# Patient Record
Sex: MALE | Race: WHITE | NOT HISPANIC OR LATINO | Employment: OTHER | URBAN - METROPOLITAN AREA
[De-identification: names, ages, dates, MRNs, and addresses within clinical notes are randomized per-mention and may not be internally consistent; named-entity substitution may affect disease eponyms.]

---

## 2022-05-19 ENCOUNTER — TRANSCRIBE ORDERS (OUTPATIENT)
Dept: ADMINISTRATIVE | Facility: HOSPITAL | Age: 67
End: 2022-05-19

## 2022-05-19 DIAGNOSIS — M43.06 PARS DEFECT OF LUMBAR SPINE: Primary | ICD-10-CM

## 2022-05-26 ENCOUNTER — HOSPITAL ENCOUNTER (OUTPATIENT)
Dept: MRI IMAGING | Facility: HOSPITAL | Age: 67
Discharge: HOME | End: 2022-05-26
Admitting: NURSE PRACTITIONER

## 2022-05-26 DIAGNOSIS — M43.06 PARS DEFECT OF LUMBAR SPINE: ICD-10-CM

## 2022-05-26 PROCEDURE — 72148 MRI LUMBAR SPINE W/O DYE: CPT

## 2022-05-31 ENCOUNTER — HOSPITAL ENCOUNTER (INPATIENT)
Facility: HOSPITAL | Age: 67
LOS: 12 days | Discharge: NURSING, REHAB OR LONG-TERM CARE | End: 2022-06-17
Attending: EMERGENCY MEDICINE

## 2022-05-31 ENCOUNTER — APPOINTMENT (OUTPATIENT)
Dept: CT IMAGING | Facility: HOSPITAL | Age: 67
End: 2022-05-31

## 2022-05-31 ENCOUNTER — APPOINTMENT (OUTPATIENT)
Dept: GENERAL RADIOLOGY | Facility: HOSPITAL | Age: 67
End: 2022-05-31

## 2022-05-31 DIAGNOSIS — R55 SYNCOPE, UNSPECIFIED SYNCOPE TYPE: Primary | ICD-10-CM

## 2022-05-31 DIAGNOSIS — Z95.1 S/P CABG (CORONARY ARTERY BYPASS GRAFT): ICD-10-CM

## 2022-05-31 DIAGNOSIS — I95.1 ORTHOSTATIC HYPOTENSION: ICD-10-CM

## 2022-05-31 DIAGNOSIS — N28.9 ACUTE RENAL INSUFFICIENCY: ICD-10-CM

## 2022-05-31 DIAGNOSIS — J96.01 ACUTE RESPIRATORY FAILURE WITH HYPOXEMIA: ICD-10-CM

## 2022-05-31 DIAGNOSIS — N17.9 AKI (ACUTE KIDNEY INJURY): ICD-10-CM

## 2022-05-31 DIAGNOSIS — R94.39 ABNORMAL NUCLEAR STRESS TEST: ICD-10-CM

## 2022-05-31 DIAGNOSIS — J44.1 COPD EXACERBATION: ICD-10-CM

## 2022-05-31 DIAGNOSIS — R77.8 ELEVATED TROPONIN: ICD-10-CM

## 2022-05-31 DIAGNOSIS — F10.920 ALCOHOLIC INTOXICATION WITHOUT COMPLICATION: ICD-10-CM

## 2022-05-31 DIAGNOSIS — I25.10 CORONARY ARTERY DISEASE INVOLVING NATIVE CORONARY ARTERY OF NATIVE HEART, UNSPECIFIED WHETHER ANGINA PRESENT: ICD-10-CM

## 2022-05-31 LAB
ALBUMIN SERPL-MCNC: 3.6 G/DL (ref 3.5–5.2)
ALBUMIN/GLOB SERPL: 1.4 G/DL
ALP SERPL-CCNC: 54 U/L (ref 39–117)
ALT SERPL W P-5'-P-CCNC: 30 U/L (ref 1–41)
ANION GAP SERPL CALCULATED.3IONS-SCNC: 15 MMOL/L (ref 5–15)
AST SERPL-CCNC: 30 U/L (ref 1–40)
BASOPHILS # BLD AUTO: 0 10*3/MM3 (ref 0–0.2)
BASOPHILS NFR BLD AUTO: 0.5 % (ref 0–1.5)
BILIRUB SERPL-MCNC: 0.4 MG/DL (ref 0–1.2)
BUN SERPL-MCNC: 15 MG/DL (ref 8–23)
BUN/CREAT SERPL: 7.9 (ref 7–25)
CALCIUM SPEC-SCNC: 8.8 MG/DL (ref 8.6–10.5)
CHLORIDE SERPL-SCNC: 97 MMOL/L (ref 98–107)
CO2 SERPL-SCNC: 22 MMOL/L (ref 22–29)
CREAT SERPL-MCNC: 1.9 MG/DL (ref 0.76–1.27)
DEPRECATED RDW RBC AUTO: 48.1 FL (ref 37–54)
EGFRCR SERPLBLD CKD-EPI 2021: 38.2 ML/MIN/1.73
EOSINOPHIL # BLD AUTO: 0 10*3/MM3 (ref 0–0.4)
EOSINOPHIL NFR BLD AUTO: 0.7 % (ref 0.3–6.2)
ERYTHROCYTE [DISTWIDTH] IN BLOOD BY AUTOMATED COUNT: 13.9 % (ref 12.3–15.4)
ETHANOL UR QL: 0.2 %
GLOBULIN UR ELPH-MCNC: 2.5 GM/DL
GLUCOSE SERPL-MCNC: 111 MG/DL (ref 65–99)
HCT VFR BLD AUTO: 36.7 % (ref 37.5–51)
HGB BLD-MCNC: 12.1 G/DL (ref 13–17.7)
LYMPHOCYTES # BLD AUTO: 1.9 10*3/MM3 (ref 0.7–3.1)
LYMPHOCYTES NFR BLD AUTO: 31.5 % (ref 19.6–45.3)
MCH RBC QN AUTO: 32.9 PG (ref 26.6–33)
MCHC RBC AUTO-ENTMCNC: 33 G/DL (ref 31.5–35.7)
MCV RBC AUTO: 99.7 FL (ref 79–97)
MONOCYTES # BLD AUTO: 0.7 10*3/MM3 (ref 0.1–0.9)
MONOCYTES NFR BLD AUTO: 12 % (ref 5–12)
NEUTROPHILS NFR BLD AUTO: 3.3 10*3/MM3 (ref 1.7–7)
NEUTROPHILS NFR BLD AUTO: 55.3 % (ref 42.7–76)
NRBC BLD AUTO-RTO: 0.1 /100 WBC (ref 0–0.2)
PLATELET # BLD AUTO: 170 10*3/MM3 (ref 140–450)
PMV BLD AUTO: 8 FL (ref 6–12)
POTASSIUM SERPL-SCNC: 3.7 MMOL/L (ref 3.5–5.2)
PROT SERPL-MCNC: 6.1 G/DL (ref 6–8.5)
RBC # BLD AUTO: 3.68 10*6/MM3 (ref 4.14–5.8)
SARS-COV-2 RNA PNL SPEC NAA+PROBE: NOT DETECTED
SODIUM SERPL-SCNC: 134 MMOL/L (ref 136–145)
TROPONIN T SERPL-MCNC: 0.33 NG/ML (ref 0–0.03)
TROPONIN T SERPL-MCNC: 0.41 NG/ML (ref 0–0.03)
WBC NRBC COR # BLD: 6 10*3/MM3 (ref 3.4–10.8)

## 2022-05-31 PROCEDURE — 94640 AIRWAY INHALATION TREATMENT: CPT

## 2022-05-31 PROCEDURE — 82077 ASSAY SPEC XCP UR&BREATH IA: CPT | Performed by: PHYSICIAN ASSISTANT

## 2022-05-31 PROCEDURE — 84145 PROCALCITONIN (PCT): CPT | Performed by: PHYSICIAN ASSISTANT

## 2022-05-31 PROCEDURE — 84484 ASSAY OF TROPONIN QUANT: CPT | Performed by: PHYSICIAN ASSISTANT

## 2022-05-31 PROCEDURE — 70450 CT HEAD/BRAIN W/O DYE: CPT

## 2022-05-31 PROCEDURE — G0378 HOSPITAL OBSERVATION PER HR: HCPCS

## 2022-05-31 PROCEDURE — 80053 COMPREHEN METABOLIC PANEL: CPT | Performed by: PHYSICIAN ASSISTANT

## 2022-05-31 PROCEDURE — 85025 COMPLETE CBC W/AUTO DIFF WBC: CPT | Performed by: PHYSICIAN ASSISTANT

## 2022-05-31 PROCEDURE — 93005 ELECTROCARDIOGRAM TRACING: CPT | Performed by: EMERGENCY MEDICINE

## 2022-05-31 PROCEDURE — 94799 UNLISTED PULMONARY SVC/PX: CPT

## 2022-05-31 PROCEDURE — 87635 SARS-COV-2 COVID-19 AMP PRB: CPT | Performed by: PHYSICIAN ASSISTANT

## 2022-05-31 PROCEDURE — 99285 EMERGENCY DEPT VISIT HI MDM: CPT

## 2022-05-31 PROCEDURE — 93005 ELECTROCARDIOGRAM TRACING: CPT

## 2022-05-31 PROCEDURE — 71045 X-RAY EXAM CHEST 1 VIEW: CPT

## 2022-05-31 PROCEDURE — 86140 C-REACTIVE PROTEIN: CPT | Performed by: PHYSICIAN ASSISTANT

## 2022-05-31 PROCEDURE — 25010000002 THIAMINE PER 100 MG: Performed by: PHYSICIAN ASSISTANT

## 2022-05-31 RX ORDER — ALBUTEROL SULFATE 90 UG/1
6 AEROSOL, METERED RESPIRATORY (INHALATION) ONCE
Status: COMPLETED | OUTPATIENT
Start: 2022-05-31 | End: 2022-05-31

## 2022-05-31 RX ORDER — ASPIRIN 81 MG/1
324 TABLET, CHEWABLE ORAL ONCE
Status: COMPLETED | OUTPATIENT
Start: 2022-05-31 | End: 2022-06-01

## 2022-05-31 RX ORDER — HEPARIN SODIUM 10000 [USP'U]/100ML
9.62 INJECTION, SOLUTION INTRAVENOUS
Status: DISCONTINUED | OUTPATIENT
Start: 2022-05-31 | End: 2022-05-31

## 2022-05-31 RX ORDER — SODIUM CHLORIDE 0.9 % (FLUSH) 0.9 %
10 SYRINGE (ML) INJECTION AS NEEDED
Status: DISCONTINUED | OUTPATIENT
Start: 2022-05-31 | End: 2022-06-10

## 2022-05-31 RX ORDER — SODIUM CHLORIDE 9 MG/ML
75 INJECTION, SOLUTION INTRAVENOUS CONTINUOUS
Status: DISCONTINUED | OUTPATIENT
Start: 2022-05-31 | End: 2022-06-03

## 2022-05-31 RX ORDER — ENOXAPARIN SODIUM 100 MG/ML
1 INJECTION SUBCUTANEOUS ONCE
Status: COMPLETED | OUTPATIENT
Start: 2022-06-01 | End: 2022-06-01

## 2022-05-31 RX ORDER — METHYLPREDNISOLONE SODIUM SUCCINATE 125 MG/2ML
125 INJECTION, POWDER, LYOPHILIZED, FOR SOLUTION INTRAMUSCULAR; INTRAVENOUS ONCE
Status: COMPLETED | OUTPATIENT
Start: 2022-05-31 | End: 2022-06-01

## 2022-05-31 RX ADMIN — ALBUTEROL SULFATE 6 PUFF: 108 INHALANT RESPIRATORY (INHALATION) at 21:44

## 2022-05-31 RX ADMIN — FOLIC ACID 1000 ML/HR: 5 INJECTION, SOLUTION INTRAMUSCULAR; INTRAVENOUS; SUBCUTANEOUS at 21:49

## 2022-05-31 RX ADMIN — SODIUM CHLORIDE 1000 ML: 9 INJECTION, SOLUTION INTRAVENOUS at 21:22

## 2022-06-01 ENCOUNTER — APPOINTMENT (OUTPATIENT)
Dept: NUCLEAR MEDICINE | Facility: HOSPITAL | Age: 67
End: 2022-06-01

## 2022-06-01 ENCOUNTER — APPOINTMENT (OUTPATIENT)
Dept: CARDIOLOGY | Facility: HOSPITAL | Age: 67
End: 2022-06-01

## 2022-06-01 ENCOUNTER — APPOINTMENT (OUTPATIENT)
Dept: CT IMAGING | Facility: HOSPITAL | Age: 67
End: 2022-06-01

## 2022-06-01 PROBLEM — J44.1 COPD EXACERBATION: Status: ACTIVE | Noted: 2022-06-01

## 2022-06-01 PROBLEM — E66.9 OBESITY (BMI 30-39.9): Status: ACTIVE | Noted: 2022-06-01

## 2022-06-01 LAB
AMPHET+METHAMPHET UR QL: NEGATIVE
ANION GAP SERPL CALCULATED.3IONS-SCNC: 13 MMOL/L (ref 5–15)
BARBITURATES UR QL SCN: NEGATIVE
BASOPHILS # BLD AUTO: 0 10*3/MM3 (ref 0–0.2)
BASOPHILS NFR BLD AUTO: 0.1 % (ref 0–1.5)
BENZODIAZ UR QL SCN: NEGATIVE
BH CV ECHO MEAS - ACS: 2.25 CM
BH CV ECHO MEAS - AO MAX PG: 7.8 MMHG
BH CV ECHO MEAS - AO MEAN PG: 3.9 MMHG
BH CV ECHO MEAS - AO ROOT DIAM: 3.8 CM
BH CV ECHO MEAS - AO V2 MAX: 139.8 CM/SEC
BH CV ECHO MEAS - AO V2 VTI: 30.6 CM
BH CV ECHO MEAS - AVA(I,D): 2.7 CM2
BH CV ECHO MEAS - EDV(CUBED): 240.2 ML
BH CV ECHO MEAS - EDV(MOD-SP4): 164.8 ML
BH CV ECHO MEAS - EF(MOD-SP4): 67 %
BH CV ECHO MEAS - ESV(CUBED): 89 ML
BH CV ECHO MEAS - ESV(MOD-SP4): 54.3 ML
BH CV ECHO MEAS - FS: 28.2 %
BH CV ECHO MEAS - IVS/LVPW: 1.1 CM
BH CV ECHO MEAS - IVSD: 1.07 CM
BH CV ECHO MEAS - LA DIMENSION: 4.1 CM
BH CV ECHO MEAS - LV MASS(C)D: 268.7 GRAMS
BH CV ECHO MEAS - LV MAX PG: 4.2 MMHG
BH CV ECHO MEAS - LV MEAN PG: 2.38 MMHG
BH CV ECHO MEAS - LV V1 MAX: 102.7 CM/SEC
BH CV ECHO MEAS - LV V1 VTI: 22.8 CM
BH CV ECHO MEAS - LVIDD: 6.2 CM
BH CV ECHO MEAS - LVIDS: 4.5 CM
BH CV ECHO MEAS - LVOT AREA: 3.7 CM2
BH CV ECHO MEAS - LVOT DIAM: 2.17 CM
BH CV ECHO MEAS - LVPWD: 0.97 CM
BH CV ECHO MEAS - MR MAX PG: 131.4 MMHG
BH CV ECHO MEAS - MR MAX VEL: 573.1 CM/SEC
BH CV ECHO MEAS - MV A MAX VEL: 113.5 CM/SEC
BH CV ECHO MEAS - MV DEC SLOPE: 334.6 CM/SEC2
BH CV ECHO MEAS - MV DEC TIME: 0.27 MSEC
BH CV ECHO MEAS - MV E MAX VEL: 45 CM/SEC
BH CV ECHO MEAS - MV E/A: 0.4
BH CV ECHO MEAS - MV MAX PG: 7.4 MMHG
BH CV ECHO MEAS - MV MEAN PG: 3.5 MMHG
BH CV ECHO MEAS - MV V2 VTI: 32.3 CM
BH CV ECHO MEAS - MVA(VTI): 2.6 CM2
BH CV ECHO MEAS - PA ACC TIME: 0.07 SEC
BH CV ECHO MEAS - PA PR(ACCEL): 47.9 MMHG
BH CV ECHO MEAS - PA V2 MAX: 104.2 CM/SEC
BH CV ECHO MEAS - PULM A REVS DUR: 0.08 SEC
BH CV ECHO MEAS - PULM A REVS VEL: 32.2 CM/SEC
BH CV ECHO MEAS - PULM DIAS VEL: 46.2 CM/SEC
BH CV ECHO MEAS - PULM S/D: 1.4
BH CV ECHO MEAS - PULM SYS VEL: 64.6 CM/SEC
BH CV ECHO MEAS - RAP SYSTOLE: 15 MMHG
BH CV ECHO MEAS - RV MAX PG: 3.9 MMHG
BH CV ECHO MEAS - RV V1 MAX: 98.6 CM/SEC
BH CV ECHO MEAS - RV V1 VTI: 20.1 CM
BH CV ECHO MEAS - RVDD: 3.2 CM
BH CV ECHO MEAS - RVSP: 48.3 MMHG
BH CV ECHO MEAS - SV(LVOT): 84.1 ML
BH CV ECHO MEAS - SV(MOD-SP4): 110.5 ML
BH CV ECHO MEAS - TR MAX PG: 33.3 MMHG
BH CV ECHO MEAS - TR MAX VEL: 288.4 CM/SEC
BH CV REST NUCLEAR ISOTOPE DOSE: 7.9 MCI
BH CV STRESS BP STAGE 1: NORMAL
BH CV STRESS BP STAGE 2: NORMAL
BH CV STRESS COMMENTS STAGE 1: NORMAL
BH CV STRESS COMMENTS STAGE 2: NORMAL
BH CV STRESS DOSE REGADENOSON STAGE 1: 0.4
BH CV STRESS DURATION MIN STAGE 1: 0
BH CV STRESS DURATION MIN STAGE 2: 4
BH CV STRESS DURATION SEC STAGE 1: 10
BH CV STRESS DURATION SEC STAGE 2: 0
BH CV STRESS HR STAGE 1: 73
BH CV STRESS HR STAGE 2: 98
BH CV STRESS NUCLEAR ISOTOPE DOSE: 21.9 MCI
BH CV STRESS PROTOCOL 1: NORMAL
BH CV STRESS RECOVERY BP: NORMAL MMHG
BH CV STRESS RECOVERY HR: 84 BPM
BH CV STRESS STAGE 1: 1
BH CV STRESS STAGE 2: 2
BH CV XLRA MEAS LEFT DIST CCA EDV: 19.1 CM/SEC
BH CV XLRA MEAS LEFT DIST CCA PSV: 121 CM/SEC
BH CV XLRA MEAS LEFT DIST ICA EDV: -15.2 CM/SEC
BH CV XLRA MEAS LEFT DIST ICA PSV: -43.2 CM/SEC
BH CV XLRA MEAS LEFT ICA/CCA RATIO: -0.61
BH CV XLRA MEAS LEFT PROX CCA EDV: 19.9 CM/SEC
BH CV XLRA MEAS LEFT PROX CCA PSV: 137 CM/SEC
BH CV XLRA MEAS LEFT PROX ECA PSV: -116 CM/SEC
BH CV XLRA MEAS LEFT PROX ICA EDV: -22.4 CM/SEC
BH CV XLRA MEAS LEFT PROX ICA PSV: -83.2 CM/SEC
BH CV XLRA MEAS LEFT PROX SCLA PSV: 257 CM/SEC
BH CV XLRA MEAS LEFT VERTEBRAL A EDV: -19.9 CM/SEC
BH CV XLRA MEAS LEFT VERTEBRAL A PSV: -96.3 CM/SEC
BH CV XLRA MEAS RIGHT DIST CCA EDV: 14.3 CM/SEC
BH CV XLRA MEAS RIGHT DIST CCA PSV: 81.4 CM/SEC
BH CV XLRA MEAS RIGHT DIST ICA EDV: -16.8 CM/SEC
BH CV XLRA MEAS RIGHT DIST ICA PSV: -53.3 CM/SEC
BH CV XLRA MEAS RIGHT ICA/CCA RATIO: -0.52
BH CV XLRA MEAS RIGHT PROX CCA EDV: 15.5 CM/SEC
BH CV XLRA MEAS RIGHT PROX CCA PSV: 107 CM/SEC
BH CV XLRA MEAS RIGHT PROX ECA PSV: -133 CM/SEC
BH CV XLRA MEAS RIGHT PROX ICA EDV: -9 CM/SEC
BH CV XLRA MEAS RIGHT PROX ICA PSV: -55.7 CM/SEC
BH CV XLRA MEAS RIGHT PROX SCLA PSV: 207 CM/SEC
BH CV XLRA MEAS RIGHT VERTEBRAL A EDV: 11.6 CM/SEC
BH CV XLRA MEAS RIGHT VERTEBRAL A PSV: 32.9 CM/SEC
BILIRUB UR QL STRIP: NEGATIVE
BUN SERPL-MCNC: 14 MG/DL (ref 8–23)
BUN/CREAT SERPL: 12 (ref 7–25)
CALCIUM SPEC-SCNC: 8.3 MG/DL (ref 8.6–10.5)
CANNABINOIDS SERPL QL: NEGATIVE
CHLORIDE SERPL-SCNC: 103 MMOL/L (ref 98–107)
CLARITY UR: CLEAR
CO2 SERPL-SCNC: 20 MMOL/L (ref 22–29)
COCAINE UR QL: NEGATIVE
COLOR UR: YELLOW
CREAT SERPL-MCNC: 1.17 MG/DL (ref 0.76–1.27)
CRP SERPL-MCNC: 1.61 MG/DL (ref 0–0.5)
D DIMER PPP FEU-MCNC: 0.7 MG/L (FEU) (ref 0–0.59)
DEPRECATED RDW RBC AUTO: 46.8 FL (ref 37–54)
EGFRCR SERPLBLD CKD-EPI 2021: 68.3 ML/MIN/1.73
EOSINOPHIL # BLD AUTO: 0 10*3/MM3 (ref 0–0.4)
EOSINOPHIL NFR BLD AUTO: 0.1 % (ref 0.3–6.2)
ERYTHROCYTE [DISTWIDTH] IN BLOOD BY AUTOMATED COUNT: 13.4 % (ref 12.3–15.4)
GLUCOSE SERPL-MCNC: 159 MG/DL (ref 65–99)
GLUCOSE UR STRIP-MCNC: NEGATIVE MG/DL
HCT VFR BLD AUTO: 40.8 % (ref 37.5–51)
HGB BLD-MCNC: 13.4 G/DL (ref 13–17.7)
HGB UR QL STRIP.AUTO: NEGATIVE
KETONES UR QL STRIP: ABNORMAL
LEUKOCYTE ESTERASE UR QL STRIP.AUTO: NEGATIVE
LV EF NUC BP: 45 %
LYMPHOCYTES # BLD AUTO: 0.6 10*3/MM3 (ref 0.7–3.1)
LYMPHOCYTES NFR BLD AUTO: 11.4 % (ref 19.6–45.3)
MAXIMAL PREDICTED HEART RATE: 153 BPM
MCH RBC QN AUTO: 32.4 PG (ref 26.6–33)
MCHC RBC AUTO-ENTMCNC: 32.8 G/DL (ref 31.5–35.7)
MCV RBC AUTO: 99 FL (ref 79–97)
METHADONE UR QL SCN: NEGATIVE
MONOCYTES # BLD AUTO: 0.1 10*3/MM3 (ref 0.1–0.9)
MONOCYTES NFR BLD AUTO: 2 % (ref 5–12)
NEUTROPHILS NFR BLD AUTO: 4.7 10*3/MM3 (ref 1.7–7)
NEUTROPHILS NFR BLD AUTO: 86.4 % (ref 42.7–76)
NITRITE UR QL STRIP: NEGATIVE
NRBC BLD AUTO-RTO: 0.1 /100 WBC (ref 0–0.2)
OPIATES UR QL: NEGATIVE
OXYCODONE UR QL SCN: NEGATIVE
PERCENT MAX PREDICTED HR: 64.05 %
PH UR STRIP.AUTO: <=5 [PH] (ref 5–8)
PLATELET # BLD AUTO: 166 10*3/MM3 (ref 140–450)
PMV BLD AUTO: 7.9 FL (ref 6–12)
POTASSIUM SERPL-SCNC: 4.8 MMOL/L (ref 3.5–5.2)
PROCALCITONIN SERPL-MCNC: 0.12 NG/ML (ref 0–0.25)
PROT UR QL STRIP: NEGATIVE
RBC # BLD AUTO: 4.12 10*6/MM3 (ref 4.14–5.8)
SODIUM SERPL-SCNC: 136 MMOL/L (ref 136–145)
SP GR UR STRIP: 1.01 (ref 1–1.03)
STRESS BASELINE BP: NORMAL MMHG
STRESS BASELINE HR: 73 BPM
STRESS PERCENT HR: 75 %
STRESS POST PEAK BP: NORMAL MMHG
STRESS POST PEAK HR: 98 BPM
STRESS TARGET HR: 130 BPM
TROPONIN T SERPL-MCNC: 0.12 NG/ML (ref 0–0.03)
UROBILINOGEN UR QL STRIP: ABNORMAL
WBC NRBC COR # BLD: 5.5 10*3/MM3 (ref 3.4–10.8)

## 2022-06-01 PROCEDURE — 80307 DRUG TEST PRSMV CHEM ANLYZR: CPT | Performed by: PHYSICIAN ASSISTANT

## 2022-06-01 PROCEDURE — 94799 UNLISTED PULMONARY SVC/PX: CPT

## 2022-06-01 PROCEDURE — 71275 CT ANGIOGRAPHY CHEST: CPT

## 2022-06-01 PROCEDURE — 78452 HT MUSCLE IMAGE SPECT MULT: CPT

## 2022-06-01 PROCEDURE — 25010000002 METHYLPREDNISOLONE PER 40 MG: Performed by: PHYSICIAN ASSISTANT

## 2022-06-01 PROCEDURE — 25010000002 ENOXAPARIN PER 10 MG: Performed by: PHYSICIAN ASSISTANT

## 2022-06-01 PROCEDURE — G0378 HOSPITAL OBSERVATION PER HR: HCPCS

## 2022-06-01 PROCEDURE — 94664 DEMO&/EVAL PT USE INHALER: CPT

## 2022-06-01 PROCEDURE — A9502 TC99M TETROFOSMIN: HCPCS | Performed by: EMERGENCY MEDICINE

## 2022-06-01 PROCEDURE — 80048 BASIC METABOLIC PNL TOTAL CA: CPT | Performed by: PHYSICIAN ASSISTANT

## 2022-06-01 PROCEDURE — 99204 OFFICE O/P NEW MOD 45 MIN: CPT | Performed by: INTERNAL MEDICINE

## 2022-06-01 PROCEDURE — 93018 CV STRESS TEST I&R ONLY: CPT | Performed by: INTERNAL MEDICINE

## 2022-06-01 PROCEDURE — 93005 ELECTROCARDIOGRAM TRACING: CPT | Performed by: INTERNAL MEDICINE

## 2022-06-01 PROCEDURE — 78452 HT MUSCLE IMAGE SPECT MULT: CPT | Performed by: INTERNAL MEDICINE

## 2022-06-01 PROCEDURE — 0 IOPAMIDOL PER 1 ML: Performed by: EMERGENCY MEDICINE

## 2022-06-01 PROCEDURE — 84484 ASSAY OF TROPONIN QUANT: CPT | Performed by: INTERNAL MEDICINE

## 2022-06-01 PROCEDURE — 25010000002 METHYLPREDNISOLONE PER 125 MG: Performed by: PHYSICIAN ASSISTANT

## 2022-06-01 PROCEDURE — 93306 TTE W/DOPPLER COMPLETE: CPT

## 2022-06-01 PROCEDURE — 93880 EXTRACRANIAL BILAT STUDY: CPT

## 2022-06-01 PROCEDURE — 25010000002 REGADENOSON 0.4 MG/5ML SOLUTION: Performed by: EMERGENCY MEDICINE

## 2022-06-01 PROCEDURE — 93306 TTE W/DOPPLER COMPLETE: CPT | Performed by: INTERNAL MEDICINE

## 2022-06-01 PROCEDURE — 85025 COMPLETE CBC W/AUTO DIFF WBC: CPT | Performed by: PHYSICIAN ASSISTANT

## 2022-06-01 PROCEDURE — 93017 CV STRESS TEST TRACING ONLY: CPT

## 2022-06-01 PROCEDURE — 81003 URINALYSIS AUTO W/O SCOPE: CPT | Performed by: PHYSICIAN ASSISTANT

## 2022-06-01 PROCEDURE — 25010000002 SULFUR HEXAFLUORIDE MICROSPH 60.7-25 MG RECONSTITUTED SUSPENSION: Performed by: EMERGENCY MEDICINE

## 2022-06-01 PROCEDURE — 85379 FIBRIN DEGRADATION QUANT: CPT | Performed by: PHYSICIAN ASSISTANT

## 2022-06-01 PROCEDURE — 94640 AIRWAY INHALATION TREATMENT: CPT

## 2022-06-01 PROCEDURE — 0 TECHNETIUM TETROFOSMIN KIT: Performed by: EMERGENCY MEDICINE

## 2022-06-01 RX ORDER — IPRATROPIUM BROMIDE AND ALBUTEROL SULFATE 2.5; .5 MG/3ML; MG/3ML
3 SOLUTION RESPIRATORY (INHALATION)
Status: DISCONTINUED | OUTPATIENT
Start: 2022-06-01 | End: 2022-06-07

## 2022-06-01 RX ORDER — GABAPENTIN 600 MG/1
600 TABLET ORAL 2 TIMES DAILY
COMMUNITY
End: 2022-06-17 | Stop reason: HOSPADM

## 2022-06-01 RX ORDER — AMLODIPINE BESYLATE 5 MG/1
5 TABLET ORAL DAILY
Status: DISCONTINUED | OUTPATIENT
Start: 2022-06-01 | End: 2022-06-10

## 2022-06-01 RX ORDER — LORAZEPAM 1 MG/1
1 TABLET ORAL
Status: ACTIVE | OUTPATIENT
Start: 2022-06-01 | End: 2022-06-08

## 2022-06-01 RX ORDER — PREDNISONE 20 MG/1
40 TABLET ORAL
Qty: 10 TABLET | Refills: 0 | Status: SHIPPED | OUTPATIENT
Start: 2022-06-02 | End: 2022-06-07

## 2022-06-01 RX ORDER — ACETAMINOPHEN 650 MG/1
650 SUPPOSITORY RECTAL EVERY 4 HOURS PRN
Status: DISCONTINUED | OUTPATIENT
Start: 2022-06-01 | End: 2022-06-10

## 2022-06-01 RX ORDER — BUDESONIDE AND FORMOTEROL FUMARATE DIHYDRATE 80; 4.5 UG/1; UG/1
2 AEROSOL RESPIRATORY (INHALATION)
Status: DISCONTINUED | OUTPATIENT
Start: 2022-06-01 | End: 2022-06-02

## 2022-06-01 RX ORDER — ALUMINA, MAGNESIA, AND SIMETHICONE 2400; 2400; 240 MG/30ML; MG/30ML; MG/30ML
15 SUSPENSION ORAL EVERY 6 HOURS PRN
Status: DISCONTINUED | OUTPATIENT
Start: 2022-06-01 | End: 2022-06-10

## 2022-06-01 RX ORDER — ACETAMINOPHEN 325 MG/1
650 TABLET ORAL EVERY 4 HOURS PRN
Status: DISCONTINUED | OUTPATIENT
Start: 2022-06-01 | End: 2022-06-10

## 2022-06-01 RX ORDER — GABAPENTIN 600 MG/1
600 TABLET ORAL 2 TIMES DAILY
Status: DISCONTINUED | OUTPATIENT
Start: 2022-06-01 | End: 2022-06-10

## 2022-06-01 RX ORDER — IPRATROPIUM BROMIDE AND ALBUTEROL SULFATE 2.5; .5 MG/3ML; MG/3ML
3 SOLUTION RESPIRATORY (INHALATION)
Status: DISCONTINUED | OUTPATIENT
Start: 2022-06-01 | End: 2022-06-10

## 2022-06-01 RX ORDER — POTASSIUM CHLORIDE 1.5 G/1.77G
40 POWDER, FOR SOLUTION ORAL AS NEEDED
Status: DISCONTINUED | OUTPATIENT
Start: 2022-06-01 | End: 2022-06-10

## 2022-06-01 RX ORDER — GUAIFENESIN 600 MG/1
1200 TABLET, EXTENDED RELEASE ORAL EVERY 12 HOURS
Status: DISCONTINUED | OUTPATIENT
Start: 2022-06-01 | End: 2022-06-13

## 2022-06-01 RX ORDER — AMLODIPINE BESYLATE 5 MG/1
5 TABLET ORAL DAILY
COMMUNITY
End: 2022-06-17 | Stop reason: HOSPADM

## 2022-06-01 RX ORDER — ONDANSETRON 4 MG/1
4 TABLET, FILM COATED ORAL EVERY 6 HOURS PRN
Status: DISCONTINUED | OUTPATIENT
Start: 2022-06-01 | End: 2022-06-10

## 2022-06-01 RX ORDER — PREDNISONE 20 MG/1
40 TABLET ORAL
Status: DISCONTINUED | OUTPATIENT
Start: 2022-06-02 | End: 2022-06-03

## 2022-06-01 RX ORDER — LISINOPRIL 20 MG/1
40 TABLET ORAL DAILY
Status: DISCONTINUED | OUTPATIENT
Start: 2022-06-01 | End: 2022-06-03

## 2022-06-01 RX ORDER — LORAZEPAM 0.5 MG/1
0.5 TABLET ORAL
Status: ACTIVE | OUTPATIENT
Start: 2022-06-01 | End: 2022-06-08

## 2022-06-01 RX ORDER — BENZONATATE 100 MG/1
200 CAPSULE ORAL 3 TIMES DAILY PRN
Status: DISCONTINUED | OUTPATIENT
Start: 2022-06-01 | End: 2022-06-10

## 2022-06-01 RX ORDER — TAMSULOSIN HYDROCHLORIDE 0.4 MG/1
1 CAPSULE ORAL DAILY
Status: ON HOLD | COMMUNITY
End: 2022-06-17 | Stop reason: SDUPTHER

## 2022-06-01 RX ORDER — LISINOPRIL 40 MG/1
40 TABLET ORAL DAILY
COMMUNITY
End: 2022-06-17 | Stop reason: HOSPADM

## 2022-06-01 RX ORDER — METHYLPREDNISOLONE SODIUM SUCCINATE 40 MG/ML
40 INJECTION, POWDER, LYOPHILIZED, FOR SOLUTION INTRAMUSCULAR; INTRAVENOUS EVERY 8 HOURS
Status: COMPLETED | OUTPATIENT
Start: 2022-06-01 | End: 2022-06-01

## 2022-06-01 RX ORDER — ACETAMINOPHEN 160 MG/5ML
650 SOLUTION ORAL EVERY 4 HOURS PRN
Status: DISCONTINUED | OUTPATIENT
Start: 2022-06-01 | End: 2022-06-10

## 2022-06-01 RX ORDER — LORAZEPAM 0.5 MG/1
0.5 TABLET ORAL
Status: DISPENSED | OUTPATIENT
Start: 2022-06-01 | End: 2022-06-08

## 2022-06-01 RX ORDER — POTASSIUM CHLORIDE 7.45 MG/ML
10 INJECTION INTRAVENOUS
Status: DISCONTINUED | OUTPATIENT
Start: 2022-06-01 | End: 2022-06-10

## 2022-06-01 RX ORDER — ATORVASTATIN CALCIUM 10 MG/1
10 TABLET, FILM COATED ORAL DAILY
Status: DISCONTINUED | OUTPATIENT
Start: 2022-06-01 | End: 2022-06-03

## 2022-06-01 RX ORDER — CHOLECALCIFEROL (VITAMIN D3) 125 MCG
5 CAPSULE ORAL NIGHTLY PRN
Status: DISCONTINUED | OUTPATIENT
Start: 2022-06-01 | End: 2022-06-10

## 2022-06-01 RX ORDER — MAGNESIUM SULFATE HEPTAHYDRATE 40 MG/ML
2 INJECTION, SOLUTION INTRAVENOUS AS NEEDED
Status: DISCONTINUED | OUTPATIENT
Start: 2022-06-01 | End: 2022-06-10

## 2022-06-01 RX ORDER — SODIUM CHLORIDE 0.9 % (FLUSH) 0.9 %
10 SYRINGE (ML) INJECTION AS NEEDED
Status: DISCONTINUED | OUTPATIENT
Start: 2022-06-01 | End: 2022-06-10

## 2022-06-01 RX ORDER — ONDANSETRON 2 MG/ML
4 INJECTION INTRAMUSCULAR; INTRAVENOUS EVERY 6 HOURS PRN
Status: DISCONTINUED | OUTPATIENT
Start: 2022-06-01 | End: 2022-06-10

## 2022-06-01 RX ORDER — POTASSIUM CHLORIDE 20 MEQ/1
40 TABLET, EXTENDED RELEASE ORAL AS NEEDED
Status: DISCONTINUED | OUTPATIENT
Start: 2022-06-01 | End: 2022-06-10

## 2022-06-01 RX ORDER — NITROGLYCERIN 0.4 MG/1
0.4 TABLET SUBLINGUAL
Status: DISCONTINUED | OUTPATIENT
Start: 2022-06-01 | End: 2022-06-10

## 2022-06-01 RX ORDER — ENOXAPARIN SODIUM 150 MG/ML
1 INJECTION SUBCUTANEOUS EVERY 12 HOURS SCHEDULED
Status: DISCONTINUED | OUTPATIENT
Start: 2022-06-01 | End: 2022-06-05

## 2022-06-01 RX ORDER — SODIUM CHLORIDE 0.9 % (FLUSH) 0.9 %
10 SYRINGE (ML) INJECTION EVERY 12 HOURS SCHEDULED
Status: DISCONTINUED | OUTPATIENT
Start: 2022-06-01 | End: 2022-06-10

## 2022-06-01 RX ORDER — PRAVASTATIN SODIUM 20 MG
20 TABLET ORAL DAILY
Status: ON HOLD | COMMUNITY
End: 2022-06-17 | Stop reason: SDUPTHER

## 2022-06-01 RX ORDER — MAGNESIUM SULFATE HEPTAHYDRATE 40 MG/ML
4 INJECTION, SOLUTION INTRAVENOUS AS NEEDED
Status: DISCONTINUED | OUTPATIENT
Start: 2022-06-01 | End: 2022-06-10

## 2022-06-01 RX ADMIN — IPRATROPIUM BROMIDE AND ALBUTEROL SULFATE 3 ML: .5; 3 SOLUTION RESPIRATORY (INHALATION) at 00:38

## 2022-06-01 RX ADMIN — Medication 600 MG: at 21:19

## 2022-06-01 RX ADMIN — ENOXAPARIN SODIUM 110 MG: 150 INJECTION SUBCUTANEOUS at 21:19

## 2022-06-01 RX ADMIN — IPRATROPIUM BROMIDE AND ALBUTEROL SULFATE 3 ML: .5; 3 SOLUTION RESPIRATORY (INHALATION) at 22:40

## 2022-06-01 RX ADMIN — SODIUM CHLORIDE 75 ML/HR: 9 INJECTION, SOLUTION INTRAVENOUS at 14:20

## 2022-06-01 RX ADMIN — ENOXAPARIN SODIUM 100 MG: 100 INJECTION SUBCUTANEOUS at 00:44

## 2022-06-01 RX ADMIN — Medication 10 ML: at 00:45

## 2022-06-01 RX ADMIN — Medication 10 ML: at 11:49

## 2022-06-01 RX ADMIN — ENOXAPARIN SODIUM 110 MG: 150 INJECTION SUBCUTANEOUS at 11:48

## 2022-06-01 RX ADMIN — METHYLPREDNISOLONE SODIUM SUCCINATE 40 MG: 40 INJECTION, POWDER, FOR SOLUTION INTRAMUSCULAR; INTRAVENOUS at 11:48

## 2022-06-01 RX ADMIN — IPRATROPIUM BROMIDE AND ALBUTEROL SULFATE 3 ML: .5; 3 SOLUTION RESPIRATORY (INHALATION) at 19:41

## 2022-06-01 RX ADMIN — IPRATROPIUM BROMIDE AND ALBUTEROL SULFATE 3 ML: .5; 3 SOLUTION RESPIRATORY (INHALATION) at 10:40

## 2022-06-01 RX ADMIN — TETROFOSMIN 1 DOSE: 1.38 INJECTION, POWDER, LYOPHILIZED, FOR SOLUTION INTRAVENOUS at 12:13

## 2022-06-01 RX ADMIN — AMLODIPINE BESYLATE 5 MG: 5 TABLET ORAL at 14:21

## 2022-06-01 RX ADMIN — IOPAMIDOL 100 ML: 755 INJECTION, SOLUTION INTRAVENOUS at 12:43

## 2022-06-01 RX ADMIN — ASPIRIN 324 MG: 81 TABLET, CHEWABLE ORAL at 00:44

## 2022-06-01 RX ADMIN — IPRATROPIUM BROMIDE AND ALBUTEROL SULFATE 3 ML: .5; 3 SOLUTION RESPIRATORY (INHALATION) at 14:58

## 2022-06-01 RX ADMIN — GUAIFENESIN 1200 MG: 600 TABLET, EXTENDED RELEASE ORAL at 23:53

## 2022-06-01 RX ADMIN — GUAIFENESIN 1200 MG: 600 TABLET, EXTENDED RELEASE ORAL at 00:45

## 2022-06-01 RX ADMIN — METHYLPREDNISOLONE SODIUM SUCCINATE 40 MG: 40 INJECTION, POWDER, FOR SOLUTION INTRAMUSCULAR; INTRAVENOUS at 23:53

## 2022-06-01 RX ADMIN — REGADENOSON 0.4 MG: 0.08 INJECTION, SOLUTION INTRAVENOUS at 13:11

## 2022-06-01 RX ADMIN — ATORVASTATIN CALCIUM 10 MG: 10 TABLET, FILM COATED ORAL at 14:20

## 2022-06-01 RX ADMIN — METHYLPREDNISOLONE SODIUM SUCCINATE 40 MG: 40 INJECTION, POWDER, FOR SOLUTION INTRAMUSCULAR; INTRAVENOUS at 18:16

## 2022-06-01 RX ADMIN — GABAPENTIN 600 MG: 600 TABLET, FILM COATED ORAL at 21:19

## 2022-06-01 RX ADMIN — LISINOPRIL 40 MG: 20 TABLET ORAL at 14:20

## 2022-06-01 RX ADMIN — SODIUM CHLORIDE 75 ML/HR: 9 INJECTION, SOLUTION INTRAVENOUS at 00:44

## 2022-06-01 RX ADMIN — GABAPENTIN 600 MG: 600 TABLET, FILM COATED ORAL at 15:52

## 2022-06-01 RX ADMIN — SULFUR HEXAFLUORIDE 2 ML: KIT at 08:46

## 2022-06-01 RX ADMIN — Medication 10 ML: at 21:19

## 2022-06-01 RX ADMIN — METHYLPREDNISOLONE SODIUM SUCCINATE 125 MG: 125 INJECTION, POWDER, FOR SOLUTION INTRAMUSCULAR; INTRAVENOUS at 00:44

## 2022-06-01 RX ADMIN — GUAIFENESIN 1200 MG: 600 TABLET, EXTENDED RELEASE ORAL at 11:49

## 2022-06-01 RX ADMIN — BUDESONIDE AND FORMOTEROL FUMARATE DIHYDRATE 2 PUFF: 80; 4.5 AEROSOL RESPIRATORY (INHALATION) at 22:40

## 2022-06-01 RX ADMIN — TETROFOSMIN 1 DOSE: 1.38 INJECTION, POWDER, LYOPHILIZED, FOR SOLUTION INTRAVENOUS at 13:11

## 2022-06-01 NOTE — PLAN OF CARE
Goal Outcome Evaluation:  Plan of Care Reviewed With: patient        Progress: no change  Outcome Evaluation: Patient had abnormal myoview. Cardiac cath tomorrow. Will monitor.

## 2022-06-01 NOTE — ED PROVIDER NOTES
"Subjective     Patient is a 67-year-old male comes in complaining of syncope since earlier today.  EMS reports that patient joann had witnessed patient passed out earlier today just prior to arrival.  EMS states that when they arrived patient had a subsequent syncopal episode where patient had lost consciousness for about 30 seconds.  EMS also reports that patient had been mildly hypoxic in the upper 80s.  EMS reports that patient has been drinking daily.  I asked patient how many beers he has had today and yesterday patient states that he drinks as much as he wants to drink \"I do not know 5-6 beers\".  Patient denies any vomiting, diarrhea, abdominal pain, chest pain, shortness of breath, urinary symptoms or swelling her legs bilaterally.  Patient denies any cough.          Review of Systems   Constitutional: Negative for chills, fatigue and fever.   HENT: Negative for congestion, sore throat, tinnitus and trouble swallowing.    Eyes: Negative for photophobia, discharge and visual disturbance.   Respiratory: Negative for cough, shortness of breath and wheezing.    Cardiovascular: Negative for chest pain and leg swelling.   Gastrointestinal: Negative for abdominal pain, diarrhea, nausea and vomiting.   Genitourinary: Negative for dysuria, flank pain and urgency.   Musculoskeletal: Negative for arthralgias and myalgias.   Skin: Negative for rash.   Neurological: Positive for syncope. Negative for dizziness, light-headedness and headaches.   Psychiatric/Behavioral: Negative for confusion.       History reviewed. No pertinent past medical history.    No Known Allergies    History reviewed. No pertinent surgical history.    History reviewed. No pertinent family history.    Social History     Socioeconomic History   • Marital status:            Objective   Physical Exam  Vitals and nursing note reviewed.   Constitutional:       General: He is not in acute distress.     Appearance: He is well-developed. He is not " "diaphoretic.   HENT:      Head: Normocephalic and atraumatic.      Right Ear: External ear normal.      Left Ear: External ear normal.      Nose: Nose normal.      Mouth/Throat:      Pharynx: No oropharyngeal exudate.   Eyes:      Extraocular Movements: Extraocular movements intact.      Conjunctiva/sclera: Conjunctivae normal.      Pupils: Pupils are equal, round, and reactive to light.   Cardiovascular:      Rate and Rhythm: Normal rate and regular rhythm.      Pulses: Normal pulses.      Heart sounds: Normal heart sounds.      Comments: S1, S2 audible.  Pulmonary:      Effort: Pulmonary effort is normal. No respiratory distress.      Breath sounds: Normal breath sounds. No wheezing.      Comments: On room air.  Abdominal:      General: Bowel sounds are normal. There is no distension.      Palpations: Abdomen is soft.      Tenderness: There is no abdominal tenderness. There is no guarding or rebound.   Musculoskeletal:         General: No tenderness or deformity. Normal range of motion.      Cervical back: Normal range of motion.      Right lower leg: No edema.      Left lower leg: No edema.   Skin:     General: Skin is warm.      Capillary Refill: Capillary refill takes less than 2 seconds.      Findings: No erythema or rash.   Neurological:      Mental Status: He is alert and oriented to person, place, and time.      Cranial Nerves: No cranial nerve deficit.   Psychiatric:         Mood and Affect: Mood normal.         Behavior: Behavior normal.         Procedures           ED Course  ED Course as of 06/01/22 0022   Tue May 31, 2022   2115 Patient's orthostatic vitals show home blood pressure lying down of 81/53 and heart rate of 69, blood pressure sitting of 83/55 and heart rate of 72, standing blood pressure of 68/45 and heart rate of 77 and reports dizziness upon standing. [RL]      ED Course User Index  [RL] Brian Brock PA      BP 95/52   Pulse 72   Temp 98.1 °F (36.7 °C)   Resp 18   Ht 177.8 cm (70\")   " Wt 104 kg (230 lb)   SpO2 94%   BMI 33.00 kg/m²   Labs Reviewed   COMPREHENSIVE METABOLIC PANEL - Abnormal; Notable for the following components:       Result Value    Glucose 111 (*)     Creatinine 1.90 (*)     Sodium 134 (*)     Chloride 97 (*)     eGFR 38.2 (*)     All other components within normal limits    Narrative:     GFR Normal >60  Chronic Kidney Disease <60  Kidney Failure <15     TROPONIN (IN-HOUSE) - Abnormal; Notable for the following components:    Troponin T 0.415 (*)     All other components within normal limits    Narrative:     Troponin T Reference Range:  <= 0.03 ng/mL-   Negative for AMI  >0.03 ng/mL-     Abnormal for myocardial necrosis.  Clinicians would have to utilize clinical acumen, EKG, Troponin and serial changes to determine if it is an Acute Myocardial Infarction or myocardial injury due to an underlying chronic condition.       Results may be falsely decreased if patient taking Biotin.     TROPONIN (IN-HOUSE) - Abnormal; Notable for the following components:    Troponin T 0.326 (*)     All other components within normal limits    Narrative:     Troponin T Reference Range:  <= 0.03 ng/mL-   Negative for AMI  >0.03 ng/mL-     Abnormal for myocardial necrosis.  Clinicians would have to utilize clinical acumen, EKG, Troponin and serial changes to determine if it is an Acute Myocardial Infarction or myocardial injury due to an underlying chronic condition.       Results may be falsely decreased if patient taking Biotin.     CBC WITH AUTO DIFFERENTIAL - Abnormal; Notable for the following components:    RBC 3.68 (*)     Hemoglobin 12.1 (*)     Hematocrit 36.7 (*)     MCV 99.7 (*)     All other components within normal limits   COVID-19,CEPHEID/ROMAIN,COR/STACEY/PAD/JOVON IN-HOUSE,NP SWAB IN TRANSPORT MEDIA 3-4 HR TAT, RT-PCR - Normal    Narrative:     Fact sheet for providers: https://www.fda.gov/media/722099/download     Fact sheet for patients: https://www.fda.gov/media/937752/download  Fact  sheet for providers: https://www.fda.gov/media/191719/download    Fact sheet for patients: https://www.fda.gov/media/511470/download    Test performed by PCR.   ETHANOL    Narrative:     Plasma Ethanol Clinical Symptoms:    ETOH (%)               Clinical Symptom  .01-.05              No apparent influence  .03-.12              Euphoria, Diminished judgment and attention   .09-.25              Impaired comprehension, Muscle incoordination  .18-.30              Confusion, Staggered gait, Slurred speech  .25-.40              Markedly decreased response to stimuli, unable to stand or                        walk, vomitting, sleep or stupor  .35-.50              Comatose, Anesthesia, Subnormal body temperature       URINE DRUG SCREEN   URINALYSIS W/ CULTURE IF INDICATED   C-REACTIVE PROTEIN   PROCALCITONIN   D-DIMER, QUANTITATIVE   CBC AND DIFFERENTIAL    Narrative:     The following orders were created for panel order CBC & Differential.  Procedure                               Abnormality         Status                     ---------                               -----------         ------                     CBC Auto Differential[[id number]]        Abnormal            Final result                 Please view results for these tests on the individual orders.     CT Head Without Contrast    Result Date: 5/31/2022  No acute intracranial abnormality.  Electronically Signed By-Johann Dodge MD On:5/31/2022 10:53 PM This report was finalized on [id number] by  Johann Dodge MD.    XR Chest 1 View    Result Date: 5/31/2022  No acute cardiopulmonary abnormality.  Electronically Signed By-Johann Dodge MD On:5/31/2022 9:29 PM This report was finalized on [id number] by  Johann Dodge MD.                                               [facility]       Chart review: No known allergies  EKG: EKG reviewed myself interpreted by Dr. Madrid, shows sinus rhythm 72 bpm, no ST elevation apparent.    Imaging:    CT Head Without Contrast  "  Final Result   No acute intracranial abnormality.       Electronically Signed By-Johann Dodge MD On:5/31/2022 10:53 PM   This report was finalized on [id number] by  Johann Dodge MD.      XR Chest 1 View   Final Result   No acute cardiopulmonary abnormality.       Electronically Signed By-Johann Dodge MD On:5/31/2022 9:29 PM   This report was finalized on [id number] by  Johann Dodge MD.          Labs: Serum creatinine elevated at 1.9 with GFR of 38.2.  CBC largely unremarkable for acute findings.  Ethanol level elevated 0.203.  UA pending upon admission as well as UDS.  COVID-19 swab negative.  Initial troponin elevated 0.4, repeat elevated 0.326.    Vitals:  BP 95/52   Pulse 72   Temp 98.1 °F (36.7 °C)   Resp 18   Ht 177.8 cm (70\")   Wt 104 kg (230 lb)   SpO2 94%   BMI 33.00 kg/m²     Medications given:    Medications   sodium chloride 0.9 % flush 10 mL (has no administration in time range)   methylPREDNISolone sodium succinate (SOLU-Medrol) injection 125 mg (has no administration in time range)   sodium chloride 0.9 % infusion (has no administration in time range)   aspirin chewable tablet 324 mg (has no administration in time range)   Enoxaparin Sodium (LOVENOX) syringe 100 mg (has no administration in time range)   LORazepam (ATIVAN) tablet 0.5 mg (has no administration in time range)     Or   LORazepam (ATIVAN) tablet 0.5 mg (has no administration in time range)     Or   LORazepam (ATIVAN) tablet 1 mg (has no administration in time range)     Or   LORazepam (ATIVAN) tablet 0.5 mg (has no administration in time range)     Or   LORazepam (ATIVAN) tablet 0.5 mg (has no administration in time range)     Or   LORazepam (ATIVAN) tablet 0.5 mg (has no administration in time range)   ipratropium-albuterol (DUO-NEB) nebulizer solution 3 mL (has no administration in time range)   ipratropium-albuterol (DUO-NEB) nebulizer solution 3 mL (has no administration in time range)   methylPREDNISolone " sodium succinate (SOLU-Medrol) injection 40 mg (has no administration in time range)   benzonatate (TESSALON) capsule 200 mg (has no administration in time range)   guaiFENesin (MUCINEX) 12 hr tablet 1,200 mg (has no administration in time range)   sodium chloride 0.9 % bolus 1,000 mL (0 mL Intravenous Stopped 5/31/22 2235)   albuterol sulfate HFA (PROVENTIL HFA;VENTOLIN HFA;PROAIR HFA) inhaler 6 puff (6 puffs Inhalation Given 5/31/22 2144)   thiamine (B-1) 100 mg, folic acid 1 mg in sodium chloride 0.9 % 1,000 mL infusion (1,000 mL/hr Intravenous New Bag 5/31/22 2149)       Procedures:    MDM: Patient is a 67-year-old male comes in complaining of syncopal episodes, x2 and patient has a history of alcoholism.  Patient states he does drink 5 or 6 beers today.  Serum creatinine elevated at 1.9 with GFR of 38.2.  CBC largely unremarkable for acute findings.  Ethanol level elevated 0.203.  UA pending upon admission as well as UDS.  COVID-19 swab negative.  CT head unremarkable for acute findings.  Initial troponin elevated 0.4, repeat elevated 0.326.  Patient had orthostatic hypotension and was hypotensive upon arrival.  Patient was given 1 L normal saline bolus as well as 1 L banana bag.  Patient was also wheezy on exam and was given albuterol as well as Solu-Medrol for apparent COPD exacerbation.  Patient states he is not on supplemental oxygen at home.  Patient currently requiring 3.5 L nasal cannula as patient was hypoxic in the upper and mid 80s on room air.  Patient was covered with therapeutic dose Lovenox for patient's elevated troponin as well as hypoxia.  Patient will need further cardiac consultation and work-up.  Patient be placed in ED observation unit for further work-up and treatment.  I discussed case with attending provider recommended ED observation unit as well.  Results and plan discussed with patient and is agreeable with plan.      Syncope  -Possibly from dehydration from alcohol  intoxication  -Cardiology consultation  -Check serial troponin, echo, continuous cardiac monitoring    Elevated troponin   -Troponin elevated at 0.4 and repeat 0.3  -Patient given therapeutic dose Lovenox here in the ED x1    Acute respiratory failure with hypoxia  -Patient usually on no supplemental oxygen at home currently requiring 3.5 L nasal cannula    COPD exacerbation  -Patient given Solu-Medrol in ED as well as albuterol  -Continue Solu-Medrol  -Check Pro-Yfn and CRP    Alcohol intoxication with history of daily alcohol use   -EtOH of 0.203 in ED  -CIWA protocol ordered  -Banana bag given in ED    Final diagnoses:   Syncope, unspecified syncope type   Alcoholic intoxication without complication (HCC)   Acute respiratory failure with hypoxemia (HCC)   Orthostatic hypotension   Elevated troponin   FELIBERTO (acute kidney injury) (HCC)   COPD exacerbation (HCC)       ED Disposition  ED Disposition     ED Disposition   Decision to Admit    Condition   --    Comment   --             No follow-up provider specified.       Medication List      No changes were made to your prescriptions during this visit.          Brian Brock PA  06/01/22 0022       Brian Brock PA  06/01/22 0022

## 2022-06-01 NOTE — H&P
"[facility] H&P    Patient Name: Chi Quinteros Sr.  : 1955  MRN: [id number]  Primary Care Physician: Deysi Mason APRN  Date of admission: 2022     Patient Care Team:  Deysi Mason APRN as PCP - General (Nurse Practitioner)          Subjective   History Present Illness     Chief Complaint:   Chief Complaint   Patient presents with   • Syncope         Obtained from ED provider HPI on 2022:  Patient is a 67-year-old male comes in complaining of syncope since earlier today.  EMS reports that patient joann had witnessed patient passed out earlier today just prior to arrival.  EMS states that when they arrived patient had a subsequent syncopal episode where patient had lost consciousness for about 30 seconds.  EMS also reports that patient had been mildly hypoxic in the upper 80s.  EMS reports that patient has been drinking daily.  I asked patient how many beers he has had today and yesterday patient states that he drinks as much as he wants to drink \"I do not know 5-6 beers\".  Patient denies any vomiting, diarrhea, abdominal pain, chest pain, shortness of breath, urinary symptoms or swelling her legs bilaterally.  Patient denies any cough.    2022: Patient confirms the HPI noted above including a syncopal episode the day before while patient was cleaning fish following a recent extended fishing trip.  He notes that he did drink a large amount of coffee, tea as well as whiskey with some beers prior to his syncopal episode.  He noted no preceding symptoms or prodrome.  No pain, nausea, vomiting, dyspnea, peripheral edema or cardiovascular history is reported.  Patient reports that he stopped smoking completely 1 year ago.  He confirms compliance all of his outpatient medical therapies including Trelegy inhaler      Review of Systems   Constitutional: Negative.   HENT: Negative.    Eyes: Negative.    Cardiovascular: Positive for syncope. Negative for chest pain, dyspnea on " exertion, irregular heartbeat, leg swelling, near-syncope and palpitations.   Respiratory: Negative.    Skin: Negative.    Musculoskeletal: Negative.    Gastrointestinal: Negative.    Genitourinary: Negative.    Psychiatric/Behavioral: Negative.            Personal History     Past Medical History:   Past Medical History:   Diagnosis Date   • Back pain    • Emphysema lung (HCC)    • Hypertension        Surgical History:    History reviewed. No pertinent surgical history.        Family History: family history is not on file. Otherwise pertinent FHx was reviewed and unremarkable.     Social History:  reports that he quit smoking about a year ago. He has never used smokeless tobacco. He reports current alcohol use. He reports that he does not use drugs.      Medications:  Prior to Admission medications    Medication Sig Start Date End Date Taking? Authorizing Provider   amLODIPine (NORVASC) 5 MG tablet Take 5 mg by mouth Daily.   Yes Ponce Tyson MD   Fluticasone-Umeclidin-Vilant (Trelegy Ellipta) 100-62.5-25 MCG/INH inhaler Inhale 1 puff Daily.   Yes Ponce Tyson MD   gabapentin (NEURONTIN) 600 MG tablet Take 600 mg by mouth 2 (Two) Times a Day.   Yes Ponce Tyson MD   ipratropium-albuterol (COMBIVENT RESPIMAT)  MCG/ACT inhaler Inhale 1 puff 4 (Four) Times a Day As Needed for Wheezing.   Yes Ponce Tyson MD   lisinopril (PRINIVIL,ZESTRIL) 40 MG tablet Take 40 mg by mouth Daily.   Yes Ponce Tyson MD   pravastatin (PRAVACHOL) 20 MG tablet Take 20 mg by mouth Daily.   Yes Ponce Tyson MD   tamsulosin (FLOMAX) 0.4 MG capsule 24 hr capsule Take 1 capsule by mouth Daily.   Yes Ponce Tyson MD       Allergies:  No Known Allergies    Objective   Objective     Vital Signs  Temp:  [98.1 °F (36.7 °C)-98.3 °F (36.8 °C)] 98.2 °F (36.8 °C)  Heart Rate:  [67-84] 71  Resp:  [16-24] 18  BP: ()/(45-79) 147/79  SpO2:  [87 %-99 %] 97 %  on  Flow (L/min):  [4-6]  4;   Device (Oxygen Therapy): nasal cannula  Body mass index is 34.58 kg/m².    Physical Exam  Vitals reviewed.   Constitutional:       General: He is not in acute distress.     Appearance: Normal appearance. He is obese. He is not ill-appearing, toxic-appearing or diaphoretic.   HENT:      Head: Normocephalic and atraumatic.      Right Ear: External ear normal.      Left Ear: External ear normal.      Nose: Nose normal.      Mouth/Throat:      Mouth: Mucous membranes are moist.   Eyes:      Extraocular Movements: Extraocular movements intact.   Cardiovascular:      Rate and Rhythm: Normal rate and regular rhythm.      Pulses: Normal pulses.      Heart sounds: Normal heart sounds.   Pulmonary:      Effort: Pulmonary effort is normal. No respiratory distress.      Breath sounds: Normal breath sounds. No wheezing.   Abdominal:      General: Bowel sounds are normal. There is no distension.      Palpations: Abdomen is soft.      Tenderness: There is no abdominal tenderness.   Musculoskeletal:         General: Normal range of motion.      Cervical back: Normal range of motion.   Skin:     General: Skin is warm and dry.      Capillary Refill: Capillary refill takes less than 2 seconds.   Neurological:      General: No focal deficit present.      Mental Status: He is alert and oriented to person, place, and time.   Psychiatric:         Mood and Affect: Mood normal.         Behavior: Behavior normal.         Thought Content: Thought content normal.         Judgment: Judgment normal.           Results Review:  I have personally reviewed most recent cardiac tracings, lab results and radiology images and interpretations and agree with findings, most notably: Troponin, CMP, CBC, D-dimer, procalcitonin, CRP, UA, urine tox, COVID-19 screen, CT of head, carotid duplex ultrasound, chest x-ray and EKG.    Results from last 7 days   Lab Units 06/01/22  1024   WBC 10*3/mm3 5.50   HEMOGLOBIN g/dL 13.4   HEMATOCRIT % 40.8   PLATELETS  10*3/mm3 166     Results from last 7 days   Lab Units 06/01/22  1024 05/31/22 2227 05/31/22 2053   SODIUM mmol/L 136  --  134*   POTASSIUM mmol/L 4.8  --  3.7   CHLORIDE mmol/L 103  --  97*   CO2 mmol/L 20.0*  --  22.0   BUN mg/dL 14  --  15   CREATININE mg/dL 1.17  --  1.90*   GLUCOSE mg/dL 159*  --  111*   CALCIUM mg/dL 8.3*  --  8.8   ALT (SGPT) U/L  --   --  30   AST (SGOT) U/L  --   --  30   TROPONIN T ng/mL 0.116* 0.326* 0.415*   PROCALCITONIN ng/mL  --  0.12  --      Estimated Creatinine Clearance: 75.7 mL/min (by C-G formula based on SCr of 1.17 mg/dL).  Brief Urine Lab Results  (Last result in the past 365 days)      Color   Clarity   Blood   Leuk Est   Nitrite   Protein   CREAT   Urine HCG        06/01/22 0029 Yellow   Clear   Negative   Negative   Negative   Negative                 Microbiology Results (last 10 days)     Procedure Component Value - Date/Time    COVID-19,CEPHEID/ROMAIN,COR/STACEY/PAD/JOVON IN-HOUSE(OR EMERGENT/ADD-ON),NP SWAB IN TRANSPORT MEDIA 3-4 HR TAT, RT-PCR - Swab, Nasopharynx [[id number]]  (Normal) Collected: 05/31/22 2124    Lab Status: Final result Specimen: Swab from Nasopharynx Updated: 05/31/22 2147     COVID19 Not Detected    Narrative:      Fact sheet for providers: https://www.fda.gov/media/543247/download     Fact sheet for patients: https://www.fda.gov/media/562519/download  Fact sheet for providers: https://www.fda.gov/media/616705/download    Fact sheet for patients: https://www.fda.gov/media/395464/download    Test performed by PCR.          ECG/EMG Results (most recent)     Procedure Component Value Units Date/Time    Adult Transthoracic Echo Complete With Contrast if Necessary Per Protocol (With Agitated Saline) [[id number]] Resulted: 06/01/22 0850     Updated: 06/01/22 0851     Target HR (85%) 130 bpm      Max. Pred. HR (100%) 153 bpm      ACS 2.25 cm      Ao root diam 3.8 cm      Ao pk amanda 139.8 cm/sec      Ao V2 VTI 30.6 cm      GERMANIA(I,D) 2.7 cm2      EDV(cubed) 240.2 ml       EDV(MOD-sp4) 164.8 ml      EF(MOD-sp4) 67.0 %      ESV(cubed) 89.0 ml      ESV(MOD-sp4) 54.3 ml      IVS/LVPW 1.10 cm      LV mass(C)d 268.7 grams      LV V1 max PG 4.2 mmHg      LV V1 mean PG 2.38 mmHg      LV V1 max 102.7 cm/sec      LVPWd 0.97 cm      MR max .4 mmHg      MV dec slope 334.6 cm/sec2      MV dec time 0.27 msec      MV V2 VTI 32.3 cm      MVA(VTI) 2.6 cm2      PA acc time 0.07 sec      PA pr(Accel) 47.9 mmHg      PA V2 max 104.2 cm/sec      Pulm A Revs Sylvester 32.2 cm/sec      RAP systole 15.0 mmHg      RV V1 max PG 3.9 mmHg      RV V1 max 98.6 cm/sec      RV V1 VTI 20.1 cm      RVIDd 3.2 cm      RVSP(TR) 48.3 mmHg      SV(LVOT) 84.1 ml      SV(MOD-sp4) 110.5 ml      TR max PG 33.3 mmHg      Ao max PG 7.8 mmHg      Ao mean PG 3.9 mmHg      FS 28.2 %      IVSd 1.07 cm      LA dimension (2D)  4.1 cm      LV V1 VTI 22.8 cm      LVIDd 6.2 cm      LVIDs 4.5 cm      LVOT area 3.7 cm2      LVOT diam 2.17 cm      MV E/A 0.40     MV max PG 7.4 mmHg      MV mean PG 3.5 mmHg      Pulm S/D 1.40     MR max sylvester 573.1 cm/sec      MV A max sylvester 113.5 cm/sec      MV E max sylvester 45.0 cm/sec      Pulm A Revs Dur 0.08 sec      Pulm Álvarez Sylvester 46.2 cm/sec      Pulm Sys Sylvester 64.6 cm/sec      TR max sylvester 288.4 cm/sec     ECG 12 Lead [[id number]] Collected: 05/31/22 2038     Updated: 06/01/22 1044     QT Interval 419 ms     Narrative:      HEART RATE= 72  bpm  RR Interval= 832  ms  CO Interval= 162  ms  P Horizontal Axis= 35  deg  P Front Axis= 16  deg  QRSD Interval= 102  ms  QT Interval= 419  ms  QRS Axis= -6  deg  T Wave Axis= -14  deg  - ABNORMAL ECG -  Sinus rhythm  Inferior infarct, old  Electronically Signed By:   Date and Time of Study: 2022-05-31 20:38:25          Results for orders placed during the hospital encounter of 05/31/22    Bilateral Carotid Duplex    Interpretation Summary  · Proximal right internal carotid artery mild stenosis.  · Proximal left internal carotid artery mild stenosis.          CT Head  Without Contrast    Result Date: 5/31/2022  No acute intracranial abnormality.  Electronically Signed By-Johann Dodge MD On:5/31/2022 10:53 PM This report was finalized on [id number] by  Johann Dodge MD.    MRI Lumbar Spine Without Contrast    Result Date: 5/26/2022  1.Multilevel degenerative changes as noted. 2.Minimal anterolisthesis of L5 on S1. There are suggested pars defects.  Electronically Signed By-Zenon James MD On:5/26/2022 9:11 AM This report was finalized on [id number] by  Zenon James MD.    XR Chest 1 View    Result Date: 5/31/2022  No acute cardiopulmonary abnormality.  Electronically Signed By-Johann Dodge MD On:5/31/2022 9:29 PM This report was finalized on [id number] by  Johann Dodge MD.        Estimated Creatinine Clearance: 75.7 mL/min (by C-G formula based on SCr of 1.17 mg/dL).    Assessment & Plan   Assessment/Plan       Active Hospital Problems    Diagnosis  POA   • COPD exacerbation (HCC) [J44.1]  Yes   • Obesity (BMI 30-39.9) [E66.9]  Yes   • Syncope [R55]  Yes      Resolved Hospital Problems   No resolved problems to display.     Syncope with an elevated troponin  -Serial troponins: 0.415, 0.326, 0.116  -D-dimer: 0.70  -UA shows trace ketones but is otherwise unremarkable  -Chest x-ray shows no acute cardiopulmonary abnormality  -CT of head showed no acute intracranial abnormality  -Bilateral carotid duplex shows mild stenosis in the proximal right and left internal carotid arteries  -EKG shows sinus rhythm at 72 without obvious acute ST changes or ectopy and a QTC of 459 ms  -Patient given treatment dose Lovenox in the ED, continue for now pending further evaluation for pulmonary embolism  -Cardiology consulted who recommended echocardiogram and stress testing  -CT PE protocol showed no pulmonary embolism but with a 4.9 cm descending aortic aneurysm along with bibasilar atelectasis and fatty infiltration of liver    AECOPD  -Oxygen saturation initially 87% on 4 L in  the ED  -Patient given 125 mg Solu-Medrol in the ED with 40 mg every 8 hours ordered subsequently  -DuoNeb, Mucinex ordered in ED  -Incentive spirometry  -Continue O2 and pulse oximetry  -IV Solu-Medrol discontinued on 6/1/2022 with plans to initiate p.o. prednisone in a.m.    Acute on chronic kidney injury  -Initial creatinine: 1.90 with a BUN of 15 and a BUN/creatinine ratio of 7.9, creatinine improved to 1.17 following fluid administration  -Monitor while admitted    Hypertension  -Patient initially hypotensive with blood pressures in the 80s systolic, has improved to 147/79  -Resume lisinopril with close monitoring while admitted    Hyperlipidemia  -Statin    BPH  -Flomax    Alcohol abuse  -Patient reports he currently drinks multiple beers along with whiskey daily  -CIWA protocol ordered in ED    Obesity (34.58)  -Encouraged on lifestyle modifications            VTE Prophylaxis -   Mechanical Order History:      Ordered        06/01/22 0033  Place Sequential Compression Device  Once            06/01/22 0033  Maintain Sequential Compression Device  Continuous                    Pharmalogical Order History:      Ordered     Dose Route Frequency Stop    06/01/22 1102  Enoxaparin Sodium (LOVENOX) syringe 110 mg         1 mg/kg SC Every 12 Hours Scheduled --    06/01/22 1058  Pharmacy to Dose enoxaparin (LOVENOX)         -- XX Continuous PRN --    05/31/22 2358  Enoxaparin Sodium (LOVENOX) syringe 100 mg         1 mg/kg SC Once 06/01/22 0044    05/31/22 2300  heparin 44676 units/250 mL (100 units/mL) in 0.45 % NaCl infusion  10 mL/hr,   Status:  Discontinued         9.62 Units/kg/hr IV Titrated 05/31/22 2355    05/31/22 2300  heparin bolus from bag 2,500 Units  Status:  Discontinued         2,500 Units IV Every 6 Hours PRN 05/31/22 2355    05/31/22 2300  heparin bolus from bag 5,000 Units  Status:  Discontinued         5,000 Units IV Every 6 Hours PRN 05/31/22 2355                CODE STATUS:    Code Status and  Medical Interventions:   Ordered at: 05/31/22 2353     Code Status (Patient has no pulse and is not breathing):    CPR (Attempt to Resuscitate)     Medical Interventions (Patient has pulse or is breathing):    Full Support       This patient has been examined wearing personal protective equipment.     I discussed the patient's findings and my recommendations with patient and nursing staff.      Signature:Electronically signed by Baldomero Hunter PA-C, 06/01/22, 5:24 PM EDT.

## 2022-06-01 NOTE — CONSULTS
Referring Provider: Hospitalist  Reason for Consultation: Syncope    Patient Care Team:  Deysi Mason APRN as PCP - General (Nurse Practitioner)    Chief complaint syncope    Subjective .     History of present illness:  Chi Quinteros Sr. is a 67 y.o. male with history of hypertension hyperlipidemia presented to hospital after syncopal episode.  Patient was at home when he had this episode and was witnessed by his family member.  Patient states that he did not have any chest pain but has shortness of breath with exertion radiograms any PND orthopnea.  No palpitation but had dizziness.  No swelling of the feet.  When patient presented to the ER he had a EKG which showed sinus rhythm and he had a stress Myoview which is abnormal and hence a cardiology consult was called.  Review of Systems   Constitutional: Negative for fever and malaise/fatigue.   HENT: Negative for ear pain and nosebleeds.    Eyes: Negative for blurred vision and double vision.   Cardiovascular: Negative for chest pain, dyspnea on exertion and palpitations.   Respiratory: Positive for shortness of breath. Negative for cough.    Skin: Negative for rash.   Musculoskeletal: Negative for joint pain.   Gastrointestinal: Negative for abdominal pain, nausea and vomiting.   Neurological: Negative for focal weakness and headaches.   Psychiatric/Behavioral: Negative for depression. The patient is not nervous/anxious.    All other systems reviewed and are negative.      History  Past Medical History:   Diagnosis Date   • Back pain    • Emphysema lung (HCC)    • Hypertension        History reviewed. No pertinent surgical history.    History reviewed. No pertinent family history.    Social History     Tobacco Use   • Smoking status: Former Smoker     Quit date: 2021     Years since quittin.0   • Smokeless tobacco: Never Used   Vaping Use   • Vaping Use: Never used   Substance Use Topics   • Alcohol use: Yes     Comment: 3-4 beers daily   • Drug  use: Never        Medications Prior to Admission   Medication Sig Dispense Refill Last Dose   • amLODIPine (NORVASC) 5 MG tablet Take 5 mg by mouth Daily.   5/31/2022 at Unknown time   • Fluticasone-Umeclidin-Vilant (Trelegy Ellipta) 100-62.5-25 MCG/INH inhaler Inhale 1 puff Daily.   5/31/2022 at Unknown time   • gabapentin (NEURONTIN) 600 MG tablet Take 600 mg by mouth 2 (Two) Times a Day.   5/31/2022 at Unknown time   • ipratropium-albuterol (COMBIVENT RESPIMAT)  MCG/ACT inhaler Inhale 1 puff 4 (Four) Times a Day As Needed for Wheezing.   Past Month at Unknown time   • lisinopril (PRINIVIL,ZESTRIL) 40 MG tablet Take 40 mg by mouth Daily.   5/31/2022 at Unknown time   • pravastatin (PRAVACHOL) 20 MG tablet Take 20 mg by mouth Daily.   5/31/2022 at Unknown time   • tamsulosin (FLOMAX) 0.4 MG capsule 24 hr capsule Take 1 capsule by mouth Daily.   5/31/2022 at Unknown time         Patient has no known allergies.    Scheduled Meds:amLODIPine, 5 mg, Oral, Daily  atorvastatin, 10 mg, Oral, Daily  budesonide-formoterol, 2 puff, Inhalation, BID - RT   And  ipratropium, 0.5 mg, Nebulization, Q6H - RT  enoxaparin, 1 mg/kg, Subcutaneous, Q12H  gabapentin, 600 mg, Oral, BID  guaiFENesin, 1,200 mg, Oral, Q12H  ipratropium-albuterol, 3 mL, Nebulization, Q4H - RT  lisinopril, 40 mg, Oral, Daily  methylPREDNISolone sodium succinate, 40 mg, Intravenous, Q8H  [START ON 6/2/2022] predniSONE, 40 mg, Oral, Daily With Breakfast  sodium chloride, 10 mL, Intravenous, Q12H      Continuous Infusions:Pharmacy to Dose enoxaparin (LOVENOX),   Pharmacy to dose Trelegy,   sodium chloride, 75 mL/hr, Last Rate: 75 mL/hr (06/01/22 1420)      PRN Meds:.•  acetaminophen **OR** acetaminophen **OR** acetaminophen  •  aluminum-magnesium hydroxide-simethicone  •  benzonatate  •  ipratropium-albuterol  •  LORazepam **OR** LORazepam **OR** LORazepam **OR** LORazepam **OR** LORazepam **OR** LORazepam  •  magnesium sulfate **OR** magnesium sulfate  "**OR** magnesium sulfate  •  melatonin  •  nitroglycerin  •  ondansetron **OR** ondansetron  •  Pharmacy to Dose enoxaparin (LOVENOX)  •  Pharmacy to dose Trelegy  •  potassium chloride **OR** potassium chloride **OR** potassium chloride  •  sodium chloride  •  sodium chloride    Objective     VITAL SIGNS  Vitals:    06/01/22 1043 06/01/22 1427 06/01/22 1458 06/01/22 1503   BP:  158/57     BP Location:  Left arm     Patient Position:  Lying     Pulse: 71 76 76 73   Resp: 18 18 18 18   Temp:  98.3 °F (36.8 °C)     TempSrc:  Oral     SpO2: 97% 97% 95% 99%   Weight:       Height:           Flowsheet Rows    Flowsheet Row First Filed Value   Admission Height 177.8 cm (70\") Documented at 05/31/2022 2052   Admission Weight 104 kg (230 lb) Documented at 05/31/2022 2033           TELEMETRY: Sinus rhythm    Physical Exam:  Constitutional:       Appearance: Well-developed.   Eyes:      General: No scleral icterus.     Conjunctiva/sclera: Conjunctivae normal.      Pupils: Pupils are equal, round, and reactive to light.   HENT:      Head: Normocephalic and atraumatic.   Neck:      Vascular: No carotid bruit or JVD.   Pulmonary:      Effort: Pulmonary effort is normal.      Breath sounds: Normal breath sounds. No wheezing. No rales.   Cardiovascular:      Normal rate. Regular rhythm.   Pulses:     Intact distal pulses.   Abdominal:      General: Bowel sounds are normal.      Palpations: Abdomen is soft.   Musculoskeletal: Normal range of motion.      Cervical back: Normal range of motion and neck supple. Skin:     General: Skin is warm and dry.      Findings: No rash.   Neurological:      Mental Status: Alert.      Comments: No focal deficits          Results Review:   I reviewed the patient's new clinical results.  Lab Results (last 24 hours)     Procedure Component Value Units Date/Time    Troponin [[id number]]  (Abnormal) Collected: 06/01/22 1024    Specimen: Blood from Arm, Left Updated: 06/01/22 1110     Troponin T 0.116 " ng/mL     Narrative:      Troponin T Reference Range:  <= 0.03 ng/mL-   Negative for AMI  >0.03 ng/mL-     Abnormal for myocardial necrosis.  Clinicians would have to utilize clinical acumen, EKG, Troponin and serial changes to determine if it is an Acute Myocardial Infarction or myocardial injury due to an underlying chronic condition.       Results may be falsely decreased if patient taking Biotin.      Basic Metabolic Panel [[id number]]  (Abnormal) Collected: 06/01/22 1024    Specimen: Blood from Arm, Left Updated: 06/01/22 1108     Glucose 159 mg/dL      BUN 14 mg/dL      Creatinine 1.17 mg/dL      Sodium 136 mmol/L      Potassium 4.8 mmol/L      Chloride 103 mmol/L      CO2 20.0 mmol/L      Calcium 8.3 mg/dL      BUN/Creatinine Ratio 12.0     Anion Gap 13.0 mmol/L      eGFR 68.3 mL/min/1.73      Comment: National Kidney Foundation and American Society of Nephrology (ASN) Task Force recommended calculation based on the Chronic Kidney Disease Epidemiology Collaboration (CKD-EPI) equation refit without adjustment for race.       Narrative:      GFR Normal >60  Chronic Kidney Disease <60  Kidney Failure <15      D-dimer, Quantitative [[id number]]  (Abnormal) Collected: 06/01/22 1024    Specimen: Blood from Arm, Left Updated: 06/01/22 1050     D-Dimer, Quantitative 0.70 mg/L (FEU)     Narrative:      Reference Range  --------------------------------------------------------------------     < 0.50   Negative Predictive Value  0.50-0.59   Indeterminate    >= 0.60   Probable VTE             A very low percentage of patients with DVT may yield D-Dimer results   below the cut-off of 0.50 mg/L FEU.  This is known to be more   prevalent in patients with distal DVT.             Results of this test should always be interpreted in conjunction with   the patient's medical history, clinical presentation and other   findings.  Clinical diagnosis should not be based on the result of   [place] D-Dimer alone.    CBC Auto  Differential [[id number]]  (Abnormal) Collected: 06/01/22 1024    Specimen: Blood from Arm, Left Updated: 06/01/22 1038     WBC 5.50 10*3/mm3      RBC 4.12 10*6/mm3      Hemoglobin 13.4 g/dL      Hematocrit 40.8 %      MCV 99.0 fL      MCH 32.4 pg      MCHC 32.8 g/dL      RDW 13.4 %      RDW-SD 46.8 fl      MPV 7.9 fL      Platelets 166 10*3/mm3      Neutrophil % 86.4 %      Lymphocyte % 11.4 %      Monocyte % 2.0 %      Eosinophil % 0.1 %      Basophil % 0.1 %      Neutrophils, Absolute 4.70 10*3/mm3      Lymphocytes, Absolute 0.60 10*3/mm3      Monocytes, Absolute 0.10 10*3/mm3      Eosinophils, Absolute 0.00 10*3/mm3      Basophils, Absolute 0.00 10*3/mm3      nRBC 0.1 /100 WBC     Urine Drug Screen - Urine, Clean Catch [[id number]]  (Normal) Collected: 06/01/22 0029    Specimen: Urine, Clean Catch Updated: 06/01/22 0123     Amphet/Methamphet, Screen Negative     Barbiturates Screen, Urine Negative     Benzodiazepine Screen, Urine Negative     Cocaine Screen, Urine Negative     Opiate Screen Negative     THC, Screen, Urine Negative     Methadone Screen, Urine Negative     Oxycodone Screen, Urine Negative    Narrative:      Negative Thresholds Per Drugs Screened:    Amphetamines                 500 ng/ml  Barbiturates                 200 ng/ml  Benzodiazepines              100 ng/ml  Cocaine                      300 ng/ml  Methadone                    300 ng/ml  Opiates                      300 ng/ml  Oxycodone                    100 ng/ml  THC                           50 ng/ml    The Normal Value for all drugs tested is negative. This report includes final unconfirmed screening results to be used for medical treatment purposes only. Unconfirmed results must not be used for non-medical purposes such as employment or legal testing. Clinical consideration should be applied to any drug of abuse test, particularly when unconfirmed results are used.          All urine drugs of abuse requests without chain of custody are  "for medical screening purposes only.  False positives are possible.      Urinalysis With Culture If Indicated - Urine, Clean Catch [[id number]]  (Abnormal) Collected: 06/01/22 0029    Specimen: Urine, Clean Catch Updated: 06/01/22 0058     Color, UA Yellow     Appearance, UA Clear     pH, UA <=5.0     Specific Gravity, UA 1.012     Glucose, UA Negative     Ketones, UA Trace     Bilirubin, UA Negative     Blood, UA Negative     Protein, UA Negative     Leuk Esterase, UA Negative     Nitrite, UA Negative     Urobilinogen, UA 0.2 E.U./dL    Narrative:      In absence of clinical symptoms, the presence of pyuria, bacteria, and/or nitrites on the urinalysis result does not correlate with infection.  Urine microscopic not indicated.    Procalcitonin [[id number]]  (Normal) Collected: 05/31/22 2227    Specimen: Blood Updated: 06/01/22 0037     Procalcitonin 0.12 ng/mL     Narrative:      As a Marker for Sepsis (Non-Neonates):    1. <0.5 ng/mL represents a low risk of severe sepsis and/or septic shock.  2. >2 ng/mL represents a high risk of severe sepsis and/or septic shock.    As a Marker for Lower Respiratory Tract Infections that require antibiotic therapy:    PCT on Admission    Antibiotic Therapy       6-12 Hrs later    >0.5                Strongly Recommended  >0.25 - <0.5        Recommended   0.1 - 0.25          Discouraged              Remeasure/reassess PCT  <0.1                Strongly Discouraged     Remeasure/reassess PCT    As 28 day mortality risk marker: \"Change in Procalcitonin Result\" (>80% or <=80%) if Day 0 (or Day 1) and Day 4 values are available. Refer to http://www.[facility]-pct-calculator.com    Change in PCT <=80%  A decrease of PCT levels below or equal to 80% defines a positive change in PCT test result representing a higher risk for 28-day all-cause mortality of patients diagnosed with severe sepsis for septic shock.    Change in PCT >80%  A decrease of PCT levels of more than 80% defines a negative " change in PCT result representing a lower risk for 28-day all-cause mortality of patients diagnosed with severe sepsis or septic shock.       C-reactive Protein [[id number]]  (Abnormal) Collected: 05/31/22 2227    Specimen: Blood Updated: 06/01/22 0032     C-Reactive Protein 1.61 mg/dL     Troponin [[id number]]  (Abnormal) Collected: 05/31/22 2227    Specimen: Blood Updated: 05/31/22 2258     Troponin T 0.326 ng/mL     Narrative:      Troponin T Reference Range:  <= 0.03 ng/mL-   Negative for AMI  >0.03 ng/mL-     Abnormal for myocardial necrosis.  Clinicians would have to utilize clinical acumen, EKG, Troponin and serial changes to determine if it is an Acute Myocardial Infarction or myocardial injury due to an underlying chronic condition.       Results may be falsely decreased if patient taking Biotin.      COVID-19,CEPHEID/ROMAIN,COR/STACEY/PAD/JOVON IN-HOUSE(OR EMERGENT/ADD-ON),NP SWAB IN TRANSPORT MEDIA 3-4 HR TAT, RT-PCR - Swab, Nasopharynx [[id number]]  (Normal) Collected: 05/31/22 2124    Specimen: Swab from Nasopharynx Updated: 05/31/22 2147     COVID19 Not Detected    Narrative:      Fact sheet for providers: https://www.fda.gov/media/471432/download     Fact sheet for patients: https://www.fda.gov/media/595455/download  Fact sheet for providers: https://www.fda.gov/media/084002/download    Fact sheet for patients: https://www.fda.gov/media/097236/download    Test performed by PCR.    Troponin [[id number]]  (Abnormal) Collected: 05/31/22 2053    Specimen: Blood Updated: 05/31/22 2128     Troponin T 0.415 ng/mL     Narrative:      Troponin T Reference Range:  <= 0.03 ng/mL-   Negative for AMI  >0.03 ng/mL-     Abnormal for myocardial necrosis.  Clinicians would have to utilize clinical acumen, EKG, Troponin and serial changes to determine if it is an Acute Myocardial Infarction or myocardial injury due to an underlying chronic condition.       Results may be falsely decreased if patient taking Biotin.       Comprehensive Metabolic Panel [[id number]]  (Abnormal) Collected: 05/31/22 2053    Specimen: Blood Updated: 05/31/22 2125     Glucose 111 mg/dL      BUN 15 mg/dL      Creatinine 1.90 mg/dL      Sodium 134 mmol/L      Potassium 3.7 mmol/L      Comment: Slight hemolysis detected by analyzer. Results may be affected.        Chloride 97 mmol/L      CO2 22.0 mmol/L      Calcium 8.8 mg/dL      Total Protein 6.1 g/dL      Albumin 3.60 g/dL      ALT (SGPT) 30 U/L      AST (SGOT) 30 U/L      Alkaline Phosphatase 54 U/L      Total Bilirubin 0.4 mg/dL      Globulin 2.5 gm/dL      A/G Ratio 1.4 g/dL      BUN/Creatinine Ratio 7.9     Anion Gap 15.0 mmol/L      eGFR 38.2 mL/min/1.73      Comment: National Kidney Foundation and American Society of Nephrology (ASN) Task Force recommended calculation based on the Chronic Kidney Disease Epidemiology Collaboration (CKD-EPI) equation refit without adjustment for race.       Narrative:      GFR Normal >60  Chronic Kidney Disease <60  Kidney Failure <15      Ethanol [[id number]] Collected: 05/31/22 2053    Specimen: Blood Updated: 05/31/22 2125     Ethanol % 0.203 %     Narrative:      Plasma Ethanol Clinical Symptoms:    ETOH (%)               Clinical Symptom  .01-.05              No apparent influence  .03-.12              Euphoria, Diminished judgment and attention   .09-.25              Impaired comprehension, Muscle incoordination  .18-.30              Confusion, Staggered gait, Slurred speech  .25-.40              Markedly decreased response to stimuli, unable to stand or                        walk, vomitting, sleep or stupor  .35-.50              Comatose, Anesthesia, Subnormal body temperature        CBC & Differential [[id number]]  (Abnormal) Collected: 05/31/22 2053    Specimen: Blood Updated: 05/31/22 2105    Narrative:      The following orders were created for panel order CBC & Differential.  Procedure                               Abnormality         Status                      ---------                               -----------         ------                     CBC Auto Differential[[id number]]        Abnormal            Final result                 Please view results for these tests on the individual orders.    CBC Auto Differential [[id number]]  (Abnormal) Collected: 05/31/22 2053    Specimen: Blood Updated: 05/31/22 2105     WBC 6.00 10*3/mm3      RBC 3.68 10*6/mm3      Hemoglobin 12.1 g/dL      Hematocrit 36.7 %      MCV 99.7 fL      MCH 32.9 pg      MCHC 33.0 g/dL      RDW 13.9 %      RDW-SD 48.1 fl      MPV 8.0 fL      Platelets 170 10*3/mm3      Neutrophil % 55.3 %      Lymphocyte % 31.5 %      Monocyte % 12.0 %      Eosinophil % 0.7 %      Basophil % 0.5 %      Neutrophils, Absolute 3.30 10*3/mm3      Lymphocytes, Absolute 1.90 10*3/mm3      Monocytes, Absolute 0.70 10*3/mm3      Eosinophils, Absolute 0.00 10*3/mm3      Basophils, Absolute 0.00 10*3/mm3      nRBC 0.1 /100 WBC           Imaging Results (Last 24 Hours)     Procedure Component Value Units Date/Time    CT Angiogram Chest Pulmonary Embolism [[id number]] Collected: 06/01/22 1246     Updated: 06/01/22 1253    Narrative:         DATE OF EXAM:  6/1/2022 12:42 PM     PROCEDURE:  CT ANGIOGRAM CHEST PULMONARY EMBOLISM-     INDICATIONS:   Pulmonary embolism (PE) suspected, positive D-dimer; R55-Syncope and  collapse; F10.920-Alcohol use, unspecified with intoxication,  uncomplicated; J96.01-Acute respiratory failure with hypoxia;  I95.1-Orthostatic hypotension; R77.8-Other specified abnormalities of  plasma proteins; N17.9-Acute kidney failure, unspecified; J44.1-Chronic  obstructive pulmonary disease with (acute) exacerbation     COMPARISON:   No Comparisons Available     TECHNIQUE:  Routine transaxial slices were obtained through chest after  administration of intravenous Isovue 370. Reconstructed coronal and  sagittal images were also obtained. In addition, a 3 D volume rendered  image was obtained after post processing.   Automated exposure control  and iterative reconstruction methods were used.      FINDINGS:  There is excellent pulmonary arterial opacification and there are no  filling defects to suggest pulmonary embolic disease. There is diffuse  calcific atherosclerotic disease of the thoracic aorta. There is  moderate coronary artery calcific atherosclerosis. There is aneurysmal  dilatation of the ascending aorta up to 4.9 cm. There is elevation of  the right hemidiaphragm. There is bilateral basilar atelectasis. There  is diffuse emphysema. There are no suspicious pulmonary nodules. There  are no pathologically enlarged lymph nodes. There are no consolidations  to suggest pneumonia. There is fatty infiltration of the liver.       Impression:      1.A descending aortic aneurysm up to 4.9 cm.  2.No pulmonary embolic disease.  3.Fatty liver.  4.Bilateral basilar atelectasis.     Electronically Signed By-Manoj Hernandez MD On:6/1/2022 12:51 PM  This report was finalized on [id number] by  Manoj Hernandez MD.    CT Head Without Contrast [[id number]] Collected: 05/31/22 2249     Updated: 05/31/22 2255    Narrative:      Examination: CT HEAD WO CONTRAST-     Date of Exam: 5/31/2022 10:44 PM     Indication: Mental status change, unknown cause.     Comparison: None available.     Technique: Axial noncontrast CT imaging of the head was performed.  Automated exposure control and iterative reconstruction methods were  used.     Findings:  Superficial soft tissues appear within normal limits. The calvarium is  intact.  Paranasal sinuses and mastoid air cells appear well aerated.   Orbits are unremarkable.  There is no acute intracranial hemorrhage.  No  mass effect or midline shift.  No abnormal extra-axial collections.   Gray-white differentiation is within normal limits.  There are no focal  hypoattenuating lesions.  Ventricular size and configuration is normal  for age.          Impression:      No acute intracranial abnormality.      Electronically Signed By-Johann Dodge MD On:5/31/2022 10:53 PM  This report was finalized on [id number] by  Johann Dodge MD.    XR Chest 1 View [[id number]] Collected: 05/31/22 2128     Updated: 05/31/22 2131    Narrative:      Examination: XR CHEST 1 VW-     Date of Exam: 5/31/2022 9:03 PM     Indication: Chest pain protocol.     Comparison: None available.     Technique: Single radiographic view of the chest was obtained.     Findings:  There is no pneumothorax, pleural effusion or focal airspace  consolidation. Cardiomediastinal silhouette is unremarkable. Pulmonary  vasculature appears within normal limits. Regional bones appear grossly  intact.        Impression:      No acute cardiopulmonary abnormality.     Electronically Signed By-Johann Dodge MD On:5/31/2022 9:29 PM  This report was finalized on [id number] by  Johann Dodge MD.          EKG      I personally viewed and interpreted the patient's EKG/Telemetry data:    ECHOCARDIOGRAM:      STRESS MYOVIEW:    CARDIAC CATHETERIZATION:    OTHER:         Assessment & Plan     Active Problems:    Syncope    COPD exacerbation (HCC)    Obesity (BMI 30-39.9)  Hypertension  Hyperlipidemia  Mild renal insufficiency  Elevated troponin    Patient presented after syncopal episode and had mild elevated troponin but had renal insufficiency also  Patient thereafter had a stress Myoview which is abnormal  Patient has been given hydration his renal function is much better now  Patient also had elevated D-dimer and hence a CT scan which showed no pulmonary embolism but had descending aortic aneurysm up to 4.9 cm  Patient will have a cardia catheterization performed to rule out severe coronary disease  Discussed with patient and family about procedure risks and benefits  Patient will have a nephrologist consultation also.    I discussed the patients findings and my recommendations with patient and nurse    Frank Giraldo MD  06/01/22  17:55 EDT

## 2022-06-01 NOTE — CASE MANAGEMENT/SOCIAL WORK
Discharge Planning Assessment   [facility]     Patient Name: Chi Quinteros Sr.  MRN: [id number]  Today's Date: 6/1/2022    Admit Date: 5/31/2022     Discharge Needs Assessment     Row Name 06/01/22 1255       Living Environment    People in Home alone    Current Living Arrangements home    Primary Care Provided by self    Provides Primary Care For no one    Family Caregiver if Needed none    Quality of Family Relationships unable to assess    Able to Return to Prior Arrangements yes       Resource/Environmental Concerns    Resource/Environmental Concerns none    Transportation Concerns none       Transition Planning    Patient/Family Anticipates Transition to home    Patient/Family Anticipated Services at Transition none    Transportation Anticipated family or friend will provide       Discharge Needs Assessment    Readmission Within the Last 30 Days no previous admission in last 30 days    Equipment Currently Used at Home none    Concerns to be Addressed denies needs/concerns at this time;substance/tobacco abuse/use    Anticipated Changes Related to Illness none    Equipment Needed After Discharge none               Discharge Plan     Row Name 06/01/22 1257       Plan    Plan D/C Plan: Anticipate routine home.    Patient/Family in Agreement with Plan yes    Plan Comments Spoke with patient at bedside.  Patient lives at home alone (wife passed away recently).  Patient IADL.  Patient is a daily drinker.  ETOH 0.203 in ER.  See MSW note.  Denies need for DME/PT/HH.  Friend will provide ride on discharge.  No issues affording medications.  Pharmacy and PMD verified.  Anticipate routine home pending clinical course.  Barriers to D/C: Cardiology consulted.  Troponin elevated.  Myoview today.               Demographic Summary     Row Name 06/01/22 1254       General Information    Admission Type observation    Arrived From emergency department    Referral Source admission list    Preferred Language English                Functional Status     Row Name 06/01/22 1255       Functional Status    Usual Activity Tolerance good    Current Activity Tolerance good       Functional Status, IADL    Medications independent    Meal Preparation independent    Housekeeping independent    Laundry independent    Shopping independent       Mental Status    General Appearance WDL WDL       Mental Status Summary    Recent Changes in Mental Status/Cognitive Functioning no changes       Employment/    Employment Status retired            Met with patient in room wearing PPE: mask.      Maintained distance greater than six feet and spent less than 15 minutes in the room.    Toma Hayward RN     Office Phone [phone]  Office Cell [phone]

## 2022-06-01 NOTE — NURSING NOTE
Called [facility] pharmacy to verify patient's home medications. The pharmacist stated they do not have him on file for filling any medications there.

## 2022-06-02 PROBLEM — F10.20 ALCOHOL DEPENDENCE: Status: ACTIVE | Noted: 2022-06-02

## 2022-06-02 PROBLEM — N28.9 ACUTE RENAL INSUFFICIENCY: Status: ACTIVE | Noted: 2022-06-02

## 2022-06-02 PROBLEM — I10 ESSENTIAL HYPERTENSION: Status: ACTIVE | Noted: 2022-06-02

## 2022-06-02 PROBLEM — J43.8 OTHER EMPHYSEMA: Status: ACTIVE | Noted: 2022-06-02

## 2022-06-02 PROBLEM — R55 SYNCOPE: Status: RESOLVED | Noted: 2022-05-31 | Resolved: 2022-06-02

## 2022-06-02 PROBLEM — R94.39 ABNORMAL NUCLEAR STRESS TEST: Status: ACTIVE | Noted: 2022-05-31

## 2022-06-02 PROBLEM — I25.10 CORONARY ARTERY DISEASE: Status: ACTIVE | Noted: 2022-06-02

## 2022-06-02 LAB
ALBUMIN SERPL-MCNC: 3.5 G/DL (ref 3.5–5.2)
ALBUMIN/GLOB SERPL: 1.2 G/DL
ALP SERPL-CCNC: 55 U/L (ref 39–117)
ALT SERPL W P-5'-P-CCNC: 26 U/L (ref 1–41)
ANION GAP SERPL CALCULATED.3IONS-SCNC: 12 MMOL/L (ref 5–15)
AST SERPL-CCNC: 22 U/L (ref 1–40)
BILIRUB SERPL-MCNC: 0.2 MG/DL (ref 0–1.2)
BUN SERPL-MCNC: 13 MG/DL (ref 8–23)
BUN/CREAT SERPL: 15.1 (ref 7–25)
CALCIUM SPEC-SCNC: 8.3 MG/DL (ref 8.6–10.5)
CHLORIDE SERPL-SCNC: 103 MMOL/L (ref 98–107)
CO2 SERPL-SCNC: 23 MMOL/L (ref 22–29)
CREAT SERPL-MCNC: 0.86 MG/DL (ref 0.76–1.27)
DEPRECATED RDW RBC AUTO: 48.6 FL (ref 37–54)
EGFRCR SERPLBLD CKD-EPI 2021: 94.9 ML/MIN/1.73
ERYTHROCYTE [DISTWIDTH] IN BLOOD BY AUTOMATED COUNT: 13.9 % (ref 12.3–15.4)
GLOBULIN UR ELPH-MCNC: 3 GM/DL
GLUCOSE SERPL-MCNC: 159 MG/DL (ref 65–99)
HCT VFR BLD AUTO: 39.3 % (ref 37.5–51)
HGB BLD-MCNC: 12.9 G/DL (ref 13–17.7)
INR PPP: 1 (ref 0.93–1.1)
MAGNESIUM SERPL-MCNC: 2.1 MG/DL (ref 1.6–2.4)
MCH RBC QN AUTO: 32.5 PG (ref 26.6–33)
MCHC RBC AUTO-ENTMCNC: 32.8 G/DL (ref 31.5–35.7)
MCV RBC AUTO: 99 FL (ref 79–97)
PLATELET # BLD AUTO: 161 10*3/MM3 (ref 140–450)
PMV BLD AUTO: 8 FL (ref 6–12)
POTASSIUM SERPL-SCNC: 4.5 MMOL/L (ref 3.5–5.2)
PROT SERPL-MCNC: 6.5 G/DL (ref 6–8.5)
PROTHROMBIN TIME: 10.3 SECONDS (ref 9.6–11.7)
RBC # BLD AUTO: 3.97 10*6/MM3 (ref 4.14–5.8)
SODIUM SERPL-SCNC: 138 MMOL/L (ref 136–145)
WBC NRBC COR # BLD: 6.8 10*3/MM3 (ref 3.4–10.8)

## 2022-06-02 PROCEDURE — 85027 COMPLETE CBC AUTOMATED: CPT | Performed by: EMERGENCY MEDICINE

## 2022-06-02 PROCEDURE — 94799 UNLISTED PULMONARY SVC/PX: CPT

## 2022-06-02 PROCEDURE — 0 IOPAMIDOL PER 1 ML: Performed by: INTERNAL MEDICINE

## 2022-06-02 PROCEDURE — 99152 MOD SED SAME PHYS/QHP 5/>YRS: CPT | Performed by: INTERNAL MEDICINE

## 2022-06-02 PROCEDURE — C1769 GUIDE WIRE: HCPCS | Performed by: INTERNAL MEDICINE

## 2022-06-02 PROCEDURE — 99214 OFFICE O/P EST MOD 30 MIN: CPT | Performed by: INTERNAL MEDICINE

## 2022-06-02 PROCEDURE — 93458 L HRT ARTERY/VENTRICLE ANGIO: CPT | Performed by: INTERNAL MEDICINE

## 2022-06-02 PROCEDURE — G0378 HOSPITAL OBSERVATION PER HR: HCPCS

## 2022-06-02 PROCEDURE — 83735 ASSAY OF MAGNESIUM: CPT | Performed by: EMERGENCY MEDICINE

## 2022-06-02 PROCEDURE — 25010000002 MIDAZOLAM PER 1 MG: Performed by: INTERNAL MEDICINE

## 2022-06-02 PROCEDURE — 85610 PROTHROMBIN TIME: CPT | Performed by: EMERGENCY MEDICINE

## 2022-06-02 PROCEDURE — B2151ZZ FLUOROSCOPY OF LEFT HEART USING LOW OSMOLAR CONTRAST: ICD-10-PCS | Performed by: INTERNAL MEDICINE

## 2022-06-02 PROCEDURE — 80053 COMPREHEN METABOLIC PANEL: CPT | Performed by: EMERGENCY MEDICINE

## 2022-06-02 PROCEDURE — 4A023N7 MEASUREMENT OF CARDIAC SAMPLING AND PRESSURE, LEFT HEART, PERCUTANEOUS APPROACH: ICD-10-PCS | Performed by: INTERNAL MEDICINE

## 2022-06-02 PROCEDURE — 25010000002 ENOXAPARIN PER 10 MG: Performed by: INTERNAL MEDICINE

## 2022-06-02 PROCEDURE — B2111ZZ FLUOROSCOPY OF MULTIPLE CORONARY ARTERIES USING LOW OSMOLAR CONTRAST: ICD-10-PCS | Performed by: INTERNAL MEDICINE

## 2022-06-02 PROCEDURE — 25010000002 FENTANYL CITRATE (PF) 100 MCG/2ML SOLUTION: Performed by: INTERNAL MEDICINE

## 2022-06-02 PROCEDURE — 99231 SBSQ HOSP IP/OBS SF/LOW 25: CPT | Performed by: NURSE PRACTITIONER

## 2022-06-02 PROCEDURE — 63710000001 PREDNISONE PER 1 MG: Performed by: PHYSICIAN ASSISTANT

## 2022-06-02 PROCEDURE — 94664 DEMO&/EVAL PT USE INHALER: CPT

## 2022-06-02 PROCEDURE — C1894 INTRO/SHEATH, NON-LASER: HCPCS | Performed by: INTERNAL MEDICINE

## 2022-06-02 PROCEDURE — C1760 CLOSURE DEV, VASC: HCPCS | Performed by: INTERNAL MEDICINE

## 2022-06-02 RX ORDER — LORAZEPAM 2 MG/ML
1 INJECTION INTRAMUSCULAR
Status: ACTIVE | OUTPATIENT
Start: 2022-06-02 | End: 2022-06-09

## 2022-06-02 RX ORDER — FENTANYL CITRATE 50 UG/ML
INJECTION, SOLUTION INTRAMUSCULAR; INTRAVENOUS AS NEEDED
Status: DISCONTINUED | OUTPATIENT
Start: 2022-06-02 | End: 2022-06-02 | Stop reason: HOSPADM

## 2022-06-02 RX ORDER — LORAZEPAM 0.5 MG/1
0.5 TABLET ORAL
Status: ACTIVE | OUTPATIENT
Start: 2022-06-02 | End: 2022-06-09

## 2022-06-02 RX ORDER — ACETAMINOPHEN 325 MG/1
650 TABLET ORAL EVERY 4 HOURS PRN
Status: DISCONTINUED | OUTPATIENT
Start: 2022-06-02 | End: 2022-06-02 | Stop reason: SDUPTHER

## 2022-06-02 RX ORDER — MIDAZOLAM HYDROCHLORIDE 1 MG/ML
INJECTION INTRAMUSCULAR; INTRAVENOUS AS NEEDED
Status: DISCONTINUED | OUTPATIENT
Start: 2022-06-02 | End: 2022-06-02 | Stop reason: HOSPADM

## 2022-06-02 RX ORDER — SODIUM CHLORIDE 9 MG/ML
75 INJECTION, SOLUTION INTRAVENOUS CONTINUOUS
Status: DISCONTINUED | OUTPATIENT
Start: 2022-06-02 | End: 2022-06-03

## 2022-06-02 RX ORDER — LORAZEPAM 1 MG/1
1 TABLET ORAL
Status: ACTIVE | OUTPATIENT
Start: 2022-06-02 | End: 2022-06-09

## 2022-06-02 RX ORDER — LORAZEPAM 2 MG/ML
0.5 INJECTION INTRAMUSCULAR
Status: DISPENSED | OUTPATIENT
Start: 2022-06-02 | End: 2022-06-09

## 2022-06-02 RX ORDER — SODIUM CHLORIDE 9 MG/ML
250 INJECTION, SOLUTION INTRAVENOUS ONCE AS NEEDED
Status: DISCONTINUED | OUTPATIENT
Start: 2022-06-02 | End: 2022-06-10

## 2022-06-02 RX ORDER — LORAZEPAM 2 MG/ML
1 INJECTION INTRAMUSCULAR
Status: DISPENSED | OUTPATIENT
Start: 2022-06-02 | End: 2022-06-09

## 2022-06-02 RX ORDER — LIDOCAINE HYDROCHLORIDE 20 MG/ML
INJECTION, SOLUTION INFILTRATION; PERINEURAL AS NEEDED
Status: DISCONTINUED | OUTPATIENT
Start: 2022-06-02 | End: 2022-06-02 | Stop reason: HOSPADM

## 2022-06-02 RX ORDER — TAMSULOSIN HYDROCHLORIDE 0.4 MG/1
0.4 CAPSULE ORAL DAILY
Status: DISCONTINUED | OUTPATIENT
Start: 2022-06-03 | End: 2022-06-10

## 2022-06-02 RX ORDER — HYDRALAZINE HYDROCHLORIDE 20 MG/ML
10 INJECTION INTRAMUSCULAR; INTRAVENOUS EVERY 6 HOURS PRN
Status: DISCONTINUED | OUTPATIENT
Start: 2022-06-02 | End: 2022-06-10

## 2022-06-02 RX ORDER — BUDESONIDE AND FORMOTEROL FUMARATE DIHYDRATE 80; 4.5 UG/1; UG/1
2 AEROSOL RESPIRATORY (INHALATION)
Status: DISCONTINUED | OUTPATIENT
Start: 2022-06-02 | End: 2022-06-05

## 2022-06-02 RX ADMIN — SODIUM CHLORIDE 75 ML/HR: 9 INJECTION, SOLUTION INTRAVENOUS at 12:31

## 2022-06-02 RX ADMIN — Medication 600 MG: at 22:17

## 2022-06-02 RX ADMIN — ENOXAPARIN SODIUM 110 MG: 150 INJECTION SUBCUTANEOUS at 22:17

## 2022-06-02 RX ADMIN — IPRATROPIUM BROMIDE AND ALBUTEROL SULFATE 3 ML: .5; 3 SOLUTION RESPIRATORY (INHALATION) at 23:18

## 2022-06-02 RX ADMIN — IPRATROPIUM BROMIDE AND ALBUTEROL SULFATE 3 ML: .5; 3 SOLUTION RESPIRATORY (INHALATION) at 12:16

## 2022-06-02 RX ADMIN — PREDNISONE 40 MG: 20 TABLET ORAL at 08:42

## 2022-06-02 RX ADMIN — Medication 10 ML: at 22:20

## 2022-06-02 RX ADMIN — AMLODIPINE BESYLATE 5 MG: 5 TABLET ORAL at 08:44

## 2022-06-02 RX ADMIN — Medication 600 MG: at 12:29

## 2022-06-02 RX ADMIN — ATORVASTATIN CALCIUM 10 MG: 10 TABLET, FILM COATED ORAL at 08:42

## 2022-06-02 RX ADMIN — BUDESONIDE AND FORMOTEROL FUMARATE DIHYDRATE 2 PUFF: 80; 4.5 AEROSOL RESPIRATORY (INHALATION) at 23:19

## 2022-06-02 RX ADMIN — Medication 5 MG: at 23:54

## 2022-06-02 RX ADMIN — GUAIFENESIN 1200 MG: 600 TABLET, EXTENDED RELEASE ORAL at 23:54

## 2022-06-02 RX ADMIN — GABAPENTIN 600 MG: 600 TABLET, FILM COATED ORAL at 22:17

## 2022-06-02 RX ADMIN — Medication 600 MG: at 04:42

## 2022-06-02 RX ADMIN — BUDESONIDE AND FORMOTEROL FUMARATE DIHYDRATE 2 PUFF: 80; 4.5 AEROSOL RESPIRATORY (INHALATION) at 06:45

## 2022-06-02 RX ADMIN — GABAPENTIN 600 MG: 600 TABLET, FILM COATED ORAL at 08:43

## 2022-06-02 RX ADMIN — SODIUM CHLORIDE 75 ML/HR: 9 INJECTION, SOLUTION INTRAVENOUS at 02:28

## 2022-06-02 RX ADMIN — IPRATROPIUM BROMIDE AND ALBUTEROL SULFATE 3 ML: .5; 3 SOLUTION RESPIRATORY (INHALATION) at 16:26

## 2022-06-02 RX ADMIN — GUAIFENESIN 1200 MG: 600 TABLET, EXTENDED RELEASE ORAL at 12:29

## 2022-06-02 RX ADMIN — IPRATROPIUM BROMIDE AND ALBUTEROL SULFATE 3 ML: .5; 3 SOLUTION RESPIRATORY (INHALATION) at 06:45

## 2022-06-02 RX ADMIN — LISINOPRIL 40 MG: 20 TABLET ORAL at 08:43

## 2022-06-02 NOTE — PROGRESS NOTES
[facility] Medicine Services Daily Progress Note    Patient Name: Chi Quinteros Sr.  : 1955  MRN: [id number]  Primary Care Physician:  Deysi Mason APRN  Date of admission: 2022      Subjective      Chief Complaint:He reports he is hungrey       Patient Reports Denies chest pain or dizziness, reports he is hungry.      Review of Systems   Constitutional: Negative.   HENT:        [place] right ear   Eyes: Negative.    Cardiovascular: Negative.    Respiratory: Negative.    Endocrine: Negative.    Hematologic/Lymphatic: Negative.    Skin: Negative.    Musculoskeletal: Positive for back pain.   Gastrointestinal: Negative.    Genitourinary: Negative.    Neurological: Negative.    Psychiatric/Behavioral: Negative.    Allergic/Immunologic: Negative.          Objective      Vitals:   Temp:  [97.7 °F (36.5 °C)-98.2 °F (36.8 °C)] 98.2 °F (36.8 °C)  Heart Rate:  [56-94] 77  Resp:  [16-22] 16  BP: (135-177)/() 164/91  Flow (L/min):  [1-2] 2    Physical Exam  Vitals reviewed.   Constitutional:       Appearance: Normal appearance. He is obese.   HENT:      Head: Normocephalic and atraumatic.      Right Ear: External ear normal.      Left Ear: External ear normal.      Ears:      Comments: [place] right ear     Nose: Nose normal.      Mouth/Throat:      Mouth: Mucous membranes are moist.   Eyes:      Extraocular Movements: Extraocular movements intact.   Cardiovascular:      Rate and Rhythm: Normal rate and regular rhythm.      Pulses: Normal pulses.      Heart sounds: Normal heart sounds.   Pulmonary:      Effort: Pulmonary effort is normal.      Breath sounds: Normal breath sounds.   Abdominal:      Palpations: Abdomen is soft.   Genitourinary:     Comments: deferred  Musculoskeletal:         General: Normal range of motion.      Cervical back: Normal range of motion and neck supple.   Skin:     General: Skin is warm and dry.   Neurological:      General: No focal deficit present.       "Mental Status: He is alert and oriented to person, place, and time.   Psychiatric:         Mood and Affect: Mood normal.         Behavior: Behavior normal.         Thought Content: Thought content normal.         Judgment: Judgment normal.            Result Review    Result Review:  I have personally reviewed the results from the time of this admission to 6/2/2022 20:49 EDT and agree with these findings:  [x]  Laboratory  [x]  Microbiology  [x]  Radiology  [x]  EKG/Telemetry   [x]  Cardiology/Vascular   []  Pathology  [x]  Old records  []  Other:  Most notable findings include: EKG sinus rhythm old infarct, initial troponin on admission 0.415 then 0.326 and 0.116.  Initial D-dimer 0.70 CT scan per radiology 5/30/1931:    \"IMPRESSION:  1.A descending aortic aneurysm up to 4.9 cm.  2.No pulmonary embolic disease.  3.Fatty liver.  4.Bilateral basilar atelectasis.  \"    Initial creatinine on admission 1.9 with a GFR of 38.2 now improved to creatinine 0.86 with a EGFR of 94.9,    Cath lab reports dated 6/222:    \"Left Main %:       0  Proximal LAD %: 70%  Mid/Distal LAD %:  0.  Diagonal branch has an ostial 70%  LCX %: Subtotal occlusion with collaterals from the LAD  [place]:    RCA %: 100% with collaterals from the left to the right  [place] %:    SVG(s) %:   \"      \"Impression and Recommendation: Severe three-vessel coronary disease  Mild LV dysfunction  We will review films with surgeons for possible coronary bypass\"        Wounds (last 24 hours)     LDA Wound     Row Name 06/02/22 0715             Wound 06/01/22 0020 Right posterior elbow Skin Tear    Wound - Properties Group Placement Date: 06/01/22  - Placement Time: 0020  -AH Present on Hospital Admission: Y  -AH Side: Right  -AH Orientation: posterior  -AH Location: elbow  -AH Primary Wound Type: Skin tear  -AH Additional Comments: mepilex placed  -      Dressing Appearance dry;intact  -TT      Retired Wound - Properties Group Placement Date: 06/01/22  - " Placement Time: 0020 -AH Present on Hospital Admission: Y  -AH Side: Right  -AH Orientation: posterior  -AH Location: elbow  -AH Primary Wound Type: Skin tear  -AH Additional Comments: mepilex placed  -AH      Retired Wound - Properties Group Date first assessed: 06/01/22  - Time first assessed: 0020 -AH Present on Hospital Admission: Y  -AH Side: Right  -AH Location: elbow  -AH Primary Wound Type: Skin tear  -AH Additional Comments: mepilex placed  -AH            User Key  (r) = Recorded By, (t) = Taken By, (c) = Cosigned By    Initials Name Provider Type    Shivani Ventura RN Registered Nurse    Deysi Leger RN Registered Nurse                  Assessment & Plan      Brief Patient Summary:  Chi Quinteros Sr. is a 67 y.o. male who initially presented to the emergency department on 5/31/2022 with complaints of syncope and loss of consciousness.  He was noted to be hypoxic in the 80s by EMS and reported at that time that he drinks about 5-6 beers daily.  His troponins were elevated at 0.415 then 0.326 and then 0.116.  He underwent a stress test which showed severe ischemia to lateral inferior wall and subsequently underwent a cardiac catheterization today 6/2/2022 which showed severe three-vessel coronary artery disease mild left ventricular dysfunction.  He will be admitted for cardiothoracic surgery evaluation for coronary artery bypass grafting.  Initially upon admission he had a creatinine of 1.9 and a GFR of 38.2 and was seen by nephrology for clearance for the cardiac catheterization.  Creatinine has now improved to 0.86 with a GFR of 94.9.  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  CIWA precautions have been put in place, and he will be further evaluated by cardiac surgery.    [MAR Hold] Acetylcysteine, 600 mg, Oral, Q8H  amLODIPine, 5 mg, Oral, Daily  [MAR Hold]  atorvastatin, 10 mg, Oral, Daily  [MAR Hold] budesonide-formoterol, 2 puff, Inhalation, BID - RT  [MAR Hold] enoxaparin, 1 mg/kg, Subcutaneous, Q12H  gabapentin, 600 mg, Oral, BID  [MAR Hold] guaiFENesin, 1,200 mg, Oral, Q12H  [MAR Hold] ipratropium-albuterol, 3 mL, Nebulization, Q4H - RT  lisinopril, 40 mg, Oral, Daily  [MAR Hold] predniSONE, 40 mg, Oral, Daily With Breakfast  [MAR Hold] sodium chloride, 10 mL, Intravenous, Q12H       Pharmacy to Dose enoxaparin (LOVENOX),   Pharmacy to dose Trelegy,   sodium chloride, 75 mL/hr, Last Rate: 75 mL/hr (06/02/22 1231)  sodium chloride, 75 mL/hr         Active Hospital Problems:  Active Hospital Problems    Diagnosis    • Coronary artery disease    • Acute renal insufficiency    • Alcohol dependence (HCC)    • Essential hypertension    • Other emphysema (HCC)    • COPD exacerbation (HCC)    • Obesity (BMI 30-39.9)    • Abnormal nuclear stress test      Added automatically from request for surgery [id number]       Plan:     Coronary artery disease, severe three-vessel per cath report, cardiovascular surgery to be consulted for CABG, cardiology following continuous cardiac monitoring    Acute renal insufficiency creatinine 1.9 at admission now normalized creatinine 0.86, was seen by nephrology and cleared for cardiac catheterization continue to trend renal function avoid nephrotoxic meds    Alcohol dependence, CIWA precautions in place  consulted    Essential hypertension, MAR on hold per cardiology, added 10 mg IV hydralazine every 6 hours as needed systolic blood pressure greater than 160, monitor BP, resume home meds when appropriate if not changed by cardiology    Emphysema, no home oxygen now stable on 2 L via nasal cannula post catheterization, restarted home Trelegy pharmacy to dose     Obesity BMI 34 lifestyle management education     BPh, resume home Flomax in a.m.      DVT prophylaxis:  Medical and mechanical DVT prophylaxis orders are  present.    CODE STATUS:    Code Status (Patient has no pulse and is not breathing): CPR (Attempt to Resuscitate)  Medical Interventions (Patient has pulse or is breathing): Full Support      Disposition:  I expect patient to be discharged pending cardiovascular surgery consult and outcome of evaluation     This patient has been examined wearing appropriate Personal Protective Equipment  06/02/22      Electronically signed by ERICA Suarez, 06/02/22, 20:49 EDT.  [facility] Hospitalist Team

## 2022-06-02 NOTE — PLAN OF CARE
Goal Outcome Evaluation:  Plan of Care Reviewed With: patient           Outcome Evaluation: Patient awaiting heart cath this evening, pt educated again on NPO status, no complaints at this time, will continue to monitor

## 2022-06-02 NOTE — PROGRESS NOTES
"[facility] Observation Unit H&P    Patient Name: Chi Quinteros Sr.  : 1955  MRN: [id number]  Primary Care Physician: Deysi Mason APRN  Date of admission: 2022     Patient Care Team:  Deysi Mason APRN as PCP - General (Nurse Practitioner)  Eliseo Casarez MD as Consulting Physician (Nephrology)          Subjective   History Present Illness     Chief Complaint:   Chief Complaint   Patient presents with   • Syncope       Obtained from ED provider HPI on 2022:  Patient is a 67-year-old male comes in complaining of syncope since earlier today.  EMS reports that patient monicaon had witnessed patient passed out earlier today just prior to arrival.  EMS states that when they arrived patient had a subsequent syncopal episode where patient had lost consciousness for about 30 seconds.  EMS also reports that patient had been mildly hypoxic in the upper 80s.  EMS reports that patient has been drinking daily.  I asked patient how many beers he has had today and yesterday patient states that he drinks as much as he wants to drink \"I do not know 5-6 beers\".  Patient denies any vomiting, diarrhea, abdominal pain, chest pain, shortness of breath, urinary symptoms or swelling her legs bilaterally.  Patient denies any cough.     2022: Patient confirms the HPI noted above including a syncopal episode the day before while patient was cleaning fish following a recent extended fishing trip.  He notes that he did drink a large amount of coffee, tea as well as whiskey with some beers prior to his syncopal episode.  He noted no preceding symptoms or prodrome.  No pain, nausea, vomiting, dyspnea, peripheral edema or cardiovascular history is reported.  Patient reports that he stopped smoking completely 1 year ago.  He confirms compliance all of his outpatient medical therapies including Trelegy inhaler     2022: Patient reports he did generally well throughout the night without recurrence of any syncope/near " syncope.  He denies any dyspnea, chest pain or palpitations.  He is aware of the plan for catheterization today and anxious and hopeful for discharge if possible following      ROS   REVIEW OF SYSTEMS:    Constitutional: Negative.   HENT: Negative.    Eyes: Negative.    Cardiovascular: Positive for syncope. Negative for chest pain, dyspnea on exertion, irregular heartbeat, leg swelling, near-syncope and palpitations.   Respiratory: Negative.    Skin: Negative.    Musculoskeletal: Negative.    Gastrointestinal: Negative.    Genitourinary: Negative.    Psychiatric/Behavioral: Negative.         Personal History     Past Medical History:   Past Medical History:   Diagnosis Date   • Back pain    • Emphysema lung (HCC)    • Hypertension        Surgical History:    History reviewed. No pertinent surgical history.        Family History: family history is not on file. Otherwise pertinent FHx was reviewed and unremarkable.     Social History:  reports that he quit smoking about a year ago. He has never used smokeless tobacco. He reports current alcohol use. He reports that he does not use drugs.      Medications:  Prior to Admission medications    Medication Sig Start Date End Date Taking? Authorizing Provider   amLODIPine (NORVASC) 5 MG tablet Take 5 mg by mouth Daily.   Yes ProviderPonce MD   Fluticasone-Umeclidin-Vilant (Trelegy Ellipta) 100-62.5-25 MCG/INH inhaler Inhale 1 puff Daily.   Yes ProviderPonce MD   gabapentin (NEURONTIN) 600 MG tablet Take 600 mg by mouth 2 (Two) Times a Day.   Yes ProviderPonce MD   ipratropium-albuterol (COMBIVENT RESPIMAT)  MCG/ACT inhaler Inhale 1 puff 4 (Four) Times a Day As Needed for Wheezing.   Yes Ponce Tyson MD   lisinopril (PRINIVIL,ZESTRIL) 40 MG tablet Take 40 mg by mouth Daily.   Yes ProviderPonce MD   pravastatin (PRAVACHOL) 20 MG tablet Take 20 mg by mouth Daily.   Yes Ponce Tyson MD   predniSONE (DELTASONE) 20 MG tablet  Take 2 tablets by mouth Daily With Breakfast for 5 doses. 6/2/22 6/7/22 Yes Baldomero Hunter PA-C   tamsulosin (FLOMAX) 0.4 MG capsule 24 hr capsule Take 1 capsule by mouth Daily.   Yes Provider, MD Ponce       Allergies:  No Known Allergies    Objective   Objective     Vital Signs  Temp:  [97.7 °F (36.5 °C)-98.3 °F (36.8 °C)] 98.2 °F (36.8 °C)  Heart Rate:  [56-94] 87  Resp:  [18-24] 18  BP: (135-158)/(65-83) 156/83  SpO2:  [92 %-99 %] 97 %  on  Flow (L/min):  [1-2] 2;   Device (Oxygen Therapy): nasal cannula  Body mass index is 34.58 kg/m².    Physical Exam  Vitals reviewed.   Constitutional:       General: He is not in acute distress.     Appearance: Normal appearance. He is obese. He is not ill-appearing, toxic-appearing or diaphoretic.   HENT:      Head: Normocephalic and atraumatic.      Right Ear: External ear normal.      Left Ear: External ear normal.      Nose: Nose normal.      Mouth/Throat:      Mouth: Mucous membranes are moist.   Eyes:      Extraocular Movements: Extraocular movements intact.   Cardiovascular:      Rate and Rhythm: Normal rate and regular rhythm.      Pulses: Normal pulses.      Heart sounds: Normal heart sounds.   Pulmonary:      Effort: Pulmonary effort is normal. No respiratory distress.      Breath sounds: Normal breath sounds. No wheezing.   Abdominal:      General: Bowel sounds are normal. There is no distension.      Palpations: Abdomen is soft.      Tenderness: There is no abdominal tenderness.   Musculoskeletal:         General: Normal range of motion.      Cervical back: Normal range of motion.   Skin:     General: Skin is warm and dry.      Capillary Refill: Capillary refill takes less than 2 seconds.   Neurological:      General: No focal deficit present.      Mental Status: He is alert and oriented to person, place, and time.   Psychiatric:         Mood and Affect: Mood normal.         Behavior: Behavior normal.         Thought Content: Thought content normal.          Judgment: Judgment normal.     Results Review:  I have personally reviewed most recent cardiac tracings, lab results and radiology images and interpretations and agree with findings, most notably: Troponin, CBC, CMP, chest x-ray and EKG.    Results from last 7 days   Lab Units 06/02/22  0500   WBC 10*3/mm3 6.80   HEMOGLOBIN g/dL 12.9*   HEMATOCRIT % 39.3   PLATELETS 10*3/mm3 161   INR  1.00     Results from last 7 days   Lab Units 06/02/22  0500 06/01/22  1024 05/31/22  2227 05/31/22  2053   SODIUM mmol/L 138 136  --  134*   POTASSIUM mmol/L 4.5 4.8  --  3.7   CHLORIDE mmol/L 103 103  --  97*   CO2 mmol/L 23.0 20.0*  --  22.0   BUN mg/dL 13 14  --  15   CREATININE mg/dL 0.86 1.17  --  1.90*   GLUCOSE mg/dL 159* 159*  --  111*   CALCIUM mg/dL 8.3* 8.3*  --  8.8   ALT (SGPT) U/L 26  --   --  30   AST (SGOT) U/L 22  --   --  30   TROPONIN T ng/mL  --  0.116* 0.326* 0.415*   PROCALCITONIN ng/mL  --   --  0.12  --      Estimated Creatinine Clearance: 103 mL/min (by C-G formula based on SCr of 0.86 mg/dL).  Brief Urine Lab Results  (Last result in the past 365 days)      Color   Clarity   Blood   Leuk Est   Nitrite   Protein   CREAT   Urine HCG        06/01/22 0029 Yellow   Clear   Negative   Negative   Negative   Negative                 Microbiology Results (last 10 days)     Procedure Component Value - Date/Time    COVID-19,CEPHEID/ROMAIN,COR/STACEY/PAD/JOVON IN-HOUSE(OR EMERGENT/ADD-ON),NP SWAB IN TRANSPORT MEDIA 3-4 HR TAT, RT-PCR - Swab, Nasopharynx [[id number]]  (Normal) Collected: 05/31/22 2124    Lab Status: Final result Specimen: Swab from Nasopharynx Updated: 05/31/22 2147     COVID19 Not Detected    Narrative:      Fact sheet for providers: https://www.fda.gov/media/722423/download     Fact sheet for patients: https://www.fda.gov/media/356641/download  Fact sheet for providers: https://www.fda.gov/media/336753/download    Fact sheet for patients: https://www.fda.gov/media/455214/download    Test performed by  PCR.          ECG/EMG Results (most recent)     Procedure Component Value Units Date/Time    ECG 12 Lead [[id number]] Collected: 05/31/22 2038     Updated: 06/01/22 1044     QT Interval 419 ms     Narrative:      HEART RATE= 72  bpm  RR Interval= 832  ms  KS Interval= 162  ms  P Horizontal Axis= 35  deg  P Front Axis= 16  deg  QRSD Interval= 102  ms  QT Interval= 419  ms  QRS Axis= -6  deg  T Wave Axis= -14  deg  - ABNORMAL ECG -  Sinus rhythm  Inferior infarct, old  Electronically Signed By:   Date and Time of Study: 2022-05-31 20:38:25    Adult Transthoracic Echo Complete With Contrast if Necessary Per Protocol (With Agitated Saline) [[id number]] Resulted: 06/01/22 2101     Updated: 06/01/22 2102     Target HR (85%) 130 bpm      Max. Pred. HR (100%) 153 bpm      ACS 2.25 cm      Ao root diam 3.8 cm      Ao pk sylvester 139.8 cm/sec      Ao V2 VTI 30.6 cm      GER[place](I,D) 2.7 cm2      EDV(cubed) 240.2 ml      EDV(MOD-sp4) 164.8 ml      EF(MOD-sp4) 67.0 %      ESV(cubed) 89.0 ml      ESV(MOD-sp4) 54.3 ml      IVS/LVPW 1.10 cm      LV mass(C)d 268.7 grams      LV V1 max PG 4.2 mmHg      LV V1 mean PG 2.38 mmHg      LV V1 max 102.7 cm/sec      LVPWd 0.97 cm      MR max .4 mmHg      MV dec slope 334.6 cm/sec2      MV dec time 0.27 msec      MV V2 VTI 32.3 cm      MVA(VTI) 2.6 cm2      PA acc time 0.07 sec      PA pr(Accel) 47.9 mmHg      PA V2 max 104.2 cm/sec      Pulm A Revs Sylvester 32.2 cm/sec      RAP systole 15.0 mmHg      RV V1 max PG 3.9 mmHg      RV V1 max 98.6 cm/sec      RV V1 VTI 20.1 cm      RVIDd 3.2 cm      RVSP(TR) 48.3 mmHg      SV(LVOT) 84.1 ml      SV(MOD-sp4) 110.5 ml      TR max PG 33.3 mmHg      Ao max PG 7.8 mmHg      Ao mean PG 3.9 mmHg      FS 28.2 %      IVSd 1.07 cm      LA dimension (2D)  4.1 cm      LV V1 VTI 22.8 cm      LVIDd 6.2 cm      LVIDs 4.5 cm      LVOT area 3.7 cm2      LVOT diam 2.17 cm      MV E/A 0.40     MV max PG 7.4 mmHg      MV mean PG 3.5 mmHg      Pulm S/D 1.40     MR max sylvester 573.1  cm/sec      MV A max sylvester 113.5 cm/sec      MV E max sylvester 45.0 cm/sec      Pulm A Revs Dur 0.08 sec      Pulm Álvarez Sylvester 46.2 cm/sec      Pulm Sys Sylvester 64.6 cm/sec      TR max sylvester 288.4 cm/sec     Narrative:      · Left ventricular ejection fraction appears to be 56 - 60%.  · The right ventricular cavity is mildly dilated.  · Mild dilation of the aortic root is present.  · No pericardial effusion noted             Results for orders placed during the hospital encounter of 05/31/22    Bilateral Carotid Duplex    Interpretation Summary  · Proximal right internal carotid artery mild stenosis.  · Proximal left internal carotid artery mild stenosis.      Results for orders placed during the hospital encounter of 05/31/22    Adult Transthoracic Echo Complete With Contrast if Necessary Per Protocol (With Agitated Saline)    Interpretation Summary  · Left ventricular ejection fraction appears to be 56 - 60%.  · The right ventricular cavity is mildly dilated.  · Mild dilation of the aortic root is present.  · No pericardial effusion noted      CT Head Without Contrast    Result Date: 5/31/2022  No acute intracranial abnormality.  Electronically Signed By-Johann Dodge MD On:5/31/2022 10:53 PM This report was finalized on [id number] by  Johann Dodge MD.    MRI Lumbar Spine Without Contrast    Result Date: 5/26/2022  1.Multilevel degenerative changes as noted. 2.Minimal anterolisthesis of L5 on S1. There are suggested pars defects.  Electronically Signed By-Zenon James MD On:5/26/2022 9:11 AM This report was finalized on [id number] by  Zenon James MD.    XR Chest 1 View    Result Date: 5/31/2022  No acute cardiopulmonary abnormality.  Electronically Signed By-Johann Dodge MD On:5/31/2022 9:29 PM This report was finalized on [id number] by  Johann Dodge MD.    CT Angiogram Chest Pulmonary Embolism    Result Date: 6/1/2022  1.A descending aortic aneurysm up to 4.9 cm. 2.No pulmonary embolic disease. 3.Fatty liver.  4.Bilateral basilar atelectasis.  Electronically Signed By-Manoj Hernandez MD On:6/1/2022 12:51 PM This report was finalized on [id number] by  Manoj Hernandez MD.        Estimated Creatinine Clearance: 103 mL/min (by C-G formula based on SCr of 0.86 mg/dL).    Assessment & Plan   Assessment/Plan       Active Hospital Problems    Diagnosis  POA   • COPD exacerbation (HCC) [J44.1]  Yes   • Obesity (BMI 30-39.9) [E66.9]  Yes   • Syncope [R55]  Yes   • Abnormal nuclear stress test [R94.39]  Unknown      Resolved Hospital Problems   No resolved problems to display.     Syncope with an elevated troponin  -Serial troponins: 0.415, 0.326, 0.116  -D-dimer: 0.70  -UA shows trace ketones but is otherwise unremarkable  -Chest x-ray shows no acute cardiopulmonary abnormality  -CT of head showed no acute intracranial abnormality  -Bilateral carotid duplex shows mild stenosis in the proximal right and left internal carotid arteries  -EKG shows sinus rhythm at 72 without obvious acute ST changes or ectopy and a QTC of 459 ms  -Patient given treatment dose Lovenox in the ED, continue for now pending further evaluation for pulmonary embolism  -Cardiology consulted who recommended echocardiogram and stress testing  -Stress test showed mildly reduced EF of 45% with large sized severe area of ischemia located in the inferior and lateral wall consistent with a high risk study  -Echocardiogram showed an EF between 56 and 60% with mildly dilated right ventricular cavity and mild dilation of the aortic root with no pericardial effusion  -Cardiac catheterization performed on 6/2/2022  -CT PE protocol showed no pulmonary embolism but with a 4.9 cm descending aortic aneurysm along with bibasilar atelectasis and fatty infiltration of liver  -Hospitalist consulted for further management     AECOPD  -Oxygen saturation initially 87% on 4 L in the ED  -Patient given 125 mg Solu-Medrol in the ED with 40 mg every 8 hours ordered subsequently  -DuoNeb,  Mucinex ordered in ED  -Incentive spirometry  -Continue O2 and pulse oximetry  -IV Solu-Medrol discontinued on 6/1/2022 with plans to initiate p.o. prednisone in a.m.     Acute on chronic kidney injury  -Initial creatinine: 1.90 with a BUN of 15 and a BUN/creatinine ratio of 7.9, creatinine improved to 1.17 following fluid administration  -Monitor while admitted              -Creatinine improved to 0.86 on the morning following admission     Hypertension  -Patient initially hypotensive with blood pressures in the 80s systolic, has improved to 149/73  -Resume lisinopril with close monitoring while admitted     Hyperlipidemia  -Statin     BPH  -Flomax     Alcohol abuse  -Patient reports he currently drinks multiple beers along with whiskey daily  -CIWA protocol ordered in ED     Obesity (34.58)  -Encouraged on lifestyle modifications            VTE Prophylaxis -   Mechanical Order History:      Ordered        06/01/22 0033  Place Sequential Compression Device  Once            06/01/22 0033  Maintain Sequential Compression Device  Continuous                    Pharmalogical Order History:      Ordered     Dose Route Frequency Stop    06/01/22 1102  Enoxaparin Sodium (LOVENOX) syringe 110 mg         1 mg/kg SC Every 12 Hours Scheduled --    06/01/22 1058  Pharmacy to Dose enoxaparin (LOVENOX)         -- XX Continuous PRN --    05/31/22 2358  Enoxaparin Sodium (LOVENOX) syringe 100 mg         1 mg/kg SC Once 06/01/22 0044    05/31/22 2300  heparin 47940 units/250 mL (100 units/mL) in 0.45 % NaCl infusion  10 mL/hr,   Status:  Discontinued         9.62 Units/kg/hr IV Titrated 05/31/22 2355    05/31/22 2300  heparin bolus from bag 2,500 Units  Status:  Discontinued         2,500 Units IV Every 6 Hours PRN 05/31/22 2355    05/31/22 2300  heparin bolus from bag 5,000 Units  Status:  Discontinued         5,000 Units IV Every 6 Hours PRN 05/31/22 2355                CODE STATUS:    Code Status and Medical Interventions:    Ordered at: 05/31/22 2353     Code Status (Patient has no pulse and is not breathing):    CPR (Attempt to Resuscitate)     Medical Interventions (Patient has pulse or is breathing):    Full Support       This patient has been examined wearing personal protective equipment.     I discussed the patient's findings and my recommendations with patient and nursing staff.      Signature:Electronically signed by Baldomero Hunter PA-C, 06/02/22, 7:06 PM EDT.

## 2022-06-02 NOTE — PLAN OF CARE
Goal Outcome Evaluation:  Plan of Care Reviewed With: patient         Planned heart cath in am. No distress.  Ivf continue  Outcome Evaluation: planned heart caththis m.no c/o chest pain or shortness of breath. resting well. ivf cntinue

## 2022-06-02 NOTE — CASE MANAGEMENT/SOCIAL WORK
Continued Stay Note  DEBORAH Gifford     Patient Name: Chi Quinteros Sr.  MRN: [id number]  Today's Date: 6/2/2022    Admit Date: 5/31/2022     Discharge Plan     Row Name 06/02/22 3220       Plan    Plan D/C Plan: Anticipate routine home.    Plan Comments Barriers to D/C: heart cath today              Phone communication or documentation only - no physical contact with patient or family.    Toma Hayward RN      Office Phone [phone]  Office Cell [phone]

## 2022-06-02 NOTE — CONSULTS
NEPHROLOGY CONSULTATION-----[facility]    [facility]  [phone]  Eliseo Casarez MD    Patient Care Team:  Deysi Mason APRN as PCP - General (Nurse Practitioner)  Eliseo Casarez MD as Consulting Physician (Nephrology)    CC/REASON FOR CONSULTATION: Elevated creatinine    PHYSICIAN REQUESTING CONSULTATION: Dr. Madrid    History of Present Illness    67 male with past medical history of hypertension presented to the ER after syncopal episode.  His creatinine is 1.9 admission is improved to 0.8 today.  His blood pressure in the low 60s.  UA negative for blood and protein.  Denies any dysuria gross hematuria.  No vomiting or diarrhea.  He does drink alcohol daily about 5-6 beers per day or more.  No chest pain or shortness of breath.  He has been seen by cardiologist.  And is needing a heart cath.  He was on amlodipine, lisinopril and Flomax prior to admission.    Review of Systems   As noted above otherwise 10 systems reviewed and were negative.    Past Medical History:   Diagnosis Date   • Back pain    • Emphysema lung (HCC)    • Hypertension        History reviewed. No pertinent surgical history.    History reviewed. No pertinent family history.    Social History     Tobacco Use   • Smoking status: Former Smoker     Quit date: 2021     Years since quittin.0   • Smokeless tobacco: Never Used   Vaping Use   • Vaping Use: Never used   Substance Use Topics   • Alcohol use: Yes     Comment: 3-4 beers daily   • Drug use: Never       Home Meds:   Medications Prior to Admission   Medication Sig Dispense Refill Last Dose   • amLODIPine (NORVASC) 5 MG tablet Take 5 mg by mouth Daily.   2022 at Unknown time   • Fluticasone-Umeclidin-Vilant (Trelegy Ellipta) 100-62.5-25 MCG/INH inhaler Inhale 1 puff Daily.   2022 at Unknown time   • gabapentin (NEURONTIN) 600 MG tablet Take 600 mg by mouth 2 (Two) Times a Day.   2022 at Unknown time   •  ipratropium-albuterol (COMBIVENT RESPIMAT)  MCG/ACT inhaler Inhale 1 puff 4 (Four) Times a Day As Needed for Wheezing.   Past Month at Unknown time   • lisinopril (PRINIVIL,ZESTRIL) 40 MG tablet Take 40 mg by mouth Daily.   5/31/2022 at Unknown time   • pravastatin (PRAVACHOL) 20 MG tablet Take 20 mg by mouth Daily.   5/31/2022 at Unknown time   • tamsulosin (FLOMAX) 0.4 MG capsule 24 hr capsule Take 1 capsule by mouth Daily.   5/31/2022 at Unknown time       Scheduled Meds:  Acetylcysteine, 600 mg, Oral, Q8H  amLODIPine, 5 mg, Oral, Daily  atorvastatin, 10 mg, Oral, Daily  budesonide-formoterol, 2 puff, Inhalation, BID - RT  enoxaparin, 1 mg/kg, Subcutaneous, Q12H  gabapentin, 600 mg, Oral, BID  guaiFENesin, 1,200 mg, Oral, Q12H  ipratropium-albuterol, 3 mL, Nebulization, Q4H - RT  lisinopril, 40 mg, Oral, Daily  predniSONE, 40 mg, Oral, Daily With Breakfast  sodium chloride, 10 mL, Intravenous, Q12H        Continuous Infusions:  Pharmacy to Dose enoxaparin (LOVENOX),   Pharmacy to dose Trelegy,   sodium chloride, 75 mL/hr, Last Rate: 75 mL/hr (06/02/22 0521)        PRN Meds:  •  acetaminophen **OR** acetaminophen **OR** acetaminophen  •  aluminum-magnesium hydroxide-simethicone  •  benzonatate  •  ipratropium-albuterol  •  LORazepam **OR** LORazepam **OR** LORazepam **OR** LORazepam **OR** LORazepam **OR** LORazepam  •  magnesium sulfate **OR** magnesium sulfate **OR** magnesium sulfate  •  melatonin  •  nitroglycerin  •  ondansetron **OR** ondansetron  •  Pharmacy to Dose enoxaparin (LOVENOX)  •  Pharmacy to dose Trelegy  •  potassium chloride **OR** potassium chloride **OR** potassium chloride  •  sodium chloride  •  sodium chloride    Allergies:  Patient has no known allergies.    OBJECTIVE    Vital Signs  Temp:  [97.7 °F (36.5 °C)-98.3 °F (36.8 °C)] 97.8 °F (36.6 °C)  Heart Rate:  [56-94] 90  Resp:  [18-24] 18  BP: (135-158)/(57-73) 149/73    I/O this shift:  In: 240 [P.O.:240]  Out: 200 [Urine:200]  I/O  last 3 completed shifts:  In: 240 [P.O.:240]  Out: 2140 [Urine:2140]    Physical Exam:  General Appearance: alert, appears stated age and cooperative  Head: normocephalic, without obvious abnormality and atraumatic  Eyes: conjunctivae and sclerae normal and no icterus  Neck: supple and no JVD  Lungs: clear to auscultation and respirations regular  Heart: regular rhythm & normal rate and normal S1, S2  Chest Wall: no abnormalities observed  Abdomen: normal bowel sounds and soft non-tender  Extremities: moves extremities well, no edema, no cyanosis  Skin: no bleeding, bruising or rash  Neurologic: Alert, and oriented. No focal deficits    Results Review:    I reviewed the patient's new clinical results.    WBC WBC   Date Value Ref Range Status   06/02/2022 6.80 3.40 - 10.80 10*3/mm3 Final   06/01/2022 5.50 3.40 - 10.80 10*3/mm3 Final   05/31/2022 6.00 3.40 - 10.80 10*3/mm3 Final      HGB Hemoglobin   Date Value Ref Range Status   06/02/2022 12.9 (L) 13.0 - 17.7 g/dL Final   06/01/2022 13.4 13.0 - 17.7 g/dL Final   05/31/2022 12.1 (L) 13.0 - 17.7 g/dL Final      HCT Hematocrit   Date Value Ref Range Status   06/02/2022 39.3 37.5 - 51.0 % Final   06/01/2022 40.8 37.5 - 51.0 % Final   05/31/2022 36.7 (L) 37.5 - 51.0 % Final      Platlets No results found for: LABPLAT   MCV MCV   Date Value Ref Range Status   06/02/2022 99.0 (H) 79.0 - 97.0 fL Final   06/01/2022 99.0 (H) 79.0 - 97.0 fL Final   05/31/2022 99.7 (H) 79.0 - 97.0 fL Final          Sodium Sodium   Date Value Ref Range Status   06/02/2022 138 136 - 145 mmol/L Final   06/01/2022 136 136 - 145 mmol/L Final   05/31/2022 134 (L) 136 - 145 mmol/L Final      Potassium Potassium   Date Value Ref Range Status   06/02/2022 4.5 3.5 - 5.2 mmol/L Final   06/01/2022 4.8 3.5 - 5.2 mmol/L Final   05/31/2022 3.7 3.5 - 5.2 mmol/L Final     Comment:     Slight hemolysis detected by analyzer. Results may be affected.      Chloride Chloride   Date Value Ref Range Status    06/02/2022 103 98 - 107 mmol/L Final   06/01/2022 103 98 - 107 mmol/L Final   05/31/2022 97 (L) 98 - 107 mmol/L Final      CO2 CO2   Date Value Ref Range Status   06/02/2022 23.0 22.0 - 29.0 mmol/L Final   06/01/2022 20.0 (L) 22.0 - 29.0 mmol/L Final   05/31/2022 22.0 22.0 - 29.0 mmol/L Final      BUN BUN   Date Value Ref Range Status   06/02/2022 13 8 - 23 mg/dL Final   06/01/2022 14 8 - 23 mg/dL Final   05/31/2022 15 8 - 23 mg/dL Final      Creatinine Creatinine   Date Value Ref Range Status   06/02/2022 0.86 0.76 - 1.27 mg/dL Final   06/01/2022 1.17 0.76 - 1.27 mg/dL Final   05/31/2022 1.90 (H) 0.76 - 1.27 mg/dL Final      Calcium Calcium   Date Value Ref Range Status   06/02/2022 8.3 (L) 8.6 - 10.5 mg/dL Final   06/01/2022 8.3 (L) 8.6 - 10.5 mg/dL Final   05/31/2022 8.8 8.6 - 10.5 mg/dL Final      PO4 No results found for: CAPO4   Albumin Albumin   Date Value Ref Range Status   06/02/2022 3.50 3.50 - 5.20 g/dL Final   05/31/2022 3.60 3.50 - 5.20 g/dL Final      Magnesium Magnesium   Date Value Ref Range Status   06/02/2022 2.1 1.6 - 2.4 mg/dL Final      Uric Acid No results found for: URICACID       Imaging Results (Last 72 Hours)     Procedure Component Value Units Date/Time    CT Angiogram Chest Pulmonary Embolism [[id number]] Collected: 06/01/22 1246     Updated: 06/01/22 1253    Narrative:         DATE OF EXAM:  6/1/2022 12:42 PM     PROCEDURE:  CT ANGIOGRAM CHEST PULMONARY EMBOLISM-     INDICATIONS:   Pulmonary embolism (PE) suspected, positive D-dimer; R55-Syncope and  collapse; F10.920-Alcohol use, unspecified with intoxication,  uncomplicated; J96.01-Acute respiratory failure with hypoxia;  I95.1-Orthostatic hypotension; R77.8-Other specified abnormalities of  plasma proteins; N17.9-Acute kidney failure, unspecified; J44.1-Chronic  obstructive pulmonary disease with (acute) exacerbation     COMPARISON:   No Comparisons Available     TECHNIQUE:  Routine transaxial slices were obtained through chest  after  administration of intravenous Isovue 370. Reconstructed coronal and  sagittal images were also obtained. In addition, a 3 D volume rendered  image was obtained after post processing.  Automated exposure control  and iterative reconstruction methods were used.      FINDINGS:  There is excellent pulmonary arterial opacification and there are no  filling defects to suggest pulmonary embolic disease. There is diffuse  calcific atherosclerotic disease of the thoracic aorta. There is  moderate coronary artery calcific atherosclerosis. There is aneurysmal  dilatation of the ascending aorta up to 4.9 cm. There is elevation of  the right hemidiaphragm. There is bilateral basilar atelectasis. There  is diffuse emphysema. There are no suspicious pulmonary nodules. There  are no pathologically enlarged lymph nodes. There are no consolidations  to suggest pneumonia. There is fatty infiltration of the liver.       Impression:      1.A descending aortic aneurysm up to 4.9 cm.  2.No pulmonary embolic disease.  3.Fatty liver.  4.Bilateral basilar atelectasis.     Electronically Signed By-Manoj Hernandez MD On:6/1/2022 12:51 PM  This report was finalized on [id number] by  Manoj Hernandez MD.    CT Head Without Contrast [[id number]] Collected: 05/31/22 2249     Updated: 05/31/22 2255    Narrative:      Examination: CT HEAD WO CONTRAST-     Date of Exam: 5/31/2022 10:44 PM     Indication: Mental status change, unknown cause.     Comparison: None available.     Technique: Axial noncontrast CT imaging of the head was performed.  Automated exposure control and iterative reconstruction methods were  used.     Findings:  Superficial soft tissues appear within normal limits. The calvarium is  intact.  Paranasal sinuses and mastoid air cells appear well aerated.   Orbits are unremarkable.  There is no acute intracranial hemorrhage.  No  mass effect or midline shift.  No abnormal extra-axial collections.   Gray-white differentiation is  within normal limits.  There are no focal  hypoattenuating lesions.  Ventricular size and configuration is normal  for age.          Impression:      No acute intracranial abnormality.     Electronically Signed By-Johann Dodge MD On:5/31/2022 10:53 PM  This report was finalized on [id number] by  Johann Dodge MD.    XR Chest 1 View [[id number]] Collected: 05/31/22 2128     Updated: 05/31/22 2131    Narrative:      Examination: XR CHEST 1 VW-     Date of Exam: 5/31/2022 9:03 PM     Indication: Chest pain protocol.     Comparison: None available.     Technique: Single radiographic view of the chest was obtained.     Findings:  There is no pneumothorax, pleural effusion or focal airspace  consolidation. Cardiomediastinal silhouette is unremarkable. Pulmonary  vasculature appears within normal limits. Regional bones appear grossly  intact.        Impression:      No acute cardiopulmonary abnormality.     Electronically Signed By-Johann Dodge MD On:5/31/2022 9:29 PM  This report was finalized on [id number] by  Johann Dodge MD.            Results for orders placed during the hospital encounter of 05/31/22    XR Chest 1 View    Narrative  Examination: XR CHEST 1 VW-    Date of Exam: 5/31/2022 9:03 PM    Indication: Chest pain protocol.    Comparison: None available.    Technique: Single radiographic view of the chest was obtained.    Findings:  There is no pneumothorax, pleural effusion or focal airspace  consolidation. Cardiomediastinal silhouette is unremarkable. Pulmonary  vasculature appears within normal limits. Regional bones appear grossly  intact.    Impression  No acute cardiopulmonary abnormality.    Electronically Signed By-Johann Dodge MD On:5/31/2022 9:29 PM  This report was finalized on [id number] by  Johann Dodge MD.        Results for orders placed during the hospital encounter of 05/31/22    Bilateral Carotid Duplex    Interpretation Summary  · Proximal right internal carotid artery  mild stenosis.  · Proximal left internal carotid artery mild stenosis.      ASSESSMENT / PLAN      Syncope    COPD exacerbation (HCC)    Obesity (BMI 30-39.9)    Abnormal nuclear stress test    · FELIBERTO- 3 renal due to volume depletion and or hypotension.  UA negative  · Hypertension  · Syncopal episode-cardiac work-up in progress    Patient with FELIBERTO from volume depletion and or hypotension.  Creatinine improved with IV hydration.  UA negative  Continue IV fluids and have added Mucomyst in anticipation for heart cath  Renal wise no objection to heart cath.    I discussed the patients findings and my recommendations with patient and consulting provider    Will follow along closely. Thank you for allowing us to see this patient in renal consultation.    [facility]  [phone]  MD Eliseo Ceballos MD  06/02/22  11:34 EDT

## 2022-06-02 NOTE — CASE MANAGEMENT/SOCIAL WORK
Social Work Assessment  [facility]     Patient Name: Chi Quinteros Sr.  MRN: [id number]  Today's Date: 6/2/2022    Admit Date: 5/31/2022     Substance Abuse     Row Name 06/02/22 1417       Substance Use    Substance Use Status current alcohol use    Substance Use Comment SW was consulted 2/2 etoh dependency. SW met with pt in room 108 to discuss further. Pt lying in bed, no family present. SW introduced self and reason for consult. Pt stated he recently returned home from a week-long fishing trip and was drinking beer and whiskey while cleaning his fish when he passed out and presented to the ED, where ethanol level registered 0.203. Pt stated he typically drinks 5-6 beers + 2 shots (pt calls them “sniftles”) of honey flavored whiskey daily, but this time, pt stated “it was going down smooth” and he lost track of how much he drank. Despite SW educating pt on risks and pointing out current hospitalization, pt stated he has been drinking since he was 18-19 y.o. and does not intend to quit or even decrease his use. He stated this was a one-time event that he accidentally overdid it, but won’t let it happen again. He reports getting 2 DUIs within 6 weeks of each other in [place] and [facility] in 1994, and going to court-ordered “drunk school,” but has received no other treatment. Ultimately, pt declined cessation resources. He reports last tobacco use was a year ago today, and he denies drug use. SW encouraged pt to reach out if he changes his mind and would like resources. No further needs identified.              Met with patient in room wearing PPE: mask.      Maintained distance greater than six feet and spent less than 15 minutes in the room.      TERESO Nam, [facility]  Medical Social Worker  Ph [phone]  Fax [phone]  [email or web address] [email or web address]

## 2022-06-03 ENCOUNTER — APPOINTMENT (OUTPATIENT)
Dept: CARDIOLOGY | Facility: HOSPITAL | Age: 67
End: 2022-06-03

## 2022-06-03 ENCOUNTER — APPOINTMENT (OUTPATIENT)
Dept: RESPIRATORY THERAPY | Facility: HOSPITAL | Age: 67
End: 2022-06-03

## 2022-06-03 LAB
ANION GAP SERPL CALCULATED.3IONS-SCNC: 12 MMOL/L (ref 5–15)
BASOPHILS # BLD AUTO: 0 10*3/MM3 (ref 0–0.2)
BASOPHILS NFR BLD AUTO: 0.1 % (ref 0–1.5)
BH CV XLRA MEAS - DIST GSV THIGH DIST LEFT: 0.18 CM
BH CV XLRA MEAS - DIST GSV THIGH DIST RIGHT: 0.2 CM
BH CV XLRA MEAS - GSV ANKLE DIST LEFT: 0.17 CM
BH CV XLRA MEAS - GSV ANKLE DIST RIGHT: 0.11 CM
BH CV XLRA MEAS - MID GSV CALF LEFT: 0.15 CM
BH CV XLRA MEAS - MID GSV THIGH  LEFT: 0.24 CM
BH CV XLRA MEAS - MID GSV THIGH  RIGHT: 0.22 CM
BH CV XLRA MEAS - PROX GSV CALF DIST LEFT: 0.18 CM
BH CV XLRA MEAS - PROX GSV CALF DIST RIGHT: 0.17 CM
BH CV XLRA MEAS - PROX GSV THIGH  LEFT: 0.39 CM
BH CV XLRA MEAS - PROX GSV THIGH  RIGHT: 0.27 CM
BUN SERPL-MCNC: 15 MG/DL (ref 8–23)
BUN/CREAT SERPL: 14 (ref 7–25)
CALCIUM SPEC-SCNC: 8.5 MG/DL (ref 8.6–10.5)
CHLORIDE SERPL-SCNC: 103 MMOL/L (ref 98–107)
CO2 SERPL-SCNC: 24 MMOL/L (ref 22–29)
CREAT SERPL-MCNC: 1.07 MG/DL (ref 0.76–1.27)
DEPRECATED RDW RBC AUTO: 48.6 FL (ref 37–54)
EGFRCR SERPLBLD CKD-EPI 2021: 76.1 ML/MIN/1.73
EOSINOPHIL # BLD AUTO: 0 10*3/MM3 (ref 0–0.4)
EOSINOPHIL NFR BLD AUTO: 0 % (ref 0.3–6.2)
ERYTHROCYTE [DISTWIDTH] IN BLOOD BY AUTOMATED COUNT: 14.1 % (ref 12.3–15.4)
GLUCOSE SERPL-MCNC: 157 MG/DL (ref 65–99)
HBA1C MFR BLD: 5.5 % (ref 3.5–5.6)
HCT VFR BLD AUTO: 41.2 % (ref 37.5–51)
HGB BLD-MCNC: 13.5 G/DL (ref 13–17.7)
LYMPHOCYTES # BLD AUTO: 0.7 10*3/MM3 (ref 0.7–3.1)
LYMPHOCYTES NFR BLD AUTO: 12 % (ref 19.6–45.3)
MAXIMAL PREDICTED HEART RATE: 153 BPM
MCH RBC QN AUTO: 32.5 PG (ref 26.6–33)
MCHC RBC AUTO-ENTMCNC: 32.7 G/DL (ref 31.5–35.7)
MCV RBC AUTO: 99.2 FL (ref 79–97)
MONOCYTES # BLD AUTO: 0.4 10*3/MM3 (ref 0.1–0.9)
MONOCYTES NFR BLD AUTO: 6.9 % (ref 5–12)
NEUTROPHILS NFR BLD AUTO: 4.9 10*3/MM3 (ref 1.7–7)
NEUTROPHILS NFR BLD AUTO: 81 % (ref 42.7–76)
NRBC BLD AUTO-RTO: 0 /100 WBC (ref 0–0.2)
PLATELET # BLD AUTO: 180 10*3/MM3 (ref 140–450)
PMV BLD AUTO: 8.9 FL (ref 6–12)
POTASSIUM SERPL-SCNC: 4.1 MMOL/L (ref 3.5–5.2)
RBC # BLD AUTO: 4.15 10*6/MM3 (ref 4.14–5.8)
SODIUM SERPL-SCNC: 139 MMOL/L (ref 136–145)
STRESS TARGET HR: 130 BPM
TSH SERPL DL<=0.05 MIU/L-ACNC: 0.48 UIU/ML (ref 0.27–4.2)
WBC NRBC COR # BLD: 6 10*3/MM3 (ref 3.4–10.8)

## 2022-06-03 PROCEDURE — 94799 UNLISTED PULMONARY SVC/PX: CPT

## 2022-06-03 PROCEDURE — 85025 COMPLETE CBC W/AUTO DIFF WBC: CPT | Performed by: INTERNAL MEDICINE

## 2022-06-03 PROCEDURE — 63710000001 PREDNISONE PER 1 MG: Performed by: INTERNAL MEDICINE

## 2022-06-03 PROCEDURE — 80048 BASIC METABOLIC PNL TOTAL CA: CPT | Performed by: INTERNAL MEDICINE

## 2022-06-03 PROCEDURE — 83036 HEMOGLOBIN GLYCOSYLATED A1C: CPT | Performed by: INTERNAL MEDICINE

## 2022-06-03 PROCEDURE — 25010000002 LORAZEPAM PER 2 MG: Performed by: NURSE PRACTITIONER

## 2022-06-03 PROCEDURE — 84443 ASSAY THYROID STIM HORMONE: CPT | Performed by: INTERNAL MEDICINE

## 2022-06-03 PROCEDURE — 99232 SBSQ HOSP IP/OBS MODERATE 35: CPT | Performed by: INTERNAL MEDICINE

## 2022-06-03 PROCEDURE — 94060 EVALUATION OF WHEEZING: CPT

## 2022-06-03 PROCEDURE — 25010000002 ENOXAPARIN PER 10 MG: Performed by: INTERNAL MEDICINE

## 2022-06-03 PROCEDURE — G0378 HOSPITAL OBSERVATION PER HR: HCPCS

## 2022-06-03 PROCEDURE — 99214 OFFICE O/P EST MOD 30 MIN: CPT | Performed by: INTERNAL MEDICINE

## 2022-06-03 PROCEDURE — 93970 EXTREMITY STUDY: CPT

## 2022-06-03 RX ORDER — LISINOPRIL 20 MG/1
20 TABLET ORAL DAILY
Status: DISCONTINUED | OUTPATIENT
Start: 2022-06-04 | End: 2022-06-05

## 2022-06-03 RX ORDER — ATORVASTATIN CALCIUM 40 MG/1
40 TABLET, FILM COATED ORAL DAILY
Status: DISCONTINUED | OUTPATIENT
Start: 2022-06-04 | End: 2022-06-10

## 2022-06-03 RX ORDER — CARVEDILOL 6.25 MG/1
6.25 TABLET ORAL 2 TIMES DAILY WITH MEALS
Status: DISCONTINUED | OUTPATIENT
Start: 2022-06-03 | End: 2022-06-10

## 2022-06-03 RX ORDER — PREDNISONE 20 MG/1
20 TABLET ORAL
Status: COMPLETED | OUTPATIENT
Start: 2022-06-04 | End: 2022-06-05

## 2022-06-03 RX ORDER — ALBUTEROL SULFATE 90 UG/1
2 AEROSOL, METERED RESPIRATORY (INHALATION) ONCE
Status: COMPLETED | OUTPATIENT
Start: 2022-06-03 | End: 2022-06-03

## 2022-06-03 RX ADMIN — IPRATROPIUM BROMIDE AND ALBUTEROL SULFATE 3 ML: .5; 3 SOLUTION RESPIRATORY (INHALATION) at 19:44

## 2022-06-03 RX ADMIN — GUAIFENESIN 1200 MG: 600 TABLET, EXTENDED RELEASE ORAL at 23:17

## 2022-06-03 RX ADMIN — LORAZEPAM 0.5 MG: 0.5 TABLET ORAL at 20:03

## 2022-06-03 RX ADMIN — TAMSULOSIN HYDROCHLORIDE 0.4 MG: 0.4 CAPSULE ORAL at 09:17

## 2022-06-03 RX ADMIN — Medication 10 ML: at 09:17

## 2022-06-03 RX ADMIN — LISINOPRIL 40 MG: 20 TABLET ORAL at 09:17

## 2022-06-03 RX ADMIN — BUDESONIDE AND FORMOTEROL FUMARATE DIHYDRATE 2 PUFF: 80; 4.5 AEROSOL RESPIRATORY (INHALATION) at 19:43

## 2022-06-03 RX ADMIN — IPRATROPIUM BROMIDE AND ALBUTEROL SULFATE 3 ML: .5; 3 SOLUTION RESPIRATORY (INHALATION) at 02:46

## 2022-06-03 RX ADMIN — ALBUTEROL SULFATE 2 PUFF: 108 INHALANT RESPIRATORY (INHALATION) at 15:11

## 2022-06-03 RX ADMIN — ENOXAPARIN SODIUM 110 MG: 150 INJECTION SUBCUTANEOUS at 09:17

## 2022-06-03 RX ADMIN — BUDESONIDE AND FORMOTEROL FUMARATE DIHYDRATE 2 PUFF: 80; 4.5 AEROSOL RESPIRATORY (INHALATION) at 07:30

## 2022-06-03 RX ADMIN — CARVEDILOL 6.25 MG: 6.25 TABLET, FILM COATED ORAL at 18:18

## 2022-06-03 RX ADMIN — AMLODIPINE BESYLATE 5 MG: 5 TABLET ORAL at 09:17

## 2022-06-03 RX ADMIN — ENOXAPARIN SODIUM 110 MG: 150 INJECTION SUBCUTANEOUS at 20:02

## 2022-06-03 RX ADMIN — IPRATROPIUM BROMIDE AND ALBUTEROL SULFATE 3 ML: .5; 3 SOLUTION RESPIRATORY (INHALATION) at 07:23

## 2022-06-03 RX ADMIN — Medication 5 MG: at 23:17

## 2022-06-03 RX ADMIN — ATORVASTATIN CALCIUM 10 MG: 10 TABLET, FILM COATED ORAL at 09:17

## 2022-06-03 RX ADMIN — IPRATROPIUM BROMIDE AND ALBUTEROL SULFATE 3 ML: .5; 3 SOLUTION RESPIRATORY (INHALATION) at 11:51

## 2022-06-03 RX ADMIN — SODIUM CHLORIDE 75 ML/HR: 9 INJECTION, SOLUTION INTRAVENOUS at 04:19

## 2022-06-03 RX ADMIN — PREDNISONE 40 MG: 20 TABLET ORAL at 09:17

## 2022-06-03 RX ADMIN — IPRATROPIUM BROMIDE AND ALBUTEROL SULFATE 3 ML: .5; 3 SOLUTION RESPIRATORY (INHALATION) at 23:00

## 2022-06-03 RX ADMIN — GABAPENTIN 600 MG: 600 TABLET, FILM COATED ORAL at 09:17

## 2022-06-03 RX ADMIN — GABAPENTIN 600 MG: 600 TABLET, FILM COATED ORAL at 20:03

## 2022-06-03 RX ADMIN — CARVEDILOL 6.25 MG: 6.25 TABLET, FILM COATED ORAL at 10:20

## 2022-06-03 RX ADMIN — GUAIFENESIN 1200 MG: 600 TABLET, EXTENDED RELEASE ORAL at 11:45

## 2022-06-03 RX ADMIN — Medication 10 ML: at 20:02

## 2022-06-03 RX ADMIN — LORAZEPAM 1 MG: 2 INJECTION INTRAMUSCULAR; INTRAVENOUS at 21:51

## 2022-06-03 NOTE — PLAN OF CARE
Goal Outcome Evaluation:    Patient new to floor from [facility] this shift.  Patient remains on normal saline at 75mL/hr.  VSS at this time.

## 2022-06-03 NOTE — PROGRESS NOTES
Referring Provider: Specialist    Reason for follow-up: Syncope     Patient Care Team:  Deysi Mason APRN as PCP - General (Nurse Practitioner)  Eliseo Casarez MD as Consulting Physician (Nephrology)    Subjective .  Patient doing well without any symptoms    Objective  Lying in bed comfortably     Review of Systems   Constitutional: Negative for fever and malaise/fatigue.   Cardiovascular: Negative for chest pain, dyspnea on exertion and palpitations.   Respiratory: Negative for cough and shortness of breath.    Skin: Negative for rash.   Gastrointestinal: Negative for abdominal pain, nausea and vomiting.   Neurological: Negative for focal weakness and headaches.   All other systems reviewed and are negative.      Patient has no known allergies.    Scheduled Meds:amLODIPine, 5 mg, Oral, Daily  [START ON 6/4/2022] atorvastatin, 40 mg, Oral, Daily  budesonide-formoterol, 2 puff, Inhalation, BID - RT  carvedilol, 6.25 mg, Oral, BID With Meals  enoxaparin, 1 mg/kg, Subcutaneous, Q12H  gabapentin, 600 mg, Oral, BID  guaiFENesin, 1,200 mg, Oral, Q12H  ipratropium-albuterol, 3 mL, Nebulization, Q4H - RT  [START ON 6/4/2022] lisinopril, 20 mg, Oral, Daily  [START ON 6/4/2022] predniSONE, 20 mg, Oral, Daily With Breakfast  sodium chloride, 10 mL, Intravenous, Q12H  tamsulosin, 0.4 mg, Oral, Daily      Continuous Infusions:Pharmacy to Dose enoxaparin (LOVENOX),   Pharmacy to dose Trelegy,       PRN Meds:.•  acetaminophen **OR** acetaminophen **OR** acetaminophen  •  aluminum-magnesium hydroxide-simethicone  •  atropine  •  benzonatate  •  hydrALAZINE  •  ipratropium-albuterol  •  LORazepam **OR** LORazepam **OR** LORazepam **OR** LORazepam **OR** LORazepam **OR** LORazepam  •  LORazepam **OR** LORazepam **OR** LORazepam **OR** LORazepam **OR** LORazepam **OR** LORazepam  •  magnesium sulfate **OR** magnesium sulfate **OR** magnesium sulfate  •  melatonin  •  nitroglycerin  •  ondansetron **OR** ondansetron  •   "Pharmacy to Dose enoxaparin (LOVENOX)  •  Pharmacy to dose Trelegy  •  potassium chloride **OR** potassium chloride **OR** potassium chloride  •  sodium chloride  •  sodium chloride  •  sodium chloride        VITAL SIGNS  Vitals:    06/03/22 1025 06/03/22 1151 06/03/22 1157 06/03/22 1403   BP: 108/56   119/64   BP Location: Right arm   Left arm   Patient Position: Lying   Sitting   Pulse: 103 82 82 73   Resp: 20 18 18 18   Temp: 98.4 °F (36.9 °C)   98.5 °F (36.9 °C)   TempSrc: Oral   Oral   SpO2: 91% 92%  94%   Weight:       Height:           Flowsheet Rows    Flowsheet Row First Filed Value   Admission Height 177.8 cm (70\") Documented at 05/31/2022 2052   Admission Weight 104 kg (230 lb) Documented at 05/31/2022 2033           TELEMETRY: Sinus rhythm    Physical Exam:  Constitutional:       Appearance: Well-developed.   Eyes:      General: No scleral icterus.     Conjunctiva/sclera: Conjunctivae normal.   HENT:      Head: Normocephalic and atraumatic.   Neck:      Vascular: No carotid bruit or JVD.   Pulmonary:      Effort: Pulmonary effort is normal.      Breath sounds: Normal breath sounds. No wheezing. No rales.   Cardiovascular:      Normal rate. Regular rhythm.   Pulses:     Intact distal pulses.   Abdominal:      General: Bowel sounds are normal.      Palpations: Abdomen is soft.   Musculoskeletal:      Cervical back: Normal range of motion and neck supple. Skin:     General: Skin is warm and dry.      Findings: No rash.   Neurological:      Mental Status: Alert.          Results Review:   I reviewed the patient's new clinical results.  Lab Results (last 24 hours)     Procedure Component Value Units Date/Time    TSH [[id number]]  (Normal) Collected: 06/03/22 1014    Specimen: Blood Updated: 06/03/22 1109     TSH 0.476 uIU/mL     Basic Metabolic Panel [[id number]]  (Abnormal) Collected: 06/03/22 1014    Specimen: Blood Updated: 06/03/22 1108     Glucose 157 mg/dL      BUN 15 mg/dL      Creatinine 1.07 mg/dL      " Sodium 139 mmol/L      Potassium 4.1 mmol/L      Chloride 103 mmol/L      CO2 24.0 mmol/L      Calcium 8.5 mg/dL      BUN/Creatinine Ratio 14.0     Anion Gap 12.0 mmol/L      eGFR 76.1 mL/min/1.73      Comment: National Kidney Foundation and American Society of Nephrology (ASN) Task Force recommended calculation based on the Chronic Kidney Disease Epidemiology Collaboration (CKD-EPI) equation refit without adjustment for race.       Narrative:      GFR Normal >60  Chronic Kidney Disease <60  Kidney Failure <15      CBC & Differential [[id number]]  (Abnormal) Collected: 06/03/22 1014    Specimen: Blood Updated: 06/03/22 1058    Narrative:      The following orders were created for panel order CBC & Differential.  Procedure                               Abnormality         Status                     ---------                               -----------         ------                     CBC Auto Differential[[id number]]        Abnormal            Final result                 Please view results for these tests on the individual orders.    CBC Auto Differential [[id number]]  (Abnormal) Collected: 06/03/22 1014    Specimen: Blood Updated: 06/03/22 1058     WBC 6.00 10*3/mm3      RBC 4.15 10*6/mm3      Hemoglobin 13.5 g/dL      Hematocrit 41.2 %      MCV 99.2 fL      MCH 32.5 pg      MCHC 32.7 g/dL      RDW 14.1 %      RDW-SD 48.6 fl      MPV 8.9 fL      Platelets 180 10*3/mm3      Neutrophil % 81.0 %      Lymphocyte % 12.0 %      Monocyte % 6.9 %      Eosinophil % 0.0 %      Basophil % 0.1 %      Neutrophils, Absolute 4.90 10*3/mm3      Lymphocytes, Absolute 0.70 10*3/mm3      Monocytes, Absolute 0.40 10*3/mm3      Eosinophils, Absolute 0.00 10*3/mm3      Basophils, Absolute 0.00 10*3/mm3      nRBC 0.0 /100 WBC     Hemoglobin A1c [[id number]] Collected: 06/03/22 1014    Specimen: Blood Updated: 06/03/22 1032          Imaging Results (Last 24 Hours)     ** No results found for the last 24 hours. **          EKG      I  personally viewed and interpreted the patient's EKG/Telemetry data:    ECHOCARDIOGRAM:    STRESS MYOVIEW:    CARDIAC CATHETERIZATION:    OTHER:         Assessment & Plan     Active Problems:    Abnormal nuclear stress test    COPD exacerbation (HCC)    Obesity (BMI 30-39.9)    Acute renal insufficiency    Alcohol dependence (HCC)    Essential hypertension    Other emphysema (HCC)    Coronary artery disease       Patient presented after a syncopal episode and is ruled out for MI by get enzymes  Patient had a stress Myoview study which is abnormal  Patient underwent a cardiac catheterization today which showed severe three-vessel coronary disease  Will review films with surgeons for possible coronary bypass surgery  Blood pressure heart rate stable  Patient's renal function has been abnormal at admission but is now better and is followed by nephrologist  Patient was started on statins also      I discussed the patients findings and my recommendations with patient and nurse    Frank Giraldo MD  06/03/22  14:47 EDT

## 2022-06-03 NOTE — CASE MANAGEMENT/SOCIAL WORK
Continued Stay Note  [facility]     Patient Name: Chi Quinteros Sr.  MRN: [id number]  Today's Date: 6/3/2022    Admit Date: 5/31/2022     Discharge Plan     Row Name 06/03/22 1403       Plan    Plan D/C Plan: Anticipate routine home pending CABG evaluation.    Plan Comments Barrier to D/C: CV surgery consult, IVFs.                    Expected Discharge Date and Time     Expected Discharge Date Expected Discharge Time    Jun 6, 2022         Phone communication or documentation only - no physical contact with patient or family.    RANDALL WelchN, RN    [address] [address]    Office: [phone]  Fax: [phone]

## 2022-06-03 NOTE — CONSULTS
"CARDIOTHORACIC CONSULT NOTE      Referring Provider: Dr. Giraldo    Reason for Consultation: CAD    Attending: Harris Boateng *    Subjective .     History of present illness:  Chi Quinteros Sr. is a 67 y.o. male who presented to the emergency department after 2 witnessed syncopal events.  Patient was orthostatic in the emergency department, troponin level was 0.4, and creatinine was elevated to 1.9.  A CT of the chest was performed as he had an elevated D-dimer that demonstrated an ascending aortic aneurysm of 4.9 to 5.0 cm.  The patient states that he has been having an increase in fatigue over the last year, he denies any other associated symptoms such as shortness of breath, chest pain, orthopnea.  His primary history is positive for COPD, hypertension, hyperlipidemia, early prostate cancer with plan for probable radiation, and lumbar herniation with appointment to plan back surgery.  He ultimately underwent a stress test that was abnormal, and had a cardiac cath today that demonstrated three-vessel coronary disease.  We are consulted for surgical consideration for intervention.  The patient is agitated as he states that he needs to go home and take care of a few things, we unfortunately did not receive consultation until this evening, our physician will see him tomorrow with recommendations.    Social history: Quit smoking 1 year ago after a 50-pack-year history, drinks approximately 3 beers per day and occasionally shot at night to \"help him sleep\", currently on FMLA from [place] driving for his back issues    Review of Systems   Constitutional: Positive for malaise/fatigue. Negative for fever.   Cardiovascular: Positive for syncope. Negative for chest pain, claudication and dyspnea on exertion.   Respiratory: Negative for shortness of breath.        History  Past Medical History:   Diagnosis Date   • Back pain    • Emphysema lung (HCC)    • Hypertension        Past Surgical History:   Procedure Laterality " Date   • CARDIAC CATHETERIZATION Right 2022    Procedure: Coronary angiography;  Surgeon: Frank Giraldo MD;  Location: [facility] CATH INVASIVE LOCATION;  Service: Cardiovascular;  Laterality: Right;   • CARDIAC CATHETERIZATION N/A 2022    Procedure: Left Heart Cath;  Surgeon: Frank Giraldo MD;  Location: [facility] CATH INVASIVE LOCATION;  Service: Cardiovascular;  Laterality: N/A;   • CARDIAC CATHETERIZATION N/A 2022    Procedure: Left ventriculography;  Surgeon: Frank Giraldo MD;  Location: [facility] CATH INVASIVE LOCATION;  Service: Cardiovascular;  Laterality: N/A;       History reviewed. No pertinent family history.    Social History     Tobacco Use   • Smoking status: Former Smoker     Quit date: 2021     Years since quittin.0   • Smokeless tobacco: Never Used   Vaping Use   • Vaping Use: Never used   Substance Use Topics   • Alcohol use: Yes     Comment: 3-4 beers daily   • Drug use: Never        Medications Prior to Admission   Medication Sig Dispense Refill Last Dose   • amLODIPine (NORVASC) 5 MG tablet Take 5 mg by mouth Daily.   2022 at Unknown time   • Fluticasone-Umeclidin-Vilant (Trelegy Ellipta) 100-62.5-25 MCG/INH inhaler Inhale 1 puff Daily.   2022 at Unknown time   • gabapentin (NEURONTIN) 600 MG tablet Take 600 mg by mouth 2 (Two) Times a Day.   2022 at Unknown time   • ipratropium-albuterol (COMBIVENT RESPIMAT)  MCG/ACT inhaler Inhale 1 puff 4 (Four) Times a Day As Needed for Wheezing.   Past Month at Unknown time   • lisinopril (PRINIVIL,ZESTRIL) 40 MG tablet Take 40 mg by mouth Daily.   2022 at Unknown time   • pravastatin (PRAVACHOL) 20 MG tablet Take 20 mg by mouth Daily.   2022 at Unknown time   • tamsulosin (FLOMAX) 0.4 MG capsule 24 hr capsule Take 1 capsule by mouth Daily.   2022 at Unknown time         Patient has no known allergies.    Scheduled Meds:amLODIPine, 5 mg, Oral, Daily  [START ON 2022] atorvastatin, 40 mg, Oral,  "Daily  budesonide-formoterol, 2 puff, Inhalation, BID - RT  carvedilol, 6.25 mg, Oral, BID With Meals  enoxaparin, 1 mg/kg, Subcutaneous, Q12H  gabapentin, 600 mg, Oral, BID  guaiFENesin, 1,200 mg, Oral, Q12H  ipratropium-albuterol, 3 mL, Nebulization, Q4H - RT  [START ON 6/4/2022] lisinopril, 20 mg, Oral, Daily  [START ON 6/4/2022] predniSONE, 20 mg, Oral, Daily With Breakfast  sodium chloride, 10 mL, Intravenous, Q12H  tamsulosin, 0.4 mg, Oral, Daily      Continuous Infusions:Pharmacy to Dose enoxaparin (LOVENOX),   Pharmacy to dose Trelegy,       PRN Meds:.•  acetaminophen **OR** acetaminophen **OR** acetaminophen  •  aluminum-magnesium hydroxide-simethicone  •  atropine  •  benzonatate  •  hydrALAZINE  •  ipratropium-albuterol  •  LORazepam **OR** LORazepam **OR** LORazepam **OR** LORazepam **OR** LORazepam **OR** LORazepam  •  LORazepam **OR** LORazepam **OR** LORazepam **OR** LORazepam **OR** LORazepam **OR** LORazepam  •  magnesium sulfate **OR** magnesium sulfate **OR** magnesium sulfate  •  melatonin  •  nitroglycerin  •  ondansetron **OR** ondansetron  •  Pharmacy to Dose enoxaparin (LOVENOX)  •  Pharmacy to dose Trelegy  •  potassium chloride **OR** potassium chloride **OR** potassium chloride  •  sodium chloride  •  sodium chloride  •  sodium chloride    Objective     VITAL SIGNS  Vitals:    06/03/22 1025 06/03/22 1151 06/03/22 1157 06/03/22 1403   BP: 108/56   119/64   BP Location: Right arm   Left arm   Patient Position: Lying   Sitting   Pulse: 103 82 82 73   Resp: 20 18 18 18   Temp: 98.4 °F (36.9 °C)   98.5 °F (36.9 °C)   TempSrc: Oral   Oral   SpO2: 91% 92%  94%   Weight:       Height:           Flowsheet Rows    Flowsheet Row First Filed Value   Admission Height 177.8 cm (70\") Documented at 05/31/2022 2052   Admission Weight 104 kg (230 lb) Documented at 05/31/2022 2033           TELEMETRY:     Physical Exam:  Constitutional:       Appearance: Healthy appearance.   Pulmonary:      Effort: " Pulmonary effort is normal.   Skin:     General: Skin is warm and dry.   Neurological:      Mental Status: Alert and oriented to person, place and time.          Results Review:        WBC WBC   Date Value Ref Range Status   06/03/2022 6.00 3.40 - 10.80 10*3/mm3 Final   06/02/2022 6.80 3.40 - 10.80 10*3/mm3 Final   06/01/2022 5.50 3.40 - 10.80 10*3/mm3 Final   05/31/2022 6.00 3.40 - 10.80 10*3/mm3 Final      HGB Hemoglobin   Date Value Ref Range Status   06/03/2022 13.5 13.0 - 17.7 g/dL Final   06/02/2022 12.9 (L) 13.0 - 17.7 g/dL Final   06/01/2022 13.4 13.0 - 17.7 g/dL Final   05/31/2022 12.1 (L) 13.0 - 17.7 g/dL Final      HCT Hematocrit   Date Value Ref Range Status   06/03/2022 41.2 37.5 - 51.0 % Final   06/02/2022 39.3 37.5 - 51.0 % Final   06/01/2022 40.8 37.5 - 51.0 % Final   05/31/2022 36.7 (L) 37.5 - 51.0 % Final      Platelets Platelets   Date Value Ref Range Status   06/03/2022 180 140 - 450 10*3/mm3 Final   06/02/2022 161 140 - 450 10*3/mm3 Final   06/01/2022 166 140 - 450 10*3/mm3 Final   05/31/2022 170 140 - 450 10*3/mm3 Final        PT/INR:    Protime   Date Value Ref Range Status   06/02/2022 10.3 9.6 - 11.7 Seconds Final   /  INR   Date Value Ref Range Status   06/02/2022 1.00 0.93 - 1.10 Final       Sodium Sodium   Date Value Ref Range Status   06/03/2022 139 136 - 145 mmol/L Final   06/02/2022 138 136 - 145 mmol/L Final   06/01/2022 136 136 - 145 mmol/L Final   05/31/2022 134 (L) 136 - 145 mmol/L Final      Potassium Potassium   Date Value Ref Range Status   06/03/2022 4.1 3.5 - 5.2 mmol/L Final   06/02/2022 4.5 3.5 - 5.2 mmol/L Final   06/01/2022 4.8 3.5 - 5.2 mmol/L Final   05/31/2022 3.7 3.5 - 5.2 mmol/L Final     Comment:     Slight hemolysis detected by analyzer. Results may be affected.      Chloride Chloride   Date Value Ref Range Status   06/03/2022 103 98 - 107 mmol/L Final   06/02/2022 103 98 - 107 mmol/L Final   06/01/2022 103 98 - 107 mmol/L Final   05/31/2022 97 (L) 98 - 107 mmol/L  Final      Bicarbonate CO2   Date Value Ref Range Status   06/03/2022 24.0 22.0 - 29.0 mmol/L Final   06/02/2022 23.0 22.0 - 29.0 mmol/L Final   06/01/2022 20.0 (L) 22.0 - 29.0 mmol/L Final   05/31/2022 22.0 22.0 - 29.0 mmol/L Final      BUN BUN   Date Value Ref Range Status   06/03/2022 15 8 - 23 mg/dL Final   06/02/2022 13 8 - 23 mg/dL Final   06/01/2022 14 8 - 23 mg/dL Final   05/31/2022 15 8 - 23 mg/dL Final      Creatinine Creatinine   Date Value Ref Range Status   06/03/2022 1.07 0.76 - 1.27 mg/dL Final   06/02/2022 0.86 0.76 - 1.27 mg/dL Final   06/01/2022 1.17 0.76 - 1.27 mg/dL Final   05/31/2022 1.90 (H) 0.76 - 1.27 mg/dL Final      Calcium Calcium   Date Value Ref Range Status   06/03/2022 8.5 (L) 8.6 - 10.5 mg/dL Final   06/02/2022 8.3 (L) 8.6 - 10.5 mg/dL Final   06/01/2022 8.3 (L) 8.6 - 10.5 mg/dL Final   05/31/2022 8.8 8.6 - 10.5 mg/dL Final      Magnesium Magnesium   Date Value Ref Range Status   06/02/2022 2.1 1.6 - 2.4 mg/dL Final        Troponin No results found for: TROPONIN   BNP No results found for: BNP           Assessment & Plan       COPD exacerbation (HCC)    Obesity (BMI 30-39.9)    Abnormal nuclear stress test    Acute renal insufficiency    Alcohol dependence (HCC)    Essential hypertension    Other emphysema (HCC)    Coronary artery disease      - NSTEMI, CAD  - Ascending aortic aneurysm 4.9-5.0 cm  - Admit with syncopal event x2---orthostatic on admit  - COPD  - HTN  - HL  - Lumbar herniation---back surgery pending  - Early prostate cancer---has follow up as outpatient, probable radiation treatment per patient  - FELIBERTO----angelita, Dr. Leida following  - ETOH use---3 beers/day    Dr. Hughes to review films and render surgical opinion regarding intervention.  PFT and vascular studies ordered for further restratification.    Thank you for allowing me to participate in the care of this patient    ERICA Johnson  06/03/22  15:49 EDT  Martha Tinoco, Cardiothoracic Surgery,  [phone] (gbbo)

## 2022-06-03 NOTE — CONSULTS
Nutrition Services    Patient Name: Chi Quinteros Sr.  YOB: 1955  MRN: [id number]  Admission date: 5/31/2022      PPE Documentation        PPE Worn By Provider mask and eye protection   PPE Worn By Patient  None      Nutrition Counseling & Education       Reason for Visit: Physician Consult      Session topic: Diet rationale, Key food habit change and Heart Healthy eating     Session comments: Provided Heart Healthy diet handout along with RD contact information.  Patient states he is hopeful to go home soon.  Appreciative of information.  Discussed that he has always done the cooking but has now been without his wife for 3 years.       Provided print material via: [employer]-Nutrition Care Manual      Goals: Increase knowledge on diet/nutrition effects on condition/status      Monitoring/Follow Up: RD to follow up PRN    Patient to follow up PRN on outpatient basis       Electronically signed by:  Delmi Cornejo RD  06/03/22 12:09 EDT

## 2022-06-03 NOTE — PROGRESS NOTES
"NEPHROLOGY PROGRESS NOTE------[facility]    [facility]  [phone]  Eliseo Casarez MD      Patient Care Team:  Deysi Mason APRN as PCP - General (Nurse Practitioner)  Eliseo Casarez MD as Consulting Physician (Nephrology)      Provider:  Eliseo Casarez MD  Patient Name: Chi Quinteros Sr.  :  1955    SUBJECTIVE:  F/u FELIBERTO  No chest pain or SOA  S/p cath revealing severe 3 vessel disease  Awaits CTS eval for CABG    Medication:  amLODIPine, 5 mg, Oral, Daily  atorvastatin, 10 mg, Oral, Daily  budesonide-formoterol, 2 puff, Inhalation, BID - RT  enoxaparin, 1 mg/kg, Subcutaneous, Q12H  gabapentin, 600 mg, Oral, BID  guaiFENesin, 1,200 mg, Oral, Q12H  ipratropium-albuterol, 3 mL, Nebulization, Q4H - RT  lisinopril, 40 mg, Oral, Daily  predniSONE, 40 mg, Oral, Daily With Breakfast  sodium chloride, 10 mL, Intravenous, Q12H  tamsulosin, 0.4 mg, Oral, Daily      Pharmacy to Dose enoxaparin (LOVENOX),   Pharmacy to dose Trelegy,   sodium chloride, 75 mL/hr, Last Rate: 75 mL/hr (22 1231)  sodium chloride, 75 mL/hr, Last Rate: 75 mL/hr (22 0419)        OBJECTIVE    Vital Sign Min/Max for last 24 hours  Temp  Min: 97.8 °F (36.6 °C)  Max: 98.5 °F (36.9 °C)   BP  Min: 131/69  Max: 177/98   Pulse  Min: 76  Max: 90   Resp  Min: 16  Max: 24   SpO2  Min: 89 %  Max: 100 %   No data recorded   Weight  Min: 111 kg (245 lb 2.4 oz)  Max: 111 kg (245 lb 2.4 oz)     Flowsheet Rows    Flowsheet Row First Filed Value   Admission Height 177.8 cm (70\") Documented at 2022   Admission Weight 104 kg (230 lb) Documented at 2022          No intake/output data recorded.  I/O last 3 completed shifts:  In: 720 [P.O.:720]  Out: 2225 [Urine:2225]    Physical Exam:  General Appearance: alert, appears stated age and cooperative  Head: normocephalic, without obvious abnormality and atraumatic  Eyes: conjunctivae and sclerae normal and no " icterus  Neck: supple and no JVD  Lungs: clear to auscultation and respirations regular  Heart: regular rhythm & normal rate and normal S1, S2  Chest: Wall no abnormalities observed  Abdomen: normal bowel sounds and soft non-tender  Extremities: moves extremities well, no edema, no cyanosis and no redness  Skin: no bleeding, bruising or rash, turgor normal, color normal and no lesions noted  Neurologic: Alert, and oriented. No focal deficits    Labs:    WBC WBC   Date Value Ref Range Status   06/02/2022 6.80 3.40 - 10.80 10*3/mm3 Final   06/01/2022 5.50 3.40 - 10.80 10*3/mm3 Final   05/31/2022 6.00 3.40 - 10.80 10*3/mm3 Final      HGB Hemoglobin   Date Value Ref Range Status   06/02/2022 12.9 (L) 13.0 - 17.7 g/dL Final   06/01/2022 13.4 13.0 - 17.7 g/dL Final   05/31/2022 12.1 (L) 13.0 - 17.7 g/dL Final      HCT Hematocrit   Date Value Ref Range Status   06/02/2022 39.3 37.5 - 51.0 % Final   06/01/2022 40.8 37.5 - 51.0 % Final   05/31/2022 36.7 (L) 37.5 - 51.0 % Final      Platlets No results found for: LABPLAT   MCV MCV   Date Value Ref Range Status   06/02/2022 99.0 (H) 79.0 - 97.0 fL Final   06/01/2022 99.0 (H) 79.0 - 97.0 fL Final   05/31/2022 99.7 (H) 79.0 - 97.0 fL Final          Sodium Sodium   Date Value Ref Range Status   06/02/2022 138 136 - 145 mmol/L Final   06/01/2022 136 136 - 145 mmol/L Final   05/31/2022 134 (L) 136 - 145 mmol/L Final      Potassium Potassium   Date Value Ref Range Status   06/02/2022 4.5 3.5 - 5.2 mmol/L Final   06/01/2022 4.8 3.5 - 5.2 mmol/L Final   05/31/2022 3.7 3.5 - 5.2 mmol/L Final     Comment:     Slight hemolysis detected by analyzer. Results may be affected.      Chloride Chloride   Date Value Ref Range Status   06/02/2022 103 98 - 107 mmol/L Final   06/01/2022 103 98 - 107 mmol/L Final   05/31/2022 97 (L) 98 - 107 mmol/L Final      CO2 CO2   Date Value Ref Range Status   06/02/2022 23.0 22.0 - 29.0 mmol/L Final   06/01/2022 20.0 (L) 22.0 - 29.0 mmol/L Final   05/31/2022  22.0 22.0 - 29.0 mmol/L Final      BUN BUN   Date Value Ref Range Status   06/02/2022 13 8 - 23 mg/dL Final   06/01/2022 14 8 - 23 mg/dL Final   05/31/2022 15 8 - 23 mg/dL Final      Creatinine Creatinine   Date Value Ref Range Status   06/02/2022 0.86 0.76 - 1.27 mg/dL Final   06/01/2022 1.17 0.76 - 1.27 mg/dL Final   05/31/2022 1.90 (H) 0.76 - 1.27 mg/dL Final      Calcium Calcium   Date Value Ref Range Status   06/02/2022 8.3 (L) 8.6 - 10.5 mg/dL Final   06/01/2022 8.3 (L) 8.6 - 10.5 mg/dL Final   05/31/2022 8.8 8.6 - 10.5 mg/dL Final      PO4 No components found for: PO4   Albumin Albumin   Date Value Ref Range Status   06/02/2022 3.50 3.50 - 5.20 g/dL Final   05/31/2022 3.60 3.50 - 5.20 g/dL Final      Magnesium Magnesium   Date Value Ref Range Status   06/02/2022 2.1 1.6 - 2.4 mg/dL Final      Uric Acid No components found for: URIC ACID     Imaging Results (Last 72 Hours)     Procedure Component Value Units Date/Time    CT Angiogram Chest Pulmonary Embolism [[id number]] Collected: 06/01/22 1246     Updated: 06/01/22 1253    Narrative:         DATE OF EXAM:  6/1/2022 12:42 PM     PROCEDURE:  CT ANGIOGRAM CHEST PULMONARY EMBOLISM-     INDICATIONS:   Pulmonary embolism (PE) suspected, positive D-dimer; R55-Syncope and  collapse; F10.920-Alcohol use, unspecified with intoxication,  uncomplicated; J96.01-Acute respiratory failure with hypoxia;  I95.1-Orthostatic hypotension; R77.8-Other specified abnormalities of  plasma proteins; N17.9-Acute kidney failure, unspecified; J44.1-Chronic  obstructive pulmonary disease with (acute) exacerbation     COMPARISON:   No Comparisons Available     TECHNIQUE:  Routine transaxial slices were obtained through chest after  administration of intravenous Isovue 370. Reconstructed coronal and  sagittal images were also obtained. In addition, a 3 D volume rendered  image was obtained after post processing.  Automated exposure control  and iterative reconstruction methods were used.       FINDINGS:  There is excellent pulmonary arterial opacification and there are no  filling defects to suggest pulmonary embolic disease. There is diffuse  calcific atherosclerotic disease of the thoracic aorta. There is  moderate coronary artery calcific atherosclerosis. There is aneurysmal  dilatation of the ascending aorta up to 4.9 cm. There is elevation of  the right hemidiaphragm. There is bilateral basilar atelectasis. There  is diffuse emphysema. There are no suspicious pulmonary nodules. There  are no pathologically enlarged lymph nodes. There are no consolidations  to suggest pneumonia. There is fatty infiltration of the liver.       Impression:      1.A descending aortic aneurysm up to 4.9 cm.  2.No pulmonary embolic disease.  3.Fatty liver.  4.Bilateral basilar atelectasis.     Electronically Signed By-Manoj Hernandez MD On:6/1/2022 12:51 PM  This report was finalized on [id number] by  Manoj Hernandez MD.    CT Head Without Contrast [[id number]] Collected: 05/31/22 2249     Updated: 05/31/22 2255    Narrative:      Examination: CT HEAD WO CONTRAST-     Date of Exam: 5/31/2022 10:44 PM     Indication: Mental status change, unknown cause.     Comparison: None available.     Technique: Axial noncontrast CT imaging of the head was performed.  Automated exposure control and iterative reconstruction methods were  used.     Findings:  Superficial soft tissues appear within normal limits. The calvarium is  intact.  Paranasal sinuses and mastoid air cells appear well aerated.   Orbits are unremarkable.  There is no acute intracranial hemorrhage.  No  mass effect or midline shift.  No abnormal extra-axial collections.   Gray-white differentiation is within normal limits.  There are no focal  hypoattenuating lesions.  Ventricular size and configuration is normal  for age.          Impression:      No acute intracranial abnormality.     Electronically Signed By-Johann Dodge MD On:5/31/2022 10:53 PM  This report was  finalized on [id number] by  Johann Dodge MD.    XR Chest 1 View [[id number]] Collected: 05/31/22 2128     Updated: 05/31/22 2131    Narrative:      Examination: XR CHEST 1 VW-     Date of Exam: 5/31/2022 9:03 PM     Indication: Chest pain protocol.     Comparison: None available.     Technique: Single radiographic view of the chest was obtained.     Findings:  There is no pneumothorax, pleural effusion or focal airspace  consolidation. Cardiomediastinal silhouette is unremarkable. Pulmonary  vasculature appears within normal limits. Regional bones appear grossly  intact.        Impression:      No acute cardiopulmonary abnormality.     Electronically Signed By-Johann Dodge MD On:5/31/2022 9:29 PM  This report was finalized on [id number] by  Johann Dodge MD.          Results for orders placed during the hospital encounter of 05/31/22    XR Chest 1 View    Narrative  Examination: XR CHEST 1 VW-    Date of Exam: 5/31/2022 9:03 PM    Indication: Chest pain protocol.    Comparison: None available.    Technique: Single radiographic view of the chest was obtained.    Findings:  There is no pneumothorax, pleural effusion or focal airspace  consolidation. Cardiomediastinal silhouette is unremarkable. Pulmonary  vasculature appears within normal limits. Regional bones appear grossly  intact.    Impression  No acute cardiopulmonary abnormality.    Electronically Signed By-Johann Dodge MD On:5/31/2022 9:29 PM  This report was finalized on [id number] by  Johann Dodge MD.      Results for orders placed during the hospital encounter of 05/31/22    Bilateral Carotid Duplex    Interpretation Summary  · Proximal right internal carotid artery mild stenosis.  · Proximal left internal carotid artery mild stenosis.        ASSESSMENT / PLAN      COPD exacerbation (HCC)    Obesity (BMI 30-39.9)    Abnormal nuclear stress test    Acute renal insufficiency    Alcohol dependence (HCC)    Essential hypertension    Other  emphysema (HCC)    Coronary artery disease    · FELIBERTO- 3 renal due to volume depletion and or hypotension.  UA negative  · Hypertension  · Syncopal episode-cardiac work-up in progress  · CAD--cath with severe three vessel disease.     CR stable post cath  BP low--reduce Lisinopril to 20 mg daily  Stop IVF  CTS to evaluate for possible CABG        Eliseo Casarez MD  [facility]  [phone]  06/03/22  08:58 EDT

## 2022-06-04 LAB
AMMONIA BLD-SCNC: 34 UMOL/L (ref 16–60)
ANION GAP SERPL CALCULATED.3IONS-SCNC: 10 MMOL/L (ref 5–15)
BUN SERPL-MCNC: 18 MG/DL (ref 8–23)
BUN/CREAT SERPL: 17.6 (ref 7–25)
CALCIUM SPEC-SCNC: 8.4 MG/DL (ref 8.6–10.5)
CHLORIDE SERPL-SCNC: 99 MMOL/L (ref 98–107)
CO2 SERPL-SCNC: 27 MMOL/L (ref 22–29)
CREAT SERPL-MCNC: 1.02 MG/DL (ref 0.76–1.27)
EGFRCR SERPLBLD CKD-EPI 2021: 80.6 ML/MIN/1.73
GLUCOSE SERPL-MCNC: 122 MG/DL (ref 65–99)
POTASSIUM SERPL-SCNC: 4 MMOL/L (ref 3.5–5.2)
QT INTERVAL: 419 MS
SODIUM SERPL-SCNC: 136 MMOL/L (ref 136–145)

## 2022-06-04 PROCEDURE — 99233 SBSQ HOSP IP/OBS HIGH 50: CPT | Performed by: INTERNAL MEDICINE

## 2022-06-04 PROCEDURE — 82140 ASSAY OF AMMONIA: CPT | Performed by: INTERNAL MEDICINE

## 2022-06-04 PROCEDURE — 63710000001 PREDNISONE PER 1 MG: Performed by: INTERNAL MEDICINE

## 2022-06-04 PROCEDURE — 94799 UNLISTED PULMONARY SVC/PX: CPT

## 2022-06-04 PROCEDURE — 99214 OFFICE O/P EST MOD 30 MIN: CPT | Performed by: INTERNAL MEDICINE

## 2022-06-04 PROCEDURE — 25010000002 ENOXAPARIN PER 10 MG: Performed by: INTERNAL MEDICINE

## 2022-06-04 PROCEDURE — G0378 HOSPITAL OBSERVATION PER HR: HCPCS

## 2022-06-04 PROCEDURE — 25010000002 LORAZEPAM PER 2 MG: Performed by: NURSE PRACTITIONER

## 2022-06-04 PROCEDURE — 80048 BASIC METABOLIC PNL TOTAL CA: CPT | Performed by: INTERNAL MEDICINE

## 2022-06-04 PROCEDURE — 94761 N-INVAS EAR/PLS OXIMETRY MLT: CPT

## 2022-06-04 PROCEDURE — 94664 DEMO&/EVAL PT USE INHALER: CPT

## 2022-06-04 RX ORDER — DIPHENOXYLATE HYDROCHLORIDE AND ATROPINE SULFATE 2.5; .025 MG/1; MG/1
1 TABLET ORAL DAILY
Status: DISCONTINUED | OUTPATIENT
Start: 2022-06-04 | End: 2022-06-10

## 2022-06-04 RX ADMIN — CARVEDILOL 6.25 MG: 6.25 TABLET, FILM COATED ORAL at 17:14

## 2022-06-04 RX ADMIN — ATORVASTATIN CALCIUM 40 MG: 40 TABLET, FILM COATED ORAL at 08:03

## 2022-06-04 RX ADMIN — THERA TABS 1 TABLET: TAB at 17:14

## 2022-06-04 RX ADMIN — IPRATROPIUM BROMIDE AND ALBUTEROL SULFATE 3 ML: .5; 3 SOLUTION RESPIRATORY (INHALATION) at 15:18

## 2022-06-04 RX ADMIN — ENOXAPARIN SODIUM 110 MG: 150 INJECTION SUBCUTANEOUS at 08:04

## 2022-06-04 RX ADMIN — Medication 10 ML: at 21:30

## 2022-06-04 RX ADMIN — AMLODIPINE BESYLATE 5 MG: 5 TABLET ORAL at 08:04

## 2022-06-04 RX ADMIN — PREDNISONE 20 MG: 20 TABLET ORAL at 08:03

## 2022-06-04 RX ADMIN — LORAZEPAM 1 MG: 2 INJECTION INTRAMUSCULAR; INTRAVENOUS at 08:03

## 2022-06-04 RX ADMIN — BUDESONIDE AND FORMOTEROL FUMARATE DIHYDRATE 2 PUFF: 80; 4.5 AEROSOL RESPIRATORY (INHALATION) at 07:59

## 2022-06-04 RX ADMIN — ENOXAPARIN SODIUM 110 MG: 150 INJECTION SUBCUTANEOUS at 21:28

## 2022-06-04 RX ADMIN — IPRATROPIUM BROMIDE AND ALBUTEROL SULFATE 3 ML: .5; 3 SOLUTION RESPIRATORY (INHALATION) at 03:23

## 2022-06-04 RX ADMIN — Medication 100 MG: at 21:30

## 2022-06-04 RX ADMIN — IPRATROPIUM BROMIDE AND ALBUTEROL SULFATE 3 ML: .5; 3 SOLUTION RESPIRATORY (INHALATION) at 11:30

## 2022-06-04 RX ADMIN — LORAZEPAM 1 MG: 2 INJECTION INTRAMUSCULAR; INTRAVENOUS at 00:52

## 2022-06-04 RX ADMIN — LORAZEPAM 1 MG: 2 INJECTION INTRAMUSCULAR; INTRAVENOUS at 04:13

## 2022-06-04 RX ADMIN — CARVEDILOL 6.25 MG: 6.25 TABLET, FILM COATED ORAL at 08:03

## 2022-06-04 RX ADMIN — IPRATROPIUM BROMIDE AND ALBUTEROL SULFATE 3 ML: .5; 3 SOLUTION RESPIRATORY (INHALATION) at 22:59

## 2022-06-04 RX ADMIN — IPRATROPIUM BROMIDE AND ALBUTEROL SULFATE 3 ML: .5; 3 SOLUTION RESPIRATORY (INHALATION) at 19:30

## 2022-06-04 RX ADMIN — LISINOPRIL 20 MG: 20 TABLET ORAL at 08:04

## 2022-06-04 RX ADMIN — IPRATROPIUM BROMIDE AND ALBUTEROL SULFATE 3 ML: .5; 3 SOLUTION RESPIRATORY (INHALATION) at 07:30

## 2022-06-04 RX ADMIN — TAMSULOSIN HYDROCHLORIDE 0.4 MG: 0.4 CAPSULE ORAL at 08:03

## 2022-06-04 RX ADMIN — Medication 10 ML: at 08:04

## 2022-06-04 RX ADMIN — GABAPENTIN 600 MG: 600 TABLET, FILM COATED ORAL at 08:03

## 2022-06-04 RX ADMIN — BUDESONIDE AND FORMOTEROL FUMARATE DIHYDRATE 2 PUFF: 80; 4.5 AEROSOL RESPIRATORY (INHALATION) at 19:30

## 2022-06-04 RX ADMIN — GABAPENTIN 600 MG: 600 TABLET, FILM COATED ORAL at 21:29

## 2022-06-04 NOTE — PROGRESS NOTES
[facility] Medicine Services Daily Progress Note    Patient Name: Chi Quinteros Sr.  : 1955  MRN: [id number]  Primary Care Physician:  Deysi Mason APRN  Date of admission: 2022      Subjective      Chief Complaint:He reports he is hungrey     6/3  Vitals and labs reviewed  Awaiting CT surgery recommendation regarding possible cardiac surgery    Notes reviewed      Apparently intermittently confused overnight and angry intermittently  His vitals are stable requiring 2 L of oxygen afebrile  Suspected alcohol withdrawal.  CIWA score was 12 today with the nursing staff  On alcohol withdrawal protocol  Psychiatrist has been consulted by cardiac surgery  .  Add thiamine    Review of Systems   Constitutional: Negative.   HENT:        [place] right ear   Eyes: Negative.    Cardiovascular: Negative.    Respiratory: Negative.    Endocrine: Negative.    Hematologic/Lymphatic: Negative.    Skin: Negative.    Musculoskeletal: Positive for back pain.   Gastrointestinal: Negative.    Genitourinary: Negative.    Neurological: Negative.    Psychiatric/Behavioral: Negative.    Allergic/Immunologic: Negative.          Objective      Vitals:   Temp:  [98 °F (36.7 °C)-98.6 °F (37 °C)] 98.6 °F (37 °C)  Heart Rate:  [] 79  Resp:  [16-22] 16  BP: (108-135)/(56-77) 117/71  Flow (L/min):  [1-3] 3    Physical Exam  Vitals reviewed.   Constitutional:       Appearance: Normal appearance. He is obese.   HENT:      Head: Normocephalic and atraumatic.      Right Ear: External ear normal.      Left Ear: External ear normal.      Ears:      Comments: [place] right ear     Nose: Nose normal.      Mouth/Throat:      Mouth: Mucous membranes are moist.   Eyes:      Extraocular Movements: Extraocular movements intact.   Cardiovascular:      Rate and Rhythm: Normal rate and regular rhythm.      Pulses: Normal pulses.      Heart sounds: Normal heart sounds.   Pulmonary:      Effort: Pulmonary effort is normal.       "Breath sounds: Normal breath sounds.   Abdominal:      Palpations: Abdomen is soft.   Genitourinary:     Comments: deferred  Musculoskeletal:         General: Normal range of motion.      Cervical back: Normal range of motion and neck supple.   Skin:     General: Skin is warm and dry.   Neurological:      General: No focal deficit present.      Mental Status: He is alert and oriented to person, place, and time.   Psychiatric:         Mood and Affect: Mood normal.         Behavior: Behavior normal.         Thought Content: Thought content normal.         Judgment: Judgment normal.            Result Review    Result Review:  I have personally reviewed the results from the time of this admission to 6/4/2022 10:24 EDT and agree with these findings:  [x]  Laboratory  [x]  Microbiology  [x]  Radiology  [x]  EKG/Telemetry   [x]  Cardiology/Vascular   []  Pathology  [x]  Old records  []  Other:  Most notable findings include: EKG sinus rhythm old infarct, initial troponin on admission 0.415 then 0.326 and 0.116.  Initial D-dimer 0.70 CT scan per radiology 5/30/1931:    \"IMPRESSION:  1.A descending aortic aneurysm up to 4.9 cm.  2.No pulmonary embolic disease.  3.Fatty liver.  4.Bilateral basilar atelectasis.  \"    Initial creatinine on admission 1.9 with a GFR of 38.2 now improved to creatinine 0.86 with a EGFR of 94.9,    Cath lab reports dated 6/222:    \"Left Main %:       0  Proximal LAD %: 70%  Mid/Distal LAD %:  0.  Diagonal branch has an ostial 70%  LCX %: Subtotal occlusion with collaterals from the LAD  [place]:    RCA %: 100% with collaterals from the left to the right  [place] %:    SVG(s) %:   \"      \"Impression and Recommendation: Severe three-vessel coronary disease  Mild LV dysfunction  We will review films with surgeons for possible coronary bypass\"        Wounds (last 24 hours)     LDA Wound     Row Name 06/04/22 0800 06/04/22 0400 06/04/22 0000       Wound 06/01/22 0020 Right posterior elbow Skin Tear    Wound - " Properties Group Placement Date: 06/01/22  - Placement Time: 0020  -AH Present on Hospital Admission: Y  -AH Side: Right  -AH Orientation: posterior  -AH Location: elbow  -AH Primary Wound Type: Skin tear  -AH Additional Comments: mepilex placed  -AH    Dressing Appearance dry;intact  -SM dry;intact  -KR dry;intact  -KR    Closure WHIT  -SM WHIT  -KR WHIT  -KR    Base dressing in place, unable to visualize  -SM dressing in place, unable to visualize  -KR dressing in place, unable to visualize  -KR    Retired Wound - Properties Group Placement Date: 06/01/22  - Placement Time: 0020  -AH Present on Hospital Admission: Y  -AH Side: Right  -AH Orientation: posterior  -AH Location: elbow  -AH Primary Wound Type: Skin tear  -AH Additional Comments: mepilex placed  -AH    Retired Wound - Properties Group Date first assessed: 06/01/22  - Time first assessed: 0020  -AH Present on Hospital Admission: Y  -AH Side: Right  -AH Location: elbow  -AH Primary Wound Type: Skin tear  -AH Additional Comments: mepilex placed  -AH    Row Name 06/03/22 2000 06/03/22 1600 06/03/22 1200       Wound 06/01/22 0020 Right posterior elbow Skin Tear    Wound - Properties Group Placement Date: 06/01/22  - Placement Time: 0020  -AH Present on Hospital Admission: Y  -AH Side: Right  -AH Orientation: posterior  -AH Location: elbow  -AH Primary Wound Type: Skin tear  -AH Additional Comments: mepilex placed  -AH    Dressing Appearance dry;intact  -KR -- --    Closure WHIT  -KR WHIT  -CL WHIT  -CL    Base dressing in place, unable to visualize  -KR dressing in place, unable to visualize  -CL dressing in place, unable to visualize  -CL    Retired Wound - Properties Group Placement Date: 06/01/22  - Placement Time: 0020  -AH Present on Hospital Admission: Y  -AH Side: Right  -AH Orientation: posterior  -AH Location: elbow  -AH Primary Wound Type: Skin tear  -AH Additional Comments: mepilex placed  -AH    Retired Wound - Properties Group Date first  assessed: 06/01/22  - Time first assessed: 0020  -AH Present on Hospital Admission: Y  -AH Side: Right  -AH Location: elbow  -AH Primary Wound Type: Skin tear  -AH Additional Comments: mepilex placed  -AH          User Key  (r) = Recorded By, (t) = Taken By, (c) = Cosigned By    Initials Name Provider Type    Violeta Hoyt RN Registered Nurse    Deysi Leger RN Registered Nurse    Lizette Mitchell RN Registered Nurse    Eda Borja RN Registered Nurse                  Assessment & Plan      Brief Patient Summary:  Chi Quinteros Sr. is a 67 y.o. male who initially presented to the emergency department on 5/31/2022 with complaints of syncope and loss of consciousness.  He was noted to be hypoxic in the 80s by EMS and reported at that time that he drinks about 5-6 beers daily.  His troponins were elevated at 0.415 then 0.326 and then 0.116.  He underwent a stress test which showed severe ischemia to lateral inferior wall and subsequently underwent a cardiac catheterization today 6/2/2022 which showed severe three-vessel coronary artery disease mild left ventricular dysfunction.  He will be admitted for cardiothoracic surgery evaluation for coronary artery bypass grafting.  Initially upon admission he had a creatinine of 1.9 and a GFR of 38.2 and was seen by nephrology for clearance for the cardiac catheterization.  Creatinine has now improved to 0.86 with a GFR of 94.9.  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  CIWA precautions have been put in place, and he will be further evaluated by cardiac surgery.    amLODIPine, 5 mg, Oral, Daily  atorvastatin, 40 mg, Oral, Daily  budesonide-formoterol, 2 puff, Inhalation, BID - RT  carvedilol, 6.25 mg, Oral, BID With Meals  enoxaparin, 1 mg/kg, Subcutaneous, Q12H  gabapentin, 600 mg, Oral, BID  guaiFENesin, 1,200 mg, Oral,  Q12H  ipratropium-albuterol, 3 mL, Nebulization, Q4H - RT  lisinopril, 20 mg, Oral, Daily  predniSONE, 20 mg, Oral, Daily With Breakfast  sodium chloride, 10 mL, Intravenous, Q12H  tamsulosin, 0.4 mg, Oral, Daily       Pharmacy to Dose enoxaparin (LOVENOX),   Pharmacy to dose Trelegy,          Active Hospital Problems:  Active Hospital Problems    Diagnosis    • Acute renal insufficiency    • Alcohol dependence (HCC)    • Essential hypertension    • Other emphysema (HCC)    • Coronary artery disease    • COPD exacerbation (HCC)    • Obesity (BMI 30-39.9)    • Abnormal nuclear stress test      Added automatically from request for surgery [id number]       Plan:     Syncopal episode  Abnormal stress test and heart cath showing three-vessel disease awaiting CT surgery recommendation  Dr. Giraldo following  On statin and Lovenox full dose and lisinopril    Systolic congestive heart failure EF 40%  On lisinopril  Add Coreg    Acute kidney injury  Improved  Seen and followed by nephrologist      Alcohol dependence, CIWA precautions in place  consulted  Supplemental thiamine    Essential hypertension,  On Norvasc  On lisinopril  Coreg      Alcohol dependence  Alcohol withdrawal syndrome  Started on CIWA protocol   Add thiamine  Add multivitamin  Psychiatrist consulted  Rule out steroid psychosis as well      ?  Fasting hyperglycemia, 159  A1c 5.5  TSH 0.47      COPD exacerbation  Emphysema, no home oxygen now stable on 2 L via nasal cannula post catheterization, restarted home Trelegy pharmacy to dose   Taper steroids    Obesity BMI 34 lifestyle management education     BPh, resume home Flomax       DVT prophylaxis:  Medical and mechanical DVT prophylaxis orders are present.    CODE STATUS:    Code Status (Patient has no pulse and is not breathing): CPR (Attempt to Resuscitate)  Medical Interventions (Patient has pulse or is breathing): Full Support      Disposition:  I expect patient to be discharged pending  cardiovascular surgery consult and outcome of evaluation     This patient has been examined wearing appropriate Personal Protective Equipment  06/04/22      Electronically signed by Harris Boateng MD, 06/04/22, 10:24 EDT.  [facility]ist [facility]

## 2022-06-04 NOTE — NURSING NOTE
"Informed by CNA pt up oob in bathroom. Would not return to bed. Pt redirected and placed back in bed.  Breakfast ordered.  Pt given ativan with ciwa of 12.  Pt anxious accused night shift of \"trying to kill me or something last night\". Pt back to bed.Will monitor.   "

## 2022-06-04 NOTE — PLAN OF CARE
Goal Outcome Evaluation:    Patient was alert and oriented at beginning of shift.  Patient has become more confused throughout the night.  He has been tearing off equipment and pulled out IV.  Patient has been scoring high enough on CIWA scale to receive PRN ativan.  Patient was placed on 1L NC while asleep, VSS at this time.

## 2022-06-04 NOTE — NURSING NOTE
Pt found OOB with iv pulled out and blood on pt, floor, and bed.  Pt cleaned up and given breakfast tray.  Pt oriented xx2 but not to situation. Pt appears anxious and agitated at times.  Pt redirected.  Will cont to monitor

## 2022-06-04 NOTE — NURSING NOTE
Pt up out of bed.  Pulled leadsoff. Pt stated going to bathroom. Urinal given.  Instructed pt not to get oob without assist. Pt angry times.  Would not follow commands to return to bed.  Pt eventually got back in bed.  Will cont to monitor

## 2022-06-04 NOTE — PROGRESS NOTES
Cardiology Progress Note      Admiting Physician:  Harris Boateng *   LOS: 0 days       Reason For Followup:  Multivessel coronary artery disease      Subjective:    Interval History:  Seen and examined.  Chart and labs reviewed.  Patient now having altered mentation from alcohol withdrawal.  Surgery on hold.  Sitter at bedside.    Review of Systems:  A complete review of systems was attempted however patient's cognitive status is questionable.  He denies chest pain or shortness of breath at this time.    Assessment & Plan    Impressions:  Syncope  Multivessel coronary artery disease  Hyperlipidemia  Hypertension  Ischemic cardiomyopathy EF 40%  Acute kidney injury-improved  Alcohol dependence  Altered mental status likely secondary to alcohol withdrawal    Recommendations:  CABG on hold due to alcohol withdrawal and altered mentation  Monitor rate and rhythm closely  Further recommendations as patient's course progresses  Alcohol withdrawal per admitting service    Objective:    Medication Review:   Scheduled Meds:amLODIPine, 5 mg, Oral, Daily  atorvastatin, 40 mg, Oral, Daily  budesonide-formoterol, 2 puff, Inhalation, BID - RT  carvedilol, 6.25 mg, Oral, BID With Meals  enoxaparin, 1 mg/kg, Subcutaneous, Q12H  gabapentin, 600 mg, Oral, BID  guaiFENesin, 1,200 mg, Oral, Q12H  ipratropium-albuterol, 3 mL, Nebulization, Q4H - RT  lisinopril, 20 mg, Oral, Daily  predniSONE, 20 mg, Oral, Daily With Breakfast  sodium chloride, 10 mL, Intravenous, Q12H  tamsulosin, 0.4 mg, Oral, Daily      Continuous Infusions:Pharmacy to Dose enoxaparin (LOVENOX),   Pharmacy to dose Trelegy,       PRN Meds:.•  acetaminophen **OR** acetaminophen **OR** acetaminophen  •  aluminum-magnesium hydroxide-simethicone  •  atropine  •  benzonatate  •  hydrALAZINE  •  ipratropium-albuterol  •  LORazepam **OR** LORazepam **OR** LORazepam **OR** LORazepam **OR** LORazepam **OR** LORazepam  •  LORazepam **OR** LORazepam **OR** LORazepam  **OR** LORazepam **OR** LORazepam **OR** LORazepam  •  magnesium sulfate **OR** magnesium sulfate **OR** magnesium sulfate  •  melatonin  •  nitroglycerin  •  ondansetron **OR** ondansetron  •  Pharmacy to Dose enoxaparin (LOVENOX)  •  Pharmacy to dose Trelegy  •  potassium chloride **OR** potassium chloride **OR** potassium chloride  •  sodium chloride  •  sodium chloride  •  sodium chloride    Patient Active Problem List   Diagnosis   • COPD exacerbation (HCC)   • Obesity (BMI 30-39.9)   • Abnormal nuclear stress test   • Acute renal insufficiency   • Alcohol dependence (HCC)   • Essential hypertension   • Other emphysema ([facility])   • Coronary artery disease         Physical Exam:    General: Alert, cooperative, no distress, appears stated age  Head:  Normocephalic, atraumatic, mucous membranes moist  Eyes:  Conjunctivae/corneas clear, EOM's intact     Neck:  Supple,  no bruit  Lungs:  Clear to auscultation bilaterally, no wheezes rhonchi rales are noted  Chest wall: No tenderness  Heart::  Regular rate and rhythm, S1 and S2 normal, 1/6 holosystolic murmur.  No rub or gallop  Abdomen: Soft, non-tender, nondistended bowel sounds active.  Obese  Extremities: No cyanosis, clubbing, or edema  Pulses: Diminished pedal pulses  Skin:  No rashes or lesions  Neuro/psych: A&O x3. CN II through XII are grossly intact with appropriate affect    Vital Signs:  Vitals:    06/04/22 0730 06/04/22 0732 06/04/22 0733 06/04/22 0736   BP:       BP Location:       Patient Position:       Pulse: 69 79 69 79   Resp: 16 16 16 16   Temp:       TempSrc:       SpO2: 96%  96%    Weight:       Height:         Wt Readings from Last 1 Encounters:   06/04/22 111 kg (245 lb 6 oz)       Intake/Output Summary (Last 24 hours) at 6/4/2022 1018  Last data filed at 6/4/2022 0800  Gross per 24 hour   Intake 840 ml   Output 750 ml   Net 90 ml         Results Review:     CBC    Results from last 7 days   Lab Units 06/03/22  1014 06/02/22  0500 06/01/22  1024  05/31/22 2053   WBC 10*3/mm3 6.00 6.80 5.50 6.00   HEMOGLOBIN g/dL 13.5 12.9* 13.4 12.1*   PLATELETS 10*3/mm3 180 161 166 170     Cr Clearance Estimated Creatinine Clearance: 83.6 mL/min (by C-G formula based on SCr of 1.07 mg/dL).  Coag   Results from last 7 days   Lab Units 06/02/22  0500   INR  1.00     HbA1C   Lab Results   Component Value Date    HGBA1C 5.5 06/03/2022     Blood Glucose No results found for: POCGLU  Infection   Results from last 7 days   Lab Units 05/31/22 2227   PROCALCITONIN ng/mL 0.12     CMP   Results from last 7 days   Lab Units 06/03/22  1014 06/02/22  0500 06/01/22  1024 05/31/22 2053   SODIUM mmol/L 139 138 136 134*   POTASSIUM mmol/L 4.1 4.5 4.8 3.7   CHLORIDE mmol/L 103 103 103 97*   CO2 mmol/L 24.0 23.0 20.0* 22.0   BUN mg/dL 15 13 14 15   CREATININE mg/dL 1.07 0.86 1.17 1.90*   GLUCOSE mg/dL 157* 159* 159* 111*   ALBUMIN g/dL  --  3.50  --  3.60   BILIRUBIN mg/dL  --  0.2  --  0.4   ALK PHOS U/L  --  55  --  54   AST (SGOT) U/L  --  22  --  30   ALT (SGPT) U/L  --  26  --  30     ABG      UA    Results from last 7 days   Lab Units 06/01/22  0029   NITRITE UA  Negative     ANGELO  No results found for: POCMETH, POCAMPHET, POCBARBITUR, POCBENZO, POCCOCAINE, POCOPIATES, POCOXYCODO, POCPHENCYC, POCPROPOXY, POCTHC, POCTRICYC  Lysis Labs   Results from last 7 days   Lab Units 06/03/22  1014 06/02/22  0500 06/01/22  1024 05/31/22 2053   INR   --  1.00  --   --    HEMOGLOBIN g/dL 13.5 12.9* 13.4 12.1*   PLATELETS 10*3/mm3 180 161 166 170   CREATININE mg/dL 1.07 0.86 1.17 1.90*     Radiology(recent) No radiology results for the last day      Results from last 7 days   Lab Units 06/01/22  1024   TROPONIN T ng/mL 0.116*       Imaging Results (Last 24 Hours)     ** No results found for the last 24 hours. **          Cardiac Studies:  Echo- Results for orders placed during the hospital encounter of 05/31/22    Adult Transthoracic Echo Complete With Contrast if Necessary Per Protocol (With  Agitated Saline)    Interpretation Summary  · Left ventricular ejection fraction appears to be 56 - 60%.  · The right ventricular cavity is mildly dilated.  · Mild dilation of the aortic root is present.  · No pericardial effusion noted    Stress Myoview-  Cath-        Baldomero Dowd DO  06/04/22  10:18 EDT

## 2022-06-04 NOTE — PLAN OF CARE
Problem: Adult Inpatient Plan of Care  Goal: Plan of Care Review  Outcome: Ongoing, Progressing  Flowsheets (Taken 6/4/2022 1431)  Outcome Evaluation: Plan for eval for bypass surgery when pt out of withdrawls.  Will cont to monitor.   Goal Outcome Evaluation:              Outcome Evaluation: Plan for eval for bypass surgery when pt out of withdrawls.  Will cont to monitor.

## 2022-06-04 NOTE — PROGRESS NOTES
LOS: 0 days   Patient Care Team:  Deysi Mason APRN as PCP - General (Nurse Practitioner)  Eliseo Casarez MD as Consulting Physician (Nephrology)    Chief Complaint:       Subjective      Vital Signs  Temp:  [98 °F (36.7 °C)-98.6 °F (37 °C)] 98.6 °F (37 °C)  Heart Rate:  [69-87] 79  Resp:  [16-22] 16  BP: (117-135)/(64-77) 117/71  Body mass index is 35.21 kg/m².    Intake/Output Summary (Last 24 hours) at 6/4/2022 1121  Last data filed at 6/4/2022 0800  Gross per 24 hour   Intake 600 ml   Output 500 ml   Net 100 ml     I/O this shift:  In: 360 [P.O.:360]  Out: -       Wt Readings from Last 3 Encounters:   06/04/22 111 kg (245 lb 6 oz)         Objective    Results Review:        WBC No results found for: WBCS   HGB Hemoglobin   Date Value Ref Range Status   06/03/2022 13.5 13.0 - 17.7 g/dL Final   06/02/2022 12.9 (L) 13.0 - 17.7 g/dL Final      HCT Hematocrit   Date Value Ref Range Status   06/03/2022 41.2 37.5 - 51.0 % Final   06/02/2022 39.3 37.5 - 51.0 % Final      Platelets No results found for: LABPLAT     PT/INR:    Protime   Date Value Ref Range Status   06/02/2022 10.3 9.6 - 11.7 Seconds Final   /  INR   Date Value Ref Range Status   06/02/2022 1.00 0.93 - 1.10 Final       Sodium Sodium   Date Value Ref Range Status   06/04/2022 136 136 - 145 mmol/L Final   06/03/2022 139 136 - 145 mmol/L Final   06/02/2022 138 136 - 145 mmol/L Final      Potassium Potassium   Date Value Ref Range Status   06/04/2022 4.0 3.5 - 5.2 mmol/L Final   06/03/2022 4.1 3.5 - 5.2 mmol/L Final   06/02/2022 4.5 3.5 - 5.2 mmol/L Final      Chloride Chloride   Date Value Ref Range Status   06/04/2022 99 98 - 107 mmol/L Final   06/03/2022 103 98 - 107 mmol/L Final   06/02/2022 103 98 - 107 mmol/L Final      Bicarbonate No results found for: PLASMABICARB   BUN BUN   Date Value Ref Range Status   06/04/2022 18 8 - 23 mg/dL Final   06/03/2022 15 8 - 23 mg/dL Final   06/02/2022 13 8 - 23 mg/dL Final      Creatinine Creatinine   Date  Value Ref Range Status   06/04/2022 1.02 0.76 - 1.27 mg/dL Final   06/03/2022 1.07 0.76 - 1.27 mg/dL Final   06/02/2022 0.86 0.76 - 1.27 mg/dL Final      Calcium Calcium   Date Value Ref Range Status   06/04/2022 8.4 (L) 8.6 - 10.5 mg/dL Final   06/03/2022 8.5 (L) 8.6 - 10.5 mg/dL Final   06/02/2022 8.3 (L) 8.6 - 10.5 mg/dL Final      Magnesium Magnesium   Date Value Ref Range Status   06/02/2022 2.1 1.6 - 2.4 mg/dL Final         .imag    amLODIPine, 5 mg, Oral, Daily  atorvastatin, 40 mg, Oral, Daily  budesonide-formoterol, 2 puff, Inhalation, BID - RT  carvedilol, 6.25 mg, Oral, BID With Meals  enoxaparin, 1 mg/kg, Subcutaneous, Q12H  gabapentin, 600 mg, Oral, BID  guaiFENesin, 1,200 mg, Oral, Q12H  ipratropium-albuterol, 3 mL, Nebulization, Q4H - RT  lisinopril, 20 mg, Oral, Daily  predniSONE, 20 mg, Oral, Daily With Breakfast  sodium chloride, 10 mL, Intravenous, Q12H  tamsulosin, 0.4 mg, Oral, Daily      Pharmacy to Dose enoxaparin (LOVENOX),   Pharmacy to dose Trelegy,           Patient Active Problem List   Diagnosis Code   • COPD exacerbation (HCC) J44.1   • Obesity (BMI 30-39.9) E66.9   • Abnormal nuclear stress test R94.39   • Acute renal insufficiency N28.9   • Alcohol dependence (HCC) F10.20   • Essential hypertension I10   • Other emphysema (HCC) J43.8   • Coronary artery disease I25.10       Assessment & Plan    COPD exacerbation (HCC)    Obesity (BMI 30-39.9)    Abnormal nuclear stress test    Acute renal insufficiency    Alcohol dependence (HCC)    Essential hypertension    Other emphysema (HCC)    Coronary artery disease        - NSTEMI, CAD  - Ascending aortic aneurysm 4.9-5.0 cm  - Admit with syncopal event x2---orthostatic on admit  - COPD  - HTN  - HL  - Lumbar herniation---back surgery pending  - Early prostate cancer---has follow up as outpatient, probable radiation treatment per patient  - FELIBERTO----resolving, Dr. Casarez following  - ETOH use---3 beers/day     Patient with multivessel coronary  artery disease and an ascending aortic aneurysm.  Patient actually is having an alcoholic withdrawal.  He was a heavy smoker and PFT are pending.  We will reassess if he is a surgical candidate after he solve this withdrawal, and evaluate the lung function.  I suggest to consult psychiatric for further management.    Benson Hughes MD  06/04/22  11:21 EDT

## 2022-06-04 NOTE — PROGRESS NOTES
"NEPHROLOGY PROGRESS NOTE------[facility]    [facility]  [phone]  Eliseo Casarez MD      Patient Care Team:  Deysi Mason APRN as PCP - General (Nurse Practitioner)  Eliseo Casarez MD as Consulting Physician (Nephrology)      Provider:  Eliseo Casarez MD  Patient Name: Chi Quinteros Sr.  :  1955    SUBJECTIVE:  F/u FELIBERTO  No chest pain or SOA  S/p cath revealing severe 3 vessel disease  Awaits CTS eval for CABG    Medication:  amLODIPine, 5 mg, Oral, Daily  atorvastatin, 40 mg, Oral, Daily  budesonide-formoterol, 2 puff, Inhalation, BID - RT  carvedilol, 6.25 mg, Oral, BID With Meals  enoxaparin, 1 mg/kg, Subcutaneous, Q12H  gabapentin, 600 mg, Oral, BID  guaiFENesin, 1,200 mg, Oral, Q12H  ipratropium-albuterol, 3 mL, Nebulization, Q4H - RT  lisinopril, 20 mg, Oral, Daily  predniSONE, 20 mg, Oral, Daily With Breakfast  sodium chloride, 10 mL, Intravenous, Q12H  tamsulosin, 0.4 mg, Oral, Daily      Pharmacy to Dose enoxaparin (LOVENOX),   Pharmacy to dose Trelegy,         OBJECTIVE    Vital Sign Min/Max for last 24 hours  Temp  Min: 98 °F (36.7 °C)  Max: 98.6 °F (37 °C)   BP  Min: 108/56  Max: 135/72   Pulse  Min: 69  Max: 103   Resp  Min: 16  Max: 22   SpO2  Min: 91 %  Max: 99 %   No data recorded   Weight  Min: 111 kg (245 lb 6 oz)  Max: 111 kg (245 lb 6 oz)     Flowsheet Rows    Flowsheet Row First Filed Value   Admission Height 177.8 cm (70\") Documented at 2022   Admission Weight 104 kg (230 lb) Documented at 2022          No intake/output data recorded.  I/O last 3 completed shifts:  In: 960 [P.O.:960]  Out:  [Urine:]    Physical Exam:  General Appearance: alert, appears stated age and cooperative  Head: normocephalic, without obvious abnormality and atraumatic  Eyes: conjunctivae and sclerae normal and no icterus  Neck: supple and no JVD  Lungs: clear to auscultation and respirations regular  Heart: " regular rhythm & normal rate and normal S1, S2  Chest: Wall no abnormalities observed  Abdomen: normal bowel sounds and soft non-tender  Extremities: moves extremities well, no edema, no cyanosis and no redness  Skin: no bleeding, bruising or rash, turgor normal, color normal and no lesions noted  Neurologic: Alert, and oriented. No focal deficits    Labs:    WBC WBC   Date Value Ref Range Status   06/03/2022 6.00 3.40 - 10.80 10*3/mm3 Final   06/02/2022 6.80 3.40 - 10.80 10*3/mm3 Final   06/01/2022 5.50 3.40 - 10.80 10*3/mm3 Final      HGB Hemoglobin   Date Value Ref Range Status   06/03/2022 13.5 13.0 - 17.7 g/dL Final   06/02/2022 12.9 (L) 13.0 - 17.7 g/dL Final   06/01/2022 13.4 13.0 - 17.7 g/dL Final      HCT Hematocrit   Date Value Ref Range Status   06/03/2022 41.2 37.5 - 51.0 % Final   06/02/2022 39.3 37.5 - 51.0 % Final   06/01/2022 40.8 37.5 - 51.0 % Final      Platlets No results found for: LABPLAT   MCV MCV   Date Value Ref Range Status   06/03/2022 99.2 (H) 79.0 - 97.0 fL Final   06/02/2022 99.0 (H) 79.0 - 97.0 fL Final   06/01/2022 99.0 (H) 79.0 - 97.0 fL Final          Sodium Sodium   Date Value Ref Range Status   06/03/2022 139 136 - 145 mmol/L Final   06/02/2022 138 136 - 145 mmol/L Final   06/01/2022 136 136 - 145 mmol/L Final      Potassium Potassium   Date Value Ref Range Status   06/03/2022 4.1 3.5 - 5.2 mmol/L Final   06/02/2022 4.5 3.5 - 5.2 mmol/L Final   06/01/2022 4.8 3.5 - 5.2 mmol/L Final      Chloride Chloride   Date Value Ref Range Status   06/03/2022 103 98 - 107 mmol/L Final   06/02/2022 103 98 - 107 mmol/L Final   06/01/2022 103 98 - 107 mmol/L Final      CO2 CO2   Date Value Ref Range Status   06/03/2022 24.0 22.0 - 29.0 mmol/L Final   06/02/2022 23.0 22.0 - 29.0 mmol/L Final   06/01/2022 20.0 (L) 22.0 - 29.0 mmol/L Final      BUN BUN   Date Value Ref Range Status   06/03/2022 15 8 - 23 mg/dL Final   06/02/2022 13 8 - 23 mg/dL Final   06/01/2022 14 8 - 23 mg/dL Final      Creatinine  Creatinine   Date Value Ref Range Status   06/03/2022 1.07 0.76 - 1.27 mg/dL Final   06/02/2022 0.86 0.76 - 1.27 mg/dL Final   06/01/2022 1.17 0.76 - 1.27 mg/dL Final      Calcium Calcium   Date Value Ref Range Status   06/03/2022 8.5 (L) 8.6 - 10.5 mg/dL Final   06/02/2022 8.3 (L) 8.6 - 10.5 mg/dL Final   06/01/2022 8.3 (L) 8.6 - 10.5 mg/dL Final      PO4 No components found for: PO4   Albumin Albumin   Date Value Ref Range Status   06/02/2022 3.50 3.50 - 5.20 g/dL Final      Magnesium Magnesium   Date Value Ref Range Status   06/02/2022 2.1 1.6 - 2.4 mg/dL Final      Uric Acid No components found for: URIC ACID     Imaging Results (Last 72 Hours)     Procedure Component Value Units Date/Time    CT Angiogram Chest Pulmonary Embolism [[id number]] Collected: 06/01/22 1246     Updated: 06/01/22 1253    Narrative:         DATE OF EXAM:  6/1/2022 12:42 PM     PROCEDURE:  CT ANGIOGRAM CHEST PULMONARY EMBOLISM-     INDICATIONS:   Pulmonary embolism (PE) suspected, positive D-dimer; R55-Syncope and  collapse; F10.920-Alcohol use, unspecified with intoxication,  uncomplicated; J96.01-Acute respiratory failure with hypoxia;  I95.1-Orthostatic hypotension; R77.8-Other specified abnormalities of  plasma proteins; N17.9-Acute kidney failure, unspecified; J44.1-Chronic  obstructive pulmonary disease with (acute) exacerbation     COMPARISON:   No Comparisons Available     TECHNIQUE:  Routine transaxial slices were obtained through chest after  administration of intravenous Isovue 370. Reconstructed coronal and  sagittal images were also obtained. In addition, a 3 D volume rendered  image was obtained after post processing.  Automated exposure control  and iterative reconstruction methods were used.      FINDINGS:  There is excellent pulmonary arterial opacification and there are no  filling defects to suggest pulmonary embolic disease. There is diffuse  calcific atherosclerotic disease of the thoracic aorta. There is  moderate  coronary artery calcific atherosclerosis. There is aneurysmal  dilatation of the ascending aorta up to 4.9 cm. There is elevation of  the right hemidiaphragm. There is bilateral basilar atelectasis. There  is diffuse emphysema. There are no suspicious pulmonary nodules. There  are no pathologically enlarged lymph nodes. There are no consolidations  to suggest pneumonia. There is fatty infiltration of the liver.       Impression:      1.A descending aortic aneurysm up to 4.9 cm.  2.No pulmonary embolic disease.  3.Fatty liver.  4.Bilateral basilar atelectasis.     Electronically Signed By-Manoj Hernandez MD On:6/1/2022 12:51 PM  This report was finalized on [id number] by  Manoj Hernandez MD.          Results for orders placed during the hospital encounter of 05/31/22    XR Chest 1 View    Narrative  Examination: XR CHEST 1 VW-    Date of Exam: 5/31/2022 9:03 PM    Indication: Chest pain protocol.    Comparison: None available.    Technique: Single radiographic view of the chest was obtained.    Findings:  There is no pneumothorax, pleural effusion or focal airspace  consolidation. Cardiomediastinal silhouette is unremarkable. Pulmonary  vasculature appears within normal limits. Regional bones appear grossly  intact.    Impression  No acute cardiopulmonary abnormality.    Electronically Signed By-Johann Dodge MD On:5/31/2022 9:29 PM  This report was finalized on [id number] by  Johann Dodge MD.      Results for orders placed during the hospital encounter of 05/31/22    Duplex Vein Mapping Lower Extremity - Bilateral CAR    Interpretation Summary  The greater saphenous veins are of satisfactory quality from the groin to the mid distal thigh bilaterally.  Distal to this the veins are small and inadequate.        ASSESSMENT / PLAN      COPD exacerbation (HCC)    Obesity (BMI 30-39.9)    Abnormal nuclear stress test    Acute renal insufficiency    Alcohol dependence (HCC)    Essential hypertension    Other emphysema  (HCC)    Coronary artery disease    · FELIBERTO- 3 renal due to volume depletion and or hypotension.  UA negative  · Hypertension  · Syncopal episode-cardiac work-up in progress  · CAD--cath with severe three vessel disease.     CR stable post cath  CTS to evaluate for possible CABG        Eliseo Casarez MD  [facility]/[place]  [phone]  06/04/22  09:43 EDT

## 2022-06-05 ENCOUNTER — APPOINTMENT (OUTPATIENT)
Dept: GENERAL RADIOLOGY | Facility: HOSPITAL | Age: 67
End: 2022-06-05

## 2022-06-05 PROBLEM — R55 SYNCOPE, UNSPECIFIED SYNCOPE TYPE: Status: ACTIVE | Noted: 2022-06-05

## 2022-06-05 LAB
ANION GAP SERPL CALCULATED.3IONS-SCNC: 10 MMOL/L (ref 5–15)
BASOPHILS # BLD AUTO: 0 10*3/MM3 (ref 0–0.2)
BASOPHILS NFR BLD AUTO: 0.2 % (ref 0–1.5)
BUN SERPL-MCNC: 16 MG/DL (ref 8–23)
BUN/CREAT SERPL: 16.3 (ref 7–25)
CALCIUM SPEC-SCNC: 8.6 MG/DL (ref 8.6–10.5)
CHLORIDE SERPL-SCNC: 97 MMOL/L (ref 98–107)
CO2 SERPL-SCNC: 27 MMOL/L (ref 22–29)
CREAT SERPL-MCNC: 0.98 MG/DL (ref 0.76–1.27)
DEPRECATED RDW RBC AUTO: 45.9 FL (ref 37–54)
EGFRCR SERPLBLD CKD-EPI 2021: 84.5 ML/MIN/1.73
EOSINOPHIL # BLD AUTO: 0 10*3/MM3 (ref 0–0.4)
EOSINOPHIL NFR BLD AUTO: 0.3 % (ref 0.3–6.2)
ERYTHROCYTE [DISTWIDTH] IN BLOOD BY AUTOMATED COUNT: 13.6 % (ref 12.3–15.4)
GLUCOSE BLDC GLUCOMTR-MCNC: 266 MG/DL (ref 70–105)
GLUCOSE BLDC GLUCOMTR-MCNC: 277 MG/DL (ref 70–105)
GLUCOSE SERPL-MCNC: 105 MG/DL (ref 65–99)
HCT VFR BLD AUTO: 38 % (ref 37.5–51)
HGB BLD-MCNC: 12.5 G/DL (ref 13–17.7)
LYMPHOCYTES # BLD AUTO: 1.2 10*3/MM3 (ref 0.7–3.1)
LYMPHOCYTES NFR BLD AUTO: 18.3 % (ref 19.6–45.3)
MCH RBC QN AUTO: 32.2 PG (ref 26.6–33)
MCHC RBC AUTO-ENTMCNC: 32.9 G/DL (ref 31.5–35.7)
MCV RBC AUTO: 97.9 FL (ref 79–97)
MONOCYTES # BLD AUTO: 0.8 10*3/MM3 (ref 0.1–0.9)
MONOCYTES NFR BLD AUTO: 11.8 % (ref 5–12)
NEUTROPHILS NFR BLD AUTO: 4.6 10*3/MM3 (ref 1.7–7)
NEUTROPHILS NFR BLD AUTO: 69.4 % (ref 42.7–76)
NRBC BLD AUTO-RTO: 0.1 /100 WBC (ref 0–0.2)
PLATELET # BLD AUTO: 155 10*3/MM3 (ref 140–450)
PMV BLD AUTO: 8.4 FL (ref 6–12)
POTASSIUM SERPL-SCNC: 3.9 MMOL/L (ref 3.5–5.2)
RBC # BLD AUTO: 3.88 10*6/MM3 (ref 4.14–5.8)
SODIUM SERPL-SCNC: 134 MMOL/L (ref 136–145)
WBC NRBC COR # BLD: 6.6 10*3/MM3 (ref 3.4–10.8)

## 2022-06-05 PROCEDURE — 71045 X-RAY EXAM CHEST 1 VIEW: CPT

## 2022-06-05 PROCEDURE — 94799 UNLISTED PULMONARY SVC/PX: CPT

## 2022-06-05 PROCEDURE — 82962 GLUCOSE BLOOD TEST: CPT

## 2022-06-05 PROCEDURE — 99233 SBSQ HOSP IP/OBS HIGH 50: CPT | Performed by: INTERNAL MEDICINE

## 2022-06-05 PROCEDURE — 25010000002 METHYLPREDNISOLONE PER 40 MG: Performed by: INTERNAL MEDICINE

## 2022-06-05 PROCEDURE — 99232 SBSQ HOSP IP/OBS MODERATE 35: CPT | Performed by: INTERNAL MEDICINE

## 2022-06-05 PROCEDURE — 99222 1ST HOSP IP/OBS MODERATE 55: CPT | Performed by: INTERNAL MEDICINE

## 2022-06-05 PROCEDURE — 63710000001 PREDNISONE PER 1 MG: Performed by: INTERNAL MEDICINE

## 2022-06-05 PROCEDURE — 80048 BASIC METABOLIC PNL TOTAL CA: CPT | Performed by: INTERNAL MEDICINE

## 2022-06-05 PROCEDURE — 25010000002 METHYLPREDNISOLONE PER 125 MG: Performed by: INTERNAL MEDICINE

## 2022-06-05 PROCEDURE — 25010000002 ENOXAPARIN PER 10 MG: Performed by: INTERNAL MEDICINE

## 2022-06-05 PROCEDURE — 99221 1ST HOSP IP/OBS SF/LOW 40: CPT | Performed by: PHYSICIAN ASSISTANT

## 2022-06-05 PROCEDURE — 85025 COMPLETE CBC W/AUTO DIFF WBC: CPT | Performed by: INTERNAL MEDICINE

## 2022-06-05 PROCEDURE — 25010000002 ENOXAPARIN PER 10 MG

## 2022-06-05 PROCEDURE — 63710000001 INSULIN LISPRO (HUMAN) PER 5 UNITS: Performed by: INTERNAL MEDICINE

## 2022-06-05 RX ORDER — METHYLPREDNISOLONE SODIUM SUCCINATE 125 MG/2ML
125 INJECTION, POWDER, LYOPHILIZED, FOR SOLUTION INTRAMUSCULAR; INTRAVENOUS ONCE
Status: COMPLETED | OUTPATIENT
Start: 2022-06-05 | End: 2022-06-05

## 2022-06-05 RX ORDER — OLANZAPINE 10 MG/2ML
1 INJECTION, POWDER, LYOPHILIZED, FOR SOLUTION INTRAMUSCULAR
Status: DISCONTINUED | OUTPATIENT
Start: 2022-06-05 | End: 2022-06-10

## 2022-06-05 RX ORDER — DEXTROSE MONOHYDRATE 25 G/50ML
25 INJECTION, SOLUTION INTRAVENOUS
Status: DISCONTINUED | OUTPATIENT
Start: 2022-06-05 | End: 2022-06-10

## 2022-06-05 RX ORDER — INSULIN LISPRO 100 [IU]/ML
0-7 INJECTION, SOLUTION INTRAVENOUS; SUBCUTANEOUS AS NEEDED
Status: DISCONTINUED | OUTPATIENT
Start: 2022-06-05 | End: 2022-06-05

## 2022-06-05 RX ORDER — NICOTINE POLACRILEX 4 MG
15 LOZENGE BUCCAL
Status: DISCONTINUED | OUTPATIENT
Start: 2022-06-05 | End: 2022-06-10

## 2022-06-05 RX ORDER — BUDESONIDE 0.5 MG/2ML
0.5 INHALANT ORAL
Status: DISCONTINUED | OUTPATIENT
Start: 2022-06-05 | End: 2022-06-17 | Stop reason: HOSPADM

## 2022-06-05 RX ORDER — METHYLPREDNISOLONE SODIUM SUCCINATE 40 MG/ML
40 INJECTION, POWDER, LYOPHILIZED, FOR SOLUTION INTRAMUSCULAR; INTRAVENOUS EVERY 8 HOURS
Status: DISCONTINUED | OUTPATIENT
Start: 2022-06-05 | End: 2022-06-05

## 2022-06-05 RX ORDER — ASPIRIN 81 MG/1
81 TABLET, CHEWABLE ORAL DAILY
Status: DISCONTINUED | OUTPATIENT
Start: 2022-06-05 | End: 2022-06-10

## 2022-06-05 RX ORDER — METHYLPREDNISOLONE SODIUM SUCCINATE 40 MG/ML
40 INJECTION, POWDER, LYOPHILIZED, FOR SOLUTION INTRAMUSCULAR; INTRAVENOUS EVERY 8 HOURS
Status: DISPENSED | OUTPATIENT
Start: 2022-06-05 | End: 2022-06-08

## 2022-06-05 RX ORDER — INSULIN LISPRO 100 [IU]/ML
0-7 INJECTION, SOLUTION INTRAVENOUS; SUBCUTANEOUS
Status: DISCONTINUED | OUTPATIENT
Start: 2022-06-05 | End: 2022-06-10

## 2022-06-05 RX ORDER — ARFORMOTEROL TARTRATE 15 UG/2ML
15 SOLUTION RESPIRATORY (INHALATION)
Status: DISCONTINUED | OUTPATIENT
Start: 2022-06-05 | End: 2022-06-17 | Stop reason: HOSPADM

## 2022-06-05 RX ORDER — ASPIRIN 81 MG/1
81 TABLET, CHEWABLE ORAL DAILY
Status: DISCONTINUED | OUTPATIENT
Start: 2022-06-05 | End: 2022-06-05

## 2022-06-05 RX ORDER — ENOXAPARIN SODIUM 100 MG/ML
40 INJECTION SUBCUTANEOUS EVERY 12 HOURS SCHEDULED
Status: DISCONTINUED | OUTPATIENT
Start: 2022-06-05 | End: 2022-06-10

## 2022-06-05 RX ADMIN — AMLODIPINE BESYLATE 5 MG: 5 TABLET ORAL at 08:44

## 2022-06-05 RX ADMIN — CARVEDILOL 6.25 MG: 6.25 TABLET, FILM COATED ORAL at 16:49

## 2022-06-05 RX ADMIN — IPRATROPIUM BROMIDE AND ALBUTEROL SULFATE 3 ML: .5; 3 SOLUTION RESPIRATORY (INHALATION) at 07:52

## 2022-06-05 RX ADMIN — GUAIFENESIN 1200 MG: 600 TABLET, EXTENDED RELEASE ORAL at 11:43

## 2022-06-05 RX ADMIN — IPRATROPIUM BROMIDE AND ALBUTEROL SULFATE 3 ML: .5; 3 SOLUTION RESPIRATORY (INHALATION) at 16:01

## 2022-06-05 RX ADMIN — GABAPENTIN 600 MG: 600 TABLET, FILM COATED ORAL at 08:44

## 2022-06-05 RX ADMIN — GUAIFENESIN 1200 MG: 600 TABLET, EXTENDED RELEASE ORAL at 02:12

## 2022-06-05 RX ADMIN — LISINOPRIL 20 MG: 20 TABLET ORAL at 08:44

## 2022-06-05 RX ADMIN — ENOXAPARIN SODIUM 110 MG: 150 INJECTION SUBCUTANEOUS at 08:44

## 2022-06-05 RX ADMIN — Medication 100 MG: at 22:11

## 2022-06-05 RX ADMIN — IPRATROPIUM BROMIDE AND ALBUTEROL SULFATE 3 ML: .5; 3 SOLUTION RESPIRATORY (INHALATION) at 12:12

## 2022-06-05 RX ADMIN — ASPIRIN 81 MG: 81 TABLET, CHEWABLE ORAL at 11:43

## 2022-06-05 RX ADMIN — Medication 10 ML: at 22:11

## 2022-06-05 RX ADMIN — BUDESONIDE AND FORMOTEROL FUMARATE DIHYDRATE 2 PUFF: 80; 4.5 AEROSOL RESPIRATORY (INHALATION) at 08:00

## 2022-06-05 RX ADMIN — PREDNISONE 20 MG: 20 TABLET ORAL at 08:44

## 2022-06-05 RX ADMIN — IPRATROPIUM BROMIDE AND ALBUTEROL SULFATE 3 ML: .5; 3 SOLUTION RESPIRATORY (INHALATION) at 04:10

## 2022-06-05 RX ADMIN — BUDESONIDE 0.5 MG: 0.5 INHALANT RESPIRATORY (INHALATION) at 19:30

## 2022-06-05 RX ADMIN — Medication 100 MG: at 08:44

## 2022-06-05 RX ADMIN — CARVEDILOL 6.25 MG: 6.25 TABLET, FILM COATED ORAL at 08:44

## 2022-06-05 RX ADMIN — THERA TABS 1 TABLET: TAB at 08:44

## 2022-06-05 RX ADMIN — TAMSULOSIN HYDROCHLORIDE 0.4 MG: 0.4 CAPSULE ORAL at 08:44

## 2022-06-05 RX ADMIN — ATORVASTATIN CALCIUM 40 MG: 40 TABLET, FILM COATED ORAL at 08:43

## 2022-06-05 RX ADMIN — INSULIN LISPRO 4 UNITS: 100 INJECTION, SOLUTION INTRAVENOUS; SUBCUTANEOUS at 17:56

## 2022-06-05 RX ADMIN — METHYLPREDNISOLONE SODIUM SUCCINATE 125 MG: 125 INJECTION, POWDER, FOR SOLUTION INTRAMUSCULAR; INTRAVENOUS at 10:51

## 2022-06-05 RX ADMIN — ENOXAPARIN SODIUM 40 MG: 100 INJECTION SUBCUTANEOUS at 22:09

## 2022-06-05 RX ADMIN — METHYLPREDNISOLONE SODIUM SUCCINATE 40 MG: 40 INJECTION, POWDER, FOR SOLUTION INTRAMUSCULAR; INTRAVENOUS at 16:49

## 2022-06-05 RX ADMIN — Medication 10 ML: at 08:45

## 2022-06-05 RX ADMIN — ARFORMOTEROL TARTRATE 15 MCG: 15 SOLUTION RESPIRATORY (INHALATION) at 19:25

## 2022-06-05 RX ADMIN — GABAPENTIN 600 MG: 600 TABLET, FILM COATED ORAL at 22:10

## 2022-06-05 NOTE — PROGRESS NOTES
[facility] Medicine Services Daily Progress Note    Patient Name: Chi Quinteros Sr.  : 1955  MRN: [id number]  Primary Care Physician:  Deysi Mason APRN  Date of admission: 2022      Subjective      Chief Complaint:He reports he is hungrey     6/3  Vitals and labs reviewed  Awaiting CT surgery recommendation regarding possible cardiac surgery    Notes reviewed    /  Apparently intermittently confused overnight and angry intermittently  His vitals are stable requiring 2 L of oxygen afebrile  Suspected alcohol withdrawal.  CIWA score was 12 today with the nursing staff  On alcohol withdrawal protocol  Psychiatrist has been consulted by cardiac surgery  .  Add thiamine        6/  Short of breath and wheezing much more than last few days.  Her last pulmonologist for evaluation given need for bypass surgery  Continue bronchodilators  Will give additional steroid with Solu-Medrol  Repeat chest x-ray shows no acute  problem      Review of Systems   Constitutional: Negative.   HENT:        "[employer] right ear   Eyes: Negative.    Cardiovascular: Negative.    Respiratory: Negative.    Endocrine: Negative.    Hematologic/Lymphatic: Negative.    Skin: Negative.    Musculoskeletal: Positive for back pain.   Gastrointestinal: Negative.    Genitourinary: Negative.    Neurological: Negative.    Psychiatric/Behavioral: Negative.    Allergic/Immunologic: Negative.          Objective      Vitals:   Temp:  [97.4 °F (36.3 °C)-100.2 °F (37.9 °C)] 99 °F (37.2 °C)  Heart Rate:  [73-90] 89  Resp:  [18-26] 22  BP: (123-159)/(66-70) 159/70  Flow (L/min):  [3] 3    Physical Exam  Vitals reviewed.   Constitutional:       Appearance: Normal appearance. He is obese.   HENT:      Head: Normocephalic and atraumatic.      Right Ear: External ear normal.      Left Ear: External ear normal.      Ears:      Comments: "[employer] right ear     Nose: Nose normal.      Mouth/Throat:      Mouth: Mucous membranes are moist.  "  Eyes:      Extraocular Movements: Extraocular movements intact.   Cardiovascular:      Rate and Rhythm: Normal rate and regular rhythm.      Pulses: Normal pulses.      Heart sounds: Normal heart sounds.   Pulmonary:      Effort: Pulmonary effort is normal.      Breath sounds: Normal breath sounds.   Abdominal:      Palpations: Abdomen is soft.   Genitourinary:     Comments: deferred  Musculoskeletal:         General: Normal range of motion.      Cervical back: Normal range of motion and neck supple.   Skin:     General: Skin is warm and dry.   Neurological:      General: No focal deficit present.      Mental Status: He is alert and oriented to person, place, and time.   Psychiatric:         Mood and Affect: Mood normal.         Behavior: Behavior normal.         Thought Content: Thought content normal.         Judgment: Judgment normal.            Result Review    Result Review:  I have personally reviewed the results from the time of this admission to 6/5/2022 08:47 EDT and agree with these findings:  [x]  Laboratory  [x]  Microbiology  [x]  Radiology  [x]  EKG/Telemetry   [x]  Cardiology/Vascular   []  Pathology  [x]  Old records  []  Other:  Most notable findings include: EKG sinus rhythm old infarct, initial troponin on admission 0.415 then 0.326 and 0.116.  Initial D-dimer 0.70 CT scan per radiology 5/30/1931:    \"IMPRESSION:  1.A descending aortic aneurysm up to 4.9 cm.  2.No pulmonary embolic disease.  3.Fatty liver.  4.Bilateral basilar atelectasis.  \"    Initial creatinine on admission 1.9 with a GFR of 38.2 now improved to creatinine 0.86 with a EGFR of 94.9,    Cath lab reports dated 6/222:    \"Left Main %:       0  Proximal LAD %: 70%  Mid/Distal LAD %:  0.  Diagonal branch has an ostial 70%  LCX %: Subtotal occlusion with collaterals from the LAD  Ramus:    RCA %: 100% with collaterals from the left to the right  Lima %:    SVG(s) %:   \"      \"Impression and Recommendation: Severe three-vessel " "coronary disease  Mild LV dysfunction  We will review films with surgeons for possible coronary bypass\"        Wounds (last 24 hours)     LDA Wound     Row Name 06/05/22 0400 06/05/22 0005 06/04/22 2000       Wound 06/01/22 0020 Right posterior elbow Skin Tear    Wound - Properties Group Placement Date: 06/01/22  -AH Placement Time: 0020  -AH Present on Hospital Admission: Y  -AH Side: Right  -AH Orientation: posterior  -AH Location: elbow  -AH Primary Wound Type: Skin tear  -AH Additional Comments: mepilex placed  -AH    Closure WHIT  -DH WHIT  -DH WHIT  -DH    Base dressing in place, unable to visualize  -DH dressing in place, unable to visualize  -DH dressing in place, unable to visualize  -DH    Retired Wound - Properties Group Placement Date: 06/01/22  -AH Placement Time: 0020  -AH Present on Hospital Admission: Y  -AH Side: Right  -AH Orientation: posterior  -AH Location: elbow  -AH Primary Wound Type: Skin tear  -AH Additional Comments: mepilex placed  -AH    Retired Wound - Properties Group Date first assessed: 06/01/22  - Time first assessed: 0020  -AH Present on Hospital Admission: Y  -AH Side: Right  -AH Location: elbow  -AH Primary Wound Type: Skin tear  -AH Additional Comments: mepilex placed  -AH    Row Name 06/04/22 1544 06/04/22 1200          Wound 06/01/22 0020 Right posterior elbow Skin Tear    Wound - Properties Group Placement Date: 06/01/22  -AH Placement Time: 0020  -AH Present on Hospital Admission: Y  -AH Side: Right  -AH Orientation: posterior  -AH Location: elbow  -AH Primary Wound Type: Skin tear  -AH Additional Comments: mepilex placed  -AH     Closure WHIT  -SM WHIT  -SM     Base dressing in place, unable to visualize  -SM dressing in place, unable to visualize  -SM     Retired Wound - Properties Group Placement Date: 06/01/22  -AH Placement Time: 0020  -AH Present on Hospital Admission: Y  -AH Side: Right  -AH Orientation: posterior  -AH Location: elbow  -AH Primary Wound Type: Skin tear  " - Additional Comments: mepilex placed  -AH     Retired Wound - Properties Group Date first assessed: 06/01/22  - Time first assessed: 0020  -AH Present on Hospital Admission: Y  -AH Side: Right  -AH Location: elbow  -AH Primary Wound Type: Skin tear  -AH Additional Comments: mepilex placed  -AH           User Key  (r) = Recorded By, (t) = Taken By, (c) = Cosigned By    Initials Name Provider Type    Maral Salas RN Registered Nurse    Deysi Leger RN Registered Nurse    Lizette Mitchell RN Registered Nurse                  Assessment & Plan      Brief Patient Summary:  Chi Quinteros Sr. is a 67 y.o. male who initially presented to the emergency department on 5/31/2022 with complaints of syncope and loss of consciousness.  He was noted to be hypoxic in the 80s by EMS and reported at that time that he drinks about 5-6 beers daily.  His troponins were elevated at 0.415 then 0.326 and then 0.116.  He underwent a stress test which showed severe ischemia to lateral inferior wall and subsequently underwent a cardiac catheterization today 6/2/2022 which showed severe three-vessel coronary artery disease mild left ventricular dysfunction.  He will be admitted for cardiothoracic surgery evaluation for coronary artery bypass grafting.  Initially upon admission he had a creatinine of 1.9 and a GFR of 38.2 and was seen by nephrology for clearance for the cardiac catheterization.  Creatinine has now improved to 0.86 with a GFR of 94.9.  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  CIWA precautions have been put in place, and he will be further evaluated by cardiac surgery.    amLODIPine, 5 mg, Oral, Daily  atorvastatin, 40 mg, Oral, Daily  budesonide-formoterol, 2 puff, Inhalation, BID - RT  carvedilol, 6.25 mg, Oral, BID With Meals  enoxaparin, 1 mg/kg, Subcutaneous, Q12H  gabapentin,  600 mg, Oral, BID  guaiFENesin, 1,200 mg, Oral, Q12H  ipratropium-albuterol, 3 mL, Nebulization, Q4H - RT  lisinopril, 20 mg, Oral, Daily  multivitamin, 1 tablet, Oral, Daily  sodium chloride, 10 mL, Intravenous, Q12H  tamsulosin, 0.4 mg, Oral, Daily  thiamine, 100 mg, Oral, BID       Pharmacy to Dose enoxaparin (LOVENOX),   Pharmacy to dose Trelegy,          Active Hospital Problems:  Active Hospital Problems    Diagnosis    • Acute renal insufficiency    • Alcohol dependence (HCC)    • Essential hypertension    • Other emphysema (HCC)    • Coronary artery disease    • COPD exacerbation (HCC)    • Obesity (BMI 30-39.9)    • Abnormal nuclear stress test      Added automatically from request for surgery [id number]       Plan:     Syncopal episode  Abnormal stress test and heart cath showing three-vessel disease awaiting CT surgery recommendation  Dr. Giraldo following  On statin and Lovenox full dose and lisinopril    Systolic congestive heart failure EF 40%  On lisinopril  Add Coreg    Acute kidney injury  Improved  Seen and followed by nephrologist      Alcohol dependence, CIWA precautions in place  consulted  Supplemental thiamine    Essential hypertension,  On Norvasc  On lisinopril  Coreg      Alcohol dependence  Alcohol withdrawal syndrome  Started on CIWA protocol   Add thiamine  Add multivitamin  Psychiatrist consulted      ?  Fasting hyperglycemia, 159  A1c 5.5  TSH 0.47      COPD exacerbation  Emphysema, no home oxygen now stable on 2 L via nasal cannula post catheterization, restarted home Trelegy pharmacy to dose   Taper steroids  Now with new exacerbation 6/5/2022  Solu-Medrol 125x1  Pulmonary consult appreciated given need for bypass surgery    Obesity BMI 34 lifestyle management education     BPh, resume home Flomax       DVT prophylaxis:  Medical and mechanical DVT prophylaxis orders are present.    CODE STATUS:    Code Status (Patient has no pulse and is not breathing): CPR (Attempt to  Resuscitate)  Medical Interventions (Patient has pulse or is breathing): Full Support      Disposition:  I expect patient to be discharged pending cardiovascular surgery consult and outcome of evaluation     This patient has been examined wearing appropriate Personal Protective Equipment  06/05/22      Electronically signed by Harris Boateng MD, 06/05/22, 08:47 EDT.  [facility]

## 2022-06-05 NOTE — PLAN OF CARE
Problem: Adult Inpatient Plan of Care  Goal: Plan of Care Review  Outcome: Ongoing, Progressing  Flowsheets (Taken 6/5/2022 1709)  Outcome Evaluation: pt being prepared for cabg surgery.New consults for pulmonary and endrocrine today.  Pt using IS and states his breathing feels better with meds. Pt anxious to get surgery overwith.  Pt CIWA score was only 3 today.  Pt very pleasent and cooperative today  Sat up in chair most of the day.  Denies pain.soa.Will cont to monitor.   Goal Outcome Evaluation:              Outcome Evaluation: pt being prepared for cabg surgery.New consults for pulmonary and endrocrine today.  Pt using IS and states his breathing feels better with meds. Pt anxious to get surgery overwith.  Pt CIWA score was only 3 today.  Pt very pleasent and cooperative today  Sat up in chair most of the day.  Denies pain.soa.Will cont to monitor.

## 2022-06-05 NOTE — CONSULTS
[facility]: [facility]         CONSULT NOTE    Patient Identification:  Chi Quinteros Sr.  67 y.o.  male  1955  [id number]            Requesting physician: Attending physician    Reason for Consultation: Preop evaluation for surgical clearance      History of Present Illness:  67-year-old male admitted to the hospital 5/31/2022 with 2 episodes of witnessed syncope.  He has history of COPD, hypertension, hyperlipidemia, early prostate cancer with plan for probable radiation, and lumbar herniation with appointment to plan back surgery.  Patient had elevated troponin and elevated D-dimer but negative CT scan for PE however demonstrating ascending aortic aneurysm of 4.9 to 5 cm.  Cardiac catheter was done 6/3/2022 showing three-vessel coronary artery disease.    From respiratory standpoint he have shortness of breath and nonproductive cough.    Assessment:  Syncope  COPD with acute exacerbation  PFT 6/3/2022 showed combined severe obstructive and restrictive respiratory defect: FEV1 45% without significant response to bronchodilators, SVC 58%  Non-STEMI  Three-vessel coronary artery disease  Ascending aortic aneurysm 4.9205 cm  Hypertension  Dyslipidemia  Back pain with a lumbar herniation  Early prostate cancer  FELIBERTO: Resolving  Alcohol abuse  History of tobacco smoking: Quit June 2021    Recommendations:  Oxygen supplement and titration: Currently oxygenating well on room air however at night he was requiring 3 L per nasal cannula  From pulmonary standpoint patient is at high risk for pulmonary complications including pneumonia, atelectasis and or prolonged ventilation but there is no absolute contraindication for the surgery, but he needs his lung function to be optimized: I will start him on steroids and once his wheezing stops then his steroids could be stopped and then the patient could go for the CABG, patient will need perioperative bronchodilators and aggressive pulmonary toilet/intensive  spirometry/flutter valve  Inhaled corticosteroids  Bronchodilators  Statin  Blood pressure control            Review of Sytems:  Review of Systems  Constitutional: Negative for chills, fever and malaise/fatigue.   HENT: Negative.    Eyes: Negative.    Cardiovascular: Syncope upon admission  Respiratory: Positive for chronic cough and exertional shortness of breath.    Skin: Negative.    Musculoskeletal: Negative.    Gastrointestinal: Negative.    Genitourinary: Negative.    Neurological: Negative.    Psychiatric/Behavioral: Negative.  Past Medical History:  Past Medical History:   Diagnosis Date   • Back pain    • Emphysema lung (HCC)    • Hypertension        Past Surgical History:  Past Surgical History:   Procedure Laterality Date   • CARDIAC CATHETERIZATION Right 6/2/2022    Procedure: Coronary angiography;  Surgeon: Frank Giraldo MD;  Location:  [facility];  Service: Cardiovascular;  Laterality: Right;   • CARDIAC CATHETERIZATION N/A 6/2/2022    Procedure: Left Heart Cath;  Surgeon: Frank Giraldo MD;  Location:  [facility];  Service: Cardiovascular;  Laterality: N/A;   • CARDIAC CATHETERIZATION N/A 6/2/2022    Procedure: Left ventriculography;  Surgeon: Frank Giraldo MD;  Location:  [facility];  Service: Cardiovascular;  Laterality: N/A;        Home Meds:  Medications Prior to Admission   Medication Sig Dispense Refill Last Dose   • amLODIPine (NORVASC) 5 MG tablet Take 5 mg by mouth Daily.   5/31/2022 at Unknown time   • Fluticasone-Umeclidin-Vilant (Trelegy Ellipta) 100-62.5-25 MCG/INH inhaler Inhale 1 puff Daily.   5/31/2022 at Unknown time   • gabapentin (NEURONTIN) 600 MG tablet Take 600 mg by mouth 2 (Two) Times a Day.   5/31/2022 at Unknown time   • ipratropium-albuterol (COMBIVENT RESPIMAT)  MCG/ACT inhaler Inhale 1 puff 4 (Four) Times a Day As Needed for Wheezing.   Past Month at Unknown time   • lisinopril (PRINIVIL,ZESTRIL) 40 MG tablet  "Take 40 mg by mouth Daily.   2022 at Unknown time   • pravastatin (PRAVACHOL) 20 MG tablet Take 20 mg by mouth Daily.   2022 at Unknown time   • tamsulosin (FLOMAX) 0.4 MG capsule 24 hr capsule Take 1 capsule by mouth Daily.   2022 at Unknown time       Allergies:  No Known Allergies    Social History:   Social History     Socioeconomic History   • Marital status:    Tobacco Use   • Smoking status: Former Smoker     Quit date: 2021     Years since quittin.0   • Smokeless tobacco: Never Used   Vaping Use   • Vaping Use: Never used   Substance and Sexual Activity   • Alcohol use: Yes     Comment: 3-4 beers daily   • Drug use: Never   • Sexual activity: Defer       Family History:  History reviewed. No pertinent family history.    Physical Exam:  /70 (BP Location: Left arm, Patient Position: Lying)   Pulse 89   Temp 99 °F (37.2 °C) (Oral)   Resp 22   Ht 177.8 cm (70\")   Wt 111 kg (245 lb 6 oz)   SpO2 93%   BMI 35.21 kg/m²  Body mass index is 35.21 kg/m². 93% 111 kg (245 lb 6 oz)  Physical Exam  General Appearance:  Alert   HEENT:  Normocephalic, without obvious abnormality, Conjunctiva/corneas clear,.   Nares normal, no drainage     Neck:  Supple, symmetrical, trachea midline. No JVD.  Lungs /Chest wall:   Mild bilateral basal rhonchi and expiratory wheezing, respirations unlabored, symmetrical wall movement.     Heart:  Regular rate and rhythm, S1 S2 normal  Abdomen: Soft, non-tender, no masses, no organomegaly.    Extremities: No edema, no clubbing or cyanosis  LABS:  Lab Results   Component Value Date    CALCIUM 8.6 2022     Results from last 7 days   Lab Units 22  0540 22  0539 22  1008 22  1014 22  0500 22  1024 22  2227 22   MAGNESIUM mg/dL  --   --   --   --  2.1  --   --   --    SODIUM mmol/L  --  134* 136 139 138   < >  --  134*   POTASSIUM mmol/L  --  3.9 4.0 4.1 4.5   < >  --  3.7   CHLORIDE mmol/L  --  97* " 99 103 103   < >  --  97*   CO2 mmol/L  --  27.0 27.0 24.0 23.0   < >  --  22.0   BUN mg/dL  --  16 18 15 13   < >  --  15   CREATININE mg/dL  --  0.98 1.02 1.07 0.86   < >  --  1.90*   GLUCOSE mg/dL  --  105* 122* 157* 159*   < >  --  111*   CALCIUM mg/dL  --  8.6 8.4* 8.5* 8.3*   < >  --  8.8   WBC 10*3/mm3 6.60  --   --  6.00 6.80   < >  --  6.00   HEMOGLOBIN g/dL 12.5*  --   --  13.5 12.9*   < >  --  12.1*   PLATELETS 10*3/mm3 155  --   --  180 161   < >  --  170   ALT (SGPT) U/L  --   --   --   --  26  --   --  30   AST (SGOT) U/L  --   --   --   --  22  --   --  30   PROCALCITONIN ng/mL  --   --   --   --   --   --  0.12  --     < > = values in this interval not displayed.     Lab Results   Component Value Date    TROPONINT 0.116 (C) 06/01/2022     Results from last 7 days   Lab Units 06/01/22  1024 05/31/22 2227 05/31/22  2053   TROPONIN T ng/mL 0.116* 0.326* 0.415*         Results from last 7 days   Lab Units 05/31/22 2227   PROCALCITONIN ng/mL 0.12             Results from last 7 days   Lab Units 06/02/22  0500   INR  1.00         Lab Results   Component Value Date    TSH 0.476 06/03/2022     Estimated Creatinine Clearance: 91.3 mL/min (by C-G formula based on SCr of 0.98 mg/dL).  Results from last 7 days   Lab Units 06/01/22  0029   NITRITE UA  Negative        Imaging:  Imaging Results (Last 24 Hours)     Procedure Component Value Units Date/Time    XR Chest 1 View [[id number]] Resulted: 06/05/22 0911     Updated: 06/05/22 0912            Current Meds:   SCHEDULE  amLODIPine, 5 mg, Oral, Daily  atorvastatin, 40 mg, Oral, Daily  budesonide-formoterol, 2 puff, Inhalation, BID - RT  carvedilol, 6.25 mg, Oral, BID With Meals  enoxaparin, 1 mg/kg, Subcutaneous, Q12H  gabapentin, 600 mg, Oral, BID  guaiFENesin, 1,200 mg, Oral, Q12H  ipratropium-albuterol, 3 mL, Nebulization, Q4H - RT  lisinopril, 20 mg, Oral, Daily  methylPREDNISolone sodium succinate, 125 mg, Intravenous, Once  multivitamin, 1 tablet, Oral,  Daily  sodium chloride, 10 mL, Intravenous, Q12H  tamsulosin, 0.4 mg, Oral, Daily  thiamine, 100 mg, Oral, BID      Infusions  Pharmacy to Dose enoxaparin (LOVENOX),   Pharmacy to dose Trelegy,       PRNs  •  acetaminophen **OR** acetaminophen **OR** acetaminophen  •  aluminum-magnesium hydroxide-simethicone  •  atropine  •  benzonatate  •  hydrALAZINE  •  ipratropium-albuterol  •  LORazepam **OR** LORazepam **OR** LORazepam **OR** LORazepam **OR** LORazepam **OR** LORazepam  •  LORazepam **OR** LORazepam **OR** LORazepam **OR** LORazepam **OR** LORazepam **OR** LORazepam  •  magnesium sulfate **OR** magnesium sulfate **OR** magnesium sulfate  •  melatonin  •  nitroglycerin  •  ondansetron **OR** ondansetron  •  Pharmacy to Dose enoxaparin (LOVENOX)  •  Pharmacy to dose Trelegy  •  potassium chloride **OR** potassium chloride **OR** potassium chloride  •  sodium chloride  •  sodium chloride  •  sodium chloride        Ca Santo MD  6/5/2022  09:17 EDT      Much of this encounter note is an electronic transcription/translation of spoken language to printed text using Dragon Software.

## 2022-06-05 NOTE — PROGRESS NOTES
LOS: 0 days   Patient Care Team:  Deysi Mason APRN as PCP - General (Nurse Practitioner)  Eliseo Casarez MD as Consulting Physician (Nephrology)    Chief Complaint:       Subjective      Vital Signs  Temp:  [97.4 °F (36.3 °C)-100.2 °F (37.9 °C)] 99.4 °F (37.4 °C)  Heart Rate:  [73-90] 81  Resp:  [18-26] 22  BP: (118-159)/(57-70) 118/57  Body mass index is 35.21 kg/m².    Intake/Output Summary (Last 24 hours) at 6/5/2022 1050  Last data filed at 6/5/2022 0324  Gross per 24 hour   Intake 480 ml   Output 3080 ml   Net -2600 ml     No intake/output data recorded.      Wt Readings from Last 3 Encounters:   06/04/22 111 kg (245 lb 6 oz)         Objective    Results Review:        WBC No results found for: WBCS   HGB Hemoglobin   Date Value Ref Range Status   06/05/2022 12.5 (L) 13.0 - 17.7 g/dL Final   06/03/2022 13.5 13.0 - 17.7 g/dL Final      HCT Hematocrit   Date Value Ref Range Status   06/05/2022 38.0 37.5 - 51.0 % Final   06/03/2022 41.2 37.5 - 51.0 % Final      Platelets No results found for: LABPLAT     PT/INR:  No results found for: PROTIME/No results found for: INR    Sodium Sodium   Date Value Ref Range Status   06/05/2022 134 (L) 136 - 145 mmol/L Final   06/04/2022 136 136 - 145 mmol/L Final   06/03/2022 139 136 - 145 mmol/L Final      Potassium Potassium   Date Value Ref Range Status   06/05/2022 3.9 3.5 - 5.2 mmol/L Final   06/04/2022 4.0 3.5 - 5.2 mmol/L Final   06/03/2022 4.1 3.5 - 5.2 mmol/L Final      Chloride Chloride   Date Value Ref Range Status   06/05/2022 97 (L) 98 - 107 mmol/L Final   06/04/2022 99 98 - 107 mmol/L Final   06/03/2022 103 98 - 107 mmol/L Final      Bicarbonate No results found for: PLASMABICARB   BUN BUN   Date Value Ref Range Status   06/05/2022 16 8 - 23 mg/dL Final   06/04/2022 18 8 - 23 mg/dL Final   06/03/2022 15 8 - 23 mg/dL Final      Creatinine Creatinine   Date Value Ref Range Status   06/05/2022 0.98 0.76 - 1.27 mg/dL Final   06/04/2022 1.02 0.76 - 1.27 mg/dL  Final   06/03/2022 1.07 0.76 - 1.27 mg/dL Final      Calcium Calcium   Date Value Ref Range Status   06/05/2022 8.6 8.6 - 10.5 mg/dL Final   06/04/2022 8.4 (L) 8.6 - 10.5 mg/dL Final   06/03/2022 8.5 (L) 8.6 - 10.5 mg/dL Final      Magnesium No results found for: MG      .imag    amLODIPine, 5 mg, Oral, Daily  arformoterol, 15 mcg, Nebulization, BID - RT  atorvastatin, 40 mg, Oral, Daily  budesonide, 0.5 mg, Nebulization, BID - RT  carvedilol, 6.25 mg, Oral, BID With Meals  enoxaparin, 1 mg/kg, Subcutaneous, Q12H  gabapentin, 600 mg, Oral, BID  guaiFENesin, 1,200 mg, Oral, Q12H  ipratropium-albuterol, 3 mL, Nebulization, Q4H - RT  lisinopril, 20 mg, Oral, Daily  methylPREDNISolone sodium succinate, 125 mg, Intravenous, Once  methylPREDNISolone sodium succinate, 40 mg, Intravenous, Q8H  multivitamin, 1 tablet, Oral, Daily  sodium chloride, 10 mL, Intravenous, Q12H  tamsulosin, 0.4 mg, Oral, Daily  thiamine, 100 mg, Oral, BID      Pharmacy to Dose enoxaparin (LOVENOX),   Pharmacy to dose Trelegy,           Patient Active Problem List   Diagnosis Code   • COPD exacerbation ([facility]) J44.1   • Obesity (BMI 30-39.9) E66.9   • Abnormal nuclear stress test R94.39   • Acute renal insufficiency N28.9   • Alcohol dependence ([facility]) F10.20   • Essential hypertension I10   • Other emphysema ([facility]) J43.8   • Coronary artery disease I25.10       Assessment & Plan    - NSTEMI, CAD  - Ascending aortic aneurysm 4.9-5.0 cm  - Admit with syncopal event x2---orthostatic on admit  - Severe COPD  - HTN  - HL  - Lumbar herniation---back surgery pending  - Early prostate cancer---has follow up as outpatient, probable radiation treatment per patient  - FELIBERTO----resolving, Dr. Casarez following  - ETOH use---3 beers/day - withdrawal     Much better today from the alcohol withdrawal.  He is oriented and cooperative.  Pulmonology is following him, he still has some wheezing.  He is on steroids and will need to stop them before surgery.  Hopefully by the  end of the week he can go to surgery.  CABG and ascending aorta repair is planned.  A consult endocrinology to help us in the management of his diabetes.  We can transfer the patient to [facility] if a bed is available.    Benson Hughes MD  06/05/22  10:50 EDT

## 2022-06-05 NOTE — SIGNIFICANT NOTE
06/05/22 0630   OTHER   Discipline physical therapist   Rehab Time/Intention   Session Not Performed unable to evaluate, medical status change;other (see comments)  (Pt to have evaluation for bypass sx following alcohol withdrawls. PT will follow up when appropriate.)   Recommendation   PT - Next Appointment 06/06/22

## 2022-06-05 NOTE — CONSULTS
"  Referring Provider: Dr. Hughes  Reason for Consultation: Alcohol withdrawal      Chief complaint \"I am not having any withdrawal. I don't have to drink.\"    Subjective .     History of present illness:  The patient is a 67 y.o. male with PMH significant for HTN, back pain, COPD and alcohol abuse who was admitted secondary to syncopal episode.  Psych was consulted by Dr. Hughes for alcohol withdrawal.  Patient needs CABG and aortic aneurysm repair but needs alcohol withdrawal under control.  Patient was alert and oriented, initially irritated when asked about his withdrawal because he felt his alcohol use was being blown out of proportion.  He denies any current withdrawal symptoms, no tremors. He has not been given any Ativan since last night per [facility] protocol. He denies any hx of anxiety or depression.  He reports that he drinks 2 to 3 beers per night to help him sleep with an occasional shot, which is less than he told providers on admission.  He has never had any treatment for his alcohol use and declines the need for it.  Patient says that his drinking has never interfered with his employment, has been at his current job for 9 yrs but currently on FLMA for treatment of lumber disc herniation that is waiting for repair and has not worked since April.      Past Psych History: Alcohol use; no rehab; no hospitalizations; no SAs    Review of Systems   Constitutional: Positive for activity change and fatigue. Negative for appetite change.   HENT: Negative.    Eyes: Negative.    Respiratory: Negative.    Cardiovascular: Negative.    Gastrointestinal: Negative.    Endocrine: Negative.    Genitourinary: Negative.    Musculoskeletal: Positive for arthralgias and back pain.   Skin: Negative.    Neurological: Positive for syncope. Negative for tremors, seizures and speech difficulty.   Psychiatric/Behavioral: Positive for sleep disturbance. Negative for agitation, behavioral problems, confusion, decreased " concentration, dysphoric mood, hallucinations, self-injury and suicidal ideas. The patient is not nervous/anxious and is not hyperactive.         History      Past Medical History:   Diagnosis Date   • Back pain    • Emphysema lung (HCC)    • Hypertension         History reviewed. No pertinent family history.     Social History     Tobacco Use   • Smoking status: Former Smoker     Quit date: 2021     Years since quittin.0   • Smokeless tobacco: Never Used   Vaping Use   • Vaping Use: Never used   Substance Use Topics   • Alcohol use: Yes     Comment: 3-4 beers daily   • Drug use: Never          Medications Prior to Admission   Medication Sig Dispense Refill Last Dose   • amLODIPine (NORVASC) 5 MG tablet Take 5 mg by mouth Daily.   2022 at Unknown time   • Fluticasone-Umeclidin-Vilant (Trelegy Ellipta) 100-62.5-25 MCG/INH inhaler Inhale 1 puff Daily.   2022 at Unknown time   • gabapentin (NEURONTIN) 600 MG tablet Take 600 mg by mouth 2 (Two) Times a Day.   2022 at Unknown time   • ipratropium-albuterol (COMBIVENT RESPIMAT)  MCG/ACT inhaler Inhale 1 puff 4 (Four) Times a Day As Needed for Wheezing.   Past Month at Unknown time   • lisinopril (PRINIVIL,ZESTRIL) 40 MG tablet Take 40 mg by mouth Daily.   2022 at Unknown time   • pravastatin (PRAVACHOL) 20 MG tablet Take 20 mg by mouth Daily.   2022 at Unknown time   • tamsulosin (FLOMAX) 0.4 MG capsule 24 hr capsule Take 1 capsule by mouth Daily.   2022 at Unknown time       Scheduled Meds:amLODIPine, 5 mg, Oral, Daily  arformoterol, 15 mcg, Nebulization, BID - RT  aspirin, 81 mg, Oral, Daily  atorvastatin, 40 mg, Oral, Daily  budesonide, 0.5 mg, Nebulization, BID - RT  carvedilol, 6.25 mg, Oral, BID With Meals  enoxaparin, 40 mg, Subcutaneous, Q12H  gabapentin, 600 mg, Oral, BID  guaiFENesin, 1,200 mg, Oral, Q12H  ipratropium-albuterol, 3 mL, Nebulization, Q4H - RT  methylPREDNISolone sodium succinate, 40 mg, Intravenous,  "Q8H  multivitamin, 1 tablet, Oral, Daily  sodium chloride, 10 mL, Intravenous, Q12H  tamsulosin, 0.4 mg, Oral, Daily  thiamine, 100 mg, Oral, BID      Continuous Infusions:Pharmacy to Dose enoxaparin (LOVENOX),   Pharmacy to dose Trelegy,       PRN Meds:.•  acetaminophen **OR** acetaminophen **OR** acetaminophen  •  aluminum-magnesium hydroxide-simethicone  •  atropine  •  benzonatate  •  hydrALAZINE  •  ipratropium-albuterol  •  LORazepam **OR** LORazepam **OR** LORazepam **OR** LORazepam **OR** LORazepam **OR** LORazepam  •  LORazepam **OR** LORazepam **OR** LORazepam **OR** LORazepam **OR** LORazepam **OR** LORazepam  •  magnesium sulfate **OR** magnesium sulfate **OR** magnesium sulfate  •  melatonin  •  nitroglycerin  •  ondansetron **OR** ondansetron  •  Pharmacy to Dose enoxaparin (LOVENOX)  •  Pharmacy to dose Trelegy  •  potassium chloride **OR** potassium chloride **OR** potassium chloride  •  sodium chloride  •  sodium chloride  •  sodium chloride     Allergies:  Patient has no known allergies.      Objective     Vital Signs   /57   Pulse 78   Temp 99.4 °F (37.4 °C) (Oral)   Resp 22   Ht 177.8 cm (70\")   Wt 111 kg (245 lb 6 oz)   SpO2 96%   BMI 35.21 kg/m²     Physical Exam:     General Appearance:    NAD   Head:    Normocephalic, without obvious abnormality, atraumatic   Eyes:            Lids and lashes normal, conjunctivae and sclerae normal, no   icterus, no pallor, corneas clear, PERRLA   Skin:   No bleeding, bruising or rash          Neurologic:   Cranial nerves 2 - 12 grossly intact, sensation intact, DTR       present and equal bilaterally    Musculoskeletal:  Muscle tone & strength WNL; gait not assessed, sitting in recliner      Mental Status Exam:   Hygiene:   good  Cooperation:  Cooperative  Eye Contact:  Good  Psychomotor Behavior:  Appropriate  Affect:  Appropriate  Mood: irritable  Hopelessness: Denies  Speech:  Normal rate and volume  Language: Appropriate  Associations: " Intact  Thought Process:  Goal directed  Thought Content:  Mood congruent  Suicidal:  None  Homicidal:  None  Hallucinations:  None  Delusion:  None  Memory:  Remote and recent memory intact  Attention span and concentration: Appropriate  Fund of Knowledge: Appropriate  Orientation:  Person, Place, Time and Situation  Reliability:  good  Insight:  Good  Judgement:  Good  Impulse Control:  Good  Physical/Medical Issues:  Yes     Medications and allergies were reviewed by this provider.     Lab Results   Component Value Date    GLUCOSE 105 (H) 06/05/2022    CALCIUM 8.6 06/05/2022     (L) 06/05/2022    K 3.9 06/05/2022    CO2 27.0 06/05/2022    CL 97 (L) 06/05/2022    BUN 16 06/05/2022    CREATININE 0.98 06/05/2022    BCR 16.3 06/05/2022    ANIONGAP 10.0 06/05/2022       Last Urine Toxicity     LAST URINE TOXICITY RESULTS Latest Ref Rng & Units 6/1/2022    BARBITURATES SCREEN Negative Negative    BENZODIAZEPINE SCREEN, URINE Negative Negative    COCAINE SCREEN, URINE Negative Negative    METHADONE SCREEN, URINE Negative Negative          No results found for: PHENYTOIN, PHENOBARB, VALPROATE, CBMZ    Lab Results   Component Value Date     (L) 06/05/2022    BUN 16 06/05/2022    CREATININE 0.98 06/05/2022    TSH 0.476 06/03/2022    WBC 6.60 06/05/2022       Brief Urine Lab Results  (Last result in the past 365 days)      Color   Clarity   Blood   Leuk Est   Nitrite   Protein   CREAT   Urine HCG        06/01/22 0029 Yellow   Clear   Negative   Negative   Negative   Negative                 ECG/EMG Results (last 72 hours)     Procedure Component Value Units Date/Time    SCANNED - TELEMETRY   [[id number]] Resulted: 05/31/22     Updated: 06/03/22 0848    SCANNED - TELEMETRY   [[id number]] Resulted: 05/31/22     Updated: 06/03/22 1553    ECG 12 Lead [[id number]] Collected: 05/31/22 2038     Updated: 06/04/22 0814     QT Interval 419 ms     Narrative:      HEART RATE= 72  bpm  RR Interval= 832  ms  AR Interval= 162   ms  P Horizontal Axis= 35  deg  P Front Axis= 16  deg  QRSD Interval= 102  ms  QT Interval= 419  ms  QRS Axis= -6  deg  T Wave Axis= -14  deg  - ABNORMAL ECG -  Sinus rhythm  Inferior infarct, old  No previous ECG available for comparison  Electronically Signed By: Manoj Madrid ([facility]) 04-Jun-2022 08:14:25  Date and Time of Study: 2022-05-31 20:38:25          CT/MRI     Examination: CT HEAD WO CONTRAST-     Date of Exam: 5/31/2022 10:44 PM     Indication: Mental status change, unknown cause.     Comparison: None available.     Technique: Axial noncontrast CT imaging of the head was performed.  Automated exposure control and iterative reconstruction methods were  used.     Findings:  Superficial soft tissues appear within normal limits. The calvarium is  intact.  Paranasal sinuses and mastoid air cells appear well aerated.   Orbits are unremarkable.  There is no acute intracranial hemorrhage.  No  mass effect or midline shift.  No abnormal extra-axial collections.   Gray-white differentiation is within normal limits.  There are no focal  hypoattenuating lesions.  Ventricular size and configuration is normal  for age.        IMPRESSION:  No acute intracranial abnormality.     Electronically Signed By-Johann Dodge MD On:5/31/2022 10:53 PM  This report was finalized on [id number] by  Johann Dodge MD.    Assessment & Plan       COPD exacerbation (HCC)    Obesity (BMI 30-39.9)    Abnormal nuclear stress test    Acute renal insufficiency    Alcohol dependence (HCC)    Essential hypertension    Other emphysema (HCC)    Coronary artery disease       LABS: Reviewed    Assessment:   Alcohol abuse    Treatment Plan:   Patient is alert and oriented, cooperative.  Patient's withdrawals have improved, and he denies any withdrawal symptoms today and has not been given any Ativan since 6/4 per [facility] protocol.    Patient denies any anxiety or depression and no indications for further psychotropic medication.    Patient declines  the need for alcohol rehab, understands alcohol cessation is necessary to proceed with cardiac and lumbar surgeries, and patient says that will not be a problem for him.    Will sign off    Treatment Plan discussed with: Patient      I discussed the patient's findings and my recommendations with patient and nursing staff    I have reviewed and approved the behavioral health treatment plans and problem list. Yes  Thank you for the consult   Referring MD has access to the consult report and progress notes in EMR     Svetlana Lee PA-C  06/05/22  12:24 EDT    Patient was examined wearing appropriate PPE.    EMR Dragon transcription disclaimer:  Part of this note may be an electronic transcription/translation of spoken language to printed text using the Dragon Dictation System.

## 2022-06-05 NOTE — PLAN OF CARE
Problem: Adult Inpatient Plan of Care  Goal: Absence of Hospital-Acquired Illness or Injury  Intervention: Identify and Manage Fall Risk  Recent Flowsheet Documentation  Taken 6/5/2022 0600 by Maral Mobley RN  Safety Promotion/Fall Prevention: safety round/check completed  Taken 6/5/2022 0400 by Maral Mobley RN  Safety Promotion/Fall Prevention: safety round/check completed  Taken 6/5/2022 0200 by Maral Mobley RN  Safety Promotion/Fall Prevention: safety round/check completed  Taken 6/5/2022 0100 by Maral Mobley RN  Safety Promotion/Fall Prevention: (sitter has gone home) --  Taken 6/5/2022 0005 by Maral Mobley RN  Safety Promotion/Fall Prevention: safety round/check completed  Taken 6/4/2022 2200 by Maral Mobley RN  Safety Promotion/Fall Prevention: safety round/check completed  Taken 6/4/2022 2000 by Maral Mobley RN  Safety Promotion/Fall Prevention: safety round/check completed  Intervention: Prevent Skin Injury  Recent Flowsheet Documentation  Taken 6/5/2022 0400 by Maral Mobley RN  Body Position: position changed independently  Taken 6/5/2022 0005 by Maral Mobley RN  Body Position: position changed independently  Skin Protection: tubing/devices free from skin contact  Taken 6/4/2022 2000 by Maral Mobley RN  Body Position: position changed independently  Intervention: Prevent and Manage VTE (Venous Thromboembolism) Risk  Recent Flowsheet Documentation  Taken 6/5/2022 0400 by Maral Mobley RN  Activity Management: activity adjusted per tolerance  Taken 6/5/2022 0005 by Maral Mobley RN  Activity Management: activity adjusted per tolerance  Taken 6/4/2022 2000 by Maral Mobley RN  Activity Management: activity adjusted per tolerance  Intervention: Prevent Infection  Recent Flowsheet Documentation  Taken 6/5/2022 0005 by Maral Mobley RN  Infection Prevention: single patient room provided  Taken 6/4/2022  2000 by Maral Mobley RN  Infection Prevention: single patient room provided  Goal: Optimal Comfort and Wellbeing  Intervention: Monitor Pain and Promote Comfort  Recent Flowsheet Documentation  Taken 6/5/2022 0400 by Maral Mobley RN  Pain Management Interventions: care clustered  Taken 6/4/2022 2000 by Maral Mobley RN  Pain Management Interventions: care clustered  Intervention: Provide Person-Centered Care  Recent Flowsheet Documentation  Taken 6/5/2022 0400 by Maral Mobley, RN  Trust Relationship/Rapport: care explained  Taken 6/5/2022 0005 by Maral Mobley RN  Trust Relationship/Rapport: care explained  Taken 6/4/2022 2000 by Maral Mobley RN  Trust Relationship/Rapport: care explained   Goal Outcome Evaluation:

## 2022-06-05 NOTE — PROGRESS NOTES
"NEPHROLOGY PROGRESS NOTE------[facility]    [facility]  [phone]  Eliseo Casarez MD      Patient Care Team:  Deysi Mason APRN as PCP - General (Nurse Practitioner)  Eliseo Casarez MD as Consulting Physician (Nephrology)      Provider:  Eliseo Casarez MD  Patient Name: Chi Quinteros Sr.  :  1955    SUBJECTIVE:  F/u FELIBERTO  No chest pain or SOA      Medication:  amLODIPine, 5 mg, Oral, Daily  atorvastatin, 40 mg, Oral, Daily  budesonide-formoterol, 2 puff, Inhalation, BID - RT  carvedilol, 6.25 mg, Oral, BID With Meals  enoxaparin, 1 mg/kg, Subcutaneous, Q12H  gabapentin, 600 mg, Oral, BID  guaiFENesin, 1,200 mg, Oral, Q12H  ipratropium-albuterol, 3 mL, Nebulization, Q4H - RT  lisinopril, 20 mg, Oral, Daily  multivitamin, 1 tablet, Oral, Daily  predniSONE, 20 mg, Oral, Daily With Breakfast  sodium chloride, 10 mL, Intravenous, Q12H  tamsulosin, 0.4 mg, Oral, Daily  thiamine, 100 mg, Oral, BID      Pharmacy to Dose enoxaparin (LOVENOX),   Pharmacy to dose Trelegy,         OBJECTIVE    Vital Sign Min/Max for last 24 hours  Temp  Min: 97.4 °F (36.3 °C)  Max: 100.2 °F (37.9 °C)   BP  Min: 123/69  Max: 159/70   Pulse  Min: 73  Max: 90   Resp  Min: 18  Max: 26   SpO2  Min: 86 %  Max: 100 %   No data recorded   No data recorded     Flowsheet Rows    Flowsheet Row First Filed Value   Admission Height 177.8 cm (70\") Documented at 2022   Admission Weight 104 kg (230 lb) Documented at 2022          No intake/output data recorded.  I/O last 3 completed shifts:  In: 840 [P.O.:840]  Out: 3180 [Urine:3180]    Physical Exam:  General Appearance: alert, appears stated age and cooperative  Head: normocephalic, without obvious abnormality and atraumatic  Eyes: conjunctivae and sclerae normal and no icterus  Neck: supple and no JVD  Lungs: clear to auscultation and respirations regular  Heart: regular rhythm & normal rate and normal S1, " S2  Chest: Wall no abnormalities observed  Abdomen: normal bowel sounds and soft non-tender  Extremities: moves extremities well, no edema, no cyanosis and no redness  Skin: no bleeding, bruising or rash, turgor normal, color normal and no lesions noted  Neurologic: Alert, and oriented. No focal deficits    Labs:    WBC WBC   Date Value Ref Range Status   06/05/2022 6.60 3.40 - 10.80 10*3/mm3 Final   06/03/2022 6.00 3.40 - 10.80 10*3/mm3 Final      HGB Hemoglobin   Date Value Ref Range Status   06/05/2022 12.5 (L) 13.0 - 17.7 g/dL Final   06/03/2022 13.5 13.0 - 17.7 g/dL Final      HCT Hematocrit   Date Value Ref Range Status   06/05/2022 38.0 37.5 - 51.0 % Final   06/03/2022 41.2 37.5 - 51.0 % Final      Platlets No results found for: LABPLAT   MCV MCV   Date Value Ref Range Status   06/05/2022 97.9 (H) 79.0 - 97.0 fL Final   06/03/2022 99.2 (H) 79.0 - 97.0 fL Final          Sodium Sodium   Date Value Ref Range Status   06/05/2022 134 (L) 136 - 145 mmol/L Final   06/04/2022 136 136 - 145 mmol/L Final   06/03/2022 139 136 - 145 mmol/L Final      Potassium Potassium   Date Value Ref Range Status   06/05/2022 3.9 3.5 - 5.2 mmol/L Final   06/04/2022 4.0 3.5 - 5.2 mmol/L Final   06/03/2022 4.1 3.5 - 5.2 mmol/L Final      Chloride Chloride   Date Value Ref Range Status   06/05/2022 97 (L) 98 - 107 mmol/L Final   06/04/2022 99 98 - 107 mmol/L Final   06/03/2022 103 98 - 107 mmol/L Final      CO2 CO2   Date Value Ref Range Status   06/05/2022 27.0 22.0 - 29.0 mmol/L Final   06/04/2022 27.0 22.0 - 29.0 mmol/L Final   06/03/2022 24.0 22.0 - 29.0 mmol/L Final      BUN BUN   Date Value Ref Range Status   06/05/2022 16 8 - 23 mg/dL Final   06/04/2022 18 8 - 23 mg/dL Final   06/03/2022 15 8 - 23 mg/dL Final      Creatinine Creatinine   Date Value Ref Range Status   06/05/2022 0.98 0.76 - 1.27 mg/dL Final   06/04/2022 1.02 0.76 - 1.27 mg/dL Final   06/03/2022 1.07 0.76 - 1.27 mg/dL Final      Calcium Calcium   Date Value Ref  Range Status   06/05/2022 8.6 8.6 - 10.5 mg/dL Final   06/04/2022 8.4 (L) 8.6 - 10.5 mg/dL Final   06/03/2022 8.5 (L) 8.6 - 10.5 mg/dL Final      PO4 No components found for: PO4   Albumin No results found for: ALBUMIN   Magnesium No results found for: MG   Uric Acid No components found for: URIC ACID     Imaging Results (Last 72 Hours)     ** No results found for the last 72 hours. **          Results for orders placed during the hospital encounter of 05/31/22    XR Chest 1 View    Narrative  Examination: XR CHEST 1 VW-    Date of Exam: 5/31/2022 9:03 PM    Indication: Chest pain protocol.    Comparison: None available.    Technique: Single radiographic view of the chest was obtained.    Findings:  There is no pneumothorax, pleural effusion or focal airspace  consolidation. Cardiomediastinal silhouette is unremarkable. Pulmonary  vasculature appears within normal limits. Regional bones appear grossly  intact.    Impression  No acute cardiopulmonary abnormality.    Electronically Signed By-Johann Dodge MD On:5/31/2022 9:29 PM  This report was finalized on [id number] by  Johann Dodge MD.      Results for orders placed during the hospital encounter of 05/31/22    Duplex Vein Mapping Lower Extremity - Bilateral CAR    Interpretation Summary  The greater saphenous veins are of satisfactory quality from the groin to the mid distal thigh bilaterally.  Distal to this the veins are small and inadequate.        ASSESSMENT / PLAN      COPD exacerbation (HCC)    Obesity (BMI 30-39.9)    Abnormal nuclear stress test    Acute renal insufficiency    Alcohol dependence (HCC)    Essential hypertension    Other emphysema (HCC)    Coronary artery disease    · FELIBERTO- 3 renal due to volume depletion and or hypotension.  UA negative  · Hypertension  · Syncopal episode-cardiac work-up in progress  · CAD--cath with severe three vessel disease.  · ETOH use--withdrawal     CR remains stable  CTS to evaluate for possible  CABG        Eliseo Casarez MD  [facility]  [phone]  06/05/22  08:37 EDT

## 2022-06-05 NOTE — CONSULTS
Inpatient Endocrine Consult  Consultation requested by cardiothoracic surgery team for hyperglycemia  Patient Care Team:  Deysi Mason APRN as PCP - General (Nurse Practitioner)  Eliseo Casarez MD as Consulting Physician (Nephrology)    Chief Complaint: Hyperglycemia    HPI: This is a 67-year-old male with history of hypertension and COPD and hyperlipidemia and prostate cancer came in with elevated troponin, came in with syncopal episode, so far was found to have non-ST MI and is now scheduled for CABG surgery later next week.  He apparently did receive some steroid which caused hyperglycemia.  There is no history of type 2 diabetes.  His sugars are now improving and his A1c was 5.5%.  He is eating well.  No complaints at this time.    Past Medical History:   Diagnosis Date   • Back pain    • Emphysema lung (HCC)    • Hypertension        Social History     Socioeconomic History   • Marital status:    Tobacco Use   • Smoking status: Former Smoker     Quit date: 2021     Years since quittin.0   • Smokeless tobacco: Never Used   Vaping Use   • Vaping Use: Never used   Substance and Sexual Activity   • Alcohol use: Yes     Comment: 3-4 beers daily   • Drug use: Never   • Sexual activity: Defer       History reviewed. No pertinent family history.    No Known Allergies    ROS:   Constitutional:  Denies fatigue, tiredness.    Eyes:  Denies change in visual acuity   HENT:  Denies nasal congestion or sore throat   Respiratory: Denies cough, shortness of breath.   Cardiovascular:  Denies chest pain, edema   GI:  Denies abdominal pain, nausea, vomiting.   :  Denies polyuria and polydipsia  Musculoskeletal:  Denies back pain or joint pain   Integument:  Denies dry skin, rash   Neurologic:  Denies headache, focal weakness or sensory changes   Endocrine:  Denies polyuria or polydipsia   Psychiatric:  Denies depression or anxiety      Vitals:    22 1607   BP:    Pulse: 70   Resp: 20   Temp:     SpO2:       Body mass index is 35.21 kg/m².     Physical Exam:  GEN: NAD, conversant  EYES: EOMI, PERRL, no conjunctival erythema  NECK: no thyromegaly, full ROM   CV: RRR, no murmurs/rubs/gallops, no peripheral edema  LUNG: CTAB, no wheezes/rales/rhonchi  SKIN: no rashes, no acanthosis  MSK: no deformities, full ROM of all extremities  NEURO: no tremors, DTR normal  PSYCH: AOX3, appropriate mood, affect normal      Results Review:     I reviewed the patient's new clinical results.    Lab Results   Component Value Date    GLUCOSE 105 (H) 06/05/2022    BUN 16 06/05/2022    CREATININE 0.98 06/05/2022    BCR 16.3 06/05/2022    K 3.9 06/05/2022    CO2 27.0 06/05/2022    CALCIUM 8.6 06/05/2022    ALBUMIN 3.50 06/02/2022    AST 22 06/02/2022    ALT 26 06/02/2022       Lab Results   Component Value Date    HGBA1C 5.5 06/03/2022     Lab Results   Component Value Date    CREATININE 0.98 06/05/2022           Medication Review: Reviewed.       Current Facility-Administered Medications:   •  acetaminophen (TYLENOL) tablet 650 mg, 650 mg, Oral, Q4H PRN **OR** acetaminophen (TYLENOL) 160 MG/5ML solution 650 mg, 650 mg, Oral, Q4H PRN **OR** acetaminophen (TYLENOL) suppository 650 mg, 650 mg, Rectal, Q4H PRN, Frank Giraldo MD  •  aluminum-magnesium hydroxide-simethicone (MAALOX MAX) 400-400-40 MG/5ML suspension 15 mL, 15 mL, Oral, Q6H PRN, Frank Giraldo MD  •  amLODIPine (NORVASC) tablet 5 mg, 5 mg, Oral, Daily, Frank Giraldo MD, 5 mg at 06/05/22 0844  •  arformoterol (BROVANA) nebulizer solution 15 mcg, 15 mcg, Nebulization, BID - RT, Ca Santo MD  •  aspirin chewable tablet 81 mg, 81 mg, Oral, Daily, Benson Hughes MD, 81 mg at 06/05/22 1143  •  atorvastatin (LIPITOR) tablet 40 mg, 40 mg, Oral, Daily, Harris Boateng MD, 40 mg at 06/05/22 0843  •  atropine sulfate injection 0.5 mg, 0.5 mg, Intravenous, Q5 Min PRN, Frank Giraldo MD  •  benzonatate (TESSALON) capsule 200 mg, 200 mg, Oral, TID  PRN, Frank Giraldo MD  •  budesonide (PULMICORT) nebulizer solution 0.5 mg, 0.5 mg, Nebulization, BID - RT, Ca Santo MD  •  carvedilol (COREG) tablet 6.25 mg, 6.25 mg, Oral, BID With Meals, Kilo, Harris Mclaughlin MD, 6.25 mg at 06/05/22 1649  •  Enoxaparin Sodium (LOVENOX) syringe 40 mg, 40 mg, Subcutaneous, Q12H, Benson Hughes MD  •  gabapentin (NEURONTIN) tablet 600 mg, 600 mg, Oral, BID, Frank Giraldo MD, 600 mg at 06/05/22 0844  •  guaiFENesin (MUCINEX) 12 hr tablet 1,200 mg, 1,200 mg, Oral, Q12H, Frank Giraldo MD, 1,200 mg at 06/05/22 1143  •  hydrALAZINE (APRESOLINE) injection 10 mg, 10 mg, Intravenous, Q6H PRN, Janet Leon APRN  •  ipratropium-albuterol (DUO-NEB) nebulizer solution 3 mL, 3 mL, Nebulization, Q4H - RT, Frank Giraldo MD, 3 mL at 06/05/22 1601  •  ipratropium-albuterol (DUO-NEB) nebulizer solution 3 mL, 3 mL, Nebulization, Q2H PRN, Frank Giraldo MD  •  LORazepam (ATIVAN) tablet 0.5 mg, 0.5 mg, Oral, Q2H PRN **OR** LORazepam (ATIVAN) injection 0.5 mg, 0.5 mg, Intravenous, Q2H PRN **OR** LORazepam (ATIVAN) tablet 1 mg, 1 mg, Oral, Q1H PRN **OR** LORazepam (ATIVAN) injection 1 mg, 1 mg, Intravenous, Q1H PRN, 1 mg at 06/04/22 0803 **OR** LORazepam (ATIVAN) injection 1 mg, 1 mg, Intravenous, Q15 Min PRN **OR** LORazepam (ATIVAN) injection 1 mg, 1 mg, Intramuscular, Q15 Min PRN, Janet Leon APRN  •  LORazepam (ATIVAN) tablet 0.5 mg, 0.5 mg, Oral, Q2H PRN **OR** LORazepam (ATIVAN) tablet 0.5 mg, 0.5 mg, Oral, Q2H PRN, 0.5 mg at 06/03/22 2003 **OR** LORazepam (ATIVAN) tablet 1 mg, 1 mg, Oral, Q2H PRN **OR** LORazepam (ATIVAN) tablet 0.5 mg, 0.5 mg, Oral, Q2H PRN **OR** LORazepam (ATIVAN) tablet 0.5 mg, 0.5 mg, Oral, Q2H PRN **OR** LORazepam (ATIVAN) tablet 0.5 mg, 0.5 mg, Oral, Q2H PRN, Frank Giraldo MD  •  Magnesium Sulfate 2 gram Bolus, followed by 8 gram infusion (total Mg dose 10 grams)- Mg less than or equal to 1mg/dL, 2 g, Intravenous, PRN  **OR** Magnesium Sulfate 2 gram / 50mL Infusion (GIVE X 3 BAGS TO EQUAL 6GM TOTAL DOSE) - Mg 1.1 - 1.5 mg/dl, 2 g, Intravenous, PRN **OR** Magnesium Sulfate 4 gram infusion- Mg 1.6-1.9 mg/dL, 4 g, Intravenous, PRN, Frank Giraldo MD  •  melatonin tablet 5 mg, 5 mg, Oral, Nightly PRN, Frank Giraldo MD, 5 mg at 06/03/22 2317  •  methylPREDNISolone sodium succinate (SOLU-Medrol) injection 40 mg, 40 mg, Intravenous, Q8H, Ca Santo MD, 40 mg at 06/05/22 1649  •  multivitamin (THERAGRAN) tablet 1 tablet, 1 tablet, Oral, Daily, Kilo, Harris Mclaughlin MD, 1 tablet at 06/05/22 0844  •  nitroglycerin (NITROSTAT) SL tablet 0.4 mg, 0.4 mg, Sublingual, Q5 Min PRN, Frank Giraldo MD  •  ondansetron (ZOFRAN) tablet 4 mg, 4 mg, Oral, Q6H PRN **OR** ondansetron (ZOFRAN) injection 4 mg, 4 mg, Intravenous, Q6H PRN, Frank Giraldo MD  •  Pharmacy to Dose enoxaparin (LOVENOX), , Does not apply, Continuous PRN, rFank Giraldo MD  •  Pharmacy to dose Trelegy, , Does not apply, Continuous PRN, Frank Giraldo MD  •  potassium chloride (K-DUR,KLOR-CON) CR tablet 40 mEq, 40 mEq, Oral, PRN **OR** potassium chloride (KLOR-CON) packet 40 mEq, 40 mEq, Oral, PRN **OR** potassium chloride 10 mEq in 100 mL IVPB, 10 mEq, Intravenous, Q1H PRN, Frank Giraldo MD  •  sodium chloride 0.9 % flush 10 mL, 10 mL, Intravenous, PRN, Frank Giraldo MD  •  sodium chloride 0.9 % flush 10 mL, 10 mL, Intravenous, Q12H, Frank Giraldo MD, 10 mL at 06/05/22 0845  •  sodium chloride 0.9 % flush 10 mL, 10 mL, Intravenous, PRN, Frank Giraldo MD  •  sodium chloride 0.9 % infusion 250 mL, 250 mL, Intravenous, Once PRN, Frank Giraldo MD  •  tamsulosin (FLOMAX) 24 hr capsule 0.4 mg, 0.4 mg, Oral, Daily, Janet Leon, ERICA, 0.4 mg at 06/05/22 0844  •  thiamine (VITAMIN B-1) tablet 100 mg, 100 mg, Oral, BID, Harris Boateng MD, 100 mg at 06/05/22 0844          Assessment and plan:  Hyperglycemia: Most likely due to steroids,  blood sugars are improving.  A1c normal.  Will check blood sugars before meals and at bedtime and I am putting him on sliding scale for now.  If he does have persistent hyperglycemia I might consider adding regular insulin with the steroids.  We will follow and make further recommendations as needed.    CAD: Scheduled for CABG later next week    Hypertension: Well-controlled    Hyperlipidemia: Currently on atorvastatin.    Thank you very much for the consultation.      Jaziel Rendon MD FACE.

## 2022-06-05 NOTE — PROGRESS NOTES
Cardiology Progress Note      Admiting Physician:  Harris Boateng *   LOS: 0 days       Reason For Followup:  Multivessel coronary artery disease      Subjective:    Interval History:  Seen and examined.  Chart and labs reviewed.  Patient appears to be in a normal cognitive state.  He is upset that his surgery has been postponed.  He reports that he does not have a drinking problem and that he only has 2-3 drinks a night.    Review of Systems:  A complete review of systems was attempted however patient's cognitive status is questionable.  He denies chest pain or shortness of breath at this time.    Assessment & Plan    Impressions:  Syncope  Multivessel coronary artery disease  Hyperlipidemia  Hypertension  Ischemic cardiomyopathy EF 40%  Acute kidney injury-improved  Alcohol dependence  Altered mental status likely secondary to alcohol withdrawal    Recommendations:  CABG on hold due altered mentation  Monitor rate and rhythm closely  Further recommendations as patient's course progresses  Alcohol withdrawal/altered mentation as per admitting service    Objective:    Medication Review:   Scheduled Meds:amLODIPine, 5 mg, Oral, Daily  arformoterol, 15 mcg, Nebulization, BID - RT  atorvastatin, 40 mg, Oral, Daily  budesonide, 0.5 mg, Nebulization, BID - RT  carvedilol, 6.25 mg, Oral, BID With Meals  enoxaparin, 1 mg/kg, Subcutaneous, Q12H  gabapentin, 600 mg, Oral, BID  guaiFENesin, 1,200 mg, Oral, Q12H  ipratropium-albuterol, 3 mL, Nebulization, Q4H - RT  lisinopril, 20 mg, Oral, Daily  methylPREDNISolone sodium succinate, 125 mg, Intravenous, Once  methylPREDNISolone sodium succinate, 40 mg, Intravenous, Q8H  multivitamin, 1 tablet, Oral, Daily  sodium chloride, 10 mL, Intravenous, Q12H  tamsulosin, 0.4 mg, Oral, Daily  thiamine, 100 mg, Oral, BID      Continuous Infusions:Pharmacy to Dose enoxaparin (LOVENOX),   Pharmacy to dose Trelegy,       PRN Meds:.•  acetaminophen **OR** acetaminophen **OR**  acetaminophen  •  aluminum-magnesium hydroxide-simethicone  •  atropine  •  benzonatate  •  hydrALAZINE  •  ipratropium-albuterol  •  LORazepam **OR** LORazepam **OR** LORazepam **OR** LORazepam **OR** LORazepam **OR** LORazepam  •  LORazepam **OR** LORazepam **OR** LORazepam **OR** LORazepam **OR** LORazepam **OR** LORazepam  •  magnesium sulfate **OR** magnesium sulfate **OR** magnesium sulfate  •  melatonin  •  nitroglycerin  •  ondansetron **OR** ondansetron  •  Pharmacy to Dose enoxaparin (LOVENOX)  •  Pharmacy to dose Trelegy  •  potassium chloride **OR** potassium chloride **OR** potassium chloride  •  sodium chloride  •  sodium chloride  •  sodium chloride    Patient Active Problem List   Diagnosis   • COPD exacerbation (HCC)   • Obesity (BMI 30-39.9)   • Abnormal nuclear stress test   • Acute renal insufficiency   • Alcohol dependence (HCC)   • Essential hypertension   • Other emphysema (HCC)   • Coronary artery disease         Physical Exam:    General: Alert, cooperative, no distress, appears stated age  Head:  Normocephalic, atraumatic, mucous membranes moist  Eyes:  Conjunctivae/corneas clear, EOM's intact     Neck:  Supple,  no bruit  Lungs:  Clear to auscultation bilaterally, no wheezes rhonchi rales are noted  Chest wall: No tenderness  Heart::  Regular rate and rhythm, S1 and S2 normal, 1/6 holosystolic murmur.  No rub or gallop  Abdomen: Soft, non-tender, nondistended bowel sounds active.  Obese  Extremities: No cyanosis, clubbing, or edema  Pulses: Diminished pedal pulses  Skin:  No rashes or lesions  Neuro/psych: A&O x3. CN II through XII are grossly intact with appropriate affect    Vital Signs:  Vitals:    06/05/22 0757 06/05/22 0800 06/05/22 0805 06/05/22 1007   BP:    118/57   BP Location:       Patient Position:       Pulse: 87 90 89 81   Resp: 22 23 22 22   Temp:    99.4 °F (37.4 °C)   TempSrc:    Oral   SpO2:    92%   Weight:       Height:         Wt Readings from Last 1 Encounters:    06/04/22 111 kg (245 lb 6 oz)       Intake/Output Summary (Last 24 hours) at 6/5/2022 1038  Last data filed at 6/5/2022 0324  Gross per 24 hour   Intake 480 ml   Output 3080 ml   Net -2600 ml         Results Review:     CBC    Results from last 7 days   Lab Units 06/05/22  0540 06/03/22  1014 06/02/22  0500 06/01/22  1024 05/31/22 2053   WBC 10*3/mm3 6.60 6.00 6.80 5.50 6.00   HEMOGLOBIN g/dL 12.5* 13.5 12.9* 13.4 12.1*   PLATELETS 10*3/mm3 155 180 161 166 170     Cr Clearance Estimated Creatinine Clearance: 91.3 mL/min (by C-G formula based on SCr of 0.98 mg/dL).  Coag   Results from last 7 days   Lab Units 06/02/22  0500   INR  1.00     HbA1C   Lab Results   Component Value Date    HGBA1C 5.5 06/03/2022     Blood Glucose No results found for: POCGLU  Infection   Results from last 7 days   Lab Units 05/31/22  2227   PROCALCITONIN ng/mL 0.12     CMP   Results from last 7 days   Lab Units 06/05/22  0539 06/04/22  1744 06/04/22  1008 06/03/22  1014 06/02/22  0500 06/01/22  1024 05/31/22 2053   SODIUM mmol/L 134*  --  136 139 138 136 134*   POTASSIUM mmol/L 3.9  --  4.0 4.1 4.5 4.8 3.7   CHLORIDE mmol/L 97*  --  99 103 103 103 97*   CO2 mmol/L 27.0  --  27.0 24.0 23.0 20.0* 22.0   BUN mg/dL 16  --  18 15 13 14 15   CREATININE mg/dL 0.98  --  1.02 1.07 0.86 1.17 1.90*   GLUCOSE mg/dL 105*  --  122* 157* 159* 159* 111*   ALBUMIN g/dL  --   --   --   --  3.50  --  3.60   BILIRUBIN mg/dL  --   --   --   --  0.2  --  0.4   ALK PHOS U/L  --   --   --   --  55  --  54   AST (SGOT) U/L  --   --   --   --  22  --  30   ALT (SGPT) U/L  --   --   --   --  26  --  30   AMMONIA umol/L  --  34  --   --   --   --   --      ABG      UA    Results from last 7 days   Lab Units 06/01/22  0029   NITRITE UA  Negative     ANGELO  No results found for: POCMETH, POCAMPHET, POCBARBITUR, POCBENZO, POCCOCAINE, POCOPIATES, POCOXYCODO, POCPHENCYC, POCPROPOXY, POCTHC, POCTRICYC  Lysis Labs   Results from last 7 days   Lab Units 06/05/22  0539  06/05/22  0539 06/04/22  1008 06/03/22  1014 06/02/22  0500 06/01/22  1024 05/31/22 2053   INR   --   --   --   --  1.00  --   --    HEMOGLOBIN g/dL 12.5*  --   --  13.5 12.9* 13.4 12.1*   PLATELETS 10*3/mm3 155  --   --  180 161 166 170   CREATININE mg/dL  --  0.98 1.02 1.07 0.86 1.17 1.90*     Radiology(recent) XR Chest 1 View    Result Date: 6/5/2022   Emphysema and mild scattered atelectasis. No evidence of acute cardiopulmonary process otherwise.  Electronically Signed ByCarlos Car On:6/5/2022 10:00 AM This report was finalized on [id number] by  Chad Car, .        Results from last 7 days   Lab Units 06/01/22  1024   TROPONIN T ng/mL 0.116*       Imaging Results (Last 24 Hours)     Procedure Component Value Units Date/Time    XR Chest 1 View [[id number]] Collected: 06/05/22 0958     Updated: 06/05/22 1002    Narrative:      EXAM: XR CHEST 1 VW-     DATE OF EXAM: 6/5/2022 8:58 AM     INDICATION: copd exac  cough; R55-Syncope and collapse; F10.920-Alcohol  use, unspecified with intoxication, uncomplicated; J96.01-Acute  respiratory failure with hypoxia; I95.1-Orthostatic hypotension;  R77.8-Other specified abnormalities of plasma proteins; N17.9-Acute  kidney failure, unspecified; J44.1-Chronic obstructive pulmonary disease  with (acute) exacerbation; R94.39-Abnormal result of other cardiovasc.       COMPARISONS: 5/31/2022      FINDINGS:     Emphysema is evident. No pneumothorax or pleural effusion. Mild  scattered atelectasis. No consolidation. Mediastinum appears unchanged,  no evidence of acute process.       Impression:         Emphysema and mild scattered atelectasis. No evidence of acute  cardiopulmonary process otherwise.     Electronically Signed ByCarlos Car On:6/5/2022 10:00 AM  This report was finalized on [id number] by  Chad Car, .          Cardiac Studies:  Echo- Results for orders placed during the hospital encounter of 05/31/22    Adult Transthoracic Echo Complete With Contrast if  Necessary Per Protocol (With Agitated Saline)    Interpretation Summary  · Left ventricular ejection fraction appears to be 56 - 60%.  · The right ventricular cavity is mildly dilated.  · Mild dilation of the aortic root is present.  · No pericardial effusion noted    Stress Myoview-  Cath-        Baldomero Dowd DO  06/05/22  10:38 EDT

## 2022-06-06 LAB
GLUCOSE BLDC GLUCOMTR-MCNC: 157 MG/DL (ref 70–105)
GLUCOSE BLDC GLUCOMTR-MCNC: 157 MG/DL (ref 70–105)
GLUCOSE BLDC GLUCOMTR-MCNC: 200 MG/DL (ref 70–105)
GLUCOSE BLDC GLUCOMTR-MCNC: 206 MG/DL (ref 70–105)

## 2022-06-06 PROCEDURE — 99231 SBSQ HOSP IP/OBS SF/LOW 25: CPT | Performed by: INTERNAL MEDICINE

## 2022-06-06 PROCEDURE — 63710000001 INSULIN LISPRO (HUMAN) PER 5 UNITS: Performed by: INTERNAL MEDICINE

## 2022-06-06 PROCEDURE — 94799 UNLISTED PULMONARY SVC/PX: CPT

## 2022-06-06 PROCEDURE — 99232 SBSQ HOSP IP/OBS MODERATE 35: CPT | Performed by: INTERNAL MEDICINE

## 2022-06-06 PROCEDURE — 63710000001 INSULIN REGULAR HUMAN PER 5 UNITS: Performed by: INTERNAL MEDICINE

## 2022-06-06 PROCEDURE — 82962 GLUCOSE BLOOD TEST: CPT

## 2022-06-06 PROCEDURE — 94664 DEMO&/EVAL PT USE INHALER: CPT

## 2022-06-06 PROCEDURE — 97162 PT EVAL MOD COMPLEX 30 MIN: CPT

## 2022-06-06 PROCEDURE — 25010000002 ENOXAPARIN PER 10 MG

## 2022-06-06 PROCEDURE — 94761 N-INVAS EAR/PLS OXIMETRY MLT: CPT

## 2022-06-06 PROCEDURE — 25010000002 METHYLPREDNISOLONE PER 40 MG: Performed by: INTERNAL MEDICINE

## 2022-06-06 RX ORDER — ALBUTEROL SULFATE 90 UG/1
2 AEROSOL, METERED RESPIRATORY (INHALATION) EVERY 4 HOURS PRN
COMMUNITY

## 2022-06-06 RX ADMIN — INSULIN LISPRO 3 UNITS: 100 INJECTION, SOLUTION INTRAVENOUS; SUBCUTANEOUS at 12:42

## 2022-06-06 RX ADMIN — CARVEDILOL 6.25 MG: 6.25 TABLET, FILM COATED ORAL at 09:06

## 2022-06-06 RX ADMIN — CARVEDILOL 6.25 MG: 6.25 TABLET, FILM COATED ORAL at 17:53

## 2022-06-06 RX ADMIN — TAMSULOSIN HYDROCHLORIDE 0.4 MG: 0.4 CAPSULE ORAL at 09:06

## 2022-06-06 RX ADMIN — Medication 100 MG: at 09:06

## 2022-06-06 RX ADMIN — BUDESONIDE 0.5 MG: 0.5 INHALANT RESPIRATORY (INHALATION) at 19:54

## 2022-06-06 RX ADMIN — INSULIN LISPRO 2 UNITS: 100 INJECTION, SOLUTION INTRAVENOUS; SUBCUTANEOUS at 09:06

## 2022-06-06 RX ADMIN — ARFORMOTEROL TARTRATE 15 MCG: 15 SOLUTION RESPIRATORY (INHALATION) at 19:50

## 2022-06-06 RX ADMIN — INSULIN LISPRO 2 UNITS: 100 INJECTION, SOLUTION INTRAVENOUS; SUBCUTANEOUS at 17:53

## 2022-06-06 RX ADMIN — AMLODIPINE BESYLATE 5 MG: 5 TABLET ORAL at 09:06

## 2022-06-06 RX ADMIN — METHYLPREDNISOLONE SODIUM SUCCINATE 40 MG: 40 INJECTION, POWDER, FOR SOLUTION INTRAMUSCULAR; INTRAVENOUS at 17:53

## 2022-06-06 RX ADMIN — ASPIRIN 81 MG: 81 TABLET, CHEWABLE ORAL at 09:06

## 2022-06-06 RX ADMIN — METHYLPREDNISOLONE SODIUM SUCCINATE 40 MG: 40 INJECTION, POWDER, FOR SOLUTION INTRAMUSCULAR; INTRAVENOUS at 09:06

## 2022-06-06 RX ADMIN — IPRATROPIUM BROMIDE AND ALBUTEROL SULFATE 3 ML: .5; 3 SOLUTION RESPIRATORY (INHALATION) at 15:18

## 2022-06-06 RX ADMIN — GABAPENTIN 600 MG: 600 TABLET, FILM COATED ORAL at 20:14

## 2022-06-06 RX ADMIN — THERA TABS 1 TABLET: TAB at 09:06

## 2022-06-06 RX ADMIN — GABAPENTIN 600 MG: 600 TABLET, FILM COATED ORAL at 09:06

## 2022-06-06 RX ADMIN — ATORVASTATIN CALCIUM 40 MG: 40 TABLET, FILM COATED ORAL at 09:06

## 2022-06-06 RX ADMIN — Medication 10 ML: at 20:14

## 2022-06-06 RX ADMIN — GUAIFENESIN 1200 MG: 600 TABLET, EXTENDED RELEASE ORAL at 12:42

## 2022-06-06 RX ADMIN — Medication 10 ML: at 09:07

## 2022-06-06 RX ADMIN — ENOXAPARIN SODIUM 40 MG: 100 INJECTION SUBCUTANEOUS at 20:14

## 2022-06-06 RX ADMIN — ENOXAPARIN SODIUM 40 MG: 100 INJECTION SUBCUTANEOUS at 09:07

## 2022-06-06 RX ADMIN — ARFORMOTEROL TARTRATE 15 MCG: 15 SOLUTION RESPIRATORY (INHALATION) at 07:12

## 2022-06-06 RX ADMIN — Medication 100 MG: at 20:14

## 2022-06-06 RX ADMIN — BUDESONIDE 0.5 MG: 0.5 INHALANT RESPIRATORY (INHALATION) at 07:16

## 2022-06-06 RX ADMIN — INSULIN HUMAN 2 UNITS: 100 INJECTION, SOLUTION PARENTERAL at 18:24

## 2022-06-06 NOTE — PAYOR COMM NOTE
"  Observation order 5/31/22  Inpatient order 6/5/22    ER admit following a syncopal episode. Hypoxia at admission requiring supplemental oxygen and treatment of COPD exacerbation. Cardiac cath performed 6/2/22 pt found to have 3 vessel CAD and ventricular dysfunction. Elevated troponin series.   [facility] consulted and planning for CABG on Friday 6/10/22  =======  AUTHORIZATION PENDING:   PLEASE FAX OR CALL DETERMINATION TO CONTACT BELOW:       THANK YOU,    CADEN Gamez, RN  Utilization Review  [facility]  Phone: [phone]  Fax: [phone]      NPI: [id number]  Tax ID: [id number]  Chi Quinteros Sr. (67 y.o. Male)             Date of Birth   1955    Social Security Number       Address   [address] [place] [facility]    Home Phone   [phone]    MRN   [id number]       Taoism   None    Marital Status                               Admission Date   5/31/22    Admission Type   Emergency    Admitting Provider   Manoj Madrid MD    Attending Provider   Harris Boateng MD    Department, Room/Bed   [facility] PROGRESS CARE, 2121/1       Discharge Date       Discharge Disposition       Discharge Destination                               Attending Provider: Harris Boateng MD    Allergies: No Known Allergies    Isolation: None   Infection: None   Code Status: CPR   Advance Care Planning Activity    Ht: 177.8 cm (70\")   Wt: 110 kg (242 lb 1 oz)    Admission Cmt: None   Principal Problem: Syncope [R55]                 Active Insurance as of 5/31/2022     Primary Coverage     Payor Plan Insurance Group Employer/Plan Group    [facility] SafetyTat [facility] SafetyTat BLUE [facility] PPO [id number]     Payor Plan Address Payor Plan Phone Number Payor Plan Fax Number Effective Dates    [address] [phone]  1/1/2022 - None Entered    [place] [address]       Subscriber Name Subscriber Birth Date Member ID       CHI QUINTEROS SR. 1955 " "[id number]           Secondary Coverage     Payor Plan Insurance Group Employer/Plan Group    HUMANA MEDICARE REPLACEMENT HU[employer] MEDICARE REPLACEMENT [id number]     Payor Plan Address Payor Plan Phone Number Payor Plan Fax Number Effective Dates    [address] [address] [phone]  2022 - None Entered    [facility] [id number]       Subscriber Name Subscriber Birth Date Member ID       CHI QUINTEROS SR. 1955 [id number]                 Emergency Contacts      (Rel.) Home Phone Work Phone Mobile Phone    ELIZ LOWRY (Son) -- -- [phone]               History & Physical      Baldomero Hunter PA-C at 22 1110     Attestation signed by Manoj Madrid MD at 22 [phone]    Agree with PA H&P physical exam and treatment plan                [facility] H&P    Patient Name: Chi Quinteros Sr.  : 1955  MRN: [id number]  Primary Care Physician: Deysi Mason APRN  Date of admission: 2022     Patient Care Team:  Deysi Mason APRN as PCP - General (Nurse Practitioner)          Subjective   History Present Illness     Chief Complaint:   Chief Complaint   Patient presents with   • Syncope         Obtained from ED provider HPI on 2022:  Patient is a 67-year-old male comes in complaining of syncope since earlier today.  EMS reports that patient stepson had witnessed patient passed out earlier today just prior to arrival.  EMS states that when they arrived patient had a subsequent syncopal episode where patient had lost consciousness for about 30 seconds.  EMS also reports that patient had been mildly hypoxic in the upper 80s.  EMS reports that patient has been drinking daily.  I asked patient how many beers he has had today and yesterday patient states that he drinks as much as he wants to drink \"I do not know 5-6 beers\".  Patient denies any vomiting, diarrhea, abdominal pain, chest pain, shortness of breath, urinary symptoms or swelling her legs bilaterally.  Patient " denies any cough.    6/1/2022: Patient confirms the HPI noted above including a syncopal episode the day before while patient was cleaning fish following a recent extended fishing trip.  He notes that he did drink a large amount of coffee, tea as well as whiskey with some beers prior to his syncopal episode.  He noted no preceding symptoms or prodrome.  No pain, nausea, vomiting, dyspnea, peripheral edema or cardiovascular history is reported.  Patient reports that he stopped smoking completely 1 year ago.  He confirms compliance all of his outpatient medical therapies including Trelegy inhaler      Review of Systems   Constitutional: Negative.   HENT: Negative.    Eyes: Negative.    Cardiovascular: Positive for syncope. Negative for chest pain, dyspnea on exertion, irregular heartbeat, leg swelling, near-syncope and palpitations.   Respiratory: Negative.    Skin: Negative.    Musculoskeletal: Negative.    Gastrointestinal: Negative.    Genitourinary: Negative.    Psychiatric/Behavioral: Negative.            Personal History     Past Medical History:   Past Medical History:   Diagnosis Date   • Back pain    • Emphysema lung (HCC)    • Hypertension        Surgical History:    History reviewed. No pertinent surgical history.        Family History: family history is not on file. Otherwise pertinent FHx was reviewed and unremarkable.     Social History:  reports that he quit smoking about a year ago. He has never used smokeless tobacco. He reports current alcohol use. He reports that he does not use drugs.      Medications:  Prior to Admission medications    Medication Sig Start Date End Date Taking? Authorizing Provider   amLODIPine (NORVASC) 5 MG tablet Take 5 mg by mouth Daily.   Yes ProviderPonce MD   Fluticasone-Umeclidin-Vilant (Trelegy Ellipta) 100-62.5-25 MCG/INH inhaler Inhale 1 puff Daily.   Yes ProviderPonce MD   gabapentin (NEURONTIN) 600 MG tablet Take 600 mg by mouth 2 (Two) Times a Day.    Yes Provider, MD Ponce   ipratropium-albuterol (COMBIVENT RESPIMAT)  MCG/ACT inhaler Inhale 1 puff 4 (Four) Times a Day As Needed for Wheezing.   Yes Ponce Tyson MD   lisinopril (PRINIVIL,ZESTRIL) 40 MG tablet Take 40 mg by mouth Daily.   Yes Ponce Tyson MD   pravastatin (PRAVACHOL) 20 MG tablet Take 20 mg by mouth Daily.   Yes Ponce Tyson MD   tamsulosin (FLOMAX) 0.4 MG capsule 24 hr capsule Take 1 capsule by mouth Daily.   Yes Provider, MD Ponce       Allergies:  No Known Allergies    Objective   Objective     Vital Signs  Temp:  [98.1 °F (36.7 °C)-98.3 °F (36.8 °C)] 98.2 °F (36.8 °C)  Heart Rate:  [67-84] 71  Resp:  [16-24] 18  BP: ()/(45-79) 147/79  SpO2:  [87 %-99 %] 97 %  on  Flow (L/min):  [4-6] 4;   Device (Oxygen Therapy): nasal cannula  Body mass index is 34.58 kg/m².    Physical Exam  Vitals reviewed.   Constitutional:       General: He is not in acute distress.     Appearance: Normal appearance. He is obese. He is not ill-appearing, toxic-appearing or diaphoretic.   HENT:      Head: Normocephalic and atraumatic.      Right Ear: External ear normal.      Left Ear: External ear normal.      Nose: Nose normal.      Mouth/Throat:      Mouth: Mucous membranes are moist.   Eyes:      Extraocular Movements: Extraocular movements intact.   Cardiovascular:      Rate and Rhythm: Normal rate and regular rhythm.      Pulses: Normal pulses.      Heart sounds: Normal heart sounds.   Pulmonary:      Effort: Pulmonary effort is normal. No respiratory distress.      Breath sounds: Normal breath sounds. No wheezing.   Abdominal:      General: Bowel sounds are normal. There is no distension.      Palpations: Abdomen is soft.      Tenderness: There is no abdominal tenderness.   Musculoskeletal:         General: Normal range of motion.      Cervical back: Normal range of motion.   Skin:     General: Skin is warm and dry.      Capillary Refill: Capillary refill takes less  than 2 seconds.   Neurological:      General: No focal deficit present.      Mental Status: He is alert and oriented to person, place, and time.   Psychiatric:         Mood and Affect: Mood normal.         Behavior: Behavior normal.         Thought Content: Thought content normal.         Judgment: Judgment normal.           Results Review:  I have personally reviewed most recent cardiac tracings, lab results and radiology images and interpretations and agree with findings, most notably: Troponin, CMP, CBC, D-dimer, procalcitonin, CRP, UA, urine tox, COVID-19 screen, CT of head, carotid duplex ultrasound, chest x-ray and EKG.    Results from last 7 days   Lab Units 06/01/22  1024   WBC 10*3/mm3 5.50   HEMOGLOBIN g/dL 13.4   HEMATOCRIT % 40.8   PLATELETS 10*3/mm3 166     Results from last 7 days   Lab Units 06/01/22  1024 05/31/22  2227 05/31/22  2053   SODIUM mmol/L 136  --  134*   POTASSIUM mmol/L 4.8  --  3.7   CHLORIDE mmol/L 103  --  97*   CO2 mmol/L 20.0*  --  22.0   BUN mg/dL 14  --  15   CREATININE mg/dL 1.17  --  1.90*   GLUCOSE mg/dL 159*  --  111*   CALCIUM mg/dL 8.3*  --  8.8   ALT (SGPT) U/L  --   --  30   AST (SGOT) U/L  --   --  30   TROPONIN T ng/mL 0.116* 0.326* 0.415*   PROCALCITONIN ng/mL  --  0.12  --      Estimated Creatinine Clearance: 75.7 mL/min (by C-G formula based on SCr of 1.17 mg/dL).  Brief Urine Lab Results  (Last result in the past 365 days)      Color   Clarity   Blood   Leuk Est   Nitrite   Protein   CREAT   Urine HCG        06/01/22 0029 Yellow   Clear   Negative   Negative   Negative   Negative                 Microbiology Results (last 10 days)     Procedure Component Value - Date/Time    COVID-19,CEPHEID/ROMAIN,COR/STACEY/PAD/JOVON IN-HOUSE(OR EMERGENT/ADD-ON),NP SWAB IN TRANSPORT MEDIA 3-4 HR TAT, RT-PCR - Swab, Nasopharynx [[id number]]  (Normal) Collected: 05/31/22 2124    Lab Status: Final result Specimen: Swab from Nasopharynx Updated: 05/31/22 2147     COVID19 Not Detected     Narrative:      Fact sheet for providers: https://www.fda.gov/media/922978/download     Fact sheet for patients: https://www.fda.gov/media/081661/download  Fact sheet for providers: https://www.fda.gov/media/455481/download    Fact sheet for patients: https://www.[employer].gov/media/038670/download    Test performed by PCR.          ECG/EMG Results (most recent)     Procedure Component Value Units Date/Time    Adult Transthoracic Echo Complete With Contrast if Necessary Per Protocol (With Agitated Saline) [[id number]] Resulted: 06/01/22 0850     Updated: 06/01/22 0851     Target HR (85%) 130 bpm      Max. Pred. HR (100%) 153 bpm      ACS 2.25 cm      Ao root diam 3.8 cm      Ao pk sylvester 139.8 cm/sec      Ao V2 VTI 30.6 cm      GERMANIA(I,D) 2.7 cm2      EDV(cubed) 240.2 ml      EDV(MOD-sp4) 164.8 ml      EF(MOD-sp4) 67.0 %      ESV(cubed) 89.0 ml      ESV(MOD-sp4) 54.3 ml      IVS/LVPW 1.10 cm      LV mass(C)d 268.7 grams      LV V1 max PG 4.2 mmHg      LV V1 mean PG 2.38 mmHg      LV V1 max 102.7 cm/sec      LVPWd 0.97 cm      MR max .4 mmHg      MV dec slope 334.6 cm/sec2      MV dec time 0.27 msec      MV V2 VTI 32.3 cm      MVA(VTI) 2.6 cm2      PA acc time 0.07 sec      PA pr(Accel) 47.9 mmHg      PA V2 max 104.2 cm/sec      Pulm A Revs Sylvester 32.2 cm/sec      RAP systole 15.0 mmHg      RV V1 max PG 3.9 mmHg      RV V1 max 98.6 cm/sec      RV V1 VTI 20.1 cm      RVIDd 3.2 cm      RVSP(TR) 48.3 mmHg      SV(LVOT) 84.1 ml      SV(MOD-sp4) 110.5 ml      TR max PG 33.3 mmHg      Ao max PG 7.8 mmHg      Ao mean PG 3.9 mmHg      FS 28.2 %      IVSd 1.07 cm      LA dimension (2D)  4.1 cm      LV V1 VTI 22.8 cm      LVIDd 6.2 cm      LVIDs 4.5 cm      LVOT area 3.7 cm2      LVOT diam 2.17 cm      MV E/A 0.40     MV max PG 7.4 mmHg      MV mean PG 3.5 mmHg      Pulm S/D 1.40     MR max sylvester 573.1 cm/sec      MV A max sylvester 113.5 cm/sec      MV E max sylvester 45.0 cm/sec      Pulm A Revs Dur 0.08 sec      Pulm Álvarez Sylvester 46.2 cm/sec       Pulm Sys Sylvester 64.6 cm/sec      TR max sylvester 288.4 cm/sec     ECG 12 Lead [[id number]] Collected: 05/31/22 2038     Updated: 06/01/22 1044     QT Interval 419 ms     Narrative:      HEART RATE= 72  bpm  RR Interval= 832  ms  NC Interval= 162  ms  P Horizontal Axis= 35  deg  P Front Axis= 16  deg  QRSD Interval= 102  ms  QT Interval= 419  ms  QRS Axis= -6  deg  T Wave Axis= -14  deg  - ABNORMAL ECG -  Sinus rhythm  Inferior infarct, old  Electronically Signed By:   Date and Time of Study: 2022-05-31 20:38:25          Results for orders placed during the hospital encounter of 05/31/22    Bilateral Carotid Duplex    Interpretation Summary  · Proximal right internal carotid artery mild stenosis.  · Proximal left internal carotid artery mild stenosis.          CT Head Without Contrast    Result Date: 5/31/2022  No acute intracranial abnormality.  Electronically Signed By-Johann Dodge MD On:5/31/2022 10:53 PM This report was finalized on [id number] by  Johann Dodge MD.    MRI Lumbar Spine Without Contrast    Result Date: 5/26/2022  1.Multilevel degenerative changes as noted. 2.Minimal anterolisthesis of L5 on S1. There are suggested pars defects.  Electronically Signed By-Zenon James MD On:5/26/2022 9:11 AM This report was finalized on [id number] by  Zenon James MD.    XR Chest 1 View    Result Date: 5/31/2022  No acute cardiopulmonary abnormality.  Electronically Signed By-Johann Dodge MD On:5/31/2022 9:29 PM This report was finalized on [id number] by  Johann Dodge MD.        Estimated Creatinine Clearance: 75.7 mL/min (by C-G formula based on SCr of 1.17 mg/dL).    Assessment & Plan   Assessment/Plan       Active Hospital Problems    Diagnosis  POA   • COPD exacerbation (HCC) [J44.1]  Yes   • Obesity (BMI 30-39.9) [E66.9]  Yes   • Syncope [R55]  Yes      Resolved Hospital Problems   No resolved problems to display.     Syncope with an elevated troponin  -Serial troponins: 0.415, 0.326, 0.116  -D-dimer:  0.70  -UA shows trace ketones but is otherwise unremarkable  -Chest x-ray shows no acute cardiopulmonary abnormality  -CT of head showed no acute intracranial abnormality  -Bilateral carotid duplex shows mild stenosis in the proximal right and left internal carotid arteries  -EKG shows sinus rhythm at 72 without obvious acute ST changes or ectopy and a QTC of 459 ms  -Patient given treatment dose Lovenox in the ED, continue for now pending further evaluation for pulmonary embolism  -Cardiology consulted who recommended echocardiogram and stress testing  -CT PE protocol showed no pulmonary embolism but with a 4.9 cm descending aortic aneurysm along with bibasilar atelectasis and fatty infiltration of liver    AECOPD  -Oxygen saturation initially 87% on 4 L in the ED  -Patient given 125 mg Solu-Medrol in the ED with 40 mg every 8 hours ordered subsequently  -DuoNeb, Mucinex ordered in ED  -Incentive spirometry  -Continue O2 and pulse oximetry  -IV Solu-Medrol discontinued on 6/1/2022 with plans to initiate p.o. prednisone in a.m.    Acute on chronic kidney injury  -Initial creatinine: 1.90 with a BUN of 15 and a BUN/creatinine ratio of 7.9, creatinine improved to 1.17 following fluid administration  -Monitor while admitted    Hypertension  -Patient initially hypotensive with blood pressures in the 80s systolic, has improved to 147/79  -Resume lisinopril with close monitoring while admitted    Hyperlipidemia  -Statin    BPH  -Flomax    Alcohol abuse  -Patient reports he currently drinks multiple beers along with whiskey daily  -CIWA protocol ordered in ED    Obesity (34.58)  -Encouraged on lifestyle modifications            VTE Prophylaxis -   Mechanical Order History:      Ordered        06/01/22 0033  Place Sequential Compression Device  Once            06/01/22 0033  Maintain Sequential Compression Device  Continuous                    Pharmalogical Order History:      Ordered     Dose Route Frequency Stop     06/01/22 1102  Enoxaparin Sodium (LOVENOX) syringe 110 mg         1 mg/kg SC Every 12 Hours Scheduled --    06/01/22 1058  Pharmacy to Dose enoxaparin (LOVENOX)         -- XX Continuous PRN --    05/31/22 2358  Enoxaparin Sodium (LOVENOX) syringe 100 mg         1 mg/kg SC Once 06/01/22 0044    05/31/22 2300  heparin 53710 units/250 mL (100 units/mL) in 0.45 % NaCl infusion  10 mL/hr,   Status:  Discontinued         9.62 Units/kg/hr IV Titrated 05/31/22 2355 05/31/22 2300  heparin bolus from bag 2,500 Units  Status:  Discontinued         2,500 Units IV Every 6 Hours PRN 05/31/22 2355 05/31/22 2300  heparin bolus from bag 5,000 Units  Status:  Discontinued         5,000 Units IV Every 6 Hours PRN 05/31/22 2355                CODE STATUS:    Code Status and Medical Interventions:   Ordered at: 05/31/22 2353     Code Status (Patient has no pulse and is not breathing):    CPR (Attempt to Resuscitate)     Medical Interventions (Patient has pulse or is breathing):    Full Support       This patient has been examined wearing personal protective equipment.     I discussed the patient's findings and my recommendations with patient and nursing staff.      Signature:Electronically signed by Baldomero Hunter PA-C, 06/01/22, 5:24 PM EDT.            Electronically signed by Manoj Madrid MD at 06/03/22 0754          Emergency Department Notes      Brian Brock PA at 05/31/22 2043     Attestation signed by Manoj Madrid MD at 06/03/22 0755        NON FACE TO FACE: This visit was performed by BOTH a physician and an APC. I performed all aspects of the MDM as documented.  Manoj Madrid MD 6/3/2022 07:55 EDT                         Subjective     Patient is a 67-year-old male comes in complaining of syncope since earlier today.  EMS reports that patient joann had witnessed patient passed out earlier today just prior to arrival.  EMS states that when they arrived patient had a subsequent syncopal episode where patient had lost  "consciousness for about 30 seconds.  EMS also reports that patient had been mildly hypoxic in the upper 80s.  EMS reports that patient has been drinking daily.  I asked patient how many beers he has had today and yesterday patient states that he drinks as much as he wants to drink \"I do not know 5-6 beers\".  Patient denies any vomiting, diarrhea, abdominal pain, chest pain, shortness of breath, urinary symptoms or swelling her legs bilaterally.  Patient denies any cough.          Review of Systems   Constitutional: Negative for chills, fatigue and fever.   HENT: Negative for congestion, sore throat, tinnitus and trouble swallowing.    Eyes: Negative for photophobia, discharge and visual disturbance.   Respiratory: Negative for cough, shortness of breath and wheezing.    Cardiovascular: Negative for chest pain and leg swelling.   Gastrointestinal: Negative for abdominal pain, diarrhea, nausea and vomiting.   Genitourinary: Negative for dysuria, flank pain and urgency.   Musculoskeletal: Negative for arthralgias and myalgias.   Skin: Negative for rash.   Neurological: Positive for syncope. Negative for dizziness, light-headedness and headaches.   Psychiatric/Behavioral: Negative for confusion.       History reviewed. No pertinent past medical history.    No Known Allergies    History reviewed. No pertinent surgical history.    History reviewed. No pertinent family history.    Social History     Socioeconomic History   • Marital status:            Objective   Physical Exam  Vitals and nursing note reviewed.   Constitutional:       General: He is not in acute distress.     Appearance: He is well-developed. He is not diaphoretic.   HENT:      Head: Normocephalic and atraumatic.      Right Ear: External ear normal.      Left Ear: External ear normal.      Nose: Nose normal.      Mouth/Throat:      Pharynx: No oropharyngeal exudate.   Eyes:      Extraocular Movements: Extraocular movements intact.      " "Conjunctiva/sclera: Conjunctivae normal.      Pupils: Pupils are equal, round, and reactive to light.   Cardiovascular:      Rate and Rhythm: Normal rate and regular rhythm.      Pulses: Normal pulses.      Heart sounds: Normal heart sounds.      Comments: S1, S2 audible.  Pulmonary:      Effort: Pulmonary effort is normal. No respiratory distress.      Breath sounds: Normal breath sounds. No wheezing.      Comments: On room air.  Abdominal:      General: Bowel sounds are normal. There is no distension.      Palpations: Abdomen is soft.      Tenderness: There is no abdominal tenderness. There is no guarding or rebound.   Musculoskeletal:         General: No tenderness or deformity. Normal range of motion.      Cervical back: Normal range of motion.      Right lower leg: No edema.      Left lower leg: No edema.   Skin:     General: Skin is warm.      Capillary Refill: Capillary refill takes less than 2 seconds.      Findings: No erythema or rash.   Neurological:      Mental Status: He is alert and oriented to person, place, and time.      Cranial Nerves: No cranial nerve deficit.   Psychiatric:         Mood and Affect: Mood normal.         Behavior: Behavior normal.         Procedures          ED Course  ED Course as of 06/01/22 0022   Tue May 31, 2022   2115 Patient's orthostatic vitals show home blood pressure lying down of 81/53 and heart rate of 69, blood pressure sitting of 83/55 and heart rate of 72, standing blood pressure of 68/45 and heart rate of 77 and reports dizziness upon standing. [RL]      ED Course User Index  [RL] Brian Brock PA      BP 95/52   Pulse 72   Temp 98.1 °F (36.7 °C)   Resp 18   Ht 177.8 cm (70\")   Wt 104 kg (230 lb)   SpO2 94%   BMI 33.00 kg/m²   Labs Reviewed   COMPREHENSIVE METABOLIC PANEL - Abnormal; Notable for the following components:       Result Value    Glucose 111 (*)     Creatinine 1.90 (*)     Sodium 134 (*)     Chloride 97 (*)     eGFR 38.2 (*)     All other " components within normal limits    Narrative:     GFR Normal >60  Chronic Kidney Disease <60  Kidney Failure <15     TROPONIN (IN-HOUSE) - Abnormal; Notable for the following components:    Troponin T 0.415 (*)     All other components within normal limits    Narrative:     Troponin T Reference Range:  <= 0.03 ng/mL-   Negative for AMI  >0.03 ng/mL-     Abnormal for myocardial necrosis.  Clinicians would have to utilize clinical acumen, EKG, Troponin and serial changes to determine if it is an Acute Myocardial Infarction or myocardial injury due to an underlying chronic condition.       Results may be falsely decreased if patient taking Biotin.     TROPONIN (IN-HOUSE) - Abnormal; Notable for the following components:    Troponin T 0.326 (*)     All other components within normal limits    Narrative:     Troponin T Reference Range:  <= 0.03 ng/mL-   Negative for AMI  >0.03 ng/mL-     Abnormal for myocardial necrosis.  Clinicians would have to utilize clinical acumen, EKG, Troponin and serial changes to determine if it is an Acute Myocardial Infarction or myocardial injury due to an underlying chronic condition.       Results may be falsely decreased if patient taking Biotin.     CBC WITH AUTO DIFFERENTIAL - Abnormal; Notable for the following components:    RBC 3.68 (*)     Hemoglobin 12.1 (*)     Hematocrit 36.7 (*)     MCV 99.7 (*)     All other components within normal limits   COVID-19,CEPHEID/ROMAIN,COR/STACEY/PAD/JOVON IN-HOUSE,NP SWAB IN TRANSPORT MEDIA 3-4 HR TAT, RT-PCR - Normal    Narrative:     Fact sheet for providers: https://www.fda.gov/media/175248/download     Fact sheet for patients: https://www.fda.gov/media/673610/download  Fact sheet for providers: https://www.fda.gov/media/083102/download    Fact sheet for patients: https://www.fda.gov/media/362210/download    Test performed by PCR.   ETHANOL    Narrative:     Plasma Ethanol Clinical Symptoms:    ETOH (%)               Clinical Symptom  .01-.05               No apparent influence  .03-.12              Euphoria, Diminished judgment and attention   .09-.25              Impaired comprehension, Muscle incoordination  .18-.30              Confusion, Staggered gait, Slurred speech  .25-.40              Markedly decreased response to stimuli, unable to stand or                        walk, vomitting, sleep or stupor  .35-.50              Comatose, Anesthesia, Subnormal body temperature       URINE DRUG SCREEN   URINALYSIS W/ CULTURE IF INDICATED   C-REACTIVE PROTEIN   PROCALCITONIN   D-DIMER, QUANTITATIVE   CBC AND DIFFERENTIAL    Narrative:     The following orders were created for panel order CBC & Differential.  Procedure                               Abnormality         Status                     ---------                               -----------         ------                     CBC Auto Differential[[id number]]        Abnormal            Final result                 Please view results for these tests on the individual orders.     CT Head Without Contrast    Result Date: 5/31/2022  No acute intracranial abnormality.  Electronically Signed By-Johann Dodge MD On:5/31/2022 10:53 PM This report was finalized on [id number] by  Johann Dodge MD.    XR Chest 1 View    Result Date: 5/31/2022  No acute cardiopulmonary abnormality.  Electronically Signed By-Johann Dodge MD On:5/31/2022 9:29 PM This report was finalized on [id number] by  Johann Dodge MD.                                               [facility]       Chart review: No known allergies  EKG: EKG reviewed myself interpreted by Dr. Madrid, shows sinus rhythm 72 bpm, no ST elevation apparent.    Imaging:    CT Head Without Contrast   Final Result   No acute intracranial abnormality.       Electronically Signed By-Johann Dodge MD On:5/31/2022 10:53 PM   This report was finalized on [id number] by  Johann Dodge MD.      XR Chest 1 View   Final Result   No acute cardiopulmonary abnormality.      "  Electronically Signed By-Johann Dodge MD On:5/31/2022 9:29 PM   This report was finalized on [id number] by  Johann Dodge MD.          Labs: Serum creatinine elevated at 1.9 with GFR of 38.2.  CBC largely unremarkable for acute findings.  Ethanol level elevated 0.203.  UA pending upon admission as well as UDS.  COVID-19 swab negative.  Initial troponin elevated 0.4, repeat elevated 0.326.    Vitals:  BP 95/52   Pulse 72   Temp 98.1 °F (36.7 °C)   Resp 18   Ht 177.8 cm (70\")   Wt 104 kg (230 lb)   SpO2 94%   BMI 33.00 kg/m²     Medications given:    Medications   sodium chloride 0.9 % flush 10 mL (has no administration in time range)   methylPREDNISolone sodium succinate (SOLU-Medrol) injection 125 mg (has no administration in time range)   sodium chloride 0.9 % infusion (has no administration in time range)   aspirin chewable tablet 324 mg (has no administration in time range)   Enoxaparin Sodium (LOVENOX) syringe 100 mg (has no administration in time range)   LORazepam (ATIVAN) tablet 0.5 mg (has no administration in time range)     Or   LORazepam (ATIVAN) tablet 0.5 mg (has no administration in time range)     Or   LORazepam (ATIVAN) tablet 1 mg (has no administration in time range)     Or   LORazepam (ATIVAN) tablet 0.5 mg (has no administration in time range)     Or   LORazepam (ATIVAN) tablet 0.5 mg (has no administration in time range)     Or   LORazepam (ATIVAN) tablet 0.5 mg (has no administration in time range)   ipratropium-albuterol (DUO-NEB) nebulizer solution 3 mL (has no administration in time range)   ipratropium-albuterol (DUO-NEB) nebulizer solution 3 mL (has no administration in time range)   methylPREDNISolone sodium succinate (SOLU-Medrol) injection 40 mg (has no administration in time range)   benzonatate (TESSALON) capsule 200 mg (has no administration in time range)   guaiFENesin (MUCINEX) 12 hr tablet 1,200 mg (has no administration in time range)   sodium chloride 0.9 % bolus " 1,000 mL (0 mL Intravenous Stopped 5/31/22 2235)   albuterol sulfate HFA (PROVENTIL HFA;VENTOLIN HFA;PROAIR HFA) inhaler 6 puff (6 puffs Inhalation Given 5/31/22 2144)   thiamine (B-1) 100 mg, folic acid 1 mg in sodium chloride 0.9 % 1,000 mL infusion (1,000 mL/hr Intravenous New Bag 5/31/22 2149)       Procedures:    MDM: Patient is a 67-year-old male comes in complaining of syncopal episodes, x2 and patient has a history of alcoholism.  Patient states he does drink 5 or 6 beers today.  Serum creatinine elevated at 1.9 with GFR of 38.2.  CBC largely unremarkable for acute findings.  Ethanol level elevated 0.203.  UA pending upon admission as well as UDS.  COVID-19 swab negative.  CT head unremarkable for acute findings.  Initial troponin elevated 0.4, repeat elevated 0.326.  Patient had orthostatic hypotension and was hypotensive upon arrival.  Patient was given 1 L normal saline bolus as well as 1 L banana bag.  Patient was also wheezy on exam and was given albuterol as well as Solu-Medrol for apparent COPD exacerbation.  Patient states he is not on supplemental oxygen at home.  Patient currently requiring 3.5 L nasal cannula as patient was hypoxic in the upper and mid 80s on room air.  Patient was covered with therapeutic dose Lovenox for patient's elevated troponin as well as hypoxia.  Patient will need further cardiac consultation and work-up.  Patient be placed in ED observation unit for further work-up and treatment.  I discussed case with attending provider recommended ED observation unit as well.  Results and plan discussed with patient and is agreeable with plan.      Syncope  -Possibly from dehydration from alcohol intoxication  -Cardiology consultation  -Check serial troponin, echo, continuous cardiac monitoring    Elevated troponin   -Troponin elevated at 0.4 and repeat 0.3  -Patient given therapeutic dose Lovenox here in the ED x1    Acute respiratory failure with hypoxia  -Patient usually on no  supplemental oxygen at home currently requiring 3.5 L nasal cannula    COPD exacerbation  -Patient given Solu-Medrol in ED as well as albuterol  -Continue Solu-Medrol  -Check Pro-Yfn and CRP    Alcohol intoxication with history of daily alcohol use   -EtOH of 0.203 in ED  -CIWA protocol ordered  -Banana bag given in ED    Final diagnoses:   Syncope, unspecified syncope type   Alcoholic intoxication without complication (HCC)   Acute respiratory failure with hypoxemia (HCC)   Orthostatic hypotension   Elevated troponin   FELIBERTO (acute kidney injury) (HCC)   COPD exacerbation (HCC)       ED Disposition  ED Disposition     ED Disposition   Decision to Admit    Condition   --    Comment   --             No follow-up provider specified.       Medication List      No changes were made to your prescriptions during this visit.          Brian Brock PA  06/01/22 0022       Brian Brock PA  06/01/22 0022      Electronically signed by Manoj Madrid MD at 06/03/22 0755         Facility-Administered Medications as of 6/6/2022   Medication Dose Route Frequency Provider Last Rate Last Admin   • acetaminophen (TYLENOL) tablet 650 mg  650 mg Oral Q4H PRN Frank Giraldo MD        Or   • acetaminophen (TYLENOL) 160 MG/5ML solution 650 mg  650 mg Oral Q4H PRN Frank Giraldo MD        Or   • acetaminophen (TYLENOL) suppository 650 mg  650 mg Rectal Q4H PRN Frank Giraldo MD       • [COMPLETED] Acetylcysteine capsule 600 mg  600 mg Oral Q8H Eliseo Casarez MD   600 mg at 06/02/22 2217   • [COMPLETED] albuterol sulfate HFA (PROVENTIL HFA;VENTOLIN HFA;PROAIR HFA) inhaler 2 puff  2 puff Inhalation Once Harris Boateng MD   2 puff at 06/03/22 1511   • [COMPLETED] albuterol sulfate HFA (PROVENTIL HFA;VENTOLIN HFA;PROAIR HFA) inhaler 6 puff  6 puff Inhalation Once Brian Brock PA   6 puff at 05/31/22 2144   • aluminum-magnesium hydroxide-simethicone (MAALOX MAX) 400-400-40 MG/5ML suspension 15 mL  15 mL Oral Q6H PRN Kristal  MD Frank       • amLODIPine (NORVASC) tablet 5 mg  5 mg Oral Daily Frank Giraldo MD   5 mg at 06/06/22 0906   • arformoterol (BROVANA) nebulizer solution 15 mcg  15 mcg Nebulization BID - RT Ca Santo MD   15 mcg at 06/06/22 0712   • [COMPLETED] aspirin chewable tablet 324 mg  324 mg Oral Once Brian Brock PA   324 mg at 06/01/22 0044   • aspirin chewable tablet 81 mg  81 mg Oral Daily Benson Hughes MD   81 mg at 06/06/22 0906   • atorvastatin (LIPITOR) tablet 40 mg  40 mg Oral Daily Harris Boateng MD   40 mg at 06/06/22 0906   • atropine sulfate injection 0.5 mg  0.5 mg Intravenous Q5 Min PRN Frank Giraldo MD       • benzonatate (TESSALON) capsule 200 mg  200 mg Oral TID PRN Frank Giraldo MD       • budesonide (PULMICORT) nebulizer solution 0.5 mg  0.5 mg Nebulization BID - RT Ca Santo MD   0.5 mg at 06/06/22 0716   • carvedilol (COREG) tablet 6.25 mg  6.25 mg Oral BID With Meals Harris Boateng MD   6.25 mg at 06/06/22 0906   • dextrose (D50W) (25 g/50 mL) IV injection 25 g  25 g Intravenous Q15 Min PRN Jaziel Rendon MD       • dextrose (GLUTOSE) oral gel 15 g  15 g Oral Q15 Min PRN Jaziel Rendon MD       • [COMPLETED] Enoxaparin Sodium (LOVENOX) syringe 100 mg  1 mg/kg Subcutaneous Once Brian Brock PA   100 mg at 06/01/22 0044   • Enoxaparin Sodium (LOVENOX) syringe 40 mg  40 mg Subcutaneous Q12H Benson Hughes MD   40 mg at 06/06/22 0907   • gabapentin (NEURONTIN) tablet 600 mg  600 mg Oral BID Frank Giradlo MD   600 mg at 06/06/22 0906   • glucagon (human recombinant) (GLUCAGEN DIAGNOSTIC) 1 mg in sterile water (preservative free) 1 mL injection  1 mg Intramuscular Q15 Min PRN Jaziel Rendon MD       • guaiFENesin (MUCINEX) 12 hr tablet 1,200 mg  1,200 mg Oral Q12H Frank Giraldo MD   1,200 mg at 06/06/22 1242   • hydrALAZINE (APRESOLINE) injection 10 mg  10 mg Intravenous Q6H PRN Janet Leon APRN       • insulin lispro  (ADMELOG) injection 0-7 Units  0-7 Units Subcutaneous TID AC Jaziel Rendon MD   3 Units at 06/06/22 1242   • [COMPLETED] iopamidol (ISOVUE-370) 76 % injection 100 mL  100 mL Intravenous Once in imaging Manoj Madrid MD   100 mL at 06/01/22 1243   • ipratropium-albuterol (DUO-NEB) nebulizer solution 3 mL  3 mL Nebulization Q4H - RT Frank Giraldo MD   3 mL at 06/06/22 1518   • ipratropium-albuterol (DUO-NEB) nebulizer solution 3 mL  3 mL Nebulization Q2H PRN Frank Giraldo MD       • LORazepam (ATIVAN) tablet 0.5 mg  0.5 mg Oral Q2H PRN Janet Leon APRN        Or   • LORazepam (ATIVAN) injection 0.5 mg  0.5 mg Intravenous Q2H PRN Janet Leon APRN        Or   • LORazepam (ATIVAN) tablet 1 mg  1 mg Oral Q1H PRN Janet Leon APRN        Or   • LORazepam (ATIVAN) injection 1 mg  1 mg Intravenous Q1H PRN Janet Leon APRN   1 mg at 06/04/22 0803    Or   • LORazepam (ATIVAN) injection 1 mg  1 mg Intravenous Q15 Min PRN Janet Leon APRN        Or   • LORazepam (ATIVAN) injection 1 mg  1 mg Intramuscular Q15 Min PRN Janet Leon APRN       • LORazepam (ATIVAN) tablet 0.5 mg  0.5 mg Oral Q2H PRN Frank Giraldo MD        Or   • LORazepam (ATIVAN) tablet 1 mg  1 mg Oral Q2H PRN Frank Giraldo MD        Or   • LORazepam (ATIVAN) tablet 0.5 mg  0.5 mg Oral Q2H PRN Frank Giraldo MD   0.5 mg at 06/03/22 2003    Or   • LORazepam (ATIVAN) tablet 0.5 mg  0.5 mg Oral Q2H PRN Frank Giraldo MD        Or   • LORazepam (ATIVAN) tablet 0.5 mg  0.5 mg Oral Q2H PRN Frank Giraldo MD        Or   • LORazepam (ATIVAN) tablet 0.5 mg  0.5 mg Oral Q2H PRN Frank Giraldo MD       • Magnesium Sulfate 2 gram Bolus, followed by 8 gram infusion (total Mg dose 10 grams)- Mg less than or equal to 1mg/dL  2 g Intravenous PRN Frank Giraldo MD        Or   • Magnesium Sulfate 2 gram / 50mL Infusion (GIVE X 3 BAGS TO EQUAL 6GM TOTAL DOSE) - Mg 1.1 - 1.5 mg/dl  2 g Intravenous PRN Kristal  MD Frank        Or   • Magnesium Sulfate 4 gram infusion- Mg 1.6-1.9 mg/dL  4 g Intravenous PRN Frank Giraldo MD       • melatonin tablet 5 mg  5 mg Oral Nightly PRN Frank Giraldo MD   5 mg at 06/03/22 2317   • [COMPLETED] methylPREDNISolone sodium succinate (SOLU-Medrol) injection 125 mg  125 mg Intravenous Once Brian Brock PA   125 mg at 06/01/22 0044   • [COMPLETED] methylPREDNISolone sodium succinate (SOLU-Medrol) injection 125 mg  125 mg Intravenous Once Harris Boateng MD   125 mg at 06/05/22 1051   • [COMPLETED] methylPREDNISolone sodium succinate (SOLU-Medrol) injection 40 mg  40 mg Intravenous Q8H Baldomero Hunter PA-C   40 mg at 06/01/22 2353   • methylPREDNISolone sodium succinate (SOLU-Medrol) injection 40 mg  40 mg Intravenous Q8H Ca Santo MD   40 mg at 06/06/22 0906   • multivitamin (THERAGRAN) tablet 1 tablet  1 tablet Oral Daily Harris Boateng MD   1 tablet at 06/06/22 0906   • nitroglycerin (NITROSTAT) SL tablet 0.4 mg  0.4 mg Sublingual Q5 Min PRN Frank Giraldo MD       • ondansetron (ZOFRAN) tablet 4 mg  4 mg Oral Q6H PRN Frank Giraldo MD        Or   • ondansetron (ZOFRAN) injection 4 mg  4 mg Intravenous Q6H PRN Frank Giraldo MD       • Pharmacy to dose Trelegy   Does not apply Continuous PRN Frank Giraldo MD       • potassium chloride (K-DUR,KLOR-CON) CR tablet 40 mEq  40 mEq Oral PRN Frank Giraldo MD        Or   • potassium chloride (KLOR-CON) packet 40 mEq  40 mEq Oral PRN Frank Giraldo MD        Or   • potassium chloride 10 mEq in 100 mL IVPB  10 mEq Intravenous Q1H PRN Frank Giraldo MD       • [COMPLETED] predniSONE (DELTASONE) tablet 20 mg  20 mg Oral Daily With Breakfast Harris Boateng MD   20 mg at 06/05/22 0844   • [COMPLETED] regadenoson (LEXISCAN) injection 0.4 mg  0.4 mg Intravenous Once in imaging Manoj Madrid MD   0.4 mg at 06/01/22 1311   • [COMPLETED] sodium chloride 0.9 % bolus 1,000 mL  1,000 mL  Intravenous Once Brian Brock PA   Stopped at 22 2235   • sodium chloride 0.9 % flush 10 mL  10 mL Intravenous PRN Frank Giraldo MD       • sodium chloride 0.9 % flush 10 mL  10 mL Intravenous Q12H Frank Giraldo MD   10 mL at 22 0907   • sodium chloride 0.9 % flush 10 mL  10 mL Intravenous PRN Frank Giraldo MD       • sodium chloride 0.9 % infusion 250 mL  250 mL Intravenous Once PRN Frank Giraldo MD       • [COMPLETED] sulfur hexafluoride microsph (LUMASON) 60.7-25 MG IV reconstituted suspension reconstituted suspension 2 mL  2 mL Intravenous Once in imaging Manoj Madrid MD   2 mL at 22 0846   • tamsulosin (FLOMAX) 24 hr capsule 0.4 mg  0.4 mg Oral Daily Janet Leon APRN   0.4 mg at 22 0906   • [COMPLETED] technetium tetrofosmin (Tc-MYOVIEW) injection 1 dose  1 dose Intravenous Once in imaging Manoj Madrid MD   1 dose at 22 1213   • [COMPLETED] technetium tetrofosmin (Tc-MYOVIEW) injection 1 dose  1 dose Intravenous Once in imaging Manoj Madrid MD   1 dose at 22 1311   • [COMPLETED] thiamine (B-1) 100 mg, folic acid 1 mg in sodium chloride 0.9 % 1,000 mL infusion  1,000 mL/hr Intravenous Once Brian Brock PA   Stopped at 22 0040   • thiamine (VITAMIN B-1) tablet 100 mg  100 mg Oral BID Harris Boateng MD   100 mg at 22 0906        Physician Progress Notes (last 24 hours)      Harris Boateng MD at 22 0959              [facility] Medicine Services Daily Progress Note    Patient Name: Chi Quinteros Sr.  : 1955  MRN: [id number]  Primary Care Physician:  Deysi Mason APRN  Date of admission: 2022      Subjective      Chief Complaint:He reports he is hungrey     6/3  Vitals and labs reviewed  Awaiting CT surgery recommendation regarding possible cardiac surgery    Notes reviewed      Apparently intermittently confused overnight and angry intermittently  His vitals are stable  requiring 2 L of oxygen afebrile  Suspected alcohol withdrawal.  CIWA score was 12 today with the nursing staff  On alcohol withdrawal protocol  Psychiatrist has been consulted by cardiac surgery  .  Add thiamine        6/5  Short of breath and wheezing much more than last few days.  Her last pulmonologist for evaluation given need for bypass surgery  Continue bronchodilators  Will give additional steroid with Solu-Medrol  Repeat chest x-ray shows no acute  problem    6/6  Labs and notes reviewed      Review of Systems   Constitutional: Negative.   HENT:        Kenaitze right ear   Eyes: Negative.    Cardiovascular: Negative.    Respiratory: Negative.    Endocrine: Negative.    Hematologic/Lymphatic: Negative.    Skin: Negative.    Musculoskeletal: Positive for back pain.   Gastrointestinal: Negative.    Genitourinary: Negative.    Neurological: Negative.    Psychiatric/Behavioral: Negative.    Allergic/Immunologic: Negative.          Objective      Vitals:   Temp:  [97.5 °F (36.4 °C)-99.4 °F (37.4 °C)] 97.8 °F (36.6 °C)  Heart Rate:  [56-81] 62  Resp:  [16-22] 18  BP: (106-129)/(49-68) 109/60  Flow (L/min):  [2-3] 2    Physical Exam  Vitals reviewed.   Constitutional:       Appearance: Normal appearance. He is obese.   HENT:      Head: Normocephalic and atraumatic.      Right Ear: External ear normal.      Left Ear: External ear normal.      Ears:      Comments: Kenaitze right ear     Nose: Nose normal.      Mouth/Throat:      Mouth: Mucous membranes are moist.   Eyes:      Extraocular Movements: Extraocular movements intact.   Cardiovascular:      Rate and Rhythm: Normal rate and regular rhythm.      Pulses: Normal pulses.      Heart sounds: Normal heart sounds.   Pulmonary:      Effort: Pulmonary effort is normal.      Breath sounds: Normal breath sounds.   Abdominal:      Palpations: Abdomen is soft.   Genitourinary:     Comments: deferred  Musculoskeletal:         General: Normal range of motion.      Cervical back:  "Normal range of motion and neck supple.   Skin:     General: Skin is warm and dry.   Neurological:      General: No focal deficit present.      Mental Status: He is alert and oriented to person, place, and time.   Psychiatric:         Mood and Affect: Mood normal.         Behavior: Behavior normal.         Thought Content: Thought content normal.         Judgment: Judgment normal.            Result Review    Result Review:  I have personally reviewed the results from the time of this admission to 6/6/2022 09:59 EDT and agree with these findings:  [x]  Laboratory  [x]  Microbiology  [x]  Radiology  [x]  EKG/Telemetry   [x]  Cardiology/Vascular   []  Pathology  [x]  Old records  []  Other:  Most notable findings include: EKG sinus rhythm old infarct, initial troponin on admission 0.415 then 0.326 and 0.116.  Initial D-dimer 0.70 CT scan per radiology 5/30/1931:    \"IMPRESSION:  1.A descending aortic aneurysm up to 4.9 cm.  2.No pulmonary embolic disease.  3.Fatty liver.  4.Bilateral basilar atelectasis.  \"    Initial creatinine on admission 1.9 with a GFR of 38.2 now improved to creatinine 0.86 with a EGFR of 94.9,    Cath lab reports dated 6/222:    \"Left Main %:       0  Proximal LAD %: 70%  Mid/Distal LAD %:  0.  Diagonal branch has an ostial 70%  LCX %: Subtotal occlusion with collaterals from the LAD  Ramus:    RCA %: 100% with collaterals from the left to the right  Lima %:    SVG(s) %:   \"      \"Impression and Recommendation: Severe three-vessel coronary disease  Mild LV dysfunction  We will review films with surgeons for possible coronary bypass\"        Wounds (last 24 hours)     LDA Wound     Row Name 06/06/22 0348 06/06/22 0010 06/05/22 2000       Wound 06/01/22 0020 Right posterior elbow Skin Tear    Wound - Properties Group Placement Date: 06/01/22  - Placement Time: 0020  -AH Present on Hospital Admission: Y  -AH Side: Right  -AH Orientation: posterior  -AH Location: elbow  -AH Primary Wound Type: Skin " tear  -AH Additional Comments: mepilex placed  -AH    Closure WHIT  -DH WHIT  -DH WHIT  -DH    Base dressing in place, unable to visualize  -DH dressing in place, unable to visualize  -DH dressing in place, unable to visualize  -DH    Retired Wound - Properties Group Placement Date: 06/01/22  - Placement Time: 0020  -AH Present on Hospital Admission: Y  -AH Side: Right  -AH Orientation: posterior  -AH Location: elbow  -AH Primary Wound Type: Skin tear  -AH Additional Comments: mepilex placed  -AH    Retired Wound - Properties Group Date first assessed: 06/01/22  - Time first assessed: 0020  -AH Present on Hospital Admission: Y  -AH Side: Right  -AH Location: elbow  -AH Primary Wound Type: Skin tear  -AH Additional Comments: mepilex placed  -AH    Row Name 06/05/22 1600 06/05/22 1203          Wound 06/01/22 0020 Right posterior elbow Skin Tear    Wound - Properties Group Placement Date: 06/01/22  - Placement Time: 0020  -AH Present on Hospital Admission: Y  -AH Side: Right  -AH Orientation: posterior  -AH Location: elbow  -AH Primary Wound Type: Skin tear  -AH Additional Comments: mepilex placed  -AH     Closure WHIT  -SM WHIT  -SM     Base dressing in place, unable to visualize  -SM dressing in place, unable to visualize  -SM     Retired Wound - Properties Group Placement Date: 06/01/22  - Placement Time: 0020  -AH Present on Hospital Admission: Y  -AH Side: Right  -AH Orientation: posterior  -AH Location: elbow  -AH Primary Wound Type: Skin tear  -AH Additional Comments: mepilex placed  -AH     Retired Wound - Properties Group Date first assessed: 06/01/22  - Time first assessed: 0020  -AH Present on Hospital Admission: Y  -AH Side: Right  -AH Location: elbow  -AH Primary Wound Type: Skin tear  -AH Additional Comments: mepilex placed  -AH           User Key  (r) = Recorded By, (t) = Taken By, (c) = Cosigned By    Initials Name Provider Type    Maral Salas, RN Registered Nurse    Deysi Leger  RN Registered Nurse    Lizette Mitchell RN Registered Nurse                  Assessment & Plan      Brief Patient Summary:  Chi Quinteros Sr. is a 67 y.o. male who initially presented to the emergency department on 5/31/2022 with complaints of syncope and loss of consciousness.  He was noted to be hypoxic in the 80s by EMS and reported at that time that he drinks about 5-6 beers daily.  His troponins were elevated at 0.415 then 0.326 and then 0.116.  He underwent a stress test which showed severe ischemia to lateral inferior wall and subsequently underwent a cardiac catheterization today 6/2/2022 which showed severe three-vessel coronary artery disease mild left ventricular dysfunction.  He will be admitted for cardiothoracic surgery evaluation for coronary artery bypass grafting.  Initially upon admission he had a creatinine of 1.9 and a GFR of 38.2 and was seen by nephrology for clearance for the cardiac catheterization.  Creatinine has now improved to 0.86 with a GFR of 94.9.  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  CIWA precautions have been put in place, and he will be further evaluated by cardiac surgery.    amLODIPine, 5 mg, Oral, Daily  arformoterol, 15 mcg, Nebulization, BID - RT  aspirin, 81 mg, Oral, Daily  atorvastatin, 40 mg, Oral, Daily  budesonide, 0.5 mg, Nebulization, BID - RT  carvedilol, 6.25 mg, Oral, BID With Meals  enoxaparin, 40 mg, Subcutaneous, Q12H  gabapentin, 600 mg, Oral, BID  guaiFENesin, 1,200 mg, Oral, Q12H  insulin lispro, 0-7 Units, Subcutaneous, TID AC  ipratropium-albuterol, 3 mL, Nebulization, Q4H - RT  methylPREDNISolone sodium succinate, 40 mg, Intravenous, Q8H  multivitamin, 1 tablet, Oral, Daily  sodium chloride, 10 mL, Intravenous, Q12H  tamsulosin, 0.4 mg, Oral, Daily  thiamine, 100 mg, Oral, BID       Pharmacy to dose Trelegy,          Active  Hospital Problems:  Active Hospital Problems    Diagnosis    • Syncope, unspecified syncope type    • Acute renal insufficiency    • Alcohol dependence (HCC)    • Essential hypertension    • Other emphysema (HCC)    • Coronary artery disease    • COPD exacerbation (HCC)    • Obesity (BMI 30-39.9)    • Abnormal nuclear stress test      Added automatically from request for surgery [id number]       Plan:     Syncopal episode  Abnormal stress test and heart cath showing three-vessel disease awaiting CT surgery recommendation  Dr. Giraldo following  On statin and Lovenox full dose and lisinopril    Systolic congestive heart failure EF 40%  On lisinopril  Add Coreg    Acute kidney injury  Improved  Seen and followed by nephrologist      Alcohol dependence, CIWA precautions in place  consulted  Supplemental thiamine    Essential hypertension,  On Norvasc  On lisinopril  Coreg      Alcohol dependence  Alcohol withdrawal syndrome  Started on CIWA protocol   Add thiamine  Add multivitamin  Deferred to psychiatrist      ?  Fasting hyperglycemia, 159  A1c 5.5  TSH 0.47  Defer to endocrinologist    COPD exacerbation  Emphysema, no home oxygen now stable on 2 L via nasal cannula post catheterization, restarted home Trelegy pharmacy to dose   Taper steroids  Now with new exacerbation 6/5/2022  Solu-Medrol 125x1  Pulmonary consult appreciated given need for bypass surgery  Defer to pulmonologist    Obesity BMI 34 lifestyle management education     BPh, resume home Flomax       DVT prophylaxis:  Medical and mechanical DVT prophylaxis orders are present.    CODE STATUS:    Code Status (Patient has no pulse and is not breathing): CPR (Attempt to Resuscitate)  Medical Interventions (Patient has pulse or is breathing): Full Support      Disposition:  I expect patient to be discharged pending cardiovascular surgery consult and outcome of evaluation     This patient has been examined wearing appropriate Personal Protective  "Equipment  22      Electronically signed by Harris Boateng MD, 22, 09:59 EDT.  [facility] [place] [facility]             Electronically signed by Harris Boateng MD at 22 1000     Dre Cooper MD at 22 0928          NEPHROLOGY PROGRESS NOTE------[facility]    [facility]/[place]  [phone]  Amara Cooper MD      Patient Care Team:  Deysi Mason APRN as PCP - General (Nurse Practitioner)  Eliseo Casarez MD as Consulting Physician (Nephrology)      Provider:  Amara Cooper MD  Patient Name: Chi Quinteros Sr.  :  1955    SUBJECTIVE:    F/U ARF/FELIBERTO/CRF/CKD    Patient without any complaints. No SOB, CP, dysuria    Medication:  amLODIPine, 5 mg, Oral, Daily  arformoterol, 15 mcg, Nebulization, BID - RT  aspirin, 81 mg, Oral, Daily  atorvastatin, 40 mg, Oral, Daily  budesonide, 0.5 mg, Nebulization, BID - RT  carvedilol, 6.25 mg, Oral, BID With Meals  enoxaparin, 40 mg, Subcutaneous, Q12H  gabapentin, 600 mg, Oral, BID  guaiFENesin, 1,200 mg, Oral, Q12H  insulin lispro, 0-7 Units, Subcutaneous, TID AC  ipratropium-albuterol, 3 mL, Nebulization, Q4H - RT  methylPREDNISolone sodium succinate, 40 mg, Intravenous, Q8H  multivitamin, 1 tablet, Oral, Daily  sodium chloride, 10 mL, Intravenous, Q12H  tamsulosin, 0.4 mg, Oral, Daily  thiamine, 100 mg, Oral, BID      Pharmacy to dose Trelegy,         OBJECTIVE    Vital Sign Min/Max for last 24 hours  Temp  Min: 97.5 °F (36.4 °C)  Max: 99.4 °F (37.4 °C)   BP  Min: 106/59  Max: 129/65   Pulse  Min: 56  Max: 81   Resp  Min: 16  Max: 22   SpO2  Min: 92 %  Max: 99 %   No data recorded   Weight  Min: 110 kg (242 lb 1 oz)  Max: 110 kg (242 lb 1 oz)     Flowsheet Rows    Flowsheet Row First Filed Value   Admission Height 177.8 cm (70\") Documented at 2022   Admission Weight 104 kg (230 lb) Documented at 2022          No intake/output " data recorded.  I/O last 3 completed shifts:  In: 1440 [P.O.:1440]  Out: 3630 [Urine:3630]    Physical Exam:  General Appearance: alert, appears stated age and cooperative  Head: normocephalic, without obvious abnormality and atraumatic  Eyes: conjunctivae and sclerae normal and no icterus  Neck: supple and no JVD  Lungs: clear to auscultation and respirations regular  Heart: regular rhythm & normal rate and normal S1, S2 _+ANTONY  Chest: Wall no abnormalities observed  Abdomen: normal bowel sounds and soft non-tender  Extremities: moves extremities well, no edema, no cyanosis and no redness  Skin: no bleeding, bruising or rash, turgor normal, color normal and no lesions noted  Neurologic: Alert, and oriented. No focal deficits    Labs:    WBC WBC   Date Value Ref Range Status   06/05/2022 6.60 3.40 - 10.80 10*3/mm3 Final   06/03/2022 6.00 3.40 - 10.80 10*3/mm3 Final      HGB Hemoglobin   Date Value Ref Range Status   06/05/2022 12.5 (L) 13.0 - 17.7 g/dL Final   06/03/2022 13.5 13.0 - 17.7 g/dL Final      HCT Hematocrit   Date Value Ref Range Status   06/05/2022 38.0 37.5 - 51.0 % Final   06/03/2022 41.2 37.5 - 51.0 % Final      Platlets No results found for: LABPLAT   MCV MCV   Date Value Ref Range Status   06/05/2022 97.9 (H) 79.0 - 97.0 fL Final   06/03/2022 99.2 (H) 79.0 - 97.0 fL Final          Sodium Sodium   Date Value Ref Range Status   06/05/2022 134 (L) 136 - 145 mmol/L Final   06/04/2022 136 136 - 145 mmol/L Final   06/03/2022 139 136 - 145 mmol/L Final      Potassium Potassium   Date Value Ref Range Status   06/05/2022 3.9 3.5 - 5.2 mmol/L Final   06/04/2022 4.0 3.5 - 5.2 mmol/L Final   06/03/2022 4.1 3.5 - 5.2 mmol/L Final      Chloride Chloride   Date Value Ref Range Status   06/05/2022 97 (L) 98 - 107 mmol/L Final   06/04/2022 99 98 - 107 mmol/L Final   06/03/2022 103 98 - 107 mmol/L Final      CO2 CO2   Date Value Ref Range Status   06/05/2022 27.0 22.0 - 29.0 mmol/L Final   06/04/2022 27.0 22.0 - 29.0  mmol/L Final   06/03/2022 24.0 22.0 - 29.0 mmol/L Final      BUN BUN   Date Value Ref Range Status   06/05/2022 16 8 - 23 mg/dL Final   06/04/2022 18 8 - 23 mg/dL Final   06/03/2022 15 8 - 23 mg/dL Final      Creatinine Creatinine   Date Value Ref Range Status   06/05/2022 0.98 0.76 - 1.27 mg/dL Final   06/04/2022 1.02 0.76 - 1.27 mg/dL Final   06/03/2022 1.07 0.76 - 1.27 mg/dL Final      Calcium Calcium   Date Value Ref Range Status   06/05/2022 8.6 8.6 - 10.5 mg/dL Final   06/04/2022 8.4 (L) 8.6 - 10.5 mg/dL Final   06/03/2022 8.5 (L) 8.6 - 10.5 mg/dL Final      PO4 No components found for: PO4   Albumin No results found for: ALBUMIN   Magnesium No results found for: MG   Uric Acid No components found for: URIC ACID     Imaging Results (Last 72 Hours)     Procedure Component Value Units Date/Time    XR Chest 1 View [[id number]] Collected: 06/05/22 0958     Updated: 06/05/22 1002    Narrative:      EXAM: XR CHEST 1 VW-     DATE OF EXAM: 6/5/2022 8:58 AM     INDICATION: copd exac  cough; R55-Syncope and collapse; F10.920-Alcohol  use, unspecified with intoxication, uncomplicated; J96.01-Acute  respiratory failure with hypoxia; I95.1-Orthostatic hypotension;  R77.8-Other specified abnormalities of plasma proteins; N17.9-Acute  kidney failure, unspecified; J44.1-Chronic obstructive pulmonary disease  with (acute) exacerbation; R94.39-Abnormal result of other cardiovasc.       COMPARISONS: 5/31/2022      FINDINGS:     Emphysema is evident. No pneumothorax or pleural effusion. Mild  scattered atelectasis. No consolidation. Mediastinum appears unchanged,  no evidence of acute process.       Impression:         Emphysema and mild scattered atelectasis. No evidence of acute  cardiopulmonary process otherwise.     Electronically Signed By-Chad Car On:6/5/2022 10:00 AM  This report was finalized on [id number] by  Chad Long, .          Results for orders placed during the hospital encounter of 05/31/22    XR Chest 1  View    Narrative  EXAM: XR CHEST 1 VW-    DATE OF EXAM: 6/5/2022 8:58 AM    INDICATION: copd exac  cough; R55-Syncope and collapse; F10.920-Alcohol  use, unspecified with intoxication, uncomplicated; J96.01-Acute  respiratory failure with hypoxia; I95.1-Orthostatic hypotension;  R77.8-Other specified abnormalities of plasma proteins; N17.9-Acute  kidney failure, unspecified; J44.1-Chronic obstructive pulmonary disease  with (acute) exacerbation; R94.39-Abnormal result of other cardiovasc.    COMPARISONS: 5/31/2022    FINDINGS:    Emphysema is evident. No pneumothorax or pleural effusion. Mild  scattered atelectasis. No consolidation. Mediastinum appears unchanged,  no evidence of acute process.    Impression  Emphysema and mild scattered atelectasis. No evidence of acute  cardiopulmonary process otherwise.    Electronically Signed By-Chad Car On:6/5/2022 10:00 AM  This report was finalized on [id number] by  Chad Car, .      XR Chest 1 View    Narrative  Examination: XR CHEST 1 VW-    Date of Exam: 5/31/2022 9:03 PM    Indication: Chest pain protocol.    Comparison: None available.    Technique: Single radiographic view of the chest was obtained.    Findings:  There is no pneumothorax, pleural effusion or focal airspace  consolidation. Cardiomediastinal silhouette is unremarkable. Pulmonary  vasculature appears within normal limits. Regional bones appear grossly  intact.    Impression  No acute cardiopulmonary abnormality.    Electronically Signed By-Johann Dodge MD On:5/31/2022 9:29 PM  This report was finalized on [id number] by  Johann Dodge MD.      Results for orders placed during the hospital encounter of 05/31/22    Duplex Vein Mapping Lower Extremity - Bilateral CAR    Interpretation Summary  The greater saphenous veins are of satisfactory quality from the groin to the mid distal thigh bilaterally.  Distal to this the veins are small and inadequate.        ASSESSMENT / PLAN      COPD  exacerbation (HCC)    Obesity (BMI 30-39.9)    Abnormal nuclear stress test    Acute renal insufficiency    Alcohol dependence (HCC)    Essential hypertension    Other emphysema (HCC)    Coronary artery disease    Syncope, unspecified syncope type    1. ARF/FELIBERTO------Nonoliguric. Resolved with volume repletion    2. CAD------No angina or gross overload    3. BENIGN ESSENTIAL HTN------BP okay. No ACE-I/ARB/DRI/diuretic for now    4. BPH-------On Flomax    5. S/P SYNCOPE    6. ETOH ABUSE    7. HYPERLIPIDEMIA-------On Statin    8. DVT PROPHYLAXIS-------On Lovenox      Amara Cooper MD  [facility]  [phone]  06/06/22  09:28 EDT      Electronically signed by Dre Cooper MD at 06/06/22 1111     Shawna-Christal Purvis APRN at 06/06/22 0819          CC:  Syncopal episodes, witnessed    F/U:  CAD/asc aortic aneurysm--[place]  EF 40% (cath)    Subjective:  Reports he feels better    Withdrawal symptoms this weekend, improved      PREOP studies:  Carotid duplex:  16-49% bilat  Vein regina:  Adequate in bilat thighs  A1c:  5.5   Plt agg:  pending  MRSA screen:  pending  COVID-19 screen:  Neg 5/31  UA:  Neg for UTI  PFTs:  FEV1/FVC 73, FEV1 45, DLCO not recorded  Echo:  55-60%, RV mild dilatation, aortic root dilatation, mild MR, mild to mod TR      Intake/Output Summary (Last 24 hours) at 6/6/2022 0819  Last data filed at 6/6/2022 0111  Gross per 24 hour   Intake 960 ml   Output 1150 ml   Net -190 ml     Temp:  [97.5 °F (36.4 °C)-99.4 °F (37.4 °C)] 97.7 °F (36.5 °C)  Heart Rate:  [56-81] 62  Resp:  [16-22] 18  BP: (106-129)/(49-68) 109/60      Results from last 7 days   Lab Units 06/05/22  0540 06/03/22  1014 06/02/22  0500   WBC 10*3/mm3 6.60 6.00 6.80   HEMOGLOBIN g/dL 12.5* 13.5 12.9*   HEMATOCRIT % 38.0 41.2 39.3   PLATELETS 10*3/mm3 155 180 161   INR   --   --  1.00     Results from last 7 days   Lab Units 06/05/22  0539 06/03/22  1014 06/02/22  0500    CREATININE mg/dL 0.98   < > 0.86   POTASSIUM mmol/L 3.9   < > 4.5   SODIUM mmol/L 134*   < > 138   MAGNESIUM mg/dL  --   --  2.1    < > = values in this interval not displayed.       Physical Exam:  Neuro intact, nad, resting in bed  Tele:  SR 60s  Diminished bases, 99% 2L  Benign abd, no BM  No edema    Assessment/Plan:  Active Problems:    COPD exacerbation (HCC)    Obesity (BMI 30-39.9)    Abnormal nuclear stress test    Acute renal insufficiency    Alcohol dependence (HCC)    Essential hypertension    Other emphysema (HCC)    Coronary artery disease    Syncope, unspecified syncope type    - MV CAD, EF 40% (cath)--surgical workup in progress  - NSTEMI presentation  - Ascending aortic aneurysm,  4.9-5.0 cm  - Admit with syncopal event x2--orthostatic on admit  - Severe COPD--pulm following, on steroids  - HTN--stable  - HLD--statin  - Lumbar herniation--back surgery pending  - Early prostate cancer--has follow up as outpatient, probable radiation treatment per patient  - FELIBERTO--resolving, Dr. Casarez following  - ETOH use--reports 3 beers/day and bourbon--withdrawal this weekend, improved    Tentative plans for CABG/asc ao repair with Dr. Hughes on Friday if his lungs are optimized.  Pt is aware and agreeable.  PT/OT eval before surgery.  He is currently on Solu-medrol 40 mg TID for 3 days.  Will transfer to [facility] when bed opens.    ERICA Decker  6/6/2022  08:19 EDT    Electronically signed by Christal Vora APRN at 06/06/22 1439     Lito, ERICA Winslow at 06/06/22 0656          Daily Progress Note        COPD exacerbation (HCC)    Obesity (BMI 30-39.9)    Abnormal nuclear stress test    Acute renal insufficiency    Alcohol dependence (HCC)    Essential hypertension    Other emphysema (HCC)    Coronary artery disease    Syncope, unspecified syncope type      Assessment    COPD with acute exacerbation  PFT 6/3/2022 showed combined severe obstructive and restrictive  respiratory defect: FEV1 45% without significant response to bronchodilators, SVC 58%  -Unfortunately total lung capacity and DLCO were not performed    Non-STEMI  Three-vessel coronary artery disease  Ascending aortic aneurysm 4.9205 cm  Hypertension  Syncope    Alcohol abuse  Dyslipidemia  Back pain with a lumbar herniation  Early prostate cancer  FELIBERTO: Resolving    History of tobacco smoking: Quit June 2021     Recommendations:    Oxygen supplement and titration: Requiring 2 L per NC     from pulmonary standpoint patient is at high risk for pulmonary complications including pneumonia, atelectasis and or prolonged ventilation but there is no absolute contraindication for the surgery, but he needs his lung function to be optimized: I will start him on steroids and once his wheezing stops then his steroids could be stopped and then the patient could go for the CABG    patient will need perioperative bronchodilators and aggressive pulmonary toilet/intensive spirometry/flutter valve  Inhaled corticosteroids/Bronchodilators  Statin  Blood pressure control  DVT prophylaxis Lovenox  Mucinex twice daily  Thiamine                LOS: 1 day     Subjective         Objective     Vital signs for last 24 hours:  Vitals:    06/05/22 1934 06/05/22 2154 06/06/22 0111 06/06/22 0630   BP:  120/68 108/49 109/60   BP Location:  Left arm Left arm Left arm   Patient Position:  Sitting Lying Lying   Pulse: 70 66 64 67   Resp: 20 18 16 18   Temp:  98.5 °F (36.9 °C) 97.5 °F (36.4 °C) 97.7 °F (36.5 °C)   TempSrc:  Oral Oral Oral   SpO2: 96% 99% 94% 96%   Weight:       Height:           Intake/Output last 3 shifts:  I/O last 3 completed shifts:  In: 1320 [P.O.:1320]  Out: 3730 [Urine:3730]  Intake/Output this shift:  I/O this shift:  In: -   Out: 500 [Urine:500]      Radiology  Imaging Results (Last 24 Hours)     Procedure Component Value Units Date/Time    XR Chest 1 View [[id number]] Collected: 06/05/22 0958     Updated: 06/05/22 1002     Narrative:      EXAM: XR CHEST 1 VW-     DATE OF EXAM: 6/5/2022 8:58 AM     INDICATION: copd exac  cough; R55-Syncope and collapse; F10.920-Alcohol  use, unspecified with intoxication, uncomplicated; J96.01-Acute  respiratory failure with hypoxia; I95.1-Orthostatic hypotension;  R77.8-Other specified abnormalities of plasma proteins; N17.9-Acute  kidney failure, unspecified; J44.1-Chronic obstructive pulmonary disease  with (acute) exacerbation; R94.39-Abnormal result of other cardiovasc.       COMPARISONS: 5/31/2022      FINDINGS:     Emphysema is evident. No pneumothorax or pleural effusion. Mild  scattered atelectasis. No consolidation. Mediastinum appears unchanged,  no evidence of acute process.       Impression:         Emphysema and mild scattered atelectasis. No evidence of acute  cardiopulmonary process otherwise.     Electronically Signed By-Chad Car On:6/5/2022 10:00 AM  This report was finalized on [id number] by  Chad Car, .          Labs:  Results from last 7 days   Lab Units 06/05/22  0540   WBC 10*3/mm3 6.60   HEMOGLOBIN g/dL 12.5*   HEMATOCRIT % 38.0   PLATELETS 10*3/mm3 155     Results from last 7 days   Lab Units 06/05/22  0539 06/03/22  1014 06/02/22  0500   SODIUM mmol/L 134*   < > 138   POTASSIUM mmol/L 3.9   < > 4.5   CHLORIDE mmol/L 97*   < > 103   CO2 mmol/L 27.0   < > 23.0   BUN mg/dL 16   < > 13   CREATININE mg/dL 0.98   < > 0.86   CALCIUM mg/dL 8.6   < > 8.3*   BILIRUBIN mg/dL  --   --  0.2   ALK PHOS U/L  --   --  55   ALT (SGPT) U/L  --   --  26   AST (SGOT) U/L  --   --  22   GLUCOSE mg/dL 105*   < > 159*    < > = values in this interval not displayed.         Results from last 7 days   Lab Units 06/02/22  0500 05/31/22  2053   ALBUMIN g/dL 3.50 3.60     Results from last 7 days   Lab Units 06/01/22  1024 05/31/22  2227 05/31/22 2053   TROPONIN T ng/mL 0.116* 0.326* 0.415*         Results from last 7 days   Lab Units 06/02/22  0500   MAGNESIUM mg/dL 2.1     Results from last  7 days   Lab Units 06/02/22  0500   INR  1.00     Results from last 7 days   Lab Units 06/03/22  1014   TSH uIU/mL 0.476           Meds:   SCHEDULE  amLODIPine, 5 mg, Oral, Daily  arformoterol, 15 mcg, Nebulization, BID - RT  aspirin, 81 mg, Oral, Daily  atorvastatin, 40 mg, Oral, Daily  budesonide, 0.5 mg, Nebulization, BID - RT  carvedilol, 6.25 mg, Oral, BID With Meals  enoxaparin, 40 mg, Subcutaneous, Q12H  gabapentin, 600 mg, Oral, BID  guaiFENesin, 1,200 mg, Oral, Q12H  insulin lispro, 0-7 Units, Subcutaneous, TID AC  ipratropium-albuterol, 3 mL, Nebulization, Q4H - RT  methylPREDNISolone sodium succinate, 40 mg, Intravenous, Q8H  multivitamin, 1 tablet, Oral, Daily  sodium chloride, 10 mL, Intravenous, Q12H  tamsulosin, 0.4 mg, Oral, Daily  thiamine, 100 mg, Oral, BID      Infusions  Pharmacy to Dose enoxaparin (LOVENOX),   Pharmacy to dose Trelegy,       PRNs  •  acetaminophen **OR** acetaminophen **OR** acetaminophen  •  aluminum-magnesium hydroxide-simethicone  •  atropine  •  benzonatate  •  dextrose  •  dextrose  •  glucagon (human recombinant)  •  hydrALAZINE  •  ipratropium-albuterol  •  LORazepam **OR** LORazepam **OR** LORazepam **OR** LORazepam **OR** LORazepam **OR** LORazepam  •  LORazepam **OR** LORazepam **OR** LORazepam **OR** LORazepam **OR** LORazepam **OR** LORazepam  •  magnesium sulfate **OR** magnesium sulfate **OR** magnesium sulfate  •  melatonin  •  nitroglycerin  •  ondansetron **OR** ondansetron  •  Pharmacy to Dose enoxaparin (LOVENOX)  •  Pharmacy to dose Trelegy  •  potassium chloride **OR** potassium chloride **OR** potassium chloride  •  sodium chloride  •  sodium chloride  •  sodium chloride    Physical Exam:  Physical Exam  Vitals reviewed.   Constitutional:       Appearance: He is obese.   Pulmonary:      Breath sounds: Decreased breath sounds and wheezing present.   Skin:     General: Skin is warm and dry.   Neurological:      Mental Status: He is alert.         ROS  Review  of Systems   Respiratory: Positive for shortness of breath and wheezing.        I have reviewed the patient's new clinical results.    Electronically signed by ERICA Jarrell.    Electronically signed by Nilda Morse APRN at 22 08          Consult Notes (last 24 hours)      Jaziel Rendon MD at 22 1704                Inpatient Endocrine Consult  Consultation requested by cardiothoracic surgery team for hyperglycemia  Patient Care Team:  Deysi Mason APRN as PCP - General (Nurse Practitioner)  Eliseo Casarez MD as Consulting Physician (Nephrology)    Chief Complaint: Hyperglycemia    HPI: This is a 67-year-old male with history of hypertension and COPD and hyperlipidemia and prostate cancer came in with elevated troponin, came in with syncopal episode, so far was found to have non-ST MI and is now scheduled for CABG surgery later next week.  He apparently did receive some steroid which caused hyperglycemia.  There is no history of type 2 diabetes.  His sugars are now improving and his A1c was 5.5%.  He is eating well.  No complaints at this time.    Past Medical History:   Diagnosis Date   • Back pain    • Emphysema lung (HCC)    • Hypertension        Social History     Socioeconomic History   • Marital status:    Tobacco Use   • Smoking status: Former Smoker     Quit date: 2021     Years since quittin.0   • Smokeless tobacco: Never Used   Vaping Use   • Vaping Use: Never used   Substance and Sexual Activity   • Alcohol use: Yes     Comment: 3-4 beers daily   • Drug use: Never   • Sexual activity: Defer       History reviewed. No pertinent family history.    No Known Allergies    ROS:   Constitutional:  Denies fatigue, tiredness.    Eyes:  Denies change in visual acuity   HENT:  Denies nasal congestion or sore throat   Respiratory: Denies cough, shortness of breath.   Cardiovascular:  Denies chest pain, edema   GI:  Denies abdominal pain, nausea, vomiting.   :   Denies polyuria and polydipsia  Musculoskeletal:  Denies back pain or joint pain   Integument:  Denies dry skin, rash   Neurologic:  Denies headache, focal weakness or sensory changes   Endocrine:  Denies polyuria or polydipsia   Psychiatric:  Denies depression or anxiety      Vitals:    06/05/22 1607   BP:    Pulse: 70   Resp: 20   Temp:    SpO2:       Body mass index is 35.21 kg/m².     Physical Exam:  GEN: NAD, conversant  EYES: EOMI, PERRL, no conjunctival erythema  NECK: no thyromegaly, full ROM   CV: RRR, no murmurs/rubs/gallops, no peripheral edema  LUNG: CTAB, no wheezes/rales/rhonchi  SKIN: no rashes, no acanthosis  MSK: no deformities, full ROM of all extremities  NEURO: no tremors, DTR normal  PSYCH: AOX3, appropriate mood, affect normal      Results Review:     I reviewed the patient's new clinical results.    Lab Results   Component Value Date    GLUCOSE 105 (H) 06/05/2022    BUN 16 06/05/2022    CREATININE 0.98 06/05/2022    BCR 16.3 06/05/2022    K 3.9 06/05/2022    CO2 27.0 06/05/2022    CALCIUM 8.6 06/05/2022    ALBUMIN 3.50 06/02/2022    AST 22 06/02/2022    ALT 26 06/02/2022       Lab Results   Component Value Date    HGBA1C 5.5 06/03/2022     Lab Results   Component Value Date    CREATININE 0.98 06/05/2022           Medication Review: Reviewed.       Current Facility-Administered Medications:   •  acetaminophen (TYLENOL) tablet 650 mg, 650 mg, Oral, Q4H PRN **OR** acetaminophen (TYLENOL) 160 MG/5ML solution 650 mg, 650 mg, Oral, Q4H PRN **OR** acetaminophen (TYLENOL) suppository 650 mg, 650 mg, Rectal, Q4H PRN, Frank Giraldo MD  •  aluminum-magnesium hydroxide-simethicone (MAALOX MAX) 400-400-40 MG/5ML suspension 15 mL, 15 mL, Oral, Q6H PRN, Frank Giraldo MD  •  amLODIPine (NORVASC) tablet 5 mg, 5 mg, Oral, Daily, Frank Giraldo MD, 5 mg at 06/05/22 0844  •  arformoterol (BROVANA) nebulizer solution 15 mcg, 15 mcg, Nebulization, BID - RT, Ca Santo MD  •  aspirin chewable tablet 81 mg, 81  mg, Oral, Daily, Benson Hughes MD, 81 mg at 06/05/22 1143  •  atorvastatin (LIPITOR) tablet 40 mg, 40 mg, Oral, Daily, Harris Boateng MD, 40 mg at 06/05/22 0843  •  atropine sulfate injection 0.5 mg, 0.5 mg, Intravenous, Q5 Min PRN, Frank Giraldo MD  •  benzonatate (TESSALON) capsule 200 mg, 200 mg, Oral, TID PRN, Frank Giraldo MD  •  budesonide (PULMICORT) nebulizer solution 0.5 mg, 0.5 mg, Nebulization, BID - RT, Ca Santo MD  •  carvedilol (COREG) tablet 6.25 mg, 6.25 mg, Oral, BID With Meals, Kilo, Harris Mclaughlin MD, 6.25 mg at 06/05/22 1649  •  Enoxaparin Sodium (LOVENOX) syringe 40 mg, 40 mg, Subcutaneous, Q12H, Benson Hughes MD  •  gabapentin (NEURONTIN) tablet 600 mg, 600 mg, Oral, BID, Frank Giraldo MD, 600 mg at 06/05/22 0844  •  guaiFENesin (MUCINEX) 12 hr tablet 1,200 mg, 1,200 mg, Oral, Q12H, Frank Giraldo MD, 1,200 mg at 06/05/22 1143  •  hydrALAZINE (APRESOLINE) injection 10 mg, 10 mg, Intravenous, Q6H PRN, Janet Leon APRN  •  ipratropium-albuterol (DUO-NEB) nebulizer solution 3 mL, 3 mL, Nebulization, Q4H - RT, Frank Giraldo MD, 3 mL at 06/05/22 1601  •  ipratropium-albuterol (DUO-NEB) nebulizer solution 3 mL, 3 mL, Nebulization, Q2H PRN, Frank Giraldo MD  •  LORazepam (ATIVAN) tablet 0.5 mg, 0.5 mg, Oral, Q2H PRN **OR** LORazepam (ATIVAN) injection 0.5 mg, 0.5 mg, Intravenous, Q2H PRN **OR** LORazepam (ATIVAN) tablet 1 mg, 1 mg, Oral, Q1H PRN **OR** LORazepam (ATIVAN) injection 1 mg, 1 mg, Intravenous, Q1H PRN, 1 mg at 06/04/22 0803 **OR** LORazepam (ATIVAN) injection 1 mg, 1 mg, Intravenous, Q15 Min PRN **OR** LORazepam (ATIVAN) injection 1 mg, 1 mg, Intramuscular, Q15 Min PRN, Janet Leon, APRN  •  LORazepam (ATIVAN) tablet 0.5 mg, 0.5 mg, Oral, Q2H PRN **OR** LORazepam (ATIVAN) tablet 0.5 mg, 0.5 mg, Oral, Q2H PRN, 0.5 mg at 06/03/22 2003 **OR** LORazepam (ATIVAN) tablet 1 mg, 1 mg, Oral, Q2H PRN **OR**  LORazepam (ATIVAN) tablet 0.5 mg, 0.5 mg, Oral, Q2H PRN **OR** LORazepam (ATIVAN) tablet 0.5 mg, 0.5 mg, Oral, Q2H PRN **OR** LORazepam (ATIVAN) tablet 0.5 mg, 0.5 mg, Oral, Q2H PRN, Frank Giraldo MD  •  Magnesium Sulfate 2 gram Bolus, followed by 8 gram infusion (total Mg dose 10 grams)- Mg less than or equal to 1mg/dL, 2 g, Intravenous, PRN **OR** Magnesium Sulfate 2 gram / 50mL Infusion (GIVE X 3 BAGS TO EQUAL 6GM TOTAL DOSE) - Mg 1.1 - 1.5 mg/dl, 2 g, Intravenous, PRN **OR** Magnesium Sulfate 4 gram infusion- Mg 1.6-1.9 mg/dL, 4 g, Intravenous, PRN, Frank Giraldo MD  •  melatonin tablet 5 mg, 5 mg, Oral, Nightly PRN, Frank Giraldo MD, 5 mg at 06/03/22 2317  •  methylPREDNISolone sodium succinate (SOLU-Medrol) injection 40 mg, 40 mg, Intravenous, Q8H, Ca Santo MD, 40 mg at 06/05/22 1649  •  multivitamin (THERAGRAN) tablet 1 tablet, 1 tablet, Oral, Daily, KiloHarris MD, 1 tablet at 06/05/22 0844  •  nitroglycerin (NITROSTAT) SL tablet 0.4 mg, 0.4 mg, Sublingual, Q5 Min PRN, Frank Giraldo MD  •  ondansetron (ZOFRAN) tablet 4 mg, 4 mg, Oral, Q6H PRN **OR** ondansetron (ZOFRAN) injection 4 mg, 4 mg, Intravenous, Q6H PRN, Frank Giraldo MD  •  Pharmacy to Dose enoxaparin (LOVENOX), , Does not apply, Continuous PRN, Frank Giraldo MD  •  Pharmacy to dose Trelegy, , Does not apply, Continuous PRN, Frank Giraldo MD  •  potassium chloride (K-DUR,KLOR-CON) CR tablet 40 mEq, 40 mEq, Oral, PRN **OR** potassium chloride (KLOR-CON) packet 40 mEq, 40 mEq, Oral, PRN **OR** potassium chloride 10 mEq in 100 mL IVPB, 10 mEq, Intravenous, Q1H PRNKristal Srini, MD  •  sodium chloride 0.9 % flush 10 mL, 10 mL, Intravenous, PRN, Frank Giraldo MD  •  sodium chloride 0.9 % flush 10 mL, 10 mL, Intravenous, Q12H, Frank Giraldo MD, 10 mL at 06/05/22 0845  •  sodium chloride 0.9 % flush 10 mL, 10 mL, Intravenous, PRN, Frank Giraldo MD  •  sodium chloride 0.9 % infusion 250 mL, 250 mL, Intravenous, Once  Kristal GALLEGO Srini, MD  •  tamsulosin (FLOMAX) 24 hr capsule 0.4 mg, 0.4 mg, Oral, Daily, Janet Leon APRN, 0.4 mg at 06/05/22 0844  •  thiamine (VITAMIN B-1) tablet 100 mg, 100 mg, Oral, BID, Harris Boateng MD, 100 mg at 06/05/22 0844          Assessment and plan:  Hyperglycemia: Most likely due to steroids, blood sugars are improving.  A1c normal.  Will check blood sugars before meals and at bedtime and I am putting him on sliding scale for now.  If he does have persistent hyperglycemia I might consider adding regular insulin with the steroids.  We will follow and make further recommendations as needed.    CAD: Scheduled for CABG later next week    Hypertension: Well-controlled    Hyperlipidemia: Currently on atorvastatin.    Thank you very much for the consultation.      Jaziel Rendon MD FACE.              Electronically signed by Jaziel Rendon MD at 06/05/22 9141

## 2022-06-06 NOTE — THERAPY EVALUATION
Patient Name: Chi Quinteros .  : 1955    MRN: [id number]                              Today's Date: 2022       Admit Date: 2022    Visit Dx:     ICD-10-CM ICD-9-CM   1. Syncope, unspecified syncope type  R55 780.2   2. Alcoholic intoxication without complication ([facility])  F10.920 305.00   3. Acute respiratory failure with hypoxemia ([facility])  J96.01 518.81   4. Orthostatic hypotension  I95.1 458.0   5. Elevated troponin  R77.8 790.6   6. FELIBERTO (acute kidney injury) ([facility])  N17.9 584.9   7. COPD exacerbation ([facility])  J44.1 491.21   8. Abnormal nuclear stress test  R94.39 794.39   9. Coronary artery disease involving native coronary artery of native heart, unspecified whether angina present  I25.10 414.01     Patient Active Problem List   Diagnosis   • COPD exacerbation ([facility])   • Obesity (BMI 30-39.9)   • Abnormal nuclear stress test   • Acute renal insufficiency   • Alcohol dependence ([facility])   • Essential hypertension   • Other emphysema ([facility])   • Coronary artery disease   • Syncope, unspecified syncope type     Past Medical History:   Diagnosis Date   • Back pain    • Emphysema lung ([facility])    • Hypertension      Past Surgical History:   Procedure Laterality Date   • CARDIAC CATHETERIZATION Right 2022    Procedure: Coronary angiography;  Surgeon: Frank Giraldo MD;  Location: [facility] CATH INVASIVE LOCATION;  Service: Cardiovascular;  Laterality: Right;   • CARDIAC CATHETERIZATION N/A 2022    Procedure: Left Heart Cath;  Surgeon: Frank Giraldo MD;  Location: [facility] CATH INVASIVE LOCATION;  Service: Cardiovascular;  Laterality: N/A;   • CARDIAC CATHETERIZATION N/A 2022    Procedure: Left ventriculography;  Surgeon: Frank Giraldo MD;  Location: [facility] CATH INVASIVE LOCATION;  Service: Cardiovascular;  Laterality: N/A;      General Information     Row Name 22 2580          Physical Therapy Time and Intention    Document Type evaluation  -CM     Mode of Treatment physical therapy  -CM     Row Name  06/06/22 1532          General Information    Patient Profile Reviewed yes  -CM     Prior Level of Function independent:;community mobility;gait  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  -CM     Existing Precautions/Restrictions fall;cardiac;orthostatic hypotension  -CM     Barriers to Rehab ineffective coping;medically complex;hearing deficit   reports he is legally deaf  -CM     Row Name 06/06/22 1532          Living Environment    People in Home alone  -CM     Row Name 06/06/22 1532          Home Main Entrance    Number of Stairs, Main Entrance three  -CM     Stair Railings, Main Entrance railings safe and in good condition;railings on both sides of stairs  -CM     Row Name 06/06/22 1532          Stairs Within Home, Primary    Stairs, Within Home, Primary one flight of stairs to basement laundry  -CM     Number of Stairs, Within Home, Primary other (see comments)  -CM     Row Name 06/06/22 1532          Cognition    Orientation Status (Cognition) oriented x 3;person;place;time  pt thinks he is here due to dehydration  -CM     Row Name 06/06/22 1532          Safety Issues, Functional Mobility    Impairments Affecting Function (Mobility) endurance/activity tolerance  -CM           User Key  (r) = Recorded By, (t) = Taken By, (c) = Cosigned By    Initials Name Provider Type    CM Samantha Connell, PT Physical Therapist               Mobility     Row Name 06/06/22 1535          Bed Mobility    Bed Mobility bed mobility (all) activities  -CM     All Activities, Barton (Bed Mobility) not tested  -CM     Comment, (Bed Mobility) up in chair upon PT's arrival.  -CM     Row Name 06/06/22 1535          Sit-Stand Transfer    Sit-Stand Barton (Transfers) independent  -CM     Row Name 06/06/22 1535          Gait/Stairs (Locomotion)    Barton Level (Gait) independent  -CM     Distance in Feet (Gait) 120 ft w/o assistive device; no loss of balance; mild soa.  O2 sats  dropped to 87% on room air w/ ambulation.  -CM           User Key  (r) = Recorded By, (t) = Taken By, (c) = Cosigned By    Initials Name Provider Type    Samantha Link PT Physical Therapist               Obj/Interventions     Row Name 06/06/22 1536          Range of Motion Comprehensive    General Range of Motion no range of motion deficits identified  -CM     Row Name 06/06/22 1536          Strength Comprehensive (MMT)    General Manual Muscle Testing (MMT) Assessment no strength deficits identified  -CM     Row Name 06/06/22 1536          Balance    Balance Assessment sitting static balance;standing static balance  -CM     Static Sitting Balance independent  -CM     Static Standing Balance independent  -CM     Row Name 06/06/22 1536          Sensory Assessment (Somatosensory)    Sensory Assessment (Somatosensory) sensation intact  -CM           User Key  (r) = Recorded By, (t) = Taken By, (c) = Cosigned By    Initials Name Provider Type    Samantha Link, PT Physical Therapist               Goals/Plan    No documentation.                Clinical Impression     Row Name 06/06/22 1537          Pain    Pretreatment Pain Rating 0/10 - no pain  -CM     Posttreatment Pain Rating 0/10 - no pain  -CM     Pain Intervention(s) Ambulation/increased activity;Therapeutic presence;Emotional support  -CM     Row Name 06/06/22 1537          Plan of Care Review    Plan of Care Reviewed With patient  -CM     Outcome Evaluation 68 yo male adm on 5/31/22 for acute nstemi (troponin 0.413), orthostatic hypotension, etoh withdrawal. Hx of etoh abuse. Blood etoh level 0.203 at admission. Pt is now scheduled to undergo cabg on 6/10/22. Pt reports hx of recent prostate ca dx, though this is not indicated in medical record. He also reports he is to have spine sx soon, but it has been delayed w/ his other medical problems which he currently has. At baseline, pt lives alone in a single level home w/ a basement laundry. Pt does  his own laundry, shopping, driving. He drinks 5-6 beers daily. 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 Today, pt presents w/ normal strength and ROM. He is independent for coming to stand and for amb 120 ft w/o assistive device. He has a drop in O2 sat levels w/ amb, but he recovers well w/ seated rest. Pt is currently doing well and does not need PT at this time. However, likely to need IP rehab s/p cabg.  Will sign off for now, w/ new orders to be issued s/p cabg. Will reassess then.  -     Row Name 06/06/22 1545          Therapy Assessment/Plan (PT)    Criteria for Skilled Interventions Met (PT) no;no problems identified which require skilled intervention  -     Therapy Frequency (PT) evaluation only  -CM     Row Name 06/06/22 1545          Vital Signs    Pre SpO2 (%) 95  -CM     O2 Delivery Pre Treatment room air  -CM     Intra SpO2 (%) 87  -CM     O2 Delivery Intra Treatment room air  -CM     Post SpO2 (%) 92  -CM     O2 Delivery Post Treatment room air  -CM     Recovery Time HR stable w/ activity  -CM     Row Name 06/06/22 1545          Positioning and Restraints    Pre-Treatment Position sitting in chair/recliner  -CM     Post Treatment Position chair  -CM     In Chair notified nsg;sitting;call light within reach;encouraged to call for assist;exit alarm on  -CM           User Key  (r) = Recorded By, (t) = Taken By, (c) = Cosigned By    Initials Name Provider Type    Samantha Link, PT Physical Therapist               Outcome Measures    No documentation.                              Physical Therapy Education                 Title: PT OT SLP Therapies (Done)     Topic: Physical Therapy (Done)     Point: Mobility training (Done)     Learning Progress Summary           Patient Acceptance, E,TB, VU by CM at 6/6/2022 1546                   Point: Precautions (Done)     Learning Progress Summary           Patient Acceptance, E,TB, VU by CM at 6/6/2022 1546                               User Key     Initials  Effective Dates Name Provider Type Discipline     06/16/21 -  Samantha Connell, PT Physical Therapist PT              PT Recommendation and Plan     Plan of Care Reviewed With: patient  Outcome Evaluation: 68 yo male adm on 5/31/22 for acute nstemi (troponin 0.413), orthostatic hypotension, etoh withdrawal. Hx of etoh abuse. Blood etoh level 0.203 at admission. Pt is now scheduled to undergo cabg on 6/10/22. Pt reports hx of recent prostate ca dx, though this is not indicated in medical record. He also reports he is to have spine sx soon, but it has been delayed w/ his other medical problems which he currently has. At baseline, pt lives alone in a single level home w/ a basement laundry. Pt does his own laundry, shopping, driving. He drinks 5-6 beers daily. 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 Today, pt presents w/ normal strength and ROM. He is independent for coming to stand and for amb 120 ft w/o assistive device. He has a drop in O2 sat levels w/ amb, but he recovers well w/ seated rest. Pt is currently doing well and does not need PT at this time. However, likely to need IP rehab s/p cabg.  Will sign off for now, w/ new orders to be issued s/p cabg. Will reassess then.     Time Calculation:    PT Charges     Row Name 06/06/22 1546             Time Calculation    Start Time 1448  -CM      Stop Time 1509  -CM      Time Calculation (min) 21 min  -CM      PT Received On 06/06/22  -CM              Time Calculation- PT    Total Timed Code Minutes- PT 0 minute(s)  -CM            User Key  (r) = Recorded By, (t) = Taken By, (c) = Cosigned By    Initials Name Provider Type     Samantha Connell, PT Physical Therapist              Therapy Charges for Today     Code Description Service Date Service Provider Modifiers Qty    65985221958 HC PT EVAL MOD COMPLEXITY 3 6/6/2022 Samantha Connell, PT GP 1          PT G-Codes  AM-PAC 6 Clicks Score (PT): 22    Samantha Connell PT  6/6/2022

## 2022-06-06 NOTE — PLAN OF CARE
Goal Outcome Evaluation:  Plan of Care Reviewed With: patient           Outcome Evaluation: 68 yo male adm on 5/31/22 for acute nstemi (troponin 0.413), orthostatic hypotension, etoh withdrawal. Hx of etoh abuse. Blood etoh level 0.203 at admission. Pt is now scheduled to undergo cabg on 6/10/22. Pt reports hx of recent prostate ca dx, though this is not indicated in medical record. He also reports he is to have spine sx soon, but it has been delayed w/ his other medical problems which he currently has. At baseline, pt lives alone in a single level home w/ a basement laundry. Pt does his own laundry, shopping, driving. He drinks 5-6 beers daily. 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 Today, pt presents w/ normal strength and ROM. He is independent for coming to stand and for amb 120 ft w/o assistive device. He has a drop in O2 sat levels w/ amb, but he recovers well w/ seated rest. Pt is currently doing well and does not need PT at this time. However, likely to need IP rehab s/p cabg.  Will sign off for now, w/ new orders to be issued s/p cabg. Will reassess then.

## 2022-06-06 NOTE — PROGRESS NOTES
Cardiology Progress Note      Admiting Physician:  Harris Boateng *   LOS: 1 day       Reason For Followup:  Multivessel coronary artery disease      Subjective:    Interval History:  Seen and examined.  Chart and labs reviewed.  Patient appears to be in a normal cognitive state.  He is upset that his surgery has been postponed.  He reports that he does not have a drinking problem and that he only has 2-3 drinks a night.    Review of Systems:  A complete review of systems was attempted however patient's cognitive status is questionable.  He denies chest pain or shortness of breath at this time.    Assessment & Plan    Impressions:  Syncope  Multivessel coronary artery disease  Hyperlipidemia  Hypertension  Ischemic cardiomyopathy EF 40%  Acute kidney injury-improved  Alcohol dependence  Altered mental status likely secondary to alcohol withdrawal    Recommendations:  Patient will have coronary bypass surgery on Friday  Till then continue medical therapy  Blood pressure heart rate are stable  Renal function has significantly improved since he is on IV fluids  Monitor rate and rhythm closely  Further recommendations as patient's course progresses  Alcohol withdrawal/altered mentation as per admitting service    Objective:    Medication Review:   Scheduled Meds:amLODIPine, 5 mg, Oral, Daily  arformoterol, 15 mcg, Nebulization, BID - RT  aspirin, 81 mg, Oral, Daily  atorvastatin, 40 mg, Oral, Daily  budesonide, 0.5 mg, Nebulization, BID - RT  carvedilol, 6.25 mg, Oral, BID With Meals  enoxaparin, 40 mg, Subcutaneous, Q12H  gabapentin, 600 mg, Oral, BID  guaiFENesin, 1,200 mg, Oral, Q12H  insulin lispro, 0-7 Units, Subcutaneous, TID AC  insulin regular, 2 Units, Subcutaneous, Q8H  ipratropium-albuterol, 3 mL, Nebulization, Q4H - RT  methylPREDNISolone sodium succinate, 40 mg, Intravenous, Q8H  multivitamin, 1 tablet, Oral, Daily  sodium chloride, 10 mL, Intravenous, Q12H  tamsulosin, 0.4 mg, Oral,  Daily  thiamine, 100 mg, Oral, BID      Continuous Infusions:Pharmacy Consult - Steroid Insulin Protocol,   Pharmacy to dose Trelegy,       PRN Meds:.•  acetaminophen **OR** acetaminophen **OR** acetaminophen  •  aluminum-magnesium hydroxide-simethicone  •  atropine  •  benzonatate  •  dextrose  •  dextrose  •  glucagon (human recombinant)  •  hydrALAZINE  •  ipratropium-albuterol  •  LORazepam **OR** LORazepam **OR** LORazepam **OR** LORazepam **OR** LORazepam **OR** LORazepam  •  LORazepam **OR** LORazepam **OR** LORazepam **OR** LORazepam **OR** LORazepam **OR** LORazepam  •  magnesium sulfate **OR** magnesium sulfate **OR** magnesium sulfate  •  melatonin  •  nitroglycerin  •  ondansetron **OR** ondansetron  •  Pharmacy Consult - Steroid Insulin Protocol  •  Pharmacy to dose Trelegy  •  potassium chloride **OR** potassium chloride **OR** potassium chloride  •  sodium chloride  •  sodium chloride  •  sodium chloride    Patient Active Problem List   Diagnosis   • COPD exacerbation ([facility])   • Obesity (BMI 30-39.9)   • Abnormal nuclear stress test   • Acute renal insufficiency   • Alcohol dependence ([facility])   • Essential hypertension   • Other emphysema ([facility])   • Coronary artery disease   • Syncope, unspecified syncope type         Physical Exam:    General: Alert, cooperative, no distress, appears stated age  Head:  Normocephalic, atraumatic, mucous membranes moist  Eyes:  Conjunctivae/corneas clear, EOM's intact     Neck:  Supple,  no bruit  Lungs:  Clear to auscultation bilaterally, no wheezes rhonchi rales are noted  Chest wall: No tenderness  Heart::  Regular rate and rhythm, S1 and S2 normal, 1/6 holosystolic murmur.  No rub or gallop  Abdomen: Soft, non-tender, nondistended bowel sounds active.  Obese  Extremities: No cyanosis, clubbing, or edema  Pulses: Diminished pedal pulses  Skin:  No rashes or lesions  Neuro/psych: A&O x3. CN II through XII are grossly intact with appropriate affect    Vital  Signs:  Vitals:    06/06/22 1314 06/06/22 1518 06/06/22 1521 06/06/22 1606   BP: 114/62   114/57   BP Location:       Patient Position:       Pulse: 71 80 79 79   Resp: 18 18 18 18   Temp: 97.6 °F (36.4 °C)   98 °F (36.7 °C)   TempSrc: Oral   Oral   SpO2: 91% 92% 93% 92%   Weight:       Height:         Wt Readings from Last 1 Encounters:   06/06/22 110 kg (242 lb 1 oz)       Intake/Output Summary (Last 24 hours) at 6/6/2022 1755  Last data filed at 6/6/2022 0111  Gross per 24 hour   Intake 480 ml   Output 700 ml   Net -220 ml         Results Review:     CBC    Results from last 7 days   Lab Units 06/05/22  0540 06/03/22  1014 06/02/22  0500 06/01/22  1024 05/31/22 2053   WBC 10*3/mm3 6.60 6.00 6.80 5.50 6.00   HEMOGLOBIN g/dL 12.5* 13.5 12.9* 13.4 12.1*   PLATELETS 10*3/mm3 155 180 161 166 170     Cr Clearance Estimated Creatinine Clearance: 90.8 mL/min (by C-G formula based on SCr of 0.98 mg/dL).  Coag   Results from last 7 days   Lab Units 06/02/22  0500   INR  1.00     HbA1C   Lab Results   Component Value Date    HGBA1C 5.5 06/03/2022     Blood Glucose   Glucose   Date/Time Value Ref Range Status   06/06/2022 1609 206 (H) 70 - 105 mg/dL Final     Comment:     Serial Number: [id number]:  [id number]   06/06/2022 1105 200 (H) 70 - 105 mg/dL Final     Comment:     Serial Number: [id number]:  [id number]   06/06/2022 0710 157 (H) 70 - 105 mg/dL Final     Comment:     Serial Number: [id number]:  [id number]   06/05/2022 2009 266 (H) 70 - 105 mg/dL Final     Comment:     Serial Number: [id number]:  [id number]   06/05/2022 1739 277 (H) 70 - 105 mg/dL Final     Comment:     Serial Number: [id number]:  [id number]     Infection   Results from last 7 days   Lab Units 05/31/22  2227   PROCALCITONIN ng/mL 0.12     CMP   Results from last 7 days   Lab Units 06/05/22  0539 06/04/22  1744 06/04/22  1008 06/03/22  1014 06/02/22  0500 06/01/22  1024 05/31/22  2053   SODIUM mmol/L 134*  --  136 139 138  136 134*   POTASSIUM mmol/L 3.9  --  4.0 4.1 4.5 4.8 3.7   CHLORIDE mmol/L 97*  --  99 103 103 103 97*   CO2 mmol/L 27.0  --  27.0 24.0 23.0 20.0* 22.0   BUN mg/dL 16  --  18 15 13 14 15   CREATININE mg/dL 0.98  --  1.02 1.07 0.86 1.17 1.90*   GLUCOSE mg/dL 105*  --  122* 157* 159* 159* 111*   ALBUMIN g/dL  --   --   --   --  3.50  --  3.60   BILIRUBIN mg/dL  --   --   --   --  0.2  --  0.4   ALK PHOS U/L  --   --   --   --  55  --  54   AST (SGOT) U/L  --   --   --   --  22  --  30   ALT (SGPT) U/L  --   --   --   --  26  --  30   AMMONIA umol/L  --  34  --   --   --   --   --      ABG      UA    Results from last 7 days   Lab Units 06/01/22  0029   NITRITE UA  Negative     ANGELO  No results found for: POCMETH, POCAMPHET, POCBARBITUR, POCBENZO, POCCOCAINE, POCOPIATES, POCOXYCODO, POCPHENCYC, POCPROPOXY, POCTHC, POCTRICYC  Lysis Labs   Results from last 7 days   Lab Units 06/05/22  0540 06/05/22  0539 06/04/22  1008 06/03/22  1014 06/02/22  0500 06/01/22  1024 05/31/22 2053   INR   --   --   --   --  1.00  --   --    HEMOGLOBIN g/dL 12.5*  --   --  13.5 12.9* 13.4 12.1*   PLATELETS 10*3/mm3 155  --   --  180 161 166 170   CREATININE mg/dL  --  0.98 1.02 1.07 0.86 1.17 1.90*     Radiology(recent) XR Chest 1 View    Result Date: 6/5/2022   Emphysema and mild scattered atelectasis. No evidence of acute cardiopulmonary process otherwise.  Electronically Signed By-Chad Car On:6/5/2022 10:00 AM This report was finalized on [id number] by  Chad Car, .        Results from last 7 days   Lab Units 06/01/22  1024   TROPONIN T ng/mL 0.116*       Imaging Results (Last 24 Hours)     ** No results found for the last 24 hours. **          Cardiac Studies:  Echo- Results for orders placed during the hospital encounter of 05/31/22    Adult Transthoracic Echo Complete With Contrast if Necessary Per Protocol (With Agitated Saline)    Interpretation Summary  · Left ventricular ejection fraction appears to be 56 - 60%.  · The  right ventricular cavity is mildly dilated.  · Mild dilation of the aortic root is present.  · No pericardial effusion noted    Stress Myoview-  Cath-        Frank Giraldo MD  06/06/22  17:55 EDT

## 2022-06-06 NOTE — CASE MANAGEMENT/SOCIAL WORK
Continued Stay Note  [facility]     Patient Name: Chi Quinteros Sr.  MRN: [id number]  Today's Date: 6/6/2022    Admit Date: 5/31/2022     Discharge Plan     Row Name 06/06/22 1146       Plan    Plan D/C Plan: Anticipate routine home pending CABG (6/10).    Plan Comments Barrier to D/C: CABG 6/10, IV steroids.                    Expected Discharge Date and Time     Expected Discharge Date Expected Discharge Time    Jun 14, 2022         Phone communication or documentation only - no physical contact with patient or family.    RANDALL WelchN, RN    [place] [id number]    Office: [phone]  Fax: [phone]

## 2022-06-06 NOTE — PLAN OF CARE
Problem: Adult Inpatient Plan of Care  Goal: Absence of Hospital-Acquired Illness or Injury  Intervention: Identify and Manage Fall Risk  Recent Flowsheet Documentation  Taken 6/6/2022 0200 by Maral Mobley RN  Safety Promotion/Fall Prevention: safety round/check completed  Taken 6/6/2022 0010 by Maral Mobley RN  Safety Promotion/Fall Prevention: safety round/check completed  Taken 6/5/2022 2200 by Maral Mobley RN  Safety Promotion/Fall Prevention: safety round/check completed  Taken 6/5/2022 2000 by Maral Mobley RN  Safety Promotion/Fall Prevention: nonskid shoes/slippers when out of bed  Intervention: Prevent Skin Injury  Recent Flowsheet Documentation  Taken 6/5/2022 2000 by Maral Mobley RN  Body Position: position changed independently  Intervention: Prevent and Manage VTE (Venous Thromboembolism) Risk  Recent Flowsheet Documentation  Taken 6/6/2022 0010 by Maral Mobley RN  Activity Management: activity adjusted per tolerance  Taken 6/5/2022 2000 by Maral Mobley RN  Activity Management: activity adjusted per tolerance  Intervention: Prevent Infection  Recent Flowsheet Documentation  Taken 6/5/2022 2000 by Maral Mobley RN  Infection Prevention: environmental surveillance performed  Goal: Optimal Comfort and Wellbeing  Intervention: Monitor Pain and Promote Comfort  Recent Flowsheet Documentation  Taken 6/6/2022 0010 by Maral Mobley RN  Pain Management Interventions: care clustered  Taken 6/5/2022 2000 by Maral Mobley RN  Pain Management Interventions: care clustered  Intervention: Provide Person-Centered Care  Recent Flowsheet Documentation  Taken 6/5/2022 2000 by Maral Mobley RN  Trust Relationship/Rapport: care explained   Goal Outcome Evaluation:

## 2022-06-06 NOTE — PROGRESS NOTES
[facility] Medicine Services Daily Progress Note    Patient Name: Chi Quinteros Sr.  : 1955  MRN: [id number]  Primary Care Physician:  Deysi Mason APRN  Date of admission: 2022      Subjective      Chief Complaint:He reports he is hungrey     6/3  Vitals and labs reviewed  Awaiting CT surgery recommendation regarding possible cardiac surgery    Notes reviewed      Apparently intermittently confused overnight and angry intermittently  His vitals are stable requiring 2 L of oxygen afebrile  Suspected alcohol withdrawal.  CIWA score was 12 today with the nursing staff  On alcohol withdrawal protocol  Psychiatrist has been consulted by cardiac surgery  .  Add thiamine        /  Short of breath and wheezing much more than last few days.  Her last pulmonologist for evaluation given need for bypass surgery  Continue bronchodilators  Will give additional steroid with Solu-Medrol  Repeat chest x-ray shows no acute  problem      Labs and notes reviewed      Review of Systems   Constitutional: Negative.   HENT:        [place] right ear   Eyes: Negative.    Cardiovascular: Negative.    Respiratory: Negative.    Endocrine: Negative.    Hematologic/Lymphatic: Negative.    Skin: Negative.    Musculoskeletal: Positive for back pain.   Gastrointestinal: Negative.    Genitourinary: Negative.    Neurological: Negative.    Psychiatric/Behavioral: Negative.    Allergic/Immunologic: Negative.          Objective      Vitals:   Temp:  [97.5 °F (36.4 °C)-99.4 °F (37.4 °C)] 97.8 °F (36.6 °C)  Heart Rate:  [56-81] 62  Resp:  [16-22] 18  BP: (106-129)/(49-68) 109/60  Flow (L/min):  [2-3] 2    Physical Exam  Vitals reviewed.   Constitutional:       Appearance: Normal appearance. He is obese.   HENT:      Head: Normocephalic and atraumatic.      Right Ear: External ear normal.      Left Ear: External ear normal.      Ears:      Comments: [place] right ear     Nose: Nose normal.      Mouth/Throat:       "Mouth: Mucous membranes are moist.   Eyes:      Extraocular Movements: Extraocular movements intact.   Cardiovascular:      Rate and Rhythm: Normal rate and regular rhythm.      Pulses: Normal pulses.      Heart sounds: Normal heart sounds.   Pulmonary:      Effort: Pulmonary effort is normal.      Breath sounds: Normal breath sounds.   Abdominal:      Palpations: Abdomen is soft.   Genitourinary:     Comments: deferred  Musculoskeletal:         General: Normal range of motion.      Cervical back: Normal range of motion and neck supple.   Skin:     General: Skin is warm and dry.   Neurological:      General: No focal deficit present.      Mental Status: He is alert and oriented to person, place, and time.   Psychiatric:         Mood and Affect: Mood normal.         Behavior: Behavior normal.         Thought Content: Thought content normal.         Judgment: Judgment normal.            Result Review    Result Review:  I have personally reviewed the results from the time of this admission to 6/6/2022 09:59 EDT and agree with these findings:  [x]  Laboratory  [x]  Microbiology  [x]  Radiology  [x]  EKG/Telemetry   [x]  Cardiology/Vascular   []  Pathology  [x]  Old records  []  Other:  Most notable findings include: EKG sinus rhythm old infarct, initial troponin on admission 0.415 then 0.326 and 0.116.  Initial D-dimer 0.70 CT scan per radiology 5/30/1931:    \"IMPRESSION:  1.A descending aortic aneurysm up to 4.9 cm.  2.No pulmonary embolic disease.  3.Fatty liver.  4.Bilateral basilar atelectasis.  \"    Initial creatinine on admission 1.9 with a GFR of 38.2 now improved to creatinine 0.86 with a EGFR of 94.9,    Cath lab reports dated 6/222:    \"Left Main %:       0  Proximal LAD %: 70%  Mid/Distal LAD %:  0.  Diagonal branch has an ostial 70%  LCX %: Subtotal occlusion with collaterals from the LAD  [place]:    RCA %: 100% with collaterals from the left to the right  [place] %:    SVG(s) %:   \"      \"Impression and " "Recommendation: Severe three-vessel coronary disease  Mild LV dysfunction  We will review films with surgeons for possible coronary bypass\"        Wounds (last 24 hours)     LDA Wound     Row Name 06/06/22 0348 06/06/22 0010 06/05/22 2000       Wound 06/01/22 0020 Right posterior elbow Skin Tear    Wound - Properties Group Placement Date: 06/01/22  -AH Placement Time: 0020  -AH Present on Hospital Admission: Y  -AH Side: Right  -AH Orientation: posterior  -AH Location: elbow  -AH Primary Wound Type: Skin tear  -AH Additional Comments: mepilex placed  -AH    Closure WHIT  -DH WHIT  -DH WHIT  -DH    Base dressing in place, unable to visualize  -DH dressing in place, unable to visualize  -DH dressing in place, unable to visualize  -DH    Retired Wound - Properties Group Placement Date: 06/01/22  -AH Placement Time: 0020  -AH Present on Hospital Admission: Y  -AH Side: Right  -AH Orientation: posterior  -AH Location: elbow  -AH Primary Wound Type: Skin tear  -AH Additional Comments: mepilex placed  -AH    Retired Wound - Properties Group Date first assessed: 06/01/22  - Time first assessed: 0020  -AH Present on Hospital Admission: Y  -AH Side: Right  -AH Location: elbow  -AH Primary Wound Type: Skin tear  -AH Additional Comments: mepilex placed  -AH    Row Name 06/05/22 1600 06/05/22 1203          Wound 06/01/22 0020 Right posterior elbow Skin Tear    Wound - Properties Group Placement Date: 06/01/22  -AH Placement Time: 0020  -AH Present on Hospital Admission: Y  -AH Side: Right  -AH Orientation: posterior  -AH Location: elbow  -AH Primary Wound Type: Skin tear  -AH Additional Comments: mepilex placed  -AH     Closure WHIT  -SM WHIT  -SM     Base dressing in place, unable to visualize  -SM dressing in place, unable to visualize  -SM     Retired Wound - Properties Group Placement Date: 06/01/22  -AH Placement Time: 0020  -AH Present on Hospital Admission: Y  -AH Side: Right  -AH Orientation: posterior  -AH Location: elbow  " -AH Primary Wound Type: Skin tear  -AH Additional Comments: mepilex placed  -AH     Retired Wound - Properties Group Date first assessed: 06/01/22  - Time first assessed: 0020  -AH Present on Hospital Admission: Y  -AH Side: Right  -AH Location: elbow  -AH Primary Wound Type: Skin tear  -AH Additional Comments: mepilex placed  -AH           User Key  (r) = Recorded By, (t) = Taken By, (c) = Cosigned By    Initials Name Provider Type    Maral Salas RN Registered Nurse    Deysi Leger RN Registered Nurse    Lizette Mitchell RN Registered Nurse                  Assessment & Plan      Brief Patient Summary:  Chi Quinteros Sr. is a 67 y.o. male who initially presented to the emergency department on 5/31/2022 with complaints of syncope and loss of consciousness.  He was noted to be hypoxic in the 80s by EMS and reported at that time that he drinks about 5-6 beers daily.  His troponins were elevated at 0.415 then 0.326 and then 0.116.  He underwent a stress test which showed severe ischemia to lateral inferior wall and subsequently underwent a cardiac catheterization today 6/2/2022 which showed severe three-vessel coronary artery disease mild left ventricular dysfunction.  He will be admitted for cardiothoracic surgery evaluation for coronary artery bypass grafting.  Initially upon admission he had a creatinine of 1.9 and a GFR of 38.2 and was seen by nephrology for clearance for the cardiac catheterization.  Creatinine has now improved to 0.86 with a GFR of 94.9.  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  CIWA precautions have been put in place, and he will be further evaluated by cardiac surgery.    amLODIPine, 5 mg, Oral, Daily  arformoterol, 15 mcg, Nebulization, BID - RT  aspirin, 81 mg, Oral, Daily  atorvastatin, 40 mg, Oral, Daily  budesonide, 0.5 mg, Nebulization, BID -  RT  carvedilol, 6.25 mg, Oral, BID With Meals  enoxaparin, 40 mg, Subcutaneous, Q12H  gabapentin, 600 mg, Oral, BID  guaiFENesin, 1,200 mg, Oral, Q12H  insulin lispro, 0-7 Units, Subcutaneous, TID AC  ipratropium-albuterol, 3 mL, Nebulization, Q4H - RT  methylPREDNISolone sodium succinate, 40 mg, Intravenous, Q8H  multivitamin, 1 tablet, Oral, Daily  sodium chloride, 10 mL, Intravenous, Q12H  tamsulosin, 0.4 mg, Oral, Daily  thiamine, 100 mg, Oral, BID       Pharmacy to dose Trelegy,          Active Hospital Problems:  Active Hospital Problems    Diagnosis    • Syncope, unspecified syncope type    • Acute renal insufficiency    • Alcohol dependence (HCC)    • Essential hypertension    • Other emphysema (HCC)    • Coronary artery disease    • COPD exacerbation (HCC)    • Obesity (BMI 30-39.9)    • Abnormal nuclear stress test      Added automatically from request for surgery [id number]       Plan:     Syncopal episode  Abnormal stress test and heart cath showing three-vessel disease awaiting CT surgery recommendation  Dr. Giraldo following  On statin and Lovenox full dose and lisinopril    Systolic congestive heart failure EF 40%  On lisinopril  Add Coreg    Acute kidney injury  Improved  Seen and followed by nephrologist      Alcohol dependence, [facility] precautions in place  consulted  Supplemental thiamine    Essential hypertension,  On Norvasc  On lisinopril  Coreg      Alcohol dependence  Alcohol withdrawal syndrome  Started on CIWA protocol   Add thiamine  Add multivitamin  Deferred to psychiatrist      ?  Fasting hyperglycemia, 159  A1c 5.5  TSH 0.47  Defer to endocrinologist    COPD exacerbation  Emphysema, no home oxygen now stable on 2 L via nasal cannula post catheterization, restarted home Trelegy pharmacy to dose   Taper steroids  Now with new exacerbation 6/5/2022  Solu-Medrol 125x1  Pulmonary consult appreciated given need for bypass surgery  Defer to pulmonologist    Obesity BMI 34 lifestyle  management education     BPh, resume home Flomax       DVT prophylaxis:  Medical and mechanical DVT prophylaxis orders are present.    CODE STATUS:    Code Status (Patient has no pulse and is not breathing): CPR (Attempt to Resuscitate)  Medical Interventions (Patient has pulse or is breathing): Full Support      Disposition:  I expect patient to be discharged pending cardiovascular surgery consult and outcome of evaluation     This patient has been examined wearing appropriate Personal Protective Equipment  06/06/22      Electronically signed by Harris Boateng MD, 06/06/22, 09:59 EDT.  [facility], [place] Hospitalist Team

## 2022-06-06 NOTE — PROGRESS NOTES
"NEPHROLOGY PROGRESS NOTE------[facility]    [facility]  [phone]  Amara Cooper MD      Patient Care Team:  Deysi Mason APRN as PCP - General (Nurse Practitioner)  Eliseo Casarez MD as Consulting Physician (Nephrology)      Provider:  Amara Cooper MD  Patient Name: Chi Quinteros Sr.  :  1955    SUBJECTIVE:    F/U ARF/FELIBERTO/CRF/CKD    Patient without any complaints. No SOB, CP, dysuria    Medication:  amLODIPine, 5 mg, Oral, Daily  arformoterol, 15 mcg, Nebulization, BID - RT  aspirin, 81 mg, Oral, Daily  atorvastatin, 40 mg, Oral, Daily  budesonide, 0.5 mg, Nebulization, BID - RT  carvedilol, 6.25 mg, Oral, BID With Meals  enoxaparin, 40 mg, Subcutaneous, Q12H  gabapentin, 600 mg, Oral, BID  guaiFENesin, 1,200 mg, Oral, Q12H  insulin lispro, 0-7 Units, Subcutaneous, TID AC  ipratropium-albuterol, 3 mL, Nebulization, Q4H - RT  methylPREDNISolone sodium succinate, 40 mg, Intravenous, Q8H  multivitamin, 1 tablet, Oral, Daily  sodium chloride, 10 mL, Intravenous, Q12H  tamsulosin, 0.4 mg, Oral, Daily  thiamine, 100 mg, Oral, BID      Pharmacy to dose Trelegy,         OBJECTIVE    Vital Sign Min/Max for last 24 hours  Temp  Min: 97.5 °F (36.4 °C)  Max: 99.4 °F (37.4 °C)   BP  Min: 106/59  Max: 129/65   Pulse  Min: 56  Max: 81   Resp  Min: 16  Max: 22   SpO2  Min: 92 %  Max: 99 %   No data recorded   Weight  Min: 110 kg (242 lb 1 oz)  Max: 110 kg (242 lb 1 oz)     Flowsheet Rows    Flowsheet Row First Filed Value   Admission Height 177.8 cm (70\") Documented at 2022   Admission Weight 104 kg (230 lb) Documented at 2022          No intake/output data recorded.  I/O last 3 completed shifts:  In: 1440 [P.O.:1440]  Out: 3630 [Urine:3630]    Physical Exam:  General Appearance: alert, appears stated age and cooperative  Head: normocephalic, without obvious abnormality and atraumatic  Eyes: conjunctivae and sclerae " normal and no icterus  Neck: supple and no JVD  Lungs: clear to auscultation and respirations regular  Heart: regular rhythm & normal rate and normal S1, S2 _+ANTONY  Chest: Wall no abnormalities observed  Abdomen: normal bowel sounds and soft non-tender  Extremities: moves extremities well, no edema, no cyanosis and no redness  Skin: no bleeding, bruising or rash, turgor normal, color normal and no lesions noted  Neurologic: Alert, and oriented. No focal deficits    Labs:    WBC WBC   Date Value Ref Range Status   06/05/2022 6.60 3.40 - 10.80 10*3/mm3 Final   06/03/2022 6.00 3.40 - 10.80 10*3/mm3 Final      HGB Hemoglobin   Date Value Ref Range Status   06/05/2022 12.5 (L) 13.0 - 17.7 g/dL Final   06/03/2022 13.5 13.0 - 17.7 g/dL Final      HCT Hematocrit   Date Value Ref Range Status   06/05/2022 38.0 37.5 - 51.0 % Final   06/03/2022 41.2 37.5 - 51.0 % Final      Platlets No results found for: LABPLAT   MCV MCV   Date Value Ref Range Status   06/05/2022 97.9 (H) 79.0 - 97.0 fL Final   06/03/2022 99.2 (H) 79.0 - 97.0 fL Final          Sodium Sodium   Date Value Ref Range Status   06/05/2022 134 (L) 136 - 145 mmol/L Final   06/04/2022 136 136 - 145 mmol/L Final   06/03/2022 139 136 - 145 mmol/L Final      Potassium Potassium   Date Value Ref Range Status   06/05/2022 3.9 3.5 - 5.2 mmol/L Final   06/04/2022 4.0 3.5 - 5.2 mmol/L Final   06/03/2022 4.1 3.5 - 5.2 mmol/L Final      Chloride Chloride   Date Value Ref Range Status   06/05/2022 97 (L) 98 - 107 mmol/L Final   06/04/2022 99 98 - 107 mmol/L Final   06/03/2022 103 98 - 107 mmol/L Final      CO2 CO2   Date Value Ref Range Status   06/05/2022 27.0 22.0 - 29.0 mmol/L Final   06/04/2022 27.0 22.0 - 29.0 mmol/L Final   06/03/2022 24.0 22.0 - 29.0 mmol/L Final      BUN BUN   Date Value Ref Range Status   06/05/2022 16 8 - 23 mg/dL Final   06/04/2022 18 8 - 23 mg/dL Final   06/03/2022 15 8 - 23 mg/dL Final      Creatinine Creatinine   Date Value Ref Range Status    06/05/2022 0.98 0.76 - 1.27 mg/dL Final   06/04/2022 1.02 0.76 - 1.27 mg/dL Final   06/03/2022 1.07 0.76 - 1.27 mg/dL Final      Calcium Calcium   Date Value Ref Range Status   06/05/2022 8.6 8.6 - 10.5 mg/dL Final   06/04/2022 8.4 (L) 8.6 - 10.5 mg/dL Final   06/03/2022 8.5 (L) 8.6 - 10.5 mg/dL Final      PO4 No components found for: PO4   Albumin No results found for: ALBUMIN   Magnesium No results found for: MG   Uric Acid No components found for: URIC ACID     Imaging Results (Last 72 Hours)     Procedure Component Value Units Date/Time    XR Chest 1 View [[id number]] Collected: 06/05/22 0958     Updated: 06/05/22 1002    Narrative:      EXAM: XR CHEST 1 VW-     DATE OF EXAM: 6/5/2022 8:58 AM     INDICATION: copd exac  cough; R55-Syncope and collapse; F10.920-Alcohol  use, unspecified with intoxication, uncomplicated; J96.01-Acute  respiratory failure with hypoxia; I95.1-Orthostatic hypotension;  R77.8-Other specified abnormalities of plasma proteins; N17.9-Acute  kidney failure, unspecified; J44.1-Chronic obstructive pulmonary disease  with (acute) exacerbation; R94.39-Abnormal result of other cardiovasc.       COMPARISONS: 5/31/2022      FINDINGS:     Emphysema is evident. No pneumothorax or pleural effusion. Mild  scattered atelectasis. No consolidation. Mediastinum appears unchanged,  no evidence of acute process.       Impression:         Emphysema and mild scattered atelectasis. No evidence of acute  cardiopulmonary process otherwise.     Electronically Signed By-Chad Car On:6/5/2022 10:00 AM  This report was finalized on [id number] by  Chad Car, .          Results for orders placed during the hospital encounter of 05/31/22    XR Chest 1 View    Narrative  EXAM: XR CHEST 1 VW-    DATE OF EXAM: 6/5/2022 8:58 AM    INDICATION: copd exac  cough; R55-Syncope and collapse; F10.920-Alcohol  use, unspecified with intoxication, uncomplicated; J96.01-Acute  respiratory failure with hypoxia;  I95.1-Orthostatic hypotension;  R77.8-Other specified abnormalities of plasma proteins; N17.9-Acute  kidney failure, unspecified; J44.1-Chronic obstructive pulmonary disease  with (acute) exacerbation; R94.39-Abnormal result of other cardiovasc.    COMPARISONS: 5/31/2022    FINDINGS:    Emphysema is evident. No pneumothorax or pleural effusion. Mild  scattered atelectasis. No consolidation. Mediastinum appears unchanged,  no evidence of acute process.    Impression  Emphysema and mild scattered atelectasis. No evidence of acute  cardiopulmonary process otherwise.    Electronically Signed By-Chad Car On:6/5/2022 10:00 AM  This report was finalized on [id number] by  Chad Car, .      XR Chest 1 View    Narrative  Examination: XR CHEST 1 VW-    Date of Exam: 5/31/2022 9:03 PM    Indication: Chest pain protocol.    Comparison: None available.    Technique: Single radiographic view of the chest was obtained.    Findings:  There is no pneumothorax, pleural effusion or focal airspace  consolidation. Cardiomediastinal silhouette is unremarkable. Pulmonary  vasculature appears within normal limits. Regional bones appear grossly  intact.    Impression  No acute cardiopulmonary abnormality.    Electronically Signed By-Johann Dodge MD On:5/31/2022 9:29 PM  This report was finalized on [id number] by  Johann Dodge MD.      Results for orders placed during the hospital encounter of 05/31/22    Duplex Vein Mapping Lower Extremity - Bilateral CAR    Interpretation Summary  The greater saphenous veins are of satisfactory quality from the groin to the mid distal thigh bilaterally.  Distal to this the veins are small and inadequate.        ASSESSMENT / PLAN      COPD exacerbation (HCC)    Obesity (BMI 30-39.9)    Abnormal nuclear stress test    Acute renal insufficiency    Alcohol dependence (HCC)    Essential hypertension    Other emphysema (HCC)    Coronary artery disease    Syncope, unspecified syncope type    1.  ARF/FELIBERTO------Nonoliguric. Resolved with volume repletion    2. CAD------No angina or gross overload    3. BENIGN ESSENTIAL HTN------BP okay. No ACE-I/ARB/DRI/diuretic for now    4. BPH-------On Flomax    5. S/P SYNCOPE    6. ETOH ABUSE    7. HYPERLIPIDEMIA-------On Statin    8. DVT PROPHYLAXIS-------On Lovenox      Amara Cooper MD  [facility]  [phone]  06/06/22  09:28 EDT

## 2022-06-06 NOTE — PROGRESS NOTES
Daily Progress Note        COPD exacerbation (HCC)    Obesity (BMI 30-39.9)    Abnormal nuclear stress test    Acute renal insufficiency    Alcohol dependence (HCC)    Essential hypertension    Other emphysema (HCC)    Coronary artery disease    Syncope, unspecified syncope type      Assessment    COPD with acute exacerbation  PFT 6/3/2022 showed combined severe obstructive and restrictive respiratory defect: FEV1 45% without significant response to bronchodilators, SVC 58%  -Unfortunately total lung capacity and DLCO were not performed    Non-STEMI  Three-vessel coronary artery disease  Ascending aortic aneurysm 4.9205 cm  Hypertension  Syncope    Alcohol abuse  Dyslipidemia  Back pain with a lumbar herniation  Early prostate cancer  FELIBERTO: Resolving    History of tobacco smoking: Quit June 2021     Recommendations:    Oxygen supplement and titration: Requiring 2 L per NC     from pulmonary standpoint patient is at high risk for pulmonary complications including pneumonia, atelectasis and or prolonged ventilation but there is no absolute contraindication for the surgery, but he needs his lung function to be optimized: I will start him on steroids and once his wheezing stops then his steroids could be stopped and then the patient could go for the CABG    patient will need perioperative bronchodilators and aggressive pulmonary toilet/intensive spirometry/flutter valve  Inhaled corticosteroids/Bronchodilators  Statin  Blood pressure control  DVT prophylaxis Lovenox  Mucinex twice daily  Thiamine                LOS: 1 day     Subjective         Objective     Vital signs for last 24 hours:  Vitals:    06/05/22 1934 06/05/22 2154 06/06/22 0111 06/06/22 0630   BP:  120/68 108/49 109/60   BP Location:  Left arm Left arm Left arm   Patient Position:  Sitting Lying Lying   Pulse: 70 66 64 67   Resp: 20 18 16 18   Temp:  98.5 °F (36.9 °C) 97.5 °F (36.4 °C) 97.7 °F (36.5 °C)   TempSrc:  Oral Oral Oral   SpO2: 96% 99% 94% 96%    Weight:       Height:           Intake/Output last 3 shifts:  I/O last 3 completed shifts:  In: 1320 [P.O.:1320]  Out: 3730 [Urine:3730]  Intake/Output this shift:  I/O this shift:  In: -   Out: 500 [Urine:500]      Radiology  Imaging Results (Last 24 Hours)     Procedure Component Value Units Date/Time    XR Chest 1 View [[id number]] Collected: 06/05/22 0958     Updated: 06/05/22 1002    Narrative:      EXAM: XR CHEST 1 VW-     DATE OF EXAM: 6/5/2022 8:58 AM     INDICATION: copd exac  cough; R55-Syncope and collapse; F10.920-Alcohol  use, unspecified with intoxication, uncomplicated; J96.01-Acute  respiratory failure with hypoxia; I95.1-Orthostatic hypotension;  R77.8-Other specified abnormalities of plasma proteins; N17.9-Acute  kidney failure, unspecified; J44.1-Chronic obstructive pulmonary disease  with (acute) exacerbation; R94.39-Abnormal result of other cardiovasc.       COMPARISONS: 5/31/2022      FINDINGS:     Emphysema is evident. No pneumothorax or pleural effusion. Mild  scattered atelectasis. No consolidation. Mediastinum appears unchanged,  no evidence of acute process.       Impression:         Emphysema and mild scattered atelectasis. No evidence of acute  cardiopulmonary process otherwise.     Electronically Signed By-Chad Car On:6/5/2022 10:00 AM  This report was finalized on [id number] by  Chad Car, .          Labs:  Results from last 7 days   Lab Units 06/05/22  0540   WBC 10*3/mm3 6.60   HEMOGLOBIN g/dL 12.5*   HEMATOCRIT % 38.0   PLATELETS 10*3/mm3 155     Results from last 7 days   Lab Units 06/05/22  0539 06/03/22  1014 06/02/22  0500   SODIUM mmol/L 134*   < > 138   POTASSIUM mmol/L 3.9   < > 4.5   CHLORIDE mmol/L 97*   < > 103   CO2 mmol/L 27.0   < > 23.0   BUN mg/dL 16   < > 13   CREATININE mg/dL 0.98   < > 0.86   CALCIUM mg/dL 8.6   < > 8.3*   BILIRUBIN mg/dL  --   --  0.2   ALK PHOS U/L  --   --  55   ALT (SGPT) U/L  --   --  26   AST (SGOT) U/L  --   --  22   GLUCOSE mg/dL  105*   < > 159*    < > = values in this interval not displayed.         Results from last 7 days   Lab Units 06/02/22  0500 05/31/22  2053   ALBUMIN g/dL 3.50 3.60     Results from last 7 days   Lab Units 06/01/22  1024 05/31/22  2227 05/31/22  2053   TROPONIN T ng/mL 0.116* 0.326* 0.415*         Results from last 7 days   Lab Units 06/02/22  0500   MAGNESIUM mg/dL 2.1     Results from last 7 days   Lab Units 06/02/22  0500   INR  1.00     Results from last 7 days   Lab Units 06/03/22  1014   TSH uIU/mL 0.476           Meds:   SCHEDULE  amLODIPine, 5 mg, Oral, Daily  arformoterol, 15 mcg, Nebulization, BID - RT  aspirin, 81 mg, Oral, Daily  atorvastatin, 40 mg, Oral, Daily  budesonide, 0.5 mg, Nebulization, BID - RT  carvedilol, 6.25 mg, Oral, BID With Meals  enoxaparin, 40 mg, Subcutaneous, Q12H  gabapentin, 600 mg, Oral, BID  guaiFENesin, 1,200 mg, Oral, Q12H  insulin lispro, 0-7 Units, Subcutaneous, TID AC  ipratropium-albuterol, 3 mL, Nebulization, Q4H - RT  methylPREDNISolone sodium succinate, 40 mg, Intravenous, Q8H  multivitamin, 1 tablet, Oral, Daily  sodium chloride, 10 mL, Intravenous, Q12H  tamsulosin, 0.4 mg, Oral, Daily  thiamine, 100 mg, Oral, BID      Infusions  Pharmacy to Dose enoxaparin (LOVENOX),   Pharmacy to dose Trelegy,       PRNs  •  acetaminophen **OR** acetaminophen **OR** acetaminophen  •  aluminum-magnesium hydroxide-simethicone  •  atropine  •  benzonatate  •  dextrose  •  dextrose  •  glucagon (human recombinant)  •  hydrALAZINE  •  ipratropium-albuterol  •  LORazepam **OR** LORazepam **OR** LORazepam **OR** LORazepam **OR** LORazepam **OR** LORazepam  •  LORazepam **OR** LORazepam **OR** LORazepam **OR** LORazepam **OR** LORazepam **OR** LORazepam  •  magnesium sulfate **OR** magnesium sulfate **OR** magnesium sulfate  •  melatonin  •  nitroglycerin  •  ondansetron **OR** ondansetron  •  Pharmacy to Dose enoxaparin (LOVENOX)  •  Pharmacy to dose Trelegy  •  potassium chloride **OR**  potassium chloride **OR** potassium chloride  •  sodium chloride  •  sodium chloride  •  sodium chloride    Physical Exam:  Physical Exam  Vitals reviewed.   Constitutional:       Appearance: He is obese.   Pulmonary:      Breath sounds: Decreased breath sounds and wheezing present.   Skin:     General: Skin is warm and dry.   Neurological:      Mental Status: He is alert.         ROS  Review of Systems   Respiratory: Positive for shortness of breath and wheezing.        I have reviewed the patient's new clinical results.    Electronically signed by ERICA Jarrell.

## 2022-06-06 NOTE — PLAN OF CARE
Goal Outcome Evaluation:      Pt currently in chair on room air with occasional 2L NC. Pt has been oriented and cooperative with low CIWA scores. Plan is for CABG Friday. VSS with no complaints at this time.    Problem: Adult Inpatient Plan of Care  Goal: Plan of Care Review  Outcome: Ongoing, Progressing

## 2022-06-06 NOTE — PROGRESS NOTES
CC:  Syncopal episodes, witnessed    F/U:  CAD/asc aortic aneurysm--[place]  EF 40% (cath)    Subjective:  Reports he feels better    Withdrawal symptoms this weekend, improved      PREOP studies:  Carotid duplex:  16-49% bilat  Vein regina:  Adequate in bilat thighs  A1c:  5.5   Plt agg:  pending  MRSA screen:  pending  COVID-19 screen:  Neg 5/31  UA:  Neg for UTI  PFTs:  FEV1/FVC 73, FEV1 45, DLCO not recorded  Echo:  55-60%, RV mild dilatation, aortic root dilatation, mild MR, mild to mod TR      Intake/Output Summary (Last 24 hours) at 6/6/2022 0819  Last data filed at 6/6/2022 0111  Gross per 24 hour   Intake 960 ml   Output 1150 ml   Net -190 ml     Temp:  [97.5 °F (36.4 °C)-99.4 °F (37.4 °C)] 97.7 °F (36.5 °C)  Heart Rate:  [56-81] 62  Resp:  [16-22] 18  BP: (106-129)/(49-68) 109/60      Results from last 7 days   Lab Units 06/05/22  0540 06/03/22  1014 06/02/22  0500   WBC 10*3/mm3 6.60 6.00 6.80   HEMOGLOBIN g/dL 12.5* 13.5 12.9*   HEMATOCRIT % 38.0 41.2 39.3   PLATELETS 10*3/mm3 155 180 161   INR   --   --  1.00     Results from last 7 days   Lab Units 06/05/22  0539 06/03/22  1014 06/02/22  0500   CREATININE mg/dL 0.98   < > 0.86   POTASSIUM mmol/L 3.9   < > 4.5   SODIUM mmol/L 134*   < > 138   MAGNESIUM mg/dL  --   --  2.1    < > = values in this interval not displayed.       Physical Exam:  Neuro intact, nad, resting in bed  Tele:  SR 60s  Diminished bases, 99% 2L  Benign abd, no BM  No edema    Assessment/Plan:  Active Problems:    COPD exacerbation (HCC)    Obesity (BMI 30-39.9)    Abnormal nuclear stress test    Acute renal insufficiency    Alcohol dependence (HCC)    Essential hypertension    Other emphysema (HCC)    Coronary artery disease    Syncope, unspecified syncope type    - MV CAD, EF 40% (cath)--surgical workup in progress  - NSTEMI presentation  - Ascending aortic aneurysm,  4.9-5.0 cm  - Admit with syncopal event x2--orthostatic on admit  - Severe COPD--pulm following, on steroids  -  HTN--stable  - HLD--statin  - Lumbar herniation--back surgery pending  - Early prostate cancer--has follow up as outpatient, probable radiation treatment per patient  - FELIBERTO--resolving, Dr. Casarez following  - ETOH use--reports 3 beers/day and bourbon--withdrawal this weekend, improved    Tentative plans for CABG/asc ao repair with Dr. Hughes on Friday if his lungs are optimized.  Pt is aware and agreeable.  PT/OT eval before surgery.  He is currently on Solu-medrol 40 mg TID for 3 days.  Will transfer to [facility] when bed opens.    Christal Vora, ERICA  6/6/2022  08:19 EDT

## 2022-06-07 ENCOUNTER — TELEPHONE (OUTPATIENT)
Dept: CARDIAC SURGERY | Facility: CLINIC | Age: 67
End: 2022-06-07

## 2022-06-07 ENCOUNTER — APPOINTMENT (OUTPATIENT)
Dept: RESPIRATORY THERAPY | Facility: HOSPITAL | Age: 67
End: 2022-06-07

## 2022-06-07 LAB
ANION GAP SERPL CALCULATED.3IONS-SCNC: 9 MMOL/L (ref 5–15)
BUN SERPL-MCNC: 27 MG/DL (ref 8–23)
BUN/CREAT SERPL: 30.3 (ref 7–25)
CALCIUM SPEC-SCNC: 8.8 MG/DL (ref 8.6–10.5)
CHLORIDE SERPL-SCNC: 98 MMOL/L (ref 98–107)
CO2 SERPL-SCNC: 27 MMOL/L (ref 22–29)
CREAT SERPL-MCNC: 0.89 MG/DL (ref 0.76–1.27)
DEPRECATED RDW RBC AUTO: 46.4 FL (ref 37–54)
EGFRCR SERPLBLD CKD-EPI 2021: 93.9 ML/MIN/1.73
ERYTHROCYTE [DISTWIDTH] IN BLOOD BY AUTOMATED COUNT: 13.5 % (ref 12.3–15.4)
GLUCOSE BLDC GLUCOMTR-MCNC: 131 MG/DL (ref 70–105)
GLUCOSE BLDC GLUCOMTR-MCNC: 151 MG/DL (ref 70–105)
GLUCOSE BLDC GLUCOMTR-MCNC: 175 MG/DL (ref 70–105)
GLUCOSE BLDC GLUCOMTR-MCNC: 191 MG/DL (ref 70–105)
GLUCOSE SERPL-MCNC: 139 MG/DL (ref 65–99)
HCT VFR BLD AUTO: 39.3 % (ref 37.5–51)
HGB BLD-MCNC: 13.2 G/DL (ref 13–17.7)
MAGNESIUM SERPL-MCNC: 2.3 MG/DL (ref 1.6–2.4)
MCH RBC QN AUTO: 32.8 PG (ref 26.6–33)
MCHC RBC AUTO-ENTMCNC: 33.6 G/DL (ref 31.5–35.7)
MCV RBC AUTO: 97.8 FL (ref 79–97)
PHOSPHATE SERPL-MCNC: 4.5 MG/DL (ref 2.5–4.5)
PLATELET # BLD AUTO: 229 10*3/MM3 (ref 140–450)
PMV BLD AUTO: 8.5 FL (ref 6–12)
POTASSIUM SERPL-SCNC: 4.8 MMOL/L (ref 3.5–5.2)
RBC # BLD AUTO: 4.02 10*6/MM3 (ref 4.14–5.8)
SODIUM SERPL-SCNC: 134 MMOL/L (ref 136–145)
WBC NRBC COR # BLD: 8.1 10*3/MM3 (ref 3.4–10.8)

## 2022-06-07 PROCEDURE — 25010000002 ENOXAPARIN PER 10 MG

## 2022-06-07 PROCEDURE — 94060 EVALUATION OF WHEEZING: CPT

## 2022-06-07 PROCEDURE — 63710000001 INSULIN LISPRO (HUMAN) PER 5 UNITS: Performed by: INTERNAL MEDICINE

## 2022-06-07 PROCEDURE — 99232 SBSQ HOSP IP/OBS MODERATE 35: CPT | Performed by: INTERNAL MEDICINE

## 2022-06-07 PROCEDURE — 99233 SBSQ HOSP IP/OBS HIGH 50: CPT | Performed by: INTERNAL MEDICINE

## 2022-06-07 PROCEDURE — 25010000002 METHYLPREDNISOLONE PER 40 MG: Performed by: INTERNAL MEDICINE

## 2022-06-07 PROCEDURE — 84100 ASSAY OF PHOSPHORUS: CPT | Performed by: INTERNAL MEDICINE

## 2022-06-07 PROCEDURE — 94664 DEMO&/EVAL PT USE INHALER: CPT

## 2022-06-07 PROCEDURE — 82962 GLUCOSE BLOOD TEST: CPT

## 2022-06-07 PROCEDURE — 63710000001 INSULIN REGULAR HUMAN PER 5 UNITS: Performed by: INTERNAL MEDICINE

## 2022-06-07 PROCEDURE — 85027 COMPLETE CBC AUTOMATED: CPT | Performed by: INTERNAL MEDICINE

## 2022-06-07 PROCEDURE — 94799 UNLISTED PULMONARY SVC/PX: CPT

## 2022-06-07 PROCEDURE — 80048 BASIC METABOLIC PNL TOTAL CA: CPT | Performed by: INTERNAL MEDICINE

## 2022-06-07 PROCEDURE — 83735 ASSAY OF MAGNESIUM: CPT | Performed by: INTERNAL MEDICINE

## 2022-06-07 PROCEDURE — 94727 GAS DIL/WSHOT DETER LNG VOL: CPT

## 2022-06-07 PROCEDURE — 94761 N-INVAS EAR/PLS OXIMETRY MLT: CPT

## 2022-06-07 PROCEDURE — 94729 DIFFUSING CAPACITY: CPT

## 2022-06-07 RX ORDER — ALBUTEROL SULFATE 90 UG/1
2 AEROSOL, METERED RESPIRATORY (INHALATION) ONCE
Status: COMPLETED | OUTPATIENT
Start: 2022-06-07 | End: 2022-06-07

## 2022-06-07 RX ADMIN — METHYLPREDNISOLONE SODIUM SUCCINATE 40 MG: 40 INJECTION, POWDER, FOR SOLUTION INTRAMUSCULAR; INTRAVENOUS at 17:35

## 2022-06-07 RX ADMIN — CARVEDILOL 6.25 MG: 6.25 TABLET, FILM COATED ORAL at 09:16

## 2022-06-07 RX ADMIN — ENOXAPARIN SODIUM 40 MG: 100 INJECTION SUBCUTANEOUS at 21:01

## 2022-06-07 RX ADMIN — METHYLPREDNISOLONE SODIUM SUCCINATE 40 MG: 40 INJECTION, POWDER, FOR SOLUTION INTRAMUSCULAR; INTRAVENOUS at 01:07

## 2022-06-07 RX ADMIN — TAMSULOSIN HYDROCHLORIDE 0.4 MG: 0.4 CAPSULE ORAL at 09:16

## 2022-06-07 RX ADMIN — IPRATROPIUM BROMIDE AND ALBUTEROL SULFATE 3 ML: .5; 3 SOLUTION RESPIRATORY (INHALATION) at 11:04

## 2022-06-07 RX ADMIN — INSULIN LISPRO 2 UNITS: 100 INJECTION, SOLUTION INTRAVENOUS; SUBCUTANEOUS at 12:28

## 2022-06-07 RX ADMIN — ALBUTEROL SULFATE 2 PUFF: 108 INHALANT RESPIRATORY (INHALATION) at 14:52

## 2022-06-07 RX ADMIN — METHYLPREDNISOLONE SODIUM SUCCINATE 40 MG: 40 INJECTION, POWDER, FOR SOLUTION INTRAMUSCULAR; INTRAVENOUS at 09:15

## 2022-06-07 RX ADMIN — INSULIN LISPRO 2 UNITS: 100 INJECTION, SOLUTION INTRAVENOUS; SUBCUTANEOUS at 17:36

## 2022-06-07 RX ADMIN — Medication 10 ML: at 21:01

## 2022-06-07 RX ADMIN — CARVEDILOL 6.25 MG: 6.25 TABLET, FILM COATED ORAL at 17:35

## 2022-06-07 RX ADMIN — AMLODIPINE BESYLATE 5 MG: 5 TABLET ORAL at 09:16

## 2022-06-07 RX ADMIN — Medication 100 MG: at 09:16

## 2022-06-07 RX ADMIN — GUAIFENESIN 1200 MG: 600 TABLET, EXTENDED RELEASE ORAL at 00:28

## 2022-06-07 RX ADMIN — INSULIN HUMAN 2 UNITS: 100 INJECTION, SOLUTION PARENTERAL at 17:35

## 2022-06-07 RX ADMIN — BUDESONIDE 0.5 MG: 0.5 INHALANT RESPIRATORY (INHALATION) at 07:02

## 2022-06-07 RX ADMIN — ATORVASTATIN CALCIUM 40 MG: 40 TABLET, FILM COATED ORAL at 09:16

## 2022-06-07 RX ADMIN — Medication 5 MG: at 00:29

## 2022-06-07 RX ADMIN — ARFORMOTEROL TARTRATE 15 MCG: 15 SOLUTION RESPIRATORY (INHALATION) at 07:02

## 2022-06-07 RX ADMIN — GABAPENTIN 600 MG: 600 TABLET, FILM COATED ORAL at 09:16

## 2022-06-07 RX ADMIN — Medication 5 MG: at 23:33

## 2022-06-07 RX ADMIN — GUAIFENESIN 1200 MG: 600 TABLET, EXTENDED RELEASE ORAL at 12:28

## 2022-06-07 RX ADMIN — Medication 10 ML: at 09:16

## 2022-06-07 RX ADMIN — INSULIN HUMAN 2 UNITS: 100 INJECTION, SOLUTION PARENTERAL at 09:16

## 2022-06-07 RX ADMIN — Medication 100 MG: at 21:01

## 2022-06-07 RX ADMIN — ARFORMOTEROL TARTRATE 15 MCG: 15 SOLUTION RESPIRATORY (INHALATION) at 18:57

## 2022-06-07 RX ADMIN — GUAIFENESIN 1200 MG: 600 TABLET, EXTENDED RELEASE ORAL at 23:33

## 2022-06-07 RX ADMIN — ASPIRIN 81 MG: 81 TABLET, CHEWABLE ORAL at 09:16

## 2022-06-07 RX ADMIN — ENOXAPARIN SODIUM 40 MG: 100 INJECTION SUBCUTANEOUS at 09:15

## 2022-06-07 RX ADMIN — INSULIN HUMAN 2 UNITS: 100 INJECTION, SOLUTION PARENTERAL at 01:07

## 2022-06-07 RX ADMIN — GABAPENTIN 600 MG: 600 TABLET, FILM COATED ORAL at 21:01

## 2022-06-07 RX ADMIN — BUDESONIDE 0.5 MG: 0.5 INHALANT RESPIRATORY (INHALATION) at 18:57

## 2022-06-07 RX ADMIN — THERA TABS 1 TABLET: TAB at 09:16

## 2022-06-07 NOTE — PLAN OF CARE
Goal Outcome Evaluation:      Pt currently in chair on room air and tolerating well. Anticipate CABG Friday. VSS with no complaints at this time.    Problem: Adult Inpatient Plan of Care  Goal: Plan of Care Review  Outcome: Ongoing, Progressing

## 2022-06-07 NOTE — PROGRESS NOTES
Daily Progress Note        COPD exacerbation (HCC)    Obesity (BMI 30-39.9)    Abnormal nuclear stress test    Acute renal insufficiency    Alcohol dependence (HCC)    Essential hypertension    Other emphysema (HCC)    Coronary artery disease    Syncope, unspecified syncope type      Assessment    COPD with acute exacerbation, improving  Spirometry 6/3/2022 and flow volume loop suggestive of severe obstructive and possible restrictive respiratory defect: Unfortunately total lung capacity and DLCO were not performed   FEV1 45% without significant response to bronchodilators, FEV1/FVC ratio 56 compatible with obstructive defect    Non-STEMI  Three-vessel coronary artery disease  Ascending aortic aneurysm 4.9205 cm  Hypertension  Syncope    Alcohol abuse  Dyslipidemia  Back pain with a lumbar herniation  Early prostate cancer  FELIBERTO: Resolving    History of tobacco smoking: Quit June 2021     Recommendations:    Oxygen supplement and titration: Requiring 2 L per NC     from pulmonary standpoint patient is at high risk for pulmonary complications including pneumonia, atelectasis and or prolonged ventilation but there is no absolute contraindication for the surgery,  patient was treated with short course of steroids currently off    perioperative bronchodilators and aggressive pulmonary toilet/intensive spirometry/flutter valve  Inhaled corticosteroids/Bronchodilators  Statin  Blood pressure control  DVT prophylaxis Lovenox  Mucinex twice daily  Thiamine                LOS: 2 days     Subjective         Objective     Vital signs for last 24 hours:  Vitals:    06/06/22 2145 06/07/22 0123 06/07/22 0500 06/07/22 0533   BP: 111/53 104/58  112/62   BP Location: Left arm Left arm  Left arm   Patient Position: Lying Lying  Lying   Pulse: 63 64  61   Resp: 18 16  16   Temp: 98.5 °F (36.9 °C) 98.3 °F (36.8 °C)  97.3 °F (36.3 °C)   TempSrc: Oral Oral  Oral   SpO2: 93% 95%  93%   Weight:   110 kg (241 lb 6.5 oz)    Height:            Intake/Output last 3 shifts:  I/O last 3 completed shifts:  In: 960 [P.O.:960]  Out: 1325 [Urine:1150; Stool:175]  Intake/Output this shift:  I/O this shift:  In: 240 [P.O.:240]  Out: 575 [Urine:575]      Radiology  Imaging Results (Last 24 Hours)     ** No results found for the last 24 hours. **          Labs:  Results from last 7 days   Lab Units 06/07/22  0411   WBC 10*3/mm3 8.10   HEMOGLOBIN g/dL 13.2   HEMATOCRIT % 39.3   PLATELETS 10*3/mm3 229     Results from last 7 days   Lab Units 06/07/22  0411 06/03/22  1014 06/02/22  0500   SODIUM mmol/L 134*   < > 138   POTASSIUM mmol/L 4.8   < > 4.5   CHLORIDE mmol/L 98   < > 103   CO2 mmol/L 27.0   < > 23.0   BUN mg/dL 27*   < > 13   CREATININE mg/dL 0.89   < > 0.86   CALCIUM mg/dL 8.8   < > 8.3*   BILIRUBIN mg/dL  --   --  0.2   ALK PHOS U/L  --   --  55   ALT (SGPT) U/L  --   --  26   AST (SGOT) U/L  --   --  22   GLUCOSE mg/dL 139*   < > 159*    < > = values in this interval not displayed.         Results from last 7 days   Lab Units 06/02/22  0500 05/31/22  2053   ALBUMIN g/dL 3.50 3.60     Results from last 7 days   Lab Units 06/01/22  1024 05/31/22  2227 05/31/22  2053   TROPONIN T ng/mL 0.116* 0.326* 0.415*         Results from last 7 days   Lab Units 06/07/22  0411   MAGNESIUM mg/dL 2.3     Results from last 7 days   Lab Units 06/02/22  0500   INR  1.00     Results from last 7 days   Lab Units 06/03/22  1014   TSH uIU/mL 0.476           Meds:   SCHEDULE  amLODIPine, 5 mg, Oral, Daily  arformoterol, 15 mcg, Nebulization, BID - RT  aspirin, 81 mg, Oral, Daily  atorvastatin, 40 mg, Oral, Daily  budesonide, 0.5 mg, Nebulization, BID - RT  carvedilol, 6.25 mg, Oral, BID With Meals  enoxaparin, 40 mg, Subcutaneous, Q12H  gabapentin, 600 mg, Oral, BID  guaiFENesin, 1,200 mg, Oral, Q12H  insulin lispro, 0-7 Units, Subcutaneous, TID AC  insulin regular, 2 Units, Subcutaneous, Q8H  ipratropium-albuterol, 3 mL, Nebulization, Q4H - RT  methylPREDNISolone sodium  succinate, 40 mg, Intravenous, Q8H  multivitamin, 1 tablet, Oral, Daily  sodium chloride, 10 mL, Intravenous, Q12H  tamsulosin, 0.4 mg, Oral, Daily  thiamine, 100 mg, Oral, BID      Infusions  Pharmacy Consult - Steroid Insulin Protocol,   Pharmacy to dose Trelegy,       PRNs  •  acetaminophen **OR** acetaminophen **OR** acetaminophen  •  aluminum-magnesium hydroxide-simethicone  •  atropine  •  benzonatate  •  dextrose  •  dextrose  •  glucagon (human recombinant)  •  hydrALAZINE  •  ipratropium-albuterol  •  LORazepam **OR** LORazepam **OR** LORazepam **OR** LORazepam **OR** LORazepam **OR** LORazepam  •  LORazepam **OR** LORazepam **OR** LORazepam **OR** LORazepam **OR** LORazepam **OR** LORazepam  •  magnesium sulfate **OR** magnesium sulfate **OR** magnesium sulfate  •  melatonin  •  nitroglycerin  •  ondansetron **OR** ondansetron  •  Pharmacy Consult - Steroid Insulin Protocol  •  Pharmacy to dose Trelegy  •  potassium chloride **OR** potassium chloride **OR** potassium chloride  •  sodium chloride  •  sodium chloride  •  sodium chloride    Physical Exam:  Physical Exam  Vitals reviewed.   Constitutional:       Appearance: He is obese.   Pulmonary:      Breath sounds: Decreased breath sounds and wheezing present.   Skin:     General: Skin is warm and dry.   Neurological:      Mental Status: He is alert.         ROS  Review of Systems   Respiratory: Positive for shortness of breath and wheezing.        I have reviewed the patient's new clinical results.    Electronically signed by ERICA Jarrell.

## 2022-06-07 NOTE — PLAN OF CARE
Goal Outcome Evaluation:    Patient sat up in the chair for the first half of the shift and was placed on 2L at bedtime.  Patient has had no complaints of pain, VSS at this time.

## 2022-06-07 NOTE — PLAN OF CARE
Per PT, patient has no needs prior to surgery.  Will sign off and will require new orders after surgery.

## 2022-06-07 NOTE — PROGRESS NOTES
[facility] Daily Progress Note    Patient Name: Chi Quinteros Sr.  : 1955  MRN: [id number]  Primary Care Physician:  Deysi Mason APRN  Date of admission: 2022      Subjective      Chief Complaint: Chest pain    6/3  Vitals and labs reviewed  Awaiting CT surgery recommendation regarding possible cardiac surgery    Notes reviewed      Apparently intermittently confused overnight and angry intermittently  His vitals are stable requiring 2 L of oxygen afebrile  Suspected alcohol withdrawal.  CIWA score was 12 today with the nursing staff  On alcohol withdrawal protocol  Psychiatrist has been consulted by cardiac surgery  Add thiamine        /  Short of breath and wheezing much more than last few days.  Her last pulmonologist for evaluation given need for bypass surgery  Continue bronchodilators  Will give additional steroid with Solu-Medrol  Repeat chest x-ray shows no acute  problem      Labs and notes reviewed    2022  Patient seen and examined  Denies any chest pain, shortness of breath no palpitation.  Scheduled tentatively for CABG 6/10/2022        Review of Systems   Constitutional: Negative for chills and fever.   HENT: Negative for congestion.    Eyes: Negative for blurred vision.   Cardiovascular: Negative for chest pain.   Respiratory: Negative for shortness of breath.    Endocrine: Negative for cold intolerance and heat intolerance.   Gastrointestinal: Negative for abdominal pain, nausea and vomiting.   Genitourinary: Negative for dysuria.   Neurological: Negative for focal weakness.   Psychiatric/Behavioral: Negative for altered mental status.         Objective      Vitals:   Temp:  [97.3 °F (36.3 °C)-98.5 °F (36.9 °C)] 97.7 °F (36.5 °C)  Heart Rate:  [60-81] 81  Resp:  [16-18] 18  BP: (104-128)/(53-73) 128/73  Flow (L/min):  [2] 2    Physical Exam  Vitals reviewed.   Constitutional:       General: He is not in acute distress.  HENT:       Head: Normocephalic and atraumatic.      Nose: Nose normal.   Eyes:      Extraocular Movements: Extraocular movements intact.      Conjunctiva/sclera: Conjunctivae normal.      Pupils: Pupils are equal, round, and reactive to light.   Cardiovascular:      Rate and Rhythm: Normal rate and regular rhythm.   Pulmonary:      Effort: No respiratory distress.      Breath sounds: Normal breath sounds.   Abdominal:      General: Bowel sounds are normal.      Palpations: Abdomen is soft.      Tenderness: There is no abdominal tenderness.   Musculoskeletal:         General: Normal range of motion.      Cervical back: Normal range of motion.   Skin:     General: Skin is warm and dry.   Neurological:      General: No focal deficit present.      Mental Status: He is alert and oriented to person, place, and time.   Psychiatric:         Mood and Affect: Mood normal.            Result Review    Result Review:  I have personally reviewed the results from the time of this admission to 6/7/2022 09:43 EDT and agree with these findings:  [x]  Laboratory  [x]  Microbiology  [x]  Radiology  [x]  EKG/Telemetry   [x]  Cardiology/Vascular   []  Pathology  [x]  Old records  []  Other:          Wounds (last 24 hours)     LDA Wound     Row Name 06/07/22 0400 06/07/22 0000 06/06/22 1952       Wound 06/01/22 0020 Right posterior elbow Skin Tear    Wound - Properties Group Placement Date: 06/01/22  - Placement Time: 0020  -AH Present on Hospital Admission: Y  -AH Side: Right  -AH Orientation: posterior  -AH Location: elbow  -AH Primary Wound Type: Skin tear  -AH Additional Comments: mepilex placed  -AH    Dressing Appearance dry;intact  -KR dry;intact  -KR dry;intact  -KR    Closure WHIT  -KR WHIT  -KR WHIT  -KR    Base dressing in place, unable to visualize  -KR dressing in place, unable to visualize  -KR dressing in place, unable to visualize  -KR    Retired Wound - Properties Group Placement Date: 06/01/22  - Placement Time: 0020 - Present  on Hospital Admission: Y  -AH Side: Right  -AH Orientation: posterior  -AH Location: elbow  -AH Primary Wound Type: Skin tear  -AH Additional Comments: mepilex placed  -AH    Retired Wound - Properties Group Date first assessed: 06/01/22  - Time first assessed: 0020  -AH Present on Hospital Admission: Y  -AH Side: Right  -AH Location: elbow  -AH Primary Wound Type: Skin tear  -AH Additional Comments: mepilex placed  -AH          User Key  (r) = Recorded By, (t) = Taken By, (c) = Cosigned By    Initials Name Provider Type    Deysi Leger, RN Registered Nurse    Eda Borja RN Registered Nurse                  Assessment & Plan      Brief Patient Summary:    Chi Quinteros Sr. is a 67 y.o. male who initially presented to the emergency department on 5/31/2022 with complaints of syncope.  He was noted to be hypoxic in the 80s by EMS and reported at that time that he drinks about 5-6 beers daily.  His troponins were elevated at 0.415 then 0.326 and then 0.116.  He underwent a stress test which showed severe ischemia to lateral inferior wall and subsequently underwent a cardiac catheterization on 6/2/2022 which showed severe three-vessel coronary artery disease with mild left ventricular dysfunction.  He was admitted for cardiothoracic surgery evaluation for coronary artery bypass grafting.    Initially upon admission he had a creatinine of 1.9 and a GFR of 38.2 and was seen by nephrology for clearance for the cardiac catheterization.  Creatinine subsequently improved to 0.86 with a GFR of 94.9.  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    CIWA precautions have been put in place      amLODIPine, 5 mg, Oral, Daily  arformoterol, 15 mcg, Nebulization, BID - RT  aspirin, 81 mg, Oral, Daily  atorvastatin, 40 mg, Oral, Daily  budesonide, 0.5 mg, Nebulization, BID - RT  carvedilol, 6.25 mg,  Oral, BID With Meals  enoxaparin, 40 mg, Subcutaneous, Q12H  gabapentin, 600 mg, Oral, BID  guaiFENesin, 1,200 mg, Oral, Q12H  insulin lispro, 0-7 Units, Subcutaneous, TID AC  insulin regular, 2 Units, Subcutaneous, Q8H  ipratropium-albuterol, 3 mL, Nebulization, Q4H - RT  methylPREDNISolone sodium succinate, 40 mg, Intravenous, Q8H  multivitamin, 1 tablet, Oral, Daily  sodium chloride, 10 mL, Intravenous, Q12H  tamsulosin, 0.4 mg, Oral, Daily  thiamine, 100 mg, Oral, BID       Pharmacy Consult - Steroid Insulin Protocol,   Pharmacy to dose Trelegy,          Active Hospital Problems:  Active Hospital Problems    Diagnosis    • Syncope, unspecified syncope type    • Acute renal insufficiency    • Alcohol dependence (HCC)    • Essential hypertension    • Other emphysema (HCC)    • Coronary artery disease    • COPD exacerbation (HCC)    • Obesity (BMI 30-39.9)    • Abnormal nuclear stress test      Added automatically from request for surgery [id number]       Plan:     Syncopal episode-most likely cardiac in origin  Abnormal stress test and heart cath showing three-vessel disease  Was evaluated by CV[facility] and bypass surgery planned tentatively for 6/10/2022.  On medical management  On aspirin, Coreg and statin    Chronic systolic congestive heart failure EF 40%  Most likely ischemic cardiomyopathy  Compensated  On Coreg and lisinopril      Acute kidney injury  Improved  Nephrologist following      Alcohol dependence  On withdrawal protocol    Essential hypertension,  Blood pressure marginal  Continue on lisinopril and Coreg  Discontinue Norvasc      COPD exacerbation  Emphysema  Respiratory care with bronchodilators  On Solu-Medrol  Also on sliding scale insulin for steroid-induced hyperglycemia  Oxygen therapy and titration  Pulmonologist following-patient high risk for pulmonary complication with surgery      Obesity BMI 34  Lifestyle management education given    BPH-on Flomax      DVT prophylaxis:  Medical and mechanical  DVT prophylaxis orders are present.    CODE STATUS:    Code Status (Patient has no pulse and is not breathing): CPR (Attempt to Resuscitate)  Medical Interventions (Patient has pulse or is breathing): Full Support      Disposition: Pending clinical progress    This patient has been examined wearing appropriate Personal Protective Equipment  06/07/22      Electronically signed by Giancarlo Soto MD, 06/07/22, 09:43 EDT.  [facility] Hospitalist Team

## 2022-06-07 NOTE — TELEPHONE ENCOUNTER
Due to scheduling issues, changed time of surgery to 12pm on 06/10/2022 per Deysi and reached out to neuromonitoring contact to advise of time change for procedure on 06/10/2022.

## 2022-06-07 NOTE — PROGRESS NOTES
CC:  Syncopal episodes, witnessed    F/U:  CAD/asc aortic aneurysm--[place]  EF 40% (cath)    Subjective:  Reports he feels better    No events overnight  RT to get DLCO       PREOP studies:  Carotid duplex:  16-49% bilat  Vein regina:  Adequate in bilat thighs  A1c:  5.5   Plt agg:  pending  MRSA screen:  pending  COVID-19 screen:  Neg 5/31  UA:  Neg for UTI  PFTs:  FEV1/FVC 73, FEV1 45, DLCO not recorded  Echo:  55-60%, RV mild dilatation, aortic root dilatation, mild MR, mild to mod TR      Intake/Output Summary (Last 24 hours) at 6/7/2022 1603  Last data filed at 6/7/2022 0900  Gross per 24 hour   Intake 720 ml   Output 1250 ml   Net -530 ml     Temp:  [97.3 °F (36.3 °C)-98.8 °F (37.1 °C)] 98.8 °F (37.1 °C)  Heart Rate:  [60-87] 65  Resp:  [16-18] 18  BP: (104-128)/(53-73) 111/64      Results from last 7 days   Lab Units 06/07/22  0411 06/05/22  0540 06/03/22  1014 06/02/22  0500   WBC 10*3/mm3 8.10 6.60   < > 6.80   HEMOGLOBIN g/dL 13.2 12.5*   < > 12.9*   HEMATOCRIT % 39.3 38.0   < > 39.3   PLATELETS 10*3/mm3 229 155   < > 161   INR   --   --   --  1.00    < > = values in this interval not displayed.     Results from last 7 days   Lab Units 06/07/22  0411   CREATININE mg/dL 0.89   POTASSIUM mmol/L 4.8   SODIUM mmol/L 134*   MAGNESIUM mg/dL 2.3   PHOSPHORUS mg/dL 4.5       Physical Exam:  Neuro intact, nad, resting in bed  Tele:  SR 60s  Diminished bases, 99% 2L  Benign abd, no BM  No edema    Assessment/Plan:  Active Problems:    COPD exacerbation (HCC)    Obesity (BMI 30-39.9)    Abnormal nuclear stress test    Acute renal insufficiency    Alcohol dependence (HCC)    Essential hypertension    Other emphysema (HCC)    Coronary artery disease    Syncope, unspecified syncope type    - MV CAD, EF 40% (cath)--surgical workup in progress  - NSTEMI presentation  - Ascending aortic aneurysm,  4.9-5.0 cm  - Admit with syncopal event x2--orthostatic on admit  - Severe COPD--pulm following, on steroids  -  HTN--stable  - HLD--statin  - Lumbar herniation--back surgery pending  - Early prostate cancer--has follow up as outpatient, probable radiation treatment per patient  - FELIBERTO--resolved with volume repletion, Dr. Casarez following  - ETOH use--reports 3 beers/day and bourbon--withdrawal this weekend, improved    Tentative plans for CABG/asc ao repair with Dr. Hughes on Friday if his lungs are optimized.  Pt is aware and agreeable.  PT/OT eval before surgery.  He is currently on Solu-medrol 40 mg TID for 3 days.  Will transfer to [facility] when bed opens.    Christal Vroa, APRN  6/7/2022  16:03 EDT

## 2022-06-07 NOTE — PROGRESS NOTES
"Subjective   Chi Quinteros Sr. is a 67 y.o. male.   Seen for diabetes f/u.  Refused steroid dose early this am, but took it @ 9am & 5pm.      Objective     /53 (BP Location: Left arm, Patient Position: Lying)   Pulse 63   Temp 98.5 °F (36.9 °C) (Oral)   Resp 18   Ht 177.8 cm (70\")   Wt 110 kg (242 lb 1 oz)   SpO2 93%   BMI 34.73 kg/m²   Blood sugar  157 this am,  200 @ lunch, 206 @ supper    ASSESSMENT    Patient is stable    PLAN    Will start steroid protocol.         Israel Hidalgo MD  6/6/2022  22:29 EDT    "

## 2022-06-08 LAB
ANION GAP SERPL CALCULATED.3IONS-SCNC: 8 MMOL/L (ref 5–15)
ARTERIAL PATENCY WRIST A: POSITIVE
ATMOSPHERIC PRESS: ABNORMAL MM[HG]
BASE EXCESS BLDA CALC-SCNC: 3.5 MMOL/L (ref 0–3)
BDY SITE: ABNORMAL
BUN SERPL-MCNC: 27 MG/DL (ref 8–23)
BUN/CREAT SERPL: 28.1 (ref 7–25)
CALCIUM SPEC-SCNC: 9.1 MG/DL (ref 8.6–10.5)
CHLORIDE SERPL-SCNC: 98 MMOL/L (ref 98–107)
CO2 BLDA-SCNC: 28.5 MMOL/L (ref 22–29)
CO2 SERPL-SCNC: 30 MMOL/L (ref 22–29)
CREAT SERPL-MCNC: 0.96 MG/DL (ref 0.76–1.27)
DEPRECATED RDW RBC AUTO: 47.7 FL (ref 37–54)
EGFRCR SERPLBLD CKD-EPI 2021: 86.6 ML/MIN/1.73
ERYTHROCYTE [DISTWIDTH] IN BLOOD BY AUTOMATED COUNT: 13.6 % (ref 12.3–15.4)
GLUCOSE BLDC GLUCOMTR-MCNC: 109 MG/DL (ref 70–105)
GLUCOSE BLDC GLUCOMTR-MCNC: 118 MG/DL (ref 70–105)
GLUCOSE BLDC GLUCOMTR-MCNC: 144 MG/DL (ref 70–105)
GLUCOSE BLDC GLUCOMTR-MCNC: 169 MG/DL (ref 70–105)
GLUCOSE SERPL-MCNC: 128 MG/DL (ref 65–99)
HCO3 BLDA-SCNC: 27.4 MMOL/L (ref 21–28)
HCT VFR BLD AUTO: 40.9 % (ref 37.5–51)
HEMODILUTION: NO
HGB BLD-MCNC: 13.3 G/DL (ref 13–17.7)
INHALED O2 CONCENTRATION: 21 %
MAGNESIUM SERPL-MCNC: 2.3 MG/DL (ref 1.6–2.4)
MCH RBC QN AUTO: 32.2 PG (ref 26.6–33)
MCHC RBC AUTO-ENTMCNC: 32.6 G/DL (ref 31.5–35.7)
MCV RBC AUTO: 98.9 FL (ref 79–97)
MODALITY: ABNORMAL
MRSA DNA SPEC QL NAA+PROBE: ABNORMAL
PCO2 BLDA: 38.3 MM HG (ref 35–48)
PH BLDA: 7.46 PH UNITS (ref 7.35–7.45)
PHOSPHATE SERPL-MCNC: 5 MG/DL (ref 2.5–4.5)
PLATELET # BLD AUTO: 237 10*3/MM3 (ref 140–450)
PMV BLD AUTO: 8.1 FL (ref 6–12)
PO2 BLDA: 70.1 MM HG (ref 83–108)
POTASSIUM SERPL-SCNC: 4.7 MMOL/L (ref 3.5–5.2)
POTASSIUM SERPL-SCNC: 5.7 MMOL/L (ref 3.5–5.2)
QT INTERVAL: 423 MS
RBC # BLD AUTO: 4.13 10*6/MM3 (ref 4.14–5.8)
SAO2 % BLDCOA: 94.8 % (ref 94–98)
SODIUM SERPL-SCNC: 136 MMOL/L (ref 136–145)
WBC NRBC COR # BLD: 7.7 10*3/MM3 (ref 3.4–10.8)

## 2022-06-08 PROCEDURE — 63710000001 INSULIN REGULAR HUMAN PER 5 UNITS: Performed by: INTERNAL MEDICINE

## 2022-06-08 PROCEDURE — 94761 N-INVAS EAR/PLS OXIMETRY MLT: CPT

## 2022-06-08 PROCEDURE — 85027 COMPLETE CBC AUTOMATED: CPT | Performed by: INTERNAL MEDICINE

## 2022-06-08 PROCEDURE — 99232 SBSQ HOSP IP/OBS MODERATE 35: CPT | Performed by: INTERNAL MEDICINE

## 2022-06-08 PROCEDURE — 82962 GLUCOSE BLOOD TEST: CPT

## 2022-06-08 PROCEDURE — 63710000001 INSULIN LISPRO (HUMAN) PER 5 UNITS: Performed by: INTERNAL MEDICINE

## 2022-06-08 PROCEDURE — 25010000002 METHYLPREDNISOLONE PER 40 MG: Performed by: INTERNAL MEDICINE

## 2022-06-08 PROCEDURE — 83735 ASSAY OF MAGNESIUM: CPT | Performed by: INTERNAL MEDICINE

## 2022-06-08 PROCEDURE — 94760 N-INVAS EAR/PLS OXIMETRY 1: CPT

## 2022-06-08 PROCEDURE — 94664 DEMO&/EVAL PT USE INHALER: CPT

## 2022-06-08 PROCEDURE — 80048 BASIC METABOLIC PNL TOTAL CA: CPT | Performed by: INTERNAL MEDICINE

## 2022-06-08 PROCEDURE — 84132 ASSAY OF SERUM POTASSIUM: CPT | Performed by: INTERNAL MEDICINE

## 2022-06-08 PROCEDURE — 25010000002 ENOXAPARIN PER 10 MG

## 2022-06-08 PROCEDURE — 87641 MR-STAPH DNA AMP PROBE: CPT | Performed by: NURSE PRACTITIONER

## 2022-06-08 PROCEDURE — 94799 UNLISTED PULMONARY SVC/PX: CPT

## 2022-06-08 PROCEDURE — 36600 WITHDRAWAL OF ARTERIAL BLOOD: CPT

## 2022-06-08 PROCEDURE — 82803 BLOOD GASES ANY COMBINATION: CPT

## 2022-06-08 PROCEDURE — 84100 ASSAY OF PHOSPHORUS: CPT | Performed by: INTERNAL MEDICINE

## 2022-06-08 RX ORDER — CHLORHEXIDINE GLUCONATE 500 MG/1
1 CLOTH TOPICAL EVERY 12 HOURS
Status: DISCONTINUED | OUTPATIENT
Start: 2022-06-09 | End: 2022-06-10

## 2022-06-08 RX ORDER — ALPRAZOLAM 0.25 MG/1
0.25 TABLET ORAL EVERY 8 HOURS PRN
Status: DISCONTINUED | OUTPATIENT
Start: 2022-06-08 | End: 2022-06-10

## 2022-06-08 RX ORDER — CHLORHEXIDINE GLUCONATE 0.12 MG/ML
15 RINSE ORAL EVERY 12 HOURS SCHEDULED
Status: DISCONTINUED | OUTPATIENT
Start: 2022-06-09 | End: 2022-06-10

## 2022-06-08 RX ORDER — DEXTROSE MONOHYDRATE 25 G/50ML
50 INJECTION, SOLUTION INTRAVENOUS
Status: DISCONTINUED | OUTPATIENT
Start: 2022-06-08 | End: 2022-06-10

## 2022-06-08 RX ADMIN — GABAPENTIN 600 MG: 600 TABLET, FILM COATED ORAL at 09:27

## 2022-06-08 RX ADMIN — INSULIN HUMAN 2 UNITS: 100 INJECTION, SOLUTION PARENTERAL at 09:27

## 2022-06-08 RX ADMIN — TAMSULOSIN HYDROCHLORIDE 0.4 MG: 0.4 CAPSULE ORAL at 09:27

## 2022-06-08 RX ADMIN — GABAPENTIN 600 MG: 600 TABLET, FILM COATED ORAL at 20:37

## 2022-06-08 RX ADMIN — METHYLPREDNISOLONE SODIUM SUCCINATE 40 MG: 40 INJECTION, POWDER, FOR SOLUTION INTRAMUSCULAR; INTRAVENOUS at 09:27

## 2022-06-08 RX ADMIN — Medication 10 ML: at 20:38

## 2022-06-08 RX ADMIN — ARFORMOTEROL TARTRATE 15 MCG: 15 SOLUTION RESPIRATORY (INHALATION) at 08:19

## 2022-06-08 RX ADMIN — AMLODIPINE BESYLATE 5 MG: 5 TABLET ORAL at 09:27

## 2022-06-08 RX ADMIN — DEXTROSE MONOHYDRATE 50 ML: 25 INJECTION, SOLUTION INTRAVENOUS at 09:31

## 2022-06-08 RX ADMIN — Medication 100 MG: at 20:37

## 2022-06-08 RX ADMIN — INSULIN LISPRO 2 UNITS: 100 INJECTION, SOLUTION INTRAVENOUS; SUBCUTANEOUS at 12:09

## 2022-06-08 RX ADMIN — METHYLPREDNISOLONE SODIUM SUCCINATE 40 MG: 40 INJECTION, POWDER, FOR SOLUTION INTRAMUSCULAR; INTRAVENOUS at 02:58

## 2022-06-08 RX ADMIN — ASPIRIN 81 MG: 81 TABLET, CHEWABLE ORAL at 09:27

## 2022-06-08 RX ADMIN — Medication 10 ML: at 09:28

## 2022-06-08 RX ADMIN — ENOXAPARIN SODIUM 40 MG: 100 INJECTION SUBCUTANEOUS at 09:27

## 2022-06-08 RX ADMIN — BUDESONIDE 0.5 MG: 0.5 INHALANT RESPIRATORY (INHALATION) at 20:14

## 2022-06-08 RX ADMIN — THERA TABS 1 TABLET: TAB at 09:27

## 2022-06-08 RX ADMIN — ARFORMOTEROL TARTRATE 15 MCG: 15 SOLUTION RESPIRATORY (INHALATION) at 20:09

## 2022-06-08 RX ADMIN — SODIUM BICARBONATE 50 MEQ: 84 INJECTION, SOLUTION INTRAVENOUS at 09:26

## 2022-06-08 RX ADMIN — CARVEDILOL 6.25 MG: 6.25 TABLET, FILM COATED ORAL at 17:16

## 2022-06-08 RX ADMIN — INSULIN HUMAN 10 UNITS: 100 INJECTION, SOLUTION PARENTERAL at 09:31

## 2022-06-08 RX ADMIN — Medication 100 MG: at 09:26

## 2022-06-08 RX ADMIN — GUAIFENESIN 1200 MG: 600 TABLET, EXTENDED RELEASE ORAL at 14:02

## 2022-06-08 RX ADMIN — SODIUM ZIRCONIUM CYCLOSILICATE 10 G: 10 POWDER, FOR SUSPENSION ORAL at 09:26

## 2022-06-08 RX ADMIN — INSULIN HUMAN 2 UNITS: 100 INJECTION, SOLUTION PARENTERAL at 02:58

## 2022-06-08 RX ADMIN — BUDESONIDE 0.5 MG: 0.5 INHALANT RESPIRATORY (INHALATION) at 08:26

## 2022-06-08 RX ADMIN — ENOXAPARIN SODIUM 40 MG: 100 INJECTION SUBCUTANEOUS at 20:36

## 2022-06-08 RX ADMIN — CARVEDILOL 6.25 MG: 6.25 TABLET, FILM COATED ORAL at 09:27

## 2022-06-08 RX ADMIN — ATORVASTATIN CALCIUM 40 MG: 40 TABLET, FILM COATED ORAL at 09:29

## 2022-06-08 NOTE — PROGRESS NOTES
[facility] Medicine Services Daily Progress Note    Patient Name: Chi Quinteros Sr.  : 1955  MRN: [id number]  Primary Care Physician:  Deysi Mason APRN  Date of admission: 2022      Subjective      Chief Complaint: Chest pain    6/3  Vitals and labs reviewed  Awaiting CT surgery recommendation regarding possible cardiac surgery    Notes reviewed      Apparently intermittently confused overnight and angry intermittently  His vitals are stable requiring 2 L of oxygen afebrile  Suspected alcohol withdrawal.  CIWA score was 12 today with the nursing staff  On alcohol withdrawal protocol  Psychiatrist has been consulted by cardiac surgery  Add thiamine        /  Short of breath and wheezing much more than last few days.  Her last pulmonologist for evaluation given need for bypass surgery  Continue bronchodilators  Will give additional steroid with Solu-Medrol  Repeat chest x-ray shows no acute  problem      Labs and notes reviewed    2022  Patient seen and examined  Denies any chest pain, shortness of breath no palpitation.  Scheduled tentatively for CABG 6/10/2022    2022  Patient seen and examined  No new complaints    Review of Systems   Constitutional: Negative for chills and fever.   HENT: Negative for congestion.    Eyes: Negative for blurred vision.   Cardiovascular: Negative for chest pain.   Respiratory: Negative for shortness of breath.    Endocrine: Negative for cold intolerance and heat intolerance.   Gastrointestinal: Negative for abdominal pain, nausea and vomiting.   Genitourinary: Negative for dysuria.   Neurological: Negative for focal weakness.   Psychiatric/Behavioral: Negative for altered mental status.         Objective      Vitals:   Temp:  [97.4 °F (36.3 °C)-98.8 °F (37.1 °C)] 97.6 °F (36.4 °C)  Heart Rate:  [57-87] 71  Resp:  [16-20] 18  BP: (106-128)/(55-73) 124/73  Flow (L/min):  [2] 2    Physical Exam  Vitals reviewed.   Constitutional:        General: He is not in acute distress.  HENT:      Head: Normocephalic and atraumatic.      Nose: Nose normal.   Eyes:      Extraocular Movements: Extraocular movements intact.      Conjunctiva/sclera: Conjunctivae normal.      Pupils: Pupils are equal, round, and reactive to light.   Cardiovascular:      Rate and Rhythm: Normal rate and regular rhythm.   Pulmonary:      Effort: No respiratory distress.      Breath sounds: Normal breath sounds.   Abdominal:      General: Bowel sounds are normal.      Palpations: Abdomen is soft.      Tenderness: There is no abdominal tenderness.   Musculoskeletal:         General: Normal range of motion.      Cervical back: Normal range of motion.   Skin:     General: Skin is warm and dry.   Neurological:      General: No focal deficit present.      Mental Status: He is alert and oriented to person, place, and time.   Psychiatric:         Mood and Affect: Mood normal.            Result Review    Result Review:  I have personally reviewed the results from the time of this admission to 6/8/2022 09:43 EDT and agree with these findings:  [x]  Laboratory  [x]  Microbiology  [x]  Radiology  [x]  EKG/Telemetry   [x]  Cardiology/Vascular   []  Pathology  [x]  Old records  []  Other:          Wounds (last 24 hours)     LDA Wound     Row Name 06/08/22 0400 06/08/22 0000 06/07/22 2000       Wound 06/01/22 0020 Right posterior elbow Skin Tear    Wound - Properties Group Placement Date: 06/01/22  - Placement Time: 0020  -AH Present on Hospital Admission: Y  -AH Side: Right  -AH Orientation: posterior  -AH Location: elbow  -AH Primary Wound Type: Skin tear  -AH Additional Comments: mepilex placed  -AH    Base dressing in place, unable to visualize  -AS dressing in place, unable to visualize  -AS dressing in place, unable to visualize  -AS    Retired Wound - Properties Group Placement Date: 06/01/22  - Placement Time: 0020  -AH Present on Hospital Admission: Y  -AH Side: Right  -AH  Orientation: posterior  -AH Location: elbow  -AH Primary Wound Type: Skin tear  -AH Additional Comments: mepilex placed  -AH    Retired Wound - Properties Group Date first assessed: 06/01/22  - Time first assessed: 0020  -AH Present on Hospital Admission: Y  -AH Side: Right  -AH Location: elbow  -AH Primary Wound Type: Skin tear  -AH Additional Comments: mepilex placed  -AH          User Key  (r) = Recorded By, (t) = Taken By, (c) = Cosigned By    Initials Name Provider Type    AS Arlene Dunn, RN Registered Nurse     Deysi Almendarez RN Registered Nurse                  Assessment & Plan      Brief Patient Summary:    Chi Quinteros Sr. is a 67 y.o. male who initially presented to the emergency department on 5/31/2022 with complaints of syncope.  He was noted to be hypoxic in the 80s by EMS and reported at that time that he drinks about 5-6 beers daily.  His troponins were elevated at 0.415 then 0.326 and then 0.116.  He underwent a stress test which showed severe ischemia to lateral inferior wall and subsequently underwent a cardiac catheterization on 6/2/2022 which showed severe three-vessel coronary artery disease with mild left ventricular dysfunction.  He was admitted for cardiothoracic surgery evaluation for coronary artery bypass grafting.    Initially upon admission he had a creatinine of 1.9 and a GFR of 38.2 and was seen by nephrology for clearance for the cardiac catheterization.  Creatinine subsequently improved to 0.86 with a GFR of 94.9.  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    CIWA precautions have been put in place      amLODIPine, 5 mg, Oral, Daily  arformoterol, 15 mcg, Nebulization, BID - RT  aspirin, 81 mg, Oral, Daily  atorvastatin, 40 mg, Oral, Daily  budesonide, 0.5 mg, Nebulization, BID - RT  carvedilol, 6.25 mg, Oral, BID With Meals  enoxaparin, 40 mg,  Subcutaneous, Q12H  gabapentin, 600 mg, Oral, BID  guaiFENesin, 1,200 mg, Oral, Q12H  insulin lispro, 0-7 Units, Subcutaneous, TID AC  methylPREDNISolone sodium succinate, 40 mg, Intravenous, Q8H  multivitamin, 1 tablet, Oral, Daily  sodium chloride, 10 mL, Intravenous, Q12H  tamsulosin, 0.4 mg, Oral, Daily  thiamine, 100 mg, Oral, BID       Pharmacy Consult - Steroid Insulin Protocol,   Pharmacy to dose Trelegy,          Active Hospital Problems:  Active Hospital Problems    Diagnosis    • Syncope, unspecified syncope type    • Acute renal insufficiency    • Alcohol dependence (HCC)    • Essential hypertension    • Other emphysema (HCC)    • Coronary artery disease    • COPD exacerbation (HCC)    • Obesity (BMI 30-39.9)    • Abnormal nuclear stress test      Added automatically from request for surgery [id number]       Plan:     Syncopal episode-most likely cardiac in origin  Abnormal stress test and heart cath showing three-vessel disease  Was evaluated by CV[facility] and bypass surgery planned tentatively for 6/10/2022.  On medical management  On aspirin, Coreg and statin    Chronic systolic congestive heart failure EF 40%  Most likely ischemic cardiomyopathy  Compensated  On Coreg and lisinopril      Acute kidney injury  Improved  Nephrologist following      Alcohol dependence  On withdrawal protocol    Essential hypertension,  Blood pressure marginal  Continue on lisinopril and Coreg  Discontinue Norvasc      COPD exacerbation  Emphysema  Respiratory care with bronchodilators  On Solu-Medrol  Also on sliding scale insulin for steroid-induced hyperglycemia  Oxygen therapy and titration  Pulmonologist following-patient high risk for pulmonary complication with surgery      Obesity BMI 34  Lifestyle management education given    BPH-on Flomax      DVT prophylaxis:  Medical and mechanical DVT prophylaxis orders are present.    CODE STATUS:    Code Status (Patient has no pulse and is not breathing): CPR (Attempt to  Resuscitate)  Medical Interventions (Patient has pulse or is breathing): Full Support      Disposition: Pending clinical progress    This patient has been examined wearing appropriate Personal Protective Equipment  06/08/22      Electronically signed by Giancarlo Soto MD, 06/08/22, 09:43 EDT.  [facility], [place], operated by [facility] Hospitalist Team

## 2022-06-08 NOTE — SIGNIFICANT NOTE
06/08/22 0842   OTHER   Discipline occupational therapist   Rehab Time/Intention   Session Not Performed other (see comments)  (PT reports no need for Occupational Therapy evaluation until after CABG procedure. Will sign off, with need for new orders post surgery.)

## 2022-06-08 NOTE — PROGRESS NOTES
Cardiology Progress Note      Admiting Physician:  Giancarlo oSto MD   LOS: 3 days       Reason For Followup:  Multivessel coronary artery disease      Subjective:    Interval History:  Seen and examined.  Chart and labs reviewed.  Patient appears to be in a normal cognitive state.  He is upset that his surgery has been postponed.  He reports that he does not have a drinking problem and that he only has 2-3 drinks a night.    Review of Systems:  A complete review of systems was attempted however patient's cognitive status is questionable.  He denies chest pain or shortness of breath at this time.    Assessment & Plan    Impressions:  Syncope  Multivessel coronary artery disease  Hyperlipidemia  Hypertension  Ischemic cardiomyopathy EF 40%  Acute kidney injury-improved  Alcohol dependence  Altered mental status likely secondary to alcohol withdrawal    Recommendations:  Patient will have coronary bypass surgery on Friday  Patient is followed by the cardiac surgeons also  Till then continue medical therapy  Blood pressure heart rate are stable  Renal function has significantly improved since he is on IV fluids  Monitor rate and rhythm closely  Further recommendations as patient's course progresses  Alcohol withdrawal/altered mentation as per admitting service  Patient is much better now and does not have any withdrawal symptoms  Blood pressure and heart rate are stable and tolerating oral medicines including aspirin carvedilol statins and amlodipine    Objective:    Medication Review:   Scheduled Meds:amLODIPine, 5 mg, Oral, Daily  arformoterol, 15 mcg, Nebulization, BID - RT  aspirin, 81 mg, Oral, Daily  atorvastatin, 40 mg, Oral, Daily  budesonide, 0.5 mg, Nebulization, BID - RT  carvedilol, 6.25 mg, Oral, BID With Meals  [START ON 6/10/2022] ceFAZolin, 2 g, Intravenous, Once  [START ON 6/9/2022] chlorhexidine, 15 mL, Mouth/Throat, Q12H  Chlorhexidine Gluconate Cloth, 1 application, Topical, Q12H  enoxaparin, 40 mg,  Subcutaneous, Q12H  gabapentin, 600 mg, Oral, BID  guaiFENesin, 1,200 mg, Oral, Q12H  insulin lispro, 0-7 Units, Subcutaneous, TID AC  methylPREDNISolone sodium succinate, 40 mg, Intravenous, Q8H  [START ON 6/10/2022] metoprolol tartrate, 12.5 mg, Oral, Once  multivitamin, 1 tablet, Oral, Daily  [START ON 6/9/2022] mupirocin, 1 application, Each Nare, Q12H  sodium chloride, 10 mL, Intravenous, Q12H  tamsulosin, 0.4 mg, Oral, Daily  thiamine, 100 mg, Oral, BID      Continuous Infusions:Pharmacy Consult - Steroid Insulin Protocol,   Pharmacy to dose Trelegy,       PRN Meds:.•  acetaminophen **OR** acetaminophen **OR** acetaminophen  •  ALPRAZolam  •  aluminum-magnesium hydroxide-simethicone  •  atropine  •  benzonatate  •  dextrose  •  dextrose  •  dextrose  •  glucagon (human recombinant)  •  hydrALAZINE  •  ipratropium-albuterol  •  LORazepam **OR** LORazepam **OR** LORazepam **OR** LORazepam **OR** LORazepam **OR** LORazepam  •  magnesium sulfate **OR** magnesium sulfate **OR** magnesium sulfate  •  melatonin  •  nitroglycerin  •  ondansetron **OR** ondansetron  •  Pharmacy Consult - Steroid Insulin Protocol  •  Pharmacy to dose Trelegy  •  potassium chloride **OR** potassium chloride **OR** potassium chloride  •  sodium chloride  •  sodium chloride  •  sodium chloride    Patient Active Problem List   Diagnosis   • COPD exacerbation ([facility])   • Obesity (BMI 30-39.9)   • Abnormal nuclear stress test   • Acute renal insufficiency   • Alcohol dependence ([facility])   • Essential hypertension   • Other emphysema ([facility])   • Coronary artery disease   • Syncope, unspecified syncope type         Physical Exam:    General: Alert, cooperative, no distress, appears stated age  Head:  Normocephalic, atraumatic, mucous membranes moist  Eyes:  Conjunctivae/corneas clear, EOM's intact     Neck:  Supple,  no bruit  Lungs:  Clear to auscultation bilaterally, no wheezes rhonchi rales are noted  Chest wall: No tenderness  Heart::  Regular  rate and rhythm, S1 and S2 normal, 1/6 holosystolic murmur.  No rub or gallop  Abdomen: Soft, non-tender, nondistended bowel sounds active.  Obese  Extremities: No cyanosis, clubbing, or edema  Pulses: Diminished pedal pulses  Skin:  No rashes or lesions  Neuro/psych: A&O x3. CN II through XII are grossly intact with appropriate affect    Vital Signs:  Vitals:    06/08/22 0828 06/08/22 0913 06/08/22 1311 06/08/22 1610   BP:  124/73 128/64 102/72   BP Location:       Patient Position:       Pulse: 69 71 71 65   Resp: 16 18 18 18   Temp:  97.6 °F (36.4 °C) 98.1 °F (36.7 °C) 97.9 °F (36.6 °C)   TempSrc:  Oral Oral Oral   SpO2: 99% 93% 92% 95%   Weight:       Height:         Wt Readings from Last 1 Encounters:   06/08/22 107 kg (236 lb 8.9 oz)       Intake/Output Summary (Last 24 hours) at 6/8/2022 1621  Last data filed at 6/8/2022 1434  Gross per 24 hour   Intake 1440 ml   Output 2300 ml   Net -860 ml         Results Review:     CBC    Results from last 7 days   Lab Units 06/08/22  0403 06/07/22  0411 06/05/22  0540 06/03/22  1014 06/02/22  0500   WBC 10*3/mm3 7.70 8.10 6.60 6.00 6.80   HEMOGLOBIN g/dL 13.3 13.2 12.5* 13.5 12.9*   PLATELETS 10*3/mm3 237 229 155 180 161     Cr Clearance Estimated Creatinine Clearance: 91.5 mL/min (by C-G formula based on SCr of 0.96 mg/dL).  Coag   Results from last 7 days   Lab Units 06/02/22  0500   INR  1.00     HbA1C   Lab Results   Component Value Date    HGBA1C 5.5 06/03/2022     Blood Glucose   Glucose   Date/Time Value Ref Range Status   06/08/2022 1610 109 (H) 70 - 105 mg/dL Final     Comment:     Serial Number: [id number]:  [id number]   06/08/2022 1102 169 (H) 70 - 105 mg/dL Final     Comment:     Serial Number: [id number]:  [id number]   06/08/2022 0703 144 (H) 70 - 105 mg/dL Final     Comment:     Serial Number: [id number]:  [id number]   06/07/2022 2053 175 (H) 70 - 105 mg/dL Final     Comment:     Serial Number: [id number]:  [id number]   06/07/2022  1606 191 (H) 70 - 105 mg/dL Final     Comment:     Serial Number: [id number]:  [id number]   06/07/2022 1110 151 (H) 70 - 105 mg/dL Final     Comment:     Serial Number: [id number]:  [id number]   06/07/2022 0718 131 (H) 70 - 105 mg/dL Final     Comment:     Serial Number: [id number]:  [id number]   06/06/2022 2012 157 (H) 70 - 105 mg/dL Final     Comment:     Serial Number: [id number]:  [id number]     Infection       CMP   Results from last 7 days   Lab Units 06/08/22  1411 06/08/22  0403 06/07/22  0411 06/05/22  0539 06/04/22  1744 06/04/22  1008 06/03/22  1014 06/02/22  0500   SODIUM mmol/L  --  136 134* 134*  --  136 139 138   POTASSIUM mmol/L 4.7 5.7* 4.8 3.9  --  4.0 4.1 4.5   CHLORIDE mmol/L  --  98 98 97*  --  99 103 103   CO2 mmol/L  --  30.0* 27.0 27.0  --  27.0 24.0 23.0   BUN mg/dL  --  27* 27* 16  --  18 15 13   CREATININE mg/dL  --  0.96 0.89 0.98  --  1.02 1.07 0.86   GLUCOSE mg/dL  --  128* 139* 105*  --  122* 157* 159*   ALBUMIN g/dL  --   --   --   --   --   --   --  3.50   BILIRUBIN mg/dL  --   --   --   --   --   --   --  0.2   ALK PHOS U/L  --   --   --   --   --   --   --  55   AST (SGOT) U/L  --   --   --   --   --   --   --  22   ALT (SGPT) U/L  --   --   --   --   --   --   --  26   AMMONIA umol/L  --   --   --   --  34  --   --   --      ABG      UA        ANGELO  No results found for: POCMETH, POCAMPHET, POCBARBITUR, POCBENZO, POCCOCAINE, POCOPIATES, POCOXYCODO, POCPHENCYC, POCPROPOXY, POCTHC, POCTRICYC  Lysis Labs   Results from last 7 days   Lab Units 06/08/22  0403 06/07/22  0411 06/05/22  0540 06/05/22  0539 06/04/22  1008 06/03/22  1014 06/02/22  0500   INR   --   --   --   --   --   --  1.00   HEMOGLOBIN g/dL 13.3 13.2 12.5*  --   --  13.5 12.9*   PLATELETS 10*3/mm3 237 229 155  --   --  180 161   CREATININE mg/dL 0.96 0.89  --  0.98 1.02 1.07 0.86     Radiology(recent) No radiology results for the last day            Imaging Results (Last 24 Hours)     ** No  results found for the last 24 hours. **          Cardiac Studies:  Echo- Results for orders placed during the hospital encounter of 05/31/22    Adult Transthoracic Echo Complete With Contrast if Necessary Per Protocol (With Agitated Saline)    Interpretation Summary  · Left ventricular ejection fraction appears to be 56 - 60%.  · The right ventricular cavity is mildly dilated.  · Mild dilation of the aortic root is present.  · No pericardial effusion noted    Stress Myoview-  Cath-        Frank Giraldo MD  06/08/22  16:21 EDT

## 2022-06-08 NOTE — CONSULTS
Nutrition Services    Patient Name: Chi Quinteros Sr.  YOB: 1955  MRN: [id number]  Admission date: 5/31/2022    PPE Documentation        PPE Worn By Provider Did not enter room this encounter    PPE Worn By Patient  N/A     NUTRITION SCREENING      Encounter Information: LOS x 8 days.  Noted being worked up for CABG possibly Friday.  Noted with CIWA protocol.  Sitter at bedside at times.         PO Diet: Diet Cardiac, Renal; Healthy Heart; 2gm K+   PO Supplements: None ordered    PO Intake:  94% average PO intakes since admission        Labs (reviewed below): Reviewed, managed per attending        GI Function:  Last BM 6/8 (today)       Skin: Skin tear elbow        Weight Hx Review: Wt Readings from Last 50 Encounters:   06/08/22 107 kg (236 lb 8.9 oz)          Nutrition Intervention: Continue current diet and encourage good po intakes.       Results from last 7 days   Lab Units 06/08/22 0403 06/07/22 0411 06/05/22  0539 06/03/22  1014 06/02/22  0500   SODIUM mmol/L 136 134* 134*   < > 138   POTASSIUM mmol/L 5.7* 4.8 3.9   < > 4.5   CHLORIDE mmol/L 98 98 97*   < > 103   CO2 mmol/L 30.0* 27.0 27.0   < > 23.0   BUN mg/dL 27* 27* 16   < > 13   CREATININE mg/dL 0.96 0.89 0.98   < > 0.86   CALCIUM mg/dL 9.1 8.8 8.6   < > 8.3*   BILIRUBIN mg/dL  --   --   --   --  0.2   ALK PHOS U/L  --   --   --   --  55   ALT (SGPT) U/L  --   --   --   --  26   AST (SGOT) U/L  --   --   --   --  22   GLUCOSE mg/dL 128* 139* 105*   < > 159*    < > = values in this interval not displayed.     Results from last 7 days   Lab Units 06/08/22 0403 06/07/22  0411 06/03/22  1014 06/02/22  0500   MAGNESIUM mg/dL 2.3 2.3  --  2.1   PHOSPHORUS mg/dL 5.0* 4.5   < >  --    HEMOGLOBIN g/dL 13.3 13.2   < > 12.9*   HEMATOCRIT % 40.9 39.3   < > 39.3    < > = values in this interval not displayed.     COVID19   Date Value Ref Range Status   05/31/2022 Not Detected Not Detected - Ref. Range Final     Lab Results   Component Value Date     HGBA1C 5.5 06/03/2022       RD to follow up per protocol.    Electronically signed by:  Delmi Cornejo RD  06/08/22 12:57 EDT

## 2022-06-08 NOTE — PROGRESS NOTES
CC:  Syncopal episodes, witnessed    F/U:  CAD/asc aortic aneurysm--[place]  EF 40% (cath)    Subjective:  Reports he feels better, ready to get heart surgery over with    No events overnight        PREOP studies:  Carotid duplex:  16-49% bilat  Vein regina:  Adequate in bilat thighs  A1c:  5.5   Plt agg:  pending  MRSA screen:  pending  COVID-19 screen:  Neg 5/31  UA:  Neg for UTI  PFTs:  FEV1/FVC 73, FEV1 45, DLCO 40  Echo:  55-60%, RV mild dilatation, aortic root dilatation, mild MR, mild to mod TR      Intake/Output Summary (Last 24 hours) at 6/8/2022 1425  Last data filed at 6/8/2022 0828  Gross per 24 hour   Intake 960 ml   Output 1550 ml   Net -590 ml     Temp:  [97.4 °F (36.3 °C)-98.7 °F (37.1 °C)] 98.1 °F (36.7 °C)  Heart Rate:  [57-79] 71  Resp:  [16-20] 18  BP: (106-128)/(55-73) 128/64      Results from last 7 days   Lab Units 06/08/22  0403 06/07/22  0411 06/03/22  1014 06/02/22  0500   WBC 10*3/mm3 7.70 8.10   < > 6.80   HEMOGLOBIN g/dL 13.3 13.2   < > 12.9*   HEMATOCRIT % 40.9 39.3   < > 39.3   PLATELETS 10*3/mm3 237 229   < > 161   INR   --   --   --  1.00    < > = values in this interval not displayed.     Results from last 7 days   Lab Units 06/08/22  0403   CREATININE mg/dL 0.96   POTASSIUM mmol/L 5.7*   SODIUM mmol/L 136   MAGNESIUM mg/dL 2.3   PHOSPHORUS mg/dL 5.0*       Physical Exam:  Neuro intact, nad, up in chair visiting with friends  Tele:  SR 60s  Diminished bases, 92% RA  Benign abd, + BM  No edema    Assessment/Plan:  Active Problems:    COPD exacerbation (HCC)    Obesity (BMI 30-39.9)    Abnormal nuclear stress test    Acute renal insufficiency    Alcohol dependence (HCC)    Essential hypertension    Other emphysema (HCC)    Coronary artery disease    Syncope, unspecified syncope type    - MV CAD, EF 40% (cath)--surgical workup in progress  - NSTEMI presentation  - Ascending aortic aneurysm,  4.9-5.0 cm  - Admit with syncopal event x2--orthostatic on admit  - Severe COPD--pulm  following, on steroids  - HTN--stable  - HLD--statin  - Lumbar herniation--back surgery pending  - Early prostate cancer--has follow up as outpatient, probable radiation treatment per patient  - FELIBERTO--resolved with volume repletion, Dr. Casarez following  - ETOH use--reports 3 beers/day and bourbon--withdrawal this weekend, improved    Plans for CABG/asc ao repair with Dr. Hughes on Friday.  Preops initiated.  Off steroids currently.  Pt is aware and agreeable.  Will transfer to [facility] when bed opens.    Christal Vora, APRN  6/8/2022  14:25 EDT

## 2022-06-08 NOTE — PAYOR COMM NOTE
"RECONSIDERATION OF CASE# [id number]      ER admit with c/o syncopal episode and elevated troponin levels. Cardiac cath found patient to have severe 3 vessel disease.   Patient pending urgent CABG on Friday 6/10/22 - delay due to current COPD exacerbation.   Cardiology, CVS and Pulmonary notes attached.     See previously sent clinical for ER and H&P notes.   ======  AUTHORIZATION PENDING:   PLEASE FAX OR CALL DETERMINATION TO CONTACT BELOW:       THANK YOU,    CADEN Gamez, RN  Utilization Review  [facility]  Phone: [phone]  Fax: [phone]      NPI: [id number]  Tax ID: [id number]      Chi Quinteros Sr. (67 y.o. Male)             Date of Birth   1955    Social Security Number       Address   [address] [place] [address]    Home Phone   [phone]    MRN   [id number]       Scientology   None    Marital Status                               Admission Date   5/31/22    Admission Type   Emergency    Admitting Provider   Manoj Madrid MD    Attending Provider   Giancarlo Soto MD    Department, Room/Bed   [facility] PROGRESS CARE, 2121/1       Discharge Date       Discharge Disposition       Discharge Destination                               Attending Provider: Giancarlo Soto MD    Allergies: No Known Allergies    Isolation: None   Infection: None   Code Status: CPR   Advance Care Planning Activity    Ht: 177.8 cm (70\")   Wt: 107 kg (236 lb 8.9 oz)    Admission Cmt: None   Principal Problem: Syncope [R55]                 Active Insurance as of 5/31/2022     Primary Coverage     Payor Plan Insurance Group Employer/Plan Group    ANTH BLUE CROSS [facility] BLUE CROSS BLUE [facility] PPO [id number]     Payor Plan Address Payor Plan Phone Number Payor Plan Fax Number Effective Dates    [address] [phone]  1/1/2022 - None Entered    [place] [address]       Subscriber Name Subscriber Birth Date Member ID       CHI QUINTEROS SR. 1955 [id number]           Secondary " Coverage     Payor Plan Insurance Group Employer/Plan Group    HUMANA MEDICARE REPLACEMENT HUMANA MEDICARE REPLACEMENT [id number]     Payor Plan Address Payor Plan Phone Number Payor Plan Fax Number Effective Dates    [address] [address] [phone]  5/1/2022 - None Entered    [facility] [id number]       Subscriber Name Subscriber Birth Date Member ID       MARIZOL BOWERS SR. 1955 [id number]                 Emergency Contacts      (Rel.) Home Phone Work Phone Mobile Phone    ELIZ LOWRY (Son) -- -- [phone]              Facility-Administered Medications as of 6/8/2022   Medication Dose Route Frequency Provider Last Rate Last Admin   • acetaminophen (TYLENOL) tablet 650 mg  650 mg Oral Q4H PRN Frank Giraldo MD        Or   • acetaminophen (TYLENOL) 160 MG/5ML solution 650 mg  650 mg Oral Q4H PRN Frank Giraldo MD        Or   • acetaminophen (TYLENOL) suppository 650 mg  650 mg Rectal Q4H PRN Frank Giraldo MD       • [COMPLETED] Acetylcysteine capsule 600 mg  600 mg Oral Q8H Eliseo Casarez MD   600 mg at 06/02/22 2217   • [COMPLETED] albuterol sulfate HFA (PROVENTIL HFA;VENTOLIN HFA;PROAIR HFA) inhaler 2 puff  2 puff Inhalation Once Harris Boateng MD   2 puff at 06/03/22 1511   • [COMPLETED] albuterol sulfate HFA (PROVENTIL HFA;VENTOLIN HFA;PROAIR HFA) inhaler 2 puff  2 puff Inhalation Once Giancarlo Soto MD   2 puff at 06/07/22 1452   • [COMPLETED] albuterol sulfate HFA (PROVENTIL HFA;VENTOLIN HFA;PROAIR HFA) inhaler 6 puff  6 puff Inhalation Once Brain Brock PA   6 puff at 05/31/22 2144   • aluminum-magnesium hydroxide-simethicone (MAALOX MAX) 400-400-40 MG/5ML suspension 15 mL  15 mL Oral Q6H PRN Frank Giraldo MD       • amLODIPine (NORVASC) tablet 5 mg  5 mg Oral Daily Frank Giraldo MD   5 mg at 06/08/22 0927   • arformoterol (BROVANA) nebulizer solution 15 mcg  15 mcg Nebulization BID - RT Ca Santo MD   15 mcg at 06/08/22 0819   • [COMPLETED] aspirin chewable  tablet 324 mg  324 mg Oral Once Brian Brock PA   324 mg at 06/01/22 0044   • aspirin chewable tablet 81 mg  81 mg Oral Daily Benson Hughes MD   81 mg at 06/08/22 0927   • atorvastatin (LIPITOR) tablet 40 mg  40 mg Oral Daily Harris Boateng MD   40 mg at 06/08/22 0929   • atropine sulfate injection 0.5 mg  0.5 mg Intravenous Q5 Min PRN Frank Giraldo MD       • benzonatate (TESSALON) capsule 200 mg  200 mg Oral TID PRN Frank Giraldo MD       • budesonide (PULMICORT) nebulizer solution 0.5 mg  0.5 mg Nebulization BID - RT Ca Santo MD   0.5 mg at 06/08/22 0826   • carvedilol (COREG) tablet 6.25 mg  6.25 mg Oral BID With Meals Harris Boateng MD   6.25 mg at 06/08/22 0927   • dextrose (D50W) (25 g/50 mL) IV injection 25 g  25 g Intravenous Q15 Min PRN Jaziel Rendon MD       • dextrose (D50W) (25 g/50 mL) IV injection 50 mL  50 mL Intravenous Q1H PRN Dre Cooper MD   50 mL at 06/08/22 0931   • dextrose (GLUTOSE) oral gel 15 g  15 g Oral Q15 Min PRN Jaziel Rendon MD       • [COMPLETED] Enoxaparin Sodium (LOVENOX) syringe 100 mg  1 mg/kg Subcutaneous Once Brian Brock PA   100 mg at 06/01/22 0044   • Enoxaparin Sodium (LOVENOX) syringe 40 mg  40 mg Subcutaneous Q12H Benson Hughes MD   40 mg at 06/08/22 0927   • gabapentin (NEURONTIN) tablet 600 mg  600 mg Oral BID Frank Giraldo MD   600 mg at 06/08/22 0927   • glucagon (human recombinant) (GLUCAGEN DIAGNOSTIC) 1 mg in sterile water (preservative free) 1 mL injection  1 mg Intramuscular Q15 Min PRN Jaziel Rendon MD       • guaiFENesin (MUCINEX) 12 hr tablet 1,200 mg  1,200 mg Oral Q12H Frank Giraldo MD   1,200 mg at 06/07/22 2333   • hydrALAZINE (APRESOLINE) injection 10 mg  10 mg Intravenous Q6H PRN Janet Leon APRN       • insulin lispro (ADMELOG) injection 0-7 Units  0-7 Units Subcutaneous TID AC Jaziel Rendon MD   2 Units at 06/07/22 1736   • [COMPLETED] insulin regular  (humuLIN R,novoLIN R) injection 10 Units  10 Units Intravenous Once Dre Cooper MD   10 Units at 22 0931   • [COMPLETED] insulin regular (humuLIN R,novoLIN R) injection 2 Units  2 Units Subcutaneous Q8H Israel Hidalgo MD   2 Units at 22 0927   • [COMPLETED] iopamidol (ISOVUE-370) 76 % injection 100 mL  100 mL Intravenous Once in imaging Manoj Madrid MD   100 mL at 22 1243   • ipratropium-albuterol (DUO-NEB) nebulizer solution 3 mL  3 mL Nebulization Q2H PRN Frank Giraldo MD   3 mL at 22 1104   • LORazepam (ATIVAN) tablet 0.5 mg  0.5 mg Oral Q2H PRN Janet Leon APRN        Or   • LORazepam (ATIVAN) injection 0.5 mg  0.5 mg Intravenous Q2H PRN Janet Leon APRN        Or   • LORazepam (ATIVAN) tablet 1 mg  1 mg Oral Q1H PRN Janet Leon APRN        Or   • LORazepam (ATIVAN) injection 1 mg  1 mg Intravenous Q1H PRN Janet Leon APRN   1 mg at 22 0803    Or   • LORazepam (ATIVAN) injection 1 mg  1 mg Intravenous Q15 Min PRN Janet Leon APRN        Or   • LORazepam (ATIVAN) injection 1 mg  1 mg Intramuscular Q15 Min PRN Janet Leon APRN       • [] LORazepam (ATIVAN) tablet 0.5 mg  0.5 mg Oral Q2H PRN Frank Giraldo MD        Or   • [] LORazepam (ATIVAN) tablet 1 mg  1 mg Oral Q2H PRN Frank Giraldo MD        Or   • [] LORazepam (ATIVAN) tablet 0.5 mg  0.5 mg Oral Q2H PRN Frank Giraldo MD   0.5 mg at 22    Or   • [] LORazepam (ATIVAN) tablet 0.5 mg  0.5 mg Oral Q2H PRN Frank Giraldo MD        Or   • [] LORazepam (ATIVAN) tablet 0.5 mg  0.5 mg Oral Q2H PRN Frank Giraldo MD        Or   • [] LORazepam (ATIVAN) tablet 0.5 mg  0.5 mg Oral Q2H PRN Frank Giraldo MD       • Magnesium Sulfate 2 gram Bolus, followed by 8 gram infusion (total Mg dose 10 grams)- Mg less than or equal to 1mg/dL  2 g Intravenous PRN Frank Giraldo MD        Or   • Magnesium Sulfate 2  gram / 50mL Infusion (GIVE X 3 BAGS TO EQUAL 6GM TOTAL DOSE) - Mg 1.1 - 1.5 mg/dl  2 g Intravenous PRN Frank Giraldo MD        Or   • Magnesium Sulfate 4 gram infusion- Mg 1.6-1.9 mg/dL  4 g Intravenous PRN Frank Giraldo MD       • melatonin tablet 5 mg  5 mg Oral Nightly PRN Frank Giraldo MD   5 mg at 06/07/22 2333   • [COMPLETED] methylPREDNISolone sodium succinate (SOLU-Medrol) injection 125 mg  125 mg Intravenous Once Brian Brock PA   125 mg at 06/01/22 0044   • [COMPLETED] methylPREDNISolone sodium succinate (SOLU-Medrol) injection 125 mg  125 mg Intravenous Once Harris Boateng MD   125 mg at 06/05/22 1051   • [COMPLETED] methylPREDNISolone sodium succinate (SOLU-Medrol) injection 40 mg  40 mg Intravenous Q8H Baldomero Hunter PA-C   40 mg at 06/01/22 2353   • methylPREDNISolone sodium succinate (SOLU-Medrol) injection 40 mg  40 mg Intravenous Q8H Ca Santo MD   40 mg at 06/08/22 0927   • multivitamin (THERAGRAN) tablet 1 tablet  1 tablet Oral Daily Harris Boateng MD   1 tablet at 06/08/22 0927   • nitroglycerin (NITROSTAT) SL tablet 0.4 mg  0.4 mg Sublingual Q5 Min PRN Frank Giraldo MD       • ondansetron (ZOFRAN) tablet 4 mg  4 mg Oral Q6H PRN Frank Giraldo MD        Or   • ondansetron (ZOFRAN) injection 4 mg  4 mg Intravenous Q6H PRN Frank Giraldo MD       • Pharmacy Consult - Steroid Insulin Protocol   Does not apply Continuous PRN Israel Hidalgo MD       • Pharmacy to dose Trelegy   Does not apply Continuous PRN Frank Giraldo MD       • potassium chloride (K-DUR,KLOR-CON) CR tablet 40 mEq  40 mEq Oral PRN Frank Giraldo MD        Or   • potassium chloride (KLOR-CON) packet 40 mEq  40 mEq Oral PRN Frank Giraldo MD        Or   • potassium chloride 10 mEq in 100 mL IVPB  10 mEq Intravenous Q1H PRN Frank Giraldo MD       • [COMPLETED] predniSONE (DELTASONE) tablet 20 mg  20 mg Oral Daily With Breakfast Kilo, Harris Mclaughlin MD   20 mg at  06/05/22 0844   • [COMPLETED] regadenoson (LEXISCAN) injection 0.4 mg  0.4 mg Intravenous Once in imaging Manoj de la cruz MD   0.4 mg at 06/01/22 1311   • [COMPLETED] sodium bicarbonate injection 8.4% 50 mEq  50 mEq Intravenous Once Dre Cooper MD   50 mEq at 06/08/22 0926   • [COMPLETED] sodium chloride 0.9 % bolus 1,000 mL  1,000 mL Intravenous Once Brian Brock PA   Stopped at 05/31/22 2235   • sodium chloride 0.9 % flush 10 mL  10 mL Intravenous PRN Frank Giraldo MD       • sodium chloride 0.9 % flush 10 mL  10 mL Intravenous Q12H Frank Giraldo MD   10 mL at 06/08/22 0928   • sodium chloride 0.9 % flush 10 mL  10 mL Intravenous PRN Frank Giraldo MD       • sodium chloride 0.9 % infusion 250 mL  250 mL Intravenous Once PRN Frank Giraldo MD       • [COMPLETED] sodium zirconium cyclosilicate (LOKELMA) pack 10 g  10 g Oral Once Dre Cooper MD   10 g at 06/08/22 0926   • [COMPLETED] sulfur hexafluoride microsph (LUMASON) 60.7-25 MG IV reconstituted suspension reconstituted suspension 2 mL  2 mL Intravenous Once in imaging Manoj Madrid MD   2 mL at 06/01/22 0846   • tamsulosin (FLOMAX) 24 hr capsule 0.4 mg  0.4 mg Oral Daily Janet Leon APRN   0.4 mg at 06/08/22 0927   • [COMPLETED] technetium tetrofosmin (Tc-MYOVIEW) injection 1 dose  1 dose Intravenous Once in imaging Manoj Madrid MD   1 dose at 06/01/22 1213   • [COMPLETED] technetium tetrofosmin (Tc-MYOVIEW) injection 1 dose  1 dose Intravenous Once in imaging Manoj Madrid MD   1 dose at 06/01/22 1311   • [COMPLETED] thiamine (B-1) 100 mg, folic acid 1 mg in sodium chloride 0.9 % 1,000 mL infusion  1,000 mL/hr Intravenous Once Vinod, Brian, PA   Stopped at 06/01/22 0040   • thiamine (VITAMIN B-1) tablet 100 mg  100 mg Oral BID Harris Boateng MD   100 mg at 06/08/22 0926        Physician Progress Notes (last 48 hours)      Dre Cooper MD at 06/08/22 0808          NEPHROLOGY PROGRESS  "NOTE------[facility]    [facility]  [phone]  Amara Cooper MD      Patient Care Team:  Deysi Mason APRN as PCP - General (Nurse Practitioner)  Eliseo Casarez MD as Consulting Physician (Nephrology)      Provider:  Amara Cooper MD  Patient Name: Chi Quinteros Sr.  :  1955    SUBJECTIVE:    F/U ARF/FELIBERTO/CRF/CKD    No SOB, CP, palpitations, dysuria. No cramping.    Medication:  amLODIPine, 5 mg, Oral, Daily  arformoterol, 15 mcg, Nebulization, BID - RT  aspirin, 81 mg, Oral, Daily  atorvastatin, 40 mg, Oral, Daily  budesonide, 0.5 mg, Nebulization, BID - RT  carvedilol, 6.25 mg, Oral, BID With Meals  enoxaparin, 40 mg, Subcutaneous, Q12H  gabapentin, 600 mg, Oral, BID  guaiFENesin, 1,200 mg, Oral, Q12H  insulin lispro, 0-7 Units, Subcutaneous, TID AC  insulin regular, 2 Units, Subcutaneous, Q8H  methylPREDNISolone sodium succinate, 40 mg, Intravenous, Q8H  multivitamin, 1 tablet, Oral, Daily  sodium chloride, 10 mL, Intravenous, Q12H  tamsulosin, 0.4 mg, Oral, Daily  thiamine, 100 mg, Oral, BID      Pharmacy Consult - Steroid Insulin Protocol,   Pharmacy to dose Trelegy,         OBJECTIVE    Vital Sign Min/Max for last 24 hours  Temp  Min: 97.4 °F (36.3 °C)  Max: 98.8 °F (37.1 °C)   BP  Min: 106/60  Max: 128/65   Pulse  Min: 57  Max: 87   Resp  Min: 16  Max: 20   SpO2  Min: 92 %  Max: 100 %   No data recorded   Weight  Min: 107 kg (236 lb 8.9 oz)  Max: 107 kg (236 lb 8.9 oz)     Flowsheet Rows    Flowsheet Row First Filed Value   Admission Height 177.8 cm (70\") Documented at 2022   Admission Weight 104 kg (230 lb) Documented at 2022          No intake/output data recorded.  I/O last 3 completed shifts:  In: 1680 [P.O.:1680]  Out: 2500 [Urine:2500]    Physical Exam:  General Appearance: alert, appears stated age and cooperative  Head: normocephalic, without obvious abnormality and atraumatic  Eyes: " conjunctivae and sclerae normal and no icterus  Neck: supple and no JVD  Lungs: clear to auscultation and respirations regular  Heart: regular rhythm & normal rate and normal S1, S2 +ANTONY  Chest: Wall no abnormalities observed  Abdomen: normal bowel sounds and soft non-tender  Extremities: moves extremities well, no edema, no cyanosis and no redness  Skin: no bleeding, bruising or rash, turgor normal, color normal and no lesions noted  Neurologic: Alert, and oriented. No focal deficits    Labs:    WBC WBC   Date Value Ref Range Status   06/08/2022 7.70 3.40 - 10.80 10*3/mm3 Final   06/07/2022 8.10 3.40 - 10.80 10*3/mm3 Final      HGB Hemoglobin   Date Value Ref Range Status   06/08/2022 13.3 13.0 - 17.7 g/dL Final   06/07/2022 13.2 13.0 - 17.7 g/dL Final      HCT Hematocrit   Date Value Ref Range Status   06/08/2022 40.9 37.5 - 51.0 % Final   06/07/2022 39.3 37.5 - 51.0 % Final      Platlets No results found for: LABPLAT   MCV MCV   Date Value Ref Range Status   06/08/2022 98.9 (H) 79.0 - 97.0 fL Final   06/07/2022 97.8 (H) 79.0 - 97.0 fL Final          Sodium Sodium   Date Value Ref Range Status   06/08/2022 136 136 - 145 mmol/L Final   06/07/2022 134 (L) 136 - 145 mmol/L Final      Potassium Potassium   Date Value Ref Range Status   06/08/2022 5.7 (H) 3.5 - 5.2 mmol/L Final   06/07/2022 4.8 3.5 - 5.2 mmol/L Final      Chloride Chloride   Date Value Ref Range Status   06/08/2022 98 98 - 107 mmol/L Final   06/07/2022 98 98 - 107 mmol/L Final      CO2 CO2   Date Value Ref Range Status   06/08/2022 30.0 (H) 22.0 - 29.0 mmol/L Final   06/07/2022 27.0 22.0 - 29.0 mmol/L Final      BUN BUN   Date Value Ref Range Status   06/08/2022 27 (H) 8 - 23 mg/dL Final   06/07/2022 27 (H) 8 - 23 mg/dL Final      Creatinine Creatinine   Date Value Ref Range Status   06/08/2022 0.96 0.76 - 1.27 mg/dL Final   06/07/2022 0.89 0.76 - 1.27 mg/dL Final      Calcium Calcium   Date Value Ref Range Status   06/08/2022 9.1 8.6 - 10.5 mg/dL  Final   06/07/2022 8.8 8.6 - 10.5 mg/dL Final      PO4 No components found for: PO4   Albumin No results found for: ALBUMIN   Magnesium Magnesium   Date Value Ref Range Status   06/08/2022 2.3 1.6 - 2.4 mg/dL Final   06/07/2022 2.3 1.6 - 2.4 mg/dL Final      Uric Acid No components found for: URIC ACID     Imaging Results (Last 72 Hours)     Procedure Component Value Units Date/Time    XR Chest 1 View [[id number]] Collected: 06/05/22 0958     Updated: 06/05/22 1002    Narrative:      EXAM: XR CHEST 1 VW-     DATE OF EXAM: 6/5/2022 8:58 AM     INDICATION: copd exac  cough; R55-Syncope and collapse; F10.920-Alcohol  use, unspecified with intoxication, uncomplicated; J96.01-Acute  respiratory failure with hypoxia; I95.1-Orthostatic hypotension;  R77.8-Other specified abnormalities of plasma proteins; N17.9-Acute  kidney failure, unspecified; J44.1-Chronic obstructive pulmonary disease  with (acute) exacerbation; R94.39-Abnormal result of other cardiovasc.       COMPARISONS: 5/31/2022      FINDINGS:     Emphysema is evident. No pneumothorax or pleural effusion. Mild  scattered atelectasis. No consolidation. Mediastinum appears unchanged,  no evidence of acute process.       Impression:         Emphysema and mild scattered atelectasis. No evidence of acute  cardiopulmonary process otherwise.     Electronically Signed By-Chad Car On:6/5/2022 10:00 AM  This report was finalized on [id number] by  Chad Car, .          Results for orders placed during the hospital encounter of 05/31/22    XR Chest 1 View    Narrative  EXAM: XR CHEST 1 VW-    DATE OF EXAM: 6/5/2022 8:58 AM    INDICATION: copd exac  cough; R55-Syncope and collapse; F10.920-Alcohol  use, unspecified with intoxication, uncomplicated; J96.01-Acute  respiratory failure with hypoxia; I95.1-Orthostatic hypotension;  R77.8-Other specified abnormalities of plasma proteins; N17.9-Acute  kidney failure, unspecified; J44.1-Chronic obstructive pulmonary  disease  with (acute) exacerbation; R94.39-Abnormal result of other cardiovasc.    COMPARISONS: 5/31/2022    FINDINGS:    Emphysema is evident. No pneumothorax or pleural effusion. Mild  scattered atelectasis. No consolidation. Mediastinum appears unchanged,  no evidence of acute process.    Impression  Emphysema and mild scattered atelectasis. No evidence of acute  cardiopulmonary process otherwise.    Electronically Signed By-Chad Car On:6/5/2022 10:00 AM  This report was finalized on [id number] by  Chad Car, .      XR Chest 1 View    Narrative  Examination: XR CHEST 1 VW-    Date of Exam: 5/31/2022 9:03 PM    Indication: Chest pain protocol.    Comparison: None available.    Technique: Single radiographic view of the chest was obtained.    Findings:  There is no pneumothorax, pleural effusion or focal airspace  consolidation. Cardiomediastinal silhouette is unremarkable. Pulmonary  vasculature appears within normal limits. Regional bones appear grossly  intact.    Impression  No acute cardiopulmonary abnormality.    Electronically Signed By-Johann Dodge MD On:5/31/2022 9:29 PM  This report was finalized on [id number] by  Johann Dodge MD.      Results for orders placed during the hospital encounter of 05/31/22    Duplex Vein Mapping Lower Extremity - Bilateral CAR    Interpretation Summary  The greater saphenous veins are of satisfactory quality from the groin to the mid distal thigh bilaterally.  Distal to this the veins are small and inadequate.        ASSESSMENT / PLAN      COPD exacerbation (HCC)    Obesity (BMI 30-39.9)    Abnormal nuclear stress test    Acute renal insufficiency    Alcohol dependence (HCC)    Essential hypertension    Other emphysema (HCC)    Coronary artery disease    Syncope, unspecified syncope type    1. ARF/FELIBERTO------Nonoliguric. Resolved with volume repletion    2. CAD------No angina or gross overload. ? Surgery Friday    3. BENIGN ESSENTIAL HTN------BP okay. No ACE-I  for now    4. BPH-------On Flomax    5. S/P SYNCOPE    6. ETOH ABUSE    7. HYPERLIPIDEMIA-------On Statin    8. DVT PROPHYLAXIS-------On Lovenox    9. HYPERKALEMIA--------Med Rx and K+ restrict diet and follow      Amara Cooper MD  [facility]  [phone]  06/08/22  08:08 EDT      Electronically signed by Dre Cooper MD at 06/08/22 0918     Lito, ERICA Winslow at 06/08/22 0644          Daily Progress Note        COPD exacerbation (HCC)    Obesity (BMI 30-39.9)    Abnormal nuclear stress test    Acute renal insufficiency    Alcohol dependence (HCC)    Essential hypertension    Other emphysema (HCC)    Coronary artery disease    Syncope, unspecified syncope type      Assessment    COPD with acute exacerbation, improving  Spirometry 6/3/2022 and flow volume loop suggestive of severe obstructive and possible restrictive respiratory defect: Unfortunately total lung capacity and DLCO were not performed   FEV1 45% without significant response to bronchodilators, FEV1/FVC ratio 56 compatible with obstructive defect    Non-STEMI  Three-vessel coronary artery disease  Ascending aortic aneurysm 4.9205 cm  Hypertension  Syncope    Alcohol abuse  Dyslipidemia  Back pain with a lumbar herniation  Early prostate cancer  FELIBERTO: Resolving    History of tobacco smoking: Quit June 2021     Recommendations:    Oxygen supplement and titration: Requiring 2 L per NC     from pulmonary standpoint patient is at high risk for pulmonary complications including pneumonia, atelectasis and or prolonged ventilation but there is no absolute contraindication for the surgery,  patient was treated with short course of steroids currently off    perioperative bronchodilators and aggressive pulmonary toilet/intensive spirometry/flutter valve  Inhaled corticosteroids/Bronchodilators  Statin  Blood pressure control  DVT prophylaxis Lovenox  Mucinex twice daily  Thiamine                LOS: 3 days      Subjective         Objective     Vital signs for last 24 hours:  Vitals:    06/08/22 0144 06/08/22 0146 06/08/22 0147 06/08/22 0440   BP: 106/60 106/60  128/65   BP Location: Left arm   Left arm   Patient Position: Lying   Lying   Pulse: 57  60 62   Resp: 16   18   Temp: 97.4 °F (36.3 °C)   98.7 °F (37.1 °C)   TempSrc: Oral   Oral   SpO2: 93%  92% 95%   Weight:    107 kg (236 lb 8.9 oz)   Height:           Intake/Output last 3 shifts:  I/O last 3 completed shifts:  In: 960 [P.O.:960]  Out: 1825 [Urine:1650; Stool:175]  Intake/Output this shift:  I/O this shift:  In: 720 [P.O.:720]  Out: 850 [Urine:850]      Radiology  Imaging Results (Last 24 Hours)     ** No results found for the last 24 hours. **          Labs:  Results from last 7 days   Lab Units 06/08/22  0403   WBC 10*3/mm3 7.70   HEMOGLOBIN g/dL 13.3   HEMATOCRIT % 40.9   PLATELETS 10*3/mm3 237     Results from last 7 days   Lab Units 06/08/22  0403 06/03/22  1014 06/02/22  0500   SODIUM mmol/L 136   < > 138   POTASSIUM mmol/L 5.7*   < > 4.5   CHLORIDE mmol/L 98   < > 103   CO2 mmol/L 30.0*   < > 23.0   BUN mg/dL 27*   < > 13   CREATININE mg/dL 0.96   < > 0.86   CALCIUM mg/dL 9.1   < > 8.3*   BILIRUBIN mg/dL  --   --  0.2   ALK PHOS U/L  --   --  55   ALT (SGPT) U/L  --   --  26   AST (SGOT) U/L  --   --  22   GLUCOSE mg/dL 128*   < > 159*    < > = values in this interval not displayed.         Results from last 7 days   Lab Units 06/02/22  0500   ALBUMIN g/dL 3.50     Results from last 7 days   Lab Units 06/01/22  1024   TROPONIN T ng/mL 0.116*         Results from last 7 days   Lab Units 06/08/22  0403   MAGNESIUM mg/dL 2.3     Results from last 7 days   Lab Units 06/02/22  0500   INR  1.00     Results from last 7 days   Lab Units 06/03/22  1014   TSH uIU/mL 0.476           Meds:   SCHEDULE  amLODIPine, 5 mg, Oral, Daily  arformoterol, 15 mcg, Nebulization, BID - RT  aspirin, 81 mg, Oral, Daily  atorvastatin, 40 mg, Oral, Daily  budesonide, 0.5 mg,  Nebulization, BID - RT  carvedilol, 6.25 mg, Oral, BID With Meals  enoxaparin, 40 mg, Subcutaneous, Q12H  gabapentin, 600 mg, Oral, BID  guaiFENesin, 1,200 mg, Oral, Q12H  insulin lispro, 0-7 Units, Subcutaneous, TID AC  insulin regular, 2 Units, Subcutaneous, Q8H  methylPREDNISolone sodium succinate, 40 mg, Intravenous, Q8H  multivitamin, 1 tablet, Oral, Daily  sodium chloride, 10 mL, Intravenous, Q12H  tamsulosin, 0.4 mg, Oral, Daily  thiamine, 100 mg, Oral, BID      Infusions  Pharmacy Consult - Steroid Insulin Protocol,   Pharmacy to dose Trelegy,       PRNs  •  acetaminophen **OR** acetaminophen **OR** acetaminophen  •  aluminum-magnesium hydroxide-simethicone  •  atropine  •  benzonatate  •  dextrose  •  dextrose  •  glucagon (human recombinant)  •  hydrALAZINE  •  ipratropium-albuterol  •  LORazepam **OR** LORazepam **OR** LORazepam **OR** LORazepam **OR** LORazepam **OR** LORazepam  •  magnesium sulfate **OR** magnesium sulfate **OR** magnesium sulfate  •  melatonin  •  nitroglycerin  •  ondansetron **OR** ondansetron  •  Pharmacy Consult - Steroid Insulin Protocol  •  Pharmacy to dose Trelegy  •  potassium chloride **OR** potassium chloride **OR** potassium chloride  •  sodium chloride  •  sodium chloride  •  sodium chloride    Physical Exam:  Physical Exam  Vitals reviewed.   Constitutional:       Appearance: He is obese.   Pulmonary:      Breath sounds: Decreased breath sounds present.   Skin:     General: Skin is warm and dry.   Neurological:      Mental Status: He is alert.         ROS  Review of Systems   Respiratory: Positive for shortness of breath and wheezing.        I have reviewed the patient's new clinical results.    Electronically signed by ERICA Jarrell.    Electronically signed by Nilda Morse APRN at 06/08/22 0918     Frank Giraldo MD at 06/07/22 2003          Cardiology Progress Note      Admiting Physician:  Giancarlo Soto MD   LOS: 2 days       Reason For  Followup:  Multivessel coronary artery disease      Subjective:    Interval History:  Seen and examined.  Chart and labs reviewed.  Patient appears to be in a normal cognitive state.  He is upset that his surgery has been postponed.  He reports that he does not have a drinking problem and that he only has 2-3 drinks a night.    Review of Systems:  A complete review of systems was attempted however patient's cognitive status is questionable.  He denies chest pain or shortness of breath at this time.    Assessment & Plan    Impressions:  Syncope  Multivessel coronary artery disease  Hyperlipidemia  Hypertension  Ischemic cardiomyopathy EF 40%  Acute kidney injury-improved  Alcohol dependence  Altered mental status likely secondary to alcohol withdrawal    Recommendations:  Patient will have coronary bypass surgery on Friday  Till then continue medical therapy  Blood pressure heart rate are stable  Renal function has significantly improved since he is on IV fluids  Monitor rate and rhythm closely  Further recommendations as patient's course progresses  Alcohol withdrawal/altered mentation as per admitting service  Patient is much better now and does not have any withdrawal symptoms  Blood pressure and heart rate are stable and tolerating oral medicines including aspirin carvedilol statins and amlodipine    Objective:    Medication Review:   Scheduled Meds:amLODIPine, 5 mg, Oral, Daily  arformoterol, 15 mcg, Nebulization, BID - RT  aspirin, 81 mg, Oral, Daily  atorvastatin, 40 mg, Oral, Daily  budesonide, 0.5 mg, Nebulization, BID - RT  carvedilol, 6.25 mg, Oral, BID With Meals  enoxaparin, 40 mg, Subcutaneous, Q12H  gabapentin, 600 mg, Oral, BID  guaiFENesin, 1,200 mg, Oral, Q12H  insulin lispro, 0-7 Units, Subcutaneous, TID AC  insulin regular, 2 Units, Subcutaneous, Q8H  methylPREDNISolone sodium succinate, 40 mg, Intravenous, Q8H  multivitamin, 1 tablet, Oral, Daily  sodium chloride, 10 mL, Intravenous,  Q12H  tamsulosin, 0.4 mg, Oral, Daily  thiamine, 100 mg, Oral, BID      Continuous Infusions:Pharmacy Consult - Steroid Insulin Protocol,   Pharmacy to dose Trelegy,       PRN Meds:.•  acetaminophen **OR** acetaminophen **OR** acetaminophen  •  aluminum-magnesium hydroxide-simethicone  •  atropine  •  benzonatate  •  dextrose  •  dextrose  •  glucagon (human recombinant)  •  hydrALAZINE  •  ipratropium-albuterol  •  LORazepam **OR** LORazepam **OR** LORazepam **OR** LORazepam **OR** LORazepam **OR** LORazepam  •  LORazepam **OR** LORazepam **OR** LORazepam **OR** LORazepam **OR** LORazepam **OR** LORazepam  •  magnesium sulfate **OR** magnesium sulfate **OR** magnesium sulfate  •  melatonin  •  nitroglycerin  •  ondansetron **OR** ondansetron  •  Pharmacy Consult - Steroid Insulin Protocol  •  Pharmacy to dose Trelegy  •  potassium chloride **OR** potassium chloride **OR** potassium chloride  •  sodium chloride  •  sodium chloride  •  sodium chloride    Patient Active Problem List   Diagnosis   • COPD exacerbation (HCC)   • Obesity (BMI 30-39.9)   • Abnormal nuclear stress test   • Acute renal insufficiency   • Alcohol dependence ([facility])   • Essential hypertension   • Other emphysema ([facility])   • Coronary artery disease   • Syncope, unspecified syncope type         Physical Exam:    General: Alert, cooperative, no distress, appears stated age  Head:  Normocephalic, atraumatic, mucous membranes moist  Eyes:  Conjunctivae/corneas clear, EOM's intact     Neck:  Supple,  no bruit  Lungs:  Clear to auscultation bilaterally, no wheezes rhonchi rales are noted  Chest wall: No tenderness  Heart::  Regular rate and rhythm, S1 and S2 normal, 1/6 holosystolic murmur.  No rub or gallop  Abdomen: Soft, non-tender, nondistended bowel sounds active.  Obese  Extremities: No cyanosis, clubbing, or edema  Pulses: Diminished pedal pulses  Skin:  No rashes or lesions  Neuro/psych: A&O x3. CN II through XII are grossly intact with  appropriate affect    Vital Signs:  Vitals:    06/07/22 1857 06/07/22 1859 06/07/22 1900 06/07/22 1902   BP:       BP Location:       Patient Position:       Pulse: 69 66 65 65   Resp: 20 20 20 20   Temp:       TempSrc:       SpO2: 95% 94% 98% 100%   Weight:       Height:         Wt Readings from Last 1 Encounters:   06/07/22 110 kg (241 lb 6.5 oz)       Intake/Output Summary (Last 24 hours) at 6/7/2022 2003  Last data filed at 6/7/2022 1700  Gross per 24 hour   Intake 960 ml   Output 1650 ml   Net -690 ml         Results Review:     CBC    Results from last 7 days   Lab Units 06/07/22  0411 06/05/22  0540 06/03/22  1014 06/02/22  0500 06/01/22  1024 05/31/22  2053   WBC 10*3/mm3 8.10 6.60 6.00 6.80 5.50 6.00   HEMOGLOBIN g/dL 13.2 12.5* 13.5 12.9* 13.4 12.1*   PLATELETS 10*3/mm3 229 155 180 161 166 170     Cr Clearance Estimated Creatinine Clearance: 100 mL/min (by C-G formula based on SCr of 0.89 mg/dL).  Coag   Results from last 7 days   Lab Units 06/02/22  0500   INR  1.00     HbA1C   Lab Results   Component Value Date    HGBA1C 5.5 06/03/2022     Blood Glucose   Glucose   Date/Time Value Ref Range Status   06/07/2022 1606 191 (H) 70 - 105 mg/dL Final     Comment:     Serial Number: [id number]:  [id number]   06/07/2022 1110 151 (H) 70 - 105 mg/dL Final     Comment:     Serial Number: [id number]:  [id number]   06/07/2022 0718 131 (H) 70 - 105 mg/dL Final     Comment:     Serial Number: [id number]:  [id number]   06/06/2022 2012 157 (H) 70 - 105 mg/dL Final     Comment:     Serial Number: [id number]:  [id number]   06/06/2022 1609 206 (H) 70 - 105 mg/dL Final     Comment:     Serial Number: [id number]:  [id number]   06/06/2022 1105 200 (H) 70 - 105 mg/dL Final     Comment:     Serial Number: [id number]:  [id number]   06/06/2022 0710 157 (H) 70 - 105 mg/dL Final     Comment:     Serial Number: [id number]:  [id number]   06/05/2022 2009 266 (H) 70 - 105 mg/dL Final     Comment:      Serial Number: [id number]:  [id number]     Infection   Results from last 7 days   Lab Units 05/31/22  2227   PROCALCITONIN ng/mL 0.12     CMP   Results from last 7 days   Lab Units 06/07/22  0411 06/05/22  0539 06/04/22  1744 06/04/22  1008 06/03/22  1014 06/02/22  0500 06/01/22  1024 05/31/22  2053   SODIUM mmol/L 134* 134*  --  136 139 138 136 134*   POTASSIUM mmol/L 4.8 3.9  --  4.0 4.1 4.5 4.8 3.7   CHLORIDE mmol/L 98 97*  --  99 103 103 103 97*   CO2 mmol/L 27.0 27.0  --  27.0 24.0 23.0 20.0* 22.0   BUN mg/dL 27* 16  --  18 15 13 14 15   CREATININE mg/dL 0.89 0.98  --  1.02 1.07 0.86 1.17 1.90*   GLUCOSE mg/dL 139* 105*  --  122* 157* 159* 159* 111*   ALBUMIN g/dL  --   --   --   --   --  3.50  --  3.60   BILIRUBIN mg/dL  --   --   --   --   --  0.2  --  0.4   ALK PHOS U/L  --   --   --   --   --  55  --  54   AST (SGOT) U/L  --   --   --   --   --  22  --  30   ALT (SGPT) U/L  --   --   --   --   --  26  --  30   AMMONIA umol/L  --   --  34  --   --   --   --   --      ABG      UA    Results from last 7 days   Lab Units 06/01/22  0029   NITRITE UA  Negative     ANGELO  No results found for: POCMETH, POCAMPHET, POCBARBITUR, POCBENZO, POCCOCAINE, POCOPIATES, POCOXYCODO, POCPHENCYC, POCPROPOXY, POCTHC, POCTRICYC  Lysis Labs   Results from last 7 days   Lab Units 06/07/22  0411 06/05/22  0540 06/05/22  0539 06/04/22  1008 06/03/22  1014 06/02/22  0500 06/01/22  1024 05/31/22 2053   INR   --   --   --   --   --  1.00  --   --    HEMOGLOBIN g/dL 13.2 12.5*  --   --  13.5 12.9* 13.4 12.1*   PLATELETS 10*3/mm3 229 155  --   --  180 161 166 170   CREATININE mg/dL 0.89  --  0.98 1.02 1.07 0.86 1.17 1.90*     Radiology(recent) No radiology results for the last day      Results from last 7 days   Lab Units 06/01/22  1024   TROPONIN T ng/mL 0.116*       Imaging Results (Last 24 Hours)     ** No results found for the last 24 hours. **          Cardiac Studies:  Echo- Results for orders placed during the hospital  encounter of 05/31/22    Adult Transthoracic Echo Complete With Contrast if Necessary Per Protocol (With Agitated Saline)    Interpretation Summary  · Left ventricular ejection fraction appears to be 56 - 60%.  · The right ventricular cavity is mildly dilated.  · Mild dilation of the aortic root is present.  · No pericardial effusion noted    Stress Myoview-  Cath-        Frank Giraldo MD  06/07/22  20:03 EDT    Electronically signed by Frank Giraldo MD at 06/07/22 2003     [facility]al Purvis APRN at 06/07/22 1603          CC:  Syncopal episodes, witnessed    F/U:  CAD/asc aortic aneurysm--[place]  EF 40% (cath)    Subjective:  Reports he feels better    No events overnight  RT to get DLCO       PREOP studies:  Carotid duplex:  16-49% bilat  Vein regina:  Adequate in bilat thighs  A1c:  5.5   Plt agg:  pending  MRSA screen:  pending  COVID-19 screen:  Neg 5/31  UA:  Neg for UTI  PFTs:  FEV1/FVC 73, FEV1 45, DLCO not recorded  Echo:  55-60%, RV mild dilatation, aortic root dilatation, mild MR, mild to mod TR      Intake/Output Summary (Last 24 hours) at 6/7/2022 1603  Last data filed at 6/7/2022 0900  Gross per 24 hour   Intake 720 ml   Output 1250 ml   Net -530 ml     Temp:  [97.3 °F (36.3 °C)-98.8 °F (37.1 °C)] 98.8 °F (37.1 °C)  Heart Rate:  [60-87] 65  Resp:  [16-18] 18  BP: (104-128)/(53-73) 111/64      Results from last 7 days   Lab Units 06/07/22  0411 06/05/22  0540 06/03/22  1014 06/02/22  0500   WBC 10*3/mm3 8.10 6.60   < > 6.80   HEMOGLOBIN g/dL 13.2 12.5*   < > 12.9*   HEMATOCRIT % 39.3 38.0   < > 39.3   PLATELETS 10*3/mm3 229 155   < > 161   INR   --   --   --  1.00    < > = values in this interval not displayed.     Results from last 7 days   Lab Units 06/07/22  0411   CREATININE mg/dL 0.89   POTASSIUM mmol/L 4.8   SODIUM mmol/L 134*   MAGNESIUM mg/dL 2.3   PHOSPHORUS mg/dL 4.5       Physical Exam:  Neuro intact, nad, resting in bed  Tele:  SR 60s  Diminished bases, 99% 2L  Benign abd, no  BM  No edema    Assessment/Plan:  Active Problems:    COPD exacerbation (HCC)    Obesity (BMI 30-39.9)    Abnormal nuclear stress test    Acute renal insufficiency    Alcohol dependence (HCC)    Essential hypertension    Other emphysema (HCC)    Coronary artery disease    Syncope, unspecified syncope type    - MV CAD, EF 40% (cath)--surgical workup in progress  - NSTEMI presentation  - Ascending aortic aneurysm,  4.9-5.0 cm  - Admit with syncopal event x2--orthostatic on admit  - Severe COPD--pulm following, on steroids  - HTN--stable  - HLD--statin  - Lumbar herniation--back surgery pending  - Early prostate cancer--has follow up as outpatient, probable radiation treatment per patient  - FELIBERTO--resolved with volume repletion, Dr. Casarez following  - ETOH use--reports 3 beers/day and bourbon--withdrawal this weekend, improved    Tentative plans for CABG/asc ao repair with Dr. Hughes on Friday if his lungs are optimized.  Pt is aware and agreeable.  PT/OT eval before surgery.  He is currently on Solu-medrol 40 mg TID for 3 days.  Will transfer to [facility] when bed opens.    ERICA Decker  2022  16:03 EDT    Electronically signed by Christal Vora APRN at 22 1099     Giancarlo Soto MD at 22 0971              [facility] Medicine Services Daily Progress Note    Patient Name: Chi Quinteros Sr.  : 1955  MRN: [id number]  Primary Care Physician:  Deysi Mason APRN  Date of admission: 2022      Subjective      Chief Complaint: Chest pain    6/3  Vitals and labs reviewed  Awaiting CT surgery recommendation regarding possible cardiac surgery    Notes reviewed      Apparently intermittently confused overnight and angry intermittently  His vitals are stable requiring 2 L of oxygen afebrile  Suspected alcohol withdrawal.  CIWA score was 12 today with the nursing staff  On alcohol withdrawal protocol  Psychiatrist has been consulted by  cardiac surgery  Add thiamine        6/5  Short of breath and wheezing much more than last few days.  Her last pulmonologist for evaluation given need for bypass surgery  Continue bronchodilators  Will give additional steroid with Solu-Medrol  Repeat chest x-ray shows no acute  problem    6/6  Labs and notes reviewed    6/7/2022  Patient seen and examined  Denies any chest pain, shortness of breath no palpitation.  Scheduled tentatively for CABG 6/10/2022        Review of Systems   Constitutional: Negative for chills and fever.   HENT: Negative for congestion.    Eyes: Negative for blurred vision.   Cardiovascular: Negative for chest pain.   Respiratory: Negative for shortness of breath.    Endocrine: Negative for cold intolerance and heat intolerance.   Gastrointestinal: Negative for abdominal pain, nausea and vomiting.   Genitourinary: Negative for dysuria.   Neurological: Negative for focal weakness.   Psychiatric/Behavioral: Negative for altered mental status.         Objective      Vitals:   Temp:  [97.3 °F (36.3 °C)-98.5 °F (36.9 °C)] 97.7 °F (36.5 °C)  Heart Rate:  [60-81] 81  Resp:  [16-18] 18  BP: (104-128)/(53-73) 128/73  Flow (L/min):  [2] 2    Physical Exam  Vitals reviewed.   Constitutional:       General: He is not in acute distress.  HENT:      Head: Normocephalic and atraumatic.      Nose: Nose normal.   Eyes:      Extraocular Movements: Extraocular movements intact.      Conjunctiva/sclera: Conjunctivae normal.      Pupils: Pupils are equal, round, and reactive to light.   Cardiovascular:      Rate and Rhythm: Normal rate and regular rhythm.   Pulmonary:      Effort: No respiratory distress.      Breath sounds: Normal breath sounds.   Abdominal:      General: Bowel sounds are normal.      Palpations: Abdomen is soft.      Tenderness: There is no abdominal tenderness.   Musculoskeletal:         General: Normal range of motion.      Cervical back: Normal range of motion.   Skin:     General: Skin is  warm and dry.   Neurological:      General: No focal deficit present.      Mental Status: He is alert and oriented to person, place, and time.   Psychiatric:         Mood and Affect: Mood normal.            Result Review    Result Review:  I have personally reviewed the results from the time of this admission to 6/7/2022 09:43 EDT and agree with these findings:  [x]  Laboratory  [x]  Microbiology  [x]  Radiology  [x]  EKG/Telemetry   [x]  Cardiology/Vascular   []  Pathology  [x]  Old records  []  Other:          Wounds (last 24 hours)     LDA Wound     Row Name 06/07/22 0400 06/07/22 0000 06/06/22 1952       Wound 06/01/22 0020 Right posterior elbow Skin Tear    Wound - Properties Group Placement Date: 06/01/22  - Placement Time: 0020  -AH Present on Hospital Admission: Y  -AH Side: Right  -AH Orientation: posterior  -AH Location: elbow  -AH Primary Wound Type: Skin tear  -AH Additional Comments: mepilex placed  -AH    Dressing Appearance dry;intact  -KR dry;intact  -KR dry;intact  -KR    Closure WHIT  -KR WHIT  -KR WHIT  -KR    Base dressing in place, unable to visualize  -KR dressing in place, unable to visualize  -KR dressing in place, unable to visualize  -KR    Retired Wound - Properties Group Placement Date: 06/01/22  - Placement Time: 0020  -AH Present on Hospital Admission: Y  -AH Side: Right  -AH Orientation: posterior  -AH Location: elbow  -AH Primary Wound Type: Skin tear  -AH Additional Comments: mepilex placed  -AH    Retired Wound - Properties Group Date first assessed: 06/01/22  - Time first assessed: 0020  -AH Present on Hospital Admission: Y  -AH Side: Right  -AH Location: elbow  -AH Primary Wound Type: Skin tear  -AH Additional Comments: mepilex placed  -AH          User Key  (r) = Recorded By, (t) = Taken By, (c) = Cosigned By    Initials Name Provider Type    Deysi Leger RN Registered Nurse    Eda Borja RN Registered Nurse                  Assessment & Plan      Brief  Patient Summary:    Chi Quinteros Sr. is a 67 y.o. male who initially presented to the emergency department on 5/31/2022 with complaints of syncope.  He was noted to be hypoxic in the 80s by EMS and reported at that time that he drinks about 5-6 beers daily.  His troponins were elevated at 0.415 then 0.326 and then 0.116.  He underwent a stress test which showed severe ischemia to lateral inferior wall and subsequently underwent a cardiac catheterization on 6/2/2022 which showed severe three-vessel coronary artery disease with mild left ventricular dysfunction.  He was admitted for cardiothoracic surgery evaluation for coronary artery bypass grafting.    Initially upon admission he had a creatinine of 1.9 and a GFR of 38.2 and was seen by nephrology for clearance for the cardiac catheterization.  Creatinine subsequently improved to 0.86 with a GFR of 94.9.  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    CIWA precautions have been put in place      amLODIPine, 5 mg, Oral, Daily  arformoterol, 15 mcg, Nebulization, BID - RT  aspirin, 81 mg, Oral, Daily  atorvastatin, 40 mg, Oral, Daily  budesonide, 0.5 mg, Nebulization, BID - RT  carvedilol, 6.25 mg, Oral, BID With Meals  enoxaparin, 40 mg, Subcutaneous, Q12H  gabapentin, 600 mg, Oral, BID  guaiFENesin, 1,200 mg, Oral, Q12H  insulin lispro, 0-7 Units, Subcutaneous, TID AC  insulin regular, 2 Units, Subcutaneous, Q8H  ipratropium-albuterol, 3 mL, Nebulization, Q4H - RT  methylPREDNISolone sodium succinate, 40 mg, Intravenous, Q8H  multivitamin, 1 tablet, Oral, Daily  sodium chloride, 10 mL, Intravenous, Q12H  tamsulosin, 0.4 mg, Oral, Daily  thiamine, 100 mg, Oral, BID       Pharmacy Consult - Steroid Insulin Protocol,   Pharmacy to dose Trelegy,          Active Hospital Problems:  Active Hospital Problems    Diagnosis    • Syncope, unspecified  syncope type    • Acute renal insufficiency    • Alcohol dependence (HCC)    • Essential hypertension    • Other emphysema (HCC)    • Coronary artery disease    • COPD exacerbation (HCC)    • Obesity (BMI 30-39.9)    • Abnormal nuclear stress test      Added automatically from request for surgery [id number]       Plan:     Syncopal episode-most likely cardiac in origin  Abnormal stress test and heart cath showing three-vessel disease  Was evaluated by CVS and bypass surgery planned tentatively for 6/10/2022.  On medical management  On aspirin, Coreg and statin    Chronic systolic congestive heart failure EF 40%  Most likely ischemic cardiomyopathy  Compensated  On Coreg and lisinopril      Acute kidney injury  Improved  Nephrologist following      Alcohol dependence  On withdrawal protocol    Essential hypertension,  Blood pressure marginal  Continue on lisinopril and Coreg  Discontinue Norvasc      COPD exacerbation  Emphysema  Respiratory care with bronchodilators  On Solu-Medrol  Also on sliding scale insulin for steroid-induced hyperglycemia  Oxygen therapy and titration  Pulmonologist following-patient high risk for pulmonary complication with surgery      Obesity BMI 34  Lifestyle management education given    BPH-on Flomax      DVT prophylaxis:  Medical and mechanical DVT prophylaxis orders are present.    CODE STATUS:    Code Status (Patient has no pulse and is not breathing): CPR (Attempt to Resuscitate)  Medical Interventions (Patient has pulse or is breathing): Full Support      Disposition: Pending clinical progress    This patient has been examined wearing appropriate Personal Protective Equipment  06/07/22      Electronically signed by Giancarlo Soto MD, 06/07/22, 09:43 EDT.  [facility] Hospitalist Team             Electronically signed by Giancarlo Soto MD at 06/07/22 4351     Dre Cooper MD at 06/07/22 9013          NEPHROLOGY PROGRESS NOTE------[facility]    [facility]  [phone]  Amara Cooper MD      Patient Care Team:  Deysi Mason APRN as PCP - General (Nurse Practitioner)  Eliseo Casarez MD as Consulting Physician (Nephrology)      Provider:  Amara Cooper MD  Patient Name: Chi Quinteros Sr.  :  1955    SUBJECTIVE:    F/U ARF/FELIBERTO/CRF/CKD    No complaints. Awaiting open heart surgery ? Thursday. No SOB, CP, dysuria.     Medication:  amLODIPine, 5 mg, Oral, Daily  arformoterol, 15 mcg, Nebulization, BID - RT  aspirin, 81 mg, Oral, Daily  atorvastatin, 40 mg, Oral, Daily  budesonide, 0.5 mg, Nebulization, BID - RT  carvedilol, 6.25 mg, Oral, BID With Meals  enoxaparin, 40 mg, Subcutaneous, Q12H  gabapentin, 600 mg, Oral, BID  guaiFENesin, 1,200 mg, Oral, Q12H  insulin lispro, 0-7 Units, Subcutaneous, TID AC  insulin regular, 2 Units, Subcutaneous, Q8H  ipratropium-albuterol, 3 mL, Nebulization, Q4H - RT  methylPREDNISolone sodium succinate, 40 mg, Intravenous, Q8H  multivitamin, 1 tablet, Oral, Daily  sodium chloride, 10 mL, Intravenous, Q12H  tamsulosin, 0.4 mg, Oral, Daily  thiamine, 100 mg, Oral, BID      Pharmacy Consult - Steroid Insulin Protocol,   Pharmacy to dose Trelegy,         OBJECTIVE    Vital Sign Min/Max for last 24 hours  Temp  Min: 97.3 °F (36.3 °C)  Max: 98.5 °F (36.9 °C)   BP  Min: 104/58  Max: 114/57   Pulse  Min: 60  Max: 80   Resp  Min: 16  Max: 18   SpO2  Min: 91 %  Max: 99 %   No data recorded   Weight  Min: 110 kg (241 lb 6.5 oz)  Max: 110 kg (241 lb 6.5 oz)     Flowsheet Rows    Flowsheet Row First Filed Value   Admission Height 177.8 cm (70\") Documented at 2022   Admission Weight 104 kg (230 lb) Documented at 2022          No intake/output data recorded.  I/O last 3 completed shifts:  In: 240 [P.O.:240]  Out: 192 [Urine:1750; Stool:175]    Physical Exam:  General Appearance: alert, appears stated age and cooperative  Head: " normocephalic, without obvious abnormality and atraumatic  Eyes: conjunctivae and sclerae normal and no icterus  Neck: supple and no JVD  Lungs: clear to auscultation and respirations regular  Heart: regular rhythm & normal rate and normal S1, S2 +ANTONY  Chest: Wall no abnormalities observed  Abdomen: normal bowel sounds and soft non-tender  Extremities: moves extremities well, no edema, no cyanosis and no redness  Skin: no bleeding, bruising or rash, turgor normal, color normal and no lesions noted  Neurologic: Alert, and oriented. No focal deficits    Labs:    WBC WBC   Date Value Ref Range Status   06/07/2022 8.10 3.40 - 10.80 10*3/mm3 Final   06/05/2022 6.60 3.40 - 10.80 10*3/mm3 Final      HGB Hemoglobin   Date Value Ref Range Status   06/07/2022 13.2 13.0 - 17.7 g/dL Final   06/05/2022 12.5 (L) 13.0 - 17.7 g/dL Final      HCT Hematocrit   Date Value Ref Range Status   06/07/2022 39.3 37.5 - 51.0 % Final   06/05/2022 38.0 37.5 - 51.0 % Final      Platlets No results found for: LABPLAT   MCV MCV   Date Value Ref Range Status   06/07/2022 97.8 (H) 79.0 - 97.0 fL Final   06/05/2022 97.9 (H) 79.0 - 97.0 fL Final          Sodium Sodium   Date Value Ref Range Status   06/07/2022 134 (L) 136 - 145 mmol/L Final   06/05/2022 134 (L) 136 - 145 mmol/L Final   06/04/2022 136 136 - 145 mmol/L Final      Potassium Potassium   Date Value Ref Range Status   06/07/2022 4.8 3.5 - 5.2 mmol/L Final   06/05/2022 3.9 3.5 - 5.2 mmol/L Final   06/04/2022 4.0 3.5 - 5.2 mmol/L Final      Chloride Chloride   Date Value Ref Range Status   06/07/2022 98 98 - 107 mmol/L Final   06/05/2022 97 (L) 98 - 107 mmol/L Final   06/04/2022 99 98 - 107 mmol/L Final      CO2 CO2   Date Value Ref Range Status   06/07/2022 27.0 22.0 - 29.0 mmol/L Final   06/05/2022 27.0 22.0 - 29.0 mmol/L Final   06/04/2022 27.0 22.0 - 29.0 mmol/L Final      BUN BUN   Date Value Ref Range Status   06/07/2022 27 (H) 8 - 23 mg/dL Final   06/05/2022 16 8 - 23 mg/dL Final    06/04/2022 18 8 - 23 mg/dL Final      Creatinine Creatinine   Date Value Ref Range Status   06/07/2022 0.89 0.76 - 1.27 mg/dL Final   06/05/2022 0.98 0.76 - 1.27 mg/dL Final   06/04/2022 1.02 0.76 - 1.27 mg/dL Final      Calcium Calcium   Date Value Ref Range Status   06/07/2022 8.8 8.6 - 10.5 mg/dL Final   06/05/2022 8.6 8.6 - 10.5 mg/dL Final   06/04/2022 8.4 (L) 8.6 - 10.5 mg/dL Final      PO4 No components found for: PO4   Albumin No results found for: ALBUMIN   Magnesium Magnesium   Date Value Ref Range Status   06/07/2022 2.3 1.6 - 2.4 mg/dL Final      Uric Acid No components found for: URIC ACID     Imaging Results (Last 72 Hours)     Procedure Component Value Units Date/Time    XR Chest 1 View [[id number]] Collected: 06/05/22 0958     Updated: 06/05/22 1002    Narrative:      EXAM: XR CHEST 1 VW-     DATE OF EXAM: 6/5/2022 8:58 AM     INDICATION: copd exac  cough; R55-Syncope and collapse; F10.920-Alcohol  use, unspecified with intoxication, uncomplicated; J96.01-Acute  respiratory failure with hypoxia; I95.1-Orthostatic hypotension;  R77.8-Other specified abnormalities of plasma proteins; N17.9-Acute  kidney failure, unspecified; J44.1-Chronic obstructive pulmonary disease  with (acute) exacerbation; R94.39-Abnormal result of other cardiovasc.       COMPARISONS: 5/31/2022      FINDINGS:     Emphysema is evident. No pneumothorax or pleural effusion. Mild  scattered atelectasis. No consolidation. Mediastinum appears unchanged,  no evidence of acute process.       Impression:         Emphysema and mild scattered atelectasis. No evidence of acute  cardiopulmonary process otherwise.     Electronically Signed By-Chad Car On:6/5/2022 10:00 AM  This report was finalized on [id number] by  Chad Car, .          Results for orders placed during the hospital encounter of 05/31/22    XR Chest 1 View    Narrative  EXAM: XR CHEST 1 VW-    DATE OF EXAM: 6/5/2022 8:58 AM    INDICATION: copd exac  cough;  R55-Syncope and collapse; F10.920-Alcohol  use, unspecified with intoxication, uncomplicated; J96.01-Acute  respiratory failure with hypoxia; I95.1-Orthostatic hypotension;  R77.8-Other specified abnormalities of plasma proteins; N17.9-Acute  kidney failure, unspecified; J44.1-Chronic obstructive pulmonary disease  with (acute) exacerbation; R94.39-Abnormal result of other cardiovasc.    COMPARISONS: 5/31/2022    FINDINGS:    Emphysema is evident. No pneumothorax or pleural effusion. Mild  scattered atelectasis. No consolidation. Mediastinum appears unchanged,  no evidence of acute process.    Impression  Emphysema and mild scattered atelectasis. No evidence of acute  cardiopulmonary process otherwise.    Electronically Signed By-Chad Car On:6/5/2022 10:00 AM  This report was finalized on [id number] by  Chad Car, .      XR Chest 1 View    Narrative  Examination: XR CHEST 1 VW-    Date of Exam: 5/31/2022 9:03 PM    Indication: Chest pain protocol.    Comparison: None available.    Technique: Single radiographic view of the chest was obtained.    Findings:  There is no pneumothorax, pleural effusion or focal airspace  consolidation. Cardiomediastinal silhouette is unremarkable. Pulmonary  vasculature appears within normal limits. Regional bones appear grossly  intact.    Impression  No acute cardiopulmonary abnormality.    Electronically Signed By-Johann Dodge MD On:5/31/2022 9:29 PM  This report was finalized on [id number] by  Johann Dodge MD.      Results for orders placed during the hospital encounter of 05/31/22    Duplex Vein Mapping Lower Extremity - Bilateral CAR    Interpretation Summary  The greater saphenous veins are of satisfactory quality from the groin to the mid distal thigh bilaterally.  Distal to this the veins are small and inadequate.        ASSESSMENT / PLAN      COPD exacerbation (HCC)    Obesity (BMI 30-39.9)    Abnormal nuclear stress test    Acute renal insufficiency     Alcohol dependence (HCC)    Essential hypertension    Other emphysema (HCC)    Coronary artery disease    Syncope, unspecified syncope type    1. ARF/FELIBERTO------Nonoliguric. Resolved with volume repletion    2. CAD------No angina or gross overload. ? Surgery Friday    3. BENIGN ESSENTIAL HTN------BP okay. No ACE-I for now    4. BPH-------On Flomax    5. S/P SYNCOPE    6. ETOH ABUSE    7. HYPERLIPIDEMIA-------On Statin    8. DVT PROPHYLAXIS-------On Lovenox      Amara Cooper MD  [facility]  [phone]  06/07/22  07:56 EDT      Electronically signed by Dre Cooper MD at 06/07/22 0900     Draw, MD Dany at 06/07/22 0649          Daily Progress Note        COPD exacerbation (HCC)    Obesity (BMI 30-39.9)    Abnormal nuclear stress test    Acute renal insufficiency    Alcohol dependence (HCC)    Essential hypertension    Other emphysema (HCC)    Coronary artery disease    Syncope, unspecified syncope type      Assessment    COPD with acute exacerbation, improving  Spirometry 6/3/2022 and flow volume loop suggestive of severe obstructive and possible restrictive respiratory defect: Unfortunately total lung capacity and DLCO were not performed   FEV1 45% without significant response to bronchodilators, FEV1/FVC ratio 56 compatible with obstructive defect    Non-STEMI  Three-vessel coronary artery disease  Ascending aortic aneurysm 4.9205 cm  Hypertension  Syncope    Alcohol abuse  Dyslipidemia  Back pain with a lumbar herniation  Early prostate cancer  FELIBERTO: Resolving    History of tobacco smoking: Quit June 2021     Recommendations:    Oxygen supplement and titration: Requiring 2 L per NC     from pulmonary standpoint patient is at high risk for pulmonary complications including pneumonia, atelectasis and or prolonged ventilation but there is no absolute contraindication for the surgery,  patient was treated with short course of steroids currently off    perioperative  bronchodilators and aggressive pulmonary toilet/intensive spirometry/flutter valve  Inhaled corticosteroids/Bronchodilators  Statin  Blood pressure control  DVT prophylaxis Lovenox  Mucinex twice daily  Thiamine                LOS: 2 days     Subjective         Objective     Vital signs for last 24 hours:  Vitals:    06/06/22 2145 06/07/22 0123 06/07/22 0500 06/07/22 0533   BP: 111/53 104/58  112/62   BP Location: Left arm Left arm  Left arm   Patient Position: Lying Lying  Lying   Pulse: 63 64  61   Resp: 18 16  16   Temp: 98.5 °F (36.9 °C) 98.3 °F (36.8 °C)  97.3 °F (36.3 °C)   TempSrc: Oral Oral  Oral   SpO2: 93% 95%  93%   Weight:   110 kg (241 lb 6.5 oz)    Height:           Intake/Output last 3 shifts:  I/O last 3 completed shifts:  In: 960 [P.O.:960]  Out: 1325 [Urine:1150; Stool:175]  Intake/Output this shift:  I/O this shift:  In: 240 [P.O.:240]  Out: 575 [Urine:575]      Radiology  Imaging Results (Last 24 Hours)     ** No results found for the last 24 hours. **          Labs:  Results from last 7 days   Lab Units 06/07/22  0411   WBC 10*3/mm3 8.10   HEMOGLOBIN g/dL 13.2   HEMATOCRIT % 39.3   PLATELETS 10*3/mm3 229     Results from last 7 days   Lab Units 06/07/22  0411 06/03/22  1014 06/02/22  0500   SODIUM mmol/L 134*   < > 138   POTASSIUM mmol/L 4.8   < > 4.5   CHLORIDE mmol/L 98   < > 103   CO2 mmol/L 27.0   < > 23.0   BUN mg/dL 27*   < > 13   CREATININE mg/dL 0.89   < > 0.86   CALCIUM mg/dL 8.8   < > 8.3*   BILIRUBIN mg/dL  --   --  0.2   ALK PHOS U/L  --   --  55   ALT (SGPT) U/L  --   --  26   AST (SGOT) U/L  --   --  22   GLUCOSE mg/dL 139*   < > 159*    < > = values in this interval not displayed.         Results from last 7 days   Lab Units 06/02/22  0500 05/31/22 2053   ALBUMIN g/dL 3.50 3.60     Results from last 7 days   Lab Units 06/01/22  1024 05/31/22  2227 05/31/22 2053   TROPONIN T ng/mL 0.116* 0.326* 0.415*         Results from last 7 days   Lab Units 06/07/22  0411   MAGNESIUM mg/dL  2.3     Results from last 7 days   Lab Units 06/02/22  0500   INR  1.00     Results from last 7 days   Lab Units 06/03/22  1014   TSH uIU/mL 0.476           Meds:   SCHEDULE  amLODIPine, 5 mg, Oral, Daily  arformoterol, 15 mcg, Nebulization, BID - RT  aspirin, 81 mg, Oral, Daily  atorvastatin, 40 mg, Oral, Daily  budesonide, 0.5 mg, Nebulization, BID - RT  carvedilol, 6.25 mg, Oral, BID With Meals  enoxaparin, 40 mg, Subcutaneous, Q12H  gabapentin, 600 mg, Oral, BID  guaiFENesin, 1,200 mg, Oral, Q12H  insulin lispro, 0-7 Units, Subcutaneous, TID AC  insulin regular, 2 Units, Subcutaneous, Q8H  ipratropium-albuterol, 3 mL, Nebulization, Q4H - RT  methylPREDNISolone sodium succinate, 40 mg, Intravenous, Q8H  multivitamin, 1 tablet, Oral, Daily  sodium chloride, 10 mL, Intravenous, Q12H  tamsulosin, 0.4 mg, Oral, Daily  thiamine, 100 mg, Oral, BID      Infusions  Pharmacy Consult - Steroid Insulin Protocol,   Pharmacy to dose Trelegy,       PRNs  •  acetaminophen **OR** acetaminophen **OR** acetaminophen  •  aluminum-magnesium hydroxide-simethicone  •  atropine  •  benzonatate  •  dextrose  •  dextrose  •  glucagon (human recombinant)  •  hydrALAZINE  •  ipratropium-albuterol  •  LORazepam **OR** LORazepam **OR** LORazepam **OR** LORazepam **OR** LORazepam **OR** LORazepam  •  LORazepam **OR** LORazepam **OR** LORazepam **OR** LORazepam **OR** LORazepam **OR** LORazepam  •  magnesium sulfate **OR** magnesium sulfate **OR** magnesium sulfate  •  melatonin  •  nitroglycerin  •  ondansetron **OR** ondansetron  •  Pharmacy Consult - Steroid Insulin Protocol  •  Pharmacy to dose Trelegy  •  potassium chloride **OR** potassium chloride **OR** potassium chloride  •  sodium chloride  •  sodium chloride  •  sodium chloride    Physical Exam:  Physical Exam  Vitals reviewed.   Constitutional:       Appearance: He is obese.   Pulmonary:      Breath sounds: Decreased breath sounds and wheezing present.   Skin:     General: Skin is  "warm and dry.   Neurological:      Mental Status: He is alert.         ROS  Review of Systems   Respiratory: Positive for shortness of breath and wheezing.        I have reviewed the patient's new clinical results.    Electronically signed by ERICA Jarrell.    Electronically signed by Dany Arnold MD at 06/07/22 0743     Israel Hidalgo MD at 06/06/22 1269        Subjective   Chi Quinteros Sr. is a 67 y.o. male.   Seen for diabetes f/u.  Refused steroid dose early this am, but took it @ 9am & 5pm.      Objective     /53 (BP Location: Left arm, Patient Position: Lying)   Pulse 63   Temp 98.5 °F (36.9 °C) (Oral)   Resp 18   Ht 177.8 cm (70\")   Wt 110 kg (242 lb 1 oz)   SpO2 93%   BMI 34.73 kg/m²   Blood sugar  157 this am,  200 @ lunch, 206 @ supper    ASSESSMENT    Patient is stable    PLAN    Will start steroid protocol.        Israel Hidalgo MD  6/6/2022  22:29 EDT      Electronically signed by Israel Hidalgo MD at 06/06/22 2234     Frank Giraldo MD at 06/06/22 1755          Cardiology Progress Note      Admiting Physician:  Harris Boateng *   LOS: 1 day       Reason For Followup:  Multivessel coronary artery disease      Subjective:    Interval History:  Seen and examined.  Chart and labs reviewed.  Patient appears to be in a normal cognitive state.  He is upset that his surgery has been postponed.  He reports that he does not have a drinking problem and that he only has 2-3 drinks a night.    Review of Systems:  A complete review of systems was attempted however patient's cognitive status is questionable.  He denies chest pain or shortness of breath at this time.    Assessment & Plan    Impressions:  Syncope  Multivessel coronary artery disease  Hyperlipidemia  Hypertension  Ischemic cardiomyopathy EF 40%  Acute kidney injury-improved  Alcohol dependence  Altered mental status likely secondary to alcohol withdrawal    Recommendations:  Patient will have coronary bypass surgery " on Friday  Till then continue medical therapy  Blood pressure heart rate are stable  Renal function has significantly improved since he is on IV fluids  Monitor rate and rhythm closely  Further recommendations as patient's course progresses  Alcohol withdrawal/altered mentation as per admitting service    Objective:    Medication Review:   Scheduled Meds:amLODIPine, 5 mg, Oral, Daily  arformoterol, 15 mcg, Nebulization, BID - RT  aspirin, 81 mg, Oral, Daily  atorvastatin, 40 mg, Oral, Daily  budesonide, 0.5 mg, Nebulization, BID - RT  carvedilol, 6.25 mg, Oral, BID With Meals  enoxaparin, 40 mg, Subcutaneous, Q12H  gabapentin, 600 mg, Oral, BID  guaiFENesin, 1,200 mg, Oral, Q12H  insulin lispro, 0-7 Units, Subcutaneous, TID AC  insulin regular, 2 Units, Subcutaneous, Q8H  ipratropium-albuterol, 3 mL, Nebulization, Q4H - RT  methylPREDNISolone sodium succinate, 40 mg, Intravenous, Q8H  multivitamin, 1 tablet, Oral, Daily  sodium chloride, 10 mL, Intravenous, Q12H  tamsulosin, 0.4 mg, Oral, Daily  thiamine, 100 mg, Oral, BID      Continuous Infusions:Pharmacy Consult - Steroid Insulin Protocol,   Pharmacy to dose Trelegy,       PRN Meds:.•  acetaminophen **OR** acetaminophen **OR** acetaminophen  •  aluminum-magnesium hydroxide-simethicone  •  atropine  •  benzonatate  •  dextrose  •  dextrose  •  glucagon (human recombinant)  •  hydrALAZINE  •  ipratropium-albuterol  •  LORazepam **OR** LORazepam **OR** LORazepam **OR** LORazepam **OR** LORazepam **OR** LORazepam  •  LORazepam **OR** LORazepam **OR** LORazepam **OR** LORazepam **OR** LORazepam **OR** LORazepam  •  magnesium sulfate **OR** magnesium sulfate **OR** magnesium sulfate  •  melatonin  •  nitroglycerin  •  ondansetron **OR** ondansetron  •  Pharmacy Consult - Steroid Insulin Protocol  •  Pharmacy to dose Trelegy  •  potassium chloride **OR** potassium chloride **OR** potassium chloride  •  sodium chloride  •  sodium chloride  •  sodium chloride    Patient  Active Problem List   Diagnosis   • COPD exacerbation (HCC)   • Obesity (BMI 30-39.9)   • Abnormal nuclear stress test   • Acute renal insufficiency   • Alcohol dependence (HCC)   • Essential hypertension   • Other emphysema (HCC)   • Coronary artery disease   • Syncope, unspecified syncope type         Physical Exam:    General: Alert, cooperative, no distress, appears stated age  Head:  Normocephalic, atraumatic, mucous membranes moist  Eyes:  Conjunctivae/corneas clear, EOM's intact     Neck:  Supple,  no bruit  Lungs:  Clear to auscultation bilaterally, no wheezes rhonchi rales are noted  Chest wall: No tenderness  Heart::  Regular rate and rhythm, S1 and S2 normal, 1/6 holosystolic murmur.  No rub or gallop  Abdomen: Soft, non-tender, nondistended bowel sounds active.  Obese  Extremities: No cyanosis, clubbing, or edema  Pulses: Diminished pedal pulses  Skin:  No rashes or lesions  Neuro/psych: A&O x3. CN II through XII are grossly intact with appropriate affect    Vital Signs:  Vitals:    06/06/22 1314 06/06/22 1518 06/06/22 1521 06/06/22 1606   BP: 114/62   114/57   BP Location:       Patient Position:       Pulse: 71 80 79 79   Resp: 18 18 18 18   Temp: 97.6 °F (36.4 °C)   98 °F (36.7 °C)   TempSrc: Oral   Oral   SpO2: 91% 92% 93% 92%   Weight:       Height:         Wt Readings from Last 1 Encounters:   06/06/22 110 kg (242 lb 1 oz)       Intake/Output Summary (Last 24 hours) at 6/6/2022 1755  Last data filed at 6/6/2022 0111  Gross per 24 hour   Intake 480 ml   Output 700 ml   Net -220 ml         Results Review:     CBC    Results from last 7 days   Lab Units 06/05/22  0540 06/03/22  1014 06/02/22  0500 06/01/22  1024 05/31/22  2053   WBC 10*3/mm3 6.60 6.00 6.80 5.50 6.00   HEMOGLOBIN g/dL 12.5* 13.5 12.9* 13.4 12.1*   PLATELETS 10*3/mm3 155 180 161 166 170     Cr Clearance Estimated Creatinine Clearance: 90.8 mL/min (by C-G formula based on SCr of 0.98 mg/dL).  Coag   Results from last 7 days   Lab Units  06/02/22  0500   INR  1.00     HbA1C   Lab Results   Component Value Date    HGBA1C 5.5 06/03/2022     Blood Glucose   Glucose   Date/Time Value Ref Range Status   06/06/2022 1609 206 (H) 70 - 105 mg/dL Final     Comment:     Serial Number: [id number]:  [id number]   06/06/2022 1105 200 (H) 70 - 105 mg/dL Final     Comment:     Serial Number: [id number]:  [id number]   06/06/2022 0710 157 (H) 70 - 105 mg/dL Final     Comment:     Serial Number: [id number]:  [id number]   06/05/2022 2009 266 (H) 70 - 105 mg/dL Final     Comment:     Serial Number: [id number]:  [id number]   06/05/2022 1739 277 (H) 70 - 105 mg/dL Final     Comment:     Serial Number: [id number]:  [id number]     Infection   Results from last 7 days   Lab Units 05/31/22  2227   PROCALCITONIN ng/mL 0.12     CMP   Results from last 7 days   Lab Units 06/05/22  0539 06/04/22  1744 06/04/22  1008 06/03/22  1014 06/02/22  0500 06/01/22  1024 05/31/22  2053   SODIUM mmol/L 134*  --  136 139 138 136 134*   POTASSIUM mmol/L 3.9  --  4.0 4.1 4.5 4.8 3.7   CHLORIDE mmol/L 97*  --  99 103 103 103 97*   CO2 mmol/L 27.0  --  27.0 24.0 23.0 20.0* 22.0   BUN mg/dL 16  --  18 15 13 14 15   CREATININE mg/dL 0.98  --  1.02 1.07 0.86 1.17 1.90*   GLUCOSE mg/dL 105*  --  122* 157* 159* 159* 111*   ALBUMIN g/dL  --   --   --   --  3.50  --  3.60   BILIRUBIN mg/dL  --   --   --   --  0.2  --  0.4   ALK PHOS U/L  --   --   --   --  55  --  54   AST (SGOT) U/L  --   --   --   --  22  --  30   ALT (SGPT) U/L  --   --   --   --  26  --  30   AMMONIA umol/L  --  34  --   --   --   --   --      ABG      UA    Results from last 7 days   Lab Units 06/01/22  0029   NITRITE UA  Negative     ANGELO  No results found for: POCMETH, POCAMPHET, POCBARBITUR, POCBENZO, POCCOCAINE, POCOPIATES, POCOXYCODO, POCPHENCYC, POCPROPOXY, POCTHC, POCTRICYC  Lysis Labs   Results from last 7 days   Lab Units 06/05/22  0540 06/05/22  0539 06/04/22  1008 06/03/22  1014 06/02/22  0500  22  1024 22   INR   --   --   --   --  1.00  --   --    HEMOGLOBIN g/dL 12.5*  --   --  13.5 12.9* 13.4 12.1*   PLATELETS 10*3/mm3 155  --   --  180 161 166 170   CREATININE mg/dL  --  0.98 1.02 1.07 0.86 1.17 1.90*     Radiology(recent) XR Chest 1 View    Result Date: 2022   Emphysema and mild scattered atelectasis. No evidence of acute cardiopulmonary process otherwise.  Electronically Signed By-Chad Car On:2022 10:00 AM This report was finalized on  by  Chad Car, .        Results from last 7 days   Lab Units 22  1024   TROPONIN T ng/mL 0.116*       Imaging Results (Last 24 Hours)     ** No results found for the last 24 hours. **          Cardiac Studies:  Echo- Results for orders placed during the hospital encounter of 22    Adult Transthoracic Echo Complete With Contrast if Necessary Per Protocol (With Agitated Saline)    Interpretation Summary  · Left ventricular ejection fraction appears to be 56 - 60%.  · The right ventricular cavity is mildly dilated.  · Mild dilation of the aortic root is present.  · No pericardial effusion noted    Stress Myoview-  Cath-        Frank Giraldo MD  22  17:55 EDT    Electronically signed by Frank Giraldo MD at 22 1759     Harris Boateng MD at 22 0915              [facility] Medicine Services Daily Progress Note    Patient Name: Chi Quinteros Sr.  : 1955  MRN: [id number]  Primary Care Physician:  Deysi Mason APRN  Date of admission: 2022      Subjective      Chief Complaint:He reports he is hungrey     6/3  Vitals and labs reviewed  Awaiting CT surgery recommendation regarding possible cardiac surgery    Notes reviewed      Apparently intermittently confused overnight and angry intermittently  His vitals are stable requiring 2 L of oxygen afebrile  Suspected alcohol withdrawal.  CIWA score was 12 today with the nursing staff  On alcohol  withdrawal protocol  Psychiatrist has been consulted by cardiac surgery  .  Add thiamine        6/5  Short of breath and wheezing much more than last few days.  Her last pulmonologist for evaluation given need for bypass surgery  Continue bronchodilators  Will give additional steroid with Solu-Medrol  Repeat chest x-ray shows no acute  problem    6/6  Labs and notes reviewed      Review of Systems   Constitutional: Negative.   HENT:        Tangirnaq right ear   Eyes: Negative.    Cardiovascular: Negative.    Respiratory: Negative.    Endocrine: Negative.    Hematologic/Lymphatic: Negative.    Skin: Negative.    Musculoskeletal: Positive for back pain.   Gastrointestinal: Negative.    Genitourinary: Negative.    Neurological: Negative.    Psychiatric/Behavioral: Negative.    Allergic/Immunologic: Negative.          Objective      Vitals:   Temp:  [97.5 °F (36.4 °C)-99.4 °F (37.4 °C)] 97.8 °F (36.6 °C)  Heart Rate:  [56-81] 62  Resp:  [16-22] 18  BP: (106-129)/(49-68) 109/60  Flow (L/min):  [2-3] 2    Physical Exam  Vitals reviewed.   Constitutional:       Appearance: Normal appearance. He is obese.   HENT:      Head: Normocephalic and atraumatic.      Right Ear: External ear normal.      Left Ear: External ear normal.      Ears:      Comments: Tangirnaq right ear     Nose: Nose normal.      Mouth/Throat:      Mouth: Mucous membranes are moist.   Eyes:      Extraocular Movements: Extraocular movements intact.   Cardiovascular:      Rate and Rhythm: Normal rate and regular rhythm.      Pulses: Normal pulses.      Heart sounds: Normal heart sounds.   Pulmonary:      Effort: Pulmonary effort is normal.      Breath sounds: Normal breath sounds.   Abdominal:      Palpations: Abdomen is soft.   Genitourinary:     Comments: deferred  Musculoskeletal:         General: Normal range of motion.      Cervical back: Normal range of motion and neck supple.   Skin:     General: Skin is warm and dry.   Neurological:      General: No focal  "deficit present.      Mental Status: He is alert and oriented to person, place, and time.   Psychiatric:         Mood and Affect: Mood normal.         Behavior: Behavior normal.         Thought Content: Thought content normal.         Judgment: Judgment normal.            Result Review    Result Review:  I have personally reviewed the results from the time of this admission to 6/6/2022 09:59 EDT and agree with these findings:  [x]  Laboratory  [x]  Microbiology  [x]  Radiology  [x]  EKG/Telemetry   [x]  Cardiology/Vascular   []  Pathology  [x]  Old records  []  Other:  Most notable findings include: EKG sinus rhythm old infarct, initial troponin on admission 0.415 then 0.326 and 0.116.  Initial D-dimer 0.70 CT scan per radiology 5/30/1931:    \"IMPRESSION:  1.A descending aortic aneurysm up to 4.9 cm.  2.No pulmonary embolic disease.  3.Fatty liver.  4.Bilateral basilar atelectasis.  \"    Initial creatinine on admission 1.9 with a GFR of 38.2 now improved to creatinine 0.86 with a EGFR of 94.9,    Cath lab reports dated 6/222:    \"Left Main %:       0  Proximal LAD %: 70%  Mid/Distal LAD %:  0.  Diagonal branch has an ostial 70%  LCX %: Subtotal occlusion with collaterals from the LAD  Ramus:    RCA %: 100% with collaterals from the left to the right  Lima %:    SVG(s) %:   \"      \"Impression and Recommendation: Severe three-vessel coronary disease  Mild LV dysfunction  We will review films with surgeons for possible coronary bypass\"        Wounds (last 24 hours)     LDA Wound     Row Name 06/06/22 0348 06/06/22 0010 06/05/22 2000       Wound 06/01/22 0020 Right posterior elbow Skin Tear    Wound - Properties Group Placement Date: 06/01/22  -AH Placement Time: 0020  -AH Present on Hospital Admission: Y  -AH Side: Right  -AH Orientation: posterior  -AH Location: elbow  -AH Primary Wound Type: Skin tear  -AH Additional Comments: mepilex placed  -AH    Closure WHIT  -DH WHIT  -DH WHIT  -DH    Base dressing in place, unable " to visualize  -DH dressing in place, unable to visualize  -DH dressing in place, unable to visualize  -DH    Retired Wound - Properties Group Placement Date: 06/01/22  - Placement Time: 0020  -AH Present on Hospital Admission: Y  -AH Side: Right  -AH Orientation: posterior  -AH Location: elbow  -AH Primary Wound Type: Skin tear  -AH Additional Comments: mepilex placed  -AH    Retired Wound - Properties Group Date first assessed: 06/01/22  - Time first assessed: 0020  -AH Present on Hospital Admission: Y  -AH Side: Right  -AH Location: elbow  -AH Primary Wound Type: Skin tear  -AH Additional Comments: mepilex placed  -AH    Row Name 06/05/22 1600 06/05/22 1203          Wound 06/01/22 0020 Right posterior elbow Skin Tear    Wound - Properties Group Placement Date: 06/01/22  - Placement Time: 0020  -AH Present on Hospital Admission: Y  -AH Side: Right  -AH Orientation: posterior  -AH Location: elbow  -AH Primary Wound Type: Skin tear  -AH Additional Comments: mepilex placed  -AH     Closure WHIT  -SM WHIT  -SM     Base dressing in place, unable to visualize  -SM dressing in place, unable to visualize  -SM     Retired Wound - Properties Group Placement Date: 06/01/22  - Placement Time: 0020  -AH Present on Hospital Admission: Y  -AH Side: Right  -AH Orientation: posterior  -AH Location: elbow  -AH Primary Wound Type: Skin tear  -AH Additional Comments: mepilex placed  -AH     Retired Wound - Properties Group Date first assessed: 06/01/22  - Time first assessed: 0020  -AH Present on Hospital Admission: Y  -AH Side: Right  -AH Location: elbow  -AH Primary Wound Type: Skin tear  -AH Additional Comments: mepilex placed  -AH           User Key  (r) = Recorded By, (t) = Taken By, (c) = Cosigned By    Initials Name Provider Type    Maral Salas RN Registered Nurse    Deysi Leger RN Registered Nurse    Lizette Mitchell RN Registered Nurse                  Assessment & Plan      Brief Patient  Summary:  Chi Quinteros Sr. is a 67 y.o. male who initially presented to the emergency department on 5/31/2022 with complaints of syncope and loss of consciousness.  He was noted to be hypoxic in the 80s by EMS and reported at that time that he drinks about 5-6 beers daily.  His troponins were elevated at 0.415 then 0.326 and then 0.116.  He underwent a stress test which showed severe ischemia to lateral inferior wall and subsequently underwent a cardiac catheterization today 6/2/2022 which showed severe three-vessel coronary artery disease mild left ventricular dysfunction.  He will be admitted for cardiothoracic surgery evaluation for coronary artery bypass grafting.  Initially upon admission he had a creatinine of 1.9 and a GFR of 38.2 and was seen by nephrology for clearance for the cardiac catheterization.  Creatinine has now improved to 0.86 with a GFR of 94.9.  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  CIWA precautions have been put in place, and he will be further evaluated by cardiac surgery.    amLODIPine, 5 mg, Oral, Daily  arformoterol, 15 mcg, Nebulization, BID - RT  aspirin, 81 mg, Oral, Daily  atorvastatin, 40 mg, Oral, Daily  budesonide, 0.5 mg, Nebulization, BID - RT  carvedilol, 6.25 mg, Oral, BID With Meals  enoxaparin, 40 mg, Subcutaneous, Q12H  gabapentin, 600 mg, Oral, BID  guaiFENesin, 1,200 mg, Oral, Q12H  insulin lispro, 0-7 Units, Subcutaneous, TID AC  ipratropium-albuterol, 3 mL, Nebulization, Q4H - RT  methylPREDNISolone sodium succinate, 40 mg, Intravenous, Q8H  multivitamin, 1 tablet, Oral, Daily  sodium chloride, 10 mL, Intravenous, Q12H  tamsulosin, 0.4 mg, Oral, Daily  thiamine, 100 mg, Oral, BID       Pharmacy to dose Trelegy,          Active Hospital Problems:  Active Hospital Problems    Diagnosis    • Syncope, unspecified syncope type    • Acute renal  insufficiency    • Alcohol dependence (HCC)    • Essential hypertension    • Other emphysema (HCC)    • Coronary artery disease    • COPD exacerbation (HCC)    • Obesity (BMI 30-39.9)    • Abnormal nuclear stress test      Added automatically from request for surgery [id number]       Plan:     Syncopal episode  Abnormal stress test and heart cath showing three-vessel disease awaiting CT surgery recommendation  Dr. Giraldo following  On statin and Lovenox full dose and lisinopril    Systolic congestive heart failure EF 40%  On lisinopril  Add Coreg    Acute kidney injury  Improved  Seen and followed by nephrologist      Alcohol dependence, CIWA precautions in place  consulted  Supplemental thiamine    Essential hypertension,  On Norvasc  On lisinopril  Coreg      Alcohol dependence  Alcohol withdrawal syndrome  Started on CIWA protocol   Add thiamine  Add multivitamin  Deferred to psychiatrist      ?  Fasting hyperglycemia, 159  A1c 5.5  TSH 0.47  Defer to endocrinologist    COPD exacerbation  Emphysema, no home oxygen now stable on 2 L via nasal cannula post catheterization, restarted home Trelegy pharmacy to dose   Taper steroids  Now with new exacerbation 6/5/2022  Solu-Medrol 125x1  Pulmonary consult appreciated given need for bypass surgery  Defer to pulmonologist    Obesity BMI 34 lifestyle management education     BPh, resume home Flomax       DVT prophylaxis:  Medical and mechanical DVT prophylaxis orders are present.    CODE STATUS:    Code Status (Patient has no pulse and is not breathing): CPR (Attempt to Resuscitate)  Medical Interventions (Patient has pulse or is breathing): Full Support      Disposition:  I expect patient to be discharged pending cardiovascular surgery consult and outcome of evaluation     This patient has been examined wearing appropriate Personal Protective Equipment  06/06/22      Electronically signed by Harris Boateng MD, 06/06/22, 09:59 EDT.  [facility]             Electronically signed by Harris Boateng MD at 06/06/22 1000

## 2022-06-08 NOTE — PROGRESS NOTES
Cardiology Progress Note      Admiting Physician:  Giancarlo Soto MD   LOS: 2 days       Reason For Followup:  Multivessel coronary artery disease      Subjective:    Interval History:  Seen and examined.  Chart and labs reviewed.  Patient appears to be in a normal cognitive state.  He is upset that his surgery has been postponed.  He reports that he does not have a drinking problem and that he only has 2-3 drinks a night.    Review of Systems:  A complete review of systems was attempted however patient's cognitive status is questionable.  He denies chest pain or shortness of breath at this time.    Assessment & Plan    Impressions:  Syncope  Multivessel coronary artery disease  Hyperlipidemia  Hypertension  Ischemic cardiomyopathy EF 40%  Acute kidney injury-improved  Alcohol dependence  Altered mental status likely secondary to alcohol withdrawal    Recommendations:  Patient will have coronary bypass surgery on Friday  Till then continue medical therapy  Blood pressure heart rate are stable  Renal function has significantly improved since he is on IV fluids  Monitor rate and rhythm closely  Further recommendations as patient's course progresses  Alcohol withdrawal/altered mentation as per admitting service  Patient is much better now and does not have any withdrawal symptoms  Blood pressure and heart rate are stable and tolerating oral medicines including aspirin carvedilol statins and amlodipine    Objective:    Medication Review:   Scheduled Meds:amLODIPine, 5 mg, Oral, Daily  arformoterol, 15 mcg, Nebulization, BID - RT  aspirin, 81 mg, Oral, Daily  atorvastatin, 40 mg, Oral, Daily  budesonide, 0.5 mg, Nebulization, BID - RT  carvedilol, 6.25 mg, Oral, BID With Meals  enoxaparin, 40 mg, Subcutaneous, Q12H  gabapentin, 600 mg, Oral, BID  guaiFENesin, 1,200 mg, Oral, Q12H  insulin lispro, 0-7 Units, Subcutaneous, TID AC  insulin regular, 2 Units, Subcutaneous, Q8H  methylPREDNISolone sodium succinate, 40 mg,  Intravenous, Q8H  multivitamin, 1 tablet, Oral, Daily  sodium chloride, 10 mL, Intravenous, Q12H  tamsulosin, 0.4 mg, Oral, Daily  thiamine, 100 mg, Oral, BID      Continuous Infusions:Pharmacy Consult - Steroid Insulin Protocol,   Pharmacy to dose Trelegy,       PRN Meds:.•  acetaminophen **OR** acetaminophen **OR** acetaminophen  •  aluminum-magnesium hydroxide-simethicone  •  atropine  •  benzonatate  •  dextrose  •  dextrose  •  glucagon (human recombinant)  •  hydrALAZINE  •  ipratropium-albuterol  •  LORazepam **OR** LORazepam **OR** LORazepam **OR** LORazepam **OR** LORazepam **OR** LORazepam  •  LORazepam **OR** LORazepam **OR** LORazepam **OR** LORazepam **OR** LORazepam **OR** LORazepam  •  magnesium sulfate **OR** magnesium sulfate **OR** magnesium sulfate  •  melatonin  •  nitroglycerin  •  ondansetron **OR** ondansetron  •  Pharmacy Consult - Steroid Insulin Protocol  •  Pharmacy to dose Trelegy  •  potassium chloride **OR** potassium chloride **OR** potassium chloride  •  sodium chloride  •  sodium chloride  •  sodium chloride    Patient Active Problem List   Diagnosis   • COPD exacerbation (HCC)   • Obesity (BMI 30-39.9)   • Abnormal nuclear stress test   • Acute renal insufficiency   • Alcohol dependence (HCC)   • Essential hypertension   • Other emphysema (HCC)   • Coronary artery disease   • Syncope, unspecified syncope type         Physical Exam:    General: Alert, cooperative, no distress, appears stated age  Head:  Normocephalic, atraumatic, mucous membranes moist  Eyes:  Conjunctivae/corneas clear, EOM's intact     Neck:  Supple,  no bruit  Lungs:  Clear to auscultation bilaterally, no wheezes rhonchi rales are noted  Chest wall: No tenderness  Heart::  Regular rate and rhythm, S1 and S2 normal, 1/6 holosystolic murmur.  No rub or gallop  Abdomen: Soft, non-tender, nondistended bowel sounds active.  Obese  Extremities: No cyanosis, clubbing, or edema  Pulses: Diminished pedal pulses  Skin:  No  rashes or lesions  Neuro/psych: A&O x3. CN II through XII are grossly intact with appropriate affect    Vital Signs:  Vitals:    06/07/22 1857 06/07/22 1859 06/07/22 1900 06/07/22 1902   BP:       BP Location:       Patient Position:       Pulse: 69 66 65 65   Resp: 20 20 20 20   Temp:       TempSrc:       SpO2: 95% 94% 98% 100%   Weight:       Height:         Wt Readings from Last 1 Encounters:   06/07/22 110 kg (241 lb 6.5 oz)       Intake/Output Summary (Last 24 hours) at 6/7/2022 2003  Last data filed at 6/7/2022 1700  Gross per 24 hour   Intake 960 ml   Output 1650 ml   Net -690 ml         Results Review:     CBC    Results from last 7 days   Lab Units 06/07/22  0411 06/05/22  0540 06/03/22  1014 06/02/22  0500 06/01/22  1024 05/31/22  2053   WBC 10*3/mm3 8.10 6.60 6.00 6.80 5.50 6.00   HEMOGLOBIN g/dL 13.2 12.5* 13.5 12.9* 13.4 12.1*   PLATELETS 10*3/mm3 229 155 180 161 166 170     Cr Clearance Estimated Creatinine Clearance: 100 mL/min (by C-G formula based on SCr of 0.89 mg/dL).  Coag   Results from last 7 days   Lab Units 06/02/22  0500   INR  1.00     HbA1C   Lab Results   Component Value Date    HGBA1C 5.5 06/03/2022     Blood Glucose   Glucose   Date/Time Value Ref Range Status   06/07/2022 1606 191 (H) 70 - 105 mg/dL Final     Comment:     Serial Number: [id number]:  [id number]   06/07/2022 1110 151 (H) 70 - 105 mg/dL Final     Comment:     Serial Number: [id number]:  [id number]   06/07/2022 0718 131 (H) 70 - 105 mg/dL Final     Comment:     Serial Number: [id number]:  [id number]   06/06/2022 2012 157 (H) 70 - 105 mg/dL Final     Comment:     Serial Number: [id number]:  [id number]   06/06/2022 1609 206 (H) 70 - 105 mg/dL Final     Comment:     Serial Number: [id number]:  [id number]   06/06/2022 1105 200 (H) 70 - 105 mg/dL Final     Comment:     Serial Number: [id number]:  [id number]   06/06/2022 0710 157 (H) 70 - 105 mg/dL Final     Comment:     Serial Number:  [id number]:  [id number]   06/05/2022 2009 266 (H) 70 - 105 mg/dL Final     Comment:     Serial Number: [id number]:  [id number]     Infection   Results from last 7 days   Lab Units 05/31/22  2227   PROCALCITONIN ng/mL 0.12     CMP   Results from last 7 days   Lab Units 06/07/22  0411 06/05/22  0539 06/04/22  1744 06/04/22  1008 06/03/22  1014 06/02/22  0500 06/01/22  1024 05/31/22  2053   SODIUM mmol/L 134* 134*  --  136 139 138 136 134*   POTASSIUM mmol/L 4.8 3.9  --  4.0 4.1 4.5 4.8 3.7   CHLORIDE mmol/L 98 97*  --  99 103 103 103 97*   CO2 mmol/L 27.0 27.0  --  27.0 24.0 23.0 20.0* 22.0   BUN mg/dL 27* 16  --  18 15 13 14 15   CREATININE mg/dL 0.89 0.98  --  1.02 1.07 0.86 1.17 1.90*   GLUCOSE mg/dL 139* 105*  --  122* 157* 159* 159* 111*   ALBUMIN g/dL  --   --   --   --   --  3.50  --  3.60   BILIRUBIN mg/dL  --   --   --   --   --  0.2  --  0.4   ALK PHOS U/L  --   --   --   --   --  55  --  54   AST (SGOT) U/L  --   --   --   --   --  22  --  30   ALT (SGPT) U/L  --   --   --   --   --  26  --  30   AMMONIA umol/L  --   --  34  --   --   --   --   --      ABG      UA    Results from last 7 days   Lab Units 06/01/22  0029   NITRITE UA  Negative     ANGELO  No results found for: POCMETH, POCAMPHET, POCBARBITUR, POCBENZO, POCCOCAINE, POCOPIATES, POCOXYCODO, POCPHENCYC, POCPROPOXY, POCTHC, POCTRICYC  Lysis Labs   Results from last 7 days   Lab Units 06/07/22 0411 06/05/22  0540 06/05/22  0539 06/04/22  1008 06/03/22  1014 06/02/22  0500 06/01/22  1024 05/31/22 2053   INR   --   --   --   --   --  1.00  --   --    HEMOGLOBIN g/dL 13.2 12.5*  --   --  13.5 12.9* 13.4 12.1*   PLATELETS 10*3/mm3 229 155  --   --  180 161 166 170   CREATININE mg/dL 0.89  --  0.98 1.02 1.07 0.86 1.17 1.90*     Radiology(recent) No radiology results for the last day      Results from last 7 days   Lab Units 06/01/22  1024   TROPONIN T ng/mL 0.116*       Imaging Results (Last 24 Hours)     ** No results found for the last 24  hours. **          Cardiac Studies:  Echo- Results for orders placed during the hospital encounter of 05/31/22    Adult Transthoracic Echo Complete With Contrast if Necessary Per Protocol (With Agitated Saline)    Interpretation Summary  · Left ventricular ejection fraction appears to be 56 - 60%.  · The right ventricular cavity is mildly dilated.  · Mild dilation of the aortic root is present.  · No pericardial effusion noted    Stress Myoview-  Cath-        Frank Giraldo MD  06/07/22  20:03 EDT

## 2022-06-08 NOTE — PROGRESS NOTES
"NEPHROLOGY PROGRESS NOTE------[facility]    [facility]  [phone]  Amara Cooper MD      Patient Care Team:  Deysi Mason APRN as PCP - General (Nurse Practitioner)  Eliseo Casarez MD as Consulting Physician (Nephrology)      Provider:  Amara Cooper MD  Patient Name: Chi Quinteros Sr.  :  1955    SUBJECTIVE:    F/U ARF/FELIBERTO/CRF/CKD    No SOB, CP, palpitations, dysuria. No cramping.    Medication:  amLODIPine, 5 mg, Oral, Daily  arformoterol, 15 mcg, Nebulization, BID - RT  aspirin, 81 mg, Oral, Daily  atorvastatin, 40 mg, Oral, Daily  budesonide, 0.5 mg, Nebulization, BID - RT  carvedilol, 6.25 mg, Oral, BID With Meals  enoxaparin, 40 mg, Subcutaneous, Q12H  gabapentin, 600 mg, Oral, BID  guaiFENesin, 1,200 mg, Oral, Q12H  insulin lispro, 0-7 Units, Subcutaneous, TID AC  insulin regular, 2 Units, Subcutaneous, Q8H  methylPREDNISolone sodium succinate, 40 mg, Intravenous, Q8H  multivitamin, 1 tablet, Oral, Daily  sodium chloride, 10 mL, Intravenous, Q12H  tamsulosin, 0.4 mg, Oral, Daily  thiamine, 100 mg, Oral, BID      Pharmacy Consult - Steroid Insulin Protocol,   Pharmacy to dose Trelegy,         OBJECTIVE    Vital Sign Min/Max for last 24 hours  Temp  Min: 97.4 °F (36.3 °C)  Max: 98.8 °F (37.1 °C)   BP  Min: 106/60  Max: 128/65   Pulse  Min: 57  Max: 87   Resp  Min: 16  Max: 20   SpO2  Min: 92 %  Max: 100 %   No data recorded   Weight  Min: 107 kg (236 lb 8.9 oz)  Max: 107 kg (236 lb 8.9 oz)     Flowsheet Rows    Flowsheet Row First Filed Value   Admission Height 177.8 cm (70\") Documented at 2022   Admission Weight 104 kg (230 lb) Documented at 2022          No intake/output data recorded.  I/O last 3 completed shifts:  In: 1680 [P.O.:1680]  Out: 2500 [Urine:2500]    Physical Exam:  General Appearance: alert, appears stated age and cooperative  Head: normocephalic, without obvious abnormality and " atraumatic  Eyes: conjunctivae and sclerae normal and no icterus  Neck: supple and no JVD  Lungs: clear to auscultation and respirations regular  Heart: regular rhythm & normal rate and normal S1, S2 +ANTONY  Chest: Wall no abnormalities observed  Abdomen: normal bowel sounds and soft non-tender  Extremities: moves extremities well, no edema, no cyanosis and no redness  Skin: no bleeding, bruising or rash, turgor normal, color normal and no lesions noted  Neurologic: Alert, and oriented. No focal deficits    Labs:    WBC WBC   Date Value Ref Range Status   06/08/2022 7.70 3.40 - 10.80 10*3/mm3 Final   06/07/2022 8.10 3.40 - 10.80 10*3/mm3 Final      HGB Hemoglobin   Date Value Ref Range Status   06/08/2022 13.3 13.0 - 17.7 g/dL Final   06/07/2022 13.2 13.0 - 17.7 g/dL Final      HCT Hematocrit   Date Value Ref Range Status   06/08/2022 40.9 37.5 - 51.0 % Final   06/07/2022 39.3 37.5 - 51.0 % Final      Platlets No results found for: LABPLAT   MCV MCV   Date Value Ref Range Status   06/08/2022 98.9 (H) 79.0 - 97.0 fL Final   06/07/2022 97.8 (H) 79.0 - 97.0 fL Final          Sodium Sodium   Date Value Ref Range Status   06/08/2022 136 136 - 145 mmol/L Final   06/07/2022 134 (L) 136 - 145 mmol/L Final      Potassium Potassium   Date Value Ref Range Status   06/08/2022 5.7 (H) 3.5 - 5.2 mmol/L Final   06/07/2022 4.8 3.5 - 5.2 mmol/L Final      Chloride Chloride   Date Value Ref Range Status   06/08/2022 98 98 - 107 mmol/L Final   06/07/2022 98 98 - 107 mmol/L Final      CO2 CO2   Date Value Ref Range Status   06/08/2022 30.0 (H) 22.0 - 29.0 mmol/L Final   06/07/2022 27.0 22.0 - 29.0 mmol/L Final      BUN BUN   Date Value Ref Range Status   06/08/2022 27 (H) 8 - 23 mg/dL Final   06/07/2022 27 (H) 8 - 23 mg/dL Final      Creatinine Creatinine   Date Value Ref Range Status   06/08/2022 0.96 0.76 - 1.27 mg/dL Final   06/07/2022 0.89 0.76 - 1.27 mg/dL Final      Calcium Calcium   Date Value Ref Range Status   06/08/2022 9.1 8.6  - 10.5 mg/dL Final   06/07/2022 8.8 8.6 - 10.5 mg/dL Final      PO4 No components found for: PO4   Albumin No results found for: ALBUMIN   Magnesium Magnesium   Date Value Ref Range Status   06/08/2022 2.3 1.6 - 2.4 mg/dL Final   06/07/2022 2.3 1.6 - 2.4 mg/dL Final      Uric Acid No components found for: URIC ACID     Imaging Results (Last 72 Hours)     Procedure Component Value Units Date/Time    XR Chest 1 View [[id number]] Collected: 06/05/22 0958     Updated: 06/05/22 1002    Narrative:      EXAM: XR CHEST 1 VW-     DATE OF EXAM: 6/5/2022 8:58 AM     INDICATION: copd exac  cough; R55-Syncope and collapse; F10.920-Alcohol  use, unspecified with intoxication, uncomplicated; J96.01-Acute  respiratory failure with hypoxia; I95.1-Orthostatic hypotension;  R77.8-Other specified abnormalities of plasma proteins; N17.9-Acute  kidney failure, unspecified; J44.1-Chronic obstructive pulmonary disease  with (acute) exacerbation; R94.39-Abnormal result of other cardiovasc.       COMPARISONS: 5/31/2022      FINDINGS:     Emphysema is evident. No pneumothorax or pleural effusion. Mild  scattered atelectasis. No consolidation. Mediastinum appears unchanged,  no evidence of acute process.       Impression:         Emphysema and mild scattered atelectasis. No evidence of acute  cardiopulmonary process otherwise.     Electronically Signed By-Chad Car On:6/5/2022 10:00 AM  This report was finalized on [id number] by  Chad Car, .          Results for orders placed during the hospital encounter of 05/31/22    XR Chest 1 View    Narrative  EXAM: XR CHEST 1 VW-    DATE OF EXAM: 6/5/2022 8:58 AM    INDICATION: copd exac  cough; R55-Syncope and collapse; F10.920-Alcohol  use, unspecified with intoxication, uncomplicated; J96.01-Acute  respiratory failure with hypoxia; I95.1-Orthostatic hypotension;  R77.8-Other specified abnormalities of plasma proteins; N17.9-Acute  kidney failure, unspecified; J44.1-Chronic obstructive  pulmonary disease  with (acute) exacerbation; R94.39-Abnormal result of other cardiovasc.    COMPARISONS: 5/31/2022    FINDINGS:    Emphysema is evident. No pneumothorax or pleural effusion. Mild  scattered atelectasis. No consolidation. Mediastinum appears unchanged,  no evidence of acute process.    Impression  Emphysema and mild scattered atelectasis. No evidence of acute  cardiopulmonary process otherwise.    Electronically Signed By-Chad Car On:6/5/2022 10:00 AM  This report was finalized on [id number] by  Chad Car, .      XR Chest 1 View    Narrative  Examination: XR CHEST 1 VW-    Date of Exam: 5/31/2022 9:03 PM    Indication: Chest pain protocol.    Comparison: None available.    Technique: Single radiographic view of the chest was obtained.    Findings:  There is no pneumothorax, pleural effusion or focal airspace  consolidation. Cardiomediastinal silhouette is unremarkable. Pulmonary  vasculature appears within normal limits. Regional bones appear grossly  intact.    Impression  No acute cardiopulmonary abnormality.    Electronically Signed By-Johann Dodge MD On:5/31/2022 9:29 PM  This report was finalized on [id number] by  Johann Dodge MD.      Results for orders placed during the hospital encounter of 05/31/22    Duplex Vein Mapping Lower Extremity - Bilateral CAR    Interpretation Summary  The greater saphenous veins are of satisfactory quality from the groin to the mid distal thigh bilaterally.  Distal to this the veins are small and inadequate.        ASSESSMENT / PLAN      COPD exacerbation (HCC)    Obesity (BMI 30-39.9)    Abnormal nuclear stress test    Acute renal insufficiency    Alcohol dependence (HCC)    Essential hypertension    Other emphysema (HCC)    Coronary artery disease    Syncope, unspecified syncope type    1. ARF/FELIBERTO------Nonoliguric. Resolved with volume repletion    2. CAD------No angina or gross overload. ? Surgery Friday    3. BENIGN ESSENTIAL HTN------BP okay.  No ACE-I for now    4. BPH-------On Flomax    5. S/P SYNCOPE    6. ETOH ABUSE    7. HYPERLIPIDEMIA-------On Statin    8. DVT PROPHYLAXIS-------On Lovenox    9. HYPERKALEMIA--------Med Rx and K+ restrict diet and follow      Amara Cooper MD  [facility]  [phone]  06/08/22  08:08 EDT

## 2022-06-08 NOTE — CASE MANAGEMENT/SOCIAL WORK
Continued Stay Note  DEBORAH Gifford     Patient Name: Chi Quinteros Sr.  MRN: [id number]  Today's Date: 6/8/2022    Admit Date: 5/31/2022     Discharge Plan     Row Name 06/08/22 1601       Plan    Plan DC Plan: Scheduled for CABG 6/10. May require PT/OT evals s/p surgery. Rehab/HH lists provided. From home alone.    Patient/Family in Agreement with Plan yes    Provided Post Acute Provider List? Yes    Post Acute Provider List Home Health;Inpatient Rehab    Provided Post Acute Provider Quality & Resource List? Yes    Post Acute Provider Quality and Resource List Inpatient Rehab;Home Health    Delivered To Patient    Method of Delivery In person    Plan Comments CM met with patient at bedside to discuss dc planning s/p CABG. Rehab and home health lists provided for patient to review.              Met with patient in room wearing PPE: mask.      Maintained distance greater than six feet and spent less than 15 minutes in the room.      Leela Frank RN     Office Phone: [phone]  Office Cell: [phone]

## 2022-06-08 NOTE — PROGRESS NOTES
Daily Progress Note        COPD exacerbation (HCC)    Obesity (BMI 30-39.9)    Abnormal nuclear stress test    Acute renal insufficiency    Alcohol dependence (HCC)    Essential hypertension    Other emphysema (HCC)    Coronary artery disease    Syncope, unspecified syncope type      Assessment    COPD with acute exacerbation, improving  Spirometry 6/3/2022 and flow volume loop suggestive of severe obstructive and possible restrictive respiratory defect: Unfortunately total lung capacity and DLCO were not performed   FEV1 45% without significant response to bronchodilators, FEV1/FVC ratio 56 compatible with obstructive defect    Non-STEMI  Three-vessel coronary artery disease  Ascending aortic aneurysm 4.9205 cm  Hypertension  Syncope    Alcohol abuse  Dyslipidemia  Back pain with a lumbar herniation  Early prostate cancer  FELIBERTO: Resolving    History of tobacco smoking: Quit June 2021     Recommendations:    Oxygen supplement and titration: Requiring 2 L per NC     from pulmonary standpoint patient is at high risk for pulmonary complications including pneumonia, atelectasis and or prolonged ventilation but there is no absolute contraindication for the surgery,  patient was treated with short course of steroids currently off    perioperative bronchodilators and aggressive pulmonary toilet/intensive spirometry/flutter valve  Inhaled corticosteroids/Bronchodilators  Statin  Blood pressure control  DVT prophylaxis Lovenox  Mucinex twice daily  Thiamine                LOS: 3 days     Subjective         Objective     Vital signs for last 24 hours:  Vitals:    06/08/22 0144 06/08/22 0146 06/08/22 0147 06/08/22 0440   BP: 106/60 106/60  128/65   BP Location: Left arm   Left arm   Patient Position: Lying   Lying   Pulse: 57  60 62   Resp: 16   18   Temp: 97.4 °F (36.3 °C)   98.7 °F (37.1 °C)   TempSrc: Oral   Oral   SpO2: 93%  92% 95%   Weight:    107 kg (236 lb 8.9 oz)   Height:           Intake/Output last 3 shifts:  I/O  last 3 completed shifts:  In: 960 [P.O.:960]  Out: 1825 [Urine:1650; Stool:175]  Intake/Output this shift:  I/O this shift:  In: 720 [P.O.:720]  Out: 850 [Urine:850]      Radiology  Imaging Results (Last 24 Hours)     ** No results found for the last 24 hours. **          Labs:  Results from last 7 days   Lab Units 06/08/22  0403   WBC 10*3/mm3 7.70   HEMOGLOBIN g/dL 13.3   HEMATOCRIT % 40.9   PLATELETS 10*3/mm3 237     Results from last 7 days   Lab Units 06/08/22  0403 06/03/22  1014 06/02/22  0500   SODIUM mmol/L 136   < > 138   POTASSIUM mmol/L 5.7*   < > 4.5   CHLORIDE mmol/L 98   < > 103   CO2 mmol/L 30.0*   < > 23.0   BUN mg/dL 27*   < > 13   CREATININE mg/dL 0.96   < > 0.86   CALCIUM mg/dL 9.1   < > 8.3*   BILIRUBIN mg/dL  --   --  0.2   ALK PHOS U/L  --   --  55   ALT (SGPT) U/L  --   --  26   AST (SGOT) U/L  --   --  22   GLUCOSE mg/dL 128*   < > 159*    < > = values in this interval not displayed.         Results from last 7 days   Lab Units 06/02/22  0500   ALBUMIN g/dL 3.50     Results from last 7 days   Lab Units 06/01/22  1024   TROPONIN T ng/mL 0.116*         Results from last 7 days   Lab Units 06/08/22  0403   MAGNESIUM mg/dL 2.3     Results from last 7 days   Lab Units 06/02/22  0500   INR  1.00     Results from last 7 days   Lab Units 06/03/22  1014   TSH uIU/mL 0.476           Meds:   SCHEDULE  amLODIPine, 5 mg, Oral, Daily  arformoterol, 15 mcg, Nebulization, BID - RT  aspirin, 81 mg, Oral, Daily  atorvastatin, 40 mg, Oral, Daily  budesonide, 0.5 mg, Nebulization, BID - RT  carvedilol, 6.25 mg, Oral, BID With Meals  enoxaparin, 40 mg, Subcutaneous, Q12H  gabapentin, 600 mg, Oral, BID  guaiFENesin, 1,200 mg, Oral, Q12H  insulin lispro, 0-7 Units, Subcutaneous, TID AC  insulin regular, 2 Units, Subcutaneous, Q8H  methylPREDNISolone sodium succinate, 40 mg, Intravenous, Q8H  multivitamin, 1 tablet, Oral, Daily  sodium chloride, 10 mL, Intravenous, Q12H  tamsulosin, 0.4 mg, Oral, Daily  thiamine,  100 mg, Oral, BID      Infusions  Pharmacy Consult - Steroid Insulin Protocol,   Pharmacy to dose Trelegy,       PRNs  •  acetaminophen **OR** acetaminophen **OR** acetaminophen  •  aluminum-magnesium hydroxide-simethicone  •  atropine  •  benzonatate  •  dextrose  •  dextrose  •  glucagon (human recombinant)  •  hydrALAZINE  •  ipratropium-albuterol  •  LORazepam **OR** LORazepam **OR** LORazepam **OR** LORazepam **OR** LORazepam **OR** LORazepam  •  magnesium sulfate **OR** magnesium sulfate **OR** magnesium sulfate  •  melatonin  •  nitroglycerin  •  ondansetron **OR** ondansetron  •  Pharmacy Consult - Steroid Insulin Protocol  •  Pharmacy to dose Trelegy  •  potassium chloride **OR** potassium chloride **OR** potassium chloride  •  sodium chloride  •  sodium chloride  •  sodium chloride    Physical Exam:  Physical Exam  Vitals reviewed.   Constitutional:       Appearance: He is obese.   Pulmonary:      Breath sounds: Decreased breath sounds present.   Skin:     General: Skin is warm and dry.   Neurological:      Mental Status: He is alert.         ROS  Review of Systems   Respiratory: Positive for shortness of breath and wheezing.        I have reviewed the patient's new clinical results.    Electronically signed by ERICA Jarrell.

## 2022-06-08 NOTE — PLAN OF CARE
Goal Outcome Evaluation:        Patient to have Cabg with triple a repair this Friday. K+ was 5.7 this am. Meds ordered by nephrology to lower K+ and repeat K+ was 4.7. Will continue to monitor.       Problem: Adult Inpatient Plan of Care  Goal: Plan of Care Review  Outcome: Ongoing, Progressing  Goal: Patient-Specific Goal (Individualized)  Outcome: Ongoing, Progressing  Goal: Absence of Hospital-Acquired Illness or Injury  Outcome: Ongoing, Progressing  Intervention: Identify and Manage Fall Risk  Recent Flowsheet Documentation  Taken 6/8/2022 1200 by Aicha Castillo RN  Safety Promotion/Fall Prevention:   activity supervised   clutter free environment maintained   nonskid shoes/slippers when out of bed   safety round/check completed  Taken 6/8/2022 1000 by Aicha Castillo RN  Safety Promotion/Fall Prevention:   activity supervised   clutter free environment maintained   nonskid shoes/slippers when out of bed   safety round/check completed  Taken 6/8/2022 0800 by Aicha Castillo RN  Safety Promotion/Fall Prevention:   activity supervised   assistive device/personal items within reach   clutter free environment maintained   nonskid shoes/slippers when out of bed   safety round/check completed  Intervention: Prevent and Manage VTE (Venous Thromboembolism) Risk  Recent Flowsheet Documentation  Taken 6/8/2022 1200 by Aicha Castillo RN  Activity Management: up in chair  Taken 6/8/2022 1000 by Aicha Castillo RN  Activity Management: up in chair  Taken 6/8/2022 0800 by Aicha Castillo RN  Activity Management: up in chair  Intervention: Prevent Infection  Recent Flowsheet Documentation  Taken 6/8/2022 1200 by Aicha Castillo RN  Infection Prevention:   hand hygiene promoted   personal protective equipment utilized  Taken 6/8/2022 1000 by Aicha Castillo RN  Infection Prevention:   hand hygiene promoted   personal protective equipment utilized  Taken 6/8/2022 0800 by Aicha Castillo  RN  Infection Prevention:   hand hygiene promoted   personal protective equipment utilized  Goal: Optimal Comfort and Wellbeing  Outcome: Ongoing, Progressing  Goal: Readiness for Transition of Care  Outcome: Ongoing, Progressing

## 2022-06-09 ENCOUNTER — ANESTHESIA EVENT (OUTPATIENT)
Dept: PERIOP | Facility: HOSPITAL | Age: 67
End: 2022-06-09

## 2022-06-09 LAB
ABO GROUP BLD: NORMAL
ALBUMIN SERPL-MCNC: 3 G/DL (ref 3.5–5.2)
ALBUMIN/GLOB SERPL: 0.9 G/DL
ALP SERPL-CCNC: 52 U/L (ref 39–117)
ALT SERPL W P-5'-P-CCNC: 44 U/L (ref 1–41)
ANION GAP SERPL CALCULATED.3IONS-SCNC: 10 MMOL/L (ref 5–15)
APTT PPP: 26.6 SECONDS (ref 24–31)
AST SERPL-CCNC: 25 U/L (ref 1–40)
BASOPHILS # BLD AUTO: 0 10*3/MM3 (ref 0–0.2)
BASOPHILS NFR BLD AUTO: 0.3 % (ref 0–1.5)
BILIRUB SERPL-MCNC: 0.3 MG/DL (ref 0–1.2)
BLD GP AB SCN SERPL QL: NEGATIVE
BUN SERPL-MCNC: 22 MG/DL (ref 8–23)
BUN/CREAT SERPL: 25.9 (ref 7–25)
CALCIUM SPEC-SCNC: 8.7 MG/DL (ref 8.6–10.5)
CHLORIDE SERPL-SCNC: 98 MMOL/L (ref 98–107)
CHOLEST SERPL-MCNC: 162 MG/DL (ref 0–200)
CLOSE TME COLL+ADP + EPINEP PNL BLD: 97 % (ref 86–100)
CO2 SERPL-SCNC: 28 MMOL/L (ref 22–29)
CREAT SERPL-MCNC: 0.85 MG/DL (ref 0.76–1.27)
DEPRECATED RDW RBC AUTO: 45.5 FL (ref 37–54)
EGFRCR SERPLBLD CKD-EPI 2021: 95.2 ML/MIN/1.73
EOSINOPHIL # BLD AUTO: 0 10*3/MM3 (ref 0–0.4)
EOSINOPHIL NFR BLD AUTO: 0.1 % (ref 0.3–6.2)
ERYTHROCYTE [DISTWIDTH] IN BLOOD BY AUTOMATED COUNT: 13.4 % (ref 12.3–15.4)
GLOBULIN UR ELPH-MCNC: 3.2 GM/DL
GLUCOSE BLDC GLUCOMTR-MCNC: 107 MG/DL (ref 70–105)
GLUCOSE BLDC GLUCOMTR-MCNC: 81 MG/DL (ref 70–105)
GLUCOSE BLDC GLUCOMTR-MCNC: 87 MG/DL (ref 70–105)
GLUCOSE SERPL-MCNC: 85 MG/DL (ref 65–99)
HCT VFR BLD AUTO: 41.7 % (ref 37.5–51)
HDLC SERPL-MCNC: 45 MG/DL (ref 40–60)
HGB BLD-MCNC: 13.8 G/DL (ref 13–17.7)
INR PPP: 1.05 (ref 0.93–1.1)
LDLC SERPL CALC-MCNC: 93 MG/DL (ref 0–100)
LDLC/HDLC SERPL: 2 {RATIO}
LYMPHOCYTES # BLD AUTO: 1.9 10*3/MM3 (ref 0.7–3.1)
LYMPHOCYTES NFR BLD AUTO: 19 % (ref 19.6–45.3)
MCH RBC QN AUTO: 32.3 PG (ref 26.6–33)
MCHC RBC AUTO-ENTMCNC: 33.2 G/DL (ref 31.5–35.7)
MCV RBC AUTO: 97.4 FL (ref 79–97)
MONOCYTES # BLD AUTO: 0.9 10*3/MM3 (ref 0.1–0.9)
MONOCYTES NFR BLD AUTO: 9.2 % (ref 5–12)
NEUTROPHILS NFR BLD AUTO: 7.2 10*3/MM3 (ref 1.7–7)
NEUTROPHILS NFR BLD AUTO: 71.4 % (ref 42.7–76)
NRBC BLD AUTO-RTO: 0.1 /100 WBC (ref 0–0.2)
PLATELET # BLD AUTO: 250 10*3/MM3 (ref 140–450)
PMV BLD AUTO: 8.1 FL (ref 6–12)
POTASSIUM SERPL-SCNC: 4.2 MMOL/L (ref 3.5–5.2)
PROT SERPL-MCNC: 6.2 G/DL (ref 6–8.5)
PROTHROMBIN TIME: 10.8 SECONDS (ref 9.6–11.7)
RBC # BLD AUTO: 4.29 10*6/MM3 (ref 4.14–5.8)
RH BLD: POSITIVE
SODIUM SERPL-SCNC: 136 MMOL/L (ref 136–145)
T&S EXPIRATION DATE: NORMAL
TRIGL SERPL-MCNC: 135 MG/DL (ref 0–150)
VLDLC SERPL-MCNC: 24 MG/DL (ref 5–40)
WBC NRBC COR # BLD: 10.1 10*3/MM3 (ref 3.4–10.8)

## 2022-06-09 PROCEDURE — 86850 RBC ANTIBODY SCREEN: CPT | Performed by: NURSE PRACTITIONER

## 2022-06-09 PROCEDURE — 85576 BLOOD PLATELET AGGREGATION: CPT | Performed by: NURSE PRACTITIONER

## 2022-06-09 PROCEDURE — 85610 PROTHROMBIN TIME: CPT | Performed by: NURSE PRACTITIONER

## 2022-06-09 PROCEDURE — 99232 SBSQ HOSP IP/OBS MODERATE 35: CPT | Performed by: INTERNAL MEDICINE

## 2022-06-09 PROCEDURE — 94799 UNLISTED PULMONARY SVC/PX: CPT

## 2022-06-09 PROCEDURE — 86901 BLOOD TYPING SEROLOGIC RH(D): CPT | Performed by: NURSE PRACTITIONER

## 2022-06-09 PROCEDURE — 94761 N-INVAS EAR/PLS OXIMETRY MLT: CPT

## 2022-06-09 PROCEDURE — 86900 BLOOD TYPING SEROLOGIC ABO: CPT | Performed by: NURSE PRACTITIONER

## 2022-06-09 PROCEDURE — 80053 COMPREHEN METABOLIC PANEL: CPT | Performed by: NURSE PRACTITIONER

## 2022-06-09 PROCEDURE — 82962 GLUCOSE BLOOD TEST: CPT

## 2022-06-09 PROCEDURE — 86900 BLOOD TYPING SEROLOGIC ABO: CPT

## 2022-06-09 PROCEDURE — 85730 THROMBOPLASTIN TIME PARTIAL: CPT | Performed by: NURSE PRACTITIONER

## 2022-06-09 PROCEDURE — 86901 BLOOD TYPING SEROLOGIC RH(D): CPT

## 2022-06-09 PROCEDURE — 25010000002 ENOXAPARIN PER 10 MG

## 2022-06-09 PROCEDURE — 94664 DEMO&/EVAL PT USE INHALER: CPT

## 2022-06-09 PROCEDURE — 80061 LIPID PANEL: CPT | Performed by: NURSE PRACTITIONER

## 2022-06-09 PROCEDURE — 86923 COMPATIBILITY TEST ELECTRIC: CPT

## 2022-06-09 PROCEDURE — 85025 COMPLETE CBC W/AUTO DIFF WBC: CPT | Performed by: NURSE PRACTITIONER

## 2022-06-09 PROCEDURE — G0378 HOSPITAL OBSERVATION PER HR: HCPCS

## 2022-06-09 RX ADMIN — Medication 100 MG: at 21:11

## 2022-06-09 RX ADMIN — MUPIROCIN 1 APPLICATION: 20 OINTMENT TOPICAL at 11:43

## 2022-06-09 RX ADMIN — CARVEDILOL 6.25 MG: 6.25 TABLET, FILM COATED ORAL at 08:39

## 2022-06-09 RX ADMIN — ALPRAZOLAM 0.25 MG: 0.25 TABLET ORAL at 21:11

## 2022-06-09 RX ADMIN — CHLORHEXIDINE GLUCONATE 15 ML: 1.2 SOLUTION ORAL at 21:12

## 2022-06-09 RX ADMIN — GUAIFENESIN 1200 MG: 600 TABLET, EXTENDED RELEASE ORAL at 21:10

## 2022-06-09 RX ADMIN — Medication 10 ML: at 21:11

## 2022-06-09 RX ADMIN — ARFORMOTEROL TARTRATE 15 MCG: 15 SOLUTION RESPIRATORY (INHALATION) at 07:27

## 2022-06-09 RX ADMIN — GABAPENTIN 600 MG: 600 TABLET, FILM COATED ORAL at 21:11

## 2022-06-09 RX ADMIN — CARVEDILOL 6.25 MG: 6.25 TABLET, FILM COATED ORAL at 17:24

## 2022-06-09 RX ADMIN — MUPIROCIN 1 APPLICATION: 20 OINTMENT TOPICAL at 21:10

## 2022-06-09 RX ADMIN — GUAIFENESIN 1200 MG: 600 TABLET, EXTENDED RELEASE ORAL at 11:43

## 2022-06-09 RX ADMIN — Medication 100 MG: at 08:39

## 2022-06-09 RX ADMIN — BUDESONIDE 0.5 MG: 0.5 INHALANT RESPIRATORY (INHALATION) at 07:31

## 2022-06-09 RX ADMIN — ASPIRIN 81 MG: 81 TABLET, CHEWABLE ORAL at 08:39

## 2022-06-09 RX ADMIN — TAMSULOSIN HYDROCHLORIDE 0.4 MG: 0.4 CAPSULE ORAL at 08:39

## 2022-06-09 RX ADMIN — CHLORHEXIDINE GLUCONATE 1 APPLICATION: 500 CLOTH TOPICAL at 17:24

## 2022-06-09 RX ADMIN — THERA TABS 1 TABLET: TAB at 08:39

## 2022-06-09 RX ADMIN — GABAPENTIN 600 MG: 600 TABLET, FILM COATED ORAL at 08:39

## 2022-06-09 RX ADMIN — AMLODIPINE BESYLATE 5 MG: 5 TABLET ORAL at 08:39

## 2022-06-09 RX ADMIN — GUAIFENESIN 1200 MG: 600 TABLET, EXTENDED RELEASE ORAL at 00:00

## 2022-06-09 RX ADMIN — Medication 5 MG: at 21:11

## 2022-06-09 RX ADMIN — ENOXAPARIN SODIUM 40 MG: 100 INJECTION SUBCUTANEOUS at 08:39

## 2022-06-09 RX ADMIN — ATORVASTATIN CALCIUM 40 MG: 40 TABLET, FILM COATED ORAL at 08:39

## 2022-06-09 NOTE — PLAN OF CARE
Goal Outcome Evaluation:      Patient to have Cabg tomorrow at 1200. Plan to transfer to [facility] if bed available.       Problem: Adult Inpatient Plan of Care  Goal: Plan of Care Review  Outcome: Ongoing, Progressing  Goal: Patient-Specific Goal (Individualized)  Outcome: Ongoing, Progressing  Goal: Absence of Hospital-Acquired Illness or Injury  Outcome: Ongoing, Progressing  Intervention: Identify and Manage Fall Risk  Recent Flowsheet Documentation  Taken 6/9/2022 1600 by Aicha Castillo RN  Safety Promotion/Fall Prevention:   activity supervised   clutter free environment maintained   nonskid shoes/slippers when out of bed   safety round/check completed  Taken 6/9/2022 1400 by Aicha Castillo RN  Safety Promotion/Fall Prevention:   activity supervised   clutter free environment maintained   nonskid shoes/slippers when out of bed   safety round/check completed  Taken 6/9/2022 1200 by Aicha Castillo RN  Safety Promotion/Fall Prevention:   activity supervised   clutter free environment maintained   nonskid shoes/slippers when out of bed   safety round/check completed  Taken 6/9/2022 1000 by Aicha Castillo RN  Safety Promotion/Fall Prevention:   activity supervised   clutter free environment maintained   nonskid shoes/slippers when out of bed   safety round/check completed  Taken 6/9/2022 0800 by Aicha Castillo RN  Safety Promotion/Fall Prevention:   activity supervised   clutter free environment maintained   nonskid shoes/slippers when out of bed   safety round/check completed  Intervention: Prevent and Manage VTE (Venous Thromboembolism) Risk  Recent Flowsheet Documentation  Taken 6/9/2022 1400 by Aicha Castillo RN  Activity Management: up in chair  Taken 6/9/2022 1200 by Aicha Castillo RN  Activity Management: up in chair  Taken 6/9/2022 1000 by Aicha Castillo RN  Activity Management: up in chair  Taken 6/9/2022 0800 by Aicha Castillo RN  Activity Management:   up in  chair   ambulated in room  Intervention: Prevent Infection  Recent Flowsheet Documentation  Taken 6/9/2022 1600 by Aicha Castillo RN  Infection Prevention:   hand hygiene promoted   personal protective equipment utilized  Taken 6/9/2022 1200 by Aicha Castillo RN  Infection Prevention:   hand hygiene promoted   personal protective equipment utilized  Taken 6/9/2022 1000 by Aicha Castillo RN  Infection Prevention:   hand hygiene promoted   personal protective equipment utilized  Taken 6/9/2022 0800 by Aicha Castillo RN  Infection Prevention:   hand hygiene promoted   personal protective equipment utilized  Goal: Optimal Comfort and Wellbeing  Outcome: Ongoing, Progressing  Goal: Readiness for Transition of Care  Outcome: Ongoing, Progressing

## 2022-06-09 NOTE — PROGRESS NOTES
CC:  Syncopal episodes, witnessed    F/U:  CAD/asc aortic aneurysm--[place]  EF 40% (cath)    Subjective:  Wants to shower this morning    No events overnight        PREOP studies:  Carotid duplex:  16-49% bilat  Vein regina:  Adequate in bilat thighs  A1c:  5.5   Plt ag%/250k  MRSA screen:  +  COVID-19 screen:  Neg   UA:  Neg for UTI  PFTs:  FEV1/FVC 73, FEV1 45, DLCO 40  Echo:  55-60%, RV mild dilatation, aortic root dilatation, mild MR, mild to mod TR      Intake/Output Summary (Last 24 hours) at 2022 0938  Last data filed at 2022 0900  Gross per 24 hour   Intake 1200 ml   Output 3100 ml   Net -1900 ml     Temp:  [97.8 °F (36.6 °C)-98.9 °F (37.2 °C)] 98.9 °F (37.2 °C)  Heart Rate:  [54-82] 80  Resp:  [16-20] 18  BP: (102-162)/(63-90) 105/63      Results from last 7 days   Lab Units 22  0500 22  0403   WBC 10*3/mm3 10.10 7.70   HEMOGLOBIN g/dL 13.8 13.3   HEMATOCRIT % 41.7 40.9   PLATELETS 10*3/mm3 250 237   INR  1.05  --      Results from last 7 days   Lab Units 22  0500 22  1411 22  0403   CREATININE mg/dL 0.85  --  0.96   POTASSIUM mmol/L 4.2   < > 5.7*   SODIUM mmol/L 136  --  136   MAGNESIUM mg/dL  --   --  2.3   PHOSPHORUS mg/dL  --   --  5.0*    < > = values in this interval not displayed.       Physical Exam:  Neuro intact, nad, up in chair visiting with family  Tele:  SR 60s  Diminished bases, 95% RA  Benign abd, + BM  No edema    Assessment/Plan:  Active Problems:    COPD exacerbation (HCC)    Obesity (BMI 30-39.9)    Abnormal nuclear stress test    Acute renal insufficiency    Alcohol dependence (HCC)    Essential hypertension    Other emphysema (HCC)    Coronary artery disease    Syncope, unspecified syncope type    - MV CAD, EF 40% (cath)--surgical workup in progress  - NSTEMI presentation  - Ascending aortic aneurysm,  4.9-5.0 cm  - Admit with syncopal event x2--orthostatic on admit  - Severe COPD--pulm following, on steroids  - HTN--stable  -  HLD--statin  - Lumbar herniation--back surgery pending  - Early prostate cancer--has follow up as outpatient, probable radiation treatment per patient  - FELIBERTO--resolved with volume repletion, Dr. Casarez following  - ETOH use--reports 3 beers/day and bourbon--withdrawal this weekend, improved  - MRSA nasal colonization--vanc/Bactroban    Plans for CABG/asc ao repair with Dr. Hughes tomorrow.  Preops initiated.  Off steroids currently.  Pt is aware and agreeable.  Will transfer to [facility] when bed opens.    Christal Vora, APRN  6/9/2022  09:38 EDT

## 2022-06-09 NOTE — PROGRESS NOTES
"NEPHROLOGY PROGRESS NOTE------[facility]    [facility]  [phone]  Amara Cooper MD      Patient Care Team:  Deysi Mason APRN as PCP - General (Nurse Practitioner)  Eliseo Casarez MD as Consulting Physician (Nephrology)      Provider:  Amara Cooper MD  Patient Name: Chi Quinteros Sr.  :  1955    SUBJECTIVE:    F/U ARF/FELIBERTO/CRF/CKD    No SOB, CP, palpitations, dysuria. No cramping. Surgery pending tomorrow    Medication:  amLODIPine, 5 mg, Oral, Daily  arformoterol, 15 mcg, Nebulization, BID - RT  aspirin, 81 mg, Oral, Daily  atorvastatin, 40 mg, Oral, Daily  budesonide, 0.5 mg, Nebulization, BID - RT  carvedilol, 6.25 mg, Oral, BID With Meals  [START ON 6/10/2022] ceFAZolin, 2 g, Intravenous, Once  chlorhexidine, 15 mL, Mouth/Throat, Q12H  Chlorhexidine Gluconate Cloth, 1 application, Topical, Q12H  enoxaparin, 40 mg, Subcutaneous, Q12H  gabapentin, 600 mg, Oral, BID  guaiFENesin, 1,200 mg, Oral, Q12H  insulin lispro, 0-7 Units, Subcutaneous, TID AC  [START ON 6/10/2022] metoprolol tartrate, 12.5 mg, Oral, Once  multivitamin, 1 tablet, Oral, Daily  mupirocin, 1 application, Each Nare, Q12H  sodium chloride, 10 mL, Intravenous, Q12H  tamsulosin, 0.4 mg, Oral, Daily  thiamine, 100 mg, Oral, BID      Pharmacy Consult - Steroid Insulin Protocol,   Pharmacy to dose Trelegy,         OBJECTIVE    Vital Sign Min/Max for last 24 hours  Temp  Min: 97.6 °F (36.4 °C)  Max: 98.4 °F (36.9 °C)   BP  Min: 102/72  Max: 162/90   Pulse  Min: 54  Max: 79   Resp  Min: 16  Max: 20   SpO2  Min: 92 %  Max: 100 %   No data recorded   Weight  Min: 106 kg (233 lb 11 oz)  Max: 106 kg (233 lb 11 oz)     Flowsheet Rows    Flowsheet Row First Filed Value   Admission Height 177.8 cm (70\") Documented at 2022   Admission Weight 104 kg (230 lb) Documented at 2022          No intake/output data recorded.  I/O last 3 completed shifts:  In: " 1680 [P.O.:1680]  Out: 3750 [Urine:3750]    Physical Exam:  General Appearance: alert, appears stated age and cooperative  Head: normocephalic, without obvious abnormality and atraumatic  Eyes: conjunctivae and sclerae normal and no icterus  Neck: supple and no JVD  Lungs: clear to auscultation and respirations regular  Heart: regular rhythm & normal rate and normal S1, S2 +ANTONY  Chest: Wall no abnormalities observed  Abdomen: normal bowel sounds and soft non-tender  Extremities: moves extremities well, no edema, no cyanosis and no redness  Skin: no bleeding, bruising or rash, turgor normal, color normal and no lesions noted  Neurologic: Alert, and oriented. No focal deficits    Labs:    WBC WBC   Date Value Ref Range Status   06/09/2022 10.10 3.40 - 10.80 10*3/mm3 Final   06/08/2022 7.70 3.40 - 10.80 10*3/mm3 Final   06/07/2022 8.10 3.40 - 10.80 10*3/mm3 Final      HGB Hemoglobin   Date Value Ref Range Status   06/09/2022 13.8 13.0 - 17.7 g/dL Final   06/08/2022 13.3 13.0 - 17.7 g/dL Final   06/07/2022 13.2 13.0 - 17.7 g/dL Final      HCT Hematocrit   Date Value Ref Range Status   06/09/2022 41.7 37.5 - 51.0 % Final   06/08/2022 40.9 37.5 - 51.0 % Final   06/07/2022 39.3 37.5 - 51.0 % Final      Platlets No results found for: LABPLAT   MCV MCV   Date Value Ref Range Status   06/09/2022 97.4 (H) 79.0 - 97.0 fL Final   06/08/2022 98.9 (H) 79.0 - 97.0 fL Final   06/07/2022 97.8 (H) 79.0 - 97.0 fL Final          Sodium Sodium   Date Value Ref Range Status   06/09/2022 136 136 - 145 mmol/L Final   06/08/2022 136 136 - 145 mmol/L Final   06/07/2022 134 (L) 136 - 145 mmol/L Final      Potassium Potassium   Date Value Ref Range Status   06/09/2022 4.2 3.5 - 5.2 mmol/L Final   06/08/2022 4.7 3.5 - 5.2 mmol/L Final     Comment:     Slight hemolysis detected by analyzer. Results may be affected.   06/08/2022 5.7 (H) 3.5 - 5.2 mmol/L Final   06/07/2022 4.8 3.5 - 5.2 mmol/L Final      Chloride Chloride   Date Value Ref Range  Status   06/09/2022 98 98 - 107 mmol/L Final   06/08/2022 98 98 - 107 mmol/L Final   06/07/2022 98 98 - 107 mmol/L Final      CO2 CO2   Date Value Ref Range Status   06/09/2022 28.0 22.0 - 29.0 mmol/L Final   06/08/2022 30.0 (H) 22.0 - 29.0 mmol/L Final   06/07/2022 27.0 22.0 - 29.0 mmol/L Final      BUN BUN   Date Value Ref Range Status   06/09/2022 22 8 - 23 mg/dL Final   06/08/2022 27 (H) 8 - 23 mg/dL Final   06/07/2022 27 (H) 8 - 23 mg/dL Final      Creatinine Creatinine   Date Value Ref Range Status   06/09/2022 0.85 0.76 - 1.27 mg/dL Final   06/08/2022 0.96 0.76 - 1.27 mg/dL Final   06/07/2022 0.89 0.76 - 1.27 mg/dL Final      Calcium Calcium   Date Value Ref Range Status   06/09/2022 8.7 8.6 - 10.5 mg/dL Final   06/08/2022 9.1 8.6 - 10.5 mg/dL Final   06/07/2022 8.8 8.6 - 10.5 mg/dL Final      PO4 No components found for: PO4   Albumin Albumin   Date Value Ref Range Status   06/09/2022 3.00 (L) 3.50 - 5.20 g/dL Final      Magnesium Magnesium   Date Value Ref Range Status   06/08/2022 2.3 1.6 - 2.4 mg/dL Final   06/07/2022 2.3 1.6 - 2.4 mg/dL Final      Uric Acid No components found for: URIC ACID     Imaging Results (Last 72 Hours)     ** No results found for the last 72 hours. **          Results for orders placed during the hospital encounter of 05/31/22    XR Chest 1 View    Narrative  EXAM: XR CHEST 1 VW-    DATE OF EXAM: 6/5/2022 8:58 AM    INDICATION: copd exac  cough; R55-Syncope and collapse; F10.920-Alcohol  use, unspecified with intoxication, uncomplicated; J96.01-Acute  respiratory failure with hypoxia; I95.1-Orthostatic hypotension;  R77.8-Other specified abnormalities of plasma proteins; N17.9-Acute  kidney failure, unspecified; J44.1-Chronic obstructive pulmonary disease  with (acute) exacerbation; R94.39-Abnormal result of other cardiovasc.    COMPARISONS: 5/31/2022    FINDINGS:    Emphysema is evident. No pneumothorax or pleural effusion. Mild  scattered atelectasis. No consolidation.  Mediastinum appears unchanged,  no evidence of acute process.    Impression  Emphysema and mild scattered atelectasis. No evidence of acute  cardiopulmonary process otherwise.    Electronically Signed By-Chad Car On:6/5/2022 10:00 AM  This report was finalized on [id number] by  Chad Car, .      XR Chest 1 View    Narrative  Examination: XR CHEST 1 VW-    Date of Exam: 5/31/2022 9:03 PM    Indication: Chest pain protocol.    Comparison: None available.    Technique: Single radiographic view of the chest was obtained.    Findings:  There is no pneumothorax, pleural effusion or focal airspace  consolidation. Cardiomediastinal silhouette is unremarkable. Pulmonary  vasculature appears within normal limits. Regional bones appear grossly  intact.    Impression  No acute cardiopulmonary abnormality.    Electronically Signed By-Johann Dodge MD On:5/31/2022 9:29 PM  This report was finalized on [id number] by  Johann Dodge MD.      Results for orders placed during the hospital encounter of 05/31/22    Duplex Vein Mapping Lower Extremity - Bilateral CAR    Interpretation Summary  The greater saphenous veins are of satisfactory quality from the groin to the mid distal thigh bilaterally.  Distal to this the veins are small and inadequate.        ASSESSMENT / PLAN      COPD exacerbation (HCC)    Obesity (BMI 30-39.9)    Abnormal nuclear stress test    Acute renal insufficiency    Alcohol dependence (HCC)    Essential hypertension    Other emphysema (HCC)    Coronary artery disease    Syncope, unspecified syncope type    1. ARF/FELIBERTO------Nonoliguric. Resolved with volume repletion    2. CAD------No angina or gross overload. ? Surgery Friday    3. BENIGN ESSENTIAL HTN------BP okay. No ACE-I for now    4. BPH-------On Flomax    5. S/P SYNCOPE    6. ETOH ABUSE    7. HYPERLIPIDEMIA-------On Statin    8. DVT PROPHYLAXIS-------On Lovenox    9. HYPERKALEMIA--------Resolved      Amara Cooper MD  [facility]  [phone]  06/09/22  07:45 EDT

## 2022-06-09 NOTE — CASE MANAGEMENT/SOCIAL WORK
Continued Stay Note  [facility]     Patient Name: Chi Quinteros Sr.  MRN: [id number]  Today's Date: 6/9/2022    Admit Date: 5/31/2022     Discharge Plan     Row Name 06/09/22 1130       Plan    Plan D/C Plan: CABG scheduled for 6/10. May require PT/OT eval s/p surgery. Rehab and home health lists provided. From home alone.    Plan Comments Barrier to D/C: CABG scheduled tomorrow.                    Expected Discharge Date and Time     Expected Discharge Date Expected Discharge Time    Jun 14, 2022         Phone communication or documentation only - no physical contact with patient or family.    RANDALL WelchN[place] [address]    Office: [phone]  Fax: [phone]

## 2022-06-09 NOTE — PROGRESS NOTES
Daily Progress Note        COPD exacerbation (HCC)    Obesity (BMI 30-39.9)    Abnormal nuclear stress test    Acute renal insufficiency    Alcohol dependence (HCC)    Essential hypertension    Other emphysema (HCC)    Coronary artery disease    Syncope, unspecified syncope type      Assessment    COPD with acute exacerbation, improving  Spirometry 6/3/2022 and flow volume loop suggestive of severe obstructive and possible restrictive respiratory defect: Unfortunately total lung capacity and DLCO were not performed   FEV1 45% without significant response to bronchodilators, FEV1/FVC ratio 56 compatible with obstructive defect    Non-STEMI  Three-vessel coronary artery disease  Ascending aortic aneurysm 4.9205 cm  Hypertension  Syncope    Alcohol abuse  Dyslipidemia  Back pain with a lumbar herniation  Early prostate cancer  FELIBERTO: Resolving    History of tobacco smoking: Quit June 2021     Recommendations:    Oxygen supplement and titration: Requiring 2 L per NC     from pulmonary standpoint patient is at high risk for pulmonary complications including pneumonia, atelectasis and or prolonged ventilation but there is no absolute contraindication for the surgery,  patient was treated with short course of steroids, currently off    perioperative bronchodilators and aggressive pulmonary toilet/intensive spirometry/flutter valve  Inhaled corticosteroids/Bronchodilators  Statin  Blood pressure control  DVT prophylaxis Lovenox  Mucinex twice daily  Thiamine                LOS: 4 days     Subjective         Objective     Vital signs for last 24 hours:  Vitals:    06/09/22 0727 06/09/22 0730 06/09/22 0731 06/09/22 0733   BP:       BP Location:       Patient Position:       Pulse: 67 69 69 65   Resp: 18 18 18 18   Temp:       TempSrc:       SpO2: 95% 97%  99%   Weight:       Height:           Intake/Output last 3 shifts:  I/O last 3 completed shifts:  In: 1680 [P.O.:1680]  Out: 3750 [Urine:3750]  Intake/Output this shift:  No  intake/output data recorded.      Radiology  Imaging Results (Last 24 Hours)     ** No results found for the last 24 hours. **          Labs:  Results from last 7 days   Lab Units 06/09/22  0500   WBC 10*3/mm3 10.10   HEMOGLOBIN g/dL 13.8   HEMATOCRIT % 41.7   PLATELETS 10*3/mm3 250     Results from last 7 days   Lab Units 06/09/22  0500   SODIUM mmol/L 136   POTASSIUM mmol/L 4.2   CHLORIDE mmol/L 98   CO2 mmol/L 28.0   BUN mg/dL 22   CREATININE mg/dL 0.85   CALCIUM mg/dL 8.7   BILIRUBIN mg/dL 0.3   ALK PHOS U/L 52   ALT (SGPT) U/L 44*   AST (SGOT) U/L 25   GLUCOSE mg/dL 85     Results from last 7 days   Lab Units 06/08/22  1624   PH, ARTERIAL pH units 7.462*   PO2 ART mm Hg 70.1*   PCO2, ARTERIAL mm Hg 38.3   HCO3 ART mmol/L 27.4     Results from last 7 days   Lab Units 06/09/22  0500   ALBUMIN g/dL 3.00*             Results from last 7 days   Lab Units 06/08/22  0403   MAGNESIUM mg/dL 2.3     Results from last 7 days   Lab Units 06/09/22  0500   INR  1.05   APTT seconds 26.6     Results from last 7 days   Lab Units 06/03/22  1014   TSH uIU/mL 0.476           Meds:   SCHEDULE  amLODIPine, 5 mg, Oral, Daily  arformoterol, 15 mcg, Nebulization, BID - RT  aspirin, 81 mg, Oral, Daily  atorvastatin, 40 mg, Oral, Daily  budesonide, 0.5 mg, Nebulization, BID - RT  carvedilol, 6.25 mg, Oral, BID With Meals  [START ON 6/10/2022] ceFAZolin, 2 g, Intravenous, Once  chlorhexidine, 15 mL, Mouth/Throat, Q12H  Chlorhexidine Gluconate Cloth, 1 application, Topical, Q12H  enoxaparin, 40 mg, Subcutaneous, Q12H  gabapentin, 600 mg, Oral, BID  guaiFENesin, 1,200 mg, Oral, Q12H  insulin lispro, 0-7 Units, Subcutaneous, TID AC  [START ON 6/10/2022] metoprolol tartrate, 12.5 mg, Oral, Once  multivitamin, 1 tablet, Oral, Daily  mupirocin, 1 application, Each Nare, Q12H  sodium chloride, 10 mL, Intravenous, Q12H  tamsulosin, 0.4 mg, Oral, Daily  thiamine, 100 mg, Oral, BID      Infusions  Pharmacy Consult - Steroid Insulin Protocol,    Pharmacy to dose Trelegy,       PRNs  •  acetaminophen **OR** acetaminophen **OR** acetaminophen  •  ALPRAZolam  •  aluminum-magnesium hydroxide-simethicone  •  atropine  •  benzonatate  •  dextrose  •  dextrose  •  dextrose  •  glucagon (human recombinant)  •  hydrALAZINE  •  ipratropium-albuterol  •  LORazepam **OR** LORazepam **OR** LORazepam **OR** LORazepam **OR** LORazepam **OR** LORazepam  •  magnesium sulfate **OR** magnesium sulfate **OR** magnesium sulfate  •  melatonin  •  nitroglycerin  •  ondansetron **OR** ondansetron  •  Pharmacy Consult - Steroid Insulin Protocol  •  Pharmacy to dose Trelegy  •  potassium chloride **OR** potassium chloride **OR** potassium chloride  •  sodium chloride  •  sodium chloride  •  sodium chloride    Physical Exam:  Physical Exam  Vitals reviewed.   Constitutional:       Appearance: He is obese.   Pulmonary:      Breath sounds: Decreased breath sounds present.   Skin:     General: Skin is warm and dry.   Neurological:      Mental Status: He is alert.         ROS  Review of Systems   Respiratory: Positive for shortness of breath and wheezing.        I have reviewed the patient's new clinical results.    Electronically signed by ERICA Jarrell.

## 2022-06-09 NOTE — PROGRESS NOTES
[facility] Medicine Services Daily Progress Note    Patient Name: Chi Quinteros Sr.  : 1955  MRN: [id number]  Primary Care Physician:  Deysi Mason APRN  Date of admission: 2022      Subjective      Chief Complaint: Chest pain    6/3  Vitals and labs reviewed  Awaiting CT surgery recommendation regarding possible cardiac surgery    Notes reviewed      Apparently intermittently confused overnight and angry intermittently  His vitals are stable requiring 2 L of oxygen afebrile  Suspected alcohol withdrawal.  CIWA score was 12 today with the nursing staff  On alcohol withdrawal protocol  Psychiatrist has been consulted by cardiac surgery  Add thiamine        /  Short of breath and wheezing much more than last few days.  Her last pulmonologist for evaluation given need for bypass surgery  Continue bronchodilators  Will give additional steroid with Solu-Medrol  Repeat chest x-ray shows no acute  problem      Labs and notes reviewed    2022  Patient seen and examined  Denies any chest pain, shortness of breath no palpitation.  Scheduled tentatively for CABG 6/10/2022    2022  Patient seen and examined  No new complaints    2022  Patient seen and examined  No new complaints  Family at bedside  Scheduled for CABG and ascending aortic aneurysm repair tomorrow    Review of Systems   Constitutional: Negative for chills and fever.   HENT: Negative for congestion.    Eyes: Negative for blurred vision.   Cardiovascular: Negative for chest pain.   Respiratory: Negative for shortness of breath.    Endocrine: Negative for cold intolerance and heat intolerance.   Gastrointestinal: Negative for abdominal pain, nausea and vomiting.   Genitourinary: Negative for dysuria.   Neurological: Negative for focal weakness.   Psychiatric/Behavioral: Negative for altered mental status.         Objective      Vitals:   Temp:  [97.8 °F (36.6 °C)-98.4 °F (36.9 °C)] 97.8 °F (36.6 °C)  Heart  Rate:  [54-73] 65  Resp:  [16-20] 18  BP: (102-162)/(64-90) 145/84  Flow (L/min):  [2] 2    Physical Exam  Vitals reviewed.   Constitutional:       General: He is not in acute distress.  HENT:      Head: Normocephalic and atraumatic.      Nose: Nose normal.   Eyes:      Extraocular Movements: Extraocular movements intact.      Conjunctiva/sclera: Conjunctivae normal.      Pupils: Pupils are equal, round, and reactive to light.   Cardiovascular:      Rate and Rhythm: Normal rate and regular rhythm.   Pulmonary:      Effort: No respiratory distress.      Breath sounds: Normal breath sounds.   Abdominal:      General: Bowel sounds are normal.      Palpations: Abdomen is soft.      Tenderness: There is no abdominal tenderness.   Musculoskeletal:         General: Normal range of motion.      Cervical back: Normal range of motion.   Skin:     General: Skin is warm and dry.   Neurological:      General: No focal deficit present.      Mental Status: He is alert and oriented to person, place, and time.   Psychiatric:         Mood and Affect: Mood normal.            Result Review    Result Review:  I have personally reviewed the results from the time of this admission to 6/9/2022 09:13 EDT and agree with these findings:  [x]  Laboratory  [x]  Microbiology  [x]  Radiology  [x]  EKG/Telemetry   [x]  Cardiology/Vascular   []  Pathology  [x]  Old records  []  Other:          Wounds (last 24 hours)     LDA Wound     Row Name 06/09/22 0400 06/09/22 0000 06/08/22 2030       Wound 06/01/22 0020 Right posterior elbow Skin Tear    Wound - Properties Group Placement Date: 06/01/22  - Placement Time: 0020  -AH Present on Hospital Admission: Y  -AH Side: Right  -AH Orientation: posterior  -AH Location: elbow  -AH Primary Wound Type: Skin tear  -AH Additional Comments: mepilex placed  -AH    Dressing Appearance -- -- dry;intact  -AHA    Closure WHIT  -AHA WHIT  -AHA WHIT  -AHA    Base dressing in place, unable to visualize  -AHA dressing in  place, unable to visualize  -AHA dressing in place, unable to visualize  -AHA    Retired Wound - Properties Group Placement Date: 06/01/22  - Placement Time: 0020  -AH Present on Hospital Admission: Y  -AH Side: Right  -AH Orientation: posterior  -AH Location: elbow  -AH Primary Wound Type: Skin tear  -AH Additional Comments: mepilex placed  -AH    Retired Wound - Properties Group Date first assessed: 06/01/22  - Time first assessed: 0020  -AH Present on Hospital Admission: Y  -AH Side: Right  -AH Location: elbow  -AH Primary Wound Type: Skin tear  -AH Additional Comments: mepilex placed  -AH    Row Name 06/08/22 1600 06/08/22 1200          Wound 06/01/22 0020 Right posterior elbow Skin Tear    Wound - Properties Group Placement Date: 06/01/22  - Placement Time: 0020  -AH Present on Hospital Admission: Y  -AH Side: Right  -AH Orientation: posterior  -AH Location: elbow  -AH Primary Wound Type: Skin tear  -AH Additional Comments: mepilex placed  -AH     Closure WHIT  -SARBJIT WHIT  -SARBJIT     Base dressing in place, unable to visualize  -SARBJIT dressing in place, unable to visualize  -SARBJIT     Retired Wound - Properties Group Placement Date: 06/01/22  - Placement Time: 0020  -AH Present on Hospital Admission: Y  -AH Side: Right  -AH Orientation: posterior  -AH Location: elbow  -AH Primary Wound Type: Skin tear  -AH Additional Comments: mepilex placed  -AH     Retired Wound - Properties Group Date first assessed: 06/01/22  - Time first assessed: 0020  -AH Present on Hospital Admission: Y  -AH Side: Right  -AH Location: elbow  -AH Primary Wound Type: Skin tear  -AH Additional Comments: mepilex placed  -AH           User Key  (r) = Recorded By, (t) = Taken By, (c) = Cosigned By    Initials Name Provider Type    Aicha Alberto RN Registered Nurse    Deysi Leger RN Registered Nurse    Bela Prarish RN Registered Nurse                  Assessment & Plan      Brief Patient Summary:    Chi Quinteros Sr. is  a 67 y.o. male who initially presented to the emergency department on 5/31/2022 with complaints of syncope.  He was noted to be hypoxic in the 80s by EMS and reported at that time that he drinks about 5-6 beers daily.  His troponins were elevated at 0.415 then 0.326 and then 0.116.  He underwent a stress test which showed severe ischemia to lateral inferior wall and subsequently underwent a cardiac catheterization on 6/2/2022 which showed severe three-vessel coronary artery disease with mild left ventricular dysfunction.  He was admitted for cardiothoracic surgery evaluation for coronary artery bypass grafting.    Initially upon admission he had a creatinine of 1.9 and a GFR of 38.2 and was seen by nephrology for clearance for the cardiac catheterization.  Creatinine subsequently improved to 0.86 with a GFR of 94.9.  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    CIWA precautions have been put in place      amLODIPine, 5 mg, Oral, Daily  arformoterol, 15 mcg, Nebulization, BID - RT  aspirin, 81 mg, Oral, Daily  atorvastatin, 40 mg, Oral, Daily  budesonide, 0.5 mg, Nebulization, BID - RT  carvedilol, 6.25 mg, Oral, BID With Meals  [START ON 6/10/2022] ceFAZolin, 2 g, Intravenous, Once  chlorhexidine, 15 mL, Mouth/Throat, Q12H  Chlorhexidine Gluconate Cloth, 1 application, Topical, Q12H  enoxaparin, 40 mg, Subcutaneous, Q12H  gabapentin, 600 mg, Oral, BID  guaiFENesin, 1,200 mg, Oral, Q12H  insulin lispro, 0-7 Units, Subcutaneous, TID AC  [START ON 6/10/2022] metoprolol tartrate, 12.5 mg, Oral, Once  multivitamin, 1 tablet, Oral, Daily  mupirocin, 1 application, Each Nare, Q12H  sodium chloride, 10 mL, Intravenous, Q12H  tamsulosin, 0.4 mg, Oral, Daily  thiamine, 100 mg, Oral, BID       Pharmacy to dose Trelegy,          Active Hospital Problems:  Active Hospital Problems    Diagnosis    • Syncope,  unspecified syncope type    • Acute renal insufficiency    • Alcohol dependence (HCC)    • Essential hypertension    • Other emphysema (HCC)    • Coronary artery disease    • COPD exacerbation (HCC)    • Obesity (BMI 30-39.9)    • Abnormal nuclear stress test      Added automatically from request for surgery [id number]       Plan:     Syncopal episode-most likely cardiac in origin  Abnormal stress test and heart cath showing three-vessel disease  Was evaluated by [facility] and bypass surgery planned tentatively for 6/10/2022.  On medical management  On aspirin, Coreg and statin    Chronic systolic congestive heart failure EF 40%  Most likely ischemic cardiomyopathy  Compensated  On Coreg and Norvasc      Acute kidney injury  Improved  Nephrologist following      Alcohol dependence  On withdrawal protocol    Essential hypertension,  Blood pressure marginal  Continue on Norvasc and Coreg        COPD exacerbation-resolved  Emphysema  Respiratory care with bronchodilators  Of steroid  Also on sliding scale insulin for steroid-induced hyperglycemia  Oxygen therapy and titration  Pulmonologist following-patient high risk for pulmonary complication with surgery      Obesity BMI 34  Lifestyle management education given    BPH-on Flomax      DVT prophylaxis:  Medical and mechanical DVT prophylaxis orders are present.    CODE STATUS:    Code Status (Patient has no pulse and is not breathing): CPR (Attempt to Resuscitate)  Medical Interventions (Patient has pulse or is breathing): Full Support      Disposition: Pending clinical progress    This patient has been examined wearing appropriate Personal Protective Equipment  06/09/22      Electronically signed by Giancarlo Soto MD, 06/09/22, 09:13 EDT.  [facility] Hospitalist Team

## 2022-06-10 ENCOUNTER — APPOINTMENT (OUTPATIENT)
Dept: GENERAL RADIOLOGY | Facility: HOSPITAL | Age: 67
End: 2022-06-10

## 2022-06-10 ENCOUNTER — ANESTHESIA (OUTPATIENT)
Dept: PERIOP | Facility: HOSPITAL | Age: 67
End: 2022-06-10

## 2022-06-10 LAB
ACT BLD: 109 SECONDS (ref 89–137)
ACT BLD: 132 SECONDS (ref 89–137)
ACT BLD: 387 SECONDS (ref 89–137)
ACT BLD: 393 SECONDS (ref 89–137)
ACT BLD: 399 SECONDS (ref 89–137)
ACT BLD: 404 SECONDS (ref 89–137)
ACT BLD: 416 SECONDS (ref 89–137)
ACT BLD: 451 SECONDS (ref 89–137)
ACT BLD: 457 SECONDS (ref 89–137)
ALBUMIN SERPL-MCNC: 3.5 G/DL (ref 3.5–5.2)
ALBUMIN/GLOB SERPL: 1.7 G/DL
ALP SERPL-CCNC: 43 U/L (ref 39–117)
ALT SERPL W P-5'-P-CCNC: 40 U/L (ref 1–41)
ANION GAP SERPL CALCULATED.3IONS-SCNC: 10 MMOL/L (ref 5–15)
ANION GAP SERPL CALCULATED.3IONS-SCNC: 9 MMOL/L (ref 5–15)
APTT PPP: 27.7 SECONDS (ref 24–31)
APTT PPP: 29.7 SECONDS (ref 24–31)
ARTERIAL PATENCY WRIST A: ABNORMAL
ARTERIAL PATENCY WRIST A: POSITIVE
AST SERPL-CCNC: 46 U/L (ref 1–40)
ATMOSPHERIC PRESS: ABNORMAL MM[HG]
ATMOSPHERIC PRESS: NORMAL MM[HG]
BASE DEFICIT: ABNORMAL
BASE EXCESS BLDA CALC-SCNC: 0.6 MMOL/L (ref 0–3)
BASE EXCESS BLDA CALC-SCNC: 0.7 MMOL/L (ref 0–3)
BASE EXCESS BLDA CALC-SCNC: 2 MMOL/L (ref 0–3)
BASE EXCESS BLDA CALC-SCNC: 4 MMOL/L (ref 0–3)
BASE EXCESS BLDA CALC-SCNC: 5 MMOL/L (ref 0–3)
BASE EXCESS BLDA CALC-SCNC: 5 MMOL/L (ref 0–3)
BASE EXCESS BLDA CALC-SCNC: 6 MMOL/L (ref 0–3)
BASE EXCESS BLDA CALC-SCNC: 6 MMOL/L (ref 0–3)
BASE EXCESS BLDA CALC-SCNC: 7 MMOL/L (ref 0–3)
BASE EXCESS BLDV CALC-SCNC: ABNORMAL MMOL/L
BASOPHILS # BLD AUTO: 0 10*3/MM3 (ref 0–0.2)
BASOPHILS NFR BLD AUTO: 0.1 % (ref 0–1.5)
BDY SITE: ABNORMAL
BDY SITE: NORMAL
BH BB BLOOD EXPIRATION DATE: NORMAL
BH BB BLOOD EXPIRATION DATE: NORMAL
BH BB BLOOD TYPE BARCODE: 5100
BH BB BLOOD TYPE BARCODE: 5100
BH BB DISPENSE STATUS: NORMAL
BH BB DISPENSE STATUS: NORMAL
BH BB PRODUCT CODE: NORMAL
BH BB PRODUCT CODE: NORMAL
BH BB UNIT NUMBER: NORMAL
BH BB UNIT NUMBER: NORMAL
BILIRUB SERPL-MCNC: 0.8 MG/DL (ref 0–1.2)
BUN SERPL-MCNC: 24 MG/DL (ref 8–23)
BUN SERPL-MCNC: 26 MG/DL (ref 8–23)
BUN/CREAT SERPL: 17.6 (ref 7–25)
BUN/CREAT SERPL: 22.2 (ref 7–25)
CA-I BLDA-SCNC: 1 MMOL/L (ref 1.12–1.32)
CA-I BLDA-SCNC: 1.01 MMOL/L (ref 1.12–1.32)
CA-I BLDA-SCNC: 1.02 MMOL/L (ref 1.12–1.32)
CA-I BLDA-SCNC: 1.02 MMOL/L (ref 1.12–1.32)
CA-I BLDA-SCNC: 1.03 MMOL/L (ref 1.12–1.32)
CA-I BLDA-SCNC: 1.03 MMOL/L (ref 1.12–1.32)
CA-I BLDA-SCNC: 1.16 MMOL/L (ref 1.12–1.32)
CA-I BLDA-SCNC: 1.22 MMOL/L (ref 1.15–1.33)
CA-I BLDA-SCNC: 1.28 MMOL/L (ref 1.15–1.33)
CA-I BLDA-SCNC: 1.35 MMOL/L (ref 1.12–1.32)
CA-I SERPL ISE-MCNC: 1.3 MMOL/L (ref 1.2–1.3)
CALCIUM SPEC-SCNC: 8.5 MG/DL (ref 8.6–10.5)
CALCIUM SPEC-SCNC: 9 MG/DL (ref 8.6–10.5)
CHLORIDE SERPL-SCNC: 102 MMOL/L (ref 98–107)
CHLORIDE SERPL-SCNC: 96 MMOL/L (ref 98–107)
CLOSE TME COLL+ADP + EPINEP PNL BLD: 90 % (ref 86–100)
CO2 BLDA-SCNC: 27.7 MMOL/L (ref 22–29)
CO2 BLDA-SCNC: 29.6 MMOL/L (ref 22–29)
CO2 BLDA-SCNC: 31 MMOL/L (ref 23–27)
CO2 BLDA-SCNC: 32 MMOL/L (ref 23–27)
CO2 BLDA-SCNC: 32 MMOL/L (ref 23–27)
CO2 BLDA-SCNC: 33 MMOL/L (ref 23–27)
CO2 BLDA-SCNC: 34 MMOL/L (ref 23–27)
CO2 BLDA-SCNC: 34 MMOL/L (ref 23–27)
CO2 BLDA-SCNC: 35 MMOL/L (ref 23–27)
CO2 CONTENT VENOUS: ABNORMAL
CO2 SERPL-SCNC: 26 MMOL/L (ref 22–29)
CO2 SERPL-SCNC: 30 MMOL/L (ref 22–29)
CREAT SERPL-MCNC: 1.17 MG/DL (ref 0.76–1.27)
CREAT SERPL-MCNC: 1.36 MG/DL (ref 0.76–1.27)
CROSSMATCH INTERPRETATION: NORMAL
CROSSMATCH INTERPRETATION: NORMAL
DEPRECATED RDW RBC AUTO: 46.8 FL (ref 37–54)
DEPRECATED RDW RBC AUTO: 47.3 FL (ref 37–54)
DEPRECATED RDW RBC AUTO: 47.3 FL (ref 37–54)
EGFRCR SERPLBLD CKD-EPI 2021: 57 ML/MIN/1.73
EGFRCR SERPLBLD CKD-EPI 2021: 68.3 ML/MIN/1.73
EOSINOPHIL # BLD AUTO: 0 10*3/MM3 (ref 0–0.4)
EOSINOPHIL NFR BLD AUTO: 0.1 % (ref 0.3–6.2)
ERYTHROCYTE [DISTWIDTH] IN BLOOD BY AUTOMATED COUNT: 13.6 % (ref 12.3–15.4)
ERYTHROCYTE [DISTWIDTH] IN BLOOD BY AUTOMATED COUNT: 13.8 % (ref 12.3–15.4)
ERYTHROCYTE [DISTWIDTH] IN BLOOD BY AUTOMATED COUNT: 13.9 % (ref 12.3–15.4)
FIBRINOGEN PPP-MCNC: 436 MG/DL (ref 210–450)
FIBRINOGEN PPP-MCNC: 449 MG/DL (ref 210–450)
GLOBULIN UR ELPH-MCNC: 2.1 GM/DL
GLUCOSE BLDC GLUCOMTR-MCNC: 119 MG/DL (ref 70–105)
GLUCOSE BLDC GLUCOMTR-MCNC: 134 MG/DL (ref 74–100)
GLUCOSE BLDC GLUCOMTR-MCNC: 134 MG/DL (ref 74–100)
GLUCOSE BLDC GLUCOMTR-MCNC: 141 MG/DL (ref 70–105)
GLUCOSE BLDC GLUCOMTR-MCNC: 142 MG/DL (ref 70–105)
GLUCOSE BLDC GLUCOMTR-MCNC: 142 MG/DL (ref 70–105)
GLUCOSE BLDC GLUCOMTR-MCNC: 144 MG/DL (ref 70–105)
GLUCOSE BLDC GLUCOMTR-MCNC: 144 MG/DL (ref 70–105)
GLUCOSE BLDC GLUCOMTR-MCNC: 144 MG/DL (ref 74–100)
GLUCOSE BLDC GLUCOMTR-MCNC: 144 MG/DL (ref 74–100)
GLUCOSE BLDC GLUCOMTR-MCNC: 146 MG/DL (ref 70–105)
GLUCOSE BLDC GLUCOMTR-MCNC: 147 MG/DL (ref 70–105)
GLUCOSE BLDC GLUCOMTR-MCNC: 173 MG/DL (ref 70–105)
GLUCOSE BLDC GLUCOMTR-MCNC: 99 MG/DL (ref 70–105)
GLUCOSE SERPL-MCNC: 104 MG/DL (ref 65–99)
GLUCOSE SERPL-MCNC: 146 MG/DL (ref 65–99)
HCO3 BLDA-SCNC: 26.3 MMOL/L (ref 21–28)
HCO3 BLDA-SCNC: 27.9 MMOL/L (ref 21–28)
HCO3 BLDA-SCNC: 29 MMOL/L (ref 22–26)
HCO3 BLDA-SCNC: 30 MMOL/L (ref 22–26)
HCO3 BLDA-SCNC: 30.3 MMOL/L (ref 22–26)
HCO3 BLDA-SCNC: 31.2 MMOL/L (ref 22–26)
HCO3 BLDA-SCNC: 31.9 MMOL/L (ref 22–26)
HCO3 BLDA-SCNC: 32.5 MMOL/L (ref 22–26)
HCO3 BLDA-SCNC: 32.7 MMOL/L (ref 22–26)
HCO3 BLDV-SCNC: 30.5 MMOL/L (ref 23–28)
HCT VFR BLD AUTO: 25.6 % (ref 37.5–51)
HCT VFR BLD AUTO: 32.8 % (ref 37.5–51)
HCT VFR BLD AUTO: 39.7 % (ref 37.5–51)
HCT VFR BLDA CALC: 29 % (ref 38–51)
HCT VFR BLDA CALC: 31 % (ref 38–51)
HCT VFR BLDA CALC: 32 % (ref 38–51)
HCT VFR BLDA CALC: 33 % (ref 38–51)
HCT VFR BLDA CALC: 35 % (ref 38–51)
HCT VFR BLDA CALC: 39 % (ref 38–51)
HEMODILUTION: NO
HEMODILUTION: YES
HGB BLD-MCNC: 10.5 G/DL (ref 13–17.7)
HGB BLD-MCNC: 13 G/DL (ref 13–17.7)
HGB BLD-MCNC: 8.5 G/DL (ref 13–17.7)
HGB BLDA-MCNC: 10.5 G/DL (ref 12–17)
HGB BLDA-MCNC: 10.9 G/DL (ref 12–17)
HGB BLDA-MCNC: 11.2 G/DL (ref 12–17)
HGB BLDA-MCNC: 11.8 G/DL (ref 12–17)
HGB BLDA-MCNC: 13.3 G/DL (ref 12–17)
HGB BLDA-MCNC: 9.9 G/DL (ref 12–17)
INHALED O2 CONCENTRATION: 70 %
INHALED O2 CONCENTRATION: 70 %
INR PPP: 1.14 (ref 0.93–1.1)
INR PPP: 1.25 (ref 0.93–1.1)
LYMPHOCYTES # BLD AUTO: 1 10*3/MM3 (ref 0.7–3.1)
LYMPHOCYTES NFR BLD AUTO: 4.3 % (ref 19.6–45.3)
MAGNESIUM SERPL-MCNC: 2.1 MG/DL (ref 1.6–2.4)
MAGNESIUM SERPL-MCNC: 2.9 MG/DL (ref 1.6–2.4)
MCH RBC QN AUTO: 31.4 PG (ref 26.6–33)
MCH RBC QN AUTO: 31.7 PG (ref 26.6–33)
MCH RBC QN AUTO: 32.5 PG (ref 26.6–33)
MCHC RBC AUTO-ENTMCNC: 32.1 G/DL (ref 31.5–35.7)
MCHC RBC AUTO-ENTMCNC: 32.7 G/DL (ref 31.5–35.7)
MCHC RBC AUTO-ENTMCNC: 33.3 G/DL (ref 31.5–35.7)
MCV RBC AUTO: 96.9 FL (ref 79–97)
MCV RBC AUTO: 97.7 FL (ref 79–97)
MCV RBC AUTO: 97.8 FL (ref 79–97)
MODALITY: ABNORMAL
MODALITY: NORMAL
MONOCYTES # BLD AUTO: 0.9 10*3/MM3 (ref 0.1–0.9)
MONOCYTES NFR BLD AUTO: 3.9 % (ref 5–12)
NEUTROPHILS NFR BLD AUTO: 20.5 10*3/MM3 (ref 1.7–7)
NEUTROPHILS NFR BLD AUTO: 91.6 % (ref 42.7–76)
NRBC BLD AUTO-RTO: 0 /100 WBC (ref 0–0.2)
PCO2 BLDA: 45.7 MM HG (ref 35–48)
PCO2 BLDA: 55 MM HG (ref 35–45)
PCO2 BLDA: 55.4 MM HG (ref 35–48)
PCO2 BLDA: 56.5 MM HG (ref 35–45)
PCO2 BLDA: 58.4 MM HG (ref 35–45)
PCO2 BLDA: 59.5 MM HG (ref 35–45)
PCO2 BLDA: 59.7 MM HG (ref 35–45)
PCO2 BLDA: 65.4 MM HG (ref 35–45)
PCO2 BLDA: 67.4 MM HG (ref 35–45)
PCO2 BLDV: 62 MM HG (ref 41–51)
PEEP RESPIRATORY: 8 CM[H2O]
PEEP RESPIRATORY: 8 CM[H2O]
PH BLDA: 7.24 PH UNITS (ref 7.35–7.45)
PH BLDA: 7.3 PH UNITS (ref 7.35–7.45)
PH BLDA: 7.31 PH UNITS (ref 7.35–7.45)
PH BLDA: 7.33 PH UNITS (ref 7.35–7.45)
PH BLDA: 7.34 PH UNITS (ref 7.35–7.45)
PH BLDA: 7.34 PH UNITS (ref 7.35–7.45)
PH BLDA: 7.35 PH UNITS (ref 7.35–7.45)
PH BLDA: 7.35 PH UNITS (ref 7.35–7.45)
PH BLDA: 7.37 PH UNITS (ref 7.35–7.45)
PH BLDV: 7.3 PH UNITS (ref 7.31–7.41)
PHOSPHATE SERPL-MCNC: 3.9 MG/DL (ref 2.5–4.5)
PHOSPHATE SERPL-MCNC: 5.3 MG/DL (ref 2.5–4.5)
PLATELET # BLD AUTO: 170 10*3/MM3 (ref 140–450)
PLATELET # BLD AUTO: 186 10*3/MM3 (ref 140–450)
PLATELET # BLD AUTO: 247 10*3/MM3 (ref 140–450)
PMV BLD AUTO: 7.4 FL (ref 6–12)
PMV BLD AUTO: 8.1 FL (ref 6–12)
PMV BLD AUTO: 8.2 FL (ref 6–12)
PO2 BLDA: 187 MM HG (ref 80–105)
PO2 BLDA: 277 MM HG (ref 80–105)
PO2 BLDA: 357 MM HG (ref 80–105)
PO2 BLDA: 452 MM HG (ref 80–105)
PO2 BLDA: 456 MM HG (ref 80–105)
PO2 BLDA: 489 MM HG (ref 80–105)
PO2 BLDA: 500 MM HG (ref 80–105)
PO2 BLDA: 94.4 MM HG (ref 83–108)
PO2 BLDA: 94.5 MM HG (ref 83–108)
PO2 BLDV: 52 MM HG (ref 35–42)
POTASSIUM BLDA-SCNC: 4.2 MMOL/L (ref 3.5–4.9)
POTASSIUM BLDA-SCNC: 4.2 MMOL/L (ref 3.5–4.9)
POTASSIUM BLDA-SCNC: 4.5 MMOL/L (ref 3.5–4.5)
POTASSIUM BLDA-SCNC: 4.6 MMOL/L (ref 3.5–4.9)
POTASSIUM BLDA-SCNC: 4.6 MMOL/L (ref 3.5–4.9)
POTASSIUM BLDA-SCNC: 4.7 MMOL/L (ref 3.5–4.5)
POTASSIUM BLDA-SCNC: 4.9 MMOL/L (ref 3.5–4.9)
POTASSIUM BLDA-SCNC: 5.2 MMOL/L (ref 3.5–4.9)
POTASSIUM BLDA-SCNC: 5.3 MMOL/L (ref 3.5–4.9)
POTASSIUM BLDA-SCNC: 6 MMOL/L (ref 3.5–4.9)
POTASSIUM SERPL-SCNC: 4.7 MMOL/L (ref 3.5–5.2)
POTASSIUM SERPL-SCNC: 4.7 MMOL/L (ref 3.5–5.2)
PROT SERPL-MCNC: 5.6 G/DL (ref 6–8.5)
PROTHROMBIN TIME: 11.7 SECONDS (ref 9.6–11.7)
PROTHROMBIN TIME: 12.7 SECONDS (ref 9.6–11.7)
RBC # BLD AUTO: 2.62 10*6/MM3 (ref 4.14–5.8)
RBC # BLD AUTO: 3.35 10*6/MM3 (ref 4.14–5.8)
RBC # BLD AUTO: 4.1 10*6/MM3 (ref 4.14–5.8)
RESPIRATORY RATE: 12
RESPIRATORY RATE: 12
SAO2 % BLDCOA: 100 % (ref 95–98)
SAO2 % BLDCOA: 96.4 % (ref 94–98)
SAO2 % BLDCOA: 97 % (ref 94–98)
SAO2 % BLDCOA: 99 % (ref 95–98)
SAO2 % BLDCOV: 32 % (ref 45–75)
SARS-COV-2 RNA RESP QL NAA+PROBE: NOT DETECTED
SODIUM BLD-SCNC: 133 MMOL/L (ref 138–146)
SODIUM BLD-SCNC: 133 MMOL/L (ref 138–146)
SODIUM BLD-SCNC: 134 MMOL/L (ref 138–146)
SODIUM BLD-SCNC: 134 MMOL/L (ref 138–146)
SODIUM BLD-SCNC: 135 MMOL/L (ref 138–146)
SODIUM BLD-SCNC: 135 MMOL/L (ref 138–146)
SODIUM BLD-SCNC: 136 MMOL/L (ref 138–146)
SODIUM BLD-SCNC: 136 MMOL/L (ref 138–146)
SODIUM BLD-SCNC: 138 MMOL/L (ref 138–146)
SODIUM BLD-SCNC: 139 MMOL/L (ref 138–146)
SODIUM SERPL-SCNC: 136 MMOL/L (ref 136–145)
SODIUM SERPL-SCNC: 137 MMOL/L (ref 136–145)
UNIT  ABO: NORMAL
UNIT  ABO: NORMAL
UNIT  RH: NORMAL
UNIT  RH: NORMAL
VENTILATOR MODE: ABNORMAL
VENTILATOR MODE: NORMAL
VT ON VENT VENT: 650 ML
VT ON VENT VENT: 650 ML
WBC NRBC COR # BLD: 11.4 10*3/MM3 (ref 3.4–10.8)
WBC NRBC COR # BLD: 22.4 10*3/MM3 (ref 3.4–10.8)
WBC NRBC COR # BLD: 24.9 10*3/MM3 (ref 3.4–10.8)

## 2022-06-10 PROCEDURE — C1768 GRAFT, VASCULAR: HCPCS

## 2022-06-10 PROCEDURE — 80051 ELECTROLYTE PANEL: CPT

## 2022-06-10 PROCEDURE — 83735 ASSAY OF MAGNESIUM: CPT | Performed by: NURSE PRACTITIONER

## 2022-06-10 PROCEDURE — 85730 THROMBOPLASTIN TIME PARTIAL: CPT

## 2022-06-10 PROCEDURE — 25010000002 ALBUMIN HUMAN 5% PER 50 ML: Performed by: NURSE PRACTITIONER

## 2022-06-10 PROCEDURE — 25010000002 HEPARIN (PORCINE) PER 1000 UNITS

## 2022-06-10 PROCEDURE — 85014 HEMATOCRIT: CPT

## 2022-06-10 PROCEDURE — 71045 X-RAY EXAM CHEST 1 VIEW: CPT

## 2022-06-10 PROCEDURE — 0 MILRINONE LACTATE IN DEXTROSE 20-5 MG/100ML-% SOLUTION

## 2022-06-10 PROCEDURE — 25010000002 ONDANSETRON PER 1 MG: Performed by: ANESTHESIOLOGY

## 2022-06-10 PROCEDURE — 5A1221Z PERFORMANCE OF CARDIAC OUTPUT, CONTINUOUS: ICD-10-PCS

## 2022-06-10 PROCEDURE — 85384 FIBRINOGEN ACTIVITY: CPT

## 2022-06-10 PROCEDURE — 94761 N-INVAS EAR/PLS OXIMETRY MLT: CPT

## 2022-06-10 PROCEDURE — P9100 PATHOGEN TEST FOR PLATELETS: HCPCS

## 2022-06-10 PROCEDURE — 82330 ASSAY OF CALCIUM: CPT

## 2022-06-10 PROCEDURE — 94799 UNLISTED PULMONARY SVC/PX: CPT

## 2022-06-10 PROCEDURE — 25010000002 MIDAZOLAM PER 1 MG: Performed by: ANESTHESIOLOGIST ASSISTANT

## 2022-06-10 PROCEDURE — 84100 ASSAY OF PHOSPHORUS: CPT | Performed by: INTERNAL MEDICINE

## 2022-06-10 PROCEDURE — 85027 COMPLETE CBC AUTOMATED: CPT

## 2022-06-10 PROCEDURE — 02100Z9 BYPASS CORONARY ARTERY, ONE ARTERY FROM LEFT INTERNAL MAMMARY, OPEN APPROACH: ICD-10-PCS

## 2022-06-10 PROCEDURE — 25010000002 VANCOMYCIN 10 G RECONSTITUTED SOLUTION: Performed by: NURSE PRACTITIONER

## 2022-06-10 PROCEDURE — P9045 ALBUMIN (HUMAN), 5%, 250 ML: HCPCS

## 2022-06-10 PROCEDURE — 021209W BYPASS CORONARY ARTERY, THREE ARTERIES FROM AORTA WITH AUTOLOGOUS VENOUS TISSUE, OPEN APPROACH: ICD-10-PCS

## 2022-06-10 PROCEDURE — 93005 ELECTROCARDIOGRAM TRACING: CPT | Performed by: NURSE PRACTITIONER

## 2022-06-10 PROCEDURE — 82962 GLUCOSE BLOOD TEST: CPT

## 2022-06-10 PROCEDURE — 88305 TISSUE EXAM BY PATHOLOGIST: CPT

## 2022-06-10 PROCEDURE — 84100 ASSAY OF PHOSPHORUS: CPT | Performed by: NURSE PRACTITIONER

## 2022-06-10 PROCEDURE — A4648 IMPLANTABLE TISSUE MARKER: HCPCS

## 2022-06-10 PROCEDURE — 85610 PROTHROMBIN TIME: CPT

## 2022-06-10 PROCEDURE — P9045 ALBUMIN (HUMAN), 5%, 250 ML: HCPCS | Performed by: NURSE PRACTITIONER

## 2022-06-10 PROCEDURE — C1713 ANCHOR/SCREW BN/BN,TIS/BN: HCPCS

## 2022-06-10 PROCEDURE — P9012 CRYOPRECIPITATE EACH UNIT: HCPCS

## 2022-06-10 PROCEDURE — 36430 TRANSFUSION BLD/BLD COMPNT: CPT

## 2022-06-10 PROCEDURE — 85025 COMPLETE CBC W/AUTO DIFF WBC: CPT | Performed by: NURSE PRACTITIONER

## 2022-06-10 PROCEDURE — 25010000002 MORPHINE PER 10 MG: Performed by: NURSE PRACTITIONER

## 2022-06-10 PROCEDURE — 33859 AS-AORT GRF F/DS OTH/THN DSJ: CPT

## 2022-06-10 PROCEDURE — 84295 ASSAY OF SERUM SODIUM: CPT

## 2022-06-10 PROCEDURE — 25010000002 PAPAVERINE PER 60 MG

## 2022-06-10 PROCEDURE — 88304 TISSUE EXAM BY PATHOLOGIST: CPT

## 2022-06-10 PROCEDURE — 33519 CABG ARTERY-VEIN THREE: CPT

## 2022-06-10 PROCEDURE — 83735 ASSAY OF MAGNESIUM: CPT | Performed by: INTERNAL MEDICINE

## 2022-06-10 PROCEDURE — 85018 HEMOGLOBIN: CPT

## 2022-06-10 PROCEDURE — 25010000002 EPINEPHRINE 1 MG/ML SOLUTION 30 ML VIAL: Performed by: ANESTHESIOLOGY

## 2022-06-10 PROCEDURE — 86927 PLASMA FRESH FROZEN: CPT

## 2022-06-10 PROCEDURE — 25010000002 ALBUMIN HUMAN 5% PER 50 ML

## 2022-06-10 PROCEDURE — 33533 CABG ARTERIAL SINGLE: CPT

## 2022-06-10 PROCEDURE — 63710000001 INSULIN REGULAR HUMAN PER 5 UNITS: Performed by: ANESTHESIOLOGY

## 2022-06-10 PROCEDURE — 25010000002 PROTAMINE SULFATE PER 10 MG: Performed by: ANESTHESIOLOGY

## 2022-06-10 PROCEDURE — C1729 CATH, DRAINAGE: HCPCS

## 2022-06-10 PROCEDURE — 85027 COMPLETE CBC AUTOMATED: CPT | Performed by: INTERNAL MEDICINE

## 2022-06-10 PROCEDURE — 25010000002 MAGNESIUM SULFATE PER 500 MG OF MAGNESIUM: Performed by: ANESTHESIOLOGY

## 2022-06-10 PROCEDURE — 82947 ASSAY GLUCOSE BLOOD QUANT: CPT

## 2022-06-10 PROCEDURE — 85730 THROMBOPLASTIN TIME PARTIAL: CPT | Performed by: NURSE PRACTITIONER

## 2022-06-10 PROCEDURE — C1751 CATH, INF, PER/CENT/MIDLINE: HCPCS | Performed by: ANESTHESIOLOGY

## 2022-06-10 PROCEDURE — 85610 PROTHROMBIN TIME: CPT | Performed by: NURSE PRACTITIONER

## 2022-06-10 PROCEDURE — 33533 CABG ARTERIAL SINGLE: CPT | Performed by: PHYSICIAN ASSISTANT

## 2022-06-10 PROCEDURE — 33508 ENDOSCOPIC VEIN HARVEST: CPT

## 2022-06-10 PROCEDURE — 25010000002 MIDAZOLAM PER 1 MG: Performed by: ANESTHESIOLOGY

## 2022-06-10 PROCEDURE — 93318 ECHO TRANSESOPHAGEAL INTRAOP: CPT | Performed by: ANESTHESIOLOGY

## 2022-06-10 PROCEDURE — 06BQ4ZZ EXCISION OF LEFT SAPHENOUS VEIN, PERCUTANEOUS ENDOSCOPIC APPROACH: ICD-10-PCS

## 2022-06-10 PROCEDURE — 82330 ASSAY OF CALCIUM: CPT | Performed by: NURSE PRACTITIONER

## 2022-06-10 PROCEDURE — 25010000002 HEPARIN (PORCINE) PER 1000 UNITS: Performed by: ANESTHESIOLOGY

## 2022-06-10 PROCEDURE — 25010000002 HYDROMORPHONE PER 4 MG: Performed by: ANESTHESIOLOGY

## 2022-06-10 PROCEDURE — 94002 VENT MGMT INPAT INIT DAY: CPT

## 2022-06-10 PROCEDURE — P9035 PLATELET PHERES LEUKOREDUCED: HCPCS

## 2022-06-10 PROCEDURE — 85347 COAGULATION TIME ACTIVATED: CPT

## 2022-06-10 PROCEDURE — U0003 INFECTIOUS AGENT DETECTION BY NUCLEIC ACID (DNA OR RNA); SEVERE ACUTE RESPIRATORY SYNDROME CORONAVIRUS 2 (SARS-COV-2) (CORONAVIRUS DISEASE [COVID-19]), AMPLIFIED PROBE TECHNIQUE, MAKING USE OF HIGH THROUGHPUT TECHNOLOGIES AS DESCRIBED BY CMS-2020-01-R: HCPCS | Performed by: NURSE PRACTITIONER

## 2022-06-10 PROCEDURE — 84132 ASSAY OF SERUM POTASSIUM: CPT

## 2022-06-10 PROCEDURE — 80053 COMPREHEN METABOLIC PANEL: CPT | Performed by: INTERNAL MEDICINE

## 2022-06-10 PROCEDURE — 25010000002 FENTANYL CITRATE (PF) 250 MCG/5ML SOLUTION: Performed by: ANESTHESIOLOGY

## 2022-06-10 PROCEDURE — 0 MILRINONE LACTATE IN DEXTROSE 20-5 MG/100ML-% SOLUTION: Performed by: ANESTHESIOLOGY

## 2022-06-10 PROCEDURE — 33508 ENDOSCOPIC VEIN HARVEST: CPT | Performed by: PHYSICIAN ASSISTANT

## 2022-06-10 PROCEDURE — 85384 FIBRINOGEN ACTIVITY: CPT | Performed by: NURSE PRACTITIONER

## 2022-06-10 PROCEDURE — 99232 SBSQ HOSP IP/OBS MODERATE 35: CPT | Performed by: INTERNAL MEDICINE

## 2022-06-10 PROCEDURE — 85576 BLOOD PLATELET AGGREGATION: CPT

## 2022-06-10 PROCEDURE — 33519 CABG ARTERY-VEIN THREE: CPT | Performed by: PHYSICIAN ASSISTANT

## 2022-06-10 PROCEDURE — 82803 BLOOD GASES ANY COMBINATION: CPT

## 2022-06-10 PROCEDURE — 25010000002 DEXAMETHASONE PER 1 MG: Performed by: ANESTHESIOLOGY

## 2022-06-10 PROCEDURE — 02RX0JZ REPLACEMENT OF THORACIC AORTA, ASCENDING/ARCH WITH SYNTHETIC SUBSTITUTE, OPEN APPROACH: ICD-10-PCS

## 2022-06-10 PROCEDURE — 25010000002 PHENYLEPHRINE 10 MG/ML SOLUTION: Performed by: ANESTHESIOLOGY

## 2022-06-10 PROCEDURE — 33859 AS-AORT GRF F/DS OTH/THN DSJ: CPT | Performed by: PHYSICIAN ASSISTANT

## 2022-06-10 DEVICE — INCISIONLINE PLEDGET TFLN SFT LG: Type: IMPLANTABLE DEVICE | Site: CHEST | Status: FUNCTIONAL

## 2022-06-10 DEVICE — SS SUTURE, 6 PER SLEEVE
Type: IMPLANTABLE DEVICE | Site: STERNUM | Status: FUNCTIONAL
Brand: MYO/WIRE II

## 2022-06-10 DEVICE — SS SUTURE, 3 PER SLEEVE
Type: IMPLANTABLE DEVICE | Site: STERNUM | Status: FUNCTIONAL
Brand: MYO/WIRE II

## 2022-06-10 DEVICE — GELWEAVE GELATIN IMPREGNATED WOVEN VASCULAR PROSTHESIS STRAIGHT
Type: IMPLANTABLE DEVICE | Site: HEART | Status: FUNCTIONAL
Brand: GELWEAVE™

## 2022-06-10 DEVICE — WAX,BONE,NATURAL
Type: IMPLANTABLE DEVICE | Site: STERNUM | Status: FUNCTIONAL
Brand: MEDLINE INDUSTRIES

## 2022-06-10 DEVICE — DEV CONTRL TISS STRATAFIX SPIRAL MNCRYL UD 3/0 PLS 30CM: Type: IMPLANTABLE DEVICE | Site: CHEST | Status: FUNCTIONAL

## 2022-06-10 DEVICE — DEV CONTRL TISS STRATAFIXSPIRALMNCRYL PLSPS2 REV3/0 15CM: Type: IMPLANTABLE DEVICE | Site: LEG | Status: FUNCTIONAL

## 2022-06-10 DEVICE — ABSORBABLE HEMOSTAT (OXIDIZED REGENERATED CELLULOSE, U.S.P.)
Type: IMPLANTABLE DEVICE | Site: CHEST | Status: FUNCTIONAL
Brand: SURGICEL

## 2022-06-10 RX ORDER — PANTOPRAZOLE SODIUM 40 MG/1
40 TABLET, DELAYED RELEASE ORAL EVERY MORNING
Status: DISCONTINUED | OUTPATIENT
Start: 2022-06-11 | End: 2022-06-11

## 2022-06-10 RX ORDER — NITROGLYCERIN 5 MG/ML
INJECTION, SOLUTION INTRAVENOUS AS NEEDED
Status: DISCONTINUED | OUTPATIENT
Start: 2022-06-10 | End: 2022-06-10 | Stop reason: SURG

## 2022-06-10 RX ORDER — LIDOCAINE HYDROCHLORIDE 20 MG/ML
INJECTION, SOLUTION EPIDURAL; INFILTRATION; INTRACAUDAL; PERINEURAL AS NEEDED
Status: DISCONTINUED | OUTPATIENT
Start: 2022-06-10 | End: 2022-06-10 | Stop reason: SURG

## 2022-06-10 RX ORDER — MORPHINE SULFATE 2 MG/ML
2 INJECTION, SOLUTION INTRAMUSCULAR; INTRAVENOUS
Status: DISPENSED | OUTPATIENT
Start: 2022-06-10 | End: 2022-06-12

## 2022-06-10 RX ORDER — ROCURONIUM BROMIDE 10 MG/ML
INJECTION, SOLUTION INTRAVENOUS AS NEEDED
Status: DISCONTINUED | OUTPATIENT
Start: 2022-06-10 | End: 2022-06-10 | Stop reason: SURG

## 2022-06-10 RX ORDER — NALOXONE HCL 0.4 MG/ML
0.4 VIAL (ML) INJECTION
Status: DISCONTINUED | OUTPATIENT
Start: 2022-06-10 | End: 2022-06-17 | Stop reason: HOSPADM

## 2022-06-10 RX ORDER — HYDROCODONE BITARTRATE AND ACETAMINOPHEN 5; 325 MG/1; MG/1
1 TABLET ORAL EVERY 4 HOURS PRN
Status: DISCONTINUED | OUTPATIENT
Start: 2022-06-10 | End: 2022-06-12

## 2022-06-10 RX ORDER — ACETAMINOPHEN 325 MG/1
650 TABLET ORAL EVERY 4 HOURS PRN
Status: DISCONTINUED | OUTPATIENT
Start: 2022-06-11 | End: 2022-06-17 | Stop reason: HOSPADM

## 2022-06-10 RX ORDER — MAGNESIUM SULFATE 1 G/100ML
1 INJECTION INTRAVENOUS EVERY 8 HOURS
Status: COMPLETED | OUTPATIENT
Start: 2022-06-11 | End: 2022-06-11

## 2022-06-10 RX ORDER — GABAPENTIN 300 MG/1
300 CAPSULE ORAL EVERY 8 HOURS SCHEDULED
Status: DISCONTINUED | OUTPATIENT
Start: 2022-06-10 | End: 2022-06-12

## 2022-06-10 RX ORDER — MAGNESIUM HYDROXIDE 1200 MG/15ML
LIQUID ORAL AS NEEDED
Status: DISCONTINUED | OUTPATIENT
Start: 2022-06-10 | End: 2022-06-10 | Stop reason: HOSPADM

## 2022-06-10 RX ORDER — MAGNESIUM SULFATE HEPTAHYDRATE 500 MG/ML
INJECTION, SOLUTION INTRAMUSCULAR; INTRAVENOUS AS NEEDED
Status: DISCONTINUED | OUTPATIENT
Start: 2022-06-10 | End: 2022-06-10 | Stop reason: SURG

## 2022-06-10 RX ORDER — ASPIRIN 325 MG
325 TABLET ORAL ONCE
Status: COMPLETED | OUTPATIENT
Start: 2022-06-10 | End: 2022-06-10

## 2022-06-10 RX ORDER — HYDROMORPHONE HCL 110MG/55ML
PATIENT CONTROLLED ANALGESIA SYRINGE INTRAVENOUS AS NEEDED
Status: DISCONTINUED | OUTPATIENT
Start: 2022-06-10 | End: 2022-06-10 | Stop reason: SURG

## 2022-06-10 RX ORDER — HEPARIN SODIUM 1000 [USP'U]/ML
INJECTION, SOLUTION INTRAVENOUS; SUBCUTANEOUS AS NEEDED
Status: DISCONTINUED | OUTPATIENT
Start: 2022-06-10 | End: 2022-06-10 | Stop reason: SURG

## 2022-06-10 RX ORDER — ONDANSETRON 2 MG/ML
INJECTION INTRAMUSCULAR; INTRAVENOUS AS NEEDED
Status: DISCONTINUED | OUTPATIENT
Start: 2022-06-10 | End: 2022-06-10 | Stop reason: SURG

## 2022-06-10 RX ORDER — ACETAMINOPHEN 160 MG/5ML
650 SOLUTION ORAL EVERY 4 HOURS
Status: COMPLETED | OUTPATIENT
Start: 2022-06-10 | End: 2022-06-11

## 2022-06-10 RX ORDER — OLANZAPINE 10 MG/2ML
1 INJECTION, POWDER, LYOPHILIZED, FOR SOLUTION INTRAMUSCULAR
Status: DISCONTINUED | OUTPATIENT
Start: 2022-06-10 | End: 2022-06-17 | Stop reason: HOSPADM

## 2022-06-10 RX ORDER — DEXMEDETOMIDINE HYDROCHLORIDE 4 UG/ML
.2-1.5 INJECTION, SOLUTION INTRAVENOUS
Status: DISCONTINUED | OUTPATIENT
Start: 2022-06-10 | End: 2022-06-12

## 2022-06-10 RX ORDER — SODIUM CHLORIDE 9 MG/ML
30 INJECTION, SOLUTION INTRAVENOUS CONTINUOUS PRN
Status: DISCONTINUED | OUTPATIENT
Start: 2022-06-10 | End: 2022-06-10

## 2022-06-10 RX ORDER — ALBUMIN, HUMAN INJ 5% 5 %
1000 SOLUTION INTRAVENOUS AS NEEDED
Status: DISCONTINUED | OUTPATIENT
Start: 2022-06-10 | End: 2022-06-11

## 2022-06-10 RX ORDER — NOREPINEPHRINE BIT/0.9 % NACL 8 MG/250ML
.02-.06 INFUSION BOTTLE (ML) INTRAVENOUS CONTINUOUS PRN
Status: DISCONTINUED | OUTPATIENT
Start: 2022-06-10 | End: 2022-06-12

## 2022-06-10 RX ORDER — ASPIRIN 81 MG/1
81 TABLET ORAL DAILY
Status: DISCONTINUED | OUTPATIENT
Start: 2022-06-11 | End: 2022-06-17 | Stop reason: HOSPADM

## 2022-06-10 RX ORDER — KETAMINE HCL IN NACL, ISO-OSM 100MG/10ML
SYRINGE (ML) INJECTION AS NEEDED
Status: DISCONTINUED | OUTPATIENT
Start: 2022-06-10 | End: 2022-06-10 | Stop reason: SURG

## 2022-06-10 RX ORDER — FENTANYL CITRATE 50 UG/ML
INJECTION, SOLUTION INTRAMUSCULAR; INTRAVENOUS AS NEEDED
Status: DISCONTINUED | OUTPATIENT
Start: 2022-06-10 | End: 2022-06-10 | Stop reason: SURG

## 2022-06-10 RX ORDER — MIDAZOLAM HYDROCHLORIDE 1 MG/ML
0.5 INJECTION INTRAMUSCULAR; INTRAVENOUS
Status: DISCONTINUED | OUTPATIENT
Start: 2022-06-10 | End: 2022-06-10

## 2022-06-10 RX ORDER — NOREPINEPHRINE BIT/0.9 % NACL 8 MG/250ML
INFUSION BOTTLE (ML) INTRAVENOUS CONTINUOUS PRN
Status: DISCONTINUED | OUTPATIENT
Start: 2022-06-10 | End: 2022-06-10 | Stop reason: SURG

## 2022-06-10 RX ORDER — ATORVASTATIN CALCIUM 40 MG/1
40 TABLET, FILM COATED ORAL NIGHTLY
Status: DISCONTINUED | OUTPATIENT
Start: 2022-06-11 | End: 2022-06-11

## 2022-06-10 RX ORDER — PANTOPRAZOLE SODIUM 40 MG/10ML
40 INJECTION, POWDER, LYOPHILIZED, FOR SOLUTION INTRAVENOUS ONCE
Status: COMPLETED | OUTPATIENT
Start: 2022-06-10 | End: 2022-06-10

## 2022-06-10 RX ORDER — SODIUM CHLORIDE 0.9 % (FLUSH) 0.9 %
30 SYRINGE (ML) INJECTION ONCE AS NEEDED
Status: DISCONTINUED | OUTPATIENT
Start: 2022-06-10 | End: 2022-06-10

## 2022-06-10 RX ORDER — POTASSIUM CHLORIDE 20 MEQ/1
20 TABLET, EXTENDED RELEASE ORAL AS NEEDED
Status: DISCONTINUED | OUTPATIENT
Start: 2022-06-11 | End: 2022-06-17 | Stop reason: HOSPADM

## 2022-06-10 RX ORDER — NICOTINE POLACRILEX 4 MG
15 LOZENGE BUCCAL
Status: DISCONTINUED | OUTPATIENT
Start: 2022-06-10 | End: 2022-06-17 | Stop reason: HOSPADM

## 2022-06-10 RX ORDER — ACETAMINOPHEN 325 MG/1
650 TABLET ORAL EVERY 4 HOURS
Status: COMPLETED | OUTPATIENT
Start: 2022-06-10 | End: 2022-06-11

## 2022-06-10 RX ORDER — POTASSIUM CHLORIDE 7.45 MG/ML
10 INJECTION INTRAVENOUS
Status: DISCONTINUED | OUTPATIENT
Start: 2022-06-10 | End: 2022-06-12

## 2022-06-10 RX ORDER — CHLORHEXIDINE GLUCONATE 0.12 MG/ML
15 RINSE ORAL EVERY 12 HOURS
Status: DISCONTINUED | OUTPATIENT
Start: 2022-06-10 | End: 2022-06-17 | Stop reason: HOSPADM

## 2022-06-10 RX ORDER — NICARDIPINE HYDROCHLORIDE 2.5 MG/ML
INJECTION INTRAVENOUS AS NEEDED
Status: DISCONTINUED | OUTPATIENT
Start: 2022-06-10 | End: 2022-06-10 | Stop reason: SURG

## 2022-06-10 RX ORDER — POLYETHYLENE GLYCOL 3350 17 G/17G
17 POWDER, FOR SOLUTION ORAL DAILY PRN
Status: DISCONTINUED | OUTPATIENT
Start: 2022-06-10 | End: 2022-06-12

## 2022-06-10 RX ORDER — AMINOCAPROIC ACID 250 MG/ML
INJECTION, SOLUTION INTRAVENOUS AS NEEDED
Status: DISCONTINUED | OUTPATIENT
Start: 2022-06-10 | End: 2022-06-10 | Stop reason: SURG

## 2022-06-10 RX ORDER — PHENYLEPHRINE HYDROCHLORIDE 10 MG/ML
INJECTION INTRAVENOUS AS NEEDED
Status: DISCONTINUED | OUTPATIENT
Start: 2022-06-10 | End: 2022-06-10

## 2022-06-10 RX ORDER — AMOXICILLIN 250 MG
2 CAPSULE ORAL NIGHTLY
Status: DISCONTINUED | OUTPATIENT
Start: 2022-06-11 | End: 2022-06-16

## 2022-06-10 RX ORDER — SODIUM CHLORIDE 0.9 % (FLUSH) 0.9 %
10 SYRINGE (ML) INJECTION EVERY 12 HOURS SCHEDULED
Status: DISCONTINUED | OUTPATIENT
Start: 2022-06-10 | End: 2022-06-10

## 2022-06-10 RX ORDER — ONDANSETRON 2 MG/ML
4 INJECTION INTRAMUSCULAR; INTRAVENOUS EVERY 6 HOURS PRN
Status: DISCONTINUED | OUTPATIENT
Start: 2022-06-10 | End: 2022-06-17 | Stop reason: HOSPADM

## 2022-06-10 RX ORDER — ACETAMINOPHEN 160 MG/5ML
650 SOLUTION ORAL EVERY 4 HOURS PRN
Status: DISCONTINUED | OUTPATIENT
Start: 2022-06-11 | End: 2022-06-17 | Stop reason: HOSPADM

## 2022-06-10 RX ORDER — PHENYLEPHRINE HYDROCHLORIDE 10 MG/ML
INJECTION INTRAVENOUS AS NEEDED
Status: DISCONTINUED | OUTPATIENT
Start: 2022-06-10 | End: 2022-06-10 | Stop reason: SURG

## 2022-06-10 RX ORDER — MIDAZOLAM HYDROCHLORIDE 1 MG/ML
INJECTION INTRAMUSCULAR; INTRAVENOUS AS NEEDED
Status: DISCONTINUED | OUTPATIENT
Start: 2022-06-10 | End: 2022-06-10 | Stop reason: SURG

## 2022-06-10 RX ORDER — SODIUM CHLORIDE 9 MG/ML
9 INJECTION, SOLUTION INTRAVENOUS CONTINUOUS PRN
Status: DISCONTINUED | OUTPATIENT
Start: 2022-06-10 | End: 2022-06-10

## 2022-06-10 RX ORDER — MILRINONE LACTATE 0.2 MG/ML
0.12 INJECTION, SOLUTION INTRAVENOUS
Status: DISCONTINUED | OUTPATIENT
Start: 2022-06-10 | End: 2022-06-14

## 2022-06-10 RX ORDER — DEXTROSE MONOHYDRATE 25 G/50ML
10-50 INJECTION, SOLUTION INTRAVENOUS
Status: DISCONTINUED | OUTPATIENT
Start: 2022-06-10 | End: 2022-06-17 | Stop reason: HOSPADM

## 2022-06-10 RX ORDER — POTASSIUM CHLORIDE 7.45 MG/ML
INJECTION INTRAVENOUS
Status: DISPENSED
Start: 2022-06-10 | End: 2022-06-11

## 2022-06-10 RX ORDER — SODIUM CHLORIDE 0.9 % (FLUSH) 0.9 %
10 SYRINGE (ML) INJECTION AS NEEDED
Status: DISCONTINUED | OUTPATIENT
Start: 2022-06-10 | End: 2022-06-10

## 2022-06-10 RX ORDER — ACETAMINOPHEN 650 MG/1
650 SUPPOSITORY RECTAL EVERY 4 HOURS PRN
Status: DISCONTINUED | OUTPATIENT
Start: 2022-06-11 | End: 2022-06-17 | Stop reason: HOSPADM

## 2022-06-10 RX ORDER — NITROGLYCERIN 20 MG/100ML
INJECTION INTRAVENOUS CONTINUOUS PRN
Status: DISCONTINUED | OUTPATIENT
Start: 2022-06-10 | End: 2022-06-10 | Stop reason: SURG

## 2022-06-10 RX ORDER — BISACODYL 10 MG
10 SUPPOSITORY, RECTAL RECTAL DAILY PRN
Status: DISCONTINUED | OUTPATIENT
Start: 2022-06-11 | End: 2022-06-17 | Stop reason: HOSPADM

## 2022-06-10 RX ORDER — OLANZAPINE 10 MG/2ML
1 INJECTION, POWDER, LYOPHILIZED, FOR SOLUTION INTRAMUSCULAR
Status: DISCONTINUED | OUTPATIENT
Start: 2022-06-10 | End: 2022-06-10

## 2022-06-10 RX ORDER — GABAPENTIN 600 MG/1
300 TABLET ORAL EVERY 8 HOURS SCHEDULED
Status: DISCONTINUED | OUTPATIENT
Start: 2022-06-10 | End: 2022-06-10

## 2022-06-10 RX ORDER — CALCIUM CHLORIDE 100 MG/ML
INJECTION INTRAVENOUS; INTRAVENTRICULAR AS NEEDED
Status: DISCONTINUED | OUTPATIENT
Start: 2022-06-10 | End: 2022-06-10 | Stop reason: SURG

## 2022-06-10 RX ORDER — MEPERIDINE HYDROCHLORIDE 25 MG/ML
12.5 INJECTION INTRAMUSCULAR; INTRAVENOUS; SUBCUTANEOUS ONCE AS NEEDED
Status: DISCONTINUED | OUTPATIENT
Start: 2022-06-10 | End: 2022-06-11

## 2022-06-10 RX ORDER — POTASSIUM CHLORIDE 1.5 G/1.77G
20 POWDER, FOR SOLUTION ORAL AS NEEDED
Status: DISCONTINUED | OUTPATIENT
Start: 2022-06-11 | End: 2022-06-17 | Stop reason: HOSPADM

## 2022-06-10 RX ORDER — NITROGLYCERIN 20 MG/100ML
5-50 INJECTION INTRAVENOUS CONTINUOUS PRN
Status: DISCONTINUED | OUTPATIENT
Start: 2022-06-10 | End: 2022-06-11

## 2022-06-10 RX ORDER — MILRINONE LACTATE 0.2 MG/ML
INJECTION, SOLUTION INTRAVENOUS AS NEEDED
Status: DISCONTINUED | OUTPATIENT
Start: 2022-06-10 | End: 2022-06-10 | Stop reason: SURG

## 2022-06-10 RX ORDER — ACETAMINOPHEN 650 MG/1
650 SUPPOSITORY RECTAL EVERY 4 HOURS
Status: COMPLETED | OUTPATIENT
Start: 2022-06-10 | End: 2022-06-11

## 2022-06-10 RX ORDER — ALBUMIN, HUMAN INJ 5% 5 %
SOLUTION INTRAVENOUS
Status: COMPLETED
Start: 2022-06-10 | End: 2022-06-10

## 2022-06-10 RX ORDER — DEXAMETHASONE SODIUM PHOSPHATE 4 MG/ML
INJECTION, SOLUTION INTRA-ARTICULAR; INTRALESIONAL; INTRAMUSCULAR; INTRAVENOUS; SOFT TISSUE AS NEEDED
Status: DISCONTINUED | OUTPATIENT
Start: 2022-06-10 | End: 2022-06-10 | Stop reason: SURG

## 2022-06-10 RX ORDER — NICOTINE POLACRILEX 4 MG
15 LOZENGE BUCCAL
Status: DISCONTINUED | OUTPATIENT
Start: 2022-06-10 | End: 2022-06-10

## 2022-06-10 RX ORDER — ENOXAPARIN SODIUM 100 MG/ML
40 INJECTION SUBCUTANEOUS EVERY 24 HOURS
Status: DISCONTINUED | OUTPATIENT
Start: 2022-06-12 | End: 2022-06-17 | Stop reason: HOSPADM

## 2022-06-10 RX ORDER — DEXTROSE MONOHYDRATE 25 G/50ML
10-50 INJECTION, SOLUTION INTRAVENOUS
Status: DISCONTINUED | OUTPATIENT
Start: 2022-06-10 | End: 2022-06-10

## 2022-06-10 RX ADMIN — ACETAMINOPHEN ORAL SOLUTION 650 MG: 650 SOLUTION ORAL at 23:12

## 2022-06-10 RX ADMIN — AMINOCAPROIC ACID 10 G: 250 INJECTION, SOLUTION INTRAVENOUS at 19:13

## 2022-06-10 RX ADMIN — ROCURONIUM BROMIDE 25 MG: 50 INJECTION, SOLUTION INTRAVENOUS at 17:47

## 2022-06-10 RX ADMIN — PHENYLEPHRINE HYDROCHLORIDE 50 MCG: 10 INJECTION INTRAVENOUS at 14:10

## 2022-06-10 RX ADMIN — Medication 0.02 MCG/KG/MIN: at 22:23

## 2022-06-10 RX ADMIN — Medication 20 MG: at 13:57

## 2022-06-10 RX ADMIN — HYDROMORPHONE HYDROCHLORIDE 2 MG: 2 INJECTION, SOLUTION INTRAMUSCULAR; INTRAVENOUS; SUBCUTANEOUS at 20:31

## 2022-06-10 RX ADMIN — METOPROLOL TARTRATE 12.5 MG: 25 TABLET, FILM COATED ORAL at 10:22

## 2022-06-10 RX ADMIN — NITROGLYCERIN 50 MCG: 5 INJECTION, SOLUTION INTRAVENOUS at 14:54

## 2022-06-10 RX ADMIN — DEXMEDETOMIDINE HYDROCHLORIDE 0.5 MCG/KG/HR: 100 INJECTION, SOLUTION INTRAVENOUS at 13:58

## 2022-06-10 RX ADMIN — NITROGLYCERIN 5 MCG/MIN: 20 INJECTION INTRAVENOUS at 14:01

## 2022-06-10 RX ADMIN — ASPIRIN 325 MG ORAL TABLET 325 MG: 325 PILL ORAL at 23:13

## 2022-06-10 RX ADMIN — NICARDIPINE HYDROCHLORIDE 0.5 MG: 25 INJECTION, SOLUTION INTRAVENOUS at 15:09

## 2022-06-10 RX ADMIN — ONDANSETRON 4 MG: 2 INJECTION INTRAMUSCULAR; INTRAVENOUS at 20:12

## 2022-06-10 RX ADMIN — FENTANYL CITRATE 250 MCG: 0.05 INJECTION, SOLUTION INTRAMUSCULAR; INTRAVENOUS at 14:06

## 2022-06-10 RX ADMIN — CALCIUM CHLORIDE 500 MG: 100 INJECTION INTRAVENOUS; INTRAVENTRICULAR at 19:05

## 2022-06-10 RX ADMIN — LIDOCAINE HYDROCHLORIDE 100 MG: 20 INJECTION, SOLUTION EPIDURAL; INFILTRATION; INTRACAUDAL; PERINEURAL at 19:05

## 2022-06-10 RX ADMIN — ALBUMIN (HUMAN) 250 ML: 2.5 SOLUTION INTRAVENOUS at 22:40

## 2022-06-10 RX ADMIN — MIDAZOLAM 2 MG: 1 INJECTION INTRAMUSCULAR; INTRAVENOUS at 18:32

## 2022-06-10 RX ADMIN — Medication 10 MG: at 18:32

## 2022-06-10 RX ADMIN — MAGNESIUM SULFATE HEPTAHYDRATE 2 G: 500 INJECTION, SOLUTION INTRAMUSCULAR; INTRAVENOUS at 18:34

## 2022-06-10 RX ADMIN — ALBUMIN (HUMAN) 250 ML: 2.5 SOLUTION INTRAVENOUS at 23:51

## 2022-06-10 RX ADMIN — ROCURONIUM BROMIDE 50 MG: 50 INJECTION, SOLUTION INTRAVENOUS at 14:05

## 2022-06-10 RX ADMIN — MILRINONE LACTATE 0.38 MCG/KG/MIN: 0.2 INJECTION, SOLUTION INTRAVENOUS at 23:14

## 2022-06-10 RX ADMIN — NITROGLYCERIN 100 MCG: 5 INJECTION, SOLUTION INTRAVENOUS at 14:57

## 2022-06-10 RX ADMIN — Medication 20 MG: at 14:00

## 2022-06-10 RX ADMIN — EPINEPHRINE 0.02 MCG/KG/MIN: 1 INJECTION INTRAMUSCULAR; INTRAVENOUS; SUBCUTANEOUS at 23:00

## 2022-06-10 RX ADMIN — LIDOCAINE HYDROCHLORIDE 100 MG: 20 INJECTION, SOLUTION EPIDURAL; INFILTRATION; INTRACAUDAL; PERINEURAL at 14:06

## 2022-06-10 RX ADMIN — ROCURONIUM BROMIDE 50 MG: 50 INJECTION, SOLUTION INTRAVENOUS at 14:06

## 2022-06-10 RX ADMIN — AMINOCAPROIC ACID 10 G: 250 INJECTION, SOLUTION INTRAVENOUS at 14:43

## 2022-06-10 RX ADMIN — ROCURONIUM BROMIDE 20 MG: 50 INJECTION, SOLUTION INTRAVENOUS at 18:44

## 2022-06-10 RX ADMIN — PANTOPRAZOLE SODIUM 40 MG: 40 INJECTION, POWDER, FOR SOLUTION INTRAVENOUS at 23:13

## 2022-06-10 RX ADMIN — ROCURONIUM BROMIDE 25 MG: 50 INJECTION, SOLUTION INTRAVENOUS at 16:05

## 2022-06-10 RX ADMIN — NICARDIPINE HYDROCHLORIDE 0.5 MG: 25 INJECTION, SOLUTION INTRAVENOUS at 15:26

## 2022-06-10 RX ADMIN — SODIUM CHLORIDE 2 UNITS/HR: 900 INJECTION INTRAVENOUS at 14:45

## 2022-06-10 RX ADMIN — FENTANYL CITRATE 250 MCG: 0.05 INJECTION, SOLUTION INTRAMUSCULAR; INTRAVENOUS at 18:32

## 2022-06-10 RX ADMIN — FENTANYL CITRATE 250 MCG: 0.05 INJECTION, SOLUTION INTRAMUSCULAR; INTRAVENOUS at 14:05

## 2022-06-10 RX ADMIN — MORPHINE SULFATE 2 MG: 2 INJECTION, SOLUTION INTRAMUSCULAR; INTRAVENOUS at 21:42

## 2022-06-10 RX ADMIN — MILRINONE LACTATE 0.25 MCG/KG/MIN: 0.2 INJECTION, SOLUTION INTRAVENOUS at 18:32

## 2022-06-10 RX ADMIN — MIDAZOLAM 2 MG: 1 INJECTION INTRAMUSCULAR; INTRAVENOUS at 13:57

## 2022-06-10 RX ADMIN — PHENYLEPHRINE HYDROCHLORIDE 80 MCG: 10 INJECTION INTRAVENOUS at 14:15

## 2022-06-10 RX ADMIN — NICARDIPINE HYDROCHLORIDE 0.5 MG: 25 INJECTION, SOLUTION INTRAVENOUS at 15:06

## 2022-06-10 RX ADMIN — Medication 1500 MG: at 14:00

## 2022-06-10 RX ADMIN — MUPIROCIN 1 APPLICATION: 20 OINTMENT TOPICAL at 23:13

## 2022-06-10 RX ADMIN — DEXMEDETOMIDINE HYDROCHLORIDE 0.7 MCG/KG/HR: 4 INJECTION, SOLUTION INTRAVENOUS at 22:22

## 2022-06-10 RX ADMIN — PROTAMINE SULFATE 350 MG: 10 INJECTION, SOLUTION INTRAVENOUS at 19:04

## 2022-06-10 RX ADMIN — CHLORHEXIDINE GLUCONATE 15 ML: 1.2 RINSE ORAL at 23:49

## 2022-06-10 RX ADMIN — HEPARIN SODIUM 30000 UNITS: 1000 INJECTION INTRAVENOUS; SUBCUTANEOUS at 15:32

## 2022-06-10 RX ADMIN — PHENYLEPHRINE HYDROCHLORIDE 100 MCG: 10 INJECTION INTRAVENOUS at 14:21

## 2022-06-10 RX ADMIN — DEXAMETHASONE SODIUM PHOSPHATE 4 MG: 4 INJECTION, SOLUTION INTRAMUSCULAR; INTRAVENOUS at 20:11

## 2022-06-10 RX ADMIN — PHENYLEPHRINE HYDROCHLORIDE 80 MCG: 10 INJECTION INTRAVENOUS at 14:24

## 2022-06-10 RX ADMIN — BUDESONIDE 0.5 MG: 0.5 INHALANT RESPIRATORY (INHALATION) at 08:20

## 2022-06-10 RX ADMIN — SODIUM CHLORIDE: 0.9 INJECTION, SOLUTION INTRAVENOUS at 13:53

## 2022-06-10 RX ADMIN — ROCURONIUM BROMIDE 25 MG: 50 INJECTION, SOLUTION INTRAVENOUS at 15:05

## 2022-06-10 RX ADMIN — ARFORMOTEROL TARTRATE 15 MCG: 15 SOLUTION RESPIRATORY (INHALATION) at 08:11

## 2022-06-10 RX ADMIN — EPINEPHRINE 0.04 MCG/KG/MIN: 1 INJECTION INTRAMUSCULAR; INTRAVENOUS; SUBCUTANEOUS at 18:38

## 2022-06-10 RX ADMIN — GUAIFENESIN 1200 MG: 600 TABLET, EXTENDED RELEASE ORAL at 23:50

## 2022-06-10 RX ADMIN — ROCURONIUM BROMIDE 25 MG: 50 INJECTION, SOLUTION INTRAVENOUS at 17:05

## 2022-06-10 RX ADMIN — HEPARIN SODIUM 5000 UNITS: 1000 INJECTION INTRAVENOUS; SUBCUTANEOUS at 15:47

## 2022-06-10 RX ADMIN — Medication 0.05 MCG/KG/MIN: at 16:10

## 2022-06-10 RX ADMIN — SODIUM CHLORIDE: 0.9 INJECTION, SOLUTION INTRAVENOUS at 14:00

## 2022-06-10 NOTE — PLAN OF CARE
Goal Outcome Evaluation:              Outcome Evaluation: Pt in OR currently - will evaluate when pt arrives to floor and is appropriate  Problem: Adult Inpatient Plan of Care  Goal: Plan of Care Review  Outcome: Unable to Meet, Plan Revised  Flowsheets (Taken 6/10/2022 0832)  Outcome Evaluation: Pt in OR currently - will evaluate when pt arrives to floor and is appropriate  Goal: Patient-Specific Goal (Individualized)  Outcome: Unable to Meet, Plan Revised  Goal: Absence of Hospital-Acquired Illness or Injury  Outcome: Unable to Meet, Plan Revised  Goal: Optimal Comfort and Wellbeing  Outcome: Unable to Meet, Plan Revised  Goal: Readiness for Transition of Care  Outcome: Unable to Meet, Plan Revised     Problem: Fall Injury Risk  Goal: Absence of Fall and Fall-Related Injury  Outcome: Unable to Meet, Plan Revised     Problem: COPD (Chronic Obstructive Pulmonary Disease) Comorbidity  Goal: Maintenance of COPD Symptom Control  Outcome: Unable to Meet, Plan Revised

## 2022-06-10 NOTE — ANESTHESIA PROCEDURE NOTES
Diagnostic Intraop JANELLE    Procedure Performed: Diagnostic Intraop JANELLE     Start Time:        End Time:              Anesthesia Information      Echocardiogram Comments:       Pre cabg evaluation  No AI/AS  Mild MR  EF appx 35%  Areas of hypo and akinesis      Post cpb   LV inf basilar remains AK improved SF over all EF ~45  Rv improved SF , low nl   MV TV AV no change

## 2022-06-10 NOTE — PROGRESS NOTES
Cardiology Progress Note      Admiting Physician:  Benson Hughes, *   LOS: 5 days       Reason For Followup:  Multivessel coronary artery disease      Subjective:    Interval History:  Seen and examined.  Chart and labs reviewed.  Patient appears to be in a normal cognitive state.  He is upset that his surgery has been postponed.  He reports that he does not have a drinking problem and that he only has 2-3 drinks a night.    Review of Systems:  A complete review of systems was attempted however patient's cognitive status is questionable.  He denies chest pain or shortness of breath at this time.    Assessment & Plan    Impressions:  Syncope  Multivessel coronary artery disease  Hyperlipidemia  Hypertension  Ischemic cardiomyopathy EF 40%  Acute kidney injury-improved  Alcohol dependence  Altered mental status likely secondary to alcohol withdrawal    Recommendations:  Patient is having surgery today   till then continue medical therapy  Blood pressure heart rate are stable  Renal function has significantly improved since he is on IV fluids  Monitor rate and rhythm closely  Further recommendations as patient's course progresses  Alcohol withdrawal/altered mentation are clear and is doing better now.  Patient is much better now and does not have any withdrawal symptoms  Blood pressure and heart rate are stable and tolerating oral medicines including aspirin carvedilol statins and amlodipine    Objective:    Medication Review:   Scheduled Meds:amLODIPine, 5 mg, Oral, Daily  arformoterol, 15 mcg, Nebulization, BID - RT  aspirin, 81 mg, Oral, Daily  atorvastatin, 40 mg, Oral, Daily  budesonide, 0.5 mg, Nebulization, BID - RT  carvedilol, 6.25 mg, Oral, BID With Meals  chlorhexidine, 15 mL, Mouth/Throat, Q12H  Chlorhexidine Gluconate Cloth, 1 application, Topical, Q12H  enoxaparin, 40 mg, Subcutaneous, Q12H  gabapentin, 600 mg, Oral, BID  guaiFENesin, 1,200 mg, Oral, Q12H  insulin lispro, 0-7 Units, Subcutaneous,  TID AC  multivitamin, 1 tablet, Oral, Daily  mupirocin, 1 application, Nasal, Q12H  sodium chloride, 10 mL, Intravenous, Q12H  sodium chloride, 10 mL, Intravenous, Q12H  tamsulosin, 0.4 mg, Oral, Daily  thiamine, 100 mg, Oral, BID  vancomycin, 15 mg/kg, Intravenous, Once      Continuous Infusions:insulin, 0-100 Units/hr  Pharmacy to dose Trelegy,   sodium chloride, 30 mL/hr  sodium chloride, 9 mL/hr      PRN Meds:.•  acetaminophen **OR** acetaminophen **OR** acetaminophen  •  ALPRAZolam  •  aluminum-magnesium hydroxide-simethicone  •  atropine  •  benzonatate  •  dextrose  •  dextrose  •  dextrose  •  dextrose  •  dextrose  •  glucagon (human recombinant)  •  glucagon (human recombinant)  •  hydrALAZINE  •  ipratropium-albuterol  •  magnesium sulfate **OR** magnesium sulfate **OR** magnesium sulfate  •  melatonin  •  midazolam  •  nitroglycerin  •  ondansetron **OR** ondansetron  •  Pharmacy to dose Trelegy  •  potassium chloride **OR** potassium chloride **OR** potassium chloride  •  sodium chloride  •  sodium chloride  •  sodium chloride  •  sodium chloride  •  sodium chloride  •  sodium chloride  •  sodium chloride    Patient Active Problem List   Diagnosis   • COPD exacerbation (HCC)   • Obesity (BMI 30-39.9)   • Abnormal nuclear stress test   • Acute renal insufficiency   • Alcohol dependence ([facility])   • Essential hypertension   • Other emphysema ([facility])   • Coronary artery disease   • Syncope, unspecified syncope type         Physical Exam:    General: Alert, cooperative, no distress, appears stated age  Head:  Normocephalic, atraumatic, mucous membranes moist  Eyes:  Conjunctivae/corneas clear, EOM's intact     Neck:  Supple,  no bruit  Lungs:  Clear to auscultation bilaterally, no wheezes rhonchi rales are noted  Chest wall: No tenderness  Heart::  Regular rate and rhythm, S1 and S2 normal, 1/6 holosystolic murmur.  No rub or gallop  Abdomen: Soft, non-tender, nondistended bowel sounds active.   Obese  Extremities: No cyanosis, clubbing, or edema  Pulses: Diminished pedal pulses  Skin:  No rashes or lesions  Neuro/psych: A&O x3. CN II through XII are grossly intact with appropriate affect    Vital Signs:  Vitals:    06/10/22 0930 06/10/22 1000 06/10/22 1030 06/10/22 1142   BP:    118/77   BP Location:    Right arm   Patient Position:    Sitting   Pulse: 74 70 61    Resp:    20   Temp:    97.4 °F (36.3 °C)   TempSrc:    Oral   SpO2: 93% 92% 91%    Weight:       Height:         Wt Readings from Last 1 Encounters:   06/10/22 108 kg (238 lb 1.6 oz)       Intake/Output Summary (Last 24 hours) at 6/10/2022 1156  Last data filed at 6/10/2022 0800  Gross per 24 hour   Intake 660 ml   Output 600 ml   Net 60 ml         Results Review:     CBC    Results from last 7 days   Lab Units 06/10/22  0403 06/09/22  0500 06/08/22  0403 06/07/22  0411 06/05/22  0540   WBC 10*3/mm3 11.40* 10.10 7.70 8.10 6.60   HEMOGLOBIN g/dL 13.0 13.8 13.3 13.2 12.5*   PLATELETS 10*3/mm3 247 250 237 229 155     Cr Clearance Estimated Creatinine Clearance: 75.4 mL/min (by C-G formula based on SCr of 1.17 mg/dL).  Coag   Results from last 7 days   Lab Units 06/09/22  0500   INR  1.05   APTT seconds 26.6     HbA1C   Lab Results   Component Value Date    HGBA1C 5.5 06/03/2022     Blood Glucose   Glucose   Date/Time Value Ref Range Status   06/10/2022 1021 99 70 - 105 mg/dL Final     Comment:     Serial Number: [id number]:  [id number]   06/09/2022 2016 173 (H) 70 - 105 mg/dL Final     Comment:     Serial Number: [id number]:  [id number]   06/09/2022 1649 107 (H) 70 - 105 mg/dL Final     Comment:     Serial Number: [id number]:  [id number]   06/09/2022 1127 87 70 - 105 mg/dL Final     Comment:     Serial Number: [id number]:  [id number]   06/09/2022 0725 81 70 - 105 mg/dL Final     Comment:     Serial Number: [id number]:  [id number]   06/08/2022 2039 118 (H) 70 - 105 mg/dL Final     Comment:     Serial Number:  [id number]:  [id number]   06/08/2022 1610 109 (H) 70 - 105 mg/dL Final     Comment:     Serial Number: [id number]:  [id number]   06/08/2022 1102 169 (H) 70 - 105 mg/dL Final     Comment:     Serial Number: [id number]:  [id number]     Infection       CMP   Results from last 7 days   Lab Units 06/10/22  0403 06/09/22  0500 06/08/22  1411 06/08/22  0403 06/07/22  0411 06/05/22  0539 06/04/22  1744 06/04/22  1008   SODIUM mmol/L 136 136  --  136 134* 134*  --  136   POTASSIUM mmol/L 4.7 4.2 4.7 5.7* 4.8 3.9  --  4.0   CHLORIDE mmol/L 96* 98  --  98 98 97*  --  99   CO2 mmol/L 30.0* 28.0  --  30.0* 27.0 27.0  --  27.0   BUN mg/dL 26* 22  --  27* 27* 16  --  18   CREATININE mg/dL 1.17 0.85  --  0.96 0.89 0.98  --  1.02   GLUCOSE mg/dL 104* 85  --  128* 139* 105*  --  122*   ALBUMIN g/dL  --  3.00*  --   --   --   --   --   --    BILIRUBIN mg/dL  --  0.3  --   --   --   --   --   --    ALK PHOS U/L  --  52  --   --   --   --   --   --    AST (SGOT) U/L  --  25  --   --   --   --   --   --    ALT (SGPT) U/L  --  44*  --   --   --   --   --   --    AMMONIA umol/L  --   --   --   --   --   --  34  --      ABG    Results from last 7 days   Lab Units 06/08/22  1624   PH, ARTERIAL pH units 7.462*   PCO2, ARTERIAL mm Hg 38.3   PO2 ART mm Hg 70.1*   O2 SATURATION ART % 94.8   BASE EXCESS ART mmol/L 3.5*     UA        ANGELO  No results found for: POCMETH, POCAMPHET, POCBARBITUR, POCBENZO, POCCOCAINE, POCOPIATES, POCOXYCODO, POCPHENCYC, POCPROPOXY, POCTHC, POCTRICYC  Lysis Labs   Results from last 7 days   Lab Units 06/10/22  0403 06/09/22  0500 06/08/22  0403 06/07/22  0411 06/05/22  0540 06/05/22  0539 06/04/22  1008   INR   --  1.05  --   --   --   --   --    APTT seconds  --  26.6  --   --   --   --   --    HEMOGLOBIN g/dL 13.0 13.8 13.3 13.2 12.5*  --   --    PLATELETS 10*3/mm3 247 250 237 229 155  --   --    CREATININE mg/dL 1.17 0.85 0.96 0.89  --  0.98 1.02     Radiology(recent) No radiology results for the  last day            Imaging Results (Last 24 Hours)     ** No results found for the last 24 hours. **          Cardiac Studies:  Echo- Results for orders placed during the hospital encounter of 05/31/22    Adult Transthoracic Echo Complete With Contrast if Necessary Per Protocol (With Agitated Saline)    Interpretation Summary  · Left ventricular ejection fraction appears to be 56 - 60%.  · The right ventricular cavity is mildly dilated.  · Mild dilation of the aortic root is present.  · No pericardial effusion noted    Stress Myoview-  Cath-        Frank Giraldo MD  06/10/22  11:56 EDT

## 2022-06-10 NOTE — NURSING NOTE
Pt educated on open heart surgery expectations before, during, after. Fitted for heart hugger vest and taught basic sternal precautions post-op. DVD shown to pt with family in the room, all questions answered. Pt daughter states pt is going to rehab upon discharge. Educated on PT/OT eval and rehab placement with their recommendations. Pt prep complete with chin to ankle shave, CHG shower and nose to toes kit.

## 2022-06-10 NOTE — ANESTHESIA PROCEDURE NOTES
Central Line      Patient reassessed immediately prior to procedure    Patient location during procedure: OR  Start time: 6/10/2022 2:15 PM  Indications: central pressure monitoring, MD/Surgeon request and vascular access  Staff  Anesthesiologist: Dong Alves MD  Preanesthetic Checklist  Completed: patient identified, IV checked, risks and benefits discussed, surgical consent, monitors and equipment checked, pre-op evaluation and timeout performed  Central Line Prep  Sterile Tech:cap, gloves, gown, mask and sterile barriers  Prep: chloraprep  Patient monitoring: blood pressure monitoring, continuous pulse oximetry and EKG  Central Line Procedure  Laterality:right  Location:internal jugular  Catheter Type:[place]  Assessment  Post procedure:occlusive dressing applied  Complications:no  Patient Tolerance:patient tolerated the procedure well with no apparent complications  Additional Notes  PA cath placed via previous central line access

## 2022-06-10 NOTE — CASE MANAGEMENT/SOCIAL WORK
Continued Stay Note  DEBORAH Balta     Patient Name: Chi Quinteros Sr.  MRN: [id number]  Today's Date: 6/10/2022    Admit Date: 5/31/2022     Discharge Plan     Row Name 06/10/22 1543       Plan    Plan DC Plan: Pending Clinical Course and PT/OT evaluations. IP Rehab, HH, OP Rehab, and DME lists provided and awaiting choices. CABG 6/10/22.    Patient/Family in Agreement with Plan yes    Provided Post Acute Provider List? Yes    Post Acute Provider List DME Supplier;Home Health;Inpatient Rehab;Outpatient Therapy    Provided Post Acute Provider Quality & Resource List? Yes    Post Acute Provider Quality and Resource List Home Health    Delivered To Patient    Method of Delivery In person    Plan Comments CM spoke to patient at bedside to discuss discharge plans.CM provided Home Health, IP Rehab, OP Rehab and DME lists for potential discharge needs pending PT/OT evaluations. CM requested patient/family to select 2-3 choices from each list and report back to CM when possible. CM provided contact information and Medicare.gov website for any questions and guidance. Patient/Family verbalized understanding. Patient states he wants to go to [facility] when he discharges because he is uncomfortable to go home alone after surgery. CM encouraged patient to make more choices with all of the options as we do not yet know what his specific needs will be. Patient and family verbalized understanding.CM will continue to follow for any additional needs that may develop and adjust discharge plan accordingly. DC Barriers: CABG POD 0.              Expected Discharge Date and Time     Expected Discharge Date Expected Discharge Time    Jun 14, 2022       Met with patient in room wearing PPE: mask     Maintain distance greater than six feet and spent less than fifteen minutes in the room.        Aureila Rios RN      Office Phone: [phone]  Office Cell:     [phone]

## 2022-06-10 NOTE — PROGRESS NOTES
"NEPHROLOGY PROGRESS NOTE------[facility]    [facility]  [phone]  Amara Cooper MD      Patient Care Team:  Deysi Mason APRN as PCP - General (Nurse Practitioner)  Eliseo Casarez MD as Consulting Physician (Nephrology)      Provider:  Amara Cooper MD  Patient Name: Chi Quinteros Sr.  :  1955    SUBJECTIVE:    F/U ARF/FELIBERTO/CRF/CKD    Awaiting surgery today. No angina. No dysuria. No palpitations.     Medication:  amLODIPine, 5 mg, Oral, Daily  arformoterol, 15 mcg, Nebulization, BID - RT  aspirin, 81 mg, Oral, Daily  atorvastatin, 40 mg, Oral, Daily  budesonide, 0.5 mg, Nebulization, BID - RT  carvedilol, 6.25 mg, Oral, BID With Meals  chlorhexidine, 15 mL, Mouth/Throat, Q12H  Chlorhexidine Gluconate Cloth, 1 application, Topical, Q12H  enoxaparin, 40 mg, Subcutaneous, Q12H  gabapentin, 600 mg, Oral, BID  guaiFENesin, 1,200 mg, Oral, Q12H  insulin lispro, 0-7 Units, Subcutaneous, TID AC  metoprolol tartrate, 12.5 mg, Oral, Once  multivitamin, 1 tablet, Oral, Daily  mupirocin, 1 application, Nasal, Q12H  sodium chloride, 10 mL, Intravenous, Q12H  sodium chloride, 10 mL, Intravenous, Q12H  tamsulosin, 0.4 mg, Oral, Daily  thiamine, 100 mg, Oral, BID  vancomycin, 15 mg/kg, Intravenous, Once      insulin, 0-100 Units/hr  Pharmacy to dose Trelegy,   sodium chloride, 30 mL/hr  sodium chloride, 9 mL/hr        OBJECTIVE    Vital Sign Min/Max for last 24 hours  Temp  Min: 98.2 °F (36.8 °C)  Max: 98.9 °F (37.2 °C)   BP  Min: 92/44  Max: 131/73   Pulse  Min: 59  Max: 82   Resp  Min: 16  Max: 20   SpO2  Min: 86 %  Max: 96 %   No data recorded   Weight  Min: 108 kg (238 lb 1.6 oz)  Max: 108 kg (238 lb 1.6 oz)     Flowsheet Rows    Flowsheet Row First Filed Value   Admission Height 177.8 cm (70\") Documented at 2022   Admission Weight 104 kg (230 lb) Documented at 2022          No intake/output data recorded.  I/O " last 3 completed shifts:  In: 1140 [P.O.:1140]  Out: 2150 [Urine:2150]    Physical Exam:  General Appearance: alert, appears stated age and cooperative  Head: normocephalic, without obvious abnormality and atraumatic  Eyes: conjunctivae and sclerae normal and no icterus  Neck: supple and no JVD  Lungs: clear to auscultation and respirations regular  Heart: regular rhythm & normal rate and normal S1, S2 +ANTONY  Chest: Wall no abnormalities observed  Abdomen: normal bowel sounds and soft non-tender  Extremities: moves extremities well, no edema, no cyanosis and no redness  Skin: no bleeding, bruising or rash, turgor normal, color normal and no lesions noted  Neurologic: Alert, and oriented. No focal deficits    Labs:    WBC WBC   Date Value Ref Range Status   06/10/2022 11.40 (H) 3.40 - 10.80 10*3/mm3 Final   06/09/2022 10.10 3.40 - 10.80 10*3/mm3 Final   06/08/2022 7.70 3.40 - 10.80 10*3/mm3 Final      HGB Hemoglobin   Date Value Ref Range Status   06/10/2022 13.0 13.0 - 17.7 g/dL Final   06/09/2022 13.8 13.0 - 17.7 g/dL Final   06/08/2022 13.3 13.0 - 17.7 g/dL Final      HCT Hematocrit   Date Value Ref Range Status   06/10/2022 39.7 37.5 - 51.0 % Final   06/09/2022 41.7 37.5 - 51.0 % Final   06/08/2022 40.9 37.5 - 51.0 % Final      Platlets No results found for: LABPLAT   MCV MCV   Date Value Ref Range Status   06/10/2022 96.9 79.0 - 97.0 fL Final   06/09/2022 97.4 (H) 79.0 - 97.0 fL Final   06/08/2022 98.9 (H) 79.0 - 97.0 fL Final          Sodium Sodium   Date Value Ref Range Status   06/10/2022 136 136 - 145 mmol/L Final   06/09/2022 136 136 - 145 mmol/L Final   06/08/2022 136 136 - 145 mmol/L Final      Potassium Potassium   Date Value Ref Range Status   06/10/2022 4.7 3.5 - 5.2 mmol/L Final     Comment:     Slight hemolysis detected by analyzer. Results may be affected.   06/09/2022 4.2 3.5 - 5.2 mmol/L Final   06/08/2022 4.7 3.5 - 5.2 mmol/L Final     Comment:     Slight hemolysis detected by analyzer. Results  may be affected.   06/08/2022 5.7 (H) 3.5 - 5.2 mmol/L Final      Chloride Chloride   Date Value Ref Range Status   06/10/2022 96 (L) 98 - 107 mmol/L Final   06/09/2022 98 98 - 107 mmol/L Final   06/08/2022 98 98 - 107 mmol/L Final      CO2 CO2   Date Value Ref Range Status   06/10/2022 30.0 (H) 22.0 - 29.0 mmol/L Final   06/09/2022 28.0 22.0 - 29.0 mmol/L Final   06/08/2022 30.0 (H) 22.0 - 29.0 mmol/L Final      BUN BUN   Date Value Ref Range Status   06/10/2022 26 (H) 8 - 23 mg/dL Final   06/09/2022 22 8 - 23 mg/dL Final   06/08/2022 27 (H) 8 - 23 mg/dL Final      Creatinine Creatinine   Date Value Ref Range Status   06/10/2022 1.17 0.76 - 1.27 mg/dL Final   06/09/2022 0.85 0.76 - 1.27 mg/dL Final   06/08/2022 0.96 0.76 - 1.27 mg/dL Final      Calcium Calcium   Date Value Ref Range Status   06/10/2022 8.5 (L) 8.6 - 10.5 mg/dL Final   06/09/2022 8.7 8.6 - 10.5 mg/dL Final   06/08/2022 9.1 8.6 - 10.5 mg/dL Final      PO4 No components found for: PO4   Albumin Albumin   Date Value Ref Range Status   06/09/2022 3.00 (L) 3.50 - 5.20 g/dL Final      Magnesium Magnesium   Date Value Ref Range Status   06/10/2022 2.1 1.6 - 2.4 mg/dL Final   06/08/2022 2.3 1.6 - 2.4 mg/dL Final      Uric Acid No components found for: URIC ACID     Imaging Results (Last 72 Hours)     ** No results found for the last 72 hours. **          Results for orders placed during the hospital encounter of 05/31/22    XR Chest 1 View    Narrative  EXAM: XR CHEST 1 VW-    DATE OF EXAM: 6/5/2022 8:58 AM    INDICATION: copd exac  cough; R55-Syncope and collapse; F10.920-Alcohol  use, unspecified with intoxication, uncomplicated; J96.01-Acute  respiratory failure with hypoxia; I95.1-Orthostatic hypotension;  R77.8-Other specified abnormalities of plasma proteins; N17.9-Acute  kidney failure, unspecified; J44.1-Chronic obstructive pulmonary disease  with (acute) exacerbation; R94.39-Abnormal result of other cardiovasc.    COMPARISONS:  5/31/2022    FINDINGS:    Emphysema is evident. No pneumothorax or pleural effusion. Mild  scattered atelectasis. No consolidation. Mediastinum appears unchanged,  no evidence of acute process.    Impression  Emphysema and mild scattered atelectasis. No evidence of acute  cardiopulmonary process otherwise.    Electronically Signed By-Chad Car On:6/5/2022 10:00 AM  This report was finalized on [id number] by  Chad Car, .      XR Chest 1 View    Narrative  Examination: XR CHEST 1 VW-    Date of Exam: 5/31/2022 9:03 PM    Indication: Chest pain protocol.    Comparison: None available.    Technique: Single radiographic view of the chest was obtained.    Findings:  There is no pneumothorax, pleural effusion or focal airspace  consolidation. Cardiomediastinal silhouette is unremarkable. Pulmonary  vasculature appears within normal limits. Regional bones appear grossly  intact.    Impression  No acute cardiopulmonary abnormality.    Electronically Signed By-Johann Dodge MD On:5/31/2022 9:29 PM  This report was finalized on [id number] by  Johann Dodge MD.      Results for orders placed during the hospital encounter of 05/31/22    Duplex Vein Mapping Lower Extremity - Bilateral CAR    Interpretation Summary  The greater saphenous veins are of satisfactory quality from the groin to the mid distal thigh bilaterally.  Distal to this the veins are small and inadequate.        ASSESSMENT / PLAN      COPD exacerbation (HCC)    Obesity (BMI 30-39.9)    Abnormal nuclear stress test    Acute renal insufficiency    Alcohol dependence (HCC)    Essential hypertension    Other emphysema (HCC)    Coronary artery disease    Syncope, unspecified syncope type    1. ARF/FELIBERTO------Nonoliguric. Resolved with volume repletion    2. CAD------CABG today    3. BENIGN ESSENTIAL HTN------BP okay. No ACE-I for now    4. BPH-------On Flomax    5. S/P SYNCOPE    6. ETOH ABUSE    7. HYPERLIPIDEMIA-------On Statin    8. DVT  PROPHYLAXIS-------On Lovenox    9. HYPERKALEMIA--------Resolved      Amara Cooper MD  [facility]  [phone]  06/10/22  07:57 EDT

## 2022-06-10 NOTE — PROGRESS NOTES
Daily Progress Note        COPD exacerbation (HCC)    Obesity (BMI 30-39.9)    Abnormal nuclear stress test    Acute renal insufficiency    Alcohol dependence (HCC)    Essential hypertension    Other emphysema (HCC)    Coronary artery disease    Syncope, unspecified syncope type      Assessment    COPD   Spirometry 6/3/2022 and flow volume loop suggestive of severe obstructive and possible restrictive respiratory defect: Unfortunately total lung capacity and DLCO were not performed   FEV1 45% without significant response to bronchodilators, FEV1/FVC ratio 56 compatible with obstructive defect    Non-STEMI  Three-vessel coronary artery disease  Ascending aortic aneurysm 4.9205 cm  Hypertension  Syncope    Alcohol abuse  Dyslipidemia  Back pain with a lumbar herniation  Early prostate cancer  FELIBERTO: Resolving    History of tobacco smoking: Quit June 2021     Recommendations:    Oxygen supplement and titration: Requiring 2 L per NC     from pulmonary standpoint patient is at high risk for pulmonary complications including pneumonia, atelectasis and or prolonged ventilation but there is no absolute contraindication for the surgery,  patient was treated with short course of steroids, currently off    perioperative bronchodilators and aggressive pulmonary toilet/intensive spirometry/flutter valve  Inhaled corticosteroids/Bronchodilators  Statin  Blood pressure control  DVT prophylaxis Lovenox  Mucinex twice daily  Thiamine                LOS: 5 days     Subjective         Objective     Vital signs for last 24 hours:  Vitals:    06/10/22 0930 06/10/22 1000 06/10/22 1030 06/10/22 1142   BP:    118/77   BP Location:    Right arm   Patient Position:    Sitting   Pulse: 74 70 61    Resp:    20   Temp:    97.4 °F (36.3 °C)   TempSrc:    Oral   SpO2: 93% 92% 91%    Weight:       Height:           Intake/Output last 3 shifts:  I/O last 3 completed shifts:  In: 1140 [P.O.:1140]  Out: 2150 [Urine:2150]  Intake/Output this shift:  I/O  this shift:  In: -   Out: 200 [Urine:200]      Radiology  Imaging Results (Last 24 Hours)     ** No results found for the last 24 hours. **          Labs:  Results from last 7 days   Lab Units 06/10/22  0403   WBC 10*3/mm3 11.40*   HEMOGLOBIN g/dL 13.0   HEMATOCRIT % 39.7   PLATELETS 10*3/mm3 247     Results from last 7 days   Lab Units 06/10/22  0403 06/09/22  0500   SODIUM mmol/L 136 136   POTASSIUM mmol/L 4.7 4.2   CHLORIDE mmol/L 96* 98   CO2 mmol/L 30.0* 28.0   BUN mg/dL 26* 22   CREATININE mg/dL 1.17 0.85   CALCIUM mg/dL 8.5* 8.7   BILIRUBIN mg/dL  --  0.3   ALK PHOS U/L  --  52   ALT (SGPT) U/L  --  44*   AST (SGOT) U/L  --  25   GLUCOSE mg/dL 104* 85     Results from last 7 days   Lab Units 06/08/22  1624   PH, ARTERIAL pH units 7.462*   PO2 ART mm Hg 70.1*   PCO2, ARTERIAL mm Hg 38.3   HCO3 ART mmol/L 27.4     Results from last 7 days   Lab Units 06/09/22  0500   ALBUMIN g/dL 3.00*             Results from last 7 days   Lab Units 06/10/22  0403   MAGNESIUM mg/dL 2.1     Results from last 7 days   Lab Units 06/09/22  0500   INR  1.05   APTT seconds 26.6               Meds:   SCHEDULE  amLODIPine, 5 mg, Oral, Daily  arformoterol, 15 mcg, Nebulization, BID - RT  aspirin, 81 mg, Oral, Daily  atorvastatin, 40 mg, Oral, Daily  budesonide, 0.5 mg, Nebulization, BID - RT  carvedilol, 6.25 mg, Oral, BID With Meals  chlorhexidine, 15 mL, Mouth/Throat, Q12H  Chlorhexidine Gluconate Cloth, 1 application, Topical, Q12H  enoxaparin, 40 mg, Subcutaneous, Q12H  gabapentin, 600 mg, Oral, BID  guaiFENesin, 1,200 mg, Oral, Q12H  insulin lispro, 0-7 Units, Subcutaneous, TID AC  multivitamin, 1 tablet, Oral, Daily  mupirocin, 1 application, Nasal, Q12H  sodium chloride, 10 mL, Intravenous, Q12H  sodium chloride, 10 mL, Intravenous, Q12H  tamsulosin, 0.4 mg, Oral, Daily  thiamine, 100 mg, Oral, BID  vancomycin, 15 mg/kg, Intravenous, Once      Infusions  insulin, 0-100 Units/hr  Pharmacy to dose Trelegy,   sodium chloride, 30  mL/hr  sodium chloride, 9 mL/hr      PRNs  •  acetaminophen **OR** acetaminophen **OR** acetaminophen  •  ALPRAZolam  •  aluminum-magnesium hydroxide-simethicone  •  atropine  •  benzonatate  •  dextrose  •  dextrose  •  dextrose  •  dextrose  •  dextrose  •  glucagon (human recombinant)  •  glucagon (human recombinant)  •  hydrALAZINE  •  ipratropium-albuterol  •  magnesium sulfate **OR** magnesium sulfate **OR** magnesium sulfate  •  melatonin  •  midazolam  •  nitroglycerin  •  ondansetron **OR** ondansetron  •  Pharmacy to dose Trelegy  •  potassium chloride **OR** potassium chloride **OR** potassium chloride  •  sodium chloride  •  sodium chloride  •  sodium chloride  •  sodium chloride  •  sodium chloride  •  sodium chloride  •  sodium chloride    Physical Exam:  Physical Exam  Vitals reviewed.   Constitutional:       Appearance: He is obese.   Pulmonary:      Breath sounds: Decreased breath sounds present.   Skin:     General: Skin is warm and dry.   Neurological:      Mental Status: He is alert.         ROS  Review of Systems   Respiratory: Positive for shortness of breath and wheezing.        I have reviewed the patient's new clinical results.    Electronically signed by ERICA Jarrell.

## 2022-06-10 NOTE — ANESTHESIA PREPROCEDURE EVALUATION
Anesthesia Evaluation     Patient summary reviewed and Nursing notes reviewed   NPO Solid Status: > 6 hours  NPO Liquid Status: > 6 hours           Airway   Mallampati: II  TM distance: >3 FB  Neck ROM: full  Possible difficult intubation  Dental      Pulmonary - normal exam    breath sounds clear to auscultation  (+) COPD,   Cardiovascular - normal exam    ECG reviewed  Rhythm: regular  Rate: normal    (+) hypertension, CAD,       Neuro/Psych  (+) syncope,    GI/Hepatic/Renal/Endo    (+) obesity, morbid obesity,  renal disease,     Musculoskeletal     (+) back pain,   Abdominal  - normal exam   Substance History - negative use     OB/GYN          Other        ROS/Med Hx Other: Echocardiogram Findings    Left Ventricle Left ventricular ejection fraction appears to be 56 - 60%.  Right Ventricle The right ventricular cavity is mildly dilated.  Left Atrium The left atrial cavity is mildly dilated.  Right Atrium Normal right atrial cavity size noted.  Aortic Valve The aortic valve is abnormal in structure. The aortic valve exhibits sclerosis.  Mitral Valve Mild mitral valve regurgitation is present.  Tricuspid Valve Mild to moderate tricuspid valve regurgitation is present.  Pulmonic Valve The pulmonic valve is not well visualized.  Greater Vessels Mild dilation of the aortic root is present.  Pericardium           Left Main %:       0  Proximal LAD %: 70%  Mid/Distal LAD %:  0.  Diagonal branch has an ostial 70%  LCX %: Subtotal occlusion with collaterals from the LAD  Ramus:    RCA %: 100% with collaterals from the left to the right  Lima %:                     Referring Provider: Hospitalist  Reason for Consultation: Syncope    Patient Care Team:  Deysi Mason APRN as PCP - General (Nurse Practitioner)    Chief complaint syncope    Subjective .     History of present illness:  Chi NICHOLSON Neli Welch is a 67 y.o. male with history of hypertension hyperlipidemia presented to hospital after syncopal episode.  Patient  was at home when he had this episode and was witnessed by his family member.  Patient states that he did not have any chest pain but has shortness of breath with exertion radiograms any PND orthopnea.  No palpitation but had dizziness.  No swelling of the feet.  When patient presented to the ER he had a EKG which showed sinus rhythm and he had a stress Myoview which is abnormal and hence a cardiology consult was called.  Review of Systems   Constitutional: Negative for fever and malaise/fatigue.   HENT: Negative for ear pain and nosebleeds.    Eyes: Negative for blurred vision and double vision.   Cardiovascular: Negative for chest pain, dyspnea on exertion and palpitations.   Respiratory: Positive for shortness of breath. Negative for cough.    Skin: Negative for rash.   Musculoskeletal: Negative for joint pain.   Gastrointestinal: Negative for abdominal pain, nausea and vomiting.   Neurological: Negative for focal weakness and headaches.   Psychiatric/Behavioral: Negative for depression. The patient is not nervous/anxious.    All other systems reviewed and are negative.      History  Past Medical History:   Diagnosis Date   • Back pain    • Emphysema lung (HCC)    • Hypertension        History reviewed. No pertinent surgical history.    History reviewed. No pertinent family history.    Social History     Tobacco Use   • Smoking status: Former Smoker     Quit date: 2021     Years since quittin.0   • Smokeless tobacco: Never Used   Vaping Use   • Vaping Use: Never used   Substance Use Topics   • Alcohol use: Yes     Comment: 3-4 beers daily   • Drug use: Never        Medications Prior to Admission   Medication Sig Dispense Refill Last Dose   • amLODIPine (NORVASC) 5 MG tablet Take 5 mg by mouth Daily.   2022 at Unknown time   • Fluticasone-Umeclidin-Vilant (Trelegy Ellipta) 100-62.5-25 MCG/INH inhaler Inhale 1 puff Daily.   2022 at Unknown time   • gabapentin (NEURONTIN) 600 MG tablet Take 600 mg  by mouth 2 (Two) Times a Day.   5/31/2022 at Unknown time   • ipratropium-albuterol (COMBIVENT RESPIMAT)  MCG/ACT inhaler Inhale 1 puff 4 (Four) Times a Day As Needed for Wheezing.   Past Month at Unknown time   • lisinopril (PRINIVIL,ZESTRIL) 40 MG tablet Take 40 mg by mouth Daily.   5/31/2022 at Unknown time   • pravastatin (PRAVACHOL) 20 MG tablet Take 20 mg by mouth Daily.   5/31/2022 at Unknown time   • tamsulosin (FLOMAX) 0.4 MG capsule 24 hr capsule Take 1 capsule by mouth Daily.   5/31/2022 at Unknown time         Patient has no known allergies.    Scheduled Meds:amLODIPine, 5 mg, Oral, Daily  atorvastatin, 10 mg, Oral, Daily  budesonide-formoterol, 2 puff, Inhalation, BID - RT   And  ipratropium, 0.5 mg, Nebulization, Q6H - RT  enoxaparin, 1 mg/kg, Subcutaneous, Q12H  gabapentin, 600 mg, Oral, BID  guaiFENesin, 1,200 mg, Oral, Q12H  ipratropium-albuterol, 3 mL, Nebulization, Q4H - RT  lisinopril, 40 mg, Oral, Daily  methylPREDNISolone sodium succinate, 40 mg, Intravenous, Q8H  [START ON 6/2/2022] predniSONE, 40 mg, Oral, Daily With Breakfast  sodium chloride, 10 mL, Intravenous, Q12H      Continuous Infusions:Pharmacy to Dose enoxaparin (LOVENOX),   Pharmacy to dose Trelegy,   sodium chloride, 75 mL/hr, Last Rate: 75 mL/hr (06/01/22 1420)      PRN Meds:.•  acetaminophen **OR** acetaminophen **OR** acetaminophen  •  aluminum-magnesium hydroxide-simethicone  •  benzonatate  •  ipratropium-albuterol  •  LORazepam **OR** LORazepam **OR** LORazepam **OR** LORazepam **OR** LORazepam **OR** LORazepam  •  magnesium sulfate **OR** magnesium sulfate **OR** magnesium sulfate  •  melatonin  •  nitroglycerin  •  ondansetron **OR** ondansetron  •  Pharmacy to Dose enoxaparin (LOVENOX)  •  Pharmacy to dose Trelegy  •  potassium chloride **OR** potassium chloride **OR** potassium chloride  •  sodium chloride  •  sodium chloride    Objective     VITAL SIGNS  Vitals:    06/01/22 1043 06/01/22 1427 06/01/22 1456  "06/01/22 1503   BP:  158/57     BP Location:  Left arm     Patient Position:  Lying     Pulse: 71 76 76 73   Resp: 18 18 18 18   Temp:  98.3 °F (36.8 °C)     TempSrc:  Oral     SpO2: 97% 97% 95% 99%   Weight:       Height:           Flowsheet Rows    Flowsheet Row First Filed Value   Admission Height 177.8 cm (70\") Documented at 05/31/2022 2052   Admission Weight 104 kg (230 lb) Documented at 05/31/2022 2033           TELEMETRY: Sinus rhythm    Physical Exam:  Constitutional:       Appearance: Well-developed.   Eyes:      General: No scleral icterus.     Conjunctiva/sclera: Conjunctivae normal.      Pupils: Pupils are equal, round, and reactive to light.   HENT:      Head: Normocephalic and atraumatic.   Neck:      Vascular: No carotid bruit or JVD.   Pulmonary:      Effort: Pulmonary effort is normal.      Breath sounds: Normal breath sounds. No wheezing. No rales.   Cardiovascular:      Normal rate. Regular rhythm.   Pulses:     Intact distal pulses.   Abdominal:      General: Bowel sounds are normal.      Palpations: Abdomen is soft.   Musculoskeletal: Normal range of motion.      Cervical back: Normal range of motion and neck supple. Skin:     General: Skin is warm and dry.      Findings: No rash.   Neurological:      Mental Status: Alert.      Comments: No focal deficits          Results Review:   I reviewed the patient's new clinical results.  Lab Results (last 24 hours)     Procedure Component Value Units Date/Time    Troponin [[id number]]  (Abnormal) Collected: 06/01/22 1024    Specimen: Blood from Arm, Left Updated: 06/01/22 1110     Troponin T 0.116 ng/mL     Narrative:      Troponin T Reference Range:  <= 0.03 ng/mL-   Negative for AMI  >0.03 ng/mL-     Abnormal for myocardial necrosis.  Clinicians would have to utilize clinical acumen, EKG, Troponin and serial changes to determine if it is an Acute Myocardial Infarction or myocardial injury due to an underlying chronic condition.       Results may be " falsely decreased if patient taking Biotin.      Basic Metabolic Panel [[id number]]  (Abnormal) Collected: 06/01/22 1024    Specimen: Blood from Arm, Left Updated: 06/01/22 1108     Glucose 159 mg/dL      BUN 14 mg/dL      Creatinine 1.17 mg/dL      Sodium 136 mmol/L      Potassium 4.8 mmol/L      Chloride 103 mmol/L      CO2 20.0 mmol/L      Calcium 8.3 mg/dL      BUN/Creatinine Ratio 12.0     Anion Gap 13.0 mmol/L      eGFR 68.3 mL/min/1.73      Comment: National Kidney Foundation and American Society of Nephrology (ASN) Task Force recommended calculation based on the Chronic Kidney Disease Epidemiology Collaboration (CKD-EPI) equation refit without adjustment for race.       Narrative:      GFR Normal >60  Chronic Kidney Disease <60  Kidney Failure <15      D-dimer, Quantitative [[id number]]  (Abnormal) Collected: 06/01/22 1024    Specimen: Blood from Arm, Left Updated: 06/01/22 1050     D-Dimer, Quantitative 0.70 mg/L (FEU)     Narrative:      Reference Range  --------------------------------------------------------------------     < 0.50   Negative Predictive Value  0.50-0.59   Indeterminate    >= 0.60   Probable VTE             A very low percentage of patients with DVT may yield D-Dimer results   below the cut-off of 0.50 mg/L FEU.  This is known to be more   prevalent in patients with distal DVT.             Results of this test should always be interpreted in conjunction with   the patient's medical history, clinical presentation and other   findings.  Clinical diagnosis should not be based on the result of   [place] D-Dimer alone.    CBC Auto Differential [[id number]]  (Abnormal) Collected: 06/01/22 1024    Specimen: Blood from Arm, Left Updated: 06/01/22 1038     WBC 5.50 10*3/mm3      RBC 4.12 10*6/mm3      Hemoglobin 13.4 g/dL      Hematocrit 40.8 %      MCV 99.0 fL      MCH 32.4 pg      MCHC 32.8 g/dL      RDW 13.4 %      RDW-SD 46.8 fl      MPV 7.9 fL      Platelets 166 10*3/mm3      Neutrophil % 86.4  %      Lymphocyte % 11.4 %      Monocyte % 2.0 %      Eosinophil % 0.1 %      Basophil % 0.1 %      Neutrophils, Absolute 4.70 10*3/mm3      Lymphocytes, Absolute 0.60 10*3/mm3      Monocytes, Absolute 0.10 10*3/mm3      Eosinophils, Absolute 0.00 10*3/mm3      Basophils, Absolute 0.00 10*3/mm3      nRBC 0.1 /100 WBC     Urine Drug Screen - Urine, Clean Catch [[id number]]  (Normal) Collected: 06/01/22 0029    Specimen: Urine, Clean Catch Updated: 06/01/22 0123     Amphet/Methamphet, Screen Negative     Barbiturates Screen, Urine Negative     Benzodiazepine Screen, Urine Negative     Cocaine Screen, Urine Negative     Opiate Screen Negative     THC, Screen, Urine Negative     Methadone Screen, Urine Negative     Oxycodone Screen, Urine Negative    Narrative:      Negative Thresholds Per Drugs Screened:    Amphetamines                 500 ng/ml  Barbiturates                 200 ng/ml  Benzodiazepines              100 ng/ml  Cocaine                      300 ng/ml  Methadone                    300 ng/ml  Opiates                      300 ng/ml  Oxycodone                    100 ng/ml  THC                           50 ng/ml    The Normal Value for all drugs tested is negative. This report includes final unconfirmed screening results to be used for medical treatment purposes only. Unconfirmed results must not be used for non-medical purposes such as employment or legal testing. Clinical consideration should be applied to any drug of abuse test, particularly when unconfirmed results are used.          All urine drugs of abuse requests without chain of custody are for medical screening purposes only.  False positives are possible.      Urinalysis With Culture If Indicated - Urine, Clean Catch [[id number]]  (Abnormal) Collected: 06/01/22 0029    Specimen: Urine, Clean Catch Updated: 06/01/22 0058     Color, UA Yellow     Appearance, UA Clear     pH, UA <=5.0     Specific Gravity, UA 1.012     Glucose, UA Negative     Ketones, UA  "Trace     Bilirubin, UA Negative     Blood, UA Negative     Protein, UA Negative     Leuk Esterase, UA Negative     Nitrite, UA Negative     Urobilinogen, UA 0.2 E.U./dL    Narrative:      In absence of clinical symptoms, the presence of pyuria, bacteria, and/or nitrites on the urinalysis result does not correlate with infection.  Urine microscopic not indicated.    Procalcitonin [[id number]]  (Normal) Collected: 05/31/22 2227    Specimen: Blood Updated: 06/01/22 0037     Procalcitonin 0.12 ng/mL     Narrative:      As a Marker for Sepsis (Non-Neonates):    1. <0.5 ng/mL represents a low risk of severe sepsis and/or septic shock.  2. >2 ng/mL represents a high risk of severe sepsis and/or septic shock.    As a Marker for Lower Respiratory Tract Infections that require antibiotic therapy:    PCT on Admission    Antibiotic Therapy       6-12 Hrs later    >0.5                Strongly Recommended  >0.25 - <0.5        Recommended   0.1 - 0.25          Discouraged              Remeasure/reassess PCT  <0.1                Strongly Discouraged     Remeasure/reassess PCT    As 28 day mortality risk marker: \"Change in Procalcitonin Result\" (>80% or <=80%) if Day 0 (or Day 1) and Day 4 values are available. Refer to http://www.[facility] [place]-pct-calculator.com    Change in PCT <=80%  A decrease of PCT levels below or equal to 80% defines a positive change in PCT test result representing a higher risk for 28-day all-cause mortality of patients diagnosed with severe sepsis for septic shock.    Change in PCT >80%  A decrease of PCT levels of more than 80% defines a negative change in PCT result representing a lower risk for 28-day all-cause mortality of patients diagnosed with severe sepsis or septic shock.       C-reactive Protein [[id number]]  (Abnormal) Collected: 05/31/22 2227    Specimen: Blood Updated: 06/01/22 0032     C-Reactive Protein 1.61 mg/dL     Troponin [[id number]]  (Abnormal) Collected: 05/31/22 2227    Specimen: Blood " Updated: 05/31/22 2258     Troponin T 0.326 ng/mL     Narrative:      Troponin T Reference Range:  <= 0.03 ng/mL-   Negative for AMI  >0.03 ng/mL-     Abnormal for myocardial necrosis.  Clinicians would have to utilize clinical acumen, EKG, Troponin and serial changes to determine if it is an Acute Myocardial Infarction or myocardial injury due to an underlying chronic condition.       Results may be falsely decreased if patient taking Biotin.      COVID-19,CEPHEID/ROMAIN,COR/STACEY/PAD/JOVON IN-HOUSE(OR EMERGENT/ADD-ON),NP SWAB IN TRANSPORT MEDIA 3-4 HR TAT, RT-PCR - Swab, Nasopharynx [[id number]]  (Normal) Collected: 05/31/22 2124    Specimen: Swab from Nasopharynx Updated: 05/31/22 2147     COVID19 Not Detected    Narrative:      Fact sheet for providers: https://www.fda.gov/media/034348/download     Fact sheet for patients: https://www.fda.gov/media/368424/download  Fact sheet for providers: https://www.fda.gov/media/696248/download    Fact sheet for patients: https://www.fda.gov/media/195744/download    Test performed by PCR.    Troponin [[id number]]  (Abnormal) Collected: 05/31/22 2053    Specimen: Blood Updated: 05/31/22 2128     Troponin T 0.415 ng/mL     Narrative:      Troponin T Reference Range:  <= 0.03 ng/mL-   Negative for AMI  >0.03 ng/mL-     Abnormal for myocardial necrosis.  Clinicians would have to utilize clinical acumen, EKG, Troponin and serial changes to determine if it is an Acute Myocardial Infarction or myocardial injury due to an underlying chronic condition.       Results may be falsely decreased if patient taking Biotin.      Comprehensive Metabolic Panel [[id number]]  (Abnormal) Collected: 05/31/22 2053    Specimen: Blood Updated: 05/31/22 2125     Glucose 111 mg/dL      BUN 15 mg/dL      Creatinine 1.90 mg/dL      Sodium 134 mmol/L      Potassium 3.7 mmol/L      Comment: Slight hemolysis detected by analyzer. Results may be affected.        Chloride 97 mmol/L      CO2 22.0 mmol/L      Calcium  8.8 mg/dL      Total Protein 6.1 g/dL      Albumin 3.60 g/dL      ALT (SGPT) 30 U/L      AST (SGOT) 30 U/L      Alkaline Phosphatase 54 U/L      Total Bilirubin 0.4 mg/dL      Globulin 2.5 gm/dL      A/G Ratio 1.4 g/dL      BUN/Creatinine Ratio 7.9     Anion Gap 15.0 mmol/L      eGFR 38.2 mL/min/1.73      Comment: National Kidney Foundation and American Society of Nephrology (ASN) Task Force recommended calculation based on the Chronic Kidney Disease Epidemiology Collaboration (CKD-EPI) equation refit without adjustment for race.       Narrative:      GFR Normal >60  Chronic Kidney Disease <60  Kidney Failure <15      Ethanol [[id number]] Collected: 05/31/22 2053    Specimen: Blood Updated: 05/31/22 2125     Ethanol % 0.203 %     Narrative:      Plasma Ethanol Clinical Symptoms:    ETOH (%)               Clinical Symptom  .01-.05              No apparent influence  .03-.12              Euphoria, Diminished judgment and attention   .09-.25              Impaired comprehension, Muscle incoordination  .18-.30              Confusion, Staggered gait, Slurred speech  .25-.40              Markedly decreased response to stimuli, unable to stand or                        walk, vomitting, sleep or stupor  .35-.50              Comatose, Anesthesia, Subnormal body temperature        CBC & Differential [[id number]]  (Abnormal) Collected: 05/31/22 2053    Specimen: Blood Updated: 05/31/22 2105    Narrative:      The following orders were created for panel order CBC & Differential.  Procedure                               Abnormality         Status                     ---------                               -----------         ------                     CBC Auto Differential[[id number]]        Abnormal            Final result                 Please view results for these tests on the individual orders.    CBC Auto Differential [[id number]]  (Abnormal) Collected: 05/31/22 2053    Specimen: Blood Updated: 05/31/22 2105     WBC 6.00  10*3/mm3      RBC 3.68 10*6/mm3      Hemoglobin 12.1 g/dL      Hematocrit 36.7 %      MCV 99.7 fL      MCH 32.9 pg      MCHC 33.0 g/dL      RDW 13.9 %      RDW-SD 48.1 fl      MPV 8.0 fL      Platelets 170 10*3/mm3      Neutrophil % 55.3 %      Lymphocyte % 31.5 %      Monocyte % 12.0 %      Eosinophil % 0.7 %      Basophil % 0.5 %      Neutrophils, Absolute 3.30 10*3/mm3      Lymphocytes, Absolute 1.90 10*3/mm3      Monocytes, Absolute 0.70 10*3/mm3      Eosinophils, Absolute 0.00 10*3/mm3      Basophils, Absolute 0.00 10*3/mm3      nRBC 0.1 /100 WBC           Imaging Results (Last 24 Hours)     Procedure Component Value Units Date/Time    CT Angiogram Chest Pulmonary Embolism [[id number]] Collected: 06/01/22 1246     Updated: 06/01/22 1253    Narrative:         DATE OF EXAM:  6/1/2022 12:42 PM     PROCEDURE:  CT ANGIOGRAM CHEST PULMONARY EMBOLISM-     INDICATIONS:   Pulmonary embolism (PE) suspected, positive D-dimer; R55-Syncope and  collapse; F10.920-Alcohol use, unspecified with intoxication,  uncomplicated; J96.01-Acute respiratory failure with hypoxia;  I95.1-Orthostatic hypotension; R77.8-Other specified abnormalities of  plasma proteins; N17.9-Acute kidney failure, unspecified; J44.1-Chronic  obstructive pulmonary disease with (acute) exacerbation     COMPARISON:   No Comparisons Available     TECHNIQUE:  Routine transaxial slices were obtained through chest after  administration of intravenous Isovue 370. Reconstructed coronal and  sagittal images were also obtained. In addition, a 3 D volume rendered  image was obtained after post processing.  Automated exposure control  and iterative reconstruction methods were used.      FINDINGS:  There is excellent pulmonary arterial opacification and there are no  filling defects to suggest pulmonary embolic disease. There is diffuse  calcific atherosclerotic disease of the thoracic aorta. There is  moderate coronary artery calcific atherosclerosis. There is  aneurysmal  dilatation of the ascending aorta up to 4.9 cm. There is elevation of  the right hemidiaphragm. There is bilateral basilar atelectasis. There  is diffuse emphysema. There are no suspicious pulmonary nodules. There  are no pathologically enlarged lymph nodes. There are no consolidations  to suggest pneumonia. There is fatty infiltration of the liver.       Impression:      1.A descending aortic aneurysm up to 4.9 cm.  2.No pulmonary embolic disease.  3.Fatty liver.  4.Bilateral basilar atelectasis.     Electronically Signed By-Manoj Hernandez MD On:6/1/2022 12:51 PM  This report was finalized on [id number] by  Manoj Hernandez MD.    CT Head Without Contrast [[id number]] Collected: 05/31/22 2249     Updated: 05/31/22 2255    Narrative:      Examination: CT HEAD WO CONTRAST-     Date of Exam: 5/31/2022 10:44 PM     Indication: Mental status change, unknown cause.     Comparison: None available.     Technique: Axial noncontrast CT imaging of the head was performed.  Automated exposure control and iterative reconstruction methods were  used.     Findings:  Superficial soft tissues appear within normal limits. The calvarium is  intact.  Paranasal sinuses and mastoid air cells appear well aerated.   Orbits are unremarkable.  There is no acute intracranial hemorrhage.  No  mass effect or midline shift.  No abnormal extra-axial collections.   Gray-white differentiation is within normal limits.  There are no focal  hypoattenuating lesions.  Ventricular size and configuration is normal  for age.          Impression:      No acute intracranial abnormality.     Electronically Signed By-Johann Dodge MD On:5/31/2022 10:53 PM  This report was finalized on [id number] by  Johann Dodge MD.    XR Chest 1 View [[id number]] Collected: 05/31/22 2128     Updated: 05/31/22 2131    Narrative:      Examination: XR CHEST 1 VW-     Date of Exam: 5/31/2022 9:03 PM     Indication: Chest pain protocol.     Comparison: None  available.     Technique: Single radiographic view of the chest was obtained.     Findings:  There is no pneumothorax, pleural effusion or focal airspace  consolidation. Cardiomediastinal silhouette is unremarkable. Pulmonary  vasculature appears within normal limits. Regional bones appear grossly  intact.        Impression:      No acute cardiopulmonary abnormality.     Electronically Signed By-Johann Dodge MD On:5/31/2022 9:29 PM  This report was finalized on [id number] by  Johann Dodge MD.          EKG      I personally viewed and interpreted the patient's EKG/Telemetry data:    ECHOCARDIOGRAM:      STRESS MYOVIEW:    CARDIAC CATHETERIZATION:    OTHER:         Assessment & Plan     Active Problems:    Syncope    COPD exacerbation (HCC)    Obesity (BMI 30-39.9)  Hypertension  Hyperlipidemia  Mild renal insufficiency  Elevated troponin    Patient presented after syncopal episode and had mild elevated troponin but had renal insufficiency also  Patient thereafter had a stress Myoview which is abnormal  Patient has been given hydration his renal function is much better now  Patient also had elevated D-dimer and hence a CT scan which showed no pulmonary embolism but had descending aortic aneurysm up to 4.9 cm  Patient will have a cardia catheterization performed to rule out severe coronary disease  Discussed with patient and family about procedure risks and benefits  Patient will have a nephrologist consultation also.    I discussed the patients findings and my recommendations with patient and nurse    Frank Giraldo MD  06/01/22  17:55 EDT                   Anesthesia Plan    ASA 4     general, [place], CVL and PAC     (Patient identified; pre-operative vital signs, all relevant labs/studies, complete medical/surgical/anesthetic history, full medication list, full allergy list, and NPO status obtained/reviewed; physical assessment performed; anesthetic options, side effects, potential complications, risks, and  benefits discussed; questions answered; written anesthesia consent obtained; patient cleared for procedure; anesthesia machine and equipment checked and functioning)  intravenous induction     Anesthetic plan, risks, benefits, and alternatives have been provided, discussed and informed consent has been obtained with: patient.        CODE STATUS:    Code Status (Patient has no pulse and is not breathing): CPR (Attempt to Resuscitate)  Medical Interventions (Patient has pulse or is breathing): Full Support

## 2022-06-10 NOTE — ANESTHESIA PROCEDURE NOTES
Airway  Urgency: elective    Date/Time: 6/10/2022 2:06 PM  Airway not difficult    General Information and Staff    Patient location during procedure: OR  Anesthesiologist: Dong Alves MD    Indications and Patient Condition  Indications for airway management: airway protection    Preoxygenated: yes  Mask difficulty assessment: 1 - vent by mask    Final Airway Details  Final airway type: endotracheal airway      Successful airway: ETT  Cuffed: yes   Successful intubation technique: direct laryngoscopy  Blade: Tomás  Blade size: 3  ETT size (mm): 7.5  Cormack-Lehane Classification: grade I - full view of glottis  Placement verified by: chest auscultation, capnometry and palpation of cuff   Cuff volume (mL): 10  Measured from: teeth  Number of attempts at approach: 1  Assessment: lips, teeth, and gum same as pre-op and atraumatic intubation

## 2022-06-10 NOTE — ANESTHESIA PROCEDURE NOTES
Central Line      Patient reassessed immediately prior to procedure    Patient location during procedure: OR  Start time: 6/10/2022 2:10 PM  Indications: central pressure monitoring, vascular access and MD/Surgeon request  Staff  Anesthesiologist: Dong Alves MD  Preanesthetic Checklist  Completed: patient identified, IV checked, risks and benefits discussed, surgical consent, monitors and equipment checked, pre-op evaluation and timeout performed  Central Line Prep  Sterile Tech:cap, gown, gloves, mask and sterile barriers  Prep: chloraprep  Patient monitoring: blood pressure monitoring, continuous pulse oximetry and EKG  Central Line Procedure  Laterality:right  Location:internal jugular  Catheter Type:Cordis  Catheter Size:8.5 Fr  Guidance:ultrasound guided  PROCEDURE NOTE/ULTRASOUND INTERPRETATION.  Using ultrasound guidance the potential vascular sites for insertion of the catheter were visualized to determine the patency of the vessel to be used for vascular access.  After selecting the appropriate site for insertion, the needle was visualized under ultrasound being inserted into the internal jugular vein, followed by ultrasound confirmation of wire and catheter placement. There were no abnormalities seen on ultrasound; an image was taken; and the patient tolerated the procedure with no complications. Images: still images obtained, printed/placed on chart  Assessment  Post procedure:biopatch applied, line sutured, occlusive dressing applied and secured with tape  Assessement:blood return through all ports, free fluid flow and Laith Test  Complications:no  Patient Tolerance:patient tolerated the procedure well with no apparent complications

## 2022-06-10 NOTE — ANESTHESIA PROCEDURE NOTES
Arterial Line      Patient reassessed immediately prior to procedure    Patient location during procedure: OR  Start time: 6/10/2022 2:01 PM   Line placed for hemodynamic monitoring and ABGs/Labs/ISTAT.  Performed By   Anesthesiologist: Dong Alves MD  Preanesthetic Checklist  Completed: patient identified, IV checked, risks and benefits discussed, monitors and equipment checked, pre-op evaluation and timeout performed  Arterial Line Prep   Sterile Tech: cap, gloves, mask and sterile barriers  Prep: Betadine  Patient monitoring: blood pressure monitoring, continuous pulse oximetry and EKG  Arterial Line Procedure   Laterality:left  Location:  radial artery  Catheter size: 20 G   Guidance: landmark technique and palpation technique  Number of attempts: 1  Successful placement: yes  Post Assessment   Dressing Type: occlusive dressing applied, secured with tape and wrist guard applied.   Complications no  Circ/Move/Sens Assessment: normal.   Patient Tolerance: patient tolerated the procedure well with no apparent complications

## 2022-06-10 NOTE — PROGRESS NOTES
Cardiology Progress Note      Admiting Physician:  Giancarlo Soto MD   LOS: 4 days       Reason For Followup:  Multivessel coronary artery disease      Subjective:    Interval History:  Seen and examined.  Chart and labs reviewed.  Patient appears to be in a normal cognitive state.  He is upset that his surgery has been postponed.  He reports that he does not have a drinking problem and that he only has 2-3 drinks a night.    Review of Systems:  A complete review of systems was attempted however patient's cognitive status is questionable.  He denies chest pain or shortness of breath at this time.    Assessment & Plan    Impressions:  Syncope  Multivessel coronary artery disease  Hyperlipidemia  Hypertension  Ischemic cardiomyopathy EF 40%  Acute kidney injury-improved  Alcohol dependence  Altered mental status likely secondary to alcohol withdrawal    Recommendations:  Patient will have coronary bypass surgery on Friday  Patient is followed by the cardiac surgeons also  Till then continue medical therapy  Blood pressure heart rate are stable  Renal function has significantly improved since he is on IV fluids  Monitor rate and rhythm closely  Further recommendations as patient's course progresses  Alcohol withdrawal/altered mentation are clear and is doing better now.  Patient is much better now and does not have any withdrawal symptoms  Blood pressure and heart rate are stable and tolerating oral medicines including aspirin carvedilol statins and amlodipine    Objective:    Medication Review:   Scheduled Meds:amLODIPine, 5 mg, Oral, Daily  arformoterol, 15 mcg, Nebulization, BID - RT  aspirin, 81 mg, Oral, Daily  atorvastatin, 40 mg, Oral, Daily  budesonide, 0.5 mg, Nebulization, BID - RT  carvedilol, 6.25 mg, Oral, BID With Meals  chlorhexidine, 15 mL, Mouth/Throat, Q12H  Chlorhexidine Gluconate Cloth, 1 application, Topical, Q12H  enoxaparin, 40 mg, Subcutaneous, Q12H  gabapentin, 600 mg, Oral, BID  guaiFENesin,  1,200 mg, Oral, Q12H  insulin lispro, 0-7 Units, Subcutaneous, TID AC  [START ON 6/10/2022] metoprolol tartrate, 12.5 mg, Oral, Once  multivitamin, 1 tablet, Oral, Daily  mupirocin, 1 application, Nasal, Q12H  sodium chloride, 10 mL, Intravenous, Q12H  tamsulosin, 0.4 mg, Oral, Daily  thiamine, 100 mg, Oral, BID      Continuous Infusions:Pharmacy to dose Trelegy,       PRN Meds:.•  acetaminophen **OR** acetaminophen **OR** acetaminophen  •  ALPRAZolam  •  aluminum-magnesium hydroxide-simethicone  •  atropine  •  benzonatate  •  dextrose  •  dextrose  •  dextrose  •  glucagon (human recombinant)  •  hydrALAZINE  •  ipratropium-albuterol  •  magnesium sulfate **OR** magnesium sulfate **OR** magnesium sulfate  •  melatonin  •  nitroglycerin  •  ondansetron **OR** ondansetron  •  Pharmacy to dose Trelegy  •  potassium chloride **OR** potassium chloride **OR** potassium chloride  •  sodium chloride  •  sodium chloride  •  sodium chloride    Patient Active Problem List   Diagnosis   • COPD exacerbation (HCC)   • Obesity (BMI 30-39.9)   • Abnormal nuclear stress test   • Acute renal insufficiency   • Alcohol dependence (HCC)   • Essential hypertension   • Other emphysema ([facility])   • Coronary artery disease   • Syncope, unspecified syncope type         Physical Exam:    General: Alert, cooperative, no distress, appears stated age  Head:  Normocephalic, atraumatic, mucous membranes moist  Eyes:  Conjunctivae/corneas clear, EOM's intact     Neck:  Supple,  no bruit  Lungs:  Clear to auscultation bilaterally, no wheezes rhonchi rales are noted  Chest wall: No tenderness  Heart::  Regular rate and rhythm, S1 and S2 normal, 1/6 holosystolic murmur.  No rub or gallop  Abdomen: Soft, non-tender, nondistended bowel sounds active.  Obese  Extremities: No cyanosis, clubbing, or edema  Pulses: Diminished pedal pulses  Skin:  No rashes or lesions  Neuro/psych: A&O x3. CN II through XII are grossly intact with appropriate affect    Vital  Signs:  Vitals:    06/09/22 0838 06/09/22 0935 06/09/22 1539 06/09/22 1755   BP: 119/63 105/63 109/54 114/63   BP Location:       Patient Position:       Pulse: 82 80 78 78   Resp:  18 20 18   Temp:  98.9 °F (37.2 °C) 98.4 °F (36.9 °C) 98.7 °F (37.1 °C)   TempSrc:  Oral Oral Oral   SpO2:  94% 94% 91%   Weight:       Height:         Wt Readings from Last 1 Encounters:   06/09/22 106 kg (233 lb 11 oz)       Intake/Output Summary (Last 24 hours) at 6/9/2022 2228  Last data filed at 6/9/2022 1500  Gross per 24 hour   Intake 960 ml   Output 1750 ml   Net -790 ml         Results Review:     CBC    Results from last 7 days   Lab Units 06/09/22  0500 06/08/22  0403 06/07/22  0411 06/05/22  0540 06/03/22  1014   WBC 10*3/mm3 10.10 7.70 8.10 6.60 6.00   HEMOGLOBIN g/dL 13.8 13.3 13.2 12.5* 13.5   PLATELETS 10*3/mm3 250 237 229 155 180     Cr Clearance Estimated Creatinine Clearance: 102.8 mL/min (by C-G formula based on SCr of 0.85 mg/dL).  Coag   Results from last 7 days   Lab Units 06/09/22  0500   INR  1.05   APTT seconds 26.6     HbA1C   Lab Results   Component Value Date    HGBA1C 5.5 06/03/2022     Blood Glucose   Glucose   Date/Time Value Ref Range Status   06/09/2022 1649 107 (H) 70 - 105 mg/dL Final     Comment:     Serial Number: [id number]:  [id number]   06/09/2022 1127 87 70 - 105 mg/dL Final     Comment:     Serial Number: [id number]:  [id number]   06/09/2022 0725 81 70 - 105 mg/dL Final     Comment:     Serial Number: [id number]:  [id number]   06/08/2022 2039 118 (H) 70 - 105 mg/dL Final     Comment:     Serial Number: [id number]:  [id number]   06/08/2022 1610 109 (H) 70 - 105 mg/dL Final     Comment:     Serial Number: [id number]:  [id number]   06/08/2022 1102 169 (H) 70 - 105 mg/dL Final     Comment:     Serial Number: [id number]:  [id number]   06/08/2022 0703 144 (H) 70 - 105 mg/dL Final     Comment:     Serial Number: [id number]:  [id number]   06/07/2022 2053 175 (H)  70 - 105 mg/dL Final     Comment:     Serial Number: [id number]:  [id number]     Infection       CMP   Results from last 7 days   Lab Units 06/09/22  0500 06/08/22  1411 06/08/22  0403 06/07/22  0411 06/05/22  0539 06/04/22  1744 06/04/22  1008 06/03/22  1014   SODIUM mmol/L 136  --  136 134* 134*  --  136 139   POTASSIUM mmol/L 4.2 4.7 5.7* 4.8 3.9  --  4.0 4.1   CHLORIDE mmol/L 98  --  98 98 97*  --  99 103   CO2 mmol/L 28.0  --  30.0* 27.0 27.0  --  27.0 24.0   BUN mg/dL 22  --  27* 27* 16  --  18 15   CREATININE mg/dL 0.85  --  0.96 0.89 0.98  --  1.02 1.07   GLUCOSE mg/dL 85  --  128* 139* 105*  --  122* 157*   ALBUMIN g/dL 3.00*  --   --   --   --   --   --   --    BILIRUBIN mg/dL 0.3  --   --   --   --   --   --   --    ALK PHOS U/L 52  --   --   --   --   --   --   --    AST (SGOT) U/L 25  --   --   --   --   --   --   --    ALT (SGPT) U/L 44*  --   --   --   --   --   --   --    AMMONIA umol/L  --   --   --   --   --  34  --   --      ABG    Results from last 7 days   Lab Units 06/08/22  1624   PH, ARTERIAL pH units 7.462*   PCO2, ARTERIAL mm Hg 38.3   PO2 ART mm Hg 70.1*   O2 SATURATION ART % 94.8   BASE EXCESS ART mmol/L 3.5*     UA        ANGELO  No results found for: POCMETH, POCAMPHET, POCBARBITUR, POCBENZO, POCCOCAINE, POCOPIATES, POCOXYCODO, POCPHENCYC, POCPROPOXY, POCTHC, POCTRICYC  Lysis Labs   Results from last 7 days   Lab Units 06/09/22  0500 06/08/22  0403 06/07/22  0411 06/05/22  0540 06/05/22  0539 06/04/22  1008 06/03/22  1014   INR  1.05  --   --   --   --   --   --    APTT seconds 26.6  --   --   --   --   --   --    HEMOGLOBIN g/dL 13.8 13.3 13.2 12.5*  --   --  13.5   PLATELETS 10*3/mm3 250 237 229 155  --   --  180   CREATININE mg/dL 0.85 0.96 0.89  --  0.98 1.02 1.07     Radiology(recent) No radiology results for the last day            Imaging Results (Last 24 Hours)     ** No results found for the last 24 hours. **          Cardiac Studies:  Echo- Results for orders placed during  the hospital encounter of 05/31/22    Adult Transthoracic Echo Complete With Contrast if Necessary Per Protocol (With Agitated Saline)    Interpretation Summary  · Left ventricular ejection fraction appears to be 56 - 60%.  · The right ventricular cavity is mildly dilated.  · Mild dilation of the aortic root is present.  · No pericardial effusion noted    Stress Myoview-  Cath-        Frank Giraldo MD  06/09/22  22:28 EDT

## 2022-06-11 ENCOUNTER — APPOINTMENT (OUTPATIENT)
Dept: GENERAL RADIOLOGY | Facility: HOSPITAL | Age: 67
End: 2022-06-11

## 2022-06-11 LAB
ALBUMIN SERPL-MCNC: 3.9 G/DL (ref 3.5–5.2)
ALBUMIN/GLOB SERPL: 2.2 G/DL
ALP SERPL-CCNC: 38 U/L (ref 39–117)
ALT SERPL W P-5'-P-CCNC: 36 U/L (ref 1–41)
ANION GAP SERPL CALCULATED.3IONS-SCNC: 13 MMOL/L (ref 5–15)
ARTERIAL PATENCY WRIST A: ABNORMAL
ARTERIAL PATENCY WRIST A: POSITIVE
AST SERPL-CCNC: 47 U/L (ref 1–40)
ATMOSPHERIC PRESS: ABNORMAL MM[HG]
ATMOSPHERIC PRESS: NORMAL MM[HG]
BASE EXCESS BLDA CALC-SCNC: -0.3 MMOL/L (ref 0–3)
BASE EXCESS BLDA CALC-SCNC: 0.3 MMOL/L (ref 0–3)
BASE EXCESS BLDA CALC-SCNC: 0.6 MMOL/L (ref 0–3)
BASE EXCESS BLDA CALC-SCNC: 0.6 MMOL/L (ref 0–3)
BASE EXCESS BLDA CALC-SCNC: 1.3 MMOL/L (ref 0–3)
BDY SITE: ABNORMAL
BDY SITE: NORMAL
BILIRUB SERPL-MCNC: 0.6 MG/DL (ref 0–1.2)
BUN SERPL-MCNC: 24 MG/DL (ref 8–23)
BUN/CREAT SERPL: 19.4 (ref 7–25)
CA-I BLDA-SCNC: 1.24 MMOL/L (ref 1.15–1.33)
CA-I SERPL ISE-MCNC: 1.2 MMOL/L (ref 1.2–1.3)
CALCIUM SPEC-SCNC: 8.7 MG/DL (ref 8.6–10.5)
CHLORIDE SERPL-SCNC: 103 MMOL/L (ref 98–107)
CK SERPL-CCNC: 672 U/L (ref 20–200)
CO2 BLDA-SCNC: 24.9 MMOL/L (ref 22–29)
CO2 BLDA-SCNC: 26.5 MMOL/L (ref 22–29)
CO2 BLDA-SCNC: 26.6 MMOL/L (ref 22–29)
CO2 BLDA-SCNC: 27.9 MMOL/L (ref 22–29)
CO2 BLDA-SCNC: 27.9 MMOL/L (ref 22–29)
CO2 SERPL-SCNC: 23 MMOL/L (ref 22–29)
CREAT SERPL-MCNC: 1.24 MG/DL (ref 0.76–1.27)
D-LACTATE SERPL-SCNC: 0.6 MMOL/L (ref 0.5–2)
DEPRECATED RDW RBC AUTO: 46.4 FL (ref 37–54)
EGFRCR SERPLBLD CKD-EPI 2021: 63.7 ML/MIN/1.73
ERYTHROCYTE [DISTWIDTH] IN BLOOD BY AUTOMATED COUNT: 13.8 % (ref 12.3–15.4)
GLOBULIN UR ELPH-MCNC: 1.8 GM/DL
GLUCOSE BLDC GLUCOMTR-MCNC: 108 MG/DL (ref 70–105)
GLUCOSE BLDC GLUCOMTR-MCNC: 110 MG/DL (ref 74–100)
GLUCOSE BLDC GLUCOMTR-MCNC: 117 MG/DL (ref 70–105)
GLUCOSE BLDC GLUCOMTR-MCNC: 119 MG/DL (ref 70–105)
GLUCOSE BLDC GLUCOMTR-MCNC: 120 MG/DL (ref 70–105)
GLUCOSE BLDC GLUCOMTR-MCNC: 137 MG/DL (ref 74–100)
GLUCOSE BLDC GLUCOMTR-MCNC: 141 MG/DL (ref 74–100)
GLUCOSE BLDC GLUCOMTR-MCNC: 147 MG/DL (ref 74–100)
GLUCOSE BLDC GLUCOMTR-MCNC: 147 MG/DL (ref 74–100)
GLUCOSE BLDC GLUCOMTR-MCNC: 154 MG/DL (ref 70–105)
GLUCOSE BLDC GLUCOMTR-MCNC: 91 MG/DL (ref 70–105)
GLUCOSE BLDC GLUCOMTR-MCNC: 93 MG/DL (ref 70–105)
GLUCOSE SERPL-MCNC: 139 MG/DL (ref 65–99)
HCO3 BLDA-SCNC: 23.8 MMOL/L (ref 21–28)
HCO3 BLDA-SCNC: 25.3 MMOL/L (ref 21–28)
HCO3 BLDA-SCNC: 25.3 MMOL/L (ref 21–28)
HCO3 BLDA-SCNC: 26.4 MMOL/L (ref 21–28)
HCO3 BLDA-SCNC: 26.5 MMOL/L (ref 21–28)
HCT VFR BLD AUTO: 30.1 % (ref 37.5–51)
HCT VFR BLDA CALC: 31 % (ref 38–51)
HEMODILUTION: NO
HEMODILUTION: YES
HGB BLD-MCNC: 10 G/DL (ref 13–17.7)
HGB BLDA-MCNC: 10.6 G/DL (ref 12–17)
INHALED O2 CONCENTRATION: 36 %
INHALED O2 CONCENTRATION: 40 %
INHALED O2 CONCENTRATION: 50 %
INHALED O2 CONCENTRATION: 50 %
INHALED O2 CONCENTRATION: 70 %
MAGNESIUM SERPL-MCNC: 2.9 MG/DL (ref 1.6–2.4)
MCH RBC QN AUTO: 32.3 PG (ref 26.6–33)
MCHC RBC AUTO-ENTMCNC: 33.2 G/DL (ref 31.5–35.7)
MCV RBC AUTO: 97.3 FL (ref 79–97)
MODALITY: ABNORMAL
MODALITY: NORMAL
PCO2 BLDA: 36.2 MM HG (ref 35–48)
PCO2 BLDA: 40.1 MM HG (ref 35–48)
PCO2 BLDA: 41.5 MM HG (ref 35–48)
PCO2 BLDA: 43.9 MM HG (ref 35–48)
PCO2 BLDA: 46.9 MM HG (ref 35–48)
PEEP RESPIRATORY: 5 CM[H2O]
PEEP RESPIRATORY: 5 CM[H2O]
PEEP RESPIRATORY: 8 CM[H2O]
PEEP RESPIRATORY: 8 CM[H2O]
PH BLDA: 7.36 PH UNITS (ref 7.35–7.45)
PH BLDA: 7.39 PH UNITS (ref 7.35–7.45)
PH BLDA: 7.39 PH UNITS (ref 7.35–7.45)
PH BLDA: 7.41 PH UNITS (ref 7.35–7.45)
PH BLDA: 7.43 PH UNITS (ref 7.35–7.45)
PHOSPHATE SERPL-MCNC: 4.2 MG/DL (ref 2.5–4.5)
PLATELET # BLD AUTO: 145 10*3/MM3 (ref 140–450)
PMV BLD AUTO: 8.3 FL (ref 6–12)
PO2 BLDA: 110.4 MM HG (ref 83–108)
PO2 BLDA: 139.8 MM HG (ref 83–108)
PO2 BLDA: 75 MM HG (ref 83–108)
PO2 BLDA: 85.9 MM HG (ref 83–108)
PO2 BLDA: 99.2 MM HG (ref 83–108)
POTASSIUM BLDA-SCNC: 4.6 MMOL/L (ref 3.5–4.5)
POTASSIUM SERPL-SCNC: 4.4 MMOL/L (ref 3.5–5.2)
PROT SERPL-MCNC: 5.7 G/DL (ref 6–8.5)
PSV: 10 CMH2O
QT INTERVAL: 398 MS
RBC # BLD AUTO: 3.1 10*6/MM3 (ref 4.14–5.8)
RESPIRATORY RATE: 12
SAO2 % BLDCOA: 95 % (ref 94–98)
SAO2 % BLDCOA: 96.8 % (ref 94–98)
SAO2 % BLDCOA: 97.6 % (ref 94–98)
SAO2 % BLDCOA: 98.3 % (ref 94–98)
SAO2 % BLDCOA: 99 % (ref 94–98)
SODIUM BLD-SCNC: 139 MMOL/L (ref 138–146)
SODIUM SERPL-SCNC: 139 MMOL/L (ref 136–145)
VENTILATOR MODE: ABNORMAL
VENTILATOR MODE: NORMAL
VT ON VENT VENT: 650 ML
WBC NRBC COR # BLD: 15.2 10*3/MM3 (ref 3.4–10.8)

## 2022-06-11 PROCEDURE — 84100 ASSAY OF PHOSPHORUS: CPT | Performed by: NURSE PRACTITIONER

## 2022-06-11 PROCEDURE — 94761 N-INVAS EAR/PLS OXIMETRY MLT: CPT

## 2022-06-11 PROCEDURE — 99024 POSTOP FOLLOW-UP VISIT: CPT | Performed by: NURSE PRACTITIONER

## 2022-06-11 PROCEDURE — 0 MILRINONE LACTATE IN DEXTROSE 20-5 MG/100ML-% SOLUTION

## 2022-06-11 PROCEDURE — 80051 ELECTROLYTE PANEL: CPT

## 2022-06-11 PROCEDURE — P9045 ALBUMIN (HUMAN), 5%, 250 ML: HCPCS | Performed by: NURSE PRACTITIONER

## 2022-06-11 PROCEDURE — 71045 X-RAY EXAM CHEST 1 VIEW: CPT

## 2022-06-11 PROCEDURE — 25010000002 MAGNESIUM SULFATE IN D5W 1G/100ML (PREMIX) 1-5 GM/100ML-% SOLUTION: Performed by: NURSE PRACTITIONER

## 2022-06-11 PROCEDURE — 82962 GLUCOSE BLOOD TEST: CPT

## 2022-06-11 PROCEDURE — 25010000002 ALBUMIN HUMAN 5% PER 50 ML: Performed by: NURSE PRACTITIONER

## 2022-06-11 PROCEDURE — 99233 SBSQ HOSP IP/OBS HIGH 50: CPT | Performed by: INTERNAL MEDICINE

## 2022-06-11 PROCEDURE — 85027 COMPLETE CBC AUTOMATED: CPT | Performed by: NURSE PRACTITIONER

## 2022-06-11 PROCEDURE — 94799 UNLISTED PULMONARY SVC/PX: CPT

## 2022-06-11 PROCEDURE — 25010000002 VANCOMYCIN 10 G RECONSTITUTED SOLUTION: Performed by: NURSE PRACTITIONER

## 2022-06-11 PROCEDURE — 25010000002 MORPHINE PER 10 MG: Performed by: NURSE PRACTITIONER

## 2022-06-11 PROCEDURE — 83605 ASSAY OF LACTIC ACID: CPT | Performed by: NURSE PRACTITIONER

## 2022-06-11 PROCEDURE — 82803 BLOOD GASES ANY COMBINATION: CPT

## 2022-06-11 PROCEDURE — 82330 ASSAY OF CALCIUM: CPT | Performed by: NURSE PRACTITIONER

## 2022-06-11 PROCEDURE — 83735 ASSAY OF MAGNESIUM: CPT | Performed by: NURSE PRACTITIONER

## 2022-06-11 PROCEDURE — 94003 VENT MGMT INPAT SUBQ DAY: CPT

## 2022-06-11 PROCEDURE — 25010000002 PROPOFOL 10 MG/ML EMULSION: Performed by: NURSE PRACTITIONER

## 2022-06-11 PROCEDURE — 82550 ASSAY OF CK (CPK): CPT | Performed by: NURSE PRACTITIONER

## 2022-06-11 PROCEDURE — 97162 PT EVAL MOD COMPLEX 30 MIN: CPT

## 2022-06-11 PROCEDURE — 80053 COMPREHEN METABOLIC PANEL: CPT | Performed by: NURSE PRACTITIONER

## 2022-06-11 PROCEDURE — 94664 DEMO&/EVAL PT USE INHALER: CPT

## 2022-06-11 PROCEDURE — 85018 HEMOGLOBIN: CPT

## 2022-06-11 PROCEDURE — 82330 ASSAY OF CALCIUM: CPT

## 2022-06-11 RX ORDER — PANTOPRAZOLE SODIUM 40 MG/1
40 TABLET, DELAYED RELEASE ORAL EVERY MORNING
Status: DISCONTINUED | OUTPATIENT
Start: 2022-06-12 | End: 2022-06-17 | Stop reason: HOSPADM

## 2022-06-11 RX ORDER — GABAPENTIN 100 MG/1
100 CAPSULE ORAL EVERY 8 HOURS SCHEDULED
Status: CANCELLED | OUTPATIENT
Start: 2022-06-11

## 2022-06-11 RX ORDER — IPRATROPIUM BROMIDE AND ALBUTEROL SULFATE 2.5; .5 MG/3ML; MG/3ML
3 SOLUTION RESPIRATORY (INHALATION)
Status: DISCONTINUED | OUTPATIENT
Start: 2022-06-11 | End: 2022-06-12

## 2022-06-11 RX ORDER — INSULIN LISPRO 100 [IU]/ML
0-7 INJECTION, SOLUTION INTRAVENOUS; SUBCUTANEOUS
Status: DISCONTINUED | OUTPATIENT
Start: 2022-06-13 | End: 2022-06-17 | Stop reason: HOSPADM

## 2022-06-11 RX ORDER — INSULIN LISPRO 100 [IU]/ML
0-7 INJECTION, SOLUTION INTRAVENOUS; SUBCUTANEOUS AS NEEDED
Status: DISCONTINUED | OUTPATIENT
Start: 2022-06-13 | End: 2022-06-17 | Stop reason: HOSPADM

## 2022-06-11 RX ORDER — IPRATROPIUM BROMIDE AND ALBUTEROL SULFATE 2.5; .5 MG/3ML; MG/3ML
3 SOLUTION RESPIRATORY (INHALATION) EVERY 4 HOURS PRN
Status: DISCONTINUED | OUTPATIENT
Start: 2022-06-11 | End: 2022-06-17 | Stop reason: HOSPADM

## 2022-06-11 RX ORDER — ATORVASTATIN CALCIUM 40 MG/1
40 TABLET, FILM COATED ORAL NIGHTLY
Status: DISCONTINUED | OUTPATIENT
Start: 2022-06-12 | End: 2022-06-12

## 2022-06-11 RX ORDER — PANTOPRAZOLE SODIUM 40 MG/10ML
40 INJECTION, POWDER, LYOPHILIZED, FOR SOLUTION INTRAVENOUS ONCE
Status: COMPLETED | OUTPATIENT
Start: 2022-06-11 | End: 2022-06-11

## 2022-06-11 RX ORDER — ALBUMIN, HUMAN INJ 5% 5 %
250 SOLUTION INTRAVENOUS ONCE
Status: COMPLETED | OUTPATIENT
Start: 2022-06-11 | End: 2022-06-11

## 2022-06-11 RX ADMIN — ALBUMIN (HUMAN) 250 ML: 2.5 SOLUTION INTRAVENOUS at 13:44

## 2022-06-11 RX ADMIN — SENNOSIDES AND DOCUSATE SODIUM 2 TABLET: 50; 8.6 TABLET ORAL at 20:13

## 2022-06-11 RX ADMIN — BUDESONIDE 0.5 MG: 0.5 INHALANT RESPIRATORY (INHALATION) at 07:05

## 2022-06-11 RX ADMIN — ACETAMINOPHEN ORAL SOLUTION 649.6 MG: 650 SOLUTION ORAL at 10:41

## 2022-06-11 RX ADMIN — DEXMEDETOMIDINE HYDROCHLORIDE 1.5 MCG/KG/HR: 4 INJECTION, SOLUTION INTRAVENOUS at 06:48

## 2022-06-11 RX ADMIN — DEXMEDETOMIDINE HYDROCHLORIDE 1.2 MCG/KG/HR: 4 INJECTION, SOLUTION INTRAVENOUS at 01:31

## 2022-06-11 RX ADMIN — MORPHINE SULFATE 2 MG: 2 INJECTION, SOLUTION INTRAMUSCULAR; INTRAVENOUS at 11:15

## 2022-06-11 RX ADMIN — MORPHINE SULFATE 2 MG: 2 INJECTION, SOLUTION INTRAMUSCULAR; INTRAVENOUS at 01:04

## 2022-06-11 RX ADMIN — ARFORMOTEROL TARTRATE 15 MCG: 15 SOLUTION RESPIRATORY (INHALATION) at 19:26

## 2022-06-11 RX ADMIN — ASPIRIN 81 MG: 81 TABLET, COATED ORAL at 08:48

## 2022-06-11 RX ADMIN — ACETAMINOPHEN ORAL SOLUTION 650 MG: 650 SOLUTION ORAL at 06:42

## 2022-06-11 RX ADMIN — HYDROCODONE BITARTRATE AND ACETAMINOPHEN 1 TABLET: 5; 325 TABLET ORAL at 22:18

## 2022-06-11 RX ADMIN — ARFORMOTEROL TARTRATE 15 MCG: 15 SOLUTION RESPIRATORY (INHALATION) at 06:57

## 2022-06-11 RX ADMIN — PROPOFOL 5 MCG/KG/MIN: 10 INJECTION, EMULSION INTRAVENOUS at 03:08

## 2022-06-11 RX ADMIN — VANCOMYCIN HYDROCHLORIDE 1500 MG: 10 INJECTION, POWDER, LYOPHILIZED, FOR SOLUTION INTRAVENOUS at 03:15

## 2022-06-11 RX ADMIN — ALBUMIN (HUMAN) 250 ML: 2.5 SOLUTION INTRAVENOUS at 01:24

## 2022-06-11 RX ADMIN — MILRINONE LACTATE 0.38 MCG/KG/MIN: 0.2 INJECTION, SOLUTION INTRAVENOUS at 01:52

## 2022-06-11 RX ADMIN — HYDROCODONE BITARTRATE AND ACETAMINOPHEN 1 TABLET: 5; 325 TABLET ORAL at 17:28

## 2022-06-11 RX ADMIN — MORPHINE SULFATE 2 MG: 2 INJECTION, SOLUTION INTRAMUSCULAR; INTRAVENOUS at 23:22

## 2022-06-11 RX ADMIN — CHLORHEXIDINE GLUCONATE 15 ML: 1.2 RINSE ORAL at 22:18

## 2022-06-11 RX ADMIN — GUAIFENESIN 1200 MG: 600 TABLET, EXTENDED RELEASE ORAL at 13:49

## 2022-06-11 RX ADMIN — MAGNESIUM SULFATE 1 G: 1 INJECTION INTRAVENOUS at 17:28

## 2022-06-11 RX ADMIN — MAGNESIUM SULFATE 1 G: 1 INJECTION INTRAVENOUS at 03:14

## 2022-06-11 RX ADMIN — MUPIROCIN 1 APPLICATION: 20 OINTMENT TOPICAL at 20:14

## 2022-06-11 RX ADMIN — MILRINONE LACTATE 0.25 MCG/KG/MIN: 0.2 INJECTION, SOLUTION INTRAVENOUS at 23:14

## 2022-06-11 RX ADMIN — GUAIFENESIN 1200 MG: 600 TABLET, EXTENDED RELEASE ORAL at 23:21

## 2022-06-11 RX ADMIN — INSULIN HUMAN 2.2 UNITS/HR: 1 INJECTION, SOLUTION INTRAVENOUS at 17:32

## 2022-06-11 RX ADMIN — MAGNESIUM SULFATE 1 G: 1 INJECTION INTRAVENOUS at 09:16

## 2022-06-11 RX ADMIN — VANCOMYCIN HYDROCHLORIDE 1500 MG: 10 INJECTION, POWDER, LYOPHILIZED, FOR SOLUTION INTRAVENOUS at 13:43

## 2022-06-11 RX ADMIN — MORPHINE SULFATE 2 MG: 2 INJECTION, SOLUTION INTRAMUSCULAR; INTRAVENOUS at 15:06

## 2022-06-11 RX ADMIN — BUDESONIDE 0.5 MG: 0.5 INHALANT RESPIRATORY (INHALATION) at 19:31

## 2022-06-11 RX ADMIN — DEXMEDETOMIDINE HYDROCHLORIDE 1.5 MCG/KG/HR: 4 INJECTION, SOLUTION INTRAVENOUS at 08:47

## 2022-06-11 RX ADMIN — ACETAMINOPHEN ORAL SOLUTION 650 MG: 650 SOLUTION ORAL at 03:14

## 2022-06-11 RX ADMIN — MORPHINE SULFATE 2 MG: 2 INJECTION, SOLUTION INTRAMUSCULAR; INTRAVENOUS at 13:14

## 2022-06-11 RX ADMIN — ALBUMIN (HUMAN) 250 ML: 2.5 SOLUTION INTRAVENOUS at 03:46

## 2022-06-11 RX ADMIN — MILRINONE LACTATE 0.38 MCG/KG/MIN: 0.2 INJECTION, SOLUTION INTRAVENOUS at 10:41

## 2022-06-11 RX ADMIN — MORPHINE SULFATE 2 MG: 2 INJECTION, SOLUTION INTRAMUSCULAR; INTRAVENOUS at 17:28

## 2022-06-11 RX ADMIN — CHLORHEXIDINE GLUCONATE 15 ML: 1.2 RINSE ORAL at 09:17

## 2022-06-11 RX ADMIN — DEXMEDETOMIDINE HYDROCHLORIDE 1.5 MCG/KG/HR: 4 INJECTION, SOLUTION INTRAVENOUS at 04:04

## 2022-06-11 RX ADMIN — MORPHINE SULFATE 2 MG: 2 INJECTION, SOLUTION INTRAMUSCULAR; INTRAVENOUS at 20:50

## 2022-06-11 RX ADMIN — PANTOPRAZOLE SODIUM 40 MG: 40 INJECTION, POWDER, FOR SOLUTION INTRAVENOUS at 08:48

## 2022-06-11 RX ADMIN — MORPHINE SULFATE 2 MG: 2 INJECTION, SOLUTION INTRAMUSCULAR; INTRAVENOUS at 07:50

## 2022-06-11 RX ADMIN — MUPIROCIN 1 APPLICATION: 20 OINTMENT TOPICAL at 08:47

## 2022-06-11 NOTE — THERAPY EVALUATION
Patient Name: Chi Quinteros .  : 1955    MRN: [id number]                              Today's Date: 2022       Admit Date: 2022    Visit Dx:     ICD-10-CM ICD-9-CM   1. Syncope, unspecified syncope type  R55 780.2   2. Alcoholic intoxication without complication ([facility])  F10.920 305.00   3. Acute respiratory failure with hypoxemia ([facility])  J96.01 518.81   4. Orthostatic hypotension  I95.1 458.0   5. Elevated troponin  R77.8 790.6   6. FELIBERTO (acute kidney injury) ([facility])  N17.9 584.9   7. COPD exacerbation ([facility])  J44.1 491.21   8. Abnormal nuclear stress test  R94.39 794.39   9. Coronary artery disease involving native coronary artery of native heart, unspecified whether angina present  I25.10 414.01     Patient Active Problem List   Diagnosis   • COPD exacerbation ([facility])   • Obesity (BMI 30-39.9)   • Abnormal nuclear stress test   • Acute renal insufficiency   • Alcohol dependence ([facility])   • Essential hypertension   • Other emphysema ([facility])   • Coronary artery disease   • Syncope, unspecified syncope type     Past Medical History:   Diagnosis Date   • Back pain    • Emphysema lung ([facility])    • Hypertension      Past Surgical History:   Procedure Laterality Date   • CARDIAC CATHETERIZATION Right 2022    Procedure: Coronary angiography;  Surgeon: Frank Giraldo MD;  Location: [facility] CATH INVASIVE LOCATION;  Service: Cardiovascular;  Laterality: Right;   • CARDIAC CATHETERIZATION N/A 2022    Procedure: Left Heart Cath;  Surgeon: Frank Giraldo MD;  Location: [facility] CATH INVASIVE LOCATION;  Service: Cardiovascular;  Laterality: N/A;   • CARDIAC CATHETERIZATION N/A 2022    Procedure: Left ventriculography;  Surgeon: Frank Giraldo MD;  Location: [facility] CATH INVASIVE LOCATION;  Service: Cardiovascular;  Laterality: N/A;      General Information     Row Name 22 4161          Physical Therapy Time and Intention    Document Type evaluation  -HS     Mode of Treatment individual therapy  -HS     Row  Name 06/11/22 1835          General Information    Prior Level of Function independent:  Pt reporting independence with ADLs.  -HS     Existing Precautions/Restrictions fall  -HS     Row Name 06/11/22 1835          Living Environment    People in Home alone  -HS     Row Name 06/11/22 1835          Home Main Entrance    Number of Stairs, Main Entrance three  -HS     Stair Railings, Main Entrance railing on left side (ascending)  -HS     Row Name 06/11/22 1835          Stairs Within Home, Primary    Number of Stairs, Within Home, Primary none  -HS           User Key  (r) = Recorded By, (t) = Taken By, (c) = Cosigned By    Initials Name Provider Type    HS Nichelle Davies PT Physical Therapist               Mobility     Row Name 06/11/22 1839          Bed Mobility    Bed Mobility supine-sit-supine  -HS     All Activities, [place] (Bed Mobility) minimum assist (75% patient effort);1 person to manage equipment  -HS     Comment, (Bed Mobility) Pt recently extubated and RN requesting assistance performing initial mobility. Assist x4 for patient safety and management of lines and tubes. Pt able to stand at EOB for ~1 minute. Drawsheet utilized for supine to sit transfer.  -HS     Row Name 06/11/22 1839          Sit-Stand Transfer    Sit-Stand [place] (Transfers) minimum assist (75% patient effort);1 person to manage equipment  -HS     Row Name 06/11/22 1839          Gait/Stairs (Locomotion)    [place] Level (Gait) not tested  -           User Key  (r) = Recorded By, (t) = Taken By, (c) = Cosigned By    Initials Name Provider Type     Nichelle Davies PT Physical Therapist               Obj/Interventions     Row Name 06/11/22 1841          Range of Motion Comprehensive    Comment, General Range of Motion Plan to further assess BUE next session; B LE WFL  -     Row Name 06/11/22 1841          Strength Comprehensive (MMT)    Comment, General Manual Muscle Testing (MMT) Assessment B LE at least a 3/5 per  assessment of functional mobility  -HS           User Key  (r) = Recorded By, (t) = Taken By, (c) = Cosigned By    Initials Name Provider Type    HS Nichelle Davies PT Physical Therapist               Goals/Plan     Row Name 06/11/22 1845          Bed Mobility Goal 1 (PT)    Activity/Assistive Device (Bed Mobility Goal 1, PT) bed mobility activities, all  -HS     [place] Level/Cues Needed (Bed Mobility Goal 1, PT) modified independence  -HS     Time Frame (Bed Mobility Goal 1, PT) 2 weeks  -HS     Row Name 06/11/22 1845          Transfer Goal 1 (PT)    Activity/Assistive Device (Transfer Goal 1, PT) transfers, all  -HS     [place] Level/Cues Needed (Transfer Goal 1, PT) modified independence  -HS     Time Frame (Transfer Goal 1, PT) 2 weeks  -HS     Row Name 06/11/22 1845          Gait Training Goal 1 (PT)    Activity/Assistive Device (Gait Training Goal 1, PT) gait (walking locomotion)  -HS     [place] Level (Gait Training Goal 1, PT) modified independence  -HS     Distance (Gait Training Goal 1, PT) 50'  -HS     Time Frame (Gait Training Goal 1, PT) 2 weeks  -HS           User Key  (r) = Recorded By, (t) = Taken By, (c) = Cosigned By    Initials Name Provider Type    HS Nichelle Davies PT Physical Therapist               Clinical Impression     Row Name 06/11/22 1842          Pain    Pretreatment Pain Rating --  Pt describes moderate chest pain this date.  -HS     Pain Location - chest  -HS     Row Name 06/11/22 1842          Plan of Care Review    Plan of Care Reviewed With patient  -HS     Outcome Evaluation Pt is a 66 yo M s/p CABGx4, EVH of the legs, and abdominal aortic repalcement with a 30 mm graft on 6/10/22. Hx Etoh withdrawal. Per chart review, pt independent with ADLs at baseline. Pt with overall impairments in activity tolerance, strength, endurance, trunk control, and mobility this date. Recommending dc to IPR at dc.  -HS     Row Name 06/11/22 1842          Therapy Assessment/Plan (PT)     Criteria for Skilled Interventions Met (PT) yes  -HS     Therapy Frequency (PT) 5 times/wk  -HS     Row Name 06/11/22 1842          Positioning and Restraints    Pre-Treatment Position in bed  -HS     Post Treatment Position bed  -HS     In Bed call light within reach;patient within staff view;with nsg;supine  -HS           User Key  (r) = Recorded By, (t) = Taken By, (c) = Cosigned By    Initials Name Provider Type     Nichelle Davies PT Physical Therapist               Outcome Measures     Row Name 06/11/22 1845          How much help from another person do you currently need...    Turning from your back to your side while in flat bed without using bedrails? 2  -HS     Moving from lying on back to sitting on the side of a flat bed without bedrails? 2  -HS     Moving to and from a bed to a chair (including a wheelchair)? 3  -HS     Standing up from a chair using your arms (e.g., wheelchair, bedside chair)? 3  -HS     Climbing 3-5 steps with a railing? 2  -HS     To walk in hospital room? 2  -HS     AM-PAC 6 Clicks Score (PT) 14  -HS     Highest level of mobility 4 --> Transferred to chair/commode  -HS           User Key  (r) = Recorded By, (t) = Taken By, (c) = Cosigned By    Initials Name Provider Type     Nichelle Davies PT Physical Therapist                             Physical Therapy Education                 Title: PT OT SLP Therapies (Done)     Topic: Physical Therapy (Done)     Point: Mobility training (Done)     Learning Progress Summary           Patient Acceptance, E, VU,NR by HS at 6/11/2022 1846    Acceptance, E,TB, VU by CM at 6/6/2022 1546                   Point: Home exercise program (Done)     Learning Progress Summary           Patient Acceptance, E, VU,NR by HS at 6/11/2022 1846                   Point: Body mechanics (Done)     Learning Progress Summary           Patient Acceptance, E, VU,NR by HS at 6/11/2022 1846                   Point: Precautions (Done)     Learning Progress Summary            Patient Acceptance, E, VU,NR by  at 6/11/2022 1846    Acceptance, E,TB, VU by  at 6/6/2022 1546                               User Key     Initials Effective Dates Name Provider Type Discipline     06/16/21 -  Samantha Connell, PT Physical Therapist PT     09/13/21 -  Nichelle Davies PT Physical Therapist PT              PT Recommendation and Plan     Plan of Care Reviewed With: patient  Outcome Evaluation: Pt is a 68 yo M s/p CABGx4, EVH of the legs, and abdominal aortic repalcement with a 30 mm graft on 6/10/22. Hx Etoh withdrawal. Per chart review, pt independent with ADLs at baseline. Pt with overall impairments in activity tolerance, strength, endurance, trunk control, and mobility this date. Recommending dc to IPR at dc.     Time Calculation:    PT Charges     Row Name 06/11/22 1847             Time Calculation    Start Time 1256  -HS      Stop Time 1313  -HS      Time Calculation (min) 17 min  -HS      PT Received On 06/11/22  -      PT - Next Appointment 06/13/22  -      PT Goal Re-Cert Due Date 06/25/22  -            User Key  (r) = Recorded By, (t) = Taken By, (c) = Cosigned By    Initials Name Provider Type     Nichelle Davies PT Physical Therapist              Therapy Charges for Today     Code Description Service Date Service Provider Modifiers Qty    [id number] HC PT EVAL MOD COMPLEXITY 3 6/11/2022 Nichelle Davies PT GP 1          PT G-Codes  AM-PAC 6 Clicks Score (PT): 14    Nichelle Davies PT  6/11/2022

## 2022-06-11 NOTE — ANESTHESIA POSTPROCEDURE EVALUATION
Patient: Chi Quinteros Sr.    Procedure Summary     Date: 06/10/22 Room / Location: [facility] CVOR 02 / [facility] CVOR    Anesthesia Start: 1353 Anesthesia Stop: 2104    Procedures:       CORONARY ARTERY BYPASS GRAFTING (N/A Chest)      AORTIC VALVE ASCENDING ANEURYSM REPAIR (N/A ) Diagnosis:       Coronary artery disease involving native coronary artery of native heart, unspecified whether angina present      (Coronary artery disease involving native coronary artery of native heart, unspecified whether angina present [I25.10])    Surgeons: Benson Hughes MD Provider: Kwame Ferrera MD    Anesthesia Type: general, Shruti, CVL, PAC ASA Status: 4          Anesthesia Type: general, [place], CVL, PAC    Vitals  No vitals data found for the desired time range.          Post Anesthesia Care and Evaluation    Patient location during evaluation: ICU  Patient participation: complete - patient cannot participate  Level of consciousness: obtunded/minimal responses  Pain scale: See nurse's notes for pain score.  Pain management: adequate    Airway patency: patent  Anesthetic complications: No anesthetic complications  PONV Status: none  Cardiovascular status: acceptable  Respiratory status: acceptable  Hydration status: acceptable    Comments: Patient seen and examined postoperatively; vital signs stable; SpO2 greater than or equal to 90%; cardiopulmonary status stable; nausea/vomiting adequately controlled; pain adequately controlled; no apparent anesthesia complications; patient discharged from anesthesia care when discharge criteria were met

## 2022-06-11 NOTE — PROGRESS NOTES
NEPHROLOGY PROGRESS NOTE------[facility]    [facility]  [phone]  Amara Cooper MD      Patient Care Team:  Deysi Mason APRN as PCP - General (Nurse Practitioner)  Eliseo Casarez MD as Consulting Physician (Nephrology)      Provider:  Amara Cooper MD  Patient Name: Chi Quinteros Sr.  :  1955    SUBJECTIVE:    F/U ARF/FELIBERTO/CRF/CKD    POD # 1. Weak. No real SOB. No angina. No dysuria.     Medication:  acetaminophen, 650 mg, Oral, Q4H   Or  acetaminophen, 650 mg, Oral, Q4H   Or  acetaminophen, 650 mg, Rectal, Q4H  arformoterol, 15 mcg, Nebulization, BID - RT  aspirin, 81 mg, Oral, Daily  atorvastatin, 40 mg, Oral, Nightly  budesonide, 0.5 mg, Nebulization, BID - RT  chlorhexidine, 15 mL, Mouth/Throat, Q12H  [START ON 2022] enoxaparin, 40 mg, Subcutaneous, Q24H  [MAR Hold] gabapentin, 300 mg, Oral, Q8H  guaiFENesin, 1,200 mg, Oral, Q12H  magnesium sulfate, 1 g, Intravenous, Q8H  mupirocin, 1 application, Each Nare, BID  pantoprazole, 40 mg, Oral, QAM  senna-docusate sodium, 2 tablet, Oral, Nightly  vancomycin, 15 mg/kg, Intravenous, Q12H      dexmedetomidine, 0.2-1.5 mcg/kg/hr, Last Rate: 1.5 mcg/kg/hr (22 0648)  EPINEPHrine, 0.02-0.3 mcg/kg/min, Last Rate: 0.02 mcg/kg/min (06/10/22 2300)  insulin, 0-100 Units/hr, Last Rate: 2 Units/hr (22 0710)  milrinone, 0.25-0.75 mcg/kg/min, Last Rate: 0.375 mcg/kg/min (22 0152)  niCARdipine, 5-15 mg/hr  nitroglycerin, 5-50 mcg/min  norepinephrine, 0.02-0.06 mcg/kg/min, Last Rate: 0.02 mcg/kg/min (06/10/22 2223)  propofol, 5-50 mcg/kg/min, Last Rate: 5 mcg/kg/min (22)        OBJECTIVE    Vital Sign Min/Max for last 24 hours  Temp  Min: 97.3 °F (36.3 °C)  Max: 98.6 °F (37 °C)   BP  Min: 79/46  Max: 124/70   Pulse  Min: 60  Max: 89   Resp  Min: 12  Max: 22   SpO2  Min: 0 %  Max: 100 %   No data recorded   Weight  Min: 110 kg (243 lb 6.2 oz)  Max: 110 kg (243  "lb 6.2 oz)     Flowsheet Rows    Flowsheet Row First Filed Value   Admission Height 177.8 cm (70\") Documented at 05/31/2022 2052   Admission Weight 104 kg (230 lb) Documented at 05/31/2022 2033          No intake/output data recorded.  I/O last 3 completed shifts:  In: 3010 [P.O.:180; I.V.:1311; Blood:1519]  Out: 3436 [Urine:2935; Blood:350; Chest Tube:151]    Physical Exam:  General Appearance: alert, appears stated age and cooperative  Head: normocephalic, without obvious abnormality and atraumatic  Eyes: conjunctivae and sclerae normal and no icterus  Neck: supple and no JVD  Lungs: DECREASED BS BIBASILAR  Heart: regular rhythm & normal rate and normal S1, S2 +ANTONY  Chest: S/P SURGICAL CHANGES ASSOCIATED WITH OPEN HEART SURGERY  Abdomen: +HYPOACTIVE bowel sounds and soft non-tender  Extremities: moves extremities well, no edema, no cyanosis and no redness  Skin: no bleeding, bruising or rash, turgor normal, color normal and no lesions noted  Neurologic: Alert, and oriented. No focal deficits    Labs:    WBC WBC   Date Value Ref Range Status   06/11/2022 15.20 (H) 3.40 - 10.80 10*3/mm3 Final   06/10/2022 22.40 (H) 3.40 - 10.80 10*3/mm3 Final   06/10/2022 24.90 (H) 3.40 - 10.80 10*3/mm3 Final   06/10/2022 11.40 (H) 3.40 - 10.80 10*3/mm3 Final   06/09/2022 10.10 3.40 - 10.80 10*3/mm3 Final      HGB Hemoglobin   Date Value Ref Range Status   06/11/2022 10.0 (L) 13.0 - 17.7 g/dL Final   06/11/2022 10.6 (L) 12.0 - 17.0 g/dL Final   06/10/2022 11.2 (L) 12.0 - 17.0 g/dL Final   06/10/2022 10.5 (L) 13.0 - 17.7 g/dL Final     Comment:     Result checked    06/10/2022 11.8 (L) 12.0 - 17.0 g/dL Final   06/10/2022 8.5 (L) 13.0 - 17.7 g/dL Final     Comment:     Result checked    06/10/2022 9.9 (L) 12.0 - 17.0 g/dL Final   06/10/2022 10.9 (L) 12.0 - 17.0 g/dL Final   06/10/2022 10.5 (L) 12.0 - 17.0 g/dL Final   06/10/2022 10.9 (L) 12.0 - 17.0 g/dL Final   06/10/2022 10.5 (L) 12.0 - 17.0 g/dL Final   06/10/2022 10.9 (L) 12.0 - " 17.0 g/dL Final   06/10/2022 10.5 (L) 12.0 - 17.0 g/dL Final   06/10/2022 13.3 12.0 - 17.0 g/dL Final   06/10/2022 13.0 13.0 - 17.7 g/dL Final   06/09/2022 13.8 13.0 - 17.7 g/dL Final      HCT Hematocrit   Date Value Ref Range Status   06/11/2022 30.1 (L) 37.5 - 51.0 % Final   06/11/2022 31 (L) 38 - 51 % Final   06/10/2022 33 (L) 38 - 51 % Final   06/10/2022 32.8 (L) 37.5 - 51.0 % Final   06/10/2022 35 (L) 38 - 51 % Final   06/10/2022 25.6 (L) 37.5 - 51.0 % Final   06/10/2022 29 (L) 38 - 51 % Final   06/10/2022 32 (L) 38 - 51 % Final   06/10/2022 31 (L) 38 - 51 % Final   06/10/2022 32 (L) 38 - 51 % Final   06/10/2022 31 (L) 38 - 51 % Final   06/10/2022 32 (L) 38 - 51 % Final   06/10/2022 31 (L) 38 - 51 % Final   06/10/2022 39 38 - 51 % Final   06/10/2022 39.7 37.5 - 51.0 % Final   06/09/2022 41.7 37.5 - 51.0 % Final      Platlets No results found for: LABPLAT   MCV MCV   Date Value Ref Range Status   06/11/2022 97.3 (H) 79.0 - 97.0 fL Final   06/10/2022 97.8 (H) 79.0 - 97.0 fL Final   06/10/2022 97.7 (H) 79.0 - 97.0 fL Final   06/10/2022 96.9 79.0 - 97.0 fL Final   06/09/2022 97.4 (H) 79.0 - 97.0 fL Final          Sodium Sodium   Date Value Ref Range Status   06/11/2022 139 136 - 145 mmol/L Final   06/10/2022 137 136 - 145 mmol/L Final   06/10/2022 136 136 - 145 mmol/L Final   06/09/2022 136 136 - 145 mmol/L Final      Potassium Potassium   Date Value Ref Range Status   06/11/2022 4.4 3.5 - 5.2 mmol/L Final   06/10/2022 4.7 3.5 - 5.2 mmol/L Final     Comment:     Slight hemolysis detected by analyzer. Results may be affected.   06/10/2022 4.7 3.5 - 5.2 mmol/L Final     Comment:     Slight hemolysis detected by analyzer. Results may be affected.   06/09/2022 4.2 3.5 - 5.2 mmol/L Final   06/08/2022 4.7 3.5 - 5.2 mmol/L Final     Comment:     Slight hemolysis detected by analyzer. Results may be affected.      Chloride Chloride   Date Value Ref Range Status   06/11/2022 103 98 - 107 mmol/L Final   06/10/2022 102 98 -  107 mmol/L Final   06/10/2022 96 (L) 98 - 107 mmol/L Final   06/09/2022 98 98 - 107 mmol/L Final      CO2 CO2   Date Value Ref Range Status   06/11/2022 23.0 22.0 - 29.0 mmol/L Final   06/10/2022 26.0 22.0 - 29.0 mmol/L Final   06/10/2022 30.0 (H) 22.0 - 29.0 mmol/L Final   06/09/2022 28.0 22.0 - 29.0 mmol/L Final      BUN BUN   Date Value Ref Range Status   06/11/2022 24 (H) 8 - 23 mg/dL Final   06/10/2022 24 (H) 8 - 23 mg/dL Final   06/10/2022 26 (H) 8 - 23 mg/dL Final   06/09/2022 22 8 - 23 mg/dL Final      Creatinine Creatinine   Date Value Ref Range Status   06/11/2022 1.24 0.76 - 1.27 mg/dL Final   06/10/2022 1.36 (H) 0.76 - 1.27 mg/dL Final   06/10/2022 1.17 0.76 - 1.27 mg/dL Final   06/09/2022 0.85 0.76 - 1.27 mg/dL Final      Calcium Calcium   Date Value Ref Range Status   06/11/2022 8.7 8.6 - 10.5 mg/dL Final   06/10/2022 9.0 8.6 - 10.5 mg/dL Final   06/10/2022 8.5 (L) 8.6 - 10.5 mg/dL Final   06/09/2022 8.7 8.6 - 10.5 mg/dL Final      PO4 No components found for: PO4   Albumin Albumin   Date Value Ref Range Status   06/11/2022 3.90 3.50 - 5.20 g/dL Final   06/10/2022 3.50 3.50 - 5.20 g/dL Final   06/09/2022 3.00 (L) 3.50 - 5.20 g/dL Final      Magnesium Magnesium   Date Value Ref Range Status   06/11/2022 2.9 (H) 1.6 - 2.4 mg/dL Final   06/10/2022 2.9 (H) 1.6 - 2.4 mg/dL Final   06/10/2022 2.1 1.6 - 2.4 mg/dL Final      Uric Acid No components found for: URIC ACID     Imaging Results (Last 72 Hours)     Procedure Component Value Units Date/Time    XR Chest 1 View [[id number]] Resulted: 06/11/22 0510     Updated: 06/11/22 0511    XR Chest 1 View [[id number]] Collected: 06/10/22 2208     Updated: 06/10/22 2213    Narrative:      DATE OF EXAM:  6/10/2022 9:46 PM     PROCEDURE:  XR CHEST 1 VW-     INDICATIONS:  Status post CABG procedure, history of syncope and collapse, coronary  artery disease.      COMPARISON:  06/05/2022.     TECHNIQUE:   Single radiographic view of the chest was obtained.      FINDINGS:  The patient has status post an interval CABG procedure. The tip of the  [place]-Demarco catheter terminates in the main pulmonary artery segment. The  endotracheal tube tip is in good position located between the thoracic  inlet and the aortic knob. The nasogastric tube tip projects out of the  field-of-view, but below the hemidiaphragms. There is a left basilar  chest tube in place. There are either one or two mediastinal chest tubes  in place. There is no pneumothorax. There are patchy densities in the  right upper lobe and the left lung base probably representing  atelectasis. There may be a trace left pleural effusion. There also may  be slight pulmonary vascular  congestion.       Impression:         1. Interval CABG procedure.  2. Lines and support tubes are in place as described. There is no  pneumothorax.  3. Patchy densities in the right upper lobe and left lung base probably  representing atelectasis.  4. Trace left pleural effusion.  5. Questionable slight pulmonary vascular congestion.     Electronically Signed By-Ziggy Gold MD On:6/10/2022 10:11 PM  This report was finalized on [id number] by  Ziggy Gold MD.          Results for orders placed during the hospital encounter of 05/31/22    XR Chest 1 View    Narrative  DATE OF EXAM:  6/10/2022 9:46 PM    PROCEDURE:  XR CHEST 1 VW-    INDICATIONS:  Status post CABG procedure, history of syncope and collapse, coronary  artery disease.    COMPARISON:  06/05/2022.    TECHNIQUE:  Single radiographic view of the chest was obtained.    FINDINGS:  The patient has status post an interval CABG procedure. The tip of the  [place]-Demarco catheter terminates in the main pulmonary artery segment. The  endotracheal tube tip is in good position located between the thoracic  inlet and the aortic knob. The nasogastric tube tip projects out of the  field-of-view, but below the hemidiaphragms. There is a left basilar  chest tube in place. There are either one or two  mediastinal chest tubes  in place. There is no pneumothorax. There are patchy densities in the  right upper lobe and the left lung base probably representing  atelectasis. There may be a trace left pleural effusion. There also may  be slight pulmonary vascular  congestion.    Impression  1. Interval CABG procedure.  2. Lines and support tubes are in place as described. There is no  pneumothorax.  3. Patchy densities in the right upper lobe and left lung base probably  representing atelectasis.  4. Trace left pleural effusion.  5. Questionable slight pulmonary vascular congestion.    Electronically Signed By-Ziggy Gold MD On:6/10/2022 10:11 PM  This report was finalized on [id number] by  Ziggy Gold MD.      XR Chest 1 View    Narrative  EXAM: XR CHEST 1 VW-    DATE OF EXAM: 6/5/2022 8:58 AM    INDICATION: copd exac  cough; R55-Syncope and collapse; F10.920-Alcohol  use, unspecified with intoxication, uncomplicated; J96.01-Acute  respiratory failure with hypoxia; I95.1-Orthostatic hypotension;  R77.8-Other specified abnormalities of plasma proteins; N17.9-Acute  kidney failure, unspecified; J44.1-Chronic obstructive pulmonary disease  with (acute) exacerbation; R94.39-Abnormal result of other cardiovasc.    COMPARISONS: 5/31/2022    FINDINGS:    Emphysema is evident. No pneumothorax or pleural effusion. Mild  scattered atelectasis. No consolidation. Mediastinum appears unchanged,  no evidence of acute process.    Impression  Emphysema and mild scattered atelectasis. No evidence of acute  cardiopulmonary process otherwise.    Electronically Signed By-Chad Car On:6/5/2022 10:00 AM  This report was finalized on [id number] by  Chad Car, .      XR Chest 1 View    Narrative  Examination: XR CHEST 1 VW-    Date of Exam: 5/31/2022 9:03 PM    Indication: Chest pain protocol.    Comparison: None available.    Technique: Single radiographic view of the chest was obtained.    Findings:  There is no pneumothorax,  pleural effusion or focal airspace  consolidation. Cardiomediastinal silhouette is unremarkable. Pulmonary  vasculature appears within normal limits. Regional bones appear grossly  intact.    Impression  No acute cardiopulmonary abnormality.    Electronically Signed By-Johann Dodge MD On:5/31/2022 9:29 PM  This report was finalized on [id number] by  Johann Dodge MD.      Results for orders placed during the hospital encounter of 05/31/22    Duplex Vein Mapping Lower Extremity - Bilateral CAR    Interpretation Summary  The greater saphenous veins are of satisfactory quality from the groin to the mid distal thigh bilaterally.  Distal to this the veins are small and inadequate.        ASSESSMENT / PLAN      COPD exacerbation (HCC)    Obesity (BMI 30-39.9)    Abnormal nuclear stress test    Acute renal insufficiency    Alcohol dependence (HCC)    Essential hypertension    Other emphysema (HCC)    Coronary artery disease    Syncope, unspecified syncope type    1. ARF/FELIBERTO------Nonoliguric. Resolved with volume repletion. BUN/Cr stable. CVP okay. No diuretics or IVFs (outside of meds right now)    2. CAD------CABG today    3. BENIGN ESSENTIAL HTN------BP okay. No ACE-I for now    4. BPH-------On Flomax    5. S/P SYNCOPE    6. ETOH ABUSE    7. HYPERLIPIDEMIA-------On Statin    8. DVT PROPHYLAXIS-------On Lovenox    9. HYPERKALEMIA--------Resolved      Amara Cooper MD  [facility]  [phone]  06/11/22  07:21 EDT

## 2022-06-11 NOTE — PROGRESS NOTES
Daily Progress Note        COPD exacerbation (HCC)    Obesity (BMI 30-39.9)    Abnormal nuclear stress test    Acute renal insufficiency    Alcohol dependence (HCC)    Essential hypertension    Other emphysema (HCC)    Coronary artery disease    Syncope, unspecified syncope type      Assessment    Postop CABG x4: 6/10/2022 , extubated successfully 6/11/2022  COPD   Spirometry 6/3/2022 and flow volume loop suggestive of severe obstructive and possible restrictive respiratory defect: Unfortunately total lung capacity and DLCO were not performed   FEV1 45% without significant response to bronchodilators, FEV1/FVC ratio 56 compatible with obstructive defect    Non-STEMI  Three-vessel coronary artery disease  Ascending aortic aneurysm 4.9205 cm  Hypertension  Syncope    Alcohol abuse  Dyslipidemia  Back pain with a lumbar herniation  Early prostate cancer  FELIBERTO: Resolving    History of tobacco smoking: Quit June 2021     Recommendations:    Oxygen supplement and titration: Requiring 4 L per NC   Hemodynamic support: As per CTS  Encourage use of spirometry/flutter valve  Inhaled corticosteroids/Bronchodilators  Statin  Blood pressure control  DVT prophylaxis Lovenox                  LOS: 6 days     Subjective     Mild shortness of breath    Objective     Vital signs for last 24 hours:  Vitals:    06/11/22 1100 06/11/22 1130 06/11/22 1200 06/11/22 1212   BP: 98/53 (!) 80/46 91/53    BP Location: Other (Comment)      Patient Position: Lying      Pulse: 89 89 89 89   Resp: 24      Temp: 99.3 °F (37.4 °C) 99 °F (37.2 °C) 98.8 °F (37.1 °C) 98.8 °F (37.1 °C)   TempSrc: Core      SpO2: 94% 93% 95% 94%   Weight:       Height:           Intake/Output last 3 shifts:  I/O last 3 completed shifts:  In: 3010 [P.O.:180; I.V.:1311; Blood:1519]  Out: 3436 [Urine:2935; Blood:350; Chest Tube:151]  Intake/Output this shift:  I/O this shift:  In: 581 [I.V.:581]  Out: 705 [Urine:550; Chest Tube:155]      Radiology  Imaging Results (Last 24  Hours)     Procedure Component Value Units Date/Time    XR Chest 1 View [[id number]] Collected: 06/11/22 0835     Updated: 06/11/22 0841    Narrative:      DATE OF EXAM:  6/11/2022 5:05 AM     PROCEDURE:  XR CHEST 1 VW-     INDICATIONS:  Post-Op Heart Surgery; R55-Syncope and collapse; F10.920-Alcohol use,  unspecified with intoxication, uncomplicated; J96.01-Acute respiratory  failure with hypoxia; I95.1-Orthostatic hypotension; R77.8-Other  specified abnormalities of plasma proteins; N17.9-Acute kidney failure,  unspecified; J44.1-Chronic obstructive pulmonary disease with (acute)  exacerbation; R94.39-Abnormal result of other cardi     COMPARISON:  06/10/2022     TECHNIQUE:   Single radiographic view of the chest was obtained.     FINDINGS:  Endotracheal tube tip is approximately 3 cm above the erika. [place]-Demarco  catheter appears to terminate at the main pulmonary artery.  Status post  median sternotomy and CABG. Heart size and mediastinal contour appear  unchanged.  Enteric tube appears to extend left upper quadrant, tip  beyond the margins of this exam. Is not infiltrate is present and  appears to be a left basilar chest tube. No significant pneumothorax is  seen. There are airspace opacities in the right upper lobe at the  periphery of the right midlung and there are left basilar airspace  opacities. These findings appear similar to the prior exam. No  significant infiltrate is seen. Possible trace left effusion, as before  No definite right pleural effusion.       Impression:      1.Tubes and lines appear in satisfactory in similar positions.  2.Right upper lobe and left basilar opacities appear grossly unchanged.  3.No definite pneumothorax. Possible trace left effusion, as before.     Electronically Signed By-Adam Anaya MD On:6/11/2022 8:38 AM  This report was finalized on [id number] by  Adam Anaya MD.    XR Chest 1 View [[id number]] Collected: 06/10/22 2208     Updated: 06/10/22 2213    Narrative:       DATE OF EXAM:  6/10/2022 9:46 PM     PROCEDURE:  XR CHEST 1 VW-     INDICATIONS:  Status post CABG procedure, history of syncope and collapse, coronary  artery disease.      COMPARISON:  06/05/2022.     TECHNIQUE:   Single radiographic view of the chest was obtained.     FINDINGS:  The patient has status post an interval CABG procedure. The tip of the  [place]-Demarco catheter terminates in the main pulmonary artery segment. The  endotracheal tube tip is in good position located between the thoracic  inlet and the aortic knob. The nasogastric tube tip projects out of the  field-of-view, but below the hemidiaphragms. There is a left basilar  chest tube in place. There are either one or two mediastinal chest tubes  in place. There is no pneumothorax. There are patchy densities in the  right upper lobe and the left lung base probably representing  atelectasis. There may be a trace left pleural effusion. There also may  be slight pulmonary vascular  congestion.       Impression:         1. Interval CABG procedure.  2. Lines and support tubes are in place as described. There is no  pneumothorax.  3. Patchy densities in the right upper lobe and left lung base probably  representing atelectasis.  4. Trace left pleural effusion.  5. Questionable slight pulmonary vascular congestion.     Electronically Signed By-Ziggy Gold MD On:6/10/2022 10:11 PM  This report was finalized on [id number] by  Ziggy Gold MD.          Labs:  Results from last 7 days   Lab Units 06/11/22  0355   WBC 10*3/mm3 15.20*   HEMOGLOBIN g/dL 10.0*   HEMATOCRIT % 30.1*   PLATELETS 10*3/mm3 145     Results from last 7 days   Lab Units 06/11/22  0355   SODIUM mmol/L 139   POTASSIUM mmol/L 4.4   CHLORIDE mmol/L 103   CO2 mmol/L 23.0   BUN mg/dL 24*   CREATININE mg/dL 1.24   CALCIUM mg/dL 8.7   BILIRUBIN mg/dL 0.6   ALK PHOS U/L 38*   ALT (SGPT) U/L 36   AST (SGOT) U/L 47*   GLUCOSE mg/dL 139*     Results from last 7 days   Lab Units 06/11/22  1256   PH,  ARTERIAL pH units 7.408   PO2 ART mm Hg 75.0*   PCO2, ARTERIAL mm Hg 40.1   HCO3 ART mmol/L 25.3     Results from last 7 days   Lab Units 06/11/22  0355 06/10/22  2201 06/09/22  0500   ALBUMIN g/dL 3.90 3.50 3.00*     Results from last 7 days   Lab Units 06/11/22  0355   CK TOTAL U/L 672*         Results from last 7 days   Lab Units 06/11/22  0355   MAGNESIUM mg/dL 2.9*     Results from last 7 days   Lab Units 06/10/22  2201 06/10/22  1950 06/09/22  0500   INR  1.14* 1.25* 1.05   APTT seconds 27.7 29.7 26.6               Meds:   SCHEDULE  arformoterol, 15 mcg, Nebulization, BID - RT  aspirin, 81 mg, Oral, Daily  [START ON 6/12/2022] atorvastatin, 40 mg, Oral, Nightly  budesonide, 0.5 mg, Nebulization, BID - RT  chlorhexidine, 15 mL, Mouth/Throat, Q12H  [START ON 6/12/2022] enoxaparin, 40 mg, Subcutaneous, Q24H  [MAR Hold] gabapentin, 300 mg, Oral, Q8H  guaiFENesin, 1,200 mg, Oral, Q12H  [START ON 6/13/2022] insulin lispro, 0-7 Units, Subcutaneous, TID AC  magnesium sulfate, 1 g, Intravenous, Q8H  mupirocin, 1 application, Each Nare, BID  [START ON 6/12/2022] pantoprazole, 40 mg, Oral, QAM  senna-docusate sodium, 2 tablet, Oral, Nightly  vancomycin, 15 mg/kg, Intravenous, Q12H      Infusions  dexmedetomidine, 0.2-1.5 mcg/kg/hr, Last Rate: Stopped (06/11/22 1050)  EPINEPHrine, 0.02-0.3 mcg/kg/min, Last Rate: 0.02 mcg/kg/min (06/10/22 2300)  insulin, 0-100 Units/hr, Last Rate: 1.2 Units/hr (06/11/22 1613)  milrinone, 0.25-0.75 mcg/kg/min, Last Rate: 0.25 mcg/kg/min (06/11/22 1564)  norepinephrine, 0.02-0.06 mcg/kg/min, Last Rate: 0.02 mcg/kg/min (06/10/22 9033)      PRNs  •  acetaminophen **OR** acetaminophen **OR** acetaminophen  •  bisacodyl  •  dextrose  •  dextrose  •  glucagon (human recombinant)  •  HYDROcodone-acetaminophen  •  [START ON 6/13/2022] insulin lispro **AND** [START ON 6/13/2022] insulin lispro  •  magnesium hydroxide  •  Morphine **AND** naloxone  •  norepinephrine  •  ondansetron  •  polyethylene  glycol  •  potassium chloride **OR** potassium chloride  •  potassium chloride **OR** potassium chloride    Physical Exam:  Physical Exam  Vitals reviewed.   Constitutional:       Appearance: He is obese.   Pulmonary:      Breath sounds: Decreased breath sounds present.   Skin:     General: Skin is warm and dry.   Neurological:      Mental Status: He is alert.         ROS  Constitutional: Negative for chills, fever and malaise/fatigue.   HENT: Negative.    Eyes: Negative.    Cardiovascular: Expected postop incisional pain.    Respiratory: Positive for cough and shortness of breath.    Skin: Negative.    Musculoskeletal: Negative.    Gastrointestinal: Negative.    Genitourinary: Negative.    Neurological: Negative.    Psychiatric/Behavioral: Negative.  I have reviewed the patient's new clinical results.

## 2022-06-11 NOTE — PROGRESS NOTES
Cardiology Progress Note      Admiting Physician:  Benson Hughes, *   LOS: 6 days       Reason For Followup:  Multivessel coronary artery disease      Subjective:    Interval History:  Seen and examined.  Chart and labs reviewed.  Patient appears to be in a normal cognitive state.  Status post surgery  In bed, hemodynamically electrically stable  Lines and tubes noted  Mildly low blood pressures continues on vasopressors and inotropes  Chest tubes in place draining well    Nursing at bedside discussed care    Review of Systems:  A complete review of systems was attempted however patient's cognitive status is questionable.  He denies chest pain or shortness of breath at this time.    Assessment & Plan    Impressions:  Syncope  Multivessel coronary artery disease  Hyperlipidemia  Hypertension  Ischemic cardiomyopathy EF 40%  Acute kidney injury-improved  Alcohol dependence  Altered mental status likely secondary to alcohol withdrawal    Recommendations:  Status post four-vessel bypass, LIMA to LAD, SVG to diagonal obtuse marginal and PLV branch  Left leg endovascular vein harvest  A sitting aortic replacement with 30 mm graft    Continue lines and tubes  Continue inotropes and vasopressors  Does not appear volume overloaded    Renal function has significantly improved since he is on IV fluids  Monitor rate and rhythm closely    Alcohol withdrawal/altered mentation are clear and is doing better now.  Patient is much better now and does not have any withdrawal symptoms    Appreciate surgical help with postoperative care    Objective:    Medication Review:   Scheduled Meds:arformoterol, 15 mcg, Nebulization, BID - RT  aspirin, 81 mg, Oral, Daily  [START ON 6/12/2022] atorvastatin, 40 mg, Oral, Nightly  budesonide, 0.5 mg, Nebulization, BID - RT  chlorhexidine, 15 mL, Mouth/Throat, Q12H  [START ON 6/12/2022] enoxaparin, 40 mg, Subcutaneous, Q24H  [MAR Hold] gabapentin, 300 mg, Oral, Q8H  guaiFENesin, 1,200 mg,  Oral, Q12H  [START ON 6/13/2022] insulin lispro, 0-7 Units, Subcutaneous, TID AC  magnesium sulfate, 1 g, Intravenous, Q8H  mupirocin, 1 application, Each Nare, BID  [START ON 6/12/2022] pantoprazole, 40 mg, Oral, QAM  senna-docusate sodium, 2 tablet, Oral, Nightly  vancomycin, 15 mg/kg, Intravenous, Q12H      Continuous Infusions:dexmedetomidine, 0.2-1.5 mcg/kg/hr, Last Rate: Stopped (06/11/22 1050)  EPINEPHrine, 0.02-0.3 mcg/kg/min, Last Rate: 0.02 mcg/kg/min (06/10/22 2300)  insulin, 0-100 Units/hr, Last Rate: 4.2 Units/hr (06/11/22 1243)  milrinone, 0.25-0.75 mcg/kg/min, Last Rate: 0.25 mcg/kg/min (06/11/22 1244)  norepinephrine, 0.02-0.06 mcg/kg/min, Last Rate: 0.02 mcg/kg/min (06/10/22 2223)      PRN Meds:.•  acetaminophen **OR** acetaminophen **OR** acetaminophen  •  bisacodyl  •  dextrose  •  dextrose  •  glucagon (human recombinant)  •  HYDROcodone-acetaminophen  •  [START ON 6/13/2022] insulin lispro **AND** [START ON 6/13/2022] insulin lispro  •  magnesium hydroxide  •  Morphine **AND** naloxone  •  norepinephrine  •  ondansetron  •  polyethylene glycol  •  potassium chloride **OR** potassium chloride  •  potassium chloride **OR** potassium chloride    Patient Active Problem List   Diagnosis   • COPD exacerbation (HCC)   • Obesity (BMI 30-39.9)   • Abnormal nuclear stress test   • Acute renal insufficiency   • Alcohol dependence (HCC)   • Essential hypertension   • Other emphysema (HCC)   • Coronary artery disease   • Syncope, unspecified syncope type         Physical Exam:    General: Alert, cooperative, no distress, appears stated age, up to chair  Lines and tubes right IJ line was swollen  Head:  Normocephalic, atraumatic, mucous membranes moist  Eyes:  Conjunctivae/corneas clear, EOM's intact     Neck:  Supple,  no bruit  Lungs:  Clear to auscultation bilaterally, no wheezes rhonchi rales are noted, chest tubes  Chest wall: No tenderness  Heart::  Regular rate and rhythm, S1 and S2 normal, 1/6  holosystolic murmur.  No rub or gallop  Abdomen: Soft, non-tender, nondistended bowel sounds active.  Obese  Extremities: No cyanosis, clubbing, or edema, leg bandaged  Pulses: Diminished pedal pulses  Skin:  No rashes or lesions  Neuro/psych: A&O x3. CN II through XII are grossly intact with appropriate affect    Vital Signs:  Vitals:    06/11/22 1100 06/11/22 1130 06/11/22 1200 06/11/22 1212   BP: 98/53 (!) 80/46 91/53    BP Location: Other (Comment)      Patient Position: Lying      Pulse: 89 89 89 89   Resp: 24      Temp: 99.3 °F (37.4 °C) 99 °F (37.2 °C) 98.8 °F (37.1 °C) 98.8 °F (37.1 °C)   TempSrc: Core      SpO2: 94% 93% 95% 94%   Weight:       Height:         Wt Readings from Last 1 Encounters:   06/11/22 110 kg (243 lb 6.2 oz)       Intake/Output Summary (Last 24 hours) at 6/11/2022 1252  Last data filed at 6/11/2022 1200  Gross per 24 hour   Intake 2915 ml   Output 3266 ml   Net -351 ml         Results Review:     CBC    Results from last 7 days   Lab Units 06/11/22  0355 06/11/22  0101 06/10/22  2254 06/10/22  2201 06/10/22  2147 06/10/22  1950 06/10/22  1925 06/10/22  1430 06/10/22  0403 06/09/22  0500 06/08/22  0403 06/07/22  0411   WBC 10*3/mm3 15.20*  --   --  22.40*  --  24.90*  --   --  11.40* 10.10 7.70 8.10   HEMOGLOBIN g/dL 10.0*  --   --  10.5*  --  8.5*  --   --  13.0 13.8 13.3 13.2   HEMOGLOBIN, POC g/dL  --  10.6* 11.2*  --  11.8*  --  9.9*   < >  --   --   --   --    PLATELETS 10*3/mm3 145  --   --  170  --  186  --   --  247 250 237 229    < > = values in this interval not displayed.     Cr Clearance Estimated Creatinine Clearance: 71.8 mL/min (by C-G formula based on SCr of 1.24 mg/dL).  Coag   Results from last 7 days   Lab Units 06/10/22  2201 06/10/22  1950 06/09/22  0500   INR  1.14* 1.25* 1.05   APTT seconds 27.7 29.7 26.6     HbA1C   Lab Results   Component Value Date    HGBA1C 5.5 06/03/2022     Blood Glucose   Glucose   Date/Time Value Ref Range Status   06/11/2022 1138 154 (H) 70  - 105 mg/dL Final     Comment:     Serial Number: [id number]:  [id number]   06/11/2022 1048 141 (H) 74 - 100 mg/dL Final     Comment:     Serial Number: [id number]:  [id number]   06/11/2022 0915 120 (H) 70 - 105 mg/dL Final     Comment:     Serial Number: [id number]:  [id number]   06/11/2022 0705 110 (H) 74 - 100 mg/dL Final     Comment:     Serial Number: [id number]:  [id number]   06/11/2022 0348 137 (H) 74 - 100 mg/dL Final     Comment:     Serial Number: [id number]:  [id number]   06/11/2022 0101 147 (H) 74 - 100 mg/dL Final   06/11/2022 0101 147 (H) 74 - 100 mg/dL Final     Comment:     Serial Number: [id number]:  [id number]   06/10/2022 2254 134 (H) 74 - 100 mg/dL Final   06/10/2022 2254 134 (H) 74 - 100 mg/dL Final     Comment:     Serial Number: [id number]:  [id number]     Infection       CMP   Results from last 7 days   Lab Units 06/11/22  0355 06/10/22  2201 06/10/22  0403 06/09/22  0500 06/08/22  1411 06/08/22  0403 06/07/22  0411 06/05/22  0539 06/05/22  0539 06/04/22  1744   SODIUM mmol/L 139 137 136 136  --  136 134*  --  134*  --    POTASSIUM mmol/L 4.4 4.7 4.7 4.2 4.7 5.7* 4.8   < > 3.9  --    CHLORIDE mmol/L 103 102 96* 98  --  98 98  --  97*  --    CO2 mmol/L 23.0 26.0 30.0* 28.0  --  30.0* 27.0  --  27.0  --    BUN mg/dL 24* 24* 26* 22  --  27* 27*  --  16  --    CREATININE mg/dL 1.24 1.36* 1.17 0.85  --  0.96 0.89  --  0.98  --    GLUCOSE mg/dL 139* 146* 104* 85  --  128* 139*  --  105*  --    ALBUMIN g/dL 3.90 3.50  --  3.00*  --   --   --   --   --   --    BILIRUBIN mg/dL 0.6 0.8  --  0.3  --   --   --   --   --   --    ALK PHOS U/L 38* 43  --  52  --   --   --   --   --   --    AST (SGOT) U/L 47* 46*  --  25  --   --   --   --   --   --    ALT (SGPT) U/L 36 40  --  44*  --   --   --   --   --   --    AMMONIA umol/L  --   --   --   --   --   --   --   --   --  34    < > = values in this interval not displayed.     ABG    Results from last 7 days   Lab Units 06/11/22  1048 06/11/22  0705  06/11/22  0348 06/11/22  0101 06/10/22  2254 06/10/22  2147 06/10/22  1925   PH, ARTERIAL pH units 7.427 7.393 7.390 7.359 7.368 7.309* 7.240*   PCO2, ARTERIAL mm Hg 36.2 41.5 43.9 46.9 45.7 55.4* 67.4*   PO2 ART mm Hg 85.9 110.4* 99.2 139.8* 94.5 94.4 187.0*   O2 SATURATION ART % 96.8 98.3* 97.6 99.0* 97.0 96.4 99.0*   BASE EXCESS ART mmol/L -0.3* 0.3 1.3 0.6 0.6 0.7 2.0     UA        ANGELO  No results found for: POCMETH, POCAMPHET, POCBARBITUR, POCBENZO, POCCOCAINE, POCOPIATES, POCOXYCODO, POCPHENCYC, POCPROPOXY, POCTHC, POCTRICYC  Lysis Labs   Results from last 7 days   Lab Units 06/11/22  0355 06/11/22  0101 06/10/22  2254 06/10/22  2201 06/10/22  2147 06/10/22  1950 06/10/22  1925 06/10/22  1430 06/10/22  0403 06/09/22  0500 06/08/22  0403 06/07/22  0411 06/05/22  0540 06/05/22  0539   INR   --   --   --  1.14*  --  1.25*  --   --   --  1.05  --   --   --   --    APTT seconds  --   --   --  27.7  --  29.7  --   --   --  26.6  --   --   --   --    FIBRINOGEN mg/dL  --   --   --  449  --  436  --   --   --   --   --   --   --   --    HEMOGLOBIN g/dL 10.0*  --   --  10.5*  --  8.5*  --   --  13.0 13.8 13.3 13.2   < >  --    HEMOGLOBIN, POC g/dL  --  10.6* 11.2*  --  11.8*  --  9.9*   < >  --   --   --   --   --   --    PLATELETS 10*3/mm3 145  --   --  170  --  186  --   --  247 250 237 229   < >  --    CREATININE mg/dL 1.24  --   --  1.36*  --   --   --   --  1.17 0.85 0.96 0.89  --  0.98    < > = values in this interval not displayed.     Radiology(recent) XR Chest 1 View    Result Date: 6/11/2022  1.Tubes and lines appear in satisfactory in similar positions. 2.Right upper lobe and left basilar opacities appear grossly unchanged. 3.No definite pneumothorax. Possible trace left effusion, as before.  Electronically Signed By-Adam Anaya MD On:6/11/2022 8:38 AM This report was finalized on [id number] by  Adam Anaya MD.    XR Chest 1 View    Result Date: 6/10/2022   1. Interval CABG procedure. 2. Lines and  support tubes are in place as described. There is no pneumothorax. 3. Patchy densities in the right upper lobe and left lung base probably representing atelectasis. 4. Trace left pleural effusion. 5. Questionable slight pulmonary vascular congestion.  Electronically Signed By-Ziggy Gold MD On:6/10/2022 10:11 PM This report was finalized on [id number] by  Ziggy Gold MD.        Results from last 7 days   Lab Units 06/11/22  0355   CK TOTAL U/L 672*       Imaging Results (Last 24 Hours)     Procedure Component Value Units Date/Time    XR Chest 1 View [[id number]] Collected: 06/11/22 0835     Updated: 06/11/22 0841    Narrative:      DATE OF EXAM:  6/11/2022 5:05 AM     PROCEDURE:  XR CHEST 1 VW-     INDICATIONS:  Post-Op Heart Surgery; R55-Syncope and collapse; F10.920-Alcohol use,  unspecified with intoxication, uncomplicated; J96.01-Acute respiratory  failure with hypoxia; I95.1-Orthostatic hypotension; R77.8-Other  specified abnormalities of plasma proteins; N17.9-Acute kidney failure,  unspecified; J44.1-Chronic obstructive pulmonary disease with (acute)  exacerbation; R94.39-Abnormal result of other cardi     COMPARISON:  06/10/2022     TECHNIQUE:   Single radiographic view of the chest was obtained.     FINDINGS:  Endotracheal tube tip is approximately 3 cm above the erika. [place]-Demarco  catheter appears to terminate at the main pulmonary artery.  Status post  median sternotomy and CABG. Heart size and mediastinal contour appear  unchanged.  Enteric tube appears to extend left upper quadrant, tip  beyond the margins of this exam. Is not infiltrate is present and  appears to be a left basilar chest tube. No significant pneumothorax is  seen. There are airspace opacities in the right upper lobe at the  periphery of the right midlung and there are left basilar airspace  opacities. These findings appear similar to the prior exam. No  significant infiltrate is seen. Possible trace left effusion, as before  No  definite right pleural effusion.       Impression:      1.Tubes and lines appear in satisfactory in similar positions.  2.Right upper lobe and left basilar opacities appear grossly unchanged.  3.No definite pneumothorax. Possible trace left effusion, as before.     Electronically Signed By-Adam Anaya MD On:6/11/2022 8:38 AM  This report was finalized on [id number] by  Adam Anaya MD.    XR Chest 1 View [[id number]] Collected: 06/10/22 2208     Updated: 06/10/22 2213    Narrative:      DATE OF EXAM:  6/10/2022 9:46 PM     PROCEDURE:  XR CHEST 1 VW-     INDICATIONS:  Status post CABG procedure, history of syncope and collapse, coronary  artery disease.      COMPARISON:  06/05/2022.     TECHNIQUE:   Single radiographic view of the chest was obtained.     FINDINGS:  The patient has status post an interval CABG procedure. The tip of the  [place]-Demarco catheter terminates in the main pulmonary artery segment. The  endotracheal tube tip is in good position located between the thoracic  inlet and the aortic knob. The nasogastric tube tip projects out of the  field-of-view, but below the hemidiaphragms. There is a left basilar  chest tube in place. There are either one or two mediastinal chest tubes  in place. There is no pneumothorax. There are patchy densities in the  right upper lobe and the left lung base probably representing  atelectasis. There may be a trace left pleural effusion. There also may  be slight pulmonary vascular  congestion.       Impression:         1. Interval CABG procedure.  2. Lines and support tubes are in place as described. There is no  pneumothorax.  3. Patchy densities in the right upper lobe and left lung base probably  representing atelectasis.  4. Trace left pleural effusion.  5. Questionable slight pulmonary vascular congestion.     Electronically Signed By-Ziggy Gold MD On:6/10/2022 10:11 PM  This report was finalized on [id number] by  Ziggy Gold MD.          Cardiac  Studies:  Echo- Results for orders placed during the hospital encounter of 05/31/22    Adult Transthoracic Echo Complete With Contrast if Necessary Per Protocol (With Agitated Saline)    Interpretation Summary  · Left ventricular ejection fraction appears to be 56 - 60%.  · The right ventricular cavity is mildly dilated.  · Mild dilation of the aortic root is present.  · No pericardial effusion noted    Stress Myoview-  Cath-        Adam Jackson MD  06/11/22  12:52 EDT

## 2022-06-11 NOTE — PLAN OF CARE
Goal Outcome Evaluation:  Plan of Care Reviewed With: patient           Outcome Evaluation: Pt is a 68 yo M s/p CABGx4, EVH of the legs, and abdominal aortic repalcement with a 30 mm graft on 6/10/22. Hx Etoh withdrawal. Per chart review, pt independent with ADLs at baseline. Pt with overall impairments in activity tolerance, strength, endurance, trunk control, and mobility this date. Recommending dc to IPR at dc.

## 2022-06-11 NOTE — OP NOTE
Operative Note    Date of Dictation: 06/10/22    Date of Procedure: 06/10/22    Referring Physician: Benson Hughes,*    Preoperative diagnosis: Multivessel CAD and Ascending aortic aneurysm    Postoperative diagnosis: Same    Procedure:   1. CABG x 4 ([place] to LAD, SVG to DG, SVG to OM, SVG to PV)  2. EVH of the left legs  3.  Ascending aorta replacement with 30 mm graft    Surgeon: Benson Hughes MD     Assistants: SIGIFREDO Aguilar was responsible for performing the following activities: Cardiac Surgery First assist, Endoscopic Vein [place] for CABG, surgical wound closure and their skilled assistance was necessary for the success of this case.     Anesthesia: General endotracheal anesthesia and JANELLE    Findings:  The saphenous vein was harvested endoscopically form the left  leg. The vein had a diameter of 3.5 mm and was of good quality. The coronaries had a diameter of 2 mm and were of good quality.      Estimated Blood Loss:  750mL of Cell Saver returned.    STS Data: The STS Risk and all risks and alternatives were discussed with the patient and family.  Counseling was done regarding abuse of tobacco, alcohol and drugs as needed. They understand and wish to proceed. The antibiotics and b blockers were given in the STS required window.          Description of the procedure:     The patient was placed supine on the operative table. General anesthesia was given and lines placed. The patient was prepped and draped using the usual sterile technique. A median sternotomy was performed with a scalpel and the layers carried down to the sternum using the electrocautery. The sternum was split in the midline using a vertical oscillating saw. Hemostasis was achieved. The LIMA was harvested as a pedicle and prepared with papaverine. It was of good quality. 300 units/kg of IV heparin was given to an ACT over 400. A Favaloro retractor was placed and the mediastinum exposed, the pericardium was opened and the edges  tacked to the wound. Cannulation sutures were placed in the ascending aorta and right atrium. Small cannulas were placed and aorto right atrial cardiopulmonary bypass was started. Cardioplegia cannulas were placed. Cardiopulmonary bypass was then established for 173 minutes drifting to 34°C at appropriate flow rates.  The aorta was crossclamped the distal aorta and somatosensory motor evoked potentials were normal.  The aorta was crossclamped for 142 minutes and we gave 1000 cc of antegrade cold blood cardioplegia then 200L of retrograde cold blood cardioplegia and then repeated doses every 10 to 15 minutes to good effect.   The first vein was sewn to the obtuse marginal one of the circunflex artery with 7-0 Prolene.  The next vein was sewn to first diagonal with 7-0 Prolene.  The third vein was sewn posterior descending artery of the right coronary artery with 7-0 Prolene. The LIMA was sewn to the proximal LAD with 7-0 Prolene.  The proximal LAD was intramyocardial and the anastomosis was done very proximal. An ascending aorta aneurysm was resected and was replaced with 30 mm dacron graft from the sinotubular junction to the distal ascending. 2 veins were anastomosed to the ascending aorta graft with 6-0 Prolene and marked with washers.  The vein graft to the diagonal was sewn to the proximal vein graft to the obtuse marginal because it had not enough length. A warm dose of cardioplegia was given and then the aortic clamp removed as well as the LIMA bulldog clamp. All anastomoses were checked and had good flow and morphology. The left pleural space was suctioned and the lungs ventilated. The heart was paced till regular atrial rhythm resumed. I allowed the heart to eject and once hemodynamics were acceptable, then the CPB was discontinued and the venous and cardioplegia cannulas removed. The matching dose of protamine was given and the aortic cannula removed as well. AV temporary wires and pleural and mediastinal  chest tubes were placed and the wound sprayed with platelet rich plasma. The sternum was closed with single and double wires and soft tissue in layers of reabsorbable material. The wounds were covered with sterile dressings.       Specimen removed:  Mammary Chain lymph node    CPB time:  173 minutes.    Aortic clamp time:  142 minutes    Complications:  none           Disposition: Cardiovascular recovery room           Condition: Critical but stable.

## 2022-06-11 NOTE — PROGRESS NOTES
S/P POD# 1 CABG x4 with LIMA/ asc ao replacement--[place]  EF 40% (cath)    Subjective:  Wants tube out    Extubated this morning  Drips:  milr .375, epi .02, insulin  CI 2.2, CVP 11, SVR 1010  Wt is up 6  kgs from preop  CXR:  Left effusion      Intake/Output Summary (Last 24 hours) at 6/11/2022 0854  Last data filed at 6/11/2022 0644  Gross per 24 hour   Intake 2830 ml   Output 2836 ml   Net -6 ml     Temp:  [97.3 °F (36.3 °C)-98.6 °F (37 °C)] 98.6 °F (37 °C)  Heart Rate:  [60-89] 89  Resp:  [12-20] 13  BP: ()/(46-77) 108/55  Arterial Line BP: ()/(42-65) 113/54  FiO2 (%):  [40 %-70 %] 40 %  MCT 25/8  PCT 85/8    Results from last 7 days   Lab Units 06/11/22  0355 06/11/22  0101 06/10/22  2254 06/10/22  2201 06/10/22  2147 06/10/22  1950 06/10/22  0403 06/09/22  0500   WBC 10*3/mm3 15.20*  --   --  22.40*  --  24.90*   < > 10.10   HEMOGLOBIN g/dL 10.0*  --   --  10.5*  --  8.5*   < > 13.8   HEMOGLOBIN, POC g/dL  --  10.6*   < >  --    < >  --    < >  --    HEMATOCRIT % 30.1*  --   --  32.8*  --  25.6*   < > 41.7   HEMATOCRIT POC %  --  31*   < >  --    < >  --    < >  --    PLATELETS 10*3/mm3 145  --   --  170  --  186   < > 250   INR   --   --   --  1.14*  --  1.25*  --  1.05    < > = values in this interval not displayed.     Results from last 7 days   Lab Units 06/11/22  0355   CREATININE mg/dL 1.24   POTASSIUM mmol/L 4.4   SODIUM mmol/L 139   MAGNESIUM mg/dL 2.9*   PHOSPHORUS mg/dL 4.2     ica 1.2    Physical Exam:  Neuro intact, nad, resting in bed on CPAP  Tele:  AAI 90, underlying SB-SR 50-60s  Diminished bases, CPAP  dsg c/d/i, no drainage  Benign abd, no BM  No edema    Assessment/Plan:  Active Problems:    COPD exacerbation (HCC)    Obesity (BMI 30-39.9)    Abnormal nuclear stress test    Acute renal insufficiency    Alcohol dependence (HCC)    Essential hypertension    Other emphysema (HCC)    Coronary artery disease    Syncope, unspecified syncope type    - MV CAD, asc ao aneurysm  (4.9-5 cm),  EF 40% (cath)--s/p CABG x4 with LIMA/ asc ao replacement ([place])  - NSTEMI presentation  - Admit with syncopal event x2--orthostatic on admit  - Severe COPD--pulm following, on steroids  - HTN--stable  - HLD--statin  - Lumbar herniation--back surgery pending  - Early prostate cancer--has follow up as outpatient, probable radiation treatment per patient  - FELIBERTO--resolved with volume repletion, Dr. Casarez following  - ETOH use--reports 3 beers/day and bourbon--withdrawal this weekend, improved  - MRSA nasal colonization--vanc/Bactroban  - Postop leukocytosis, multifactorial--watch closely    POD# 1.  Working towards extubation this morning.  Remains on dual inotropes.  Will wean milrinone slowly given heart function and per Dr. Hughes's direction.  Renal following since preop.  On asa/statin, holding bb for now.    Addendum:  Pt was extubated.  Noted to be orthostatic with standing.  Will give an additional albumin slowly.  Relatively dry.  Decrease milrinone to .25.    Routine care--as above  Did NOT de-escal  D/w pt/nsg, Dr. Hughes, Dr. Hemal HAWTHORNE plan TBD    Christal Vora, APRN  6/11/2022  08:54 EDT

## 2022-06-11 NOTE — PLAN OF CARE
Problem: Skin Injury Risk Increased  Goal: Skin Health and Integrity  Outcome: Ongoing, Progressing   Goal Outcome Evaluation:         Pt remains intubated on vent per direction of . MD states to wean pt at the end of my shift. Pt is occasionally restless and anxious when he awakens. Sedation titrated to keep pt calm. Vitals stable. No acute distress noted. Will continue to monitor.

## 2022-06-12 ENCOUNTER — APPOINTMENT (OUTPATIENT)
Dept: GENERAL RADIOLOGY | Facility: HOSPITAL | Age: 67
End: 2022-06-12

## 2022-06-12 LAB
ANION GAP SERPL CALCULATED.3IONS-SCNC: 10 MMOL/L (ref 5–15)
BH BB BLOOD EXPIRATION DATE: NORMAL
BH BB BLOOD TYPE BARCODE: 2800
BH BB BLOOD TYPE BARCODE: 5100
BH BB BLOOD TYPE BARCODE: 5100
BH BB BLOOD TYPE BARCODE: 7300
BH BB DISPENSE STATUS: NORMAL
BH BB PRODUCT CODE: NORMAL
BH BB UNIT NUMBER: NORMAL
BUN SERPL-MCNC: 16 MG/DL (ref 8–23)
BUN/CREAT SERPL: 21.1 (ref 7–25)
CALCIUM SPEC-SCNC: 8.1 MG/DL (ref 8.6–10.5)
CHLORIDE SERPL-SCNC: 105 MMOL/L (ref 98–107)
CK SERPL-CCNC: 894 U/L (ref 20–200)
CO2 SERPL-SCNC: 22 MMOL/L (ref 22–29)
CREAT SERPL-MCNC: 0.76 MG/DL (ref 0.76–1.27)
D-LACTATE SERPL-SCNC: 1.2 MMOL/L (ref 0.5–2)
DEPRECATED RDW RBC AUTO: 48.1 FL (ref 37–54)
EGFRCR SERPLBLD CKD-EPI 2021: 98.5 ML/MIN/1.73
ERYTHROCYTE [DISTWIDTH] IN BLOOD BY AUTOMATED COUNT: 13.9 % (ref 12.3–15.4)
GLUCOSE BLDC GLUCOMTR-MCNC: 100 MG/DL (ref 70–105)
GLUCOSE BLDC GLUCOMTR-MCNC: 103 MG/DL (ref 70–105)
GLUCOSE BLDC GLUCOMTR-MCNC: 103 MG/DL (ref 70–105)
GLUCOSE BLDC GLUCOMTR-MCNC: 120 MG/DL (ref 70–105)
GLUCOSE BLDC GLUCOMTR-MCNC: 123 MG/DL (ref 70–105)
GLUCOSE BLDC GLUCOMTR-MCNC: 124 MG/DL (ref 70–105)
GLUCOSE BLDC GLUCOMTR-MCNC: 125 MG/DL (ref 70–105)
GLUCOSE BLDC GLUCOMTR-MCNC: 132 MG/DL (ref 70–105)
GLUCOSE BLDC GLUCOMTR-MCNC: 133 MG/DL (ref 70–105)
GLUCOSE SERPL-MCNC: 106 MG/DL (ref 65–99)
HCT VFR BLD AUTO: 30.3 % (ref 37.5–51)
HGB BLD-MCNC: 9.8 G/DL (ref 13–17.7)
MAGNESIUM SERPL-MCNC: 2.5 MG/DL (ref 1.6–2.4)
MCH RBC QN AUTO: 31.6 PG (ref 26.6–33)
MCHC RBC AUTO-ENTMCNC: 32.4 G/DL (ref 31.5–35.7)
MCV RBC AUTO: 97.7 FL (ref 79–97)
PHOSPHATE SERPL-MCNC: 2.4 MG/DL (ref 2.5–4.5)
PLATELET # BLD AUTO: 139 10*3/MM3 (ref 140–450)
PMV BLD AUTO: 8.5 FL (ref 6–12)
POTASSIUM SERPL-SCNC: 4.1 MMOL/L (ref 3.5–5.2)
QT INTERVAL: 394 MS
RBC # BLD AUTO: 3.1 10*6/MM3 (ref 4.14–5.8)
SODIUM SERPL-SCNC: 137 MMOL/L (ref 136–145)
UNIT  ABO: NORMAL
UNIT  RH: NORMAL
WBC NRBC COR # BLD: 12.4 10*3/MM3 (ref 3.4–10.8)

## 2022-06-12 PROCEDURE — 94799 UNLISTED PULMONARY SVC/PX: CPT

## 2022-06-12 PROCEDURE — 97110 THERAPEUTIC EXERCISES: CPT

## 2022-06-12 PROCEDURE — 25010000002 VANCOMYCIN 10 G RECONSTITUTED SOLUTION: Performed by: NURSE PRACTITIONER

## 2022-06-12 PROCEDURE — 82962 GLUCOSE BLOOD TEST: CPT

## 2022-06-12 PROCEDURE — 99233 SBSQ HOSP IP/OBS HIGH 50: CPT | Performed by: INTERNAL MEDICINE

## 2022-06-12 PROCEDURE — 97166 OT EVAL MOD COMPLEX 45 MIN: CPT

## 2022-06-12 PROCEDURE — 93005 ELECTROCARDIOGRAM TRACING: CPT | Performed by: NURSE PRACTITIONER

## 2022-06-12 PROCEDURE — 71045 X-RAY EXAM CHEST 1 VIEW: CPT

## 2022-06-12 PROCEDURE — 87205 SMEAR GRAM STAIN: CPT | Performed by: NURSE PRACTITIONER

## 2022-06-12 PROCEDURE — 80048 BASIC METABOLIC PNL TOTAL CA: CPT | Performed by: NURSE PRACTITIONER

## 2022-06-12 PROCEDURE — 25010000002 MORPHINE PER 10 MG: Performed by: NURSE PRACTITIONER

## 2022-06-12 PROCEDURE — 83735 ASSAY OF MAGNESIUM: CPT | Performed by: INTERNAL MEDICINE

## 2022-06-12 PROCEDURE — 94664 DEMO&/EVAL PT USE INHALER: CPT

## 2022-06-12 PROCEDURE — 25010000002 FUROSEMIDE PER 20 MG

## 2022-06-12 PROCEDURE — 83605 ASSAY OF LACTIC ACID: CPT | Performed by: NURSE PRACTITIONER

## 2022-06-12 PROCEDURE — 25010000002 EPINEPHRINE 1 MG/ML SOLUTION 30 ML VIAL: Performed by: ANESTHESIOLOGY

## 2022-06-12 PROCEDURE — 84100 ASSAY OF PHOSPHORUS: CPT | Performed by: INTERNAL MEDICINE

## 2022-06-12 PROCEDURE — 99024 POSTOP FOLLOW-UP VISIT: CPT | Performed by: NURSE PRACTITIONER

## 2022-06-12 PROCEDURE — 0 MILRINONE LACTATE IN DEXTROSE 20-5 MG/100ML-% SOLUTION

## 2022-06-12 PROCEDURE — 87070 CULTURE OTHR SPECIMN AEROBIC: CPT | Performed by: NURSE PRACTITIONER

## 2022-06-12 PROCEDURE — 94761 N-INVAS EAR/PLS OXIMETRY MLT: CPT

## 2022-06-12 PROCEDURE — 25010000002 ENOXAPARIN PER 10 MG: Performed by: NURSE PRACTITIONER

## 2022-06-12 PROCEDURE — 85027 COMPLETE CBC AUTOMATED: CPT | Performed by: NURSE PRACTITIONER

## 2022-06-12 PROCEDURE — 82550 ASSAY OF CK (CPK): CPT | Performed by: INTERNAL MEDICINE

## 2022-06-12 RX ORDER — SODIUM CHLORIDE FOR INHALATION 3 %
4 VIAL, NEBULIZER (ML) INHALATION ONCE
Status: COMPLETED | OUTPATIENT
Start: 2022-06-12 | End: 2022-06-12

## 2022-06-12 RX ORDER — FUROSEMIDE 10 MG/ML
40 INJECTION INTRAMUSCULAR; INTRAVENOUS ONCE
Status: COMPLETED | OUTPATIENT
Start: 2022-06-12 | End: 2022-06-12

## 2022-06-12 RX ORDER — POTASSIUM CHLORIDE 20 MEQ/1
20 TABLET, EXTENDED RELEASE ORAL DAILY
Status: DISCONTINUED | OUTPATIENT
Start: 2022-06-12 | End: 2022-06-17 | Stop reason: HOSPADM

## 2022-06-12 RX ORDER — HYDROCODONE BITARTRATE AND ACETAMINOPHEN 5; 325 MG/1; MG/1
2 TABLET ORAL EVERY 6 HOURS PRN
Status: DISCONTINUED | OUTPATIENT
Start: 2022-06-12 | End: 2022-06-17 | Stop reason: HOSPADM

## 2022-06-12 RX ORDER — TAMSULOSIN HYDROCHLORIDE 0.4 MG/1
0.4 CAPSULE ORAL DAILY
Status: DISCONTINUED | OUTPATIENT
Start: 2022-06-12 | End: 2022-06-13

## 2022-06-12 RX ORDER — HYDROCODONE BITARTRATE AND ACETAMINOPHEN 5; 325 MG/1; MG/1
1 TABLET ORAL EVERY 6 HOURS PRN
Status: DISCONTINUED | OUTPATIENT
Start: 2022-06-12 | End: 2022-06-17 | Stop reason: HOSPADM

## 2022-06-12 RX ORDER — FENTANYL/ROPIVACAINE/NS/PF 2-625MCG/1
15 PLASTIC BAG, INJECTION (ML) EPIDURAL ONCE
Status: COMPLETED | OUTPATIENT
Start: 2022-06-12 | End: 2022-06-12

## 2022-06-12 RX ORDER — ATORVASTATIN CALCIUM 40 MG/1
40 TABLET, FILM COATED ORAL NIGHTLY
Status: DISCONTINUED | OUTPATIENT
Start: 2022-06-13 | End: 2022-06-17 | Stop reason: HOSPADM

## 2022-06-12 RX ORDER — SODIUM CHLORIDE FOR INHALATION 3 %
4 VIAL, NEBULIZER (ML) INHALATION
Status: COMPLETED | OUTPATIENT
Start: 2022-06-12 | End: 2022-06-15

## 2022-06-12 RX ORDER — GABAPENTIN 100 MG/1
100 CAPSULE ORAL EVERY 8 HOURS SCHEDULED
Status: DISCONTINUED | OUTPATIENT
Start: 2022-06-12 | End: 2022-06-17 | Stop reason: HOSPADM

## 2022-06-12 RX ORDER — ALBUTEROL SULFATE 2.5 MG/3ML
2.5 SOLUTION RESPIRATORY (INHALATION) EVERY 6 HOURS PRN
Status: DISCONTINUED | OUTPATIENT
Start: 2022-06-12 | End: 2022-06-13

## 2022-06-12 RX ORDER — SODIUM CHLORIDE FOR INHALATION 3 %
4 VIAL, NEBULIZER (ML) INHALATION EVERY 8 HOURS
Status: DISCONTINUED | OUTPATIENT
Start: 2022-06-12 | End: 2022-06-12

## 2022-06-12 RX ORDER — POLYETHYLENE GLYCOL 3350 17 G/17G
17 POWDER, FOR SOLUTION ORAL 2 TIMES DAILY
Status: DISCONTINUED | OUTPATIENT
Start: 2022-06-12 | End: 2022-06-17 | Stop reason: HOSPADM

## 2022-06-12 RX ORDER — FUROSEMIDE 10 MG/ML
INJECTION INTRAMUSCULAR; INTRAVENOUS
Status: COMPLETED
Start: 2022-06-12 | End: 2022-06-12

## 2022-06-12 RX ADMIN — HYDROCODONE BITARTRATE AND ACETAMINOPHEN 1 TABLET: 5; 325 TABLET ORAL at 10:56

## 2022-06-12 RX ADMIN — POLYETHYLENE GLYCOL 3350 17 G: 17 POWDER, FOR SOLUTION ORAL at 11:08

## 2022-06-12 RX ADMIN — BUDESONIDE 0.5 MG: 0.5 INHALANT RESPIRATORY (INHALATION) at 19:20

## 2022-06-12 RX ADMIN — SENNOSIDES AND DOCUSATE SODIUM 2 TABLET: 50; 8.6 TABLET ORAL at 21:02

## 2022-06-12 RX ADMIN — GABAPENTIN 100 MG: 100 CAPSULE ORAL at 11:09

## 2022-06-12 RX ADMIN — VANCOMYCIN HYDROCHLORIDE 1500 MG: 10 INJECTION, POWDER, LYOPHILIZED, FOR SOLUTION INTRAVENOUS at 02:22

## 2022-06-12 RX ADMIN — GUAIFENESIN 1200 MG: 600 TABLET, EXTENDED RELEASE ORAL at 11:07

## 2022-06-12 RX ADMIN — HYDROCODONE BITARTRATE AND ACETAMINOPHEN 2 TABLET: 5; 325 TABLET ORAL at 21:01

## 2022-06-12 RX ADMIN — ARFORMOTEROL TARTRATE 15 MCG: 15 SOLUTION RESPIRATORY (INHALATION) at 06:58

## 2022-06-12 RX ADMIN — TAMSULOSIN HYDROCHLORIDE 0.4 MG: 0.4 CAPSULE ORAL at 11:06

## 2022-06-12 RX ADMIN — HYDROCODONE BITARTRATE AND ACETAMINOPHEN 1 TABLET: 5; 325 TABLET ORAL at 02:22

## 2022-06-12 RX ADMIN — PANTOPRAZOLE SODIUM 40 MG: 40 TABLET, DELAYED RELEASE ORAL at 06:29

## 2022-06-12 RX ADMIN — POTASSIUM PHOSPHATE, MONOBASIC AND POTASSIUM PHOSPHATE, DIBASIC 15 MMOL: 224; 236 INJECTION, SOLUTION, CONCENTRATE INTRAVENOUS at 11:08

## 2022-06-12 RX ADMIN — MILRINONE LACTATE 0.25 MCG/KG/MIN: 0.2 INJECTION, SOLUTION INTRAVENOUS at 11:10

## 2022-06-12 RX ADMIN — ASPIRIN 81 MG: 81 TABLET, COATED ORAL at 08:31

## 2022-06-12 RX ADMIN — GABAPENTIN 100 MG: 100 CAPSULE ORAL at 21:59

## 2022-06-12 RX ADMIN — ENOXAPARIN SODIUM 40 MG: 100 INJECTION SUBCUTANEOUS at 15:21

## 2022-06-12 RX ADMIN — SODIUM CHLORIDE SOLN NEBU 3% 4 ML: 3 NEBU SOLN at 12:08

## 2022-06-12 RX ADMIN — MORPHINE SULFATE 2 MG: 2 INJECTION, SOLUTION INTRAMUSCULAR; INTRAVENOUS at 03:58

## 2022-06-12 RX ADMIN — POLYETHYLENE GLYCOL 3350 17 G: 17 POWDER, FOR SOLUTION ORAL at 21:02

## 2022-06-12 RX ADMIN — HYDROCODONE BITARTRATE AND ACETAMINOPHEN 1 TABLET: 5; 325 TABLET ORAL at 06:29

## 2022-06-12 RX ADMIN — FUROSEMIDE 40 MG: 10 INJECTION, SOLUTION INTRAMUSCULAR; INTRAVENOUS at 15:30

## 2022-06-12 RX ADMIN — BUDESONIDE 0.5 MG: 0.5 INHALANT RESPIRATORY (INHALATION) at 07:02

## 2022-06-12 RX ADMIN — ARFORMOTEROL TARTRATE 15 MCG: 15 SOLUTION RESPIRATORY (INHALATION) at 19:16

## 2022-06-12 RX ADMIN — POTASSIUM CHLORIDE 20 MEQ: 1500 TABLET, EXTENDED RELEASE ORAL at 15:29

## 2022-06-12 RX ADMIN — MUPIROCIN 1 APPLICATION: 20 OINTMENT TOPICAL at 21:02

## 2022-06-12 RX ADMIN — FUROSEMIDE 40 MG: 10 INJECTION INTRAMUSCULAR; INTRAVENOUS at 15:30

## 2022-06-12 RX ADMIN — EPINEPHRINE 0.02 MCG/KG/MIN: 1 INJECTION INTRAMUSCULAR; INTRAVENOUS; SUBCUTANEOUS at 06:31

## 2022-06-12 RX ADMIN — CHLORHEXIDINE GLUCONATE 15 ML: 1.2 RINSE ORAL at 08:31

## 2022-06-12 RX ADMIN — MUPIROCIN 1 APPLICATION: 20 OINTMENT TOPICAL at 08:32

## 2022-06-12 RX ADMIN — SODIUM CHLORIDE SOLN NEBU 3% 4 ML: 3 NEBU SOLN at 19:24

## 2022-06-12 RX ADMIN — IPRATROPIUM BROMIDE AND ALBUTEROL SULFATE 3 ML: .5; 3 SOLUTION RESPIRATORY (INHALATION) at 12:02

## 2022-06-12 RX ADMIN — CHLORHEXIDINE GLUCONATE 15 ML: 1.2 RINSE ORAL at 22:00

## 2022-06-12 NOTE — PROGRESS NOTES
Cardiology Progress Note      Admiting Physician:  Benson Hughes, *   LOS: 7 days       Reason For Followup:  Multivessel coronary artery disease      Subjective:    Interval History:  Seen and examined.  Chart and labs reviewed.  Patient appears to be in a normal cognitive state.  Status post surgery, sitting upright at bedside  In bed, hemodynamically electrically stable  Lines and tubes noted, lower pressor requirement, did not sleep well he says    Chest tubes in place draining well  Underlying sinus rhythm, pacing rate decreased to allow normal conduction  Epinephrine discontinued today, continues on milrinone, systolics in the 150s to 160s, may need to start afterload reduction with amlodipine or losartan gently    Nursing at bedside discussed care    Review of Systems:  A complete review of systems negative x14 point review of systems except as mentioned above he denies anginal chest pain or shortness of breath at this time.  Postsurgical pain noted    Assessment & Plan    Impressions:  Syncope  Multivessel coronary artery disease  Hyperlipidemia  Hypertension  Ischemic cardiomyopathy EF 40%  Acute kidney injury-improved  Alcohol dependence  Altered mental status likely secondary to alcohol withdrawal    Recommendations:  Status post four-vessel bypass, LIMA to LAD, SVG to diagonal obtuse marginal and PLV branch  Left leg endovascular vein harvest  A sending aortic replacement with 30 mm graft    Continue lines and tubes  Continue inotropes and vasopressors, off epi, continues on milrinone, hypertensive, may need losartan or amlodipine added to his regimen for afterload reduction if systolics remain, most recently charted 114/46, will follow blood pressures and use afterload reduction if needed  Does not appear volume overloaded    Renal function has significantly improved   Monitor rate and rhythm closely    Alcohol withdrawal/altered mentation are clear and is doing better now.  Patient is much  better now and does not have any withdrawal symptoms    Appreciate surgical help with postoperative care    Objective:    Medication Review:   Scheduled Meds:arformoterol, 15 mcg, Nebulization, BID - RT  aspirin, 81 mg, Oral, Daily  [START ON 6/13/2022] atorvastatin, 40 mg, Oral, Nightly  budesonide, 0.5 mg, Nebulization, BID - RT  chlorhexidine, 15 mL, Mouth/Throat, Q12H  enoxaparin, 40 mg, Subcutaneous, Q24H  gabapentin, 100 mg, Oral, Q8H  guaiFENesin, 1,200 mg, Oral, Q12H  [START ON 6/13/2022] insulin lispro, 0-7 Units, Subcutaneous, TID AC  ipratropium-albuterol, 3 mL, Nebulization, Q6H While Awake - RT  mupirocin, 1 application, Each Nare, BID  pantoprazole, 40 mg, Oral, QAM  polyethylene glycol, 17 g, Oral, BID  senna-docusate sodium, 2 tablet, Oral, Nightly  sodium chloride, 4 mL, Nebulization, Once  tamsulosin, 0.4 mg, Oral, Daily      Continuous Infusions:insulin, 0-100 Units/hr, Last Rate: 1.8 Units/hr (06/12/22 0719)  milrinone, 0.25-0.75 mcg/kg/min, Last Rate: 0.25 mcg/kg/min (06/12/22 1110)      PRN Meds:.•  acetaminophen **OR** acetaminophen **OR** acetaminophen  •  bisacodyl  •  dextrose  •  dextrose  •  glucagon (human recombinant)  •  HYDROcodone-acetaminophen **OR** HYDROcodone-acetaminophen  •  [START ON 6/13/2022] insulin lispro **AND** [START ON 6/13/2022] insulin lispro  •  ipratropium-albuterol  •  magnesium hydroxide  •  Morphine **AND** naloxone  •  ondansetron  •  potassium chloride **OR** potassium chloride    Patient Active Problem List   Diagnosis   • COPD exacerbation (HCC)   • Obesity (BMI 30-39.9)   • Abnormal nuclear stress test   • Acute renal insufficiency   • Alcohol dependence (HCC)   • Essential hypertension   • Other emphysema (HCC)   • Coronary artery disease   • Syncope, unspecified syncope type         Physical Exam:    General: Alert, cooperative, no distress, appears stated age, up to chair  Lines and tubes right IJ line was swollen  Head:  Normocephalic, atraumatic,  mucous membranes moist  Eyes:  Conjunctivae/corneas clear, EOM's intact     Neck:  Supple,  no bruit  Lungs:  Clear to auscultation bilaterally, no wheezes rhonchi rales are noted, chest tubes  Chest wall: No tenderness  Heart::  Regular rate and rhythm, S1 and S2 normal, 1/6 holosystolic murmur.  No rub or gallop  Abdomen: Soft, non-tender, nondistended bowel sounds active.  Obese  Extremities: No cyanosis, clubbing, or edema, leg bandaged  Pulses: Diminished pedal pulses  Skin:  No rashes or lesions  Neuro/psych: A&O x3. CN II through XII are grossly intact with appropriate affect  Unchanged from prior encounter    Vital Signs:  Vitals:    06/12/22 0703 06/12/22 0730 06/12/22 0800 06/12/22 0900   BP:  111/46 115/72    BP Location:  Other (Comment)     Patient Position:  Sitting     Pulse: 88 89 89 74   Resp: 20 18     Temp:  99 °F (37.2 °C) 98.96 °F (37.2 °C) 98.78 °F (37.1 °C)   TempSrc:  Core     SpO2:  96% 95% 94%   Weight:       Height:         Wt Readings from Last 1 Encounters:   06/12/22 107 kg (235 lb 12.8 oz)       Intake/Output Summary (Last 24 hours) at 6/12/2022 1159  Last data filed at 6/12/2022 0800  Gross per 24 hour   Intake 2766.5 ml   Output 1854 ml   Net 912.5 ml         Results Review:     CBC    Results from last 7 days   Lab Units 06/12/22  0231 06/11/22  0355 06/11/22  0101 06/10/22  2254 06/10/22  2201 06/10/22  2147 06/10/22  1950 06/10/22  1430 06/10/22  0403 06/09/22  0500 06/08/22  0403   WBC 10*3/mm3 12.40* 15.20*  --   --  22.40*  --  24.90*  --  11.40* 10.10 7.70   HEMOGLOBIN g/dL 9.8* 10.0*  --   --  10.5*  --  8.5*  --  13.0 13.8 13.3   HEMOGLOBIN, POC g/dL  --   --  10.6* 11.2*  --  11.8*  --    < >  --   --   --    PLATELETS 10*3/mm3 139* 145  --   --  170  --  186  --  247 250 237    < > = values in this interval not displayed.     Cr Clearance Estimated Creatinine Clearance: 115.5 mL/min (by C-G formula based on SCr of 0.76 mg/dL).  Coag   Results from last 7 days   Lab Units  06/10/22  2201 06/10/22  1950 06/09/22  0500   INR  1.14* 1.25* 1.05   APTT seconds 27.7 29.7 26.6     HbA1C   Lab Results   Component Value Date    HGBA1C 5.5 06/03/2022     Blood Glucose   Glucose   Date/Time Value Ref Range Status   06/12/2022 1145 120 (H) 70 - 105 mg/dL Final     Comment:     Serial Number: [id number]:  [id number]   06/12/2022 0458 103 70 - 105 mg/dL Final     Comment:     Serial Number: [id number]:  [id number]   06/12/2022 0230 100 70 - 105 mg/dL Final     Comment:     Serial Number: [id number]:  [id number]   06/11/2022 2210 108 (H) 70 - 105 mg/dL Final     Comment:     Serial Number: [id number]:  [id number]   06/11/2022 2021 119 (H) 70 - 105 mg/dL Final     Comment:     Serial Number: [id number]:  [id number]   06/11/2022 1726 117 (H) 70 - 105 mg/dL Final     Comment:     Serial Number: [id number]:  [id number]   06/11/2022 1612 91 70 - 105 mg/dL Final     Comment:     Serial Number: [id number]:  [id number]   06/11/2022 1453 93 70 - 105 mg/dL Final     Comment:     Serial Number: [id number]:  [id number]     Infection       CMP   Results from last 7 days   Lab Units 06/12/22  0231 06/11/22  0355 06/10/22  2201 06/10/22  0403 06/09/22  0500 06/08/22  1411 06/08/22  0403 06/07/22  0411   SODIUM mmol/L 137 139 137 136 136  --  136 134*   POTASSIUM mmol/L 4.1 4.4 4.7 4.7 4.2 4.7 5.7* 4.8   CHLORIDE mmol/L 105 103 102 96* 98  --  98 98   CO2 mmol/L 22.0 23.0 26.0 30.0* 28.0  --  30.0* 27.0   BUN mg/dL 16 24* 24* 26* 22  --  27* 27*   CREATININE mg/dL 0.76 1.24 1.36* 1.17 0.85  --  0.96 0.89   GLUCOSE mg/dL 106* 139* 146* 104* 85  --  128* 139*   ALBUMIN g/dL  --  3.90 3.50  --  3.00*  --   --   --    BILIRUBIN mg/dL  --  0.6 0.8  --  0.3  --   --   --    ALK PHOS U/L  --  38* 43  --  52  --   --   --    AST (SGOT) U/L  --  47* 46*  --  25  --   --   --    ALT (SGPT) U/L  --  36 40  --  44*  --   --   --      ABG    Results from last 7 days   Lab Units  06/11/22  1256 06/11/22  1048 06/11/22  0705 06/11/22  0348 06/11/22  0101 06/10/22  2254 06/10/22  2147   PH, ARTERIAL pH units 7.408 7.427 7.393 7.390 7.359 7.368 7.309*   PCO2, ARTERIAL mm Hg 40.1 36.2 41.5 43.9 46.9 45.7 55.4*   PO2 ART mm Hg 75.0* 85.9 110.4* 99.2 139.8* 94.5 94.4   O2 SATURATION ART % 95.0 96.8 98.3* 97.6 99.0* 97.0 96.4   BASE EXCESS ART mmol/L 0.6 -0.3* 0.3 1.3 0.6 0.6 0.7     UA        ANGELO  No results found for: POCMETH, POCAMPHET, POCBARBITUR, POCBENZO, POCCOCAINE, POCOPIATES, POCOXYCODO, POCPHENCYC, POCPROPOXY, POCTHC, POCTRICYC  Lysis Labs   Results from last 7 days   Lab Units 06/12/22  0231 06/11/22  0355 06/11/22  0101 06/10/22  2254 06/10/22  2201 06/10/22  2147 06/10/22  1950 06/10/22  1430 06/10/22  0403 06/09/22  0500 06/08/22  0403 06/07/22  0411   INR   --   --   --   --  1.14*  --  1.25*  --   --  1.05  --   --    APTT seconds  --   --   --   --  27.7  --  29.7  --   --  26.6  --   --    FIBRINOGEN mg/dL  --   --   --   --  449  --  436  --   --   --   --   --    HEMOGLOBIN g/dL 9.8* 10.0*  --   --  10.5*  --  8.5*  --  13.0 13.8 13.3 13.2   HEMOGLOBIN, POC g/dL  --   --  10.6* 11.2*  --  11.8*  --    < >  --   --   --   --    PLATELETS 10*3/mm3 139* 145  --   --  170  --  186  --  247 250 237 229   CREATININE mg/dL 0.76 1.24  --   --  1.36*  --   --   --  1.17 0.85 0.96 0.89    < > = values in this interval not displayed.     Radiology(recent) XR Chest 1 View    Result Date: 6/12/2022  1. Remaining support devices appear in expected position. 2. Low lung volumes with streaky bibasilar airspace opacity and right upper lobe airspace opacity likely representing atelectasis and/or infection. 3. No evidence of significant pleural effusion or evidence of pneumothorax.  Electronically Signed By-Ben Juarez MD On:6/12/2022 8:44 AM This report was finalized on [id number] by  Ben Juarez MD.    XR Chest 1 View    Result Date: 6/11/2022  1.Tubes and lines appear in  satisfactory in similar positions. 2.Right upper lobe and left basilar opacities appear grossly unchanged. 3.No definite pneumothorax. Possible trace left effusion, as before.  Electronically Signed By-Adam Anaya MD On:6/11/2022 8:38 AM This report was finalized on [id number] by  Adam Anaya MD.    XR Chest 1 View    Result Date: 6/10/2022   1. Interval CABG procedure. 2. Lines and support tubes are in place as described. There is no pneumothorax. 3. Patchy densities in the right upper lobe and left lung base probably representing atelectasis. 4. Trace left pleural effusion. 5. Questionable slight pulmonary vascular congestion.  Electronically Signed By-Ziggy Gold MD On:6/10/2022 10:11 PM This report was finalized on [id number] by  Ziggy Gold MD.        Results from last 7 days   Lab Units 06/12/22  0231   CK TOTAL U/L 894*       Imaging Results (Last 24 Hours)     Procedure Component Value Units Date/Time    XR Chest 1 View [[id number]] Collected: 06/12/22 0842     Updated: 06/12/22 0846    Narrative:      EXAMINATION: XR CHEST 1 VW-     DATE OF EXAM: 6/12/2022 2:24 AM     INDICATION: Post-Op Heart Surgery; R55-Syncope and collapse;  F10.920-Alcohol use, unspecified with intoxication, uncomplicated;  J96.01-Acute respiratory failure with hypoxia; I95.1-Orthostatic  hypotension; R77.8-Other specified abnormalities of plasma proteins;  N17.9-Acute kidney failure, unspecified; J44.1-Chronic obstructive  pulmonary disease with (acute) exacerbation; R94.39-Abnormal result of  other cardi.     COMPARISON: Chest radiograph dated 06/11/2022     TECHNIQUE: Portable AP view of the chest was obtained.     FINDINGS:  The patient has been extubated. The [place]-Demarco catheter tip terminates at  the pulmonary outflow tract. There is left basilar chest tube and chest  tube. The gastric suction tube has been removed. There are low lung  volumes with streaky bibasilar atelectasis and right upper lobe airspace  opacity  adjacent to the right minor fissure. There is no pneumothorax.  No smoking pleural effusion. Median sternotomy wires are present and  intact. There are multilevel degenerative changes of the thoracic spine.  Temporary pacing wires remain present.       Impression:      1. Remaining support devices appear in expected position.  2. Low lung volumes with streaky bibasilar airspace opacity and right  upper lobe airspace opacity likely representing atelectasis and/or  infection.  3. No evidence of significant pleural effusion or evidence of  pneumothorax.     Electronically Signed By-Ben Juarez MD On:6/12/2022 8:44 AM  This report was finalized on [id number] by  Ben Juarez MD.          Cardiac Studies:  Echo- Results for orders placed during the hospital encounter of 05/31/22    Adult Transthoracic Echo Complete With Contrast if Necessary Per Protocol (With Agitated Saline)    Interpretation Summary  · Left ventricular ejection fraction appears to be 56 - 60%.  · The right ventricular cavity is mildly dilated.  · Mild dilation of the aortic root is present.  · No pericardial effusion noted    Stress Myoview-  Cath-        Adam Jackson MD  06/12/22  11:59 EDT

## 2022-06-12 NOTE — PROGRESS NOTES
S/P POD# 2 CABG x4 with LIMA/ asc ao replacement--[place]  EF 40% (cath)    Subjective:  No c/o's today    No events overnight  Drips:  milr .25, epi off this morning, insulin  CI 2.3, CVP 7,   Wt is up 3 kgs from preop  CXR:  Atel, vasc congestion      Intake/Output Summary (Last 24 hours) at 6/12/2022 0756  Last data filed at 6/12/2022 0549  Gross per 24 hour   Intake 2646.5 ml   Output 2204 ml   Net 442.5 ml     Temp:  [98.1 °F (36.7 °C)-99.9 °F (37.7 °C)] 99 °F (37.2 °C)  Heart Rate:  [63-90] 89  Resp:  [18-27] 18  BP: ()/(46-70) 111/46  Arterial Line BP: ()/(38-69) 137/54  FiO2 (%):  [40 %] 40 %     MCT 50/8--this is a clarita-aortic drain  PCT 80/8    Results from last 7 days   Lab Units 06/12/22  0231 06/11/22  0355 06/10/22  2254 06/10/22  2201 06/10/22  2147 06/10/22  1950 06/10/22  0403 06/09/22  0500   WBC 10*3/mm3 12.40* 15.20*  --  22.40*  --  24.90*   < > 10.10   HEMOGLOBIN g/dL 9.8* 10.0*  --  10.5*  --  8.5*   < > 13.8   HEMOGLOBIN, POC   --   --    < >  --    < >  --    < >  --    HEMATOCRIT % 30.3* 30.1*  --  32.8*  --  25.6*   < > 41.7   HEMATOCRIT POC   --   --    < >  --    < >  --    < >  --    PLATELETS 10*3/mm3 139* 145  --  170  --  186   < > 250   INR   --   --   --  1.14*  --  1.25*  --  1.05    < > = values in this interval not displayed.     Results from last 7 days   Lab Units 06/12/22  0231   CREATININE mg/dL 0.76   POTASSIUM mmol/L 4.1   SODIUM mmol/L 137   MAGNESIUM mg/dL 2.5*   PHOSPHORUS mg/dL 2.4*       Physical Exam:  Neuro intact, nad, up in chair  Tele:  SR  Diminished bases, rhonchi, productive cough with tan thick secretions, 99% 4L  dsg c/d/i, no drainage  Benign abd, no BM  No edema    Assessment/Plan:  Active Problems:    COPD exacerbation (HCC)    Obesity (BMI 30-39.9)    Abnormal nuclear stress test    Acute renal insufficiency    Alcohol dependence (HCC)    Essential hypertension    Other emphysema (HCC)    Coronary artery disease    Syncope,  unspecified syncope type    - MV CAD, 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 Casarez following  - ETOH use--reports 3 beers/day and bourbon--withdrawal this weekend, improved  - MRSA nasal colonization--vanc/Bactroban  - Postop leukocytosis, multifactorial--watch closely, improving    POD# 2.  Off epi and tolerated decrease milrinone yest.  Sitting up in chair with frequent coughing and productive thick tan secretions.  Flutter valve added, hypertonic saline neb.  Would have low threshold for bronch if needed with his prolonged heavy tobacco abuse.  Leukocytosis is improving.  Renal following since preop.  On asa/statin, holding bb for now.  berny Cornejo.  Decrease milrinone to .125 this afternoon if CI remains > 2.2.  Restarted low dose gabapentin and increased Norco to 5-10 mg.    Routine care--as above  De-escal  D/w pt/nsg, Dr. Hughes, Dr. Hemal HAWTHORNE plan TBD    Christal Vora, APRN  6/12/2022  07:56 EDT

## 2022-06-12 NOTE — THERAPY EVALUATION
Patient Name: Chi Quinteros .  : 1955    MRN: [id number]                              Today's Date: 2022       Admit Date: 2022    Visit Dx:     ICD-10-CM ICD-9-CM   1. Syncope, unspecified syncope type  R55 780.2   2. Alcoholic intoxication without complication ([facility])  F10.920 305.00   3. Acute respiratory failure with hypoxemia ([facility])  J96.01 518.81   4. Orthostatic hypotension  I95.1 458.0   5. Elevated troponin  R77.8 790.6   6. FELIBERTO (acute kidney injury) ([facility])  N17.9 584.9   7. COPD exacerbation ([facility])  J44.1 491.21   8. Abnormal nuclear stress test  R94.39 794.39   9. Coronary artery disease involving native coronary artery of native heart, unspecified whether angina present  I25.10 414.01     Patient Active Problem List   Diagnosis   • COPD exacerbation ([facility])   • Obesity (BMI 30-39.9)   • Abnormal nuclear stress test   • Acute renal insufficiency   • Alcohol dependence ([facility])   • Essential hypertension   • Other emphysema ([facility])   • Coronary artery disease   • Syncope, unspecified syncope type     Past Medical History:   Diagnosis Date   • Back pain    • Emphysema lung ([facility])    • Hypertension      Past Surgical History:   Procedure Laterality Date   • CARDIAC CATHETERIZATION Right 2022    Procedure: Coronary angiography;  Surgeon: Frank Giraldo MD;  Location: [facility] CATH INVASIVE LOCATION;  Service: Cardiovascular;  Laterality: Right;   • CARDIAC CATHETERIZATION N/A 2022    Procedure: Left Heart Cath;  Surgeon: Frank Giraldo MD;  Location: [facility] CATH INVASIVE LOCATION;  Service: Cardiovascular;  Laterality: N/A;   • CARDIAC CATHETERIZATION N/A 2022    Procedure: Left ventriculography;  Surgeon: Frank Giraldo MD;  Location: [facility] CATH INVASIVE LOCATION;  Service: Cardiovascular;  Laterality: N/A;      General Information     Row Name 22 1911          General Information    Patient Profile Reviewed yes  -BL     Existing Precautions/Restrictions fall  -BL     Barriers to  Rehab hearing deficit  Pt reports he is legally deaf  -[facility] Name 06/12/22 1641          Living Environment    People in Home alone  -[facility] Name 06/12/22 1641          Home Main Entrance    Number of Stairs, Main Entrance three  -[facility] Name 06/12/22 1641          Stairs Within Home, Primary    Stairs, Within Home, Primary Basement with laundry  -[facility] Name 06/12/22 1641          Cognition    Orientation Status (Cognition) oriented x 4  -[facility] Name 06/12/22 1641          Safety Issues, Functional Mobility    Safety Issues Affecting Function (Mobility) safety precaution awareness  -     Impairments Affecting Function (Mobility) endurance/activity tolerance  -           User Key  (r) = Recorded By, (t) = Taken By, (c) = Cosigned By    Initials Name Provider Type    Tennille Trent OT Occupational Therapist                 Mobility/ADL's     [facility] Name 06/12/22 1644          Bed Mobility    Comment, (Bed Mobility) Pt sitting in chair on arrival  -[facility] Name 06/12/22 1644          Transfers    Comment, (Transfers) pt able to tolerate standing 2 minutes  -     Sit-Stand [place] (Transfers) minimum assist (75% patient effort)  -[facility] Name 06/12/22 1644          Activities of Daily Living    BADL Assessment/Intervention lower body dressing  -[facility] Name 06/12/22 1644          Lower Body Dressing Assessment/Training    [place] Level (Lower Body Dressing) don;socks;dependent (less than 25% patient effort)  -     Position (Lower Body Dressing) supported sitting  -           User Key  (r) = Recorded By, (t) = Taken By, (c) = Cosigned By    Initials Name Provider Type    Tennille Trent OT Occupational Therapist               Obj/Interventions     [facility] Name 06/12/22 1645          Sensory Assessment (Somatosensory)    Sensory Assessment (Somatosensory) sensation intact  -[facility] Name 06/12/22 1645          Vision Assessment/Intervention    Visual  Impairment/Limitations WFL  -BL     Row Name 06/12/22 1645          Range of Motion Comprehensive    General Range of Motion no range of motion deficits identified  -BL     Comment, General Range of Motion BUE WFL within precautions  -     Row Name 06/12/22 1645          Strength Comprehensive (MMT)    Comment, General Manual Muscle Testing (MMT) Assessment BUE not tested secondary to sternal precautions  -[facility] Name 06/12/22 1645          Motor Skills    Motor Skills functional endurance  -     Functional Endurance poor  -[facility] Name 06/12/22 1645          Balance    Balance Assessment sitting static balance;standing static balance  -BL     Static Sitting Balance supervision  -     Static Standing Balance minimal assist  -BL     Position/Device Used, Standing Balance supported;walker, front-wheeled  -           User Key  (r) = Recorded By, (t) = Taken By, (c) = Cosigned By    Initials Name Provider Type     Tennille Sexton, OT Occupational Therapist               Goals/Plan     Row Name 06/12/22 1653          Transfer Goal 1 (OT)    Activity/Assistive Device (Transfer Goal 1, OT) sit-to-stand/stand-to-sit;toilet  -BL     [place] Level/Cues Needed (Transfer Goal 1, OT) contact guard required  -BL     Time Frame (Transfer Goal 1, OT) 2 weeks  -[facility] Name 06/12/22 1653          Bathing Goal 1 (OT)    Activity/Device (Bathing Goal 1, OT) bathing skills, all  -BL     [place] Level/Cues Needed (Bathing Goal 1, OT) minimum assist (75% or more patient effort)  -BL     Time Frame (Bathing Goal 1, OT) 2 weeks  -BL     Row Name 06/12/22 1653          Dressing Goal 1 (OT)    Activity/Device (Dressing Goal 1, OT) upper body dressing  -BL     [place]/Cues Needed (Dressing Goal 1, OT) contact guard required  -BL     Time Frame (Dressing Goal 1, OT) 2 weeks  -[facility] Name 06/12/22 1653          Therapy Assessment/Plan (OT)    Planned Therapy Interventions (OT) activity tolerance  training;BADL retraining;cognitive/visual perception retraining;functional balance retraining;neuromuscular control/coordination retraining;occupation/activity based interventions;passive ROM/stretching;patient/caregiver education/training;ROM/therapeutic exercise;strengthening exercise;transfer/mobility retraining  -           User Key  (r) = Recorded By, (t) = Taken By, (c) = Cosigned By    Initials Name Provider Type     Tennille Sexton OT Occupational Therapist               Clinical Impression     Row Name 06/12/22 1646          Pain Assessment    Pretreatment Pain Rating 4/10  -BL     Posttreatment Pain Rating 4/10  -BL     Pre/Posttreatment Pain Comment Incisional pain  -     Pain Intervention(s) Repositioned  -     Row Name 06/12/22 1646          Plan of Care Review    Plan of Care Reviewed With patient  -     Outcome Evaluation 66 y/o male POD #2 for CABG x4 6/10. Pt with [place] ev[place], 2 chest tubes, and 4L O2. Pt reports he lives alone and was independent with ADLs/IADLs (driving and cooking) PLOF. At time of evaluation, pt required min-mod A to stand. Pt only able to tolerate standing 2 minutes before needing to sit secondary to decreased endurance. Pt educated on CABG exercises and sternal precautions with fair carryover. Pt required rest breaks with exercises secondary to fatigue. Pt able to recall 3/3 precautions with mod verbal cues for memory. Pt far from functional baseline and will benefit from IP rehab at d/c. OT will follow.  -     Row Name 06/12/22 1646          Therapy Assessment/Plan (OT)    Therapy Frequency (OT) 5 times/wk  -     Row Name 06/12/22 1646          Therapy Plan Review/Discharge Plan (OT)    Anticipated Discharge Disposition (OT) inpatient rehabilitation facility  -     Row Name 06/12/22 1646          Vital Signs    Pre Systolic BP Rehab 110  -BL     Pre Treatment Diastolic BP 60  -BL     Intra Systolic BP Rehab 101  -BL     Intra Treatment Diastolic BP 53  -BL      Pre SpO2 (%) 94  -BL     O2 Delivery Pre Treatment nasal cannula  4L  -BL     Row Name 06/12/22 1646          Positioning and Restraints    Pre-Treatment Position sitting in chair/recliner  -BL     Post Treatment Position chair  -BL     In Chair notified nsg;sitting;call light within reach;encouraged to call for assist;exit alarm on  -BL           User Key  (r) = Recorded By, (t) = Taken By, (c) = Cosigned By    Initials Name Provider Type     Tennille Sexton OT Occupational Therapist               Outcome Measures    No documentation.                 Occupational Therapy Education                 Title: PT OT SLP Therapies (Done)     Topic: Occupational Therapy (Done)     Point: ADL training (Done)     Description:   Instruct learner(s) on proper safety adaptation and remediation techniques during self care or transfers.   Instruct in proper use of assistive devices.              Learning Progress Summary           Patient Acceptance, E,TB, VU,NR by BL at 6/12/2022 1654                   Point: Home exercise program (Done)     Description:   Instruct learner(s) on appropriate technique for monitoring, assisting and/or progressing therapeutic exercises/activities.              Learning Progress Summary           Patient Acceptance, E,TB, VU,NR by BL at 6/12/2022 1654                   Point: Precautions (Done)     Description:   Instruct learner(s) on prescribed precautions during self-care and functional transfers.              Learning Progress Summary           Patient Acceptance, E,TB, VU,NR by BL at 6/12/2022 1654                   Point: Body mechanics (Done)     Description:   Instruct learner(s) on proper positioning and spine alignment during self-care, functional mobility activities and/or exercises.              Learning Progress Summary           Patient Acceptance, E,TB, VU,NR by BL at 6/12/2022 1654                               User Key     Initials Effective Dates Name Provider Type Discipline      04/13/21 -  Tennille Sexton OT Occupational Therapist OT              OT Recommendation and Plan  Planned Therapy Interventions (OT): activity tolerance training, BADL retraining, cognitive/visual perception retraining, functional balance retraining, neuromuscular control/coordination retraining, occupation/activity based interventions, passive ROM/stretching, patient/caregiver education/training, ROM/therapeutic exercise, strengthening exercise, transfer/mobility retraining  Therapy Frequency (OT): 5 times/wk  Plan of Care Review  Plan of Care Reviewed With: patient  Outcome Evaluation: 66 y/o male POD #2 for CABG x4 6/10. Pt with [place] evelyn, 2 chest tubes, and 4L O2. Pt reports he lives alone and was independent with ADLs/IADLs (driving and cooking) PLOF. At time of evaluation, pt required min-mod A to stand. Pt only able to tolerate standing 2 minutes before needing to sit secondary to decreased endurance. Pt educated on CABG exercises and sternal precautions with fair carryover. Pt required rest breaks with exercises secondary to fatigue. Pt able to recall 3/3 precautions with mod verbal cues for memory. Pt far from functional baseline and will benefit from IP rehab at d/c. OT will follow.     Time Calculation:    Time Calculation- OT     Row Name 06/12/22 1654             Time Calculation- OT    OT Start Time 1301  -BL      OT Stop Time 1331  -BL      OT Time Calculation (min) 30 min  -BL      Total Timed Code Minutes- OT 8 minute(s)  -BL      OT Received On 06/12/22  -BL      OT - Next Appointment 06/13/22  -      OT Goal Re-Cert Due Date 06/26/22  -            User Key  (r) = Recorded By, (t) = Taken By, (c) = Cosigned By    Initials Name Provider Type    BL Tennille Sexton OT Occupational Therapist              Therapy Charges for Today     Code Description Service Date Service Provider Modifiers Qty    12738933087 HC OT EVAL MOD COMPLEXITY 3 6/12/2022 Tennille Sexton OT GO 1    [id number]  OT  THER PROC EA 15 MIN 6/12/2022 Catracho Dotson, OT GO 1               CATRACHO DOTSON OT  6/12/2022

## 2022-06-12 NOTE — PROGRESS NOTES
"NEPHROLOGY PROGRESS NOTE------[facility]    [facility]  [phone]  Amara Cooper MD      Patient Care Team:  Deysi Mason APRN as PCP - General (Nurse Practitioner)  Eliseo Casarez MD as Consulting Physician (Nephrology)      Provider:  Amara Cooper MD  Patient Name: Chi Quinteros Sr.  :  1955    SUBJECTIVE:    F/U ARF/FELIBERTO/CRF/CKD    POD # 2. Weak. No real SOB. No angina. No dysuria.     Medication:  arformoterol, 15 mcg, Nebulization, BID - RT  aspirin, 81 mg, Oral, Daily  atorvastatin, 40 mg, Oral, Nightly  budesonide, 0.5 mg, Nebulization, BID - RT  chlorhexidine, 15 mL, Mouth/Throat, Q12H  enoxaparin, 40 mg, Subcutaneous, Q24H  [MAR Hold] gabapentin, 300 mg, Oral, Q8H  guaiFENesin, 1,200 mg, Oral, Q12H  [START ON 2022] insulin lispro, 0-7 Units, Subcutaneous, TID AC  ipratropium-albuterol, 3 mL, Nebulization, Q6H While Awake - RT  mupirocin, 1 application, Each Nare, BID  pantoprazole, 40 mg, Oral, QAM  senna-docusate sodium, 2 tablet, Oral, Nightly      dexmedetomidine, 0.2-1.5 mcg/kg/hr, Last Rate: Stopped (22 1050)  EPINEPHrine, 0.02-0.3 mcg/kg/min, Last Rate: 0.02 mcg/kg/min (22 0631)  insulin, 0-100 Units/hr, Last Rate: 1.8 Units/hr (22 0719)  milrinone, 0.25-0.75 mcg/kg/min, Last Rate: 0.25 mcg/kg/min (22 2314)  norepinephrine, 0.02-0.06 mcg/kg/min, Last Rate: 0.02 mcg/kg/min (06/10/22 2223)        OBJECTIVE    Vital Sign Min/Max for last 24 hours  Temp  Min: 98.1 °F (36.7 °C)  Max: 99.9 °F (37.7 °C)   BP  Min: 80/46  Max: 128/67   Pulse  Min: 63  Max: 90   Resp  Min: 18  Max: 27   SpO2  Min: 91 %  Max: 100 %   No data recorded   Weight  Min: 107 kg (235 lb 12.8 oz)  Max: 107 kg (235 lb 12.8 oz)     Flowsheet Rows    Flowsheet Row First Filed Value   Admission Height 177.8 cm (70\") Documented at 2022   Admission Weight 104 kg (230 lb) Documented at 2022          No " intake/output data recorded.  I/O last 3 completed shifts:  In: 5476.5 [P.O.:380; I.V.:3577.5; Blood:1519]  Out: 5040 [Urine:4050; Blood:350; Chest Tube:640]    Physical Exam:  General Appearance: alert, appears stated age and cooperative  Head: normocephalic, without obvious abnormality and atraumatic  Eyes: conjunctivae and sclerae normal and no icterus  Neck: supple and no JVD  Lungs: DECREASED BS BIBASILAR  Heart: regular rhythm & normal rate and normal S1, S2 +ANTONY  Chest: S/P SURGICAL CHANGES ASSOCIATED WITH OPEN HEART SURGERY  Abdomen: +HYPOACTIVE bowel sounds and soft non-tender  Extremities: moves extremities well, no edema, no cyanosis and no redness  Skin: no bleeding, bruising or rash, turgor normal, color normal and no lesions noted  Neurologic: Alert, and oriented. No focal deficits    Labs:    WBC WBC   Date Value Ref Range Status   06/12/2022 12.40 (H) 3.40 - 10.80 10*3/mm3 Final   06/11/2022 15.20 (H) 3.40 - 10.80 10*3/mm3 Final   06/10/2022 22.40 (H) 3.40 - 10.80 10*3/mm3 Final   06/10/2022 24.90 (H) 3.40 - 10.80 10*3/mm3 Final   06/10/2022 11.40 (H) 3.40 - 10.80 10*3/mm3 Final      HGB Hemoglobin   Date Value Ref Range Status   06/12/2022 9.8 (L) 13.0 - 17.7 g/dL Final   06/11/2022 10.0 (L) 13.0 - 17.7 g/dL Final   06/11/2022 10.6 (L) 12.0 - 17.0 g/dL Final   06/10/2022 11.2 (L) 12.0 - 17.0 g/dL Final   06/10/2022 10.5 (L) 13.0 - 17.7 g/dL Final     Comment:     Result checked    06/10/2022 11.8 (L) 12.0 - 17.0 g/dL Final   06/10/2022 8.5 (L) 13.0 - 17.7 g/dL Final     Comment:     Result checked    06/10/2022 9.9 (L) 12.0 - 17.0 g/dL Final   06/10/2022 10.9 (L) 12.0 - 17.0 g/dL Final   06/10/2022 10.5 (L) 12.0 - 17.0 g/dL Final   06/10/2022 10.9 (L) 12.0 - 17.0 g/dL Final   06/10/2022 10.5 (L) 12.0 - 17.0 g/dL Final   06/10/2022 10.9 (L) 12.0 - 17.0 g/dL Final   06/10/2022 10.5 (L) 12.0 - 17.0 g/dL Final   06/10/2022 13.3 12.0 - 17.0 g/dL Final   06/10/2022 13.0 13.0 - 17.7 g/dL Final      HCT  Hematocrit   Date Value Ref Range Status   06/12/2022 30.3 (L) 37.5 - 51.0 % Final   06/11/2022 30.1 (L) 37.5 - 51.0 % Final   06/11/2022 31 (L) 38 - 51 % Final   06/10/2022 33 (L) 38 - 51 % Final   06/10/2022 32.8 (L) 37.5 - 51.0 % Final   06/10/2022 35 (L) 38 - 51 % Final   06/10/2022 25.6 (L) 37.5 - 51.0 % Final   06/10/2022 29 (L) 38 - 51 % Final   06/10/2022 32 (L) 38 - 51 % Final   06/10/2022 31 (L) 38 - 51 % Final   06/10/2022 32 (L) 38 - 51 % Final   06/10/2022 31 (L) 38 - 51 % Final   06/10/2022 32 (L) 38 - 51 % Final   06/10/2022 31 (L) 38 - 51 % Final   06/10/2022 39 38 - 51 % Final   06/10/2022 39.7 37.5 - 51.0 % Final      Platlets No results found for: LABPLAT   MCV MCV   Date Value Ref Range Status   06/12/2022 97.7 (H) 79.0 - 97.0 fL Final   06/11/2022 97.3 (H) 79.0 - 97.0 fL Final   06/10/2022 97.8 (H) 79.0 - 97.0 fL Final   06/10/2022 97.7 (H) 79.0 - 97.0 fL Final   06/10/2022 96.9 79.0 - 97.0 fL Final          Sodium Sodium   Date Value Ref Range Status   06/12/2022 137 136 - 145 mmol/L Final   06/11/2022 139 136 - 145 mmol/L Final   06/10/2022 137 136 - 145 mmol/L Final   06/10/2022 136 136 - 145 mmol/L Final      Potassium Potassium   Date Value Ref Range Status   06/12/2022 4.1 3.5 - 5.2 mmol/L Final   06/11/2022 4.4 3.5 - 5.2 mmol/L Final   06/10/2022 4.7 3.5 - 5.2 mmol/L Final     Comment:     Slight hemolysis detected by analyzer. Results may be affected.   06/10/2022 4.7 3.5 - 5.2 mmol/L Final     Comment:     Slight hemolysis detected by analyzer. Results may be affected.      Chloride Chloride   Date Value Ref Range Status   06/12/2022 105 98 - 107 mmol/L Final   06/11/2022 103 98 - 107 mmol/L Final   06/10/2022 102 98 - 107 mmol/L Final   06/10/2022 96 (L) 98 - 107 mmol/L Final      CO2 CO2   Date Value Ref Range Status   06/12/2022 22.0 22.0 - 29.0 mmol/L Final   06/11/2022 23.0 22.0 - 29.0 mmol/L Final   06/10/2022 26.0 22.0 - 29.0 mmol/L Final   06/10/2022 30.0 (H) 22.0 - 29.0 mmol/L  Final      BUN BUN   Date Value Ref Range Status   06/12/2022 16 8 - 23 mg/dL Final   06/11/2022 24 (H) 8 - 23 mg/dL Final   06/10/2022 24 (H) 8 - 23 mg/dL Final   06/10/2022 26 (H) 8 - 23 mg/dL Final      Creatinine Creatinine   Date Value Ref Range Status   06/12/2022 0.76 0.76 - 1.27 mg/dL Final   06/11/2022 1.24 0.76 - 1.27 mg/dL Final   06/10/2022 1.36 (H) 0.76 - 1.27 mg/dL Final   06/10/2022 1.17 0.76 - 1.27 mg/dL Final      Calcium Calcium   Date Value Ref Range Status   06/12/2022 8.1 (L) 8.6 - 10.5 mg/dL Final   06/11/2022 8.7 8.6 - 10.5 mg/dL Final   06/10/2022 9.0 8.6 - 10.5 mg/dL Final   06/10/2022 8.5 (L) 8.6 - 10.5 mg/dL Final      PO4 No components found for: PO4   Albumin Albumin   Date Value Ref Range Status   06/11/2022 3.90 3.50 - 5.20 g/dL Final   06/10/2022 3.50 3.50 - 5.20 g/dL Final      Magnesium Magnesium   Date Value Ref Range Status   06/12/2022 2.5 (H) 1.6 - 2.4 mg/dL Final   06/11/2022 2.9 (H) 1.6 - 2.4 mg/dL Final   06/10/2022 2.9 (H) 1.6 - 2.4 mg/dL Final   06/10/2022 2.1 1.6 - 2.4 mg/dL Final      Uric Acid No components found for: URIC ACID     Imaging Results (Last 72 Hours)     Procedure Component Value Units Date/Time    XR Chest 1 View [[id number]] Collected: 06/12/22 0842     Updated: 06/12/22 0846    Narrative:      EXAMINATION: XR CHEST 1 VW-     DATE OF EXAM: 6/12/2022 2:24 AM     INDICATION: Post-Op Heart Surgery; R55-Syncope and collapse;  F10.920-Alcohol use, unspecified with intoxication, uncomplicated;  J96.01-Acute respiratory failure with hypoxia; I95.1-Orthostatic  hypotension; R77.8-Other specified abnormalities of plasma proteins;  N17.9-Acute kidney failure, unspecified; J44.1-Chronic obstructive  pulmonary disease with (acute) exacerbation; R94.39-Abnormal result of  other cardi.     COMPARISON: Chest radiograph dated 06/11/2022     TECHNIQUE: Portable AP view of the chest was obtained.     FINDINGS:  The patient has been extubated. The [place]-Demarco catheter tip  terminates at  the pulmonary outflow tract. There is left basilar chest tube and chest  tube. The gastric suction tube has been removed. There are low lung  volumes with streaky bibasilar atelectasis and right upper lobe airspace  opacity adjacent to the right minor fissure. There is no pneumothorax.  No smoking pleural effusion. Median sternotomy wires are present and  intact. There are multilevel degenerative changes of the thoracic spine.  Temporary pacing wires remain present.       Impression:      1. Remaining support devices appear in expected position.  2. Low lung volumes with streaky bibasilar airspace opacity and right  upper lobe airspace opacity likely representing atelectasis and/or  infection.  3. No evidence of significant pleural effusion or evidence of  pneumothorax.     Electronically Signed By-Ben Juarez MD On:6/12/2022 8:44 AM  This report was finalized on [id number] by  Ben Juarez MD.    XR Chest 1 View [[id number]] Collected: 06/11/22 0835     Updated: 06/11/22 0841    Narrative:      DATE OF EXAM:  6/11/2022 5:05 AM     PROCEDURE:  XR CHEST 1 VW-     INDICATIONS:  Post-Op Heart Surgery; R55-Syncope and collapse; F10.920-Alcohol use,  unspecified with intoxication, uncomplicated; J96.01-Acute respiratory  failure with hypoxia; I95.1-Orthostatic hypotension; R77.8-Other  specified abnormalities of plasma proteins; N17.9-Acute kidney failure,  unspecified; J44.1-Chronic obstructive pulmonary disease with (acute)  exacerbation; R94.39-Abnormal result of other cardi     COMPARISON:  06/10/2022     TECHNIQUE:   Single radiographic view of the chest was obtained.     FINDINGS:  Endotracheal tube tip is approximately 3 cm above the erika. [place]-Demarco  catheter appears to terminate at the main pulmonary artery.  Status post  median sternotomy and CABG. Heart size and mediastinal contour appear  unchanged.  Enteric tube appears to extend left upper quadrant, tip  beyond the margins of  this exam. Is not infiltrate is present and  appears to be a left basilar chest tube. No significant pneumothorax is  seen. There are airspace opacities in the right upper lobe at the  periphery of the right midlung and there are left basilar airspace  opacities. These findings appear similar to the prior exam. No  significant infiltrate is seen. Possible trace left effusion, as before  No definite right pleural effusion.       Impression:      1.Tubes and lines appear in satisfactory in similar positions.  2.Right upper lobe and left basilar opacities appear grossly unchanged.  3.No definite pneumothorax. Possible trace left effusion, as before.     Electronically Signed By-Adam Anaya MD On:6/11/2022 8:38 AM  This report was finalized on [id number] by  Adam Anaya MD.    XR Chest 1 View [[id number]] Collected: 06/10/22 2208     Updated: 06/10/22 2213    Narrative:      DATE OF EXAM:  6/10/2022 9:46 PM     PROCEDURE:  XR CHEST 1 VW-     INDICATIONS:  Status post CABG procedure, history of syncope and collapse, coronary  artery disease.      COMPARISON:  06/05/2022.     TECHNIQUE:   Single radiographic view of the chest was obtained.     FINDINGS:  The patient has status post an interval CABG procedure. The tip of the  [place]-Demarco catheter terminates in the main pulmonary artery segment. The  endotracheal tube tip is in good position located between the thoracic  inlet and the aortic knob. The nasogastric tube tip projects out of the  field-of-view, but below the hemidiaphragms. There is a left basilar  chest tube in place. There are either one or two mediastinal chest tubes  in place. There is no pneumothorax. There are patchy densities in the  right upper lobe and the left lung base probably representing  atelectasis. There may be a trace left pleural effusion. There also may  be slight pulmonary vascular  congestion.       Impression:         1. Interval CABG procedure.  2. Lines and support tubes are in  place as described. There is no  pneumothorax.  3. Patchy densities in the right upper lobe and left lung base probably  representing atelectasis.  4. Trace left pleural effusion.  5. Questionable slight pulmonary vascular congestion.     Electronically Signed By-Ziggy Gold MD On:6/10/2022 10:11 PM  This report was finalized on [id number] by  Ziggy Gold MD.          Results for orders placed during the hospital encounter of 05/31/22    XR Chest 1 View    Narrative  EXAMINATION: XR CHEST 1 VW-    DATE OF EXAM: 6/12/2022 2:24 AM    INDICATION: Post-Op Heart Surgery; R55-Syncope and collapse;  F10.920-Alcohol use, unspecified with intoxication, uncomplicated;  J96.01-Acute respiratory failure with hypoxia; I95.1-Orthostatic  hypotension; R77.8-Other specified abnormalities of plasma proteins;  N17.9-Acute kidney failure, unspecified; J44.1-Chronic obstructive  pulmonary disease with (acute) exacerbation; R94.39-Abnormal result of  other cardi.    COMPARISON: Chest radiograph dated 06/11/2022    TECHNIQUE: Portable AP view of the chest was obtained.    FINDINGS:  The patient has been extubated. The [place]-Demarco catheter tip terminates at  the pulmonary outflow tract. There is left basilar chest tube and chest  tube. The gastric suction tube has been removed. There are low lung  volumes with streaky bibasilar atelectasis and right upper lobe airspace  opacity adjacent to the right minor fissure. There is no pneumothorax.  No smoking pleural effusion. Median sternotomy wires are present and  intact. There are multilevel degenerative changes of the thoracic spine.  Temporary pacing wires remain present.    Impression  1. Remaining support devices appear in expected position.  2. Low lung volumes with streaky bibasilar airspace opacity and right  upper lobe airspace opacity likely representing atelectasis and/or  infection.  3. No evidence of significant pleural effusion or evidence of  pneumothorax.    Electronically  Signed By-Ben Juarez MD On:6/12/2022 8:44 AM  This report was finalized on [id number] by  Ben Juarez MD.      XR Chest 1 View    Narrative  DATE OF EXAM:  6/11/2022 5:05 AM    PROCEDURE:  XR CHEST 1 VW-    INDICATIONS:  Post-Op Heart Surgery; R55-Syncope and collapse; F10.920-Alcohol use,  unspecified with intoxication, uncomplicated; J96.01-Acute respiratory  failure with hypoxia; I95.1-Orthostatic hypotension; R77.8-Other  specified abnormalities of plasma proteins; N17.9-Acute kidney failure,  unspecified; J44.1-Chronic obstructive pulmonary disease with (acute)  exacerbation; R94.39-Abnormal result of other cardi    COMPARISON:  06/10/2022    TECHNIQUE:  Single radiographic view of the chest was obtained.    FINDINGS:  Endotracheal tube tip is approximately 3 cm above the erika. [place]-Demarco  catheter appears to terminate at the main pulmonary artery.  Status post  median sternotomy and CABG. Heart size and mediastinal contour appear  unchanged.  Enteric tube appears to extend left upper quadrant, tip  beyond the margins of this exam. Is not infiltrate is present and  appears to be a left basilar chest tube. No significant pneumothorax is  seen. There are airspace opacities in the right upper lobe at the  periphery of the right midlung and there are left basilar airspace  opacities. These findings appear similar to the prior exam. No  significant infiltrate is seen. Possible trace left effusion, as before  No definite right pleural effusion.    Impression  1.Tubes and lines appear in satisfactory in similar positions.  2.Right upper lobe and left basilar opacities appear grossly unchanged.  3.No definite pneumothorax. Possible trace left effusion, as before.    Electronically Signed By-Adam Anaya MD On:6/11/2022 8:38 AM  This report was finalized on [id number] by  Adam Anaya MD.      XR Chest 1 View    Narrative  DATE OF EXAM:  6/10/2022 9:46 PM    PROCEDURE:  XR CHEST 1  VW-    INDICATIONS:  Status post CABG procedure, history of syncope and collapse, coronary  artery disease.    COMPARISON:  06/05/2022.    TECHNIQUE:  Single radiographic view of the chest was obtained.    FINDINGS:  The patient has status post an interval CABG procedure. The tip of the  [place]-Demarco catheter terminates in the main pulmonary artery segment. The  endotracheal tube tip is in good position located between the thoracic  inlet and the aortic knob. The nasogastric tube tip projects out of the  field-of-view, but below the hemidiaphragms. There is a left basilar  chest tube in place. There are either one or two mediastinal chest tubes  in place. There is no pneumothorax. There are patchy densities in the  right upper lobe and the left lung base probably representing  atelectasis. There may be a trace left pleural effusion. There also may  be slight pulmonary vascular  congestion.    Impression  1. Interval CABG procedure.  2. Lines and support tubes are in place as described. There is no  pneumothorax.  3. Patchy densities in the right upper lobe and left lung base probably  representing atelectasis.  4. Trace left pleural effusion.  5. Questionable slight pulmonary vascular congestion.    Electronically Signed By-Ziggy Gold MD On:6/10/2022 10:11 PM  This report was finalized on [id number] by  Ziggy Gold MD.      Results for orders placed during the hospital encounter of 05/31/22    Duplex Vein Mapping Lower Extremity - Bilateral CAR    Interpretation Summary  The greater saphenous veins are of satisfactory quality from the groin to the mid distal thigh bilaterally.  Distal to this the veins are small and inadequate.        ASSESSMENT / PLAN      COPD exacerbation (HCC)    Obesity (BMI 30-39.9)    Abnormal nuclear stress test    Acute renal insufficiency    Alcohol dependence (HCC)    Essential hypertension    Other emphysema (HCC)    Coronary artery disease    Syncope, unspecified syncope type    1.  ARF/FELIBERTO------Nonoliguric. Resolved with volume repletion. BUN/Cr stable. CVP okay. No diuretics or IVFs (outside of meds right now)    2. CAD------CABG today    3. BENIGN ESSENTIAL HTN------BP okay. No ACE-I for now    4. BPH-------On Flomax    5. S/P SYNCOPE    6. ETOH ABUSE    7. HYPERLIPIDEMIA-------On Statin    8. DVT PROPHYLAXIS-------On Lovenox    9. HYPERKALEMIA--------Resolved      Amara Cooper MD  [facility]  [phone]  06/12/22  09:16 EDT

## 2022-06-12 NOTE — PROGRESS NOTES
Daily Progress Note        COPD exacerbation (HCC)    Obesity (BMI 30-39.9)    Abnormal nuclear stress test    Acute renal insufficiency    Alcohol dependence (HCC)    Essential hypertension    Other emphysema (HCC)    Coronary artery disease    Syncope, unspecified syncope type      Assessment    Postop CABG x4: 6/10/2022 , extubated successfully 6/11/2022  COPD   Spirometry 6/3/2022 and flow volume loop suggestive of severe obstructive and possible restrictive respiratory defect: Unfortunately total lung capacity and DLCO were not performed   FEV1 45% without significant response to bronchodilators, FEV1/FVC ratio 56 compatible with obstructive defect    Non-STEMI  Three-vessel coronary artery disease  Ascending aortic aneurysm 4.9205 cm  Hypertension  Syncope    Alcohol abuse  Dyslipidemia  Back pain with a lumbar herniation  Early prostate cancer  FELIBERTO: Resolving    History of tobacco smoking: Quit June 2021     Recommendations:    Oxygen supplement and titration: Requiring 2 L per NC   Hemodynamic support: As per CTS  Encourage use of spirometry/flutter valve  Inhaled corticosteroids/Bronchodilators  Hypertonic saline nebulized  Statin  Blood pressure control  DVT prophylaxis Lovenox                  LOS: 7 days     Subjective     Mild shortness of breath    Objective     Vital signs for last 24 hours:  Vitals:    06/12/22 1205 06/12/22 1208 06/12/22 1215 06/12/22 1330   BP:    113/64   BP Location:       Patient Position:       Pulse: 75 76 77 81   Resp: 22 (!) 29 (!) 35    Temp:    99.5 °F (37.5 °C)   TempSrc:       SpO2:    93%   Weight:       Height:           Intake/Output last 3 shifts:  I/O last 3 completed shifts:  In: 5476.5 [P.O.:380; I.V.:3577.5; Blood:1519]  Out: 5040 [Urine:4050; Blood:350; Chest Tube:640]  Intake/Output this shift:  I/O this shift:  In: 120 [P.O.:120]  Out: -       Radiology  Imaging Results (Last 24 Hours)     Procedure Component Value Units Date/Time    XR Chest 1 View  Lara Eugene is a 59 y.o. male patient. 1. Hypomagnesemia    2. Hypoglycemia      Past Medical History:   Diagnosis Date    Diabetes mellitus ([facility] [address]     Hypertension      Blood pressure (!) 114/59, pulse 85, temperature 97.9 °F (36.6 °C), temperature source Oral, resp. rate 20, height 5' 7\" (1.702 m), weight 174 lb 4.8 oz (79.1 kg), SpO2 100 %. Procedures   Lumbar puncture performed. Abnormal MRI finding. Fluoroscopic guided LP performed in [facility]IR. 10 cc clear CSF collected. EBL <2cc. No immediate complications. Bedrest x1 hour. Continue inpatient care.     Amarilis Vásquez MD  2/26/2021 [[id number]] Collected: 06/12/22 0842     Updated: 06/12/22 0846    Narrative:      EXAMINATION: XR CHEST 1 VW-     DATE OF EXAM: 6/12/2022 2:24 AM     INDICATION: Post-Op Heart Surgery; R55-Syncope and collapse;  F10.920-Alcohol use, unspecified with intoxication, uncomplicated;  J96.01-Acute respiratory failure with hypoxia; I95.1-Orthostatic  hypotension; R77.8-Other specified abnormalities of plasma proteins;  N17.9-Acute kidney failure, unspecified; J44.1-Chronic obstructive  pulmonary disease with (acute) exacerbation; R94.39-Abnormal result of  other cardi.     COMPARISON: Chest radiograph dated 06/11/2022     TECHNIQUE: Portable AP view of the chest was obtained.     FINDINGS:  The patient has been extubated. The [place] catheter tip terminates at  the pulmonary outflow tract. There is left basilar chest tube and chest  tube. The gastric suction tube has been removed. There are low lung  volumes with streaky bibasilar atelectasis and right upper lobe airspace  opacity adjacent to the right minor fissure. There is no pneumothorax.  No smoking pleural effusion. Median sternotomy wires are present and  intact. There are multilevel degenerative changes of the thoracic spine.  Temporary pacing wires remain present.       Impression:      1. Remaining support devices appear in expected position.  2. Low lung volumes with streaky bibasilar airspace opacity and right  upper lobe airspace opacity likely representing atelectasis and/or  infection.  3. No evidence of significant pleural effusion or evidence of  pneumothorax.     Electronically Signed By-Ben Juarez MD On:6/12/2022 8:44 AM  This report was finalized on [id number] by  Ben Juarez MD.          Labs:  Results from last 7 days   Lab Units 06/12/22  0231   WBC 10*3/mm3 12.40*   HEMOGLOBIN g/dL 9.8*   HEMATOCRIT % 30.3*   PLATELETS 10*3/mm3 139*     Results from last 7 days   Lab Units 06/12/22  0231 06/11/22  0355   SODIUM mmol/L 137 139    POTASSIUM mmol/L 4.1 4.4   CHLORIDE mmol/L 105 103   CO2 mmol/L 22.0 23.0   BUN mg/dL 16 24*   CREATININE mg/dL 0.76 1.24   CALCIUM mg/dL 8.1* 8.7   BILIRUBIN mg/dL  --  0.6   ALK PHOS U/L  --  38*   ALT (SGPT) U/L  --  36   AST (SGOT) U/L  --  47*   GLUCOSE mg/dL 106* 139*     Results from last 7 days   Lab Units 06/11/22  1256   PH, ARTERIAL pH units 7.408   PO2 ART mm Hg 75.0*   PCO2, ARTERIAL mm Hg 40.1   HCO3 ART mmol/L 25.3     Results from last 7 days   Lab Units 06/11/22  0355 06/10/22  2201 06/09/22  0500   ALBUMIN g/dL 3.90 3.50 3.00*     Results from last 7 days   Lab Units 06/12/22  0231 06/11/22  0355   CK TOTAL U/L 894* 672*         Results from last 7 days   Lab Units 06/12/22  0231   MAGNESIUM mg/dL 2.5*     Results from last 7 days   Lab Units 06/10/22  2201 06/10/22  1950 06/09/22  0500   INR  1.14* 1.25* 1.05   APTT seconds 27.7 29.7 26.6               Meds:   SCHEDULE  arformoterol, 15 mcg, Nebulization, BID - RT  aspirin, 81 mg, Oral, Daily  [START ON 6/13/2022] atorvastatin, 40 mg, Oral, Nightly  budesonide, 0.5 mg, Nebulization, BID - RT  chlorhexidine, 15 mL, Mouth/Throat, Q12H  enoxaparin, 40 mg, Subcutaneous, Q24H  gabapentin, 100 mg, Oral, Q8H  guaiFENesin, 1,200 mg, Oral, Q12H  [START ON 6/13/2022] insulin lispro, 0-7 Units, Subcutaneous, TID AC  ipratropium-albuterol, 3 mL, Nebulization, Q6H While Awake - RT  mupirocin, 1 application, Each Nare, BID  pantoprazole, 40 mg, Oral, QAM  polyethylene glycol, 17 g, Oral, BID  senna-docusate sodium, 2 tablet, Oral, Nightly  tamsulosin, 0.4 mg, Oral, Daily      Infusions  insulin, 0-100 Units/hr, Last Rate: 1.8 Units/hr (06/12/22 0719)  milrinone, 0.125 mcg/kg/min, Last Rate: 0.25 mcg/kg/min (06/12/22 1110)      PRNs  •  acetaminophen **OR** acetaminophen **OR** acetaminophen  •  bisacodyl  •  dextrose  •  dextrose  •  glucagon (human recombinant)  •  HYDROcodone-acetaminophen **OR** HYDROcodone-acetaminophen  •  [START ON 6/13/2022] insulin  lispro **AND** [START ON 6/13/2022] insulin lispro  •  ipratropium-albuterol  •  magnesium hydroxide  •  Morphine **AND** naloxone  •  ondansetron  •  potassium chloride **OR** potassium chloride    Physical Exam:  Physical Exam  Vitals reviewed.   Constitutional:       Appearance: He is obese.   Pulmonary:      Breath sounds: Decreased breath sounds present.   Skin:     General: Skin is warm and dry.   Neurological:      Mental Status: He is alert.         ROS  Constitutional: Negative for chills, fever and malaise/fatigue.   HENT: Negative.    Eyes: Negative.    Cardiovascular: Expected postop incisional pain.    Respiratory: Positive for cough and shortness of breath.    Skin: Negative.    Musculoskeletal: Negative.    Gastrointestinal: Negative.    Genitourinary: Negative.    Neurological: Negative.    Psychiatric/Behavioral: Negative.  I have reviewed the patient's new clinical results.

## 2022-06-12 NOTE — PLAN OF CARE
Goal Outcome Evaluation:  Plan of Care Reviewed With: patient           Outcome Evaluation: 66 y/o male POD #2 for CABG x4 6/10. Pt with [place] evelyn, 2 chest tubes, and 4L O2. Pt reports he lives alone and was independent with ADLs/IADLs (driving and cooking) PLOF. At time of evaluation, pt required min-mod A to stand. Pt only able to tolerate standing 2 minutes before needing to sit secondary to decreased endurance. Pt educated on CABG exercises and sternal precautions with fair carryover. Pt required rest breaks with exercises secondary to fatigue. Pt able to recall 3/3 precautions with mod verbal cues for memory. Pt far from functional baseline and will benefit from IP rehab at d/c. OT will follow.

## 2022-06-13 ENCOUNTER — APPOINTMENT (OUTPATIENT)
Dept: GENERAL RADIOLOGY | Facility: HOSPITAL | Age: 67
End: 2022-06-13

## 2022-06-13 LAB
ANION GAP SERPL CALCULATED.3IONS-SCNC: 12 MMOL/L (ref 5–15)
ARTERIAL PATENCY WRIST A: POSITIVE
ATMOSPHERIC PRESS: ABNORMAL MM[HG]
BACTERIA UR QL AUTO: ABNORMAL /HPF
BASE EXCESS BLDA CALC-SCNC: 0.8 MMOL/L (ref 0–3)
BDY SITE: ABNORMAL
BILIRUB UR QL STRIP: NEGATIVE
BUN SERPL-MCNC: 19 MG/DL (ref 8–23)
BUN/CREAT SERPL: 21.3 (ref 7–25)
CALCIUM SPEC-SCNC: 8 MG/DL (ref 8.6–10.5)
CHLORIDE SERPL-SCNC: 101 MMOL/L (ref 98–107)
CK SERPL-CCNC: 456 U/L (ref 20–200)
CLARITY UR: CLEAR
CO2 BLDA-SCNC: 25.9 MMOL/L (ref 22–29)
CO2 SERPL-SCNC: 24 MMOL/L (ref 22–29)
COLOR UR: YELLOW
CREAT SERPL-MCNC: 0.89 MG/DL (ref 0.76–1.27)
DEPRECATED RDW RBC AUTO: 48.1 FL (ref 37–54)
EGFRCR SERPLBLD CKD-EPI 2021: 93.9 ML/MIN/1.73
ERYTHROCYTE [DISTWIDTH] IN BLOOD BY AUTOMATED COUNT: 14 % (ref 12.3–15.4)
GLUCOSE BLDC GLUCOMTR-MCNC: 103 MG/DL (ref 70–105)
GLUCOSE BLDC GLUCOMTR-MCNC: 106 MG/DL (ref 70–105)
GLUCOSE BLDC GLUCOMTR-MCNC: 108 MG/DL (ref 70–105)
GLUCOSE BLDC GLUCOMTR-MCNC: 109 MG/DL (ref 70–105)
GLUCOSE BLDC GLUCOMTR-MCNC: 110 MG/DL (ref 70–105)
GLUCOSE BLDC GLUCOMTR-MCNC: 112 MG/DL (ref 70–105)
GLUCOSE BLDC GLUCOMTR-MCNC: 114 MG/DL (ref 70–105)
GLUCOSE BLDC GLUCOMTR-MCNC: 115 MG/DL (ref 70–105)
GLUCOSE BLDC GLUCOMTR-MCNC: 120 MG/DL (ref 70–105)
GLUCOSE BLDC GLUCOMTR-MCNC: 122 MG/DL (ref 70–105)
GLUCOSE BLDC GLUCOMTR-MCNC: 125 MG/DL (ref 70–105)
GLUCOSE BLDC GLUCOMTR-MCNC: 126 MG/DL (ref 70–105)
GLUCOSE BLDC GLUCOMTR-MCNC: 128 MG/DL (ref 70–105)
GLUCOSE BLDC GLUCOMTR-MCNC: 129 MG/DL (ref 70–105)
GLUCOSE BLDC GLUCOMTR-MCNC: 132 MG/DL (ref 70–105)
GLUCOSE BLDC GLUCOMTR-MCNC: 133 MG/DL (ref 70–105)
GLUCOSE BLDC GLUCOMTR-MCNC: 136 MG/DL (ref 70–105)
GLUCOSE BLDC GLUCOMTR-MCNC: 137 MG/DL (ref 70–105)
GLUCOSE BLDC GLUCOMTR-MCNC: 141 MG/DL (ref 70–105)
GLUCOSE BLDC GLUCOMTR-MCNC: 93 MG/DL (ref 70–105)
GLUCOSE BLDC GLUCOMTR-MCNC: 94 MG/DL (ref 70–105)
GLUCOSE BLDC GLUCOMTR-MCNC: 99 MG/DL (ref 70–105)
GLUCOSE SERPL-MCNC: 119 MG/DL (ref 65–99)
GLUCOSE UR STRIP-MCNC: NEGATIVE MG/DL
HCO3 BLDA-SCNC: 24.8 MMOL/L (ref 21–28)
HCT VFR BLD AUTO: 30.3 % (ref 37.5–51)
HEMODILUTION: NO
HGB BLD-MCNC: 9.8 G/DL (ref 13–17.7)
HGB UR QL STRIP.AUTO: ABNORMAL
HYALINE CASTS UR QL AUTO: ABNORMAL /LPF
INHALED O2 CONCENTRATION: <21 %
KETONES UR QL STRIP: ABNORMAL
LEUKOCYTE ESTERASE UR QL STRIP.AUTO: NEGATIVE
MAGNESIUM SERPL-MCNC: 2.3 MG/DL (ref 1.6–2.4)
MCH RBC QN AUTO: 31.6 PG (ref 26.6–33)
MCHC RBC AUTO-ENTMCNC: 32.4 G/DL (ref 31.5–35.7)
MCV RBC AUTO: 97.7 FL (ref 79–97)
MODALITY: ABNORMAL
NITRITE UR QL STRIP: NEGATIVE
PCO2 BLDA: 36 MM HG (ref 35–48)
PH BLDA: 7.45 PH UNITS (ref 7.35–7.45)
PH UR STRIP.AUTO: <=5 [PH] (ref 5–8)
PHOSPHATE SERPL-MCNC: 2.5 MG/DL (ref 2.5–4.5)
PLATELET # BLD AUTO: 127 10*3/MM3 (ref 140–450)
PMV BLD AUTO: 8.3 FL (ref 6–12)
PO2 BLDA: 71.2 MM HG (ref 83–108)
POTASSIUM SERPL-SCNC: 4.1 MMOL/L (ref 3.5–5.2)
PROT UR QL STRIP: NEGATIVE
RBC # BLD AUTO: 3.1 10*6/MM3 (ref 4.14–5.8)
RBC # UR STRIP: ABNORMAL /HPF
REF LAB TEST METHOD: ABNORMAL
SAO2 % BLDCOA: 94.9 % (ref 94–98)
SODIUM SERPL-SCNC: 137 MMOL/L (ref 136–145)
SP GR UR STRIP: 1.02 (ref 1–1.03)
SQUAMOUS #/AREA URNS HPF: ABNORMAL /HPF
UROBILINOGEN UR QL STRIP: ABNORMAL
WBC # UR STRIP: ABNORMAL /HPF
WBC NRBC COR # BLD: 10.5 10*3/MM3 (ref 3.4–10.8)

## 2022-06-13 PROCEDURE — 84100 ASSAY OF PHOSPHORUS: CPT | Performed by: INTERNAL MEDICINE

## 2022-06-13 PROCEDURE — 99232 SBSQ HOSP IP/OBS MODERATE 35: CPT | Performed by: INTERNAL MEDICINE

## 2022-06-13 PROCEDURE — 81001 URINALYSIS AUTO W/SCOPE: CPT | Performed by: NURSE PRACTITIONER

## 2022-06-13 PROCEDURE — 94799 UNLISTED PULMONARY SVC/PX: CPT

## 2022-06-13 PROCEDURE — 82550 ASSAY OF CK (CPK): CPT | Performed by: INTERNAL MEDICINE

## 2022-06-13 PROCEDURE — 71045 X-RAY EXAM CHEST 1 VIEW: CPT

## 2022-06-13 PROCEDURE — 36600 WITHDRAWAL OF ARTERIAL BLOOD: CPT

## 2022-06-13 PROCEDURE — 94664 DEMO&/EVAL PT USE INHALER: CPT

## 2022-06-13 PROCEDURE — 80048 BASIC METABOLIC PNL TOTAL CA: CPT | Performed by: NURSE PRACTITIONER

## 2022-06-13 PROCEDURE — 25010000002 ENOXAPARIN PER 10 MG: Performed by: NURSE PRACTITIONER

## 2022-06-13 PROCEDURE — 25010000002 LORAZEPAM PER 2 MG: Performed by: THORACIC SURGERY (CARDIOTHORACIC VASCULAR SURGERY)

## 2022-06-13 PROCEDURE — 0 MILRINONE LACTATE IN DEXTROSE 20-5 MG/100ML-% SOLUTION: Performed by: NURSE PRACTITIONER

## 2022-06-13 PROCEDURE — 94761 N-INVAS EAR/PLS OXIMETRY MLT: CPT

## 2022-06-13 PROCEDURE — 25010000002 FUROSEMIDE PER 20 MG: Performed by: THORACIC SURGERY (CARDIOTHORACIC VASCULAR SURGERY)

## 2022-06-13 PROCEDURE — 82962 GLUCOSE BLOOD TEST: CPT

## 2022-06-13 PROCEDURE — 83735 ASSAY OF MAGNESIUM: CPT | Performed by: INTERNAL MEDICINE

## 2022-06-13 PROCEDURE — 99024 POSTOP FOLLOW-UP VISIT: CPT | Performed by: THORACIC SURGERY (CARDIOTHORACIC VASCULAR SURGERY)

## 2022-06-13 PROCEDURE — 85027 COMPLETE CBC AUTOMATED: CPT | Performed by: NURSE PRACTITIONER

## 2022-06-13 PROCEDURE — 25010000002 CALCIUM GLUCONATE-NACL 1-0.675 GM/50ML-% SOLUTION: Performed by: INTERNAL MEDICINE

## 2022-06-13 PROCEDURE — 82803 BLOOD GASES ANY COMBINATION: CPT

## 2022-06-13 RX ORDER — LORAZEPAM 2 MG/ML
2 INJECTION INTRAMUSCULAR
Status: DISCONTINUED | OUTPATIENT
Start: 2022-06-13 | End: 2022-06-17 | Stop reason: HOSPADM

## 2022-06-13 RX ORDER — LORAZEPAM 2 MG/ML
0.5 INJECTION INTRAMUSCULAR
Status: DISCONTINUED | OUTPATIENT
Start: 2022-06-13 | End: 2022-06-17 | Stop reason: HOSPADM

## 2022-06-13 RX ORDER — LORAZEPAM 2 MG/ML
1 INJECTION INTRAMUSCULAR
Status: DISCONTINUED | OUTPATIENT
Start: 2022-06-13 | End: 2022-06-17 | Stop reason: HOSPADM

## 2022-06-13 RX ORDER — FUROSEMIDE 10 MG/ML
40 INJECTION INTRAMUSCULAR; INTRAVENOUS ONCE
Status: COMPLETED | OUTPATIENT
Start: 2022-06-13 | End: 2022-06-13

## 2022-06-13 RX ORDER — IPRATROPIUM BROMIDE AND ALBUTEROL SULFATE 2.5; .5 MG/3ML; MG/3ML
3 SOLUTION RESPIRATORY (INHALATION) EVERY 4 HOURS PRN
Status: DISCONTINUED | OUTPATIENT
Start: 2022-06-13 | End: 2022-06-13

## 2022-06-13 RX ORDER — FOLIC ACID 1 MG/1
1 TABLET ORAL DAILY
Status: DISCONTINUED | OUTPATIENT
Start: 2022-06-13 | End: 2022-06-17 | Stop reason: HOSPADM

## 2022-06-13 RX ORDER — LORAZEPAM 0.5 MG/1
0.5 TABLET ORAL
Status: DISCONTINUED | OUTPATIENT
Start: 2022-06-13 | End: 2022-06-17 | Stop reason: HOSPADM

## 2022-06-13 RX ORDER — CALCIUM GLUCONATE 20 MG/ML
1 INJECTION, SOLUTION INTRAVENOUS ONCE
Status: COMPLETED | OUTPATIENT
Start: 2022-06-13 | End: 2022-06-13

## 2022-06-13 RX ORDER — BUMETANIDE 1 MG/1
1 TABLET ORAL 2 TIMES DAILY
Status: DISCONTINUED | OUTPATIENT
Start: 2022-06-13 | End: 2022-06-13

## 2022-06-13 RX ORDER — LORAZEPAM 1 MG/1
1 TABLET ORAL
Status: DISCONTINUED | OUTPATIENT
Start: 2022-06-13 | End: 2022-06-17 | Stop reason: HOSPADM

## 2022-06-13 RX ORDER — BUMETANIDE 1 MG/1
1 TABLET ORAL DAILY
Status: DISCONTINUED | OUTPATIENT
Start: 2022-06-13 | End: 2022-06-14

## 2022-06-13 RX ORDER — BUDESONIDE 0.5 MG/2ML
1 INHALANT ORAL
Status: DISCONTINUED | OUTPATIENT
Start: 2022-06-13 | End: 2022-06-13

## 2022-06-13 RX ORDER — IPRATROPIUM BROMIDE AND ALBUTEROL SULFATE 2.5; .5 MG/3ML; MG/3ML
3 SOLUTION RESPIRATORY (INHALATION) ONCE
Status: DISCONTINUED | OUTPATIENT
Start: 2022-06-13 | End: 2022-06-13

## 2022-06-13 RX ORDER — DIPHENOXYLATE HYDROCHLORIDE AND ATROPINE SULFATE 2.5; .025 MG/1; MG/1
1 TABLET ORAL DAILY
Status: DISCONTINUED | OUTPATIENT
Start: 2022-06-13 | End: 2022-06-17 | Stop reason: HOSPADM

## 2022-06-13 RX ORDER — LORAZEPAM 1 MG/1
2 TABLET ORAL
Status: DISCONTINUED | OUTPATIENT
Start: 2022-06-13 | End: 2022-06-17 | Stop reason: HOSPADM

## 2022-06-13 RX ORDER — IPRATROPIUM BROMIDE AND ALBUTEROL SULFATE 2.5; .5 MG/3ML; MG/3ML
3 SOLUTION RESPIRATORY (INHALATION)
Status: DISCONTINUED | OUTPATIENT
Start: 2022-06-13 | End: 2022-06-13

## 2022-06-13 RX ADMIN — THERA TABS 1 TABLET: TAB at 10:52

## 2022-06-13 RX ADMIN — MUPIROCIN 1 APPLICATION: 20 OINTMENT TOPICAL at 09:09

## 2022-06-13 RX ADMIN — BUDESONIDE 0.5 MG: 0.5 INHALANT RESPIRATORY (INHALATION) at 06:30

## 2022-06-13 RX ADMIN — SENNOSIDES AND DOCUSATE SODIUM 2 TABLET: 50; 8.6 TABLET ORAL at 20:56

## 2022-06-13 RX ADMIN — LORAZEPAM 0.5 MG: 2 INJECTION INTRAMUSCULAR; INTRAVENOUS at 22:32

## 2022-06-13 RX ADMIN — MILRINONE LACTATE 0.12 MCG/KG/MIN: 0.2 INJECTION, SOLUTION INTRAVENOUS at 14:05

## 2022-06-13 RX ADMIN — LORAZEPAM 1 MG: 2 INJECTION INTRAMUSCULAR; INTRAVENOUS at 05:24

## 2022-06-13 RX ADMIN — LORAZEPAM 2 MG: 2 INJECTION INTRAMUSCULAR; INTRAVENOUS at 14:17

## 2022-06-13 RX ADMIN — LORAZEPAM 1 MG: 2 INJECTION INTRAMUSCULAR; INTRAVENOUS at 10:57

## 2022-06-13 RX ADMIN — LORAZEPAM 1 MG: 2 INJECTION INTRAMUSCULAR; INTRAVENOUS at 04:14

## 2022-06-13 RX ADMIN — MUPIROCIN 1 APPLICATION: 20 OINTMENT TOPICAL at 20:57

## 2022-06-13 RX ADMIN — GABAPENTIN 100 MG: 100 CAPSULE ORAL at 21:06

## 2022-06-13 RX ADMIN — LORAZEPAM 1 MG: 2 INJECTION INTRAMUSCULAR; INTRAVENOUS at 03:36

## 2022-06-13 RX ADMIN — GABAPENTIN 100 MG: 100 CAPSULE ORAL at 05:41

## 2022-06-13 RX ADMIN — ALBUTEROL SULFATE 2.5 MG: 2.5 SOLUTION RESPIRATORY (INHALATION) at 03:44

## 2022-06-13 RX ADMIN — HYDROCODONE BITARTRATE AND ACETAMINOPHEN 1 TABLET: 5; 325 TABLET ORAL at 22:32

## 2022-06-13 RX ADMIN — FUROSEMIDE 40 MG: 10 INJECTION, SOLUTION INTRAMUSCULAR; INTRAVENOUS at 06:42

## 2022-06-13 RX ADMIN — SODIUM CHLORIDE SOLN NEBU 3% 4 ML: 3 NEBU SOLN at 20:05

## 2022-06-13 RX ADMIN — Medication 100 MG: at 10:53

## 2022-06-13 RX ADMIN — CHLORHEXIDINE GLUCONATE 15 ML: 1.2 RINSE ORAL at 10:00

## 2022-06-13 RX ADMIN — BUDESONIDE 0.5 MG: 0.5 INHALANT RESPIRATORY (INHALATION) at 20:00

## 2022-06-13 RX ADMIN — ENOXAPARIN SODIUM 40 MG: 100 INJECTION SUBCUTANEOUS at 17:03

## 2022-06-13 RX ADMIN — POTASSIUM CHLORIDE 20 MEQ: 1500 TABLET, EXTENDED RELEASE ORAL at 09:12

## 2022-06-13 RX ADMIN — ATORVASTATIN CALCIUM 40 MG: 40 TABLET, FILM COATED ORAL at 20:56

## 2022-06-13 RX ADMIN — CALCIUM GLUCONATE 1 G: 20 INJECTION, SOLUTION INTRAVENOUS at 09:09

## 2022-06-13 RX ADMIN — CHLORHEXIDINE GLUCONATE 15 ML: 1.2 RINSE ORAL at 22:56

## 2022-06-13 RX ADMIN — ARFORMOTEROL TARTRATE 15 MCG: 15 SOLUTION RESPIRATORY (INHALATION) at 06:25

## 2022-06-13 RX ADMIN — SODIUM CHLORIDE SOLN NEBU 3% 4 ML: 3 NEBU SOLN at 06:25

## 2022-06-13 RX ADMIN — GABAPENTIN 100 MG: 100 CAPSULE ORAL at 13:12

## 2022-06-13 RX ADMIN — LORAZEPAM 0.5 MG: 2 INJECTION INTRAMUSCULAR; INTRAVENOUS at 02:16

## 2022-06-13 RX ADMIN — ASPIRIN 81 MG: 81 TABLET, COATED ORAL at 09:09

## 2022-06-13 RX ADMIN — LORAZEPAM 2 MG: 2 INJECTION INTRAMUSCULAR; INTRAVENOUS at 07:07

## 2022-06-13 RX ADMIN — ARFORMOTEROL TARTRATE 15 MCG: 15 SOLUTION RESPIRATORY (INHALATION) at 19:55

## 2022-06-13 RX ADMIN — GUAIFENESIN 400 MG: 100 SOLUTION ORAL at 17:03

## 2022-06-13 RX ADMIN — GUAIFENESIN 1200 MG: 600 TABLET, EXTENDED RELEASE ORAL at 02:17

## 2022-06-13 RX ADMIN — FOLIC ACID 1 MG: 1 TABLET ORAL at 10:53

## 2022-06-13 RX ADMIN — BUMETANIDE 1 MG: 1 TABLET ORAL at 13:12

## 2022-06-13 RX ADMIN — ALBUTEROL SULFATE 2.5 MG: 2.5 SOLUTION RESPIRATORY (INHALATION) at 06:49

## 2022-06-13 RX ADMIN — HYDROCODONE BITARTRATE AND ACETAMINOPHEN 2 TABLET: 5; 325 TABLET ORAL at 07:12

## 2022-06-13 RX ADMIN — GUAIFENESIN 400 MG: 100 SOLUTION ORAL at 11:00

## 2022-06-13 NOTE — CASE MANAGEMENT/SOCIAL WORK
Continued Stay Note  DEBORAH Gifford     Patient Name: Chi Quinteros Sr.  MRN: [id number]  Today's Date: 6/13/2022    Admit Date: 5/31/2022     Discharge Plan     Row Name 06/13/22 1534       Plan    Plan Referral to [facility], pending acceptance. Precert required. PASRR per facility. Pt has sitter. CABG 6/10/22. 2nd choice [facility], 3rd choice [facility].    Plan Comments Met with patient in room x 2 - patient asleep and with sitter morning and afternoon.  IP rehab recommended.  Called and spoke with patient son, Eladio Aguillon.  He said he discussed rehab with his dad prior to pt surgery, and they have decided on [facility] as first choice.  Liason notified by text message and referral placed in [facility].  Second choice [facility], 3rd choice [facility].  Pt son said pt wants to stay in [place] if at all possible.  POD #3 CABG, [place] gtt, O2 @ 4L via n/c, sitter.                    Expected Discharge Date and Time     Expected Discharge Date Expected Discharge Time    Jun 17, 2022             Kandy Garzon RN

## 2022-06-13 NOTE — NURSING NOTE
Paged returned by YARI Thomas to [facility] dawn catheter.     Catheter reinserted by RN.   Return urine was approx. 400 initially.

## 2022-06-13 NOTE — NURSING NOTE
Pt's son Eladio called for pt update. Pt's son provided with update after able to give this RN password. Pt's son requested pt's phone be moved closer to pt. This RN fulfilling request but did inform pt's son that pt may not answer phone at this time due to confusion.

## 2022-06-13 NOTE — PROGRESS NOTES
"NEPHROLOGY PROGRESS NOTE------[facility]    [facility]  [phone]  Amara Cooper MD      Patient Care Team:  Deysi Mason APRN as PCP - General (Nurse Practitioner)  Eliseo Casarez MD as Consulting Physician (Nephrology)      Provider:  Amara Cooper MD  Patient Name: Chi Quinteros Sr.  :  1955    SUBJECTIVE:    F/U ARF/FELIBERTO/CRF/CKD    Confused and with sitter    Medication:  arformoterol, 15 mcg, Nebulization, BID - RT  aspirin, 81 mg, Oral, Daily  atorvastatin, 40 mg, Oral, Nightly  budesonide, 0.5 mg, Nebulization, BID - RT  chlorhexidine, 15 mL, Mouth/Throat, Q12H  enoxaparin, 40 mg, Subcutaneous, Q24H  gabapentin, 100 mg, Oral, Q8H  guaiFENesin, 1,200 mg, Oral, Q12H  insulin lispro, 0-7 Units, Subcutaneous, TID AC  ipratropium-albuterol, 3 mL, Nebulization, Once  mupirocin, 1 application, Each Nare, BID  pantoprazole, 40 mg, Oral, QAM  polyethylene glycol, 17 g, Oral, BID  potassium chloride, 20 mEq, Oral, Daily  senna-docusate sodium, 2 tablet, Oral, Nightly  sodium chloride, 4 mL, Nebulization, BID - RT  tamsulosin, 0.4 mg, Oral, Daily      insulin, 0-100 Units/hr, Last Rate: Stopped (22 0733)  milrinone, 0.125 mcg/kg/min, Last Rate: 0.125 mcg/kg/min (22 1330)        OBJECTIVE    Vital Sign Min/Max for last 24 hours  Temp  Min: 97.6 °F (36.4 °C)  Max: 99.5 °F (37.5 °C)   BP  Min: 104/62  Max: 155/86   Pulse  Min: 74  Max: 93   Resp  Min: 20  Max: 35   SpO2  Min: 77 %  Max: 100 %   No data recorded   Weight  Min: 107 kg (235 lb)  Max: 107 kg (235 lb)     Flowsheet Rows    Flowsheet Row First Filed Value   Admission Height 177.8 cm (70\") Documented at 2022   Admission Weight 104 kg (230 lb) Documented at 2022          No intake/output data recorded.  I/O last 3 completed shifts:  In: 3133.5 [P.O.:840; I.V.:2293.5]  Out: 4259 [Urine:3185; Chest Tube:1074]    Physical Exam:  General " Appearance: AROUSABLE BUT CONFUSED WITH SITTER IN ROOM. NAD  Head: normocephalic, without obvious abnormality and atraumatic  Eyes: conjunctivae and sclerae normal and no icterus  Neck: supple and no JVD  Lungs: DECREASED BS BIBASILAR  Heart: regular rhythm & normal rate and normal S1, S2 +ANTONY  Chest: S/P SURGICAL CHANGES ASSOCIATED WITH OPEN HEART SURGERY  Abdomen: +HYPOACTIVE bowel sounds and soft non-tender +GONZALEZ  Extremities: moves extremities well, no edema, no cyanosis and no redness  Skin: no bleeding, bruising or rash, turgor normal, color normal and no lesions noted  Neurologic: AGITATED    Labs:    WBC WBC   Date Value Ref Range Status   06/13/2022 10.50 3.40 - 10.80 10*3/mm3 Final   06/12/2022 12.40 (H) 3.40 - 10.80 10*3/mm3 Final   06/11/2022 15.20 (H) 3.40 - 10.80 10*3/mm3 Final   06/10/2022 22.40 (H) 3.40 - 10.80 10*3/mm3 Final   06/10/2022 24.90 (H) 3.40 - 10.80 10*3/mm3 Final      HGB Hemoglobin   Date Value Ref Range Status   06/13/2022 9.8 (L) 13.0 - 17.7 g/dL Final   06/12/2022 9.8 (L) 13.0 - 17.7 g/dL Final   06/11/2022 10.0 (L) 13.0 - 17.7 g/dL Final   06/11/2022 10.6 (L) 12.0 - 17.0 g/dL Final   06/10/2022 11.2 (L) 12.0 - 17.0 g/dL Final   06/10/2022 10.5 (L) 13.0 - 17.7 g/dL Final     Comment:     Result checked    06/10/2022 11.8 (L) 12.0 - 17.0 g/dL Final   06/10/2022 8.5 (L) 13.0 - 17.7 g/dL Final     Comment:     Result checked    06/10/2022 9.9 (L) 12.0 - 17.0 g/dL Final   06/10/2022 10.9 (L) 12.0 - 17.0 g/dL Final   06/10/2022 10.5 (L) 12.0 - 17.0 g/dL Final   06/10/2022 10.9 (L) 12.0 - 17.0 g/dL Final   06/10/2022 10.5 (L) 12.0 - 17.0 g/dL Final   06/10/2022 10.9 (L) 12.0 - 17.0 g/dL Final   06/10/2022 10.5 (L) 12.0 - 17.0 g/dL Final   06/10/2022 13.3 12.0 - 17.0 g/dL Final      HCT Hematocrit   Date Value Ref Range Status   06/13/2022 30.3 (L) 37.5 - 51.0 % Final   06/12/2022 30.3 (L) 37.5 - 51.0 % Final   06/11/2022 30.1 (L) 37.5 - 51.0 % Final   06/11/2022 31 (L) 38 - 51 % Final    06/10/2022 33 (L) 38 - 51 % Final   06/10/2022 32.8 (L) 37.5 - 51.0 % Final   06/10/2022 35 (L) 38 - 51 % Final   06/10/2022 25.6 (L) 37.5 - 51.0 % Final   06/10/2022 29 (L) 38 - 51 % Final   06/10/2022 32 (L) 38 - 51 % Final   06/10/2022 31 (L) 38 - 51 % Final   06/10/2022 32 (L) 38 - 51 % Final   06/10/2022 31 (L) 38 - 51 % Final   06/10/2022 32 (L) 38 - 51 % Final   06/10/2022 31 (L) 38 - 51 % Final   06/10/2022 39 38 - 51 % Final      Platlets No results found for: LABPLAT   MCV MCV   Date Value Ref Range Status   06/13/2022 97.7 (H) 79.0 - 97.0 fL Final   06/12/2022 97.7 (H) 79.0 - 97.0 fL Final   06/11/2022 97.3 (H) 79.0 - 97.0 fL Final   06/10/2022 97.8 (H) 79.0 - 97.0 fL Final   06/10/2022 97.7 (H) 79.0 - 97.0 fL Final          Sodium Sodium   Date Value Ref Range Status   06/13/2022 137 136 - 145 mmol/L Final   06/12/2022 137 136 - 145 mmol/L Final   06/11/2022 139 136 - 145 mmol/L Final   06/10/2022 137 136 - 145 mmol/L Final      Potassium Potassium   Date Value Ref Range Status   06/13/2022 4.1 3.5 - 5.2 mmol/L Final   06/12/2022 4.1 3.5 - 5.2 mmol/L Final   06/11/2022 4.4 3.5 - 5.2 mmol/L Final   06/10/2022 4.7 3.5 - 5.2 mmol/L Final     Comment:     Slight hemolysis detected by analyzer. Results may be affected.      Chloride Chloride   Date Value Ref Range Status   06/13/2022 101 98 - 107 mmol/L Final   06/12/2022 105 98 - 107 mmol/L Final   06/11/2022 103 98 - 107 mmol/L Final   06/10/2022 102 98 - 107 mmol/L Final      CO2 CO2   Date Value Ref Range Status   06/13/2022 24.0 22.0 - 29.0 mmol/L Final   06/12/2022 22.0 22.0 - 29.0 mmol/L Final   06/11/2022 23.0 22.0 - 29.0 mmol/L Final   06/10/2022 26.0 22.0 - 29.0 mmol/L Final      BUN BUN   Date Value Ref Range Status   06/13/2022 19 8 - 23 mg/dL Final   06/12/2022 16 8 - 23 mg/dL Final   06/11/2022 24 (H) 8 - 23 mg/dL Final   06/10/2022 24 (H) 8 - 23 mg/dL Final      Creatinine Creatinine   Date Value Ref Range Status   06/13/2022 0.89 0.76 - 1.27  mg/dL Final   06/12/2022 0.76 0.76 - 1.27 mg/dL Final   06/11/2022 1.24 0.76 - 1.27 mg/dL Final   06/10/2022 1.36 (H) 0.76 - 1.27 mg/dL Final      Calcium Calcium   Date Value Ref Range Status   06/13/2022 8.0 (L) 8.6 - 10.5 mg/dL Final   06/12/2022 8.1 (L) 8.6 - 10.5 mg/dL Final   06/11/2022 8.7 8.6 - 10.5 mg/dL Final   06/10/2022 9.0 8.6 - 10.5 mg/dL Final      PO4 No components found for: PO4   Albumin Albumin   Date Value Ref Range Status   06/11/2022 3.90 3.50 - 5.20 g/dL Final   06/10/2022 3.50 3.50 - 5.20 g/dL Final      Magnesium Magnesium   Date Value Ref Range Status   06/13/2022 2.3 1.6 - 2.4 mg/dL Final   06/12/2022 2.5 (H) 1.6 - 2.4 mg/dL Final   06/11/2022 2.9 (H) 1.6 - 2.4 mg/dL Final   06/10/2022 2.9 (H) 1.6 - 2.4 mg/dL Final      Uric Acid No components found for: URIC ACID     Imaging Results (Last 72 Hours)     Procedure Component Value Units Date/Time    XR Chest 1 View [[id number]] Collected: 06/13/22 0752     Updated: 06/13/22 0758    Narrative:         DATE OF EXAM:   6/13/2022 7:20 AM     PROCEDURE:   XR CHEST 1 VW-     INDICATIONS:   increase O2 demands; R55-Syncope and collapse; F10.920-Alcohol use,  unspecified with intoxication, uncomplicated; J96.01-Acute respiratory  failure with hypoxia; I95.1-Orthostatic hypotension; R77.8-Other  specified abnormalities of plasma proteins; N17.9-Acute kidney failure,  unspecified; J44.1-Chronic obstructive pulmonary disease with (acute)  exacerbation; R94.39-Abnormal result of other cardiov     COMPARISON:  06/12/2022 and prior     TECHNIQUE:   Portable Chest     FINDINGS:      Patient status post median sternotomy and CABG.     Heart size appears stable. Pulmonary vasculature is mildly congested and  indistinct. This is accentuated by low lung volumes. Vascular sheath  projects of the SVC. There has been interval removal of the [place]-Demarco  catheter.     Mediastinal drains and left sided chest tube appear grossly stable.  Dependent pleural parenchymal  changes at the left lung base again noted  without great change given differences in technique. Increased hazy and  interstitial opacities on the right with a perihilar and dependent  predominance again noted. Slight improved aeration right upper lobe  airspace opacity noted. Small amount of fluid noted within the minor  fissure. No definite pneumothorax or pneumomediastinum noted. Osseous  structures are stable.        Impression:      IMPRESSION :      1. Interval removal of [place]-Demarco catheter. Lines and tubes are otherwise  stable.  2. Postsurgical changes from median sternotomy and CABG.  3. Mild pulmonary vascular congestion and dependent opacities at the  lung bases without great change given differences in technique from the  comparison[. Slight improved aeration right upper lobe opacity  peripherally from prior study.     Electronically Signed By-Hossein Alvarez On:6/13/2022 7:56 AM  This report was finalized on [id number] by  Hossein Alvarez, .    XR Chest 1 View [[id number]] Collected: 06/12/22 0842     Updated: 06/12/22 0846    Narrative:      EXAMINATION: XR CHEST 1 VW-     DATE OF EXAM: 6/12/2022 2:24 AM     INDICATION: Post-Op Heart Surgery; R55-Syncope and collapse;  F10.920-Alcohol use, unspecified with intoxication, uncomplicated;  J96.01-Acute respiratory failure with hypoxia; I95.1-Orthostatic  hypotension; R77.8-Other specified abnormalities of plasma proteins;  N17.9-Acute kidney failure, unspecified; J44.1-Chronic obstructive  pulmonary disease with (acute) exacerbation; R94.39-Abnormal result of  other cardi.     COMPARISON: Chest radiograph dated 06/11/2022     TECHNIQUE: Portable AP view of the chest was obtained.     FINDINGS:  The patient has been extubated. The [place]-Demarco catheter tip terminates at  the pulmonary outflow tract. There is left basilar chest tube and chest  tube. The gastric suction tube has been removed. There are low lung  volumes with streaky bibasilar atelectasis and  right upper lobe airspace  opacity adjacent to the right minor fissure. There is no pneumothorax.  No smoking pleural effusion. Median sternotomy wires are present and  intact. There are multilevel degenerative changes of the thoracic spine.  Temporary pacing wires remain present.       Impression:      1. Remaining support devices appear in expected position.  2. Low lung volumes with streaky bibasilar airspace opacity and right  upper lobe airspace opacity likely representing atelectasis and/or  infection.  3. No evidence of significant pleural effusion or evidence of  pneumothorax.     Electronically Signed By-Ben Juarez MD On:6/12/2022 8:44 AM  This report was finalized on [id number] by  Ben Juarez MD.    XR Chest 1 View [[id number]] Collected: 06/11/22 0835     Updated: 06/11/22 0841    Narrative:      DATE OF EXAM:  6/11/2022 5:05 AM     PROCEDURE:  XR CHEST 1 VW-     INDICATIONS:  Post-Op Heart Surgery; R55-Syncope and collapse; F10.920-Alcohol use,  unspecified with intoxication, uncomplicated; J96.01-Acute respiratory  failure with hypoxia; I95.1-Orthostatic hypotension; R77.8-Other  specified abnormalities of plasma proteins; N17.9-Acute kidney failure,  unspecified; J44.1-Chronic obstructive pulmonary disease with (acute)  exacerbation; R94.39-Abnormal result of other cardi     COMPARISON:  06/10/2022     TECHNIQUE:   Single radiographic view of the chest was obtained.     FINDINGS:  Endotracheal tube tip is approximately 3 cm above the erika. [place]-Demarco  catheter appears to terminate at the main pulmonary artery.  Status post  median sternotomy and CABG. Heart size and mediastinal contour appear  unchanged.  Enteric tube appears to extend left upper quadrant, tip  beyond the margins of this exam. Is not infiltrate is present and  appears to be a left basilar chest tube. No significant pneumothorax is  seen. There are airspace opacities in the right upper lobe at the  periphery of the right  midlung and there are left basilar airspace  opacities. These findings appear similar to the prior exam. No  significant infiltrate is seen. Possible trace left effusion, as before  No definite right pleural effusion.       Impression:      1.Tubes and lines appear in satisfactory in similar positions.  2.Right upper lobe and left basilar opacities appear grossly unchanged.  3.No definite pneumothorax. Possible trace left effusion, as before.     Electronically Signed By-Adam Anaya MD On:6/11/2022 8:38 AM  This report was finalized on [id number] by  Adam Anaya MD.    XR Chest 1 View [[id number]] Collected: 06/10/22 2208     Updated: 06/10/22 2213    Narrative:      DATE OF EXAM:  6/10/2022 9:46 PM     PROCEDURE:  XR CHEST 1 VW-     INDICATIONS:  Status post CABG procedure, history of syncope and collapse, coronary  artery disease.      COMPARISON:  06/05/2022.     TECHNIQUE:   Single radiographic view of the chest was obtained.     FINDINGS:  The patient has status post an interval CABG procedure. The tip of the  [place]-Demarco catheter terminates in the main pulmonary artery segment. The  endotracheal tube tip is in good position located between the thoracic  inlet and the aortic knob. The nasogastric tube tip projects out of the  field-of-view, but below the hemidiaphragms. There is a left basilar  chest tube in place. There are either one or two mediastinal chest tubes  in place. There is no pneumothorax. There are patchy densities in the  right upper lobe and the left lung base probably representing  atelectasis. There may be a trace left pleural effusion. There also may  be slight pulmonary vascular  congestion.       Impression:         1. Interval CABG procedure.  2. Lines and support tubes are in place as described. There is no  pneumothorax.  3. Patchy densities in the right upper lobe and left lung base probably  representing atelectasis.  4. Trace left pleural effusion.  5. Questionable slight  pulmonary vascular congestion.     Electronically Signed By-Ziggy Gold MD On:6/10/2022 10:11 PM  This report was finalized on [id number] by  Ziggy Gold MD.          Results for orders placed during the hospital encounter of 05/31/22    XR Chest 1 View    Narrative  DATE OF EXAM:  6/13/2022 7:20 AM    PROCEDURE:  XR CHEST 1 VW-    INDICATIONS:  increase O2 demands; R55-Syncope and collapse; F10.920-Alcohol use,  unspecified with intoxication, uncomplicated; J96.01-Acute respiratory  failure with hypoxia; I95.1-Orthostatic hypotension; R77.8-Other  specified abnormalities of plasma proteins; N17.9-Acute kidney failure,  unspecified; J44.1-Chronic obstructive pulmonary disease with (acute)  exacerbation; R94.39-Abnormal result of other cardiov    COMPARISON:  06/12/2022 and prior    TECHNIQUE:  Portable Chest    FINDINGS:    Patient status post median sternotomy and CABG.    Heart size appears stable. Pulmonary vasculature is mildly congested and  indistinct. This is accentuated by low lung volumes. Vascular sheath  projects of the SVC. There has been interval removal of the [place]-Demarco  catheter.    Mediastinal drains and left sided chest tube appear grossly stable.  Dependent pleural parenchymal changes at the left lung base again noted  without great change given differences in technique. Increased hazy and  interstitial opacities on the right with a perihilar and dependent  predominance again noted. Slight improved aeration right upper lobe  airspace opacity noted. Small amount of fluid noted within the minor  fissure. No definite pneumothorax or pneumomediastinum noted. Osseous  structures are stable.    Impression  IMPRESSION :    1. Interval removal of [place]-Demarco catheter. Lines and tubes are otherwise  stable.  2. Postsurgical changes from median sternotomy and CABG.  3. Mild pulmonary vascular congestion and dependent opacities at the  lung bases without great change given differences in technique from  the  comparison[. Slight improved aeration right upper lobe opacity  peripherally from prior study.    Electronically Signed By-Hossein Alvarez On:6/13/2022 7:56 AM  This report was finalized on [id number] by  Hossein Alvarez, .      XR Chest 1 View    Narrative  EXAMINATION: XR CHEST 1 VW-    DATE OF EXAM: 6/12/2022 2:24 AM    INDICATION: Post-Op Heart Surgery; R55-Syncope and collapse;  F10.920-Alcohol use, unspecified with intoxication, uncomplicated;  J96.01-Acute respiratory failure with hypoxia; I95.1-Orthostatic  hypotension; R77.8-Other specified abnormalities of plasma proteins;  N17.9-Acute kidney failure, unspecified; J44.1-Chronic obstructive  pulmonary disease with (acute) exacerbation; R94.39-Abnormal result of  other cardi.    COMPARISON: Chest radiograph dated 06/11/2022    TECHNIQUE: Portable AP view of the chest was obtained.    FINDINGS:  The patient has been extubated. The [place]-Demarco catheter tip terminates at  the pulmonary outflow tract. There is left basilar chest tube and chest  tube. The gastric suction tube has been removed. There are low lung  volumes with streaky bibasilar atelectasis and right upper lobe airspace  opacity adjacent to the right minor fissure. There is no pneumothorax.  No smoking pleural effusion. Median sternotomy wires are present and  intact. There are multilevel degenerative changes of the thoracic spine.  Temporary pacing wires remain present.    Impression  1. Remaining support devices appear in expected position.  2. Low lung volumes with streaky bibasilar airspace opacity and right  upper lobe airspace opacity likely representing atelectasis and/or  infection.  3. No evidence of significant pleural effusion or evidence of  pneumothorax.    Electronically Signed By-Ben Juarez MD On:6/12/2022 8:44 AM  This report was finalized on [id number] by  Ben Juarez MD.      XR Chest 1 View    Narrative  DATE OF EXAM:  6/11/2022 5:05 AM    PROCEDURE:  XR  CHEST 1 VW-    INDICATIONS:  Post-Op Heart Surgery; R55-Syncope and collapse; F10.920-Alcohol use,  unspecified with intoxication, uncomplicated; J96.01-Acute respiratory  failure with hypoxia; I95.1-Orthostatic hypotension; R77.8-Other  specified abnormalities of plasma proteins; N17.9-Acute kidney failure,  unspecified; J44.1-Chronic obstructive pulmonary disease with (acute)  exacerbation; R94.39-Abnormal result of other cardi    COMPARISON:  06/10/2022    TECHNIQUE:  Single radiographic view of the chest was obtained.    FINDINGS:  Endotracheal tube tip is approximately 3 cm above the erika. [place]-Demarco  catheter appears to terminate at the main pulmonary artery.  Status post  median sternotomy and CABG. Heart size and mediastinal contour appear  unchanged.  Enteric tube appears to extend left upper quadrant, tip  beyond the margins of this exam. Is not infiltrate is present and  appears to be a left basilar chest tube. No significant pneumothorax is  seen. There are airspace opacities in the right upper lobe at the  periphery of the right midlung and there are left basilar airspace  opacities. These findings appear similar to the prior exam. No  significant infiltrate is seen. Possible trace left effusion, as before  No definite right pleural effusion.    Impression  1.Tubes and lines appear in satisfactory in similar positions.  2.Right upper lobe and left basilar opacities appear grossly unchanged.  3.No definite pneumothorax. Possible trace left effusion, as before.    Electronically Signed By-Adam Anaya MD On:6/11/2022 8:38 AM  This report was finalized on [id number] by  Adam Anaya MD.      Results for orders placed during the hospital encounter of 05/31/22    Duplex Vein Mapping Lower Extremity - Bilateral CAR    Interpretation Summary  The greater saphenous veins are of satisfactory quality from the groin to the mid distal thigh bilaterally.  Distal to this the veins are small and  inadequate.        ASSESSMENT / PLAN      COPD exacerbation (HCC)    Obesity (BMI 30-39.9)    Abnormal nuclear stress test    Acute renal insufficiency    Alcohol dependence (HCC)    Essential hypertension    Other emphysema (HCC)    Coronary artery disease    Syncope, unspecified syncope type    1. ARF/FELIBERTO------Nonoliguric. BUN/Cr stable    2. CAD S/P CABG------per Cardiology and CT Surgery    3. BENIGN ESSENTIAL HTN------BP okay. No ACE-I for now    4. BPH-------Nelson replaced by CT Surgery. Hold Flomax today to avoid hypotension    5. S/P SYNCOPE    6. ETOH ABUSE    7. HYPERLIPIDEMIA-------On Statin    8. DVT PROPHYLAXIS-------On Lovenox    9. HYPERKALEMIA--------Resolved    10. ANEMIA------H/H stable    11. MILD VOLUME EXCESS------po Bumex    12. COPD/EMPHYSEMA-------per Pulmonary      Amara Cooper MD  [facility]  [phone]  06/13/22  08:35 EDT

## 2022-06-13 NOTE — PAYOR COMM NOTE
"This is a clinical update for   Reference/Auth # [id number]    THIS IS RECONSIDERATION--INCLUDED ALL NOTES FROM 6/7 FORWARD, PT REMAINS IN HOSPITAL AT THIS TIME-6/13.    Please call or fax determination to contact below.   Thank you.    Lizzette Richardson, RN, BSN  Utilization Review Nurse  [facility]  Direct & confidential phone # [phone]  Fax # [phone]    Chi Quinteros Sr. (67 y.o. Male)             Date of Birth   1955    Social Security Number       Address   [address] [place] [address]    Home Phone   [phone]    MRN   [id number]       Anabaptist   None    Marital Status                               Admission Date   5/31/22    Admission Type   Emergency    Admitting Provider       Attending Provider   Benson Hughes MD    Department, Room/Bed   [facility] CARDIOVASCULAR CARE UNIT, 2216/1       Discharge Date       Discharge Disposition       Discharge Destination                               Attending Provider: Benson Hughes MD    Allergies: No Known Allergies    Isolation: Contact   Infection: MRSA (06/08/22)   Code Status: CPR   Advance Care Planning Activity    Ht: 177.8 cm (70\")   Wt: 107 kg (235 lb)    Admission Cmt: None   Principal Problem: Syncope [R55]                 Active Insurance as of 5/31/2022     Primary Coverage     Payor Plan Insurance Group Employer/Plan Group    ANTHEM BLUE CROSS [employer]TH [email or web address] BLUE SHIELD PPO [id number]     Payor Plan Address Payor Plan Phone Number Payor Plan Fax Number Effective Dates    [address] [phone]  1/1/2022 - None Entered    [place] [address]       Subscriber Name Subscriber Birth Date Member ID       ROBINSONCHI SR. 1955 [id number]           Secondary Coverage     Payor Plan Insurance Group Employer/Plan Group    HUMANA MEDICARE REPLACEMENT HUMANA MEDICARE REPLACEMENT [id number]     Payor Plan Address Payor Plan Phone Number Payor Plan Fax Number Effective " Dates    [address] [address] [phone]  5/1/2022 - None Entered    [facility] [address]       Subscriber Name Subscriber Birth Date Member ID       CHI QUINTEROS SR. 1955 [id number]                 Emergency Contacts      (Rel.) Home Phone Work Phone Mobile Phone    ELIZ LOWRY (Son) -- -- [phone]    Chi Quinteros Jr (Son) -- -- [phone]               Operative/Procedure Notes (last 7 days)      Benson Hughes MD at 06/10/22 1450        Operative Note    Date of Dictation: 06/10/22    Date of Procedure: 06/10/22    Referring Physician: Benson Hughes,*    Preoperative diagnosis: Multivessel CAD and Ascending aortic aneurysm    Postoperative diagnosis: Same    Procedure:   1. CABG x 4 (Lima to LAD, SVG to DG, SVG to OM, SVG to PV)  2. EVH of the left legs  3.  Ascending aorta replacement with 30 mm graft    Surgeon: Benson Hughes MD     Assistants: SIGIFREDO Aguilar was responsible for performing the following activities: Cardiac Surgery First assist, Endoscopic Vein Butterfield for CABG, surgical wound closure and their skilled assistance was necessary for the success of this case.     Anesthesia: General endotracheal anesthesia and JANELLE    Findings:  The saphenous vein was harvested endoscopically form the left  leg. The vein had a diameter of 3.5 mm and was of good quality. The coronaries had a diameter of 2 mm and were of good quality.      Estimated Blood Loss:  750mL of Cell Saver returned.    STS Data: The STS Risk and all risks and alternatives were discussed with the patient and family.  Counseling was done regarding abuse of tobacco, alcohol and drugs as needed. They understand and wish to proceed. The antibiotics and b blockers were given in the STS required window.          Description of the procedure:     The patient was placed supine on the operative table. General anesthesia was given and lines placed. The patient was prepped and draped using the usual  sterile technique. A median sternotomy was performed with a scalpel and the layers carried down to the sternum using the electrocautery. The sternum was split in the midline using a vertical oscillating saw. Hemostasis was achieved. The LIMA was harvested as a pedicle and prepared with papaverine. It was of good quality. 300 units/kg of IV heparin was given to an ACT over 400. A Favaloro retractor was placed and the mediastinum exposed, the pericardium was opened and the edges tacked to the wound. Cannulation sutures were placed in the ascending aorta and right atrium. Small cannulas were placed and aorto right atrial cardiopulmonary bypass was started. Cardioplegia cannulas were placed. Cardiopulmonary bypass was then established for 173 minutes drifting to 34°C at appropriate flow rates.  The aorta was crossclamped the distal aorta and somatosensory motor evoked potentials were normal.  The aorta was crossclamped for 142 minutes and we gave 1000 cc of antegrade cold blood cardioplegia then 200L of retrograde cold blood cardioplegia and then repeated doses every 10 to 15 minutes to good effect.   The first vein was sewn to the obtuse marginal one of the circunflex artery with 7-0 Prolene.  The next vein was sewn to first diagonal with 7-0 Prolene.  The third vein was sewn posterior descending artery of the right coronary artery with 7-0 Prolene. The LIMA was sewn to the proximal LAD with 7-0 Prolene.  The proximal LAD was intramyocardial and the anastomosis was done very proximal. An ascending aorta aneurysm was resected and was replaced with 30 mm dacron graft from the sinotubular junction to the distal ascending. 2 veins were anastomosed to the ascending aorta graft with 6-0 Prolene and marked with washers.  The vein graft to the diagonal was sewn to the proximal vein graft to the obtuse marginal because it had not enough length. A warm dose of cardioplegia was given and then the aortic clamp removed as well as  the LIMA bulldog clamp. All anastomoses were checked and had good flow and morphology. The left pleural space was suctioned and the lungs ventilated. The heart was paced till regular atrial rhythm resumed. I allowed the heart to eject and once hemodynamics were acceptable, then the CPB was discontinued and the venous and cardioplegia cannulas removed. The matching dose of protamine was given and the aortic cannula removed as well. AV temporary wires and pleural and mediastinal chest tubes were placed and the wound sprayed with platelet rich plasma. The sternum was closed with single and double wires and soft tissue in layers of reabsorbable material. The wounds were covered with sterile dressings.       Specimen removed:  Mammary Chain lymph node    CPB time:  173 minutes.    Aortic clamp time:  142 minutes    Complications:  none           Disposition: Cardiovascular recovery room           Condition: Critical but stable.      Electronically signed by Benson Hughes MD at 06/10/22 9344

## 2022-06-13 NOTE — DISCHARGE PLACEMENT REQUEST
"Chi Quinteros Sr. (67 y.o. Male)             Date of Birth   1955    Social Security Number       Address   [address] [place] [facility]    Home Phone   [phone]    MRN   [id number]       Pentecostal   None    Marital Status                               Admission Date   5/31/22    Admission Type   Emergency    Admitting Provider       Attending Provider   Benson Hughes MD    Department, Room/Bed   [facility] CARDIOVASCULAR CARE UNIT, 2216/1       Discharge Date       Discharge Disposition       Discharge Destination                               Attending Provider: Benson Hughes MD    Allergies: No Known Allergies    Isolation: Contact   Infection: MRSA (06/08/22)   Code Status: CPR   Advance Care Planning Activity    Ht: 177.8 cm (70\")   Wt: 107 kg (235 lb)    Admission Cmt: None   Principal Problem: Syncope [R55]                 Active Insurance as of 5/31/2022     Primary Coverage     Payor Plan Insurance Group Employer/Plan Group    [employer]HEM BLUE CROSS [employer] BLUE CROSS BLUE SHIELD PPO [id number]     Payor Plan Address Payor Plan Phone Number Payor Plan Fax Number Effective Dates    [address] [phone]  1/1/2022 - None Entered    [facility] [address]       Subscriber Name Subscriber Birth Date Member ID       CHI QUINTEROS SR. 1955 [id number]           Secondary Coverage     Payor Plan Insurance Group Employer/Plan Group    HUMANA MEDICARE REPLACEMENT HUMANA MEDICARE REPLACEMENT [id number]     Payor Plan Address Payor Plan Phone Number Payor Plan Fax Number Effective Dates    [address] [address] [phone]  5/1/2022 - None Entered    [facility] [address]       Subscriber Name Subscriber Birth Date Member ID       CHI QUINTEROS SR. 1955 [id number]                 Emergency Contacts      (Rel.) Home Phone Work Phone Mobile Phone    ELIZ LOWRY (Son) -- -- [phone]    Chi Quinteros Jr (Son) -- -- [phone]            "

## 2022-06-13 NOTE — SIGNIFICANT NOTE
06/13/22 1409   OTHER   Discipline physical therapist   Rehab Time/Intention   Session Not Performed unable to treat, medical status change;other (see comments)  (Pt having withdrawl symptoms per nursing; Nsg reports pt is not appropriate for PT today.)   Recommendation   PT - Next Appointment 06/14/22

## 2022-06-13 NOTE — NURSING NOTE
Upon getting shift change report. Patient was confused and agitated. He is trying to get out of bed, pulling at chest tubes. Patient states he has to pee. Patient was assisted with urinal. Also had two incontinence episodes. CIWA score was 19. Ativan orders followed. See MAR.    CV surgery was paged to ask for dawn replacement and explained the situation explained above.

## 2022-06-13 NOTE — PAYOR COMM NOTE
"This is a clinical update for Neli Chi NICHOLSON Sr.  Reference/Auth # [id number]    THIS IS RECONSIDERATION REQUEST.     Please call or fax determination to contact below.   Thank you.    Lizzette Richardson, RN, BSN  Utilization Review Nurse  [facility]  Direct & confidential phone # [phone]  Fax # [phone]      Chi Quinteros Sr. (67 y.o. Male)             Date of Birth   1955    Social Security Number       Address   [address] [place] [address]    Home Phone   [phone]    MRN   [id number]       Quaker   None    Marital Status                               Admission Date   5/31/22    Admission Type   Emergency    Admitting Provider       Attending Provider   Benson Hughes MD    Department, Room/Bed   [facility], 2216/1       Discharge Date       Discharge Disposition       Discharge Destination                               Attending Provider: Benson Hughes MD    Allergies: No Known Allergies    Isolation: Contact   Infection: MRSA (06/08/22)   Code Status: CPR   Advance Care Planning Activity    Ht: 177.8 cm (70\")   Wt: 107 kg (235 lb)    Admission Cmt: None   Principal Problem: Syncope [R55]                 Active Insurance as of 5/31/2022     Primary Coverage     Payor Plan Insurance Group Employer/Plan Group    [facility] BLUE CROSS [facility] BLUE [employer] BLUE [facility] PPO [id number]     Payor Plan Address Payor Plan Phone Number Payor Plan Fax Number Effective Dates    [address] [phone]  1/1/2022 - None Entered    [place] [id number]       Subscriber Name Subscriber Birth Date Member ID       CHI QUINTEROS SR. 1955 [id number]           Secondary Coverage     Payor Plan Insurance Group Employer/Plan Group    HUMANA MEDICARE REPLACEMENT HUMANA MEDICARE REPLACEMENT [id number]     Payor Plan Address Payor Plan Phone Number Payor Plan Fax Number Effective Dates    [address] [address] [phone]  5/1/2022 - None " [facility] [id number]       Subscriber Name Subscriber Birth Date Member ID       CHI QUINTEROS SR. 1955 [id number]                 Emergency Contacts      (Rel.) Home Phone Work Phone Mobile Phone    ELIZ LOWRY (Son) -- -- [phone]    Chi Quinteros Jr (Son) -- -- [phone]               Operative/Procedure Notes (last 7 days)      Benson Hughes MD at 06/10/22 1450        Operative Note    Date of Dictation: 06/10/22    Date of Procedure: 06/10/22    Referring Physician: Benson Hughes,*    Preoperative diagnosis: Multivessel CAD and Ascending aortic aneurysm    Postoperative diagnosis: Same    Procedure:   1. CABG x 4 (Lima to LAD, SVG to DG, SVG to OM, SVG to PV)  2. EVH of the left legs  3.  Ascending aorta replacement with 30 mm graft    Surgeon: Benson Hughes MD     Assistants: SIGIFREDO Aguilar was responsible for performing the following activities: Cardiac Surgery First assist, Endoscopic Vein [place] for CABG, surgical wound closure and their skilled assistance was necessary for the success of this case.     Anesthesia: General endotracheal anesthesia and JANLELE    Findings:  The saphenous vein was harvested endoscopically form the left  leg. The vein had a diameter of 3.5 mm and was of good quality. The coronaries had a diameter of 2 mm and were of good quality.      Estimated Blood Loss:  750mL of Cell Saver returned.    STS Data: The STS Risk and all risks and alternatives were discussed with the patient and family.  Counseling was done regarding abuse of tobacco, alcohol and drugs as needed. They understand and wish to proceed. The antibiotics and b blockers were given in the STS required window.          Description of the procedure:     The patient was placed supine on the operative table. General anesthesia was given and lines placed. The patient was prepped and draped using the usual sterile technique. A median sternotomy was performed  with a scalpel and the layers carried down to the sternum using the electrocautery. The sternum was split in the midline using a vertical oscillating saw. Hemostasis was achieved. The LIMA was harvested as a pedicle and prepared with papaverine. It was of good quality. 300 units/kg of IV heparin was given to an ACT over 400. A Favaloro retractor was placed and the mediastinum exposed, the pericardium was opened and the edges tacked to the wound. Cannulation sutures were placed in the ascending aorta and right atrium. Small cannulas were placed and aorto right atrial cardiopulmonary bypass was started. Cardioplegia cannulas were placed. Cardiopulmonary bypass was then established for 173 minutes drifting to 34°C at appropriate flow rates.  The aorta was crossclamped the distal aorta and somatosensory motor evoked potentials were normal.  The aorta was crossclamped for 142 minutes and we gave 1000 cc of antegrade cold blood cardioplegia then 200L of retrograde cold blood cardioplegia and then repeated doses every 10 to 15 minutes to good effect.   The first vein was sewn to the obtuse marginal one of the circunflex artery with 7-0 Prolene.  The next vein was sewn to first diagonal with 7-0 Prolene.  The third vein was sewn posterior descending artery of the right coronary artery with 7-0 Prolene. The LIMA was sewn to the proximal LAD with 7-0 Prolene.  The proximal LAD was intramyocardial and the anastomosis was done very proximal. An ascending aorta aneurysm was resected and was replaced with 30 mm dacron graft from the sinotubular junction to the distal ascending. 2 veins were anastomosed to the ascending aorta graft with 6-0 Prolene and marked with washers.  The vein graft to the diagonal was sewn to the proximal vein graft to the obtuse marginal because it had not enough length. A warm dose of cardioplegia was given and then the aortic clamp removed as well as the LIMA bulldog clamp. All anastomoses were checked  and had good flow and morphology. The left pleural space was suctioned and the lungs ventilated. The heart was paced till regular atrial rhythm resumed. I allowed the heart to eject and once hemodynamics were acceptable, then the CPB was discontinued and the venous and cardioplegia cannulas removed. The matching dose of protamine was given and the aortic cannula removed as well. AV temporary wires and pleural and mediastinal chest tubes were placed and the wound sprayed with platelet rich plasma. The sternum was closed with single and double wires and soft tissue in layers of reabsorbable material. The wounds were covered with sterile dressings.       Specimen removed:  Mammary Chain lymph node    CPB time:  173 minutes.    Aortic clamp time:  142 minutes    Complications:  none           Disposition: Cardiovascular recovery room           Condition: Critical but stable.      Electronically signed by Benson Hughes MD at 06/10/22 2113          Physician Progress Notes (last 24 hours)      Martha Tinoco APRN at 06/13/22 0843           LOS: 8 days   Patient Care Team:  Deysi Mason APRN as PCP - General (Nurse Practitioner)  Eliseo Casarez MD as Consulting Physician (Nephrology)    Chief Complaint: post op fu    Subjective      Vital Signs  Temp:  [97.6 °F (36.4 °C)-99.5 °F (37.5 °C)] 99.2 °F (37.3 °C)  Heart Rate:  [74-93] 90  Resp:  [20-35] 28  BP: (104-155)/(51-86) 108/64  Arterial Line BP: (114-142)/(46-55) 142/55  Body mass index is 33.72 kg/m².    Intake/Output Summary (Last 24 hours) at 6/13/2022 0843  Last data filed at 6/13/2022 0600  Gross per 24 hour   Intake 1088 ml   Output 2760 ml   Net -1672 ml     No intake/output data recorded.    Chest tube drainage last 8 hours:  90,170 (no air leak)        06/11/22  0600 06/12/22  0549 06/13/22  0430   Weight: 110 kg (243 lb 6.2 oz) 107 kg (235 lb 12.8 oz) 107 kg (235 lb)         Objective:  General Appearance:  In no acute distress.    Vital  "signs: (most recent): Blood pressure 108/64, pulse 90, temperature 99.2 °F (37.3 °C), temperature source Oral, resp. rate 28, height 177.8 cm (70\"), weight 107 kg (235 lb), SpO2 97 %.    Output: Producing urine.    Lungs:  Tachypnea.  There are rhonchi.    Heart: Normal rate.  Regular rhythm.  S1 normal and S2 normal.    Extremities: There is no dependent edema.    Neurological: (Awakens to voice, pinpoint pupils).    Skin:  Warm and dry.  (Sternal dressing intact)            Results Review:        WBC WBC   Date Value Ref Range Status   06/13/2022 10.50 3.40 - 10.80 10*3/mm3 Final   06/12/2022 12.40 (H) 3.40 - 10.80 10*3/mm3 Final   06/11/2022 15.20 (H) 3.40 - 10.80 10*3/mm3 Final   06/10/2022 22.40 (H) 3.40 - 10.80 10*3/mm3 Final   06/10/2022 24.90 (H) 3.40 - 10.80 10*3/mm3 Final      HGB Hemoglobin   Date Value Ref Range Status   06/13/2022 9.8 (L) 13.0 - 17.7 g/dL Final   06/12/2022 9.8 (L) 13.0 - 17.7 g/dL Final   06/11/2022 10.0 (L) 13.0 - 17.7 g/dL Final   06/11/2022 10.6 (L) 12.0 - 17.0 g/dL Final   06/10/2022 11.2 (L) 12.0 - 17.0 g/dL Final   06/10/2022 10.5 (L) 13.0 - 17.7 g/dL Final     Comment:     Result checked    06/10/2022 11.8 (L) 12.0 - 17.0 g/dL Final   06/10/2022 8.5 (L) 13.0 - 17.7 g/dL Final     Comment:     Result checked    06/10/2022 9.9 (L) 12.0 - 17.0 g/dL Final   06/10/2022 10.9 (L) 12.0 - 17.0 g/dL Final   06/10/2022 10.5 (L) 12.0 - 17.0 g/dL Final   06/10/2022 10.9 (L) 12.0 - 17.0 g/dL Final   06/10/2022 10.5 (L) 12.0 - 17.0 g/dL Final   06/10/2022 10.9 (L) 12.0 - 17.0 g/dL Final   06/10/2022 10.5 (L) 12.0 - 17.0 g/dL Final   06/10/2022 13.3 12.0 - 17.0 g/dL Final      HCT Hematocrit   Date Value Ref Range Status   06/13/2022 30.3 (L) 37.5 - 51.0 % Final   06/12/2022 30.3 (L) 37.5 - 51.0 % Final   06/11/2022 30.1 (L) 37.5 - 51.0 % Final   06/11/2022 31 (L) 38 - 51 % Final   06/10/2022 33 (L) 38 - 51 % Final   06/10/2022 32.8 (L) 37.5 - 51.0 % Final   06/10/2022 35 (L) 38 - 51 % Final "   06/10/2022 25.6 (L) 37.5 - 51.0 % Final   06/10/2022 29 (L) 38 - 51 % Final   06/10/2022 32 (L) 38 - 51 % Final   06/10/2022 31 (L) 38 - 51 % Final   06/10/2022 32 (L) 38 - 51 % Final   06/10/2022 31 (L) 38 - 51 % Final   06/10/2022 32 (L) 38 - 51 % Final   06/10/2022 31 (L) 38 - 51 % Final   06/10/2022 39 38 - 51 % Final      Platelets Platelets   Date Value Ref Range Status   06/13/2022 127 (L) 140 - 450 10*3/mm3 Final   06/12/2022 139 (L) 140 - 450 10*3/mm3 Final   06/11/2022 145 140 - 450 10*3/mm3 Final   06/10/2022 170 140 - 450 10*3/mm3 Final   06/10/2022 186 140 - 450 10*3/mm3 Final        PT/INR:    Protime   Date Value Ref Range Status   06/10/2022 11.7 9.6 - 11.7 Seconds Final   06/10/2022 12.7 (H) 9.6 - 11.7 Seconds Final   /  INR   Date Value Ref Range Status   06/10/2022 1.14 (H) 0.93 - 1.10 Final   06/10/2022 1.25 (H) 0.93 - 1.10 Final       Sodium Sodium   Date Value Ref Range Status   06/13/2022 137 136 - 145 mmol/L Final   06/12/2022 137 136 - 145 mmol/L Final   06/11/2022 139 136 - 145 mmol/L Final   06/10/2022 137 136 - 145 mmol/L Final      Potassium Potassium   Date Value Ref Range Status   06/13/2022 4.1 3.5 - 5.2 mmol/L Final   06/12/2022 4.1 3.5 - 5.2 mmol/L Final   06/11/2022 4.4 3.5 - 5.2 mmol/L Final   06/10/2022 4.7 3.5 - 5.2 mmol/L Final     Comment:     Slight hemolysis detected by analyzer. Results may be affected.      Chloride Chloride   Date Value Ref Range Status   06/13/2022 101 98 - 107 mmol/L Final   06/12/2022 105 98 - 107 mmol/L Final   06/11/2022 103 98 - 107 mmol/L Final   06/10/2022 102 98 - 107 mmol/L Final      Bicarbonate CO2   Date Value Ref Range Status   06/13/2022 24.0 22.0 - 29.0 mmol/L Final   06/12/2022 22.0 22.0 - 29.0 mmol/L Final   06/11/2022 23.0 22.0 - 29.0 mmol/L Final   06/10/2022 26.0 22.0 - 29.0 mmol/L Final      BUN BUN   Date Value Ref Range Status   06/13/2022 19 8 - 23 mg/dL Final   06/12/2022 16 8 - 23 mg/dL Final   06/11/2022 24 (H) 8 - 23 mg/dL  Final   06/10/2022 24 (H) 8 - 23 mg/dL Final      Creatinine Creatinine   Date Value Ref Range Status   06/13/2022 0.89 0.76 - 1.27 mg/dL Final   06/12/2022 0.76 0.76 - 1.27 mg/dL Final   06/11/2022 1.24 0.76 - 1.27 mg/dL Final   06/10/2022 1.36 (H) 0.76 - 1.27 mg/dL Final      Calcium Calcium   Date Value Ref Range Status   06/13/2022 8.0 (L) 8.6 - 10.5 mg/dL Final   06/12/2022 8.1 (L) 8.6 - 10.5 mg/dL Final   06/11/2022 8.7 8.6 - 10.5 mg/dL Final   06/10/2022 9.0 8.6 - 10.5 mg/dL Final      Magnesium Magnesium   Date Value Ref Range Status   06/13/2022 2.3 1.6 - 2.4 mg/dL Final   06/12/2022 2.5 (H) 1.6 - 2.4 mg/dL Final   06/11/2022 2.9 (H) 1.6 - 2.4 mg/dL Final   06/10/2022 2.9 (H) 1.6 - 2.4 mg/dL Final      Troponin No results found for: TROPONIN   BNP No results found for: BNP         arformoterol, 15 mcg, Nebulization, BID - RT  aspirin, 81 mg, Oral, Daily  atorvastatin, 40 mg, Oral, Nightly  budesonide, 0.5 mg, Nebulization, BID - RT  bumetanide, 1 mg, Oral, BID  calcium gluconate, 1 g, Intravenous, Once  chlorhexidine, 15 mL, Mouth/Throat, Q12H  enoxaparin, 40 mg, Subcutaneous, Q24H  gabapentin, 100 mg, Oral, Q8H  guaiFENesin, 1,200 mg, Oral, Q12H  insulin lispro, 0-7 Units, Subcutaneous, TID AC  ipratropium-albuterol, 3 mL, Nebulization, Once  mupirocin, 1 application, Each Nare, BID  pantoprazole, 40 mg, Oral, QAM  polyethylene glycol, 17 g, Oral, BID  potassium chloride, 20 mEq, Oral, Daily  senna-docusate sodium, 2 tablet, Oral, Nightly  sodium chloride, 4 mL, Nebulization, BID - RT  tamsulosin, 0.4 mg, Oral, Daily      insulin, 0-100 Units/hr, Last Rate: Stopped (06/13/22 0733)  milrinone, 0.125 mcg/kg/min, Last Rate: 0.125 mcg/kg/min (06/12/22 1330)        PRN Meds:.•  acetaminophen **OR** acetaminophen **OR** acetaminophen  •  albuterol  •  bisacodyl  •  dextrose  •  dextrose  •  glucagon (human recombinant)  •  HYDROcodone-acetaminophen **OR** HYDROcodone-acetaminophen  •  insulin lispro **AND**  insulin lispro  •  ipratropium-albuterol  •  LORazepam **OR** LORazepam **OR** LORazepam **OR** LORazepam **OR** LORazepam **OR** LORazepam **OR** LORazepam **OR** LORazepam  •  magnesium hydroxide  •  [] Morphine **AND** naloxone  •  ondansetron  •  potassium chloride **OR** potassium chloride    Patient Active Problem List   Diagnosis Code   • COPD exacerbation ([facility]) J44.1   • Obesity (BMI 30-39.9) E66.9   • Abnormal nuclear stress test R94.39   • Acute renal insufficiency N28.9   • Alcohol dependence ([facility]) F10.20   • Essential hypertension I10   • Other emphysema ([facility]) J43.8   • Coronary artery disease I25.10   • Syncope, unspecified syncope type R55       Assessment & Plan    - MV CAD, 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 Casarez following  - ETOH use--reports 3 beers/day and bourbon--withdrawal this weekend  - MRSA nasal colonization--vanc/Bactroban  - Postop leukocytosis, multifactorial----normalized    POD#3.  Agitated this am, sitter at bedside, several urinary incontinent episodes, dawn placed with 400+ residual, will check u/a.  Tachypnea, check ABG.  Rhonchi throughout, encourage cough, may have to NT suction or bronch if pulmonary thinks warranted.  Leave chest tubes with increased output last 8 hours.     ERICA Johnson  22  08:43 EDT    Electronically signed by Martha Tinoco APRN at 22 0855     Dre Cooper MD at 22 0835          NEPHROLOGY PROGRESS NOTE------[facility]    [facility]  [phone]  Amara  "MD Kenneth      Patient Care Team:  Deysi Mason APRN as PCP - General (Nurse Practitioner)  Eliseo Casarez MD as Consulting Physician (Nephrology)      Provider:  Amara Cooper MD  Patient Name: Chi Quinteros Sr.  :  1955    SUBJECTIVE:    F/U ARF/FELIBERTO/CRF/CKD    Confused and with sitter    Medication:  arformoterol, 15 mcg, Nebulization, BID - RT  aspirin, 81 mg, Oral, Daily  atorvastatin, 40 mg, Oral, Nightly  budesonide, 0.5 mg, Nebulization, BID - RT  chlorhexidine, 15 mL, Mouth/Throat, Q12H  enoxaparin, 40 mg, Subcutaneous, Q24H  gabapentin, 100 mg, Oral, Q8H  guaiFENesin, 1,200 mg, Oral, Q12H  insulin lispro, 0-7 Units, Subcutaneous, TID AC  ipratropium-albuterol, 3 mL, Nebulization, Once  mupirocin, 1 application, Each Nare, BID  pantoprazole, 40 mg, Oral, QAM  polyethylene glycol, 17 g, Oral, BID  potassium chloride, 20 mEq, Oral, Daily  senna-docusate sodium, 2 tablet, Oral, Nightly  sodium chloride, 4 mL, Nebulization, BID - RT  tamsulosin, 0.4 mg, Oral, Daily      insulin, 0-100 Units/hr, Last Rate: Stopped (22 0733)  milrinone, 0.125 mcg/kg/min, Last Rate: 0.125 mcg/kg/min (22 1330)        OBJECTIVE    Vital Sign Min/Max for last 24 hours  Temp  Min: 97.6 °F (36.4 °C)  Max: 99.5 °F (37.5 °C)   BP  Min: 104/62  Max: 155/86   Pulse  Min: 74  Max: 93   Resp  Min: 20  Max: 35   SpO2  Min: 77 %  Max: 100 %   No data recorded   Weight  Min: 107 kg (235 lb)  Max: 107 kg (235 lb)     Flowsheet Rows    Flowsheet Row First Filed Value   Admission Height 177.8 cm (70\") Documented at 2022   Admission Weight 104 kg (230 lb) Documented at 2022          No intake/output data recorded.  I/O last 3 completed shifts:  In: 3133.5 [P.O.:840; I.V.:2293.5]  Out: 4259 [Urine:3185; Chest Tube:1074]    Physical Exam:  General Appearance: AROUSABLE BUT CONFUSED WITH SITTER IN ROOM. NAD  Head: normocephalic, without obvious abnormality and atraumatic  Eyes: conjunctivae " and sclerae normal and no icterus  Neck: supple and no JVD  Lungs: DECREASED BS BIBASILAR  Heart: regular rhythm & normal rate and normal S1, S2 +ANTONY  Chest: S/P SURGICAL CHANGES ASSOCIATED WITH OPEN HEART SURGERY  Abdomen: +HYPOACTIVE bowel sounds and soft non-tender +GONZALEZ  Extremities: moves extremities well, no edema, no cyanosis and no redness  Skin: no bleeding, bruising or rash, turgor normal, color normal and no lesions noted  Neurologic: AGITATED    Labs:    WBC WBC   Date Value Ref Range Status   06/13/2022 10.50 3.40 - 10.80 10*3/mm3 Final   06/12/2022 12.40 (H) 3.40 - 10.80 10*3/mm3 Final   06/11/2022 15.20 (H) 3.40 - 10.80 10*3/mm3 Final   06/10/2022 22.40 (H) 3.40 - 10.80 10*3/mm3 Final   06/10/2022 24.90 (H) 3.40 - 10.80 10*3/mm3 Final      HGB Hemoglobin   Date Value Ref Range Status   06/13/2022 9.8 (L) 13.0 - 17.7 g/dL Final   06/12/2022 9.8 (L) 13.0 - 17.7 g/dL Final   06/11/2022 10.0 (L) 13.0 - 17.7 g/dL Final   06/11/2022 10.6 (L) 12.0 - 17.0 g/dL Final   06/10/2022 11.2 (L) 12.0 - 17.0 g/dL Final   06/10/2022 10.5 (L) 13.0 - 17.7 g/dL Final     Comment:     Result checked    06/10/2022 11.8 (L) 12.0 - 17.0 g/dL Final   06/10/2022 8.5 (L) 13.0 - 17.7 g/dL Final     Comment:     Result checked    06/10/2022 9.9 (L) 12.0 - 17.0 g/dL Final   06/10/2022 10.9 (L) 12.0 - 17.0 g/dL Final   06/10/2022 10.5 (L) 12.0 - 17.0 g/dL Final   06/10/2022 10.9 (L) 12.0 - 17.0 g/dL Final   06/10/2022 10.5 (L) 12.0 - 17.0 g/dL Final   06/10/2022 10.9 (L) 12.0 - 17.0 g/dL Final   06/10/2022 10.5 (L) 12.0 - 17.0 g/dL Final   06/10/2022 13.3 12.0 - 17.0 g/dL Final      HCT Hematocrit   Date Value Ref Range Status   06/13/2022 30.3 (L) 37.5 - 51.0 % Final   06/12/2022 30.3 (L) 37.5 - 51.0 % Final   06/11/2022 30.1 (L) 37.5 - 51.0 % Final   06/11/2022 31 (L) 38 - 51 % Final   06/10/2022 33 (L) 38 - 51 % Final   06/10/2022 32.8 (L) 37.5 - 51.0 % Final   06/10/2022 35 (L) 38 - 51 % Final   06/10/2022 25.6 (L) 37.5 - 51.0  % Final   06/10/2022 29 (L) 38 - 51 % Final   06/10/2022 32 (L) 38 - 51 % Final   06/10/2022 31 (L) 38 - 51 % Final   06/10/2022 32 (L) 38 - 51 % Final   06/10/2022 31 (L) 38 - 51 % Final   06/10/2022 32 (L) 38 - 51 % Final   06/10/2022 31 (L) 38 - 51 % Final   06/10/2022 39 38 - 51 % Final      Platlets No results found for: LABPLAT   MCV MCV   Date Value Ref Range Status   06/13/2022 97.7 (H) 79.0 - 97.0 fL Final   06/12/2022 97.7 (H) 79.0 - 97.0 fL Final   06/11/2022 97.3 (H) 79.0 - 97.0 fL Final   06/10/2022 97.8 (H) 79.0 - 97.0 fL Final   06/10/2022 97.7 (H) 79.0 - 97.0 fL Final          Sodium Sodium   Date Value Ref Range Status   06/13/2022 137 136 - 145 mmol/L Final   06/12/2022 137 136 - 145 mmol/L Final   06/11/2022 139 136 - 145 mmol/L Final   06/10/2022 137 136 - 145 mmol/L Final      Potassium Potassium   Date Value Ref Range Status   06/13/2022 4.1 3.5 - 5.2 mmol/L Final   06/12/2022 4.1 3.5 - 5.2 mmol/L Final   06/11/2022 4.4 3.5 - 5.2 mmol/L Final   06/10/2022 4.7 3.5 - 5.2 mmol/L Final     Comment:     Slight hemolysis detected by analyzer. Results may be affected.      Chloride Chloride   Date Value Ref Range Status   06/13/2022 101 98 - 107 mmol/L Final   06/12/2022 105 98 - 107 mmol/L Final   06/11/2022 103 98 - 107 mmol/L Final   06/10/2022 102 98 - 107 mmol/L Final      CO2 CO2   Date Value Ref Range Status   06/13/2022 24.0 22.0 - 29.0 mmol/L Final   06/12/2022 22.0 22.0 - 29.0 mmol/L Final   06/11/2022 23.0 22.0 - 29.0 mmol/L Final   06/10/2022 26.0 22.0 - 29.0 mmol/L Final      BUN BUN   Date Value Ref Range Status   06/13/2022 19 8 - 23 mg/dL Final   06/12/2022 16 8 - 23 mg/dL Final   06/11/2022 24 (H) 8 - 23 mg/dL Final   06/10/2022 24 (H) 8 - 23 mg/dL Final      Creatinine Creatinine   Date Value Ref Range Status   06/13/2022 0.89 0.76 - 1.27 mg/dL Final   06/12/2022 0.76 0.76 - 1.27 mg/dL Final   06/11/2022 1.24 0.76 - 1.27 mg/dL Final   06/10/2022 1.36 (H) 0.76 - 1.27 mg/dL Final       Calcium Calcium   Date Value Ref Range Status   06/13/2022 8.0 (L) 8.6 - 10.5 mg/dL Final   06/12/2022 8.1 (L) 8.6 - 10.5 mg/dL Final   06/11/2022 8.7 8.6 - 10.5 mg/dL Final   06/10/2022 9.0 8.6 - 10.5 mg/dL Final      PO4 No components found for: PO4   Albumin Albumin   Date Value Ref Range Status   06/11/2022 3.90 3.50 - 5.20 g/dL Final   06/10/2022 3.50 3.50 - 5.20 g/dL Final      Magnesium Magnesium   Date Value Ref Range Status   06/13/2022 2.3 1.6 - 2.4 mg/dL Final   06/12/2022 2.5 (H) 1.6 - 2.4 mg/dL Final   06/11/2022 2.9 (H) 1.6 - 2.4 mg/dL Final   06/10/2022 2.9 (H) 1.6 - 2.4 mg/dL Final      Uric Acid No components found for: URIC ACID     Imaging Results (Last 72 Hours)     Procedure Component Value Units Date/Time    XR Chest 1 View [[id number]] Collected: 06/13/22 0752     Updated: 06/13/22 0758    Narrative:         DATE OF EXAM:   6/13/2022 7:20 AM     PROCEDURE:   XR CHEST 1 VW-     INDICATIONS:   increase O2 demands; R55-Syncope and collapse; F10.920-Alcohol use,  unspecified with intoxication, uncomplicated; J96.01-Acute respiratory  failure with hypoxia; I95.1-Orthostatic hypotension; R77.8-Other  specified abnormalities of plasma proteins; N17.9-Acute kidney failure,  unspecified; J44.1-Chronic obstructive pulmonary disease with (acute)  exacerbation; R94.39-Abnormal result of other cardiov     COMPARISON:  06/12/2022 and prior     TECHNIQUE:   Portable Chest     FINDINGS:      Patient status post median sternotomy and CABG.     Heart size appears stable. Pulmonary vasculature is mildly congested and  indistinct. This is accentuated by low lung volumes. Vascular sheath  projects of the SVC. There has been interval removal of the Acra-Demarco  catheter.     Mediastinal drains and left sided chest tube appear grossly stable.  Dependent pleural parenchymal changes at the left lung base again noted  without great change given differences in technique. Increased hazy and  interstitial opacities on the  right with a perihilar and dependent  predominance again noted. Slight improved aeration right upper lobe  airspace opacity noted. Small amount of fluid noted within the minor  fissure. No definite pneumothorax or pneumomediastinum noted. Osseous  structures are stable.        Impression:      IMPRESSION :      1. Interval removal of [place]-Demarco catheter. Lines and tubes are otherwise  stable.  2. Postsurgical changes from median sternotomy and CABG.  3. Mild pulmonary vascular congestion and dependent opacities at the  lung bases without great change given differences in technique from the  comparison[. Slight improved aeration right upper lobe opacity  peripherally from prior study.     Electronically Signed By-Hossein Alvarez On:6/13/2022 7:56 AM  This report was finalized on [id number] by  Hossein Alvarez, .    XR Chest 1 View [[id number]] Collected: 06/12/22 0842     Updated: 06/12/22 0846    Narrative:      EXAMINATION: XR CHEST 1 VW-     DATE OF EXAM: 6/12/2022 2:24 AM     INDICATION: Post-Op Heart Surgery; R55-Syncope and collapse;  F10.920-Alcohol use, unspecified with intoxication, uncomplicated;  J96.01-Acute respiratory failure with hypoxia; I95.1-Orthostatic  hypotension; R77.8-Other specified abnormalities of plasma proteins;  N17.9-Acute kidney failure, unspecified; J44.1-Chronic obstructive  pulmonary disease with (acute) exacerbation; R94.39-Abnormal result of  other cardi.     COMPARISON: Chest radiograph dated 06/11/2022     TECHNIQUE: Portable AP view of the chest was obtained.     FINDINGS:  The patient has been extubated. The [place]-Demarco catheter tip terminates at  the pulmonary outflow tract. There is left basilar chest tube and chest  tube. The gastric suction tube has been removed. There are low lung  volumes with streaky bibasilar atelectasis and right upper lobe airspace  opacity adjacent to the right minor fissure. There is no pneumothorax.  No smoking pleural effusion. Median sternotomy  wires are present and  intact. There are multilevel degenerative changes of the thoracic spine.  Temporary pacing wires remain present.       Impression:      1. Remaining support devices appear in expected position.  2. Low lung volumes with streaky bibasilar airspace opacity and right  upper lobe airspace opacity likely representing atelectasis and/or  infection.  3. No evidence of significant pleural effusion or evidence of  pneumothorax.     Electronically Signed By-Ben Juarez MD On:6/12/2022 8:44 AM  This report was finalized on [id number] by  Ben Juarez MD.    XR Chest 1 View [[id number]] Collected: 06/11/22 0835     Updated: 06/11/22 0841    Narrative:      DATE OF EXAM:  6/11/2022 5:05 AM     PROCEDURE:  XR CHEST 1 VW-     INDICATIONS:  Post-Op Heart Surgery; R55-Syncope and collapse; F10.920-Alcohol use,  unspecified with intoxication, uncomplicated; J96.01-Acute respiratory  failure with hypoxia; I95.1-Orthostatic hypotension; R77.8-Other  specified abnormalities of plasma proteins; N17.9-Acute kidney failure,  unspecified; J44.1-Chronic obstructive pulmonary disease with (acute)  exacerbation; R94.39-Abnormal result of other cardi     COMPARISON:  06/10/2022     TECHNIQUE:   Single radiographic view of the chest was obtained.     FINDINGS:  Endotracheal tube tip is approximately 3 cm above the erika. [place]-Demarco  catheter appears to terminate at the main pulmonary artery.  Status post  median sternotomy and CABG. Heart size and mediastinal contour appear  unchanged.  Enteric tube appears to extend left upper quadrant, tip  beyond the margins of this exam. Is not infiltrate is present and  appears to be a left basilar chest tube. No significant pneumothorax is  seen. There are airspace opacities in the right upper lobe at the  periphery of the right midlung and there are left basilar airspace  opacities. These findings appear similar to the prior exam. No  significant infiltrate is seen.  Possible trace left effusion, as before  No definite right pleural effusion.       Impression:      1.Tubes and lines appear in satisfactory in similar positions.  2.Right upper lobe and left basilar opacities appear grossly unchanged.  3.No definite pneumothorax. Possible trace left effusion, as before.     Electronically Signed By-Adam Anaya MD On:6/11/2022 8:38 AM  This report was finalized on [id number] by  Adam Anaya MD.    XR Chest 1 View [[id number]] Collected: 06/10/22 2208     Updated: 06/10/22 2213    Narrative:      DATE OF EXAM:  6/10/2022 9:46 PM     PROCEDURE:  XR CHEST 1 VW-     INDICATIONS:  Status post CABG procedure, history of syncope and collapse, coronary  artery disease.      COMPARISON:  06/05/2022.     TECHNIQUE:   Single radiographic view of the chest was obtained.     FINDINGS:  The patient has status post an interval CABG procedure. The tip of the  [place]-Demarco catheter terminates in the main pulmonary artery segment. The  endotracheal tube tip is in good position located between the thoracic  inlet and the aortic knob. The nasogastric tube tip projects out of the  field-of-view, but below the hemidiaphragms. There is a left basilar  chest tube in place. There are either one or two mediastinal chest tubes  in place. There is no pneumothorax. There are patchy densities in the  right upper lobe and the left lung base probably representing  atelectasis. There may be a trace left pleural effusion. There also may  be slight pulmonary vascular  congestion.       Impression:         1. Interval CABG procedure.  2. Lines and support tubes are in place as described. There is no  pneumothorax.  3. Patchy densities in the right upper lobe and left lung base probably  representing atelectasis.  4. Trace left pleural effusion.  5. Questionable slight pulmonary vascular congestion.     Electronically Signed By-Ziggy Gold MD On:6/10/2022 10:11 PM  This report was finalized on [id number] by   Ziggy Gold MD.          Results for orders placed during the hospital encounter of 05/31/22    XR Chest 1 View    Narrative  DATE OF EXAM:  6/13/2022 7:20 AM    PROCEDURE:  XR CHEST 1 VW-    INDICATIONS:  increase O2 demands; R55-Syncope and collapse; F10.920-Alcohol use,  unspecified with intoxication, uncomplicated; J96.01-Acute respiratory  failure with hypoxia; I95.1-Orthostatic hypotension; R77.8-Other  specified abnormalities of plasma proteins; N17.9-Acute kidney failure,  unspecified; J44.1-Chronic obstructive pulmonary disease with (acute)  exacerbation; R94.39-Abnormal result of other cardiov    COMPARISON:  06/12/2022 and prior    TECHNIQUE:  Portable Chest    FINDINGS:    Patient status post median sternotomy and CABG.    Heart size appears stable. Pulmonary vasculature is mildly congested and  indistinct. This is accentuated by low lung volumes. Vascular sheath  projects of the SVC. There has been interval removal of the [place]-Demarco  catheter.    Mediastinal drains and left sided chest tube appear grossly stable.  Dependent pleural parenchymal changes at the left lung base again noted  without great change given differences in technique. Increased hazy and  interstitial opacities on the right with a perihilar and dependent  predominance again noted. Slight improved aeration right upper lobe  airspace opacity noted. Small amount of fluid noted within the minor  fissure. No definite pneumothorax or pneumomediastinum noted. Osseous  structures are stable.    Impression  IMPRESSION :    1. Interval removal of [place]-Demarco catheter. Lines and tubes are otherwise  stable.  2. Postsurgical changes from median sternotomy and CABG.  3. Mild pulmonary vascular congestion and dependent opacities at the  lung bases without great change given differences in technique from the  comparison[. Slight improved aeration right upper lobe opacity  peripherally from prior study.    Electronically Signed By-Hossein Alvarez  On:6/13/2022 7:56 AM  This report was finalized on [id number] by  Hossein Alvarez, .      XR Chest 1 View    Narrative  EXAMINATION: XR CHEST 1 VW-    DATE OF EXAM: 6/12/2022 2:24 AM    INDICATION: Post-Op Heart Surgery; R55-Syncope and collapse;  F10.920-Alcohol use, unspecified with intoxication, uncomplicated;  J96.01-Acute respiratory failure with hypoxia; I95.1-Orthostatic  hypotension; R77.8-Other specified abnormalities of plasma proteins;  N17.9-Acute kidney failure, unspecified; J44.1-Chronic obstructive  pulmonary disease with (acute) exacerbation; R94.39-Abnormal result of  other cardi.    COMPARISON: Chest radiograph dated 06/11/2022    TECHNIQUE: Portable AP view of the chest was obtained.    FINDINGS:  The patient has been extubated. The [place]-Demarco catheter tip terminates at  the pulmonary outflow tract. There is left basilar chest tube and chest  tube. The gastric suction tube has been removed. There are low lung  volumes with streaky bibasilar atelectasis and right upper lobe airspace  opacity adjacent to the right minor fissure. There is no pneumothorax.  No smoking pleural effusion. Median sternotomy wires are present and  intact. There are multilevel degenerative changes of the thoracic spine.  Temporary pacing wires remain present.    Impression  1. Remaining support devices appear in expected position.  2. Low lung volumes with streaky bibasilar airspace opacity and right  upper lobe airspace opacity likely representing atelectasis and/or  infection.  3. No evidence of significant pleural effusion or evidence of  pneumothorax.    Electronically Signed By-Ben Juarez MD On:6/12/2022 8:44 AM  This report was finalized on [id number] by  Ben Juarez MD.      XR Chest 1 View    Narrative  DATE OF EXAM:  6/11/2022 5:05 AM    PROCEDURE:  XR CHEST 1 VW-    INDICATIONS:  Post-Op Heart Surgery; R55-Syncope and collapse; F10.920-Alcohol use,  unspecified with intoxication, uncomplicated;  J96.01-Acute respiratory  failure with hypoxia; I95.1-Orthostatic hypotension; R77.8-Other  specified abnormalities of plasma proteins; N17.9-Acute kidney failure,  unspecified; J44.1-Chronic obstructive pulmonary disease with (acute)  exacerbation; R94.39-Abnormal result of other cardi    COMPARISON:  06/10/2022    TECHNIQUE:  Single radiographic view of the chest was obtained.    FINDINGS:  Endotracheal tube tip is approximately 3 cm above the erika. [place]-Demarco  catheter appears to terminate at the main pulmonary artery.  Status post  median sternotomy and CABG. Heart size and mediastinal contour appear  unchanged.  Enteric tube appears to extend left upper quadrant, tip  beyond the margins of this exam. Is not infiltrate is present and  appears to be a left basilar chest tube. No significant pneumothorax is  seen. There are airspace opacities in the right upper lobe at the  periphery of the right midlung and there are left basilar airspace  opacities. These findings appear similar to the prior exam. No  significant infiltrate is seen. Possible trace left effusion, as before  No definite right pleural effusion.    Impression  1.Tubes and lines appear in satisfactory in similar positions.  2.Right upper lobe and left basilar opacities appear grossly unchanged.  3.No definite pneumothorax. Possible trace left effusion, as before.    Electronically Signed By-Adam Anaya MD On:6/11/2022 8:38 AM  This report was finalized on [id number] by  Adam Anaya MD.      Results for orders placed during the hospital encounter of 05/31/22    Duplex Vein Mapping Lower Extremity - Bilateral CAR    Interpretation Summary  The greater saphenous veins are of satisfactory quality from the groin to the mid distal thigh bilaterally.  Distal to this the veins are small and inadequate.        ASSESSMENT / PLAN      COPD exacerbation (HCC)    Obesity (BMI 30-39.9)    Abnormal nuclear stress test    Acute renal insufficiency     Alcohol dependence (HCC)    Essential hypertension    Other emphysema (HCC)    Coronary artery disease    Syncope, unspecified syncope type    1. ARF/FELIBERTO------Nonoliguric. BUN/Cr stable    2. CAD S/P CABG------per Cardiology and CT Surgery    3. BENIGN ESSENTIAL HTN------BP okay. No ACE-I for now    4. BPH-------Nelson replaced by CT Surgery. Hold Flomax today to avoid hypotension    5. S/P SYNCOPE    6. ETOH ABUSE    7. HYPERLIPIDEMIA-------On Statin    8. DVT PROPHYLAXIS-------On Lovenox    9. HYPERKALEMIA--------Resolved    10. ANEMIA------H/H stable    11. MILD VOLUME EXCESS------po Bumex    12. COPD/EMPHYSEMA-------per Pulmonary      Amara Cooper MD  [facility]  [phone]  06/13/22  08:35 EDT      Electronically signed by Dre Cooper MD at 06/13/22 1110     Ca Santo MD at 06/12/22 1434          Daily Progress Note        COPD exacerbation (HCC)    Obesity (BMI 30-39.9)    Abnormal nuclear stress test    Acute renal insufficiency    Alcohol dependence (HCC)    Essential hypertension    Other emphysema (HCC)    Coronary artery disease    Syncope, unspecified syncope type      Assessment    Postop CABG x4: 6/10/2022 , extubated successfully 6/11/2022  COPD   Spirometry 6/3/2022 and flow volume loop suggestive of severe obstructive and possible restrictive respiratory defect: Unfortunately total lung capacity and DLCO were not performed   FEV1 45% without significant response to bronchodilators, FEV1/FVC ratio 56 compatible with obstructive defect    Non-STEMI  Three-vessel coronary artery disease  Ascending aortic aneurysm 4.9205 cm  Hypertension  Syncope    Alcohol abuse  Dyslipidemia  Back pain with a lumbar herniation  Early prostate cancer  FELIBERTO: Resolving    History of tobacco smoking: Quit June 2021     Recommendations:    Oxygen supplement and titration: Requiring 2 L per NC   Hemodynamic support: As per CTS  Encourage use of spirometry/flutter  valve  Inhaled corticosteroids/Bronchodilators  Hypertonic saline nebulized  Statin  Blood pressure control  DVT prophylaxis Lovenox                  LOS: 7 days     Subjective     Mild shortness of breath    Objective     Vital signs for last 24 hours:  Vitals:    06/12/22 1205 06/12/22 1208 06/12/22 1215 06/12/22 1330   BP:    113/64   BP Location:       Patient Position:       Pulse: 75 76 77 81   Resp: 22 (!) 29 (!) 35    Temp:    99.5 °F (37.5 °C)   TempSrc:       SpO2:    93%   Weight:       Height:           Intake/Output last 3 shifts:  I/O last 3 completed shifts:  In: 5476.5 [P.O.:380; I.V.:3577.5; Blood:1519]  Out: 5040 [Urine:4050; Blood:350; Chest Tube:640]  Intake/Output this shift:  I/O this shift:  In: 120 [P.O.:120]  Out: -       Radiology  Imaging Results (Last 24 Hours)     Procedure Component Value Units Date/Time    XR Chest 1 View [[id number]] Collected: 06/12/22 0842     Updated: 06/12/22 0846    Narrative:      EXAMINATION: XR CHEST 1 VW-     DATE OF EXAM: 6/12/2022 2:24 AM     INDICATION: Post-Op Heart Surgery; R55-Syncope and collapse;  F10.920-Alcohol use, unspecified with intoxication, uncomplicated;  J96.01-Acute respiratory failure with hypoxia; I95.1-Orthostatic  hypotension; R77.8-Other specified abnormalities of plasma proteins;  N17.9-Acute kidney failure, unspecified; J44.1-Chronic obstructive  pulmonary disease with (acute) exacerbation; R94.39-Abnormal result of  other cardi.     COMPARISON: Chest radiograph dated 06/11/2022     TECHNIQUE: Portable AP view of the chest was obtained.     FINDINGS:  The patient has been extubated. The [place]-Demarco catheter tip terminates at  the pulmonary outflow tract. There is left basilar chest tube and chest  tube. The gastric suction tube has been removed. There are low lung  volumes with streaky bibasilar atelectasis and right upper lobe airspace  opacity adjacent to the right minor fissure. There is no pneumothorax.  No smoking pleural effusion.  Median sternotomy wires are present and  intact. There are multilevel degenerative changes of the thoracic spine.  Temporary pacing wires remain present.       Impression:      1. Remaining support devices appear in expected position.  2. Low lung volumes with streaky bibasilar airspace opacity and right  upper lobe airspace opacity likely representing atelectasis and/or  infection.  3. No evidence of significant pleural effusion or evidence of  pneumothorax.     Electronically Signed By-Ben Juarez MD On:6/12/2022 8:44 AM  This report was finalized on [id number] by  Ben Juarez MD.          Labs:  Results from last 7 days   Lab Units 06/12/22  0231   WBC 10*3/mm3 12.40*   HEMOGLOBIN g/dL 9.8*   HEMATOCRIT % 30.3*   PLATELETS 10*3/mm3 139*     Results from last 7 days   Lab Units 06/12/22  0231 06/11/22  0355   SODIUM mmol/L 137 139   POTASSIUM mmol/L 4.1 4.4   CHLORIDE mmol/L 105 103   CO2 mmol/L 22.0 23.0   BUN mg/dL 16 24*   CREATININE mg/dL 0.76 1.24   CALCIUM mg/dL 8.1* 8.7   BILIRUBIN mg/dL  --  0.6   ALK PHOS U/L  --  38*   ALT (SGPT) U/L  --  36   AST (SGOT) U/L  --  47*   GLUCOSE mg/dL 106* 139*     Results from last 7 days   Lab Units 06/11/22  1256   PH, ARTERIAL pH units 7.408   PO2 ART mm Hg 75.0*   PCO2, ARTERIAL mm Hg 40.1   HCO3 ART mmol/L 25.3     Results from last 7 days   Lab Units 06/11/22  0355 06/10/22  2201 06/09/22  0500   ALBUMIN g/dL 3.90 3.50 3.00*     Results from last 7 days   Lab Units 06/12/22  0231 06/11/22  0355   CK TOTAL U/L 894* 672*         Results from last 7 days   Lab Units 06/12/22  0231   MAGNESIUM mg/dL 2.5*     Results from last 7 days   Lab Units 06/10/22  2201 06/10/22  1950 06/09/22  0500   INR  1.14* 1.25* 1.05   APTT seconds 27.7 29.7 26.6               Meds:   SCHEDULE  arformoterol, 15 mcg, Nebulization, BID - RT  aspirin, 81 mg, Oral, Daily  [START ON 6/13/2022] atorvastatin, 40 mg, Oral, Nightly  budesonide, 0.5 mg, Nebulization, BID -  RT  chlorhexidine, 15 mL, Mouth/Throat, Q12H  enoxaparin, 40 mg, Subcutaneous, Q24H  gabapentin, 100 mg, Oral, Q8H  guaiFENesin, 1,200 mg, Oral, Q12H  [START ON 6/13/2022] insulin lispro, 0-7 Units, Subcutaneous, TID AC  ipratropium-albuterol, 3 mL, Nebulization, Q6H While Awake - RT  mupirocin, 1 application, Each Nare, BID  pantoprazole, 40 mg, Oral, QAM  polyethylene glycol, 17 g, Oral, BID  senna-docusate sodium, 2 tablet, Oral, Nightly  tamsulosin, 0.4 mg, Oral, Daily      Infusions  insulin, 0-100 Units/hr, Last Rate: 1.8 Units/hr (06/12/22 0719)  milrinone, 0.125 mcg/kg/min, Last Rate: 0.25 mcg/kg/min (06/12/22 1110)      PRNs  •  acetaminophen **OR** acetaminophen **OR** acetaminophen  •  bisacodyl  •  dextrose  •  dextrose  •  glucagon (human recombinant)  •  HYDROcodone-acetaminophen **OR** HYDROcodone-acetaminophen  •  [START ON 6/13/2022] insulin lispro **AND** [START ON 6/13/2022] insulin lispro  •  ipratropium-albuterol  •  magnesium hydroxide  •  Morphine **AND** naloxone  •  ondansetron  •  potassium chloride **OR** potassium chloride    Physical Exam:  Physical Exam  Vitals reviewed.   Constitutional:       Appearance: He is obese.   Pulmonary:      Breath sounds: Decreased breath sounds present.   Skin:     General: Skin is warm and dry.   Neurological:      Mental Status: He is alert.         ROS  Constitutional: Negative for chills, fever and malaise/fatigue.   HENT: Negative.    Eyes: Negative.    Cardiovascular: Expected postop incisional pain.    Respiratory: Positive for cough and shortness of breath.    Skin: Negative.    Musculoskeletal: Negative.    Gastrointestinal: Negative.    Genitourinary: Negative.    Neurological: Negative.    Psychiatric/Behavioral: Negative.  I have reviewed the patient's new clinical results.        Electronically signed by Ca Santo MD at 06/12/22 3703     Adam Jackson MD at 06/12/22 6958          Cardiology Progress Note      Admiting  Physician:  Benson Hughes, *   LOS: 7 days       Reason For Followup:  Multivessel coronary artery disease      Subjective:    Interval History:  Seen and examined.  Chart and labs reviewed.  Patient appears to be in a normal cognitive state.  Status post surgery, sitting upright at bedside  In bed, hemodynamically electrically stable  Lines and tubes noted, lower pressor requirement, did not sleep well he says    Chest tubes in place draining well  Underlying sinus rhythm, pacing rate decreased to allow normal conduction  Epinephrine discontinued today, continues on milrinone, systolics in the 150s to 160s, may need to start afterload reduction with amlodipine or losartan gently    Nursing at bedside discussed care    Review of Systems:  A complete review of systems negative x14 point review of systems except as mentioned above he denies anginal chest pain or shortness of breath at this time.  Postsurgical pain noted    Assessment & Plan    Impressions:  Syncope  Multivessel coronary artery disease  Hyperlipidemia  Hypertension  Ischemic cardiomyopathy EF 40%  Acute kidney injury-improved  Alcohol dependence  Altered mental status likely secondary to alcohol withdrawal    Recommendations:  Status post four-vessel bypass, LIMA to LAD, SVG to diagonal obtuse marginal and PLV branch  Left leg endovascular vein harvest  A sending aortic replacement with 30 mm graft    Continue lines and tubes  Continue inotropes and vasopressors, off epi, continues on milrinone, hypertensive, may need losartan or amlodipine added to his regimen for afterload reduction if systolics remain, most recently charted 114/46, will follow blood pressures and use afterload reduction if needed  Does not appear volume overloaded    Renal function has significantly improved   Monitor rate and rhythm closely    Alcohol withdrawal/altered mentation are clear and is doing better now.  Patient is much better now and does not have any  withdrawal symptoms    Appreciate surgical help with postoperative care    Objective:    Medication Review:   Scheduled Meds:arformoterol, 15 mcg, Nebulization, BID - RT  aspirin, 81 mg, Oral, Daily  [START ON 6/13/2022] atorvastatin, 40 mg, Oral, Nightly  budesonide, 0.5 mg, Nebulization, BID - RT  chlorhexidine, 15 mL, Mouth/Throat, Q12H  enoxaparin, 40 mg, Subcutaneous, Q24H  gabapentin, 100 mg, Oral, Q8H  guaiFENesin, 1,200 mg, Oral, Q12H  [START ON 6/13/2022] insulin lispro, 0-7 Units, Subcutaneous, TID AC  ipratropium-albuterol, 3 mL, Nebulization, Q6H While Awake - RT  mupirocin, 1 application, Each Nare, BID  pantoprazole, 40 mg, Oral, QAM  polyethylene glycol, 17 g, Oral, BID  senna-docusate sodium, 2 tablet, Oral, Nightly  sodium chloride, 4 mL, Nebulization, Once  tamsulosin, 0.4 mg, Oral, Daily      Continuous Infusions:insulin, 0-100 Units/hr, Last Rate: 1.8 Units/hr (06/12/22 0719)  milrinone, 0.25-0.75 mcg/kg/min, Last Rate: 0.25 mcg/kg/min (06/12/22 1110)      PRN Meds:.•  acetaminophen **OR** acetaminophen **OR** acetaminophen  •  bisacodyl  •  dextrose  •  dextrose  •  glucagon (human recombinant)  •  HYDROcodone-acetaminophen **OR** HYDROcodone-acetaminophen  •  [START ON 6/13/2022] insulin lispro **AND** [START ON 6/13/2022] insulin lispro  •  ipratropium-albuterol  •  magnesium hydroxide  •  Morphine **AND** naloxone  •  ondansetron  •  potassium chloride **OR** potassium chloride    Patient Active Problem List   Diagnosis   • COPD exacerbation (HCC)   • Obesity (BMI 30-39.9)   • Abnormal nuclear stress test   • Acute renal insufficiency   • Alcohol dependence (HCC)   • Essential hypertension   • Other emphysema (HCC)   • Coronary artery disease   • Syncope, unspecified syncope type         Physical Exam:    General: Alert, cooperative, no distress, appears stated age, up to chair  Lines and tubes right IJ line was swollen  Head:  Normocephalic, atraumatic, mucous membranes  moist  Eyes:  Conjunctivae/corneas clear, EOM's intact     Neck:  Supple,  no bruit  Lungs:  Clear to auscultation bilaterally, no wheezes rhonchi rales are noted, chest tubes  Chest wall: No tenderness  Heart::  Regular rate and rhythm, S1 and S2 normal, 1/6 holosystolic murmur.  No rub or gallop  Abdomen: Soft, non-tender, nondistended bowel sounds active.  Obese  Extremities: No cyanosis, clubbing, or edema, leg bandaged  Pulses: Diminished pedal pulses  Skin:  No rashes or lesions  Neuro/psych: A&O x3. CN II through XII are grossly intact with appropriate affect  Unchanged from prior encounter    Vital Signs:  Vitals:    06/12/22 0703 06/12/22 0730 06/12/22 0800 06/12/22 0900   BP:  111/46 115/72    BP Location:  Other (Comment)     Patient Position:  Sitting     Pulse: 88 89 89 74   Resp: 20 18     Temp:  99 °F (37.2 °C) 98.96 °F (37.2 °C) 98.78 °F (37.1 °C)   TempSrc:  Core     SpO2:  96% 95% 94%   Weight:       Height:         Wt Readings from Last 1 Encounters:   06/12/22 107 kg (235 lb 12.8 oz)       Intake/Output Summary (Last 24 hours) at 6/12/2022 1159  Last data filed at 6/12/2022 0800  Gross per 24 hour   Intake 2766.5 ml   Output 1854 ml   Net 912.5 ml         Results Review:     CBC    Results from last 7 days   Lab Units 06/12/22  0231 06/11/22  0355 06/11/22  0101 06/10/22  2254 06/10/22  2201 06/10/22  2147 06/10/22  1950 06/10/22  1430 06/10/22  0403 06/09/22  0500 06/08/22  0403   WBC 10*3/mm3 12.40* 15.20*  --   --  22.40*  --  24.90*  --  11.40* 10.10 7.70   HEMOGLOBIN g/dL 9.8* 10.0*  --   --  10.5*  --  8.5*  --  13.0 13.8 13.3   HEMOGLOBIN, POC g/dL  --   --  10.6* 11.2*  --  11.8*  --    < >  --   --   --    PLATELETS 10*3/mm3 139* 145  --   --  170  --  186  --  247 250 237    < > = values in this interval not displayed.     Cr Clearance Estimated Creatinine Clearance: 115.5 mL/min (by C-G formula based on SCr of 0.76 mg/dL).  Coag   Results from last 7 days   Lab Units 06/10/22  9094  06/10/22  1950 06/09/22  0500   INR  1.14* 1.25* 1.05   APTT seconds 27.7 29.7 26.6     HbA1C   Lab Results   Component Value Date    HGBA1C 5.5 06/03/2022     Blood Glucose   Glucose   Date/Time Value Ref Range Status   06/12/2022 1145 120 (H) 70 - 105 mg/dL Final     Comment:     Serial Number: [id number]:  [id number]   06/12/2022 0458 103 70 - 105 mg/dL Final     Comment:     Serial Number: [id number]:  [id number]   06/12/2022 0230 100 70 - 105 mg/dL Final     Comment:     Serial Number: [id number]:  [id number]   06/11/2022 2210 108 (H) 70 - 105 mg/dL Final     Comment:     Serial Number: [id number]:  [id number]   06/11/2022 2021 119 (H) 70 - 105 mg/dL Final     Comment:     Serial Number: [id number]:  [id number]   06/11/2022 1726 117 (H) 70 - 105 mg/dL Final     Comment:     Serial Number: [id number]:  [id number]   06/11/2022 1612 91 70 - 105 mg/dL Final     Comment:     Serial Number: [id number]:  [id number]   06/11/2022 1453 93 70 - 105 mg/dL Final     Comment:     Serial Number: [id number]:  [id number]     Infection       CMP   Results from last 7 days   Lab Units 06/12/22  0231 06/11/22  0355 06/10/22  2201 06/10/22  0403 06/09/22  0500 06/08/22  1411 06/08/22  0403 06/07/22  0411   SODIUM mmol/L 137 139 137 136 136  --  136 134*   POTASSIUM mmol/L 4.1 4.4 4.7 4.7 4.2 4.7 5.7* 4.8   CHLORIDE mmol/L 105 103 102 96* 98  --  98 98   CO2 mmol/L 22.0 23.0 26.0 30.0* 28.0  --  30.0* 27.0   BUN mg/dL 16 24* 24* 26* 22  --  27* 27*   CREATININE mg/dL 0.76 1.24 1.36* 1.17 0.85  --  0.96 0.89   GLUCOSE mg/dL 106* 139* 146* 104* 85  --  128* 139*   ALBUMIN g/dL  --  3.90 3.50  --  3.00*  --   --   --    BILIRUBIN mg/dL  --  0.6 0.8  --  0.3  --   --   --    ALK PHOS U/L  --  38* 43  --  52  --   --   --    AST (SGOT) U/L  --  47* 46*  --  25  --   --   --    ALT (SGPT) U/L  --  36 40  --  44*  --   --   --      ABG    Results from last 7 days   Lab Units 06/11/22  1405  06/11/22  1048 06/11/22  0705 06/11/22  0348 06/11/22  0101 06/10/22  2254 06/10/22  2147   PH, ARTERIAL pH units 7.408 7.427 7.393 7.390 7.359 7.368 7.309*   PCO2, ARTERIAL mm Hg 40.1 36.2 41.5 43.9 46.9 45.7 55.4*   PO2 ART mm Hg 75.0* 85.9 110.4* 99.2 139.8* 94.5 94.4   O2 SATURATION ART % 95.0 96.8 98.3* 97.6 99.0* 97.0 96.4   BASE EXCESS ART mmol/L 0.6 -0.3* 0.3 1.3 0.6 0.6 0.7     UA        ANGELO  No results found for: POCMETH, POCAMPHET, POCBARBITUR, POCBENZO, POCCOCAINE, POCOPIATES, POCOXYCODO, POCPHENCYC, POCPROPOXY, POCTHC, POCTRICYC  Lysis Labs   Results from last 7 days   Lab Units 06/12/22  0231 06/11/22  0355 06/11/22  0101 06/10/22  2254 06/10/22  2201 06/10/22  2147 06/10/22  1950 06/10/22  1430 06/10/22  0403 06/09/22  0500 06/08/22  0403 06/07/22  0411   INR   --   --   --   --  1.14*  --  1.25*  --   --  1.05  --   --    APTT seconds  --   --   --   --  27.7  --  29.7  --   --  26.6  --   --    FIBRINOGEN mg/dL  --   --   --   --  449  --  436  --   --   --   --   --    HEMOGLOBIN g/dL 9.8* 10.0*  --   --  10.5*  --  8.5*  --  13.0 13.8 13.3 13.2   HEMOGLOBIN, POC g/dL  --   --  10.6* 11.2*  --  11.8*  --    < >  --   --   --   --    PLATELETS 10*3/mm3 139* 145  --   --  170  --  186  --  247 250 237 229   CREATININE mg/dL 0.76 1.24  --   --  1.36*  --   --   --  1.17 0.85 0.96 0.89    < > = values in this interval not displayed.     Radiology(recent) XR Chest 1 View    Result Date: 6/12/2022  1. Remaining support devices appear in expected position. 2. Low lung volumes with streaky bibasilar airspace opacity and right upper lobe airspace opacity likely representing atelectasis and/or infection. 3. No evidence of significant pleural effusion or evidence of pneumothorax.  Electronically Signed By-Ben Juarez MD On:6/12/2022 8:44 AM This report was finalized on [id number] by  Ben Juarez MD.    XR Chest 1 View    Result Date: 6/11/2022  1.Tubes and lines appear in satisfactory in similar  positions. 2.Right upper lobe and left basilar opacities appear grossly unchanged. 3.No definite pneumothorax. Possible trace left effusion, as before.  Electronically Signed By-Adam Anaya MD On:6/11/2022 8:38 AM This report was finalized on [id number] by  Adam Anaya MD.    XR Chest 1 View    Result Date: 6/10/2022   1. Interval CABG procedure. 2. Lines and support tubes are in place as described. There is no pneumothorax. 3. Patchy densities in the right upper lobe and left lung base probably representing atelectasis. 4. Trace left pleural effusion. 5. Questionable slight pulmonary vascular congestion.  Electronically Signed By-Ziggy Gold MD On:6/10/2022 10:11 PM This report was finalized on [id number] by  Ziggy Gold MD.        Results from last 7 days   Lab Units 06/12/22  0231   CK TOTAL U/L 894*       Imaging Results (Last 24 Hours)     Procedure Component Value Units Date/Time    XR Chest 1 View [[id number]] Collected: 06/12/22 0842     Updated: 06/12/22 0846    Narrative:      EXAMINATION: XR CHEST 1 VW-     DATE OF EXAM: 6/12/2022 2:24 AM     INDICATION: Post-Op Heart Surgery; R55-Syncope and collapse;  F10.920-Alcohol use, unspecified with intoxication, uncomplicated;  J96.01-Acute respiratory failure with hypoxia; I95.1-Orthostatic  hypotension; R77.8-Other specified abnormalities of plasma proteins;  N17.9-Acute kidney failure, unspecified; J44.1-Chronic obstructive  pulmonary disease with (acute) exacerbation; R94.39-Abnormal result of  other cardi.     COMPARISON: Chest radiograph dated 06/11/2022     TECHNIQUE: Portable AP view of the chest was obtained.     FINDINGS:  The patient has been extubated. The [place]co catheter tip terminates at  the pulmonary outflow tract. There is left basilar chest tube and chest  tube. The gastric suction tube has been removed. There are low lung  volumes with streaky bibasilar atelectasis and right upper lobe airspace  opacity adjacent to the right minor  fissure. There is no pneumothorax.  No smoking pleural effusion. Median sternotomy wires are present and  intact. There are multilevel degenerative changes of the thoracic spine.  Temporary pacing wires remain present.       Impression:      1. Remaining support devices appear in expected position.  2. Low lung volumes with streaky bibasilar airspace opacity and right  upper lobe airspace opacity likely representing atelectasis and/or  infection.  3. No evidence of significant pleural effusion or evidence of  pneumothorax.     Electronically Signed By-Ben Juarez MD On:6/12/2022 8:44 AM  This report was finalized on [id number] by  Ben Juarez MD.          Cardiac Studies:  Echo- Results for orders placed during the hospital encounter of 05/31/22    Adult Transthoracic Echo Complete With Contrast if Necessary Per Protocol (With Agitated Saline)    Interpretation Summary  · Left ventricular ejection fraction appears to be 56 - 60%.  · The right ventricular cavity is mildly dilated.  · Mild dilation of the aortic root is present.  · No pericardial effusion noted    Stress Myoview-  Cath-        Adam Jackson MD  06/12/22  11:59 EDT    Electronically signed by Adam Jackson MD at 06/12/22 1203       Consult Notes (last 24 hours)  Notes from 06/12/22 1133 through 06/13/22 1133   No notes of this type exist for this encounter.

## 2022-06-13 NOTE — PROGRESS NOTES
"PULMONARY CRITICAL CARE PROGRESS  NOTE      PATIENT IDENTIFICATION:  Name: Chi Quinteros  MRN: [id number]  :  1955     Age: 67 y.o.  Sex: male    DATE OF Note:  2022   Referring Physician: No admitting provider for patient encounter.                  Subjective:   Still confused  On milrinone  On 4 L  no SOB, no chest pain,   Able to swallow no nausea or vomiting, no change in bowel habit,   Good urine out put no dysuria,  no new  skin rash or itching.      Objective:  tMax 24 hrs: Temp (24hrs), Av °F (37.2 °C), Min:97.6 °F (36.4 °C), Max:99.5 °F (37.5 °C)      Vitals Ranges:   Temp:  [97.6 °F (36.4 °C)-99.5 °F (37.5 °C)] 99.2 °F (37.3 °C)  Heart Rate:  [75-93] 87  Resp:  [20-35] 28  BP: ()/(51-86) 111/69    Intake and Output Last 3 Shifts:   I/O last 3 completed shifts:  In: 3133.5 [P.O.:840; I.V.:2293.5]  Out: 4259 [Urine:3185; Chest Tube:1074]    Exam:  /69   Pulse 87   Temp 99.2 °F (37.3 °C) (Oral)   Resp 28   Ht 177.8 cm (70\")   Wt 107 kg (235 lb)   SpO2 97%   BMI 33.72 kg/m²     General Appearance:   AA confused   HEENT:  Normocephalic, without obvious abnormality. Conjunctivae/corneas clear.  Normal external ear canals. Nares normal, no drainage     Neck:  Supple, symmetrical, trachea midline. No JVD.  Lungs /Chest wall:   Bilateral basal rhonchi, respirations unlabored, symmetrical wall movement.     Heart:  Regular rate and rhythm, systolic murmur PMI left sternal border  Abdomen: Soft, nontender, no masses, no organomegaly.    Extremities: Trace edema, no clubbing or cyanosis        Medications:    Current Facility-Administered Medications:   •  acetaminophen (TYLENOL) tablet 650 mg, 650 mg, Oral, Q4H PRN **OR** acetaminophen (TYLENOL) 160 MG/5ML solution 650 mg, 650 mg, Oral, Q4H PRN **OR** acetaminophen (TYLENOL) suppository 650 mg, 650 mg, Rectal, Q4H PRN, Martha Tinoco APRN  •  albuterol (PROVENTIL) nebulizer solution 0.083% 2.5 mg/3mL, 2.5 mg, Nebulization, Q6H " PRN, Ca Santo MD, 2.5 mg at 06/13/22 0649  •  arformoterol (BROVANA) nebulizer solution 15 mcg, 15 mcg, Nebulization, BID - RT, Martha Tinoco APRN, 15 mcg at 06/13/22 0625  •  aspirin EC tablet 81 mg, 81 mg, Oral, Daily, Martha Tinoco APRN, 81 mg at 06/13/22 0909  •  atorvastatin (LIPITOR) tablet 40 mg, 40 mg, Oral, Nightly, Dre Cooper MD  •  bisacodyl (DULCOLAX) suppository 10 mg, 10 mg, Rectal, Daily PRN, Martha Tinoco APRN  •  budesonide (PULMICORT) nebulizer solution 0.5 mg, 0.5 mg, Nebulization, BID - RT, Martha Tinoco APRN, 0.5 mg at 06/13/22 0630  •  bumetanide (BUMEX) tablet 1 mg, 1 mg, Oral, BID, Dre Cooper MD  •  chlorhexidine (PERIDEX) 0.12 % solution 15 mL, 15 mL, Mouth/Throat, Q12H, Martha Tinoco APRN, 15 mL at 06/13/22 1000  •  dextrose (D50W) (25 g/50 mL) IV injection 10-50 mL, 10-50 mL, Intravenous, Q15 Min PRN, Martha Tinoco APRN  •  dextrose (GLUTOSE) oral gel 15 g, 15 g, Oral, Q15 Min PRN, Martha Tinoco APRN  •  Enoxaparin Sodium (LOVENOX) syringe 40 mg, 40 mg, Subcutaneous, Q24H, Martha Tinoco APRN, 40 mg at 06/12/22 1521  •  gabapentin (NEURONTIN) capsule 100 mg, 100 mg, Oral, Q8H, Christal Vora, APRN, 100 mg at 06/13/22 0541  •  glucagon (human recombinant) (GLUCAGEN DIAGNOSTIC) 1 mg in sterile water (preservative free) 1 mL injection, 1 mg, Intramuscular, Q15 Min PRN, Martha Tinoco APRN  •  guaiFENesin (MUCINEX) 12 hr tablet 1,200 mg, 1,200 mg, Oral, Q12H, Martha Tinoco APRN, 1,200 mg at 06/13/22 0217  •  HYDROcodone-acetaminophen (NORCO) 5-325 MG per tablet 1 tablet, 1 tablet, Oral, Q6H PRN **OR** HYDROcodone-acetaminophen (NORCO) 5-325 MG per tablet 2 tablet, 2 tablet, Oral, Q6H PRN, Christal Vora APRN, 2 tablet at 06/13/22 0712  •  insulin lispro (ADMELOG) injection 0-7 Units, 0-7 Units, Subcutaneous, TID AC **AND** insulin lispro (ADMELOG) injection 0-7 Units, 0-7 Units, Subcutaneous, PRN,  Christal Vora APRN  •  insulin regular 1 unit/mL in 0.9% sodium chloride, 0-100 Units/hr, Intravenous, Titrated, Martha Tinoco APRN, Stopped at 22 0733  •  ipratropium-albuterol (DUO-NEB) nebulizer solution 3 mL, 3 mL, Nebulization, Q4H PRN, Day, AYESHA SheikhN  •  ipratropium-albuterol (DUO-NEB) nebulizer solution 3 mL, 3 mL, Nebulization, Once, Shivam Recio MD  •  LORazepam (ATIVAN) tablet 0.5 mg, 0.5 mg, Oral, Q2H PRN **OR** LORazepam (ATIVAN) injection 0.5 mg, 0.5 mg, Intravenous, Q2H PRN, 0.5 mg at 22 0216 **OR** LORazepam (ATIVAN) tablet 1 mg, 1 mg, Oral, Q1H PRN **OR** LORazepam (ATIVAN) injection 1 mg, 1 mg, Intravenous, Q1H PRN **OR** LORazepam (ATIVAN) injection 1 mg, 1 mg, Intravenous, Q15 Min PRN, 1 mg at 22 0524 **OR** LORazepam (ATIVAN) injection 1 mg, 1 mg, Intramuscular, Q15 Min PRN **OR** LORazepam (ATIVAN) injection 2 mg, 2 mg, Intravenous, Q1H PRN, 2 mg at 22 0707 **OR** LORazepam (ATIVAN) tablet 2 mg, 2 mg, Oral, Q1H PRN, Shivam Recio MD  •  magnesium hydroxide (MILK OF MAGNESIA) suspension 10 mL, 10 mL, Oral, Daily PRN, Martha Tinoco APRN  •  milrinone (PRIMACOR) 20 mg in 100 mL D5W infusion, 0.125 mcg/kg/min, Intravenous, Titrated, Christal Vora APRN, Last Rate: 4.05 mL/hr at 22 1330, 0.125 mcg/kg/min at 22 1330  •  mupirocin (BACTROBAN) 2 % nasal ointment 1 application, 1 application, Each Nare, BID, Martha Tinoco, APRN, 1 application at 22 0909  •  [] morphine injection 2 mg, 2 mg, Intravenous, Q2H PRN, 2 mg at 22 0358 **AND** naloxone (NARCAN) injection 0.4 mg, 0.4 mg, Intravenous, Q5 Min PRN, Martha Tinoco, ERICA  •  ondansetron (ZOFRAN) injection 4 mg, 4 mg, Intravenous, Q6H PRN, Martha Tinoco, ERICA  •  [COMPLETED] pantoprazole (PROTONIX) injection 40 mg, 40 mg, Intravenous, Once, 40 mg at 06/10/22 2313 **FOLLOWED BY** pantoprazole (PROTONIX) EC tablet 40 mg, 40 mg, Oral, QAM,  Satterly-Yaniv, Christal L, APRN, 40 mg at 06/12/22 0629  •  polyethylene glycol (MIRALAX) packet 17 g, 17 g, Oral, BID, Satterly-Yaniv, Christal L, APRN, 17 g at 06/12/22 2102  •  potassium chloride (K-DUR,KLOR-CON) CR tablet 20 mEq, 20 mEq, Oral, PRN **OR** potassium chloride (KLOR-CON) packet 20 mEq, 20 mEq, Oral, PRN, Martha Tinoco, APRN  •  potassium chloride (K-DUR,KLOR-CON) CR tablet 20 mEq, 20 mEq, Oral, Daily, Satterly-Yaniv, Christal L, APRN, 20 mEq at 06/13/22 0912  •  sennosides-docusate (PERICOLACE) 8.6-50 MG per tablet 2 tablet, 2 tablet, Oral, Nightly, Satterly-Yaniv, Christal L, APRN, 2 tablet at 06/12/22 2102  •  sodium chloride 3 % nebulizer solution 4 mL, 4 mL, Nebulization, BID - RT, DrawDany MD, 4 mL at 06/13/22 0625  •  tamsulosin (FLOMAX) 24 hr capsule 0.4 mg, 0.4 mg, Oral, Daily, Satterly-Yaniv, Christal L, APRN, 0.4 mg at 06/12/22 1106    Data Review:  All labs (24hrs):   Recent Results (from the past 24 hour(s))   POC Glucose Once    Collection Time: 06/12/22 10:37 AM    Specimen: Blood   Result Value Ref Range    Glucose 103 70 - 105 mg/dL   POC Glucose Once    Collection Time: 06/12/22 11:45 AM    Specimen: Blood   Result Value Ref Range    Glucose 120 (H) 70 - 105 mg/dL   POC Glucose Once    Collection Time: 06/12/22 12:53 PM    Specimen: Blood   Result Value Ref Range    Glucose 124 (H) 70 - 105 mg/dL   POC Glucose Once    Collection Time: 06/12/22  1:59 PM    Specimen: Blood   Result Value Ref Range    Glucose 132 (H) 70 - 105 mg/dL   Respiratory Culture - Sputum, ET Suction    Collection Time: 06/12/22  3:37 PM    Specimen: ET Suction; Sputum   Result Value Ref Range    Gram Stain Few (2+) Epithelial cells per low power field     Gram Stain Many (4+) WBCs per low power field     Gram Stain Few (2+) Gram negative bacilli     Gram Stain       Rare (1+) Mixed bacterial morphotypes seen on Gram Stain   POC Glucose Once    Collection Time: 06/12/22  4:00 PM    Specimen: Blood    Result Value Ref Range    Glucose 137 (H) 70 - 105 mg/dL   POC Glucose Once    Collection Time: 06/12/22  4:37 PM    Specimen: Blood   Result Value Ref Range    Glucose 133 (H) 70 - 105 mg/dL   POC Glucose Once    Collection Time: 06/12/22  6:43 PM    Specimen: Blood   Result Value Ref Range    Glucose 103 70 - 105 mg/dL   POC Glucose Once    Collection Time: 06/12/22  8:57 PM    Specimen: Blood   Result Value Ref Range    Glucose 123 (H) 70 - 105 mg/dL   POC Glucose Once    Collection Time: 06/12/22 11:03 PM    Specimen: Blood   Result Value Ref Range    Glucose 125 (H) 70 - 105 mg/dL   POC Glucose Once    Collection Time: 06/13/22  1:06 AM    Specimen: Blood   Result Value Ref Range    Glucose 109 (H) 70 - 105 mg/dL   POC Glucose Once    Collection Time: 06/13/22  3:10 AM    Specimen: Blood   Result Value Ref Range    Glucose 94 70 - 105 mg/dL   POC Glucose Once    Collection Time: 06/13/22  4:12 AM    Specimen: Blood   Result Value Ref Range    Glucose 112 (H) 70 - 105 mg/dL   CBC (No Diff)    Collection Time: 06/13/22  5:30 AM    Specimen: Blood   Result Value Ref Range    WBC 10.50 3.40 - 10.80 10*3/mm3    RBC 3.10 (L) 4.14 - 5.80 10*6/mm3    Hemoglobin 9.8 (L) 13.0 - 17.7 g/dL    Hematocrit 30.3 (L) 37.5 - 51.0 %    MCV 97.7 (H) 79.0 - 97.0 fL    MCH 31.6 26.6 - 33.0 pg    MCHC 32.4 31.5 - 35.7 g/dL    RDW 14.0 12.3 - 15.4 %    RDW-SD 48.1 37.0 - 54.0 fl    MPV 8.3 6.0 - 12.0 fL    Platelets 127 (L) 140 - 450 10*3/mm3   Basic Metabolic Panel    Collection Time: 06/13/22  5:30 AM    Specimen: Blood   Result Value Ref Range    Glucose 119 (H) 65 - 99 mg/dL    BUN 19 8 - 23 mg/dL    Creatinine 0.89 0.76 - 1.27 mg/dL    Sodium 137 136 - 145 mmol/L    Potassium 4.1 3.5 - 5.2 mmol/L    Chloride 101 98 - 107 mmol/L    CO2 24.0 22.0 - 29.0 mmol/L    Calcium 8.0 (L) 8.6 - 10.5 mg/dL    BUN/Creatinine Ratio 21.3 7.0 - 25.0    Anion Gap 12.0 5.0 - 15.0 mmol/L    eGFR 93.9 >60.0 mL/min/1.73   CK    Collection Time:  06/13/22  5:30 AM    Specimen: Blood   Result Value Ref Range    Creatine Kinase 456 (H) 20 - 200 U/L   Magnesium    Collection Time: 06/13/22  5:30 AM    Specimen: Blood   Result Value Ref Range    Magnesium 2.3 1.6 - 2.4 mg/dL   Phosphorus    Collection Time: 06/13/22  5:30 AM    Specimen: Blood   Result Value Ref Range    Phosphorus 2.5 2.5 - 4.5 mg/dL   POC Glucose Once    Collection Time: 06/13/22  5:30 AM    Specimen: Blood   Result Value Ref Range    Glucose 122 (H) 70 - 105 mg/dL   POC Glucose Once    Collection Time: 06/13/22  6:34 AM    Specimen: Blood   Result Value Ref Range    Glucose 114 (H) 70 - 105 mg/dL   POC Glucose Once    Collection Time: 06/13/22  7:45 AM    Specimen: Blood   Result Value Ref Range    Glucose 129 (H) 70 - 105 mg/dL   Blood Gas, Arterial -    Collection Time: 06/13/22  8:53 AM    Specimen: Arterial Blood   Result Value Ref Range    Site Right Brachial     Nathan's Test Positive     pH, Arterial 7.446 7.350 - 7.450 pH units    pCO2, Arterial 36.0 35.0 - 48.0 mm Hg    pO2, Arterial 71.2 (L) 83.0 - 108.0 mm Hg    HCO3, Arterial 24.8 21.0 - 28.0 mmol/L    Base Excess, Arterial 0.8 0.0 - 3.0 mmol/L    O2 Saturation, Arterial 94.9 94.0 - 98.0 %    CO2 Content 25.9 22 - 29 mmol/L    Barometric Pressure for Blood Gas      Modality Cannula     FIO2 <21 %    Hemodilution No    Urinalysis With Culture If Indicated - Urine, Catheter    Collection Time: 06/13/22  9:17 AM    Specimen: Urine, Catheter   Result Value Ref Range    Color, UA Yellow Yellow, Straw    Appearance, UA Clear Clear    pH, UA <=5.0 5.0 - 8.0    Specific Gravity, UA 1.016 1.005 - 1.030    Glucose, UA Negative Negative    Ketones, UA Trace (A) Negative    Bilirubin, UA Negative Negative    Blood, UA Moderate (2+) (A) Negative    Protein, UA Negative Negative    Leuk Esterase, UA Negative Negative    Nitrite, UA Negative Negative    Urobilinogen, UA 1.0 E.U./dL 0.2 - 1.0 E.U./dL   Urinalysis, Microscopic Only - Urine,  Catheter    Collection Time: 06/13/22  9:17 AM    Specimen: Urine, Catheter   Result Value Ref Range    RBC, UA 13-20 (A) None Seen /HPF    WBC, UA 0-2 (A) None Seen /HPF    Bacteria, UA None Seen None Seen /HPF    Squamous Epithelial Cells, UA 0-2 None Seen, 0-2 /HPF    Hyaline Casts, UA 3-6 None Seen /LPF    Methodology Automated Microscopy         Imaging:  XR Chest 1 View  Narrative:    DATE OF EXAM:   6/13/2022 7:20 AM     PROCEDURE:   XR CHEST 1 VW-     INDICATIONS:   increase O2 demands; R55-Syncope and collapse; F10.920-Alcohol use,  unspecified with intoxication, uncomplicated; J96.01-Acute respiratory  failure with hypoxia; I95.1-Orthostatic hypotension; R77.8-Other  specified abnormalities of plasma proteins; N17.9-Acute kidney failure,  unspecified; J44.1-Chronic obstructive pulmonary disease with (acute)  exacerbation; R94.39-Abnormal result of other cardiov     COMPARISON:  06/12/2022 and prior     TECHNIQUE:   Portable Chest     FINDINGS:      Patient status post median sternotomy and CABG.     Heart size appears stable. Pulmonary vasculature is mildly congested and  indistinct. This is accentuated by low lung volumes. Vascular sheath  projects of the SVC. There has been interval removal of the [place]  catheter.     Mediastinal drains and left sided chest tube appear grossly stable.  Dependent pleural parenchymal changes at the left lung base again noted  without great change given differences in technique. Increased hazy and  interstitial opacities on the right with a perihilar and dependent  predominance again noted. Slight improved aeration right upper lobe  airspace opacity noted. Small amount of fluid noted within the minor  fissure. No definite pneumothorax or pneumomediastinum noted. Osseous  structures are stable.      Impression: IMPRESSION :      1. Interval removal of [place] catheter. Lines and tubes are otherwise  stable.  2. Postsurgical changes from median sternotomy and CABG.  3.  Mild pulmonary vascular congestion and dependent opacities at the  lung bases without great change given differences in technique from the  comparison[. Slight improved aeration right upper lobe opacity  peripherally from prior study.     Electronically Signed By-Hsosein Alvarez On:6/13/2022 7:56 AM  This report was finalized on [id number] by  Hossein Alvarez, .       ASSESSMENT:    S?P CABG with ascending aortic aneurysm repair   COPD exacerbation (HCC)    Obesity (BMI 30-39.9)    Abnormal nuclear stress test    Acute renal insufficiency    Alcohol dependence (HCC)    Essential hypertension    Other emphysema (HCC)    Coronary artery disease    Syncope, unspecified syncope type       PLAN:  Post op care  Continue to manage behavior  Diuretics    Bronchodilator  Inhaled corticosteroids  Electrolytes/ glycemic control  DVT and GI prophylaxis.    Total Critical care time in direct medical management (   ) minutes. This time specifically excludes time spent performing procedures.  Cindy Arellano MD. D, ABSM.     6/13/2022  10:15 EDT

## 2022-06-13 NOTE — PLAN OF CARE
Problem: Adult Inpatient Plan of Care  Goal: Plan of Care Review  Outcome: Ongoing, Not Progressing  Flowsheets (Taken 6/13/2022 1233)  Progress: no change     Problem: Skin Injury Risk Increased  Goal: Skin Health and Integrity  Outcome: Ongoing, Progressing  Intervention: Optimize Skin Protection  Recent Flowsheet Documentation  Taken 6/13/2022 0831 by Awilda Briggs RN  Head of Bed (HOB) Positioning: HOB at 60 degrees     Problem: Fall Injury Risk  Goal: Absence of Fall and Fall-Related Injury  Outcome: Ongoing, Progressing  Intervention: Identify and Manage Contributors  Recent Flowsheet Documentation  Taken 6/13/2022 0831 by Awilda Briggs RN  Medication Review/Management: medications reviewed  Intervention: Promote Injury-Free Environment  Recent Flowsheet Documentation  Taken 6/13/2022 1217 by Awilda Briggs RN  Safety Promotion/Fall Prevention: safety round/check completed  Taken 6/13/2022 1100 by Awilda Briggs RN  Safety Promotion/Fall Prevention: safety round/check completed  Taken 6/13/2022 1000 by Awilda Briggs RN  Safety Promotion/Fall Prevention: safety round/check completed  Taken 6/13/2022 0900 by Awilda Briggs RN  Safety Promotion/Fall Prevention: safety round/check completed  Taken 6/13/2022 0831 by Awilda Briggs RN  Safety Promotion/Fall Prevention: safety round/check completed  Taken 6/13/2022 0700 by Awilda Briggs RN  Safety Promotion/Fall Prevention: safety round/check completed   Goal Outcome Evaluation:           Progress: no change

## 2022-06-13 NOTE — PROGRESS NOTES
" LOS: 8 days   Patient Care Team:  Deysi Mason APRN as PCP - General (Nurse Practitioner)  Eliseo Casarez MD as Consulting Physician (Nephrology)    Chief Complaint: post op fu    Subjective      Vital Signs  Temp:  [97.6 °F (36.4 °C)-99.5 °F (37.5 °C)] 99.2 °F (37.3 °C)  Heart Rate:  [74-93] 90  Resp:  [20-35] 28  BP: (104-155)/(51-86) 108/64  Arterial Line BP: (114-142)/(46-55) 142/55  Body mass index is 33.72 kg/m².    Intake/Output Summary (Last 24 hours) at 6/13/2022 0843  Last data filed at 6/13/2022 0600  Gross per 24 hour   Intake 1088 ml   Output 2760 ml   Net -1672 ml     No intake/output data recorded.    Chest tube drainage last 8 hours:  90,170 (no air leak)        06/11/22  0600 06/12/22  0549 06/13/22  0430   Weight: 110 kg (243 lb 6.2 oz) 107 kg (235 lb 12.8 oz) 107 kg (235 lb)         Objective:  General Appearance:  In no acute distress.    Vital signs: (most recent): Blood pressure 108/64, pulse 90, temperature 99.2 °F (37.3 °C), temperature source Oral, resp. rate 28, height 177.8 cm (70\"), weight 107 kg (235 lb), SpO2 97 %.    Output: Producing urine.    Lungs:  Tachypnea.  There are rhonchi.    Heart: Normal rate.  Regular rhythm.  S1 normal and S2 normal.    Extremities: There is no dependent edema.    Neurological: (Awakens to voice, pinpoint pupils).    Skin:  Warm and dry.  (Sternal dressing intact)            Results Review:        WBC WBC   Date Value Ref Range Status   06/13/2022 10.50 3.40 - 10.80 10*3/mm3 Final   06/12/2022 12.40 (H) 3.40 - 10.80 10*3/mm3 Final   06/11/2022 15.20 (H) 3.40 - 10.80 10*3/mm3 Final   06/10/2022 22.40 (H) 3.40 - 10.80 10*3/mm3 Final   06/10/2022 24.90 (H) 3.40 - 10.80 10*3/mm3 Final      HGB Hemoglobin   Date Value Ref Range Status   06/13/2022 9.8 (L) 13.0 - 17.7 g/dL Final   06/12/2022 9.8 (L) 13.0 - 17.7 g/dL Final   06/11/2022 10.0 (L) 13.0 - 17.7 g/dL Final   06/11/2022 10.6 (L) 12.0 - 17.0 g/dL Final   06/10/2022 11.2 (L) 12.0 - 17.0 g/dL " Final   06/10/2022 10.5 (L) 13.0 - 17.7 g/dL Final     Comment:     Result checked    06/10/2022 11.8 (L) 12.0 - 17.0 g/dL Final   06/10/2022 8.5 (L) 13.0 - 17.7 g/dL Final     Comment:     Result checked    06/10/2022 9.9 (L) 12.0 - 17.0 g/dL Final   06/10/2022 10.9 (L) 12.0 - 17.0 g/dL Final   06/10/2022 10.5 (L) 12.0 - 17.0 g/dL Final   06/10/2022 10.9 (L) 12.0 - 17.0 g/dL Final   06/10/2022 10.5 (L) 12.0 - 17.0 g/dL Final   06/10/2022 10.9 (L) 12.0 - 17.0 g/dL Final   06/10/2022 10.5 (L) 12.0 - 17.0 g/dL Final   06/10/2022 13.3 12.0 - 17.0 g/dL Final      HCT Hematocrit   Date Value Ref Range Status   06/13/2022 30.3 (L) 37.5 - 51.0 % Final   06/12/2022 30.3 (L) 37.5 - 51.0 % Final   06/11/2022 30.1 (L) 37.5 - 51.0 % Final   06/11/2022 31 (L) 38 - 51 % Final   06/10/2022 33 (L) 38 - 51 % Final   06/10/2022 32.8 (L) 37.5 - 51.0 % Final   06/10/2022 35 (L) 38 - 51 % Final   06/10/2022 25.6 (L) 37.5 - 51.0 % Final   06/10/2022 29 (L) 38 - 51 % Final   06/10/2022 32 (L) 38 - 51 % Final   06/10/2022 31 (L) 38 - 51 % Final   06/10/2022 32 (L) 38 - 51 % Final   06/10/2022 31 (L) 38 - 51 % Final   06/10/2022 32 (L) 38 - 51 % Final   06/10/2022 31 (L) 38 - 51 % Final   06/10/2022 39 38 - 51 % Final      Platelets Platelets   Date Value Ref Range Status   06/13/2022 127 (L) 140 - 450 10*3/mm3 Final   06/12/2022 139 (L) 140 - 450 10*3/mm3 Final   06/11/2022 145 140 - 450 10*3/mm3 Final   06/10/2022 170 140 - 450 10*3/mm3 Final   06/10/2022 186 140 - 450 10*3/mm3 Final        PT/INR:    Protime   Date Value Ref Range Status   06/10/2022 11.7 9.6 - 11.7 Seconds Final   06/10/2022 12.7 (H) 9.6 - 11.7 Seconds Final   /  INR   Date Value Ref Range Status   06/10/2022 1.14 (H) 0.93 - 1.10 Final   06/10/2022 1.25 (H) 0.93 - 1.10 Final       Sodium Sodium   Date Value Ref Range Status   06/13/2022 137 136 - 145 mmol/L Final   06/12/2022 137 136 - 145 mmol/L Final   06/11/2022 139 136 - 145 mmol/L Final   06/10/2022 137 136 - 145  mmol/L Final      Potassium Potassium   Date Value Ref Range Status   06/13/2022 4.1 3.5 - 5.2 mmol/L Final   06/12/2022 4.1 3.5 - 5.2 mmol/L Final   06/11/2022 4.4 3.5 - 5.2 mmol/L Final   06/10/2022 4.7 3.5 - 5.2 mmol/L Final     Comment:     Slight hemolysis detected by analyzer. Results may be affected.      Chloride Chloride   Date Value Ref Range Status   06/13/2022 101 98 - 107 mmol/L Final   06/12/2022 105 98 - 107 mmol/L Final   06/11/2022 103 98 - 107 mmol/L Final   06/10/2022 102 98 - 107 mmol/L Final      Bicarbonate CO2   Date Value Ref Range Status   06/13/2022 24.0 22.0 - 29.0 mmol/L Final   06/12/2022 22.0 22.0 - 29.0 mmol/L Final   06/11/2022 23.0 22.0 - 29.0 mmol/L Final   06/10/2022 26.0 22.0 - 29.0 mmol/L Final      BUN BUN   Date Value Ref Range Status   06/13/2022 19 8 - 23 mg/dL Final   06/12/2022 16 8 - 23 mg/dL Final   06/11/2022 24 (H) 8 - 23 mg/dL Final   06/10/2022 24 (H) 8 - 23 mg/dL Final      Creatinine Creatinine   Date Value Ref Range Status   06/13/2022 0.89 0.76 - 1.27 mg/dL Final   06/12/2022 0.76 0.76 - 1.27 mg/dL Final   06/11/2022 1.24 0.76 - 1.27 mg/dL Final   06/10/2022 1.36 (H) 0.76 - 1.27 mg/dL Final      Calcium Calcium   Date Value Ref Range Status   06/13/2022 8.0 (L) 8.6 - 10.5 mg/dL Final   06/12/2022 8.1 (L) 8.6 - 10.5 mg/dL Final   06/11/2022 8.7 8.6 - 10.5 mg/dL Final   06/10/2022 9.0 8.6 - 10.5 mg/dL Final      Magnesium Magnesium   Date Value Ref Range Status   06/13/2022 2.3 1.6 - 2.4 mg/dL Final   06/12/2022 2.5 (H) 1.6 - 2.4 mg/dL Final   06/11/2022 2.9 (H) 1.6 - 2.4 mg/dL Final   06/10/2022 2.9 (H) 1.6 - 2.4 mg/dL Final      Troponin No results found for: TROPONIN   BNP No results found for: BNP         arformoterol, 15 mcg, Nebulization, BID - RT  aspirin, 81 mg, Oral, Daily  atorvastatin, 40 mg, Oral, Nightly  budesonide, 0.5 mg, Nebulization, BID - RT  bumetanide, 1 mg, Oral, BID  calcium gluconate, 1 g, Intravenous, Once  chlorhexidine, 15 mL,  Mouth/Throat, Q12H  enoxaparin, 40 mg, Subcutaneous, Q24H  gabapentin, 100 mg, Oral, Q8H  guaiFENesin, 1,200 mg, Oral, Q12H  insulin lispro, 0-7 Units, Subcutaneous, TID AC  ipratropium-albuterol, 3 mL, Nebulization, Once  mupirocin, 1 application, Each Nare, BID  pantoprazole, 40 mg, Oral, QAM  polyethylene glycol, 17 g, Oral, BID  potassium chloride, 20 mEq, Oral, Daily  senna-docusate sodium, 2 tablet, Oral, Nightly  sodium chloride, 4 mL, Nebulization, BID - RT  tamsulosin, 0.4 mg, Oral, Daily      insulin, 0-100 Units/hr, Last Rate: Stopped (22 0733)  milrinone, 0.125 mcg/kg/min, Last Rate: 0.125 mcg/kg/min (22 1330)        PRN Meds:.•  acetaminophen **OR** acetaminophen **OR** acetaminophen  •  albuterol  •  bisacodyl  •  dextrose  •  dextrose  •  glucagon (human recombinant)  •  HYDROcodone-acetaminophen **OR** HYDROcodone-acetaminophen  •  insulin lispro **AND** insulin lispro  •  ipratropium-albuterol  •  LORazepam **OR** LORazepam **OR** LORazepam **OR** LORazepam **OR** LORazepam **OR** LORazepam **OR** LORazepam **OR** LORazepam  •  magnesium hydroxide  •  [] Morphine **AND** naloxone  •  ondansetron  •  potassium chloride **OR** potassium chloride    Patient Active Problem List   Diagnosis Code   • COPD exacerbation ([facility]) J44.1   • Obesity (BMI 30-39.9) E66.9   • Abnormal nuclear stress test R94.39   • Acute renal insufficiency N28.9   • Alcohol dependence ([facility]) F10.20   • Essential hypertension I10   • Other emphysema ([facility]) J43.8   • Coronary artery disease I25.10   • Syncope, unspecified syncope type R55       Assessment & Plan    - MV CAD, 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 Casarez following  - ETOH use--reports 3 beers/day and bourbon--withdrawal this weekend  - MRSA nasal colonization--vanc/Bactroban  - Postop leukocytosis, multifactorial----normalized    POD#3.  Agitated this am, sitter at bedside, several urinary incontinent episodes, dawn placed with 400+ residual, will check u/a.  Tachypnea, check ABG.  Rhonchi throughout, encourage cough, may have to NT suction or bronch if pulmonary thinks warranted.  Leave chest tubes with increased output last 8 hours.     Martha Tinoco, APRN  06/13/22  08:43 EDT

## 2022-06-14 ENCOUNTER — APPOINTMENT (OUTPATIENT)
Dept: GENERAL RADIOLOGY | Facility: HOSPITAL | Age: 67
End: 2022-06-14

## 2022-06-14 PROBLEM — F05 POSTOPERATIVE DELIRIUM: Status: ACTIVE | Noted: 2022-06-14

## 2022-06-14 LAB
ANION GAP SERPL CALCULATED.3IONS-SCNC: 12 MMOL/L (ref 5–15)
BACTERIA SPEC RESP CULT: NORMAL
BUN SERPL-MCNC: 17 MG/DL (ref 8–23)
BUN/CREAT SERPL: 23.6 (ref 7–25)
CALCIUM SPEC-SCNC: 8.6 MG/DL (ref 8.6–10.5)
CHLORIDE SERPL-SCNC: 99 MMOL/L (ref 98–107)
CO2 SERPL-SCNC: 28 MMOL/L (ref 22–29)
CREAT SERPL-MCNC: 0.72 MG/DL (ref 0.76–1.27)
DEPRECATED RDW RBC AUTO: 47.7 FL (ref 37–54)
EGFRCR SERPLBLD CKD-EPI 2021: 100.1 ML/MIN/1.73
ERYTHROCYTE [DISTWIDTH] IN BLOOD BY AUTOMATED COUNT: 14 % (ref 12.3–15.4)
GLUCOSE BLDC GLUCOMTR-MCNC: 121 MG/DL (ref 70–105)
GLUCOSE BLDC GLUCOMTR-MCNC: 134 MG/DL (ref 70–105)
GLUCOSE SERPL-MCNC: 125 MG/DL (ref 65–99)
GRAM STN SPEC: NORMAL
HCT VFR BLD AUTO: 30.6 % (ref 37.5–51)
HGB BLD-MCNC: 10.2 G/DL (ref 13–17.7)
LAB AP CASE REPORT: NORMAL
LAB AP DIAGNOSIS COMMENT: NORMAL
MAGNESIUM SERPL-MCNC: 2.2 MG/DL (ref 1.6–2.4)
MCH RBC QN AUTO: 32.1 PG (ref 26.6–33)
MCHC RBC AUTO-ENTMCNC: 33.2 G/DL (ref 31.5–35.7)
MCV RBC AUTO: 96.7 FL (ref 79–97)
PATH REPORT.FINAL DX SPEC: NORMAL
PATH REPORT.GROSS SPEC: NORMAL
PHOSPHATE SERPL-MCNC: 2.5 MG/DL (ref 2.5–4.5)
PLATELET # BLD AUTO: 192 10*3/MM3 (ref 140–450)
PMV BLD AUTO: 8.2 FL (ref 6–12)
POTASSIUM SERPL-SCNC: 4 MMOL/L (ref 3.5–5.2)
RBC # BLD AUTO: 3.17 10*6/MM3 (ref 4.14–5.8)
SODIUM SERPL-SCNC: 139 MMOL/L (ref 136–145)
WBC NRBC COR # BLD: 10.1 10*3/MM3 (ref 3.4–10.8)

## 2022-06-14 PROCEDURE — 71045 X-RAY EXAM CHEST 1 VIEW: CPT

## 2022-06-14 PROCEDURE — 25010000002 ENOXAPARIN PER 10 MG: Performed by: NURSE PRACTITIONER

## 2022-06-14 PROCEDURE — 84100 ASSAY OF PHOSPHORUS: CPT | Performed by: INTERNAL MEDICINE

## 2022-06-14 PROCEDURE — 97530 THERAPEUTIC ACTIVITIES: CPT

## 2022-06-14 PROCEDURE — 25010000002 LORAZEPAM PER 2 MG: Performed by: THORACIC SURGERY (CARDIOTHORACIC VASCULAR SURGERY)

## 2022-06-14 PROCEDURE — 99232 SBSQ HOSP IP/OBS MODERATE 35: CPT | Performed by: INTERNAL MEDICINE

## 2022-06-14 PROCEDURE — 94799 UNLISTED PULMONARY SVC/PX: CPT

## 2022-06-14 PROCEDURE — 82962 GLUCOSE BLOOD TEST: CPT

## 2022-06-14 PROCEDURE — 83735 ASSAY OF MAGNESIUM: CPT | Performed by: INTERNAL MEDICINE

## 2022-06-14 PROCEDURE — 94761 N-INVAS EAR/PLS OXIMETRY MLT: CPT

## 2022-06-14 PROCEDURE — 99024 POSTOP FOLLOW-UP VISIT: CPT | Performed by: THORACIC SURGERY (CARDIOTHORACIC VASCULAR SURGERY)

## 2022-06-14 PROCEDURE — 85027 COMPLETE CBC AUTOMATED: CPT | Performed by: NURSE PRACTITIONER

## 2022-06-14 PROCEDURE — 80048 BASIC METABOLIC PNL TOTAL CA: CPT | Performed by: NURSE PRACTITIONER

## 2022-06-14 PROCEDURE — 92610 EVALUATE SWALLOWING FUNCTION: CPT

## 2022-06-14 PROCEDURE — 99231 SBSQ HOSP IP/OBS SF/LOW 25: CPT

## 2022-06-14 PROCEDURE — 97535 SELF CARE MNGMENT TRAINING: CPT

## 2022-06-14 PROCEDURE — 94664 DEMO&/EVAL PT USE INHALER: CPT

## 2022-06-14 RX ORDER — QUETIAPINE FUMARATE 25 MG/1
25 TABLET, FILM COATED ORAL NIGHTLY
Status: DISCONTINUED | OUTPATIENT
Start: 2022-06-14 | End: 2022-06-17 | Stop reason: HOSPADM

## 2022-06-14 RX ORDER — BUMETANIDE 1 MG/1
0.5 TABLET ORAL DAILY
Status: DISCONTINUED | OUTPATIENT
Start: 2022-06-14 | End: 2022-06-15

## 2022-06-14 RX ORDER — QUETIAPINE FUMARATE 25 MG/1
25 TABLET, FILM COATED ORAL DAILY PRN
Status: DISCONTINUED | OUTPATIENT
Start: 2022-06-14 | End: 2022-06-16

## 2022-06-14 RX ADMIN — METOPROLOL TARTRATE 12.5 MG: 25 TABLET, FILM COATED ORAL at 21:08

## 2022-06-14 RX ADMIN — METOPROLOL TARTRATE 12.5 MG: 25 TABLET, FILM COATED ORAL at 11:20

## 2022-06-14 RX ADMIN — Medication 100 MG: at 11:27

## 2022-06-14 RX ADMIN — PANTOPRAZOLE SODIUM 40 MG: 40 TABLET, DELAYED RELEASE ORAL at 06:01

## 2022-06-14 RX ADMIN — BUMETANIDE 0.5 MG: 1 TABLET ORAL at 11:18

## 2022-06-14 RX ADMIN — CHLORHEXIDINE GLUCONATE 15 ML: 1.2 RINSE ORAL at 22:19

## 2022-06-14 RX ADMIN — ATORVASTATIN CALCIUM 40 MG: 40 TABLET, FILM COATED ORAL at 21:09

## 2022-06-14 RX ADMIN — LORAZEPAM 1 MG: 2 INJECTION INTRAMUSCULAR; INTRAVENOUS at 01:06

## 2022-06-14 RX ADMIN — CHLORHEXIDINE GLUCONATE 15 ML: 1.2 RINSE ORAL at 11:32

## 2022-06-14 RX ADMIN — GUAIFENESIN 400 MG: 100 SOLUTION ORAL at 05:51

## 2022-06-14 RX ADMIN — BUDESONIDE 0.5 MG: 0.5 INHALANT RESPIRATORY (INHALATION) at 07:21

## 2022-06-14 RX ADMIN — BUDESONIDE 0.5 MG: 0.5 INHALANT RESPIRATORY (INHALATION) at 20:05

## 2022-06-14 RX ADMIN — MUPIROCIN 1 APPLICATION: 20 OINTMENT TOPICAL at 09:00

## 2022-06-14 RX ADMIN — THERA TABS 1 TABLET: TAB at 11:30

## 2022-06-14 RX ADMIN — FOLIC ACID 1 MG: 1 TABLET ORAL at 11:27

## 2022-06-14 RX ADMIN — IPRATROPIUM BROMIDE AND ALBUTEROL SULFATE 3 ML: .5; 3 SOLUTION RESPIRATORY (INHALATION) at 04:13

## 2022-06-14 RX ADMIN — POTASSIUM CHLORIDE 20 MEQ: 1.5 POWDER, FOR SOLUTION ORAL at 13:29

## 2022-06-14 RX ADMIN — ARFORMOTEROL TARTRATE 15 MCG: 15 SOLUTION RESPIRATORY (INHALATION) at 07:15

## 2022-06-14 RX ADMIN — ASPIRIN 81 MG: 81 TABLET, COATED ORAL at 11:25

## 2022-06-14 RX ADMIN — MUPIROCIN 1 APPLICATION: 20 OINTMENT TOPICAL at 21:09

## 2022-06-14 RX ADMIN — GABAPENTIN 100 MG: 100 CAPSULE ORAL at 21:09

## 2022-06-14 RX ADMIN — GUAIFENESIN 400 MG: 100 SOLUTION ORAL at 13:29

## 2022-06-14 RX ADMIN — LORAZEPAM 1 MG: 2 INJECTION INTRAMUSCULAR; INTRAVENOUS at 02:14

## 2022-06-14 RX ADMIN — QUETIAPINE FUMARATE 25 MG: 25 TABLET ORAL at 21:09

## 2022-06-14 RX ADMIN — ENOXAPARIN SODIUM 40 MG: 100 INJECTION SUBCUTANEOUS at 17:34

## 2022-06-14 RX ADMIN — ACETAMINOPHEN 650 MG: 325 TABLET ORAL at 11:25

## 2022-06-14 RX ADMIN — ARFORMOTEROL TARTRATE 15 MCG: 15 SOLUTION RESPIRATORY (INHALATION) at 20:01

## 2022-06-14 RX ADMIN — GABAPENTIN 100 MG: 100 CAPSULE ORAL at 05:51

## 2022-06-14 RX ADMIN — GUAIFENESIN 400 MG: 100 SOLUTION ORAL at 00:53

## 2022-06-14 RX ADMIN — GABAPENTIN 100 MG: 100 CAPSULE ORAL at 13:29

## 2022-06-14 RX ADMIN — SENNOSIDES AND DOCUSATE SODIUM 2 TABLET: 50; 8.6 TABLET ORAL at 21:09

## 2022-06-14 RX ADMIN — SODIUM CHLORIDE SOLN NEBU 3% 4 ML: 3 NEBU SOLN at 07:15

## 2022-06-14 RX ADMIN — HYDROCODONE BITARTRATE AND ACETAMINOPHEN 2 TABLET: 5; 325 TABLET ORAL at 11:24

## 2022-06-14 RX ADMIN — SODIUM CHLORIDE SOLN NEBU 3% 4 ML: 3 NEBU SOLN at 20:11

## 2022-06-14 NOTE — PLAN OF CARE
Goal Outcome Evaluation:  Plan of Care Reviewed With: patient           Outcome Evaluation: Pt observed upright in bed and chair and was given trials of ice chips, water by all presentations, and applesauce. Pt able to follow all commands during eval but required feeding assistance from SLP. Oral phase characterized by reduced labial seal w/ spoon and straw however pt able to adequately pull all trials. Pt adequately manipulates ice chips. Disorganized oral transit and chewing patterns w/ pureed trials. Oral transit WFL w/ thin liquid trials. No overt s/s of aspiration observed w/ all trials of ice chips. Intermittent weak cough reaction w/ trials of thin liquids by all presentations and applesauce. Pt's vocal quality remained clear and O2 sats remained between 96-97. Signficant fatigue noted by end of evaluation. D/t s/s of difficulty/aspiration at bedside, recommend pt be NPO until deemed medically appropriate to participate in a VFSS to objectively assess swallow function and determine safest and L/R diet. ST will continue to follow for ongoing evaluation

## 2022-06-14 NOTE — THERAPY TREATMENT NOTE
Subjective: Pt agreeable to therapeutic plan of care.  Cognition: oriented to Person, Place and Time    Objective:   Cardiac Rehab Initiated  Level 2: AAROM/AROM Exercises. EOB Dangling or Chair transfers.      Sitting tolerance: >10min and supported  Standing tolerance: 5-10min and supported    Precautions:   Mid-sternal incision; avoid scapular retraction and depression.   Cardiovascular impairment post-sx; encourage energy conservation strategies.    Therapeutic Exercises: 10 reps UE and LE AROM in seated position.     MET level equivalent: 1.4-2.0 (Self care ADLs in sitting / slow ambulation in room, light intensity activities)    Bed Mobility: Mod-A and Assist x 2    Functional Transfers: For initial sit to stand pt required Mod-A and Assist x 2 to transfer to chair with max verbal/tactile cues for sequencing and to open eyes;     Pt required max x2 for tranfer back to bed following sitting up for ~3 hours.     Functional Ambulation: Mod-A, Assist x 2 and with rolling walker verbal/tactile cues for AD use    Lower Body Dressing: Dependent  ADL Position: supported sitting  ADL Comments: donning socks       Vitals: WNL    Pain: 3 VAS  Education: Provided education on importance of mobility and skilled verbal / tactile cueing throughout intervention.     Assessment: Chi NICHOLSON Neli Sr. presents with ADL impairments below baseline abilities which indicate the need for continued skilled intervention while inpatient. Pt far from functional baseline and would benefit form IPR at d/c. Tolerating session today without incident. Will continue to follow and progress as tolerated.     Plan/Recommendations:   Pt would benefit from Inpatient Rehabilitation placement at discharge from facility.   Pt desires Inpatient Rehabilitation placement at discharge. Pt cooperative; agreeable to therapeutic recommendations and plan of care.     Modified [place]: N/A = No pre-op stroke/TIA    Post-Tx Position: Up in Chair, Alarms activated  and Call light and personal items within reach  PPE: gloves, surgical mask, eyewear protection
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
## 2022-06-14 NOTE — PLAN OF CARE
Bed mobility - Mod-A and Assist x 2   Pt sat at edge of bed for ~ 4 minutes with min assist.  Transfers - Mod-A, Assist x 2 and with rolling walker with 100% cueing for task segmentation.   Ambulation - 5 pivoting steps  Mod-A, Max-A, Assist x 2 and with rolling walker with 100% cueing for safe step sequencing and task segmentation.  3rd person to assist with equipment management        Assessment: Chi Quinteros Sr. presents with functional mobility impairments which indicate the need for skilled intervention.  Pt has had slower progress with CABG protocol due to ETOH withdrawal symptoms but pt was able to participate in PT today. Tolerating session today without incident. Will continue to follow and progress as tolerated.     Plan/Recommendations:   High Intensity Therapy recommended post-acute care. This is recommended as therapy feels the patient would require 5-6 days per week, 2-3 hours per day. At this time, inpatient rehabilitation (acute rehab) would be the first choice and SNF would be second.. Pt requires no DME at discharge.

## 2022-06-14 NOTE — CONSULTS
Referring Provider: Benson Hughes * MD  Reason for Consultation: post-operative delirium    Chief Complaint: sleep    Subjective .     History of Present Illness:  The patient is a 67 y.o. male with a hx of alcohol abuse per nursing who was admitted secondary to syncope and eventually required CABG procedure. Psychiatry was consulted due to concern for post-operative delirium. Pt remains confused per nursing and is unable to speak clearly, speech dysarthric, and only partially oriented to person and situation. Confusion prevents pt from confirming hallucinations or delusions, but pt is able to respond occasionally with short answers. He admits to significant sleep difficulty, and is willing to try a medication for his confusion. Pt wasn't able to respond to questioning about anx/dep and SI/HI, but no concerns have been reported by nursing so far.    Review of Systems   Pertinent items are noted in HPI, all other systems reviewed and negative    History  -Past Psychiatric History: Unable to acquire due to confusion. Known hx of alcoholism per nursing.  -Psychiatric Hospitalizations: Unable to acquire due to confusion.  -Suicide Attempts: Unable to acquire due to confusion.  Past Medical History:   Diagnosis Date   • Back pain    • Emphysema lung (HCC)    • Hypertension      History reviewed. No pertinent family history.  Social History     Tobacco Use   • Smoking status: Former Smoker     Quit date: 2021     Years since quittin.0   • Smokeless tobacco: Never Used   Vaping Use   • Vaping Use: Never used   Substance Use Topics   • Alcohol use: Yes     Comment: 3-4 beers daily   • Drug use: Never     Medications Prior to Admission   Medication Sig Dispense Refill Last Dose   • amLODIPine (NORVASC) 5 MG tablet Take 5 mg by mouth Daily.   2022 at Unknown time   • Fluticasone-Umeclidin-Vilant (TRELEGY) 200-62.5-25 MCG/INH inhaler Inhale 1 puff Daily.   2022 at Unknown time   • gabapentin  (NEURONTIN) 600 MG tablet Take 600 mg by mouth 2 (Two) Times a Day.   2022 at Unknown time   • lisinopril (PRINIVIL,ZESTRIL) 40 MG tablet Take 40 mg by mouth Daily.   2022 at Unknown time   • pravastatin (PRAVACHOL) 20 MG tablet Take 20 mg by mouth Daily.   2022 at Unknown time   • tamsulosin (FLOMAX) 0.4 MG capsule 24 hr capsule Take 1 capsule by mouth Daily.   2022 at Unknown time   • albuterol sulfate  (90 Base) MCG/ACT inhaler Inhale 2 puffs Every 4 (Four) Hours As Needed for Wheezing.         Scheduled Meds:  arformoterol, 15 mcg, Nebulization, BID - RT  aspirin, 81 mg, Oral, Daily  atorvastatin, 40 mg, Oral, Nightly  budesonide, 0.5 mg, Nebulization, BID - RT  bumetanide, 0.5 mg, Oral, Daily  chlorhexidine, 15 mL, Mouth/Throat, Q12H  enoxaparin, 40 mg, Subcutaneous, Q24H  folic acid, 1 mg, Oral, Daily  gabapentin, 100 mg, Oral, Q8H  guaiFENesin, 400 mg, Oral, Q6H  insulin lispro, 0-7 Units, Subcutaneous, TID AC  metoprolol tartrate, 12.5 mg, Oral, Q12H  multivitamin, 1 tablet, Oral, Daily  mupirocin, 1 application, Each Nare, BID  pantoprazole, 40 mg, Oral, QAM  polyethylene glycol, 17 g, Oral, BID  potassium chloride, 20 mEq, Oral, Daily  QUEtiapine, 25 mg, Oral, Nightly  senna-docusate sodium, 2 tablet, Oral, Nightly  sodium chloride, 4 mL, Nebulization, BID - RT  thiamine, 100 mg, Oral, Daily      Continuous Infusions:     PRN Meds:  •  acetaminophen **OR** acetaminophen **OR** acetaminophen  •  bisacodyl  •  dextrose  •  dextrose  •  glucagon (human recombinant)  •  HYDROcodone-acetaminophen **OR** HYDROcodone-acetaminophen  •  insulin lispro **AND** insulin lispro  •  ipratropium-albuterol  •  LORazepam **OR** LORazepam **OR** LORazepam **OR** LORazepam **OR** LORazepam **OR** LORazepam **OR** LORazepam **OR** LORazepam  •  magnesium hydroxide  •  [] Morphine **AND** naloxone  •  ondansetron  •  potassium chloride **OR** potassium chloride  •  QUEtiapine   Allergies:   "Patient has no known allergies.  Objective     Physical Exam:  Vital Signs:   /57   Pulse 85   Temp 98.8 °F (37.1 °C) (Oral)   Resp 20   Ht 177.8 cm (70\")   Wt 107 kg (235 lb 10.8 oz)   SpO2 100%   BMI 33.82 kg/m²     General Appearance:  Well-developed, well-nourished, with NAD but confusion, and limited communication ability.   Head:  Normocephalic, without obvious deformity, atraumatic.   Eyes:          Lids and lashes normal, conjunctivae and sclerae normal, no   icterus, no pallor, corneas clear.   Skin:  Neurologic:  Musculoskeletal: No bleeding, bruising or rash.  Cranial nerves 2 - 12 grossly intact, sensation intact.  Muscle strength/tone: normal  Abnormal Movements: No tremors or abnormal involuntary movements  Gait: Deferred, in bed     Mental Status Exam:   Orientation:  To person and Situation  Memory: Recent and remote memory Unable to evaluate  Mood/Affect: unable to assess.  Hopelessness: unable to assess.  Suicidal Ideations: unable to assess.  Homicidal Ideations: unable to assess.  Hallucinations: unable to assess.  Delusions: unable to assess.  Obsessions: unable to assess.  Behavior and Psychomotor Activity: restless, mild.  Speech: dysarthic, minimal  Thought Process: Unable to demonstrate  Thought Content: unable to assess.  Associations: unable to assess.  Language: unable to assess.  Concentration and computation: Impaired  Attention Span: Impaired  Fund of Knowledge: Impaired  Reliability: poor  Insight into mental health: Poor  Judgement: Impaired  Impulse Control:  Impaired  Hygiene: fair  Cooperation:  Cooperative  Eye Contact:  Poor  Physical/Medical Issues:  Yes see problem list.     Medications and allergies reviewed.    Lab Results   Component Value Date    GLUCOSE 125 (H) 06/14/2022    CALCIUM 8.6 06/14/2022     06/14/2022    K 4.0 06/14/2022    CO2 28.0 06/14/2022    CL 99 06/14/2022    BUN 17 06/14/2022    CREATININE 0.72 (L) 06/14/2022    BCR 23.6 06/14/2022    " ANIONGAP 12.0 06/14/2022     Last Urine Toxicity     LAST URINE TOXICITY RESULTS Latest Ref Rng & Units 6/1/2022    BARBITURATES SCREEN Negative Negative    BENZODIAZEPINE SCREEN, URINE Negative Negative    COCAINE SCREEN, URINE Negative Negative    METHADONE SCREEN, URINE Negative Negative        No results found for: PHENYTOIN, PHENOBARB, VALPROATE, CBMZ  Lab Results   Component Value Date     06/14/2022    BUN 17 06/14/2022    CREATININE 0.72 (L) 06/14/2022    TSH 0.476 06/03/2022    WBC 10.10 06/14/2022     Brief Urine Lab Results  (Last result in the past 365 days)      Color   Clarity   Blood   Leuk Est   Nitrite   Protein   CREAT   Urine HCG        06/13/22 0917 Yellow   Clear   Moderate (2+)   Negative   Negative   Negative               Assessment & Plan     COPD exacerbation (HCC)    Obesity (BMI 30-39.9)    Abnormal nuclear stress test    Acute renal insufficiency    Alcohol dependence (HCC)    Essential hypertension    Other emphysema (HCC)    Coronary artery disease    Syncope, unspecified syncope type    Postoperative delirium    EKG Results: Reviewed.  QTc: 441 (6/12/22)    LABS: Reviewed.  Na: 139 (6/14/22)    Assessment:   Post-operative delirium    Treatment Plan: Pt is confused with limited communication abilities currently, and ativan has been ineffective per nursing. He is able to admit to poor sleep, unclear if AVH, delusions, or SI/HI are current due to confusion. Slightly restless and only able to show orientation to person and some of situation. Agreeable to medication to help with sleep and confusion. Nursing denies pt triggering CIWA protocol currently.    -Cont CIWA protocol for hx of alcoholism leading to possible withdrawal.  -25mg seroquel nightly for delirium and to promote sedation.  -25mg seroquel prn for breakthrough confusion, agitation, or insomnia also left a nursing disposal.  -Will need to discuss extent of alcoholism and desire to get tx when confusion resolves.  -AM  EKG order to monitor for QTc prolongation due to known cardiac pathology.  -Will follow and provide support.    Treatment Plan discussed with: Patient and nursing    I discussed the patients findings and my recommendations with patient and nursing staff    I have reviewed and approved the behavioral health treatment plans and problem list. Yes    Thank you for the consult  Referring MD has access to consult report and progress notes in EMR  Patient was examined wearing appropriate PPE.    Adam Henao PA-C  06/14/22  22:58 EDT    EMR Dragon transcription disclaimer:  Part of this note may be an electronic transcription/translation of spoken language to printed text using the Dragon Dictation System.

## 2022-06-14 NOTE — PROGRESS NOTES
Cardiology Progress Note      Admiting Physician:  Benson Hughes, *   LOS: 8 days       Reason For Followup:  Multivessel coronary artery disease      Subjective:    Interval History:  Seen and examined.  Chart and labs reviewed.  Patient appears to be in a normal cognitive state.  Status post surgery, sitting upright at bedside  In bed, hemodynamically electrically stable  Lines and tubes noted, lower pressor requirement, did not sleep well he says    Chest tubes in place draining well  Underlying sinus rhythm, pacing rate decreased to allow normal conduction  Epinephrine discontinued today, continues on milrinone, systolics in the 150s to 160s, may need to start afterload reduction with amlodipine or losartan gently    Nursing at bedside discussed care    Review of Systems:  A complete review of systems negative x14 point review of systems except as mentioned above he denies anginal chest pain or shortness of breath at this time.  Postsurgical pain noted    Assessment & Plan    Impressions:  Syncope  Multivessel coronary artery disease  Hyperlipidemia  Hypertension  Ischemic cardiomyopathy EF 40%  Acute kidney injury-improved  Alcohol dependence  Altered mental status likely secondary to alcohol withdrawal    Recommendations:  Status post four-vessel bypass, LIMA to LAD, SVG to diagonal obtuse marginal and PLV branch  Left leg endovascular vein harvest  Ascending aortic replacement with 30 mm graft    Continue lines and tubes  Continue inotropes and vasopressors, off epi, continues on milrinone, hypertensive, may need losartan or amlodipine added to his regimen for afterload reduction if systolics remain, most recently charted 114/46, will follow blood pressures and use afterload reduction if needed  Does not appear volume overloaded    Renal function has significantly improved   Monitor rate and rhythm closely    Patient is having withdrawal symptoms from alcohol and is being treated  appropriately    Appreciate surgical help with postoperative care    Objective:    Medication Review:   Scheduled Meds:arformoterol, 15 mcg, Nebulization, BID - RT  aspirin, 81 mg, Oral, Daily  atorvastatin, 40 mg, Oral, Nightly  budesonide, 0.5 mg, Nebulization, BID - RT  bumetanide, 1 mg, Oral, Daily  chlorhexidine, 15 mL, Mouth/Throat, Q12H  enoxaparin, 40 mg, Subcutaneous, Q24H  folic acid, 1 mg, Oral, Daily  gabapentin, 100 mg, Oral, Q8H  guaiFENesin, 400 mg, Oral, Q6H  insulin lispro, 0-7 Units, Subcutaneous, TID AC  multivitamin, 1 tablet, Oral, Daily  mupirocin, 1 application, Each Nare, BID  pantoprazole, 40 mg, Oral, QAM  polyethylene glycol, 17 g, Oral, BID  potassium chloride, 20 mEq, Oral, Daily  senna-docusate sodium, 2 tablet, Oral, Nightly  sodium chloride, 4 mL, Nebulization, BID - RT  thiamine, 100 mg, Oral, Daily      Continuous Infusions:insulin, 0-100 Units/hr, Last Rate: Stopped (22 0733)  milrinone, 0.125 mcg/kg/min, Last Rate: 0.125 mcg/kg/min (22 1405)      PRN Meds:.•  acetaminophen **OR** acetaminophen **OR** acetaminophen  •  bisacodyl  •  dextrose  •  dextrose  •  glucagon (human recombinant)  •  HYDROcodone-acetaminophen **OR** HYDROcodone-acetaminophen  •  insulin lispro **AND** insulin lispro  •  ipratropium-albuterol  •  LORazepam **OR** LORazepam **OR** LORazepam **OR** LORazepam **OR** LORazepam **OR** LORazepam **OR** LORazepam **OR** LORazepam  •  magnesium hydroxide  •  [] Morphine **AND** naloxone  •  ondansetron  •  potassium chloride **OR** potassium chloride    Patient Active Problem List   Diagnosis   • COPD exacerbation (HCC)   • Obesity (BMI 30-39.9)   • Abnormal nuclear stress test   • Acute renal insufficiency   • Alcohol dependence (HCC)   • Essential hypertension   • Other emphysema (HCC)   • Coronary artery disease   • Syncope, unspecified syncope type         Physical Exam:    General: Alert, cooperative, no distress, appears stated age, up to  chair  Lines and tubes right IJ line was swollen  Head:  Normocephalic, atraumatic, mucous membranes moist  Eyes:  Conjunctivae/corneas clear, EOM's intact     Neck:  Supple,  no bruit  Lungs:  Clear to auscultation bilaterally, no wheezes rhonchi rales are noted, chest tubes  Chest wall: No tenderness  Heart::  Regular rate and rhythm, S1 and S2 normal, 1/6 holosystolic murmur.  No rub or gallop  Abdomen: Soft, non-tender, nondistended bowel sounds active.  Obese  Extremities: No cyanosis, clubbing, or edema, leg bandaged  Pulses: Diminished pedal pulses  Skin:  No rashes or lesions  Neuro/psych: A&O x3. CN II through XII are grossly intact with appropriate affect  Unchanged from prior encounter    Vital Signs:  Vitals:    06/13/22 1700 06/13/22 1801 06/13/22 1802 06/13/22 1900   BP: 138/81  109/58 129/75   BP Location:       Patient Position:       Pulse: 91 86     Resp:       Temp:    98.2 °F (36.8 °C)   TempSrc:       SpO2: 98% 97%  98%   Weight:       Height:         Wt Readings from Last 1 Encounters:   06/13/22 107 kg (235 lb)       Intake/Output Summary (Last 24 hours) at 6/13/2022 2115  Last data filed at 6/13/2022 1405  Gross per 24 hour   Intake 600 ml   Output 1640 ml   Net -1040 ml         Results Review:     CBC    Results from last 7 days   Lab Units 06/13/22  0530 06/12/22  0231 06/11/22  0355 06/11/22  0101 06/10/22  2254 06/10/22  2201 06/10/22  2147 06/10/22  1950 06/10/22  1430 06/10/22  0403 06/09/22  0500   WBC 10*3/mm3 10.50 12.40* 15.20*  --   --  22.40*  --  24.90*  --  11.40* 10.10   HEMOGLOBIN g/dL 9.8* 9.8* 10.0*  --   --  10.5*  --  8.5*  --  13.0 13.8   HEMOGLOBIN, POC g/dL  --   --   --  10.6* 11.2*  --  11.8*  --    < >  --   --    PLATELETS 10*3/mm3 127* 139* 145  --   --  170  --  186  --  247 250    < > = values in this interval not displayed.     Cr Clearance Estimated Creatinine Clearance: 98.7 mL/min (by C-G formula based on SCr of 0.89 mg/dL).  Coag   Results from last 7 days    Lab Units 06/10/22  2201 06/10/22  1950 06/09/22  0500   INR  1.14* 1.25* 1.05   APTT seconds 27.7 29.7 26.6     HbA1C   Lab Results   Component Value Date    HGBA1C 5.5 06/03/2022     Blood Glucose   Glucose   Date/Time Value Ref Range Status   06/13/2022 2059 132 (H) 70 - 105 mg/dL Final     Comment:     Serial Number: [id number]:  [id number]   06/13/2022 1622 128 (H) 70 - 105 mg/dL Final     Comment:     Serial Number: [id number]:  [id number]   06/13/2022 1103 129 (H) 70 - 105 mg/dL Final     Comment:     Serial Number: [id number]:  [id number]   06/13/2022 0745 129 (H) 70 - 105 mg/dL Final     Comment:     Serial Number: [id number]:  [id number]   06/13/2022 0634 114 (H) 70 - 105 mg/dL Final     Comment:     Serial Number: [id number]:  [id number]   06/13/2022 0530 122 (H) 70 - 105 mg/dL Final     Comment:     Serial Number: [id number]:  [id number]   06/13/2022 0412 112 (H) 70 - 105 mg/dL Final     Comment:     Serial Number: [id number]:  [id number]   06/13/2022 0310 94 70 - 105 mg/dL Final     Comment:     Serial Number: [id number]:  [id number]     Infection   Results from last 7 days   Lab Units 06/12/22  1537   RESPCX  Rare The culture consists of normal respiratory sanaz. This is a preliminary report; final report to follow.     CMP   Results from last 7 days   Lab Units 06/13/22  0530 06/12/22  0231 06/11/22  0355 06/10/22  2201 06/10/22  0403 06/09/22  0500 06/08/22  1411 06/08/22  0403   SODIUM mmol/L 137 137 139 137 136 136  --  136   POTASSIUM mmol/L 4.1 4.1 4.4 4.7 4.7 4.2 4.7 5.7*   CHLORIDE mmol/L 101 105 103 102 96* 98  --  98   CO2 mmol/L 24.0 22.0 23.0 26.0 30.0* 28.0  --  30.0*   BUN mg/dL 19 16 24* 24* 26* 22  --  27*   CREATININE mg/dL 0.89 0.76 1.24 1.36* 1.17 0.85  --  0.96   GLUCOSE mg/dL 119* 106* 139* 146* 104* 85  --  128*   ALBUMIN g/dL  --   --  3.90 3.50  --  3.00*  --   --    BILIRUBIN mg/dL  --   --  0.6 0.8  --  0.3  --   --    ALK PHOS  U/L  --   --  38* 43  --  52  --   --    AST (SGOT) U/L  --   --  47* 46*  --  25  --   --    ALT (SGPT) U/L  --   --  36 40  --  44*  --   --      ABG    Results from last 7 days   Lab Units 06/13/22  0853 06/11/22  1256 06/11/22  1048 06/11/22  0705 06/11/22  0348 06/11/22  0101 06/10/22  2254   PH, ARTERIAL pH units 7.446 7.408 7.427 7.393 7.390 7.359 7.368   PCO2, ARTERIAL mm Hg 36.0 40.1 36.2 41.5 43.9 46.9 45.7   PO2 ART mm Hg 71.2* 75.0* 85.9 110.4* 99.2 139.8* 94.5   O2 SATURATION ART % 94.9 95.0 96.8 98.3* 97.6 99.0* 97.0   BASE EXCESS ART mmol/L 0.8 0.6 -0.3* 0.3 1.3 0.6 0.6     UA    Results from last 7 days   Lab Units 06/13/22  0917   NITRITE UA  Negative   WBC UA /HPF 0-2*   BACTERIA UA /HPF None Seen   SQUAM EPITHEL UA /HPF 0-2     ANGELO  No results found for: POCMETH, POCAMPHET, POCBARBITUR, POCBENZO, POCCOCAINE, POCOPIATES, POCOXYCODO, POCPHENCYC, POCPROPOXY, POCTHC, POCTRICYC  Lysis Labs   Results from last 7 days   Lab Units 06/13/22  0530 06/12/22  0231 06/11/22  0355 06/11/22  0101 06/10/22  2254 06/10/22  2201 06/10/22  2147 06/10/22  1950 06/10/22  1430 06/10/22  0403 06/09/22  0500 06/08/22  0403   INR   --   --   --   --   --  1.14*  --  1.25*  --   --  1.05  --    APTT seconds  --   --   --   --   --  27.7  --  29.7  --   --  26.6  --    FIBRINOGEN mg/dL  --   --   --   --   --  449  --  436  --   --   --   --    HEMOGLOBIN g/dL 9.8* 9.8* 10.0*  --   --  10.5*  --  8.5*  --  13.0 13.8 13.3   HEMOGLOBIN, POC g/dL  --   --   --  10.6* 11.2*  --  11.8*  --    < >  --   --   --    PLATELETS 10*3/mm3 127* 139* 145  --   --  170  --  186  --  247 250 237   CREATININE mg/dL 0.89 0.76 1.24  --   --  1.36*  --   --   --  1.17 0.85 0.96    < > = values in this interval not displayed.     Radiology(recent) XR Chest 1 View    Result Date: 6/13/2022  IMPRESSION :  1. Interval removal of [place]-Demarco catheter. Lines and tubes are otherwise stable. 2. Postsurgical changes from median sternotomy and CABG. 3.  Mild pulmonary vascular congestion and dependent opacities at the lung bases without great change given differences in technique from the comparison[. Slight improved aeration right upper lobe opacity peripherally from prior study.  Electronically Signed By-Hossein Alvarez On:6/13/2022 7:56 AM This report was finalized on [id number] by  Hossein Alvarez, .    XR Chest 1 View    Result Date: 6/12/2022  1. Remaining support devices appear in expected position. 2. Low lung volumes with streaky bibasilar airspace opacity and right upper lobe airspace opacity likely representing atelectasis and/or infection. 3. No evidence of significant pleural effusion or evidence of pneumothorax.  Electronically Signed By-Ben Juarez MD On:6/12/2022 8:44 AM This report was finalized on [id number] by  Ben Juarez MD.        Results from last 7 days   Lab Units 06/13/22  0530   CK TOTAL U/L 456*       Imaging Results (Last 24 Hours)     Procedure Component Value Units Date/Time    XR Chest 1 View [[id number]] Collected: 06/13/22 0752     Updated: 06/13/22 0758    Narrative:         DATE OF EXAM:   6/13/2022 7:20 AM     PROCEDURE:   XR CHEST 1 VW-     INDICATIONS:   increase O2 demands; R55-Syncope and collapse; F10.920-Alcohol use,  unspecified with intoxication, uncomplicated; J96.01-Acute respiratory  failure with hypoxia; I95.1-Orthostatic hypotension; R77.8-Other  specified abnormalities of plasma proteins; N17.9-Acute kidney failure,  unspecified; J44.1-Chronic obstructive pulmonary disease with (acute)  exacerbation; R94.39-Abnormal result of other cardiov     COMPARISON:  06/12/2022 and prior     TECHNIQUE:   Portable Chest     FINDINGS:      Patient status post median sternotomy and CABG.     Heart size appears stable. Pulmonary vasculature is mildly congested and  indistinct. This is accentuated by low lung volumes. Vascular sheath  projects of the SVC. There has been interval removal of the  [place]-Demarco  catheter.     Mediastinal drains and left sided chest tube appear grossly stable.  Dependent pleural parenchymal changes at the left lung base again noted  without great change given differences in technique. Increased hazy and  interstitial opacities on the right with a perihilar and dependent  predominance again noted. Slight improved aeration right upper lobe  airspace opacity noted. Small amount of fluid noted within the minor  fissure. No definite pneumothorax or pneumomediastinum noted. Osseous  structures are stable.        Impression:      IMPRESSION :      1. Interval removal of [place]-Demarco catheter. Lines and tubes are otherwise  stable.  2. Postsurgical changes from median sternotomy and CABG.  3. Mild pulmonary vascular congestion and dependent opacities at the  lung bases without great change given differences in technique from the  comparison[. Slight improved aeration right upper lobe opacity  peripherally from prior study.     Electronically Signed By-Hossein Alvarez On:6/13/2022 7:56 AM  This report was finalized on [id number] by  Hossein Alvarez, .          Cardiac Studies:  Echo- Results for orders placed during the hospital encounter of 05/31/22    Adult Transthoracic Echo Complete With Contrast if Necessary Per Protocol (With Agitated Saline)    Interpretation Summary  · Left ventricular ejection fraction appears to be 56 - 60%.  · The right ventricular cavity is mildly dilated.  · Mild dilation of the aortic root is present.  · No pericardial effusion noted    Stress Myoview-  Cath-        Frank Giraldo MD  06/13/22  21:15 EDT

## 2022-06-14 NOTE — PROGRESS NOTES
"PULMONARY CRITICAL CARE PROGRESS  NOTE      PATIENT IDENTIFICATION:  Name: Chi Quinteros  MRN: [id number]  :  1955     Age: 67 y.o.  Sex: male    DATE OF Note:  2022   Referring Physician: No admitting provider for patient encounter.                  Subjective:   Still confused  On milrinone  On 4 L  no SOB, no chest pain,  Off chest tube   Pending swallow eval   no nausea or vomiting, no change in bowel habit,   Good urine out ,  no new  skin rash or itching.      Objective:  tMax 24 hrs: Temp (24hrs), Av.6 °F (37 °C), Min:98.1 °F (36.7 °C), Max:99.4 °F (37.4 °C)      Vitals Ranges:   Temp:  [98.1 °F (36.7 °C)-99.4 °F (37.4 °C)] 98.1 °F (36.7 °C)  Heart Rate:  [82-93] 84  Resp:  [28-43] 33  BP: (103-138)/(58-82) 115/63    Intake and Output Last 3 Shifts:   I/O last 3 completed shifts:  In: 634 [I.V.:634]  Out: 2950 [Urine:2500; Chest Tube:450]    Exam:  /63   Pulse 84   Temp 98.1 °F (36.7 °C) (Oral)   Resp (!) 33   Ht 177.8 cm (70\")   Wt 107 kg (235 lb 10.8 oz)   SpO2 100%   BMI 33.82 kg/m²     General Appearance:   AA confused   HEENT:  Normocephalic, without obvious abnormality. Conjunctivae/corneas clear.  Normal external ear canals. Nares normal, no drainage     Neck:  Supple, symmetrical, trachea midline. No JVD.  Lungs /Chest wall:   Bilateral basal rhonchi, respirations unlabored, symmetrical wall movement.     Heart:  Regular rate and rhythm, systolic murmur PMI left sternal border  Abdomen: Soft, nontender, no masses, no organomegaly.    Extremities: Trace edema, no clubbing or cyanosis        Medications:    Current Facility-Administered Medications:   •  acetaminophen (TYLENOL) tablet 650 mg, 650 mg, Oral, Q4H PRN **OR** acetaminophen (TYLENOL) 160 MG/5ML solution 650 mg, 650 mg, Oral, Q4H PRN **OR** acetaminophen (TYLENOL) suppository 650 mg, 650 mg, Rectal, Q4H PRN, Martha Tinoco APRN  •  arformoterol (BROVANA) nebulizer solution 15 mcg, 15 mcg, Nebulization, BID - " RT, Martha Tinoco APRN, 15 mcg at 06/14/22 0715  •  aspirin EC tablet 81 mg, 81 mg, Oral, Daily, Martha Tinoco APRN, 81 mg at 06/13/22 0909  •  atorvastatin (LIPITOR) tablet 40 mg, 40 mg, Oral, Nightly, Dre Cooper MD, 40 mg at 06/13/22 2056  •  bisacodyl (DULCOLAX) suppository 10 mg, 10 mg, Rectal, Daily PRN, Martha Tinoco APRN  •  budesonide (PULMICORT) nebulizer solution 0.5 mg, 0.5 mg, Nebulization, BID - RT, Martha Tnioco APRN, 0.5 mg at 06/14/22 0721  •  bumetanide (BUMEX) tablet 0.5 mg, 0.5 mg, Oral, Daily, Dre Cooper MD  •  chlorhexidine (PERIDEX) 0.12 % solution 15 mL, 15 mL, Mouth/Throat, Q12H, Martha Tinoco APRN, 15 mL at 06/13/22 2256  •  dextrose (D50W) (25 g/50 mL) IV injection 10-50 mL, 10-50 mL, Intravenous, Q15 Min PRN, Martha Tinoco APRN  •  dextrose (GLUTOSE) oral gel 15 g, 15 g, Oral, Q15 Min PRN, Martha Tinoco APRN  •  Enoxaparin Sodium (LOVENOX) syringe 40 mg, 40 mg, Subcutaneous, Q24H, Martha Tinoco APRN, 40 mg at 06/13/22 1703  •  folic acid (FOLVITE) tablet 1 mg, 1 mg, Oral, Daily, Thong Watts, APRN, 1 mg at 06/13/22 1053  •  gabapentin (NEURONTIN) capsule 100 mg, 100 mg, Oral, Q8H, Christal Vora APRN, 100 mg at 06/14/22 0551  •  glucagon (human recombinant) (GLUCAGEN DIAGNOSTIC) 1 mg in sterile water (preservative free) 1 mL injection, 1 mg, Intramuscular, Q15 Min PRN, Martha Tinoco APRN  •  guaiFENesin solution 400 mg, 400 mg, Oral, Q6H, Cindy Arellano MD, 400 mg at 06/14/22 0551  •  HYDROcodone-acetaminophen (NORCO) 5-325 MG per tablet 1 tablet, 1 tablet, Oral, Q6H PRN, 1 tablet at 06/13/22 2232 **OR** HYDROcodone-acetaminophen (NORCO) 5-325 MG per tablet 2 tablet, 2 tablet, Oral, Q6H PRN, Christal Vora, APRN, 2 tablet at 06/13/22 0712  •  insulin lispro (ADMELOG) injection 0-7 Units, 0-7 Units, Subcutaneous, TID AC **AND** insulin lispro (ADMELOG) injection 0-7 Units, 0-7 Units, Subcutaneous, PRN,  Christal Vora APRN  •  ipratropium-albuterol (DUO-NEB) nebulizer solution 3 mL, 3 mL, Nebulization, Q4H PRN, Day, Kori, ERICA, 3 mL at 22 0413  •  LORazepam (ATIVAN) tablet 0.5 mg, 0.5 mg, Oral, Q2H PRN **OR** LORazepam (ATIVAN) injection 0.5 mg, 0.5 mg, Intravenous, Q2H PRN, 0.5 mg at 22 2232 **OR** LORazepam (ATIVAN) tablet 1 mg, 1 mg, Oral, Q1H PRN **OR** LORazepam (ATIVAN) injection 1 mg, 1 mg, Intravenous, Q1H PRN, 1 mg at 22 0214 **OR** LORazepam (ATIVAN) injection 1 mg, 1 mg, Intravenous, Q15 Min PRN, 1 mg at 22 0524 **OR** LORazepam (ATIVAN) injection 1 mg, 1 mg, Intramuscular, Q15 Min PRN **OR** LORazepam (ATIVAN) injection 2 mg, 2 mg, Intravenous, Q1H PRN, 2 mg at 22 1417 **OR** LORazepam (ATIVAN) tablet 2 mg, 2 mg, Oral, Q1H PRN, Shivam Recio MD  •  magnesium hydroxide (MILK OF MAGNESIA) suspension 10 mL, 10 mL, Oral, Daily PRN, Martha Tinoco APRN  •  metoprolol tartrate (LOPRESSOR) half tablet 12.5 mg, 12.5 mg, Oral, Q12H, Martha Tinoco APRN  •  multivitamin (THERAGRAN) tablet 1 tablet, 1 tablet, Oral, Daily, Thong Watts APRN, 1 tablet at 22 1052  •  mupirocin (BACTROBAN) 2 % nasal ointment 1 application, 1 application, Each Nare, BID, Martha Tinoco APRN, 1 application at 22  •  [] morphine injection 2 mg, 2 mg, Intravenous, Q2H PRN, 2 mg at 22 0358 **AND** naloxone (NARCAN) injection 0.4 mg, 0.4 mg, Intravenous, Q5 Min PRN, Martha Tinoco APRN  •  ondansetron (ZOFRAN) injection 4 mg, 4 mg, Intravenous, Q6H PRN, Martha Tinoco APRN  •  [COMPLETED] pantoprazole (PROTONIX) injection 40 mg, 40 mg, Intravenous, Once, 40 mg at 06/10/22 2313 **FOLLOWED BY** pantoprazole (PROTONIX) EC tablet 40 mg, 40 mg, Oral, QAM, Christal Vora APRN, 40 mg at 22 0601  •  polyethylene glycol (MIRALAX) packet 17 g, 17 g, Oral, BID, Christal Vora APRN, 17 g at 22  •  potassium chloride  (K-DUR,KLOR-CON) CR tablet 20 mEq, 20 mEq, Oral, PRN **OR** potassium chloride (KLOR-CON) packet 20 mEq, 20 mEq, Oral, PRN, Martha Tinoco, APRN  •  potassium chloride (K-DUR,KLOR-CON) CR tablet 20 mEq, 20 mEq, Oral, Daily, Satterly-Yaniv, Christal L, APRN, 20 mEq at 06/13/22 0912  •  sennosides-docusate (PERICOLACE) 8.6-50 MG per tablet 2 tablet, 2 tablet, Oral, Nightly, Satterly-Yaniv, Christal L, APRN, 2 tablet at 06/13/22 2056  •  sodium chloride 3 % nebulizer solution 4 mL, 4 mL, Nebulization, BID - RT, DrawDany MD, 4 mL at 06/14/22 0715  •  thiamine (VITAMIN B-1) tablet 100 mg, 100 mg, Oral, Daily, Thong Watts APRN, 100 mg at 06/13/22 1053    Data Review:  All labs (24hrs):   Recent Results (from the past 24 hour(s))   POC Glucose Once    Collection Time: 06/13/22 11:03 AM    Specimen: Blood   Result Value Ref Range    Glucose 129 (H) 70 - 105 mg/dL   POC Glucose Once    Collection Time: 06/13/22  4:22 PM    Specimen: Blood   Result Value Ref Range    Glucose 128 (H) 70 - 105 mg/dL   POC Glucose Once    Collection Time: 06/13/22  8:59 PM    Specimen: Blood   Result Value Ref Range    Glucose 132 (H) 70 - 105 mg/dL   CBC (No Diff)    Collection Time: 06/14/22  4:30 AM    Specimen: Blood   Result Value Ref Range    WBC 10.10 3.40 - 10.80 10*3/mm3    RBC 3.17 (L) 4.14 - 5.80 10*6/mm3    Hemoglobin 10.2 (L) 13.0 - 17.7 g/dL    Hematocrit 30.6 (L) 37.5 - 51.0 %    MCV 96.7 79.0 - 97.0 fL    MCH 32.1 26.6 - 33.0 pg    MCHC 33.2 31.5 - 35.7 g/dL    RDW 14.0 12.3 - 15.4 %    RDW-SD 47.7 37.0 - 54.0 fl    MPV 8.2 6.0 - 12.0 fL    Platelets 192 140 - 450 10*3/mm3   Basic Metabolic Panel    Collection Time: 06/14/22  4:30 AM    Specimen: Blood   Result Value Ref Range    Glucose 125 (H) 65 - 99 mg/dL    BUN 17 8 - 23 mg/dL    Creatinine 0.72 (L) 0.76 - 1.27 mg/dL    Sodium 139 136 - 145 mmol/L    Potassium 4.0 3.5 - 5.2 mmol/L    Chloride 99 98 - 107 mmol/L    CO2 28.0 22.0 - 29.0 mmol/L    Calcium 8.6 8.6 -  10.5 mg/dL    BUN/Creatinine Ratio 23.6 7.0 - 25.0    Anion Gap 12.0 5.0 - 15.0 mmol/L    eGFR 100.1 >60.0 mL/min/1.73   Magnesium    Collection Time: 06/14/22  4:30 AM    Specimen: Blood   Result Value Ref Range    Magnesium 2.2 1.6 - 2.4 mg/dL   Phosphorus    Collection Time: 06/14/22  4:30 AM    Specimen: Blood   Result Value Ref Range    Phosphorus 2.5 2.5 - 4.5 mg/dL        Imaging:  XR Chest 1 View  Narrative:    DATE OF EXAM:   6/13/2022 7:20 AM     PROCEDURE:   XR CHEST 1 VW-     INDICATIONS:   increase O2 demands; R55-Syncope and collapse; F10.920-Alcohol use,  unspecified with intoxication, uncomplicated; J96.01-Acute respiratory  failure with hypoxia; I95.1-Orthostatic hypotension; R77.8-Other  specified abnormalities of plasma proteins; N17.9-Acute kidney failure,  unspecified; J44.1-Chronic obstructive pulmonary disease with (acute)  exacerbation; R94.39-Abnormal result of other cardiov     COMPARISON:  06/12/2022 and prior     TECHNIQUE:   Portable Chest     FINDINGS:      Patient status post median sternotomy and CABG.     Heart size appears stable. Pulmonary vasculature is mildly congested and  indistinct. This is accentuated by low lung volumes. Vascular sheath  projects of the SVC. There has been interval removal of the Westpoint-Demarco  catheter.     Mediastinal drains and left sided chest tube appear grossly stable.  Dependent pleural parenchymal changes at the left lung base again noted  without great change given differences in technique. Increased hazy and  interstitial opacities on the right with a perihilar and dependent  predominance again noted. Slight improved aeration right upper lobe  airspace opacity noted. Small amount of fluid noted within the minor  fissure. No definite pneumothorax or pneumomediastinum noted. Osseous  structures are stable.      Impression: IMPRESSION :      1. Interval removal of Westpoint-Demarco catheter. Lines and tubes are otherwise  stable.  2. Postsurgical changes from  median sternotomy and CABG.  3. Mild pulmonary vascular congestion and dependent opacities at the  lung bases without great change given differences in technique from the  comparison[. Slight improved aeration right upper lobe opacity  peripherally from prior study.     Electronically Signed By-Hossein Alvarez On:6/13/2022 7:56 AM  This report was finalized on [id number] by  Hossein Alvarez, .       ASSESSMENT:    S?P CABG with ascending aortic aneurysm repair   COPD exacerbation (HCC)    Obesity (BMI 30-39.9)    Abnormal nuclear stress test    Acute renal insufficiency    Alcohol dependence (HCC)    Essential hypertension    Other emphysema (HCC)    Coronary artery disease    Syncope, unspecified syncope type       PLAN:  Pt/ot  Post op care  IS/ flutter valve   Diuretics    Bronchodilator  Inhaled corticosteroids  Electrolytes/ glycemic control  DVT and GI prophylaxis.    Total Critical care time in direct medical management (   ) minutes. This time specifically excludes time spent performing procedures.  Cindy Arellano MD. D, ABSM.     6/14/2022  10:56 EDT

## 2022-06-14 NOTE — THERAPY EVALUATION
Acute Care - Speech Language Pathology   Swallow Initial Evaluation [facility]     Patient Name: Chi Quinteros Sr.  : 1955  MRN: [id number]  Today's Date: 2022               Admit Date: 2022    Visit Dx:     ICD-10-CM ICD-9-CM   1. Syncope, unspecified syncope type  R55 780.2   2. Alcoholic intoxication without complication ([facility])  F10.920 305.00   3. Acute respiratory failure with hypoxemia ([facility])  J96.01 518.81   4. Orthostatic hypotension  I95.1 458.0   5. Elevated troponin  R77.8 790.6   6. FELIBERTO (acute kidney injury) ([facility])  N17.9 584.9   7. COPD exacerbation ([facility])  J44.1 491.21   8. Abnormal nuclear stress test  R94.39 794.39   9. Coronary artery disease involving native coronary artery of native heart, unspecified whether angina present  I25.10 414.01     Patient Active Problem List   Diagnosis   • COPD exacerbation ([facility])   • Obesity (BMI 30-39.9)   • Abnormal nuclear stress test   • Acute renal insufficiency   • Alcohol dependence ([facility])   • Essential hypertension   • Other emphysema ([facility])   • Coronary artery disease   • Syncope, unspecified syncope type     Past Medical History:   Diagnosis Date   • Back pain    • Emphysema lung ([facility])    • Hypertension      Past Surgical History:   Procedure Laterality Date   • CARDIAC CATHETERIZATION Right 2022    Procedure: Coronary angiography;  Surgeon: Frank Giraldo MD;  Location: [facility] INVASIVE LOCATION;  Service: Cardiovascular;  Laterality: Right;   • CARDIAC CATHETERIZATION N/A 2022    Procedure: Left Heart Cath;  Surgeon: Frank Giraldo MD;  Location: [facility] INVASIVE LOCATION;  Service: Cardiovascular;  Laterality: N/A;   • CARDIAC CATHETERIZATION N/A 2022    Procedure: Left ventriculography;  Surgeon: Frank Giraldo MD;  Location: [facility] CATH INVASIVE LOCATION;  Service: Cardiovascular;  Laterality: N/A;       SLP Recommendation and Plan  SLP Swallowing Diagnosis: oral dysphagia, R/O pharyngeal dysphagia (22 1500)  SLP Diet  Recommendation: NPO (06/14/22 1500)     SLP Rec. for Method of Medication Administration: meds via alternate route (06/14/22 1500)     Monitor for Signs of Aspiration: yes, notify SLP if any concerns (06/14/22 1500)  Recommended Diagnostics: reassess via clinical swallow evaluation, reassess via VFSS ([facility] [place], [place]) (06/14/22 1500)  Swallow Criteria for Skilled Therapeutic Interventions Met: demonstrates skilled criteria (06/14/22 1500)     Rehab Potential/Prognosis, Swallowing: adequate, monitor progress closely (06/14/22 1500)  Therapy Frequency (Swallow): PRN (06/14/22 1500)  Predicted Duration Therapy Intervention (Days): until discharge (06/14/22 1500)              SWALLOW EVALUATION (last 72 hours)     SLP Adult Swallow Evaluation     Row Name 06/14/22 1500       Rehab Evaluation    Document Type evaluation  -LF    Subjective Information no complaints  -LF    Patient Observations cooperative;agree to therapy  -LF    Patient Effort good  -LF    Symptoms Noted During/After Treatment fatigue  -LF            General Information    Patient Profile Reviewed yes  -LF    Pertinent History Of Current Problem Pt is a 68 y/o male s/p CABG on 6/10/22. ST orders placed for swallowing d/t abnormal CXR, confusion, and coughing w/ PO. PMH significant for COPD, alcohol dependence, HTN, and CAD.   -LF    Current Method of Nutrition full liquids  -LF    Prior Level of Function-Swallowing no diet consistency restrictions  -LF    Plans/Goals Discussed with patient  -LF    Barriers to Rehab medically complex  -LF            Pain    Additional Documentation --  no pain reported during eval  -LF            Oral Motor Structure and Function    Dentition Assessment missing teeth  -LF    Secretion Management WNL/WFL  -LF    Mucosal Quality dry  -LF            Oral Musculature and Cranial Nerve Assessment    Oral Motor General Assessment generalized oral motor weakness  -LF            General Eating/Swallowing Observations    Respiratory Support  Currently in Use nasal cannula  -LF    Eating/Swallowing Skills fed by SLP  -LF    Positioning During Eating upright 90 degree;upright in bed;upright in chair  -LF    Utensils Used spoon;straw  -LF    Consistencies Trialed pureed;ice chips;thin liquids  -LF            Clinical Swallow Eval    Oral Prep Phase impaired  -LF    Oral Transit impaired  -LF    Oral Residue WFL  -LF    Pharyngeal Phase suspected pharyngeal impairment  -LF    Clinical Swallow Evaluation Summary Pt observed upright in bed and chair and was given trials of ice chips, water by all presentations, and applesauce. Pt able to follow all commands during eval but required feeding assistance from SLP. Oral phase characterized by reduced labial seal w/ spoon and straw however pt able to adequately pull all trials. Pt adequately manipulates ice chips. Disorganized oral transit and chewing patterns w/ pureed trials. Oral transit WFL w/ thin liquid trials. No overt s/s of aspiration observed w/ all trials of ice chips. Intermittent weak cough reaction w/ trials of thin liquids by all presentations and applesauce. Pt's vocal quality remained clear and O2 sats remained between 96-97. Signficant fatigue noted by end of evaluation. D/t s/s of difficulty/aspiration at bedside, recommend pt be NPO until deemed medically appropriate to participate in a VFSS to objectively assess swallow function and determine safest and L/R diet. ST will continue to follow for ongoing evaluation.  -LF            Oral Prep Concerns    Oral Prep Concerns incomplete or weak lip closure around spoon  -LF            Oral Transit Concerns    Oral Transit Concerns increased oral transit time  -LF            Pharyngeal Phase Concerns    Pharyngeal Phase Concerns cough  -LF    Cough thin;other (see comments)  puree  -LF            SLP Evaluation Clinical Impression    SLP Swallowing Diagnosis oral dysphagia;R/O pharyngeal dysphagia  -LF    Functional Impact risk of  aspiration/pneumonia;risk of malnutrition;risk of dehydration  -    Rehab Potential/Prognosis, Swallowing adequate, monitor progress closely  -    Swallow Criteria for Skilled Therapeutic Interventions Met demonstrates skilled criteria  -            SLP Treatment Clinical Impressions    Care Plan Review evaluation/treatment results reviewed;care plan/treatment goals reviewed;risks/benefits reviewed;current/potential barriers reviewed;patient/other agree to care plan  -            Recommendations    Therapy Frequency (Swallow) PRN  -    Predicted Duration Therapy Intervention (Days) until discharge  -    SLP Diet Recommendation NPO  -    Recommended Diagnostics reassess via clinical swallow evaluation;reassess via VFSS ([facility] [place])  -    Oral Care Recommendations Oral Care BID/PRN;Before ice/water  -    SLP Rec. for Method of Medication Administration meds via alternate route  -    Monitor for Signs of Aspiration yes;notify SLP if any concerns  -            Swallow Goals (SLP)    Oral Nutrition/Hydration Goal Selection (SLP) oral nutrition/hydration, SLP goal 1;oral nutrition/hydration, SLP goal 2  -            Oral Nutrition/Hydration Goal 1 (SLP)    Oral Nutrition/Hydration Goal 1, SLP The patient will maximize swallow function for least restrictive po diet, exhibiting no complications associated with dysphagia, adequate po intake, and demonstrating independent use of safe swallow compensations.  -    Time Frame (Oral Nutrition/Hydration Goal 1, SLP) by discharge  -            Oral Nutrition/Hydration Goal 2 (SLP)    Oral Nutrition/Hydration Goal 2, SLP The patient will participate in ongoing assessment of swallow, including re-evaluation clinically and/or including instrumental assessment of swallow if indicated, to further assess swallow function in anticipation to initiate a po diet  -    Time Frame (Oral Nutrition/Hydration Goal 2, SLP) 1 day  -          User Key  (r) = Recorded By, (t)  = Taken By, (c) = Cosigned By    Initials Name Effective Dates    LF Veronique Lujan SLP 06/16/21 -                 EDUCATION  The patient has been educated in the following areas:   Dysphagia (Swallowing Impairment) Oral Care/Hydration NPO rationale.       Patient was not wearing a face mask during this therapy encounter. Therapist used appropriate personal protective equipment including mask, eye protection and gloves.  Mask used was standard procedure mask. Appropriate PPE was worn during the entire therapy session. Hand hygiene was completed before and after therapy session. Patient is not in enhanced droplet precautions.                  Time Calculation:                OSMANI Cunningham  6/14/2022

## 2022-06-14 NOTE — PLAN OF CARE
Assessment: Chi LYN Neli Martin. presents with ADL impairments below baseline abilities which indicate the need for continued skilled intervention while inpatient. Pt far from functional baseline and would benefit form IPR at d/c. Tolerating session today without incident. Will continue to follow and progress as tolerated.     Plan/Recommendations:   Pt would benefit from Inpatient Rehabilitation placement at discharge from facility.   Pt desires Inpatient Rehabilitation placement at discharge. Pt cooperative; agreeable to therapeutic recommendations and plan of care.

## 2022-06-14 NOTE — PROGRESS NOTES
" LOS: 9 days   Patient Care Team:  Deysi Mason APRN as PCP - General (Nurse Practitioner)  Eliseo Casarez MD as Consulting Physician (Nephrology)    Chief Complaint: post op fu    Subjective:  Symptoms:  No shortness of breath or chest pain.    Diet:  No nausea.    Activity level: Impaired due to weakness.    Pain:  He reports no pain.          Vital Signs  Temp:  [98.1 °F (36.7 °C)-99.4 °F (37.4 °C)] 98.1 °F (36.7 °C)  Heart Rate:  [82-93] 84  Resp:  [28-43] 33  BP: (103-138)/(58-82) 115/63  Body mass index is 33.82 kg/m².    Intake/Output Summary (Last 24 hours) at 6/14/2022 1211  Last data filed at 6/14/2022 0600  Gross per 24 hour   Intake 336 ml   Output 1520 ml   Net -1184 ml     No intake/output data recorded.    Chest tube drainage last 8 hours:  0 (no air leak)        06/12/22  0549 06/13/22  0430 06/14/22  0600   Weight: 107 kg (235 lb 12.8 oz) 107 kg (235 lb) 107 kg (235 lb 10.8 oz)         Objective:  General Appearance:  Comfortable.    Vital signs: (most recent): Blood pressure 99/61, pulse 88, temperature 98.1 °F (36.7 °C), temperature source Oral, resp. rate (!) 33, height 177.8 cm (70\"), weight 107 kg (235 lb 10.8 oz), SpO2 97 %.    Output: Producing urine.    Lungs:  Normal effort and normal respiratory rate.  (O2 at 2 liters)  Heart: Normal rate.  Regular rhythm.  S1 normal and S2 normal.    Abdomen: Abdomen is soft and non-distended.    Extremities: There is no dependent edema.    Neurological: Patient is alert and oriented to person, place and time.    Skin:  Warm and dry.  (Sternal dressing intact)            Results Review:        WBC WBC   Date Value Ref Range Status   06/14/2022 10.10 3.40 - 10.80 10*3/mm3 Final   06/13/2022 10.50 3.40 - 10.80 10*3/mm3 Final   06/12/2022 12.40 (H) 3.40 - 10.80 10*3/mm3 Final      HGB Hemoglobin   Date Value Ref Range Status   06/14/2022 10.2 (L) 13.0 - 17.7 g/dL Final   06/13/2022 9.8 (L) 13.0 - 17.7 g/dL Final   06/12/2022 9.8 (L) 13.0 - 17.7 g/dL " Final      HCT Hematocrit   Date Value Ref Range Status   06/14/2022 30.6 (L) 37.5 - 51.0 % Final   06/13/2022 30.3 (L) 37.5 - 51.0 % Final   06/12/2022 30.3 (L) 37.5 - 51.0 % Final      Platelets Platelets   Date Value Ref Range Status   06/14/2022 192 140 - 450 10*3/mm3 Final   06/13/2022 127 (L) 140 - 450 10*3/mm3 Final   06/12/2022 139 (L) 140 - 450 10*3/mm3 Final        PT/INR:  No results found for: PROTIME/No results found for: INR    Sodium Sodium   Date Value Ref Range Status   06/14/2022 139 136 - 145 mmol/L Final   06/13/2022 137 136 - 145 mmol/L Final   06/12/2022 137 136 - 145 mmol/L Final      Potassium Potassium   Date Value Ref Range Status   06/14/2022 4.0 3.5 - 5.2 mmol/L Final   06/13/2022 4.1 3.5 - 5.2 mmol/L Final   06/12/2022 4.1 3.5 - 5.2 mmol/L Final      Chloride Chloride   Date Value Ref Range Status   06/14/2022 99 98 - 107 mmol/L Final   06/13/2022 101 98 - 107 mmol/L Final   06/12/2022 105 98 - 107 mmol/L Final      Bicarbonate CO2   Date Value Ref Range Status   06/14/2022 28.0 22.0 - 29.0 mmol/L Final   06/13/2022 24.0 22.0 - 29.0 mmol/L Final   06/12/2022 22.0 22.0 - 29.0 mmol/L Final      BUN BUN   Date Value Ref Range Status   06/14/2022 17 8 - 23 mg/dL Final   06/13/2022 19 8 - 23 mg/dL Final   06/12/2022 16 8 - 23 mg/dL Final      Creatinine Creatinine   Date Value Ref Range Status   06/14/2022 0.72 (L) 0.76 - 1.27 mg/dL Final   06/13/2022 0.89 0.76 - 1.27 mg/dL Final   06/12/2022 0.76 0.76 - 1.27 mg/dL Final      Calcium Calcium   Date Value Ref Range Status   06/14/2022 8.6 8.6 - 10.5 mg/dL Final   06/13/2022 8.0 (L) 8.6 - 10.5 mg/dL Final   06/12/2022 8.1 (L) 8.6 - 10.5 mg/dL Final      Magnesium Magnesium   Date Value Ref Range Status   06/14/2022 2.2 1.6 - 2.4 mg/dL Final   06/13/2022 2.3 1.6 - 2.4 mg/dL Final   06/12/2022 2.5 (H) 1.6 - 2.4 mg/dL Final      Troponin No results found for: TROPONIN   BNP No results found for: BNP         arformoterol, 15 mcg, Nebulization, BID  - RT  aspirin, 81 mg, Oral, Daily  atorvastatin, 40 mg, Oral, Nightly  budesonide, 0.5 mg, Nebulization, BID - RT  bumetanide, 0.5 mg, Oral, Daily  chlorhexidine, 15 mL, Mouth/Throat, Q12H  enoxaparin, 40 mg, Subcutaneous, Q24H  folic acid, 1 mg, Oral, Daily  gabapentin, 100 mg, Oral, Q8H  guaiFENesin, 400 mg, Oral, Q6H  insulin lispro, 0-7 Units, Subcutaneous, TID AC  metoprolol tartrate, 12.5 mg, Oral, Q12H  multivitamin, 1 tablet, Oral, Daily  mupirocin, 1 application, Each Nare, BID  pantoprazole, 40 mg, Oral, QAM  polyethylene glycol, 17 g, Oral, BID  potassium chloride, 20 mEq, Oral, Daily  senna-docusate sodium, 2 tablet, Oral, Nightly  sodium chloride, 4 mL, Nebulization, BID - RT  thiamine, 100 mg, Oral, Daily           PRN Meds:.•  acetaminophen **OR** acetaminophen **OR** acetaminophen  •  bisacodyl  •  dextrose  •  dextrose  •  glucagon (human recombinant)  •  HYDROcodone-acetaminophen **OR** HYDROcodone-acetaminophen  •  insulin lispro **AND** insulin lispro  •  ipratropium-albuterol  •  LORazepam **OR** LORazepam **OR** LORazepam **OR** LORazepam **OR** LORazepam **OR** LORazepam **OR** LORazepam **OR** LORazepam  •  magnesium hydroxide  •  [] Morphine **AND** naloxone  •  ondansetron  •  potassium chloride **OR** potassium chloride    Patient Active Problem List   Diagnosis Code   • COPD exacerbation ([facility]) J44.1   • Obesity (BMI 30-39.9) E66.9   • Abnormal nuclear stress test R94.39   • Acute renal insufficiency N28.9   • Alcohol dependence ([facility]) F10.20   • Essential hypertension I10   • Other emphysema ([facility]) J43.8   • Coronary artery disease I25.10   • Syncope, unspecified syncope type R55       Assessment & Plan    - MV CAD, 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 Casarez following  - ETOH use--reports 3 beers/day and bourbon--withdrawal sx improving   - MRSA nasal colonization--vanc/Bactroban  - Postop leukocytosis, multifactorial----normalized  - urinary retention----danw in place 6/13    POD#4.  More oriented today, still inappropriate, sitter at bedside.  D/c chest tubes, cordis, primacor.  Start low dose lopressor, hold flomax for now to evaluate b/p response, keep wires today.  Sternal incision looks good, keep covered with beard and delirium.      Martha Tinoco, APRN  06/14/22  12:11 EDT

## 2022-06-14 NOTE — THERAPY TREATMENT NOTE
Subjective: Pt agreeable to therapeutic plan of care.  Pt needed cues to stay alert.     Objective:   Pt had just gotten chest tube removed.  Bed mobility - Mod-A and Assist x 2   Pt sat at edge of bed for ~ 4 minutes with min assist.  Transfers - Mod-A, Assist x 2 and with rolling walker with 100% cueing for task segmentation.   Ambulation - 5 pivoting steps  Mod-A, Max-A, Assist x 2 and with rolling walker with 100% cueing for safe step sequencing and task segmentation.  3rd person to assist with managing equipment    Cardiac Rehab Initiated  Level 2: AAROM/AROM Exercises. EOB Dangling or Chair transfers.      Sitting tolerance: 1-5min and supported  Standing tolerance: <1min and supported    Precautions:   Mid-sternal incision; avoid scapular retraction and depression.   Cardiovascular impairment post-sx; encourage energy conservation strategies.    Therapeutic Exercises: 10 reps UE and LE AROM in seated position.     MET level equivalent: 1.0-1.4 (AROM / ADLs in supine or sitting, bed mobility, transfers)    Vitals: WNL    Pain: 6 VAS  Education: Provided education on importance of mobility and skilled verbal / tactile cueing throughout intervention.     Assessment: Chi LYN Neli Sr. presents with functional mobility impairments which indicate the need for skilled intervention.  Pt has had slower progress with CABG protocol due to ETOH withdrawal symptoms but pt was able to participate in PT today. Tolerating session today without incident. Will continue to follow and progress as tolerated.     Plan/Recommendations:   High Intensity Therapy recommended post-acute care. This is recommended as therapy feels the patient would require 5-6 days per week, 2-3 hours per day. At this time, inpatient rehabilitation (acute rehab) would be the first choice and SNF would be second.. Pt requires no DME at discharge.     Pt desires Inpatient Rehabilitation placement at discharge. Pt cooperative; agreeable to therapeutic  recommendations and plan of care.         Basic Mobility 6-click:  Rollin = Total, A lot = 2, A little = 3; 4 = None  Supine>Sit:   1 = Total, A lot = 2, A little = 3; 4 = None   Sit>Stand with arms:  1 = Total, A lot = 2, A little = 3; 4 = None  Bed>Chair:   1 = Total, A lot = 2, A little = 3; 4 = None  Ambulate in room:  1 = Total, A lot = 2, A little = 3; 4 = None  3-5 Steps with railin = Total, A lot = 2, A little = 3; 4 = None  Score: 9    Modified [place]: 4 = Moderately severe disability (Unable to attend to own bodily needs without assistance, and unable to walk unassisted)     Post-Tx Position: Up in Chair, Staff Present, Alarms activated and Call light and personal items within reach  PPE: gloves, surgical mask, eyewear protection

## 2022-06-14 NOTE — CASE MANAGEMENT/SOCIAL WORK
Continued Stay Note  DEBORAH Gifford     Patient Name: Chi Quinteros Sr.  MRN: [id number]  Today's Date: 6/14/2022    Admit Date: 5/31/2022     Discharge Plan     Row Name 06/14/22 1457       Plan    Plan DC Plan: [facility] pending clinical course. Precert will be required. PASSR per facility. CABG 6/10/22    Patient/Family in Agreement with Plan yes    Provided Post Acute Provider List? N/A    Provided Post Acute Provider Quality & Resource List? N/A    Plan Comments CM spoke with patient’s nurse and [facility] NP Martha Tinoco to obtain clinical updates. Patient confusion improving, but sitter remains at bedside. Liaison for [facility], Mariana, reached out to CM to inform they are following, but will not formally accept until chest tubes and sitter removed. CM will continue to follow for any additional needs that may develop and adjust discharge plan accordingly. DC Barriers: Central Line, Pacer Wires, Chest Tubes x3, confusion, and sitter                    Expected Discharge Date and Time     Expected Discharge Date Expected Discharge Time    Jun 17, 2022           Phone communication or documentation only- no physical contact with patient or family.    Aurelia Rios RN     Office Phone: [phone]  Office Cell:     [phone]

## 2022-06-14 NOTE — PROGRESS NOTES
"NEPHROLOGY PROGRESS NOTE------[facility]    [facility]  [phone]  Amara Cooper MD      Patient Care Team:  Deysi Mason APRN as PCP - General (Nurse Practitioner)  Eliseo Casarez MD as Consulting Physician (Nephrology)      Provider:  Amara Cooper MD  Patient Name: Chi Quinteros Sr.  :  1955    SUBJECTIVE:    F/U ARF/FELIBERTO/CRF/CKD    Little more alert today. Still with sitter. No SOB, CP, dysuria    Medication:  arformoterol, 15 mcg, Nebulization, BID - RT  aspirin, 81 mg, Oral, Daily  atorvastatin, 40 mg, Oral, Nightly  budesonide, 0.5 mg, Nebulization, BID - RT  bumetanide, 1 mg, Oral, Daily  chlorhexidine, 15 mL, Mouth/Throat, Q12H  enoxaparin, 40 mg, Subcutaneous, Q24H  folic acid, 1 mg, Oral, Daily  gabapentin, 100 mg, Oral, Q8H  guaiFENesin, 400 mg, Oral, Q6H  insulin lispro, 0-7 Units, Subcutaneous, TID AC  multivitamin, 1 tablet, Oral, Daily  mupirocin, 1 application, Each Nare, BID  pantoprazole, 40 mg, Oral, QAM  polyethylene glycol, 17 g, Oral, BID  potassium chloride, 20 mEq, Oral, Daily  senna-docusate sodium, 2 tablet, Oral, Nightly  sodium chloride, 4 mL, Nebulization, BID - RT  thiamine, 100 mg, Oral, Daily      insulin, 0-100 Units/hr, Last Rate: Stopped (22 0733)  milrinone, 0.125 mcg/kg/min, Last Rate: 0.125 mcg/kg/min (22 1405)        OBJECTIVE    Vital Sign Min/Max for last 24 hours  Temp  Min: 98.1 °F (36.7 °C)  Max: 99.4 °F (37.4 °C)   BP  Min: 103/63  Max: 138/81   Pulse  Min: 82  Max: 93   Resp  Min: 28  Max: 43   SpO2  Min: 94 %  Max: 100 %   No data recorded   Weight  Min: 107 kg (235 lb 10.8 oz)  Max: 107 kg (235 lb 10.8 oz)     Flowsheet Rows    Flowsheet Row First Filed Value   Admission Height 177.8 cm (70\") Documented at 2022   Admission Weight 104 kg (230 lb) Documented at 2022          No intake/output data recorded.  I/O last 3 completed shifts:  In: 634 " [I.V.:634]  Out: 2950 [Urine:2500; Chest Tube:450]    Physical Exam:  General Appearance: NAD  Head: normocephalic, without obvious abnormality and atraumatic  Eyes: conjunctivae and sclerae normal and no icterus  Neck: supple and no JVD  Lungs: DECREASED BS BIBASILAR  Heart: regular rhythm & normal rate and normal S1, S2 +ANTONY  Chest: S/P SURGICAL CHANGES ASSOCIATED WITH OPEN HEART SURGERY  Abdomen: +HYPOACTIVE bowel sounds and soft non-tender +GONZALEZ  Extremities: moves extremities well, no edema, no cyanosis and no redness  Skin: no bleeding, bruising or rash, turgor normal, color normal and no lesions noted  Neurologic: A AND O X 2 TODAY    Labs:    WBC WBC   Date Value Ref Range Status   06/14/2022 10.10 3.40 - 10.80 10*3/mm3 Final   06/13/2022 10.50 3.40 - 10.80 10*3/mm3 Final   06/12/2022 12.40 (H) 3.40 - 10.80 10*3/mm3 Final      HGB Hemoglobin   Date Value Ref Range Status   06/14/2022 10.2 (L) 13.0 - 17.7 g/dL Final   06/13/2022 9.8 (L) 13.0 - 17.7 g/dL Final   06/12/2022 9.8 (L) 13.0 - 17.7 g/dL Final      HCT Hematocrit   Date Value Ref Range Status   06/14/2022 30.6 (L) 37.5 - 51.0 % Final   06/13/2022 30.3 (L) 37.5 - 51.0 % Final   06/12/2022 30.3 (L) 37.5 - 51.0 % Final      Platlets No results found for: LABPLAT   MCV MCV   Date Value Ref Range Status   06/14/2022 96.7 79.0 - 97.0 fL Final   06/13/2022 97.7 (H) 79.0 - 97.0 fL Final   06/12/2022 97.7 (H) 79.0 - 97.0 fL Final          Sodium Sodium   Date Value Ref Range Status   06/14/2022 139 136 - 145 mmol/L Final   06/13/2022 137 136 - 145 mmol/L Final   06/12/2022 137 136 - 145 mmol/L Final      Potassium Potassium   Date Value Ref Range Status   06/14/2022 4.0 3.5 - 5.2 mmol/L Final   06/13/2022 4.1 3.5 - 5.2 mmol/L Final   06/12/2022 4.1 3.5 - 5.2 mmol/L Final      Chloride Chloride   Date Value Ref Range Status   06/14/2022 99 98 - 107 mmol/L Final   06/13/2022 101 98 - 107 mmol/L Final   06/12/2022 105 98 - 107 mmol/L Final      CO2 CO2   Date  Value Ref Range Status   06/14/2022 28.0 22.0 - 29.0 mmol/L Final   06/13/2022 24.0 22.0 - 29.0 mmol/L Final   06/12/2022 22.0 22.0 - 29.0 mmol/L Final      BUN BUN   Date Value Ref Range Status   06/14/2022 17 8 - 23 mg/dL Final   06/13/2022 19 8 - 23 mg/dL Final   06/12/2022 16 8 - 23 mg/dL Final      Creatinine Creatinine   Date Value Ref Range Status   06/14/2022 0.72 (L) 0.76 - 1.27 mg/dL Final   06/13/2022 0.89 0.76 - 1.27 mg/dL Final   06/12/2022 0.76 0.76 - 1.27 mg/dL Final      Calcium Calcium   Date Value Ref Range Status   06/14/2022 8.6 8.6 - 10.5 mg/dL Final   06/13/2022 8.0 (L) 8.6 - 10.5 mg/dL Final   06/12/2022 8.1 (L) 8.6 - 10.5 mg/dL Final      PO4 No components found for: PO4   Albumin No results found for: ALBUMIN   Magnesium Magnesium   Date Value Ref Range Status   06/14/2022 2.2 1.6 - 2.4 mg/dL Final   06/13/2022 2.3 1.6 - 2.4 mg/dL Final   06/12/2022 2.5 (H) 1.6 - 2.4 mg/dL Final      Uric Acid No components found for: URIC ACID     Imaging Results (Last 72 Hours)     Procedure Component Value Units Date/Time    XR Chest 1 View [[id number]] Collected: 06/13/22 0752     Updated: 06/13/22 0758    Narrative:         DATE OF EXAM:   6/13/2022 7:20 AM     PROCEDURE:   XR CHEST 1 VW-     INDICATIONS:   increase O2 demands; R55-Syncope and collapse; F10.920-Alcohol use,  unspecified with intoxication, uncomplicated; J96.01-Acute respiratory  failure with hypoxia; I95.1-Orthostatic hypotension; R77.8-Other  specified abnormalities of plasma proteins; N17.9-Acute kidney failure,  unspecified; J44.1-Chronic obstructive pulmonary disease with (acute)  exacerbation; R94.39-Abnormal result of other cardiov     COMPARISON:  06/12/2022 and prior     TECHNIQUE:   Portable Chest     FINDINGS:      Patient status post median sternotomy and CABG.     Heart size appears stable. Pulmonary vasculature is mildly congested and  indistinct. This is accentuated by low lung volumes. Vascular sheath  projects of the SVC.  There has been interval removal of the [place]-Demarco  catheter.     Mediastinal drains and left sided chest tube appear grossly stable.  Dependent pleural parenchymal changes at the left lung base again noted  without great change given differences in technique. Increased hazy and  interstitial opacities on the right with a perihilar and dependent  predominance again noted. Slight improved aeration right upper lobe  airspace opacity noted. Small amount of fluid noted within the minor  fissure. No definite pneumothorax or pneumomediastinum noted. Osseous  structures are stable.        Impression:      IMPRESSION :      1. Interval removal of [place]-Demarco catheter. Lines and tubes are otherwise  stable.  2. Postsurgical changes from median sternotomy and CABG.  3. Mild pulmonary vascular congestion and dependent opacities at the  lung bases without great change given differences in technique from the  comparison[. Slight improved aeration right upper lobe opacity  peripherally from prior study.     Electronically Signed By-Hossein Alvarez On:6/13/2022 7:56 AM  This report was finalized on [id number] by  Hossein Alvarez, .    XR Chest 1 View [[id number]] Collected: 06/12/22 0842     Updated: 06/12/22 0846    Narrative:      EXAMINATION: XR CHEST 1 VW-     DATE OF EXAM: 6/12/2022 2:24 AM     INDICATION: Post-Op Heart Surgery; R55-Syncope and collapse;  F10.920-Alcohol use, unspecified with intoxication, uncomplicated;  J96.01-Acute respiratory failure with hypoxia; I95.1-Orthostatic  hypotension; R77.8-Other specified abnormalities of plasma proteins;  N17.9-Acute kidney failure, unspecified; J44.1-Chronic obstructive  pulmonary disease with (acute) exacerbation; R94.39-Abnormal result of  other cardi.     COMPARISON: Chest radiograph dated 06/11/2022     TECHNIQUE: Portable AP view of the chest was obtained.     FINDINGS:  The patient has been extubated. The Reed-Demarco catheter tip terminates at  the pulmonary outflow  tract. There is left basilar chest tube and chest  tube. The gastric suction tube has been removed. There are low lung  volumes with streaky bibasilar atelectasis and right upper lobe airspace  opacity adjacent to the right minor fissure. There is no pneumothorax.  No smoking pleural effusion. Median sternotomy wires are present and  intact. There are multilevel degenerative changes of the thoracic spine.  Temporary pacing wires remain present.       Impression:      1. Remaining support devices appear in expected position.  2. Low lung volumes with streaky bibasilar airspace opacity and right  upper lobe airspace opacity likely representing atelectasis and/or  infection.  3. No evidence of significant pleural effusion or evidence of  pneumothorax.     Electronically Signed By-Ben Juarez MD On:6/12/2022 8:44 AM  This report was finalized on [id number] by  Ben Juarez MD.    XR Chest 1 View [[id number]] Collected: 06/11/22 0835     Updated: 06/11/22 0841    Narrative:      DATE OF EXAM:  6/11/2022 5:05 AM     PROCEDURE:  XR CHEST 1 VW-     INDICATIONS:  Post-Op Heart Surgery; R55-Syncope and collapse; F10.920-Alcohol use,  unspecified with intoxication, uncomplicated; J96.01-Acute respiratory  failure with hypoxia; I95.1-Orthostatic hypotension; R77.8-Other  specified abnormalities of plasma proteins; N17.9-Acute kidney failure,  unspecified; J44.1-Chronic obstructive pulmonary disease with (acute)  exacerbation; R94.39-Abnormal result of other cardi     COMPARISON:  06/10/2022     TECHNIQUE:   Single radiographic view of the chest was obtained.     FINDINGS:  Endotracheal tube tip is approximately 3 cm above the erika. [place]-Demarco  catheter appears to terminate at the main pulmonary artery.  Status post  median sternotomy and CABG. Heart size and mediastinal contour appear  unchanged.  Enteric tube appears to extend left upper quadrant, tip  beyond the margins of this exam. Is not infiltrate is present  and  appears to be a left basilar chest tube. No significant pneumothorax is  seen. There are airspace opacities in the right upper lobe at the  periphery of the right midlung and there are left basilar airspace  opacities. These findings appear similar to the prior exam. No  significant infiltrate is seen. Possible trace left effusion, as before  No definite right pleural effusion.       Impression:      1.Tubes and lines appear in satisfactory in similar positions.  2.Right upper lobe and left basilar opacities appear grossly unchanged.  3.No definite pneumothorax. Possible trace left effusion, as before.     Electronically Signed By-Adam Anaya MD On:6/11/2022 8:38 AM  This report was finalized on [id number] by  Adam Anaya MD.          Results for orders placed during the hospital encounter of 05/31/22    XR Chest 1 View    Narrative  DATE OF EXAM:  6/13/2022 7:20 AM    PROCEDURE:  XR CHEST 1 VW-    INDICATIONS:  increase O2 demands; R55-Syncope and collapse; F10.920-Alcohol use,  unspecified with intoxication, uncomplicated; J96.01-Acute respiratory  failure with hypoxia; I95.1-Orthostatic hypotension; R77.8-Other  specified abnormalities of plasma proteins; N17.9-Acute kidney failure,  unspecified; J44.1-Chronic obstructive pulmonary disease with (acute)  exacerbation; R94.39-Abnormal result of other cardiov    COMPARISON:  06/12/2022 and prior    TECHNIQUE:  Portable Chest    FINDINGS:    Patient status post median sternotomy and CABG.    Heart size appears stable. Pulmonary vasculature is mildly congested and  indistinct. This is accentuated by low lung volumes. Vascular sheath  projects of the SVC. There has been interval removal of the [place]-Demarco  catheter.    Mediastinal drains and left sided chest tube appear grossly stable.  Dependent pleural parenchymal changes at the left lung base again noted  without great change given differences in technique. Increased hazy and  interstitial opacities on  the right with a perihilar and dependent  predominance again noted. Slight improved aeration right upper lobe  airspace opacity noted. Small amount of fluid noted within the minor  fissure. No definite pneumothorax or pneumomediastinum noted. Osseous  structures are stable.    Impression  IMPRESSION :    1. Interval removal of [place]-Demarco catheter. Lines and tubes are otherwise  stable.  2. Postsurgical changes from median sternotomy and CABG.  3. Mild pulmonary vascular congestion and dependent opacities at the  lung bases without great change given differences in technique from the  comparison[. Slight improved aeration right upper lobe opacity  peripherally from prior study.    Electronically Signed By-Hossein Alvarez On:6/13/2022 7:56 AM  This report was finalized on [id number] by  Hossein Alvarez, .      XR Chest 1 View    Narrative  EXAMINATION: XR CHEST 1 VW-    DATE OF EXAM: 6/12/2022 2:24 AM    INDICATION: Post-Op Heart Surgery; R55-Syncope and collapse;  F10.920-Alcohol use, unspecified with intoxication, uncomplicated;  J96.01-Acute respiratory failure with hypoxia; I95.1-Orthostatic  hypotension; R77.8-Other specified abnormalities of plasma proteins;  N17.9-Acute kidney failure, unspecified; J44.1-Chronic obstructive  pulmonary disease with (acute) exacerbation; R94.39-Abnormal result of  other cardi.    COMPARISON: Chest radiograph dated 06/11/2022    TECHNIQUE: Portable AP view of the chest was obtained.    FINDINGS:  The patient has been extubated. The [place]-Demarco catheter tip terminates at  the pulmonary outflow tract. There is left basilar chest tube and chest  tube. The gastric suction tube has been removed. There are low lung  volumes with streaky bibasilar atelectasis and right upper lobe airspace  opacity adjacent to the right minor fissure. There is no pneumothorax.  No smoking pleural effusion. Median sternotomy wires are present and  intact. There are multilevel degenerative changes of the  thoracic spine.  Temporary pacing wires remain present.    Impression  1. Remaining support devices appear in expected position.  2. Low lung volumes with streaky bibasilar airspace opacity and right  upper lobe airspace opacity likely representing atelectasis and/or  infection.  3. No evidence of significant pleural effusion or evidence of  pneumothorax.    Electronically Signed By-Ben Juarez MD On:6/12/2022 8:44 AM  This report was finalized on [id number] by  Ben Juarez MD.      XR Chest 1 View    Narrative  DATE OF EXAM:  6/11/2022 5:05 AM    PROCEDURE:  XR CHEST 1 VW-    INDICATIONS:  Post-Op Heart Surgery; R55-Syncope and collapse; F10.920-Alcohol use,  unspecified with intoxication, uncomplicated; J96.01-Acute respiratory  failure with hypoxia; I95.1-Orthostatic hypotension; R77.8-Other  specified abnormalities of plasma proteins; N17.9-Acute kidney failure,  unspecified; J44.1-Chronic obstructive pulmonary disease with (acute)  exacerbation; R94.39-Abnormal result of other cardi    COMPARISON:  06/10/2022    TECHNIQUE:  Single radiographic view of the chest was obtained.    FINDINGS:  Endotracheal tube tip is approximately 3 cm above the erika. [place]-Demarco  catheter appears to terminate at the main pulmonary artery.  Status post  median sternotomy and CABG. Heart size and mediastinal contour appear  unchanged.  Enteric tube appears to extend left upper quadrant, tip  beyond the margins of this exam. Is not infiltrate is present and  appears to be a left basilar chest tube. No significant pneumothorax is  seen. There are airspace opacities in the right upper lobe at the  periphery of the right midlung and there are left basilar airspace  opacities. These findings appear similar to the prior exam. No  significant infiltrate is seen. Possible trace left effusion, as before  No definite right pleural effusion.    Impression  1.Tubes and lines appear in satisfactory in similar positions.  2.Right  upper lobe and left basilar opacities appear grossly unchanged.  3.No definite pneumothorax. Possible trace left effusion, as before.    Electronically Signed By-Adam Anaya MD On:6/11/2022 8:38 AM  This report was finalized on [id number] by  Adam Anaya MD.      Results for orders placed during the hospital encounter of 05/31/22    Duplex Vein Mapping Lower Extremity - Bilateral CAR    Interpretation Summary  The greater saphenous veins are of satisfactory quality from the groin to the mid distal thigh bilaterally.  Distal to this the veins are small and inadequate.        ASSESSMENT / PLAN      COPD exacerbation (HCC)    Obesity (BMI 30-39.9)    Abnormal nuclear stress test    Acute renal insufficiency    Alcohol dependence (HCC)    Essential hypertension    Other emphysema (HCC)    Coronary artery disease    Syncope, unspecified syncope type    1. ARF/FELIBERTO------Nonoliguric. BUN/Cr stable    2. CAD S/P CABG------per Cardiology and CT Surgery    3. BENIGN ESSENTIAL HTN------BP okay. No ACE-I for now    4. BPH-------Nelson replaced by CT Surgery. Hold Flomax today to avoid hypotension    5. S/P SYNCOPE    6. ETOH ABUSE    7. HYPERLIPIDEMIA-------On Statin    8. DVT PROPHYLAXIS-------On Lovenox    9. HYPERKALEMIA--------Resolved    10. ANEMIA------H/H stable    11. MILD VOLUME EXCESS------po Bumex at just 0.5 mg a day    12. COPD/EMPHYSEMA-------per Pulmonary    13. DELERIUM      Amara Cooper MD  [facility]  [phone]  06/14/22  08:09 EDT

## 2022-06-14 NOTE — CONSULTS
Cardiac Rehab evaluation s/p CABG. Patient sitting up in chair. Explained program and benefits. Brochure, post op activity list given. Verified phone number. Will send information to [facility] after discharge.

## 2022-06-15 ENCOUNTER — APPOINTMENT (OUTPATIENT)
Dept: GENERAL RADIOLOGY | Facility: HOSPITAL | Age: 67
End: 2022-06-15

## 2022-06-15 LAB
ANION GAP SERPL CALCULATED.3IONS-SCNC: 11 MMOL/L (ref 5–15)
BUN SERPL-MCNC: 18 MG/DL (ref 8–23)
BUN/CREAT SERPL: 23.1 (ref 7–25)
CA-I SERPL ISE-MCNC: 1.17 MMOL/L (ref 1.2–1.3)
CALCIUM SPEC-SCNC: 8.2 MG/DL (ref 8.6–10.5)
CHLORIDE SERPL-SCNC: 98 MMOL/L (ref 98–107)
CO2 SERPL-SCNC: 29 MMOL/L (ref 22–29)
CREAT SERPL-MCNC: 0.78 MG/DL (ref 0.76–1.27)
DEPRECATED RDW RBC AUTO: 48.6 FL (ref 37–54)
EGFRCR SERPLBLD CKD-EPI 2021: 97.7 ML/MIN/1.73
ERYTHROCYTE [DISTWIDTH] IN BLOOD BY AUTOMATED COUNT: 14 % (ref 12.3–15.4)
GLUCOSE BLDC GLUCOMTR-MCNC: 104 MG/DL (ref 70–105)
GLUCOSE BLDC GLUCOMTR-MCNC: 113 MG/DL (ref 70–105)
GLUCOSE BLDC GLUCOMTR-MCNC: 123 MG/DL (ref 70–105)
GLUCOSE BLDC GLUCOMTR-MCNC: 189 MG/DL (ref 70–105)
GLUCOSE SERPL-MCNC: 127 MG/DL (ref 65–99)
HCT VFR BLD AUTO: 28.8 % (ref 37.5–51)
HGB BLD-MCNC: 9.3 G/DL (ref 13–17.7)
MAGNESIUM SERPL-MCNC: 2.5 MG/DL (ref 1.6–2.4)
MCH RBC QN AUTO: 31.8 PG (ref 26.6–33)
MCHC RBC AUTO-ENTMCNC: 32.5 G/DL (ref 31.5–35.7)
MCV RBC AUTO: 97.8 FL (ref 79–97)
PHOSPHATE SERPL-MCNC: 3.7 MG/DL (ref 2.5–4.5)
PLATELET # BLD AUTO: 189 10*3/MM3 (ref 140–450)
PMV BLD AUTO: 8.3 FL (ref 6–12)
POTASSIUM SERPL-SCNC: 3.9 MMOL/L (ref 3.5–5.2)
RBC # BLD AUTO: 2.94 10*6/MM3 (ref 4.14–5.8)
SODIUM SERPL-SCNC: 138 MMOL/L (ref 136–145)
WBC NRBC COR # BLD: 8.6 10*3/MM3 (ref 3.4–10.8)

## 2022-06-15 PROCEDURE — 82330 ASSAY OF CALCIUM: CPT

## 2022-06-15 PROCEDURE — 85027 COMPLETE CBC AUTOMATED: CPT | Performed by: NURSE PRACTITIONER

## 2022-06-15 PROCEDURE — 94664 DEMO&/EVAL PT USE INHALER: CPT

## 2022-06-15 PROCEDURE — 92611 MOTION FLUOROSCOPY/SWALLOW: CPT

## 2022-06-15 PROCEDURE — 25010000002 CALCIUM GLUCONATE-NACL 1-0.675 GM/50ML-% SOLUTION: Performed by: INTERNAL MEDICINE

## 2022-06-15 PROCEDURE — 94799 UNLISTED PULMONARY SVC/PX: CPT

## 2022-06-15 PROCEDURE — 94761 N-INVAS EAR/PLS OXIMETRY MLT: CPT

## 2022-06-15 PROCEDURE — 92526 ORAL FUNCTION THERAPY: CPT

## 2022-06-15 PROCEDURE — 25010000002 FUROSEMIDE PER 20 MG: Performed by: INTERNAL MEDICINE

## 2022-06-15 PROCEDURE — 80048 BASIC METABOLIC PNL TOTAL CA: CPT | Performed by: NURSE PRACTITIONER

## 2022-06-15 PROCEDURE — 74230 X-RAY XM SWLNG FUNCJ C+: CPT

## 2022-06-15 PROCEDURE — 99024 POSTOP FOLLOW-UP VISIT: CPT | Performed by: THORACIC SURGERY (CARDIOTHORACIC VASCULAR SURGERY)

## 2022-06-15 PROCEDURE — 99231 SBSQ HOSP IP/OBS SF/LOW 25: CPT | Performed by: PSYCHIATRY & NEUROLOGY

## 2022-06-15 PROCEDURE — 63710000001 INSULIN LISPRO (HUMAN) PER 5 UNITS: Performed by: NURSE PRACTITIONER

## 2022-06-15 PROCEDURE — 25010000002 MAGNESIUM SULFATE IN D5W 1G/100ML (PREMIX) 1-5 GM/100ML-% SOLUTION: Performed by: THORACIC SURGERY (CARDIOTHORACIC VASCULAR SURGERY)

## 2022-06-15 PROCEDURE — 71045 X-RAY EXAM CHEST 1 VIEW: CPT

## 2022-06-15 PROCEDURE — 99231 SBSQ HOSP IP/OBS SF/LOW 25: CPT

## 2022-06-15 PROCEDURE — 84100 ASSAY OF PHOSPHORUS: CPT | Performed by: INTERNAL MEDICINE

## 2022-06-15 PROCEDURE — 25010000002 CALCIUM GLUCONATE-NACL 1-0.675 GM/50ML-% SOLUTION

## 2022-06-15 PROCEDURE — 93005 ELECTROCARDIOGRAM TRACING: CPT

## 2022-06-15 PROCEDURE — 25010000002 AMIODARONE IN DEXTROSE 5% 150-4.21 MG/100ML-% SOLUTION: Performed by: THORACIC SURGERY (CARDIOTHORACIC VASCULAR SURGERY)

## 2022-06-15 PROCEDURE — 83735 ASSAY OF MAGNESIUM: CPT | Performed by: INTERNAL MEDICINE

## 2022-06-15 PROCEDURE — 82962 GLUCOSE BLOOD TEST: CPT

## 2022-06-15 RX ORDER — CALCIUM GLUCONATE 20 MG/ML
1 INJECTION, SOLUTION INTRAVENOUS ONCE
Status: COMPLETED | OUTPATIENT
Start: 2022-06-15 | End: 2022-06-15

## 2022-06-15 RX ORDER — FUROSEMIDE 10 MG/ML
40 INJECTION INTRAMUSCULAR; INTRAVENOUS ONCE
Status: COMPLETED | OUTPATIENT
Start: 2022-06-15 | End: 2022-06-15

## 2022-06-15 RX ORDER — TAMSULOSIN HYDROCHLORIDE 0.4 MG/1
0.4 CAPSULE ORAL DAILY
Status: DISCONTINUED | OUTPATIENT
Start: 2022-06-15 | End: 2022-06-17 | Stop reason: HOSPADM

## 2022-06-15 RX ORDER — AMIODARONE HYDROCHLORIDE 200 MG/1
400 TABLET ORAL EVERY 12 HOURS SCHEDULED
Status: DISCONTINUED | OUTPATIENT
Start: 2022-06-15 | End: 2022-06-17 | Stop reason: HOSPADM

## 2022-06-15 RX ORDER — MAGNESIUM SULFATE 1 G/100ML
1 INJECTION INTRAVENOUS ONCE
Status: COMPLETED | OUTPATIENT
Start: 2022-06-15 | End: 2022-06-15

## 2022-06-15 RX ORDER — BUMETANIDE 1 MG/1
1 TABLET ORAL DAILY
Status: DISCONTINUED | OUTPATIENT
Start: 2022-06-16 | End: 2022-06-17 | Stop reason: HOSPADM

## 2022-06-15 RX ADMIN — CALCIUM GLUCONATE 1 G: 20 INJECTION, SOLUTION INTRAVENOUS at 08:20

## 2022-06-15 RX ADMIN — GABAPENTIN 100 MG: 100 CAPSULE ORAL at 06:23

## 2022-06-15 RX ADMIN — GUAIFENESIN 400 MG: 100 SOLUTION ORAL at 17:30

## 2022-06-15 RX ADMIN — BARIUM SULFATE 50 ML: 400 SUSPENSION ORAL at 11:38

## 2022-06-15 RX ADMIN — POLYETHYLENE GLYCOL 3350 17 G: 17 POWDER, FOR SOLUTION ORAL at 08:30

## 2022-06-15 RX ADMIN — CALCIUM GLUCONATE 1 G: 20 INJECTION, SOLUTION INTRAVENOUS at 06:23

## 2022-06-15 RX ADMIN — HYDROCODONE BITARTRATE AND ACETAMINOPHEN 2 TABLET: 5; 325 TABLET ORAL at 15:03

## 2022-06-15 RX ADMIN — METOPROLOL TARTRATE 12.5 MG: 25 TABLET, FILM COATED ORAL at 08:20

## 2022-06-15 RX ADMIN — POTASSIUM CHLORIDE 20 MEQ: 1.5 POWDER, FOR SOLUTION ORAL at 08:20

## 2022-06-15 RX ADMIN — HYDROCODONE BITARTRATE AND ACETAMINOPHEN 2 TABLET: 5; 325 TABLET ORAL at 08:20

## 2022-06-15 RX ADMIN — MAGNESIUM SULFATE 1 G: 1 INJECTION INTRAVENOUS at 01:15

## 2022-06-15 RX ADMIN — CHLORHEXIDINE GLUCONATE 15 ML: 1.2 RINSE ORAL at 21:33

## 2022-06-15 RX ADMIN — METOPROLOL TARTRATE 12.5 MG: 25 TABLET, FILM COATED ORAL at 20:44

## 2022-06-15 RX ADMIN — HYDROCODONE BITARTRATE AND ACETAMINOPHEN 2 TABLET: 5; 325 TABLET ORAL at 02:15

## 2022-06-15 RX ADMIN — FOLIC ACID 1 MG: 1 TABLET ORAL at 08:20

## 2022-06-15 RX ADMIN — ARFORMOTEROL TARTRATE 15 MCG: 15 SOLUTION RESPIRATORY (INHALATION) at 20:51

## 2022-06-15 RX ADMIN — PANTOPRAZOLE SODIUM 40 MG: 40 TABLET, DELAYED RELEASE ORAL at 06:23

## 2022-06-15 RX ADMIN — THERA TABS 1 TABLET: TAB at 08:20

## 2022-06-15 RX ADMIN — INSULIN LISPRO 2 UNITS: 100 INJECTION, SOLUTION INTRAVENOUS; SUBCUTANEOUS at 17:47

## 2022-06-15 RX ADMIN — MUPIROCIN 1 APPLICATION: 20 OINTMENT TOPICAL at 08:20

## 2022-06-15 RX ADMIN — GABAPENTIN 100 MG: 100 CAPSULE ORAL at 21:01

## 2022-06-15 RX ADMIN — FUROSEMIDE 40 MG: 10 INJECTION, SOLUTION INTRAMUSCULAR; INTRAVENOUS at 07:45

## 2022-06-15 RX ADMIN — POLYETHYLENE GLYCOL 3350 17 G: 17 POWDER, FOR SOLUTION ORAL at 20:43

## 2022-06-15 RX ADMIN — IPRATROPIUM BROMIDE AND ALBUTEROL SULFATE 3 ML: .5; 3 SOLUTION RESPIRATORY (INHALATION) at 00:12

## 2022-06-15 RX ADMIN — ASPIRIN 81 MG: 81 TABLET, COATED ORAL at 08:20

## 2022-06-15 RX ADMIN — SODIUM CHLORIDE SOLN NEBU 3% 4 ML: 3 NEBU SOLN at 07:50

## 2022-06-15 RX ADMIN — BUDESONIDE 0.5 MG: 0.5 INHALANT RESPIRATORY (INHALATION) at 07:46

## 2022-06-15 RX ADMIN — BUDESONIDE 0.5 MG: 0.5 INHALANT RESPIRATORY (INHALATION) at 20:55

## 2022-06-15 RX ADMIN — ARFORMOTEROL TARTRATE 15 MCG: 15 SOLUTION RESPIRATORY (INHALATION) at 07:42

## 2022-06-15 RX ADMIN — SENNOSIDES AND DOCUSATE SODIUM 2 TABLET: 50; 8.6 TABLET ORAL at 20:43

## 2022-06-15 RX ADMIN — TAMSULOSIN HYDROCHLORIDE 0.4 MG: 0.4 CAPSULE ORAL at 15:03

## 2022-06-15 RX ADMIN — AMIODARONE HYDROCHLORIDE 400 MG: 200 TABLET ORAL at 20:43

## 2022-06-15 RX ADMIN — ATORVASTATIN CALCIUM 40 MG: 40 TABLET, FILM COATED ORAL at 20:44

## 2022-06-15 RX ADMIN — QUETIAPINE FUMARATE 25 MG: 25 TABLET ORAL at 20:43

## 2022-06-15 RX ADMIN — HYDROCODONE BITARTRATE AND ACETAMINOPHEN 2 TABLET: 5; 325 TABLET ORAL at 20:43

## 2022-06-15 RX ADMIN — AMIODARONE HYDROCHLORIDE 150 MG: 1.5 INJECTION, SOLUTION INTRAVENOUS at 01:15

## 2022-06-15 RX ADMIN — GABAPENTIN 100 MG: 100 CAPSULE ORAL at 15:03

## 2022-06-15 RX ADMIN — BARIUM SULFATE 135 ML: 980 POWDER, FOR SUSPENSION ORAL at 11:38

## 2022-06-15 RX ADMIN — Medication 100 MG: at 08:20
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
## 2022-06-15 NOTE — NURSING NOTE
Put call out to Dr. LYN Recio. Pt having bursts of A fib, informed of UOP 80 since 2200. New orders Amio 150 bolus and 1g Mag.

## 2022-06-15 NOTE — PROGRESS NOTES
S/P POD# 5 CABG x4 with LIMA/ asc ao replacement--[place]  EF 40% (cath)    Subjective:  No c/o's today, wants to get up in chair    No events overnight  Video swallow scheduled for today  Wt is down 7 kgs from preop  CXR:  Atel, vasc congestion      Intake/Output Summary (Last 24 hours) at 6/15/2022 1315  Last data filed at 6/15/2022 0623  Gross per 24 hour   Intake 202 ml   Output 1050 ml   Net -848 ml     Temp:  [97 °F (36.1 °C)-99.2 °F (37.3 °C)] 97 °F (36.1 °C)  Heart Rate:  [77-94] 84  Resp:  [16-25] 19  BP: ()/(45-79) 126/70         Results from last 7 days   Lab Units 06/15/22  0439 06/14/22  0430 06/10/22  2254 06/10/22  2201 06/10/22  2147 06/10/22  1950 06/10/22  0403 06/09/22  0500   WBC 10*3/mm3 8.60 10.10   < > 22.40*  --  24.90*   < > 10.10   HEMOGLOBIN g/dL 9.3* 10.2*   < > 10.5*  --  8.5*   < > 13.8   HEMOGLOBIN, POC   --   --    < >  --    < >  --    < >  --    HEMATOCRIT % 28.8* 30.6*   < > 32.8*  --  25.6*   < > 41.7   HEMATOCRIT POC   --   --    < >  --    < >  --    < >  --    PLATELETS 10*3/mm3 189 192   < > 170  --  186   < > 250   INR   --   --   --  1.14*  --  1.25*  --  1.05    < > = values in this interval not displayed.     Results from last 7 days   Lab Units 06/15/22  0439   CREATININE mg/dL 0.78   POTASSIUM mmol/L 3.9   SODIUM mmol/L 138   MAGNESIUM mg/dL 2.5*   PHOSPHORUS mg/dL 3.7       Physical Exam:  Neuro intact, nad, resting in bed  Tele:  SR 80s  Diminished bases, rhonchi, productive cough, 94% 2L  Sternotomy/SVHS healing well  Benign abd, no BM  No edema    Assessment/Plan:  Active Problems:    COPD exacerbation (HCC)    Obesity (BMI 30-39.9)    Abnormal nuclear stress test    Acute renal insufficiency    Alcohol dependence (HCC)    Essential hypertension    Other emphysema (HCC)    Coronary artery disease    Syncope, unspecified syncope type    Postoperative delirium    - MV CAD, 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 Casarez following  - ETOH use--reports 3 beers/day and bourbon--withdrawal while in the hospital, improved  - MRSA nasal colonization--vanc/Bactroban  - Postop leukocytosis, multifactorial--watch closely, improving  - Postop urinary retention--dawn replaced 6/13    POD# 5.  Intermittent confusion.  Wants to sit in chair, nsg wants to keep in bed d/t transport to video swallow.  Pt cleared for puree diet with thins.  Renal following since preop.  On asa/statin/bb.  Restart Flomax and dc dawn tomorrow.  DC wires/cordis.      Routine care--as above  D/w pt/nsg, Dr. Lenard HAWTHORNE plan--tentative [place] [facility] at end of week    Christal Vora, ERICA  6/15/2022  13:15 EDT

## 2022-06-15 NOTE — NURSING NOTE
Dr. Casarez returned phone call. New orders regarding decreased UOP. Lasix 40mg IV once, hold bumex today only.

## 2022-06-15 NOTE — PLAN OF CARE
Goal Outcome Evaluation:         VFSS completed with trials of NTL by spoon 2x, NTL consecutive sips by cup, thin by spoon 2x, thin consecutive by cup and straw, applesauce 3x and peaches 2x. Video fluoroscopic swallow study conducted in the lateral position with pt standing/seated upright.   Bolus formation, cohesion, & transit are WFL for all trials. Patient does exhibit prolonged mastication with poor rotary chew.  Patient is missing many teeth and reports not wearing dentures.  Laryngeal elevation, epiglottic deflection, and forward hyolaryngeal movement are WFL. No remarkable cricopharyngeal or UES findings. There is no laryngeal vestibule penetration, aspiration, or significant hypopharyngeal residue.     Pt presents with mile oral dysphagia, functional pharyngeal dysphagia.  It is recommended that patient initiate a puree and thin liquid diet.  ST will continue to follow and provide further recommendations as indicated.

## 2022-06-15 NOTE — PROGRESS NOTES
"PULMONARY CRITICAL CARE PROGRESS  NOTE      PATIENT IDENTIFICATION:  Name: Chi Quinteros  MRN: [id number]  :  1955     Age: 67 y.o.  Sex: male    DATE OF Note:  6/15/2022   Referring Physician: No admitting provider for patient encounter.                  Subjective:   More responsive      On 2 L  no SOB, no chest pain,       starting diet    no nausea or vomiting, no change in bowel habit,   Good urine out ,  no new  skin rash or itching.      Objective:  tMax 24 hrs: Temp (24hrs), Av °F (36.7 °C), Min:97 °F (36.1 °C), Max:99.2 °F (37.3 °C)      Vitals Ranges:   Temp:  [97 °F (36.1 °C)-99.2 °F (37.3 °C)] 97 °F (36.1 °C)  Heart Rate:  [77-94] 84  Resp:  [16-25] 19  BP: ()/(45-79) 126/70    Intake and Output Last 3 Shifts:   I/O last 3 completed shifts:  In: 236 [I.V.:236]  Out: 2360 [Urine:2350; Chest Tube:10]    Exam:  /70 (BP Location: Right arm, Patient Position: Lying)   Pulse 84   Temp 97 °F (36.1 °C) (Oral)   Resp 19   Ht 177.8 cm (70\")   Wt 104 kg (230 lb 6.1 oz)   SpO2 94%   BMI 33.06 kg/m²     General Appearance:   AA     HEENT:  Normocephalic, without obvious abnormality. Conjunctivae/corneas clear.  Normal external ear canals. Nares normal, no drainage     Neck:  Supple, symmetrical, trachea midline. No JVD.  Lungs /Chest wall:   Bilateral basal rhonchi, respirations unlabored, symmetrical wall movement.     Heart:  Regular rate and rhythm, systolic murmur PMI left sternal border  Abdomen: Soft, nontender, no masses, no organomegaly.    Extremities: Trace edema, no clubbing or cyanosis        Medications:    Current Facility-Administered Medications:   •  acetaminophen (TYLENOL) tablet 650 mg, 650 mg, Oral, Q4H PRN, 650 mg at 22 1125 **OR** acetaminophen (TYLENOL) 160 MG/5ML solution 650 mg, 650 mg, Oral, Q4H PRN **OR** acetaminophen (TYLENOL) suppository 650 mg, 650 mg, Rectal, Q4H PRN, Martha Tinoco APRN  •  arformoterol (BROVANA) nebulizer solution 15 mcg, 15 " mcg, Nebulization, BID - RT, Martha Tinoco APRN, 15 mcg at 06/15/22 0742  •  aspirin EC tablet 81 mg, 81 mg, Oral, Daily, Martha Tinoco APRN, 81 mg at 06/15/22 0820  •  atorvastatin (LIPITOR) tablet 40 mg, 40 mg, Oral, Nightly, Dre Cooper MD, 40 mg at 06/14/22 2109  •  bisacodyl (DULCOLAX) suppository 10 mg, 10 mg, Rectal, Daily PRN, Martha Tinoco APRN  •  budesonide (PULMICORT) nebulizer solution 0.5 mg, 0.5 mg, Nebulization, BID - RT, Martha Tinoco APRN, 0.5 mg at 06/15/22 0746  •  [START ON 6/16/2022] bumetanide (BUMEX) tablet 1 mg, 1 mg, Oral, Daily, Dre Cooper MD  •  chlorhexidine (PERIDEX) 0.12 % solution 15 mL, 15 mL, Mouth/Throat, Q12H, Martha Tinoco APRN, 15 mL at 06/14/22 2219  •  dextrose (D50W) (25 g/50 mL) IV injection 10-50 mL, 10-50 mL, Intravenous, Q15 Min PRN, Martha Tinoco APRN  •  dextrose (GLUTOSE) oral gel 15 g, 15 g, Oral, Q15 Min PRN, Martha Tinoco APRN  •  Enoxaparin Sodium (LOVENOX) syringe 40 mg, 40 mg, Subcutaneous, Q24H, Martha Tinoco APRN, 40 mg at 06/14/22 1734  •  folic acid (FOLVITE) tablet 1 mg, 1 mg, Oral, Daily, Thong Watts APRN, 1 mg at 06/15/22 0820  •  gabapentin (NEURONTIN) capsule 100 mg, 100 mg, Oral, Q8H, Christal Vora, APRN, 100 mg at 06/15/22 0623  •  glucagon (human recombinant) (GLUCAGEN DIAGNOSTIC) 1 mg in sterile water (preservative free) 1 mL injection, 1 mg, Intramuscular, Q15 Min PRN, Martha Tinoco APRN  •  guaiFENesin solution 400 mg, 400 mg, Oral, Q6H, Cindy Arellano MD, 400 mg at 06/14/22 1329  •  HYDROcodone-acetaminophen (NORCO) 5-325 MG per tablet 1 tablet, 1 tablet, Oral, Q6H PRN, 1 tablet at 06/13/22 2232 **OR** HYDROcodone-acetaminophen (NORCO) 5-325 MG per tablet 2 tablet, 2 tablet, Oral, Q6H PRN, Christal Vora, APRN, 2 tablet at 06/15/22 0820  •  insulin lispro (ADMELOG) injection 0-7 Units, 0-7 Units, Subcutaneous, TID AC **AND** insulin lispro (ADMELOG) injection 0-7  Units, 0-7 Units, Subcutaneous, PRN, Christal Vora, APRN  •  ipratropium-albuterol (DUO-NEB) nebulizer solution 3 mL, 3 mL, Nebulization, Q4H PRN, Day, Kori, APRN, 3 mL at 06/15/22 0012  •  LORazepam (ATIVAN) tablet 0.5 mg, 0.5 mg, Oral, Q2H PRN **OR** LORazepam (ATIVAN) injection 0.5 mg, 0.5 mg, Intravenous, Q2H PRN, 0.5 mg at 22 2232 **OR** LORazepam (ATIVAN) tablet 1 mg, 1 mg, Oral, Q1H PRN **OR** LORazepam (ATIVAN) injection 1 mg, 1 mg, Intravenous, Q1H PRN, 1 mg at 22 0214 **OR** LORazepam (ATIVAN) injection 1 mg, 1 mg, Intravenous, Q15 Min PRN, 1 mg at 22 0524 **OR** LORazepam (ATIVAN) injection 1 mg, 1 mg, Intramuscular, Q15 Min PRN **OR** LORazepam (ATIVAN) injection 2 mg, 2 mg, Intravenous, Q1H PRN, 2 mg at 22 1417 **OR** LORazepam (ATIVAN) tablet 2 mg, 2 mg, Oral, Q1H PRN, Shivam Recio MD  •  magnesium hydroxide (MILK OF MAGNESIA) suspension 10 mL, 10 mL, Oral, Daily PRN, Martha Tinoco, APRN  •  metoprolol tartrate (LOPRESSOR) half tablet 12.5 mg, 12.5 mg, Oral, Q12H, Martha Tinoco APRN, 12.5 mg at 06/15/22 0820  •  multivitamin (THERAGRAN) tablet 1 tablet, 1 tablet, Oral, Daily, Thong Watts APRN, 1 tablet at 06/15/22 0820  •  [] morphine injection 2 mg, 2 mg, Intravenous, Q2H PRN, 2 mg at 22 0358 **AND** naloxone (NARCAN) injection 0.4 mg, 0.4 mg, Intravenous, Q5 Min PRN, Martha Tinoco APRN  •  ondansetron (ZOFRAN) injection 4 mg, 4 mg, Intravenous, Q6H PRN, Martha Tinoco APRN  •  [COMPLETED] pantoprazole (PROTONIX) injection 40 mg, 40 mg, Intravenous, Once, 40 mg at 06/10/22 2313 **FOLLOWED BY** pantoprazole (PROTONIX) EC tablet 40 mg, 40 mg, Oral, QAM, Christal Vora APRN, 40 mg at 06/15/22 0623  •  polyethylene glycol (MIRALAX) packet 17 g, 17 g, Oral, BID, Christal Vora APRN, 17 g at 06/15/22 0830  •  potassium chloride (K-DUR,KLOR-CON) CR tablet 20 mEq, 20 mEq, Oral, PRN **OR** potassium chloride  (KLOR-CON) packet 20 mEq, 20 mEq, Oral, PRN, Alejandrina, Martha, APRN, 20 mEq at 06/15/22 0820  •  potassium chloride (K-DUR,KLOR-CON) CR tablet 20 mEq, 20 mEq, Oral, Daily, Satterly-Yaniv, Christal L, APRN, 20 mEq at 06/13/22 0912  •  QUEtiapine (SEROquel) tablet 25 mg, 25 mg, Oral, Nightly, Adam Henao PA-C, 25 mg at 06/14/22 2109  •  QUEtiapine (SEROquel) tablet 25 mg, 25 mg, Oral, Daily PRN, Adam Henao PA-C  •  sennosides-docusate (PERICOLACE) 8.6-50 MG per tablet 2 tablet, 2 tablet, Oral, Nightly, Satterly-Yaniv, Christal L, APRN, 2 tablet at 06/14/22 2109  •  thiamine (VITAMIN B-1) tablet 100 mg, 100 mg, Oral, Daily, Thong Watts, APRN, 100 mg at 06/15/22 0820    Data Review:  All labs (24hrs):   Recent Results (from the past 24 hour(s))   POC Glucose Once    Collection Time: 06/14/22  1:33 PM    Specimen: Blood   Result Value Ref Range    Glucose 134 (H) 70 - 105 mg/dL   POC Glucose Once    Collection Time: 06/14/22  4:22 PM    Specimen: Blood   Result Value Ref Range    Glucose 121 (H) 70 - 105 mg/dL   ECG 12 Lead    Collection Time: 06/15/22 12:47 AM   Result Value Ref Range    QT Interval 398 ms   ECG 12 Lead    Collection Time: 06/15/22  3:10 AM   Result Value Ref Range    QT Interval 379 ms   CBC (No Diff)    Collection Time: 06/15/22  4:39 AM    Specimen: Blood   Result Value Ref Range    WBC 8.60 3.40 - 10.80 10*3/mm3    RBC 2.94 (L) 4.14 - 5.80 10*6/mm3    Hemoglobin 9.3 (L) 13.0 - 17.7 g/dL    Hematocrit 28.8 (L) 37.5 - 51.0 %    MCV 97.8 (H) 79.0 - 97.0 fL    MCH 31.8 26.6 - 33.0 pg    MCHC 32.5 31.5 - 35.7 g/dL    RDW 14.0 12.3 - 15.4 %    RDW-SD 48.6 37.0 - 54.0 fl    MPV 8.3 6.0 - 12.0 fL    Platelets 189 140 - 450 10*3/mm3   Basic Metabolic Panel    Collection Time: 06/15/22  4:39 AM    Specimen: Blood   Result Value Ref Range    Glucose 127 (H) 65 - 99 mg/dL    BUN 18 8 - 23 mg/dL    Creatinine 0.78 0.76 - 1.27 mg/dL    Sodium 138 136 - 145 mmol/L    Potassium 3.9  3.5 - 5.2 mmol/L    Chloride 98 98 - 107 mmol/L    CO2 29.0 22.0 - 29.0 mmol/L    Calcium 8.2 (L) 8.6 - 10.5 mg/dL    BUN/Creatinine Ratio 23.1 7.0 - 25.0    Anion Gap 11.0 5.0 - 15.0 mmol/L    eGFR 97.7 >60.0 mL/min/1.73   Magnesium    Collection Time: 06/15/22  4:39 AM    Specimen: Blood   Result Value Ref Range    Magnesium 2.5 (H) 1.6 - 2.4 mg/dL   Phosphorus    Collection Time: 06/15/22  4:39 AM    Specimen: Blood   Result Value Ref Range    Phosphorus 3.7 2.5 - 4.5 mg/dL   Calcium, Ionized    Collection Time: 06/15/22  5:43 AM    Specimen: Blood   Result Value Ref Range    Ionized Calcium 1.17 (L) 1.20 - 1.30 mmol/L   POC Glucose Once    Collection Time: 06/15/22  8:39 AM    Specimen: Blood   Result Value Ref Range    Glucose 113 (H) 70 - 105 mg/dL        Imaging:  FL Video Swallow With Speech Single Contrast  Narrative: DATE OF EXAM:  6/15/2022 11:32 AM     PROCEDURE:  FL VIDEO SWALLOW W SPEECH SINGLE-CONTRAST-     INDICATIONS:  dysphagia; R55-Syncope and collapse; F10.920-Alcohol use, unspecified  with intoxication, uncomplicated; J96.01-Acute respiratory failure with  hypoxia; I95.1-Orthostatic hypotension; R77.8-Other specified  abnormalities of plasma proteins; N17.9-Acute kidney failure,  unspecified; J44.1-Chronic obstructive pulmonary disease with (acute)  exacerbation; R94.39-Abnormal result of other cardiovascular fu     COMPARISON:  No Comparisons Available     TECHNIQUE:   This examination was performed in conjunction with speech pathology.  Lateral video fluoroscopic evaluation of the swallowing mechanism was  performed while correlate administering to the patient various  consistency food items mixed with barium.     Fluoroscopic Time: 1.6 minutes        Number of Images: 12 spot fluoroscopic series images        FINDINGS: Modified barium swallow study was performed utilizing  fluoroscopy. The study was performed by dedicated speech pathologist. I  was present during the entire examination.         Impression: Modified barium swallow study with fluoroscopy. Please refer to the  speech pathologist report for findings and dietary recommendations.     Electronically Signed By-Pauline Mckee MD On:6/15/2022 11:45 AM  This report was finalized on [id number] by  Pauline Mckee MD.  XR Chest 1 View  Narrative: DATE OF EXAM:  6/15/2022 2:38 AM     PROCEDURE:  XR CHEST 1 VW-     INDICATIONS:  post op open heart     COMPARISON:  AP chest x-ray 05/31/2022, CT chest PE protocol 06/01/2022, AP chest  x-ray 06/14/2022.     TECHNIQUE:   Single radiographic AP view of the chest was obtained.     FINDINGS:  Right internal jugular sheath remains in place. Cardiomediastinal  contours appear stable, including aneurysmal dilatation of the ascending  thoracic aorta and enlarged cardiac silhouette. No pneumothorax is seen.  There are stable bibasilar airspace opacities with increased airspace  opacities noted in the right midlung.      Impression: 1.Stable bibasilar airspace opacities with increased airspace opacities  in the right mid lung, likely atelectasis.  2.No visible pneumothorax.     Electronically Signed By-Navya Zamora MD On:6/15/2022 8:16 AM  This report was finalized on [id number] by  Navya Zamora MD.       ASSESSMENT:    S?P CABG with ascending aortic aneurysm repair   COPD exacerbation (HCC)    Obesity (BMI 30-39.9)    Abnormal nuclear stress test    Acute renal insufficiency    Alcohol dependence (HCC)    Essential hypertension    Other emphysema (HCC)    Coronary artery disease    Syncope, unspecified syncope type       PLAN:  Start oral diet   Pt/ot  Post op care  IS/ flutter valve   Diuretics    Bronchodilator  Inhaled corticosteroids  Electrolytes/ glycemic control  DVT and GI prophylaxis.    Total Critical care time in direct medical management (   ) minutes. This time specifically excludes time spent performing procedures.  Cindy Arellano MD. D, ABSM.     6/15/2022  12:58 EDT

## 2022-06-15 NOTE — CONSULTS
"Nutrition Services    Patient Name: Chi Quinteros Sr.  YOB: 1955  MRN: [id number]  Admission date: 5/31/2022    Comment:  -- Boost Plus BID (Provides 720 kcals, 28 g protein if consumed)       PPE Documentation        PPE Worn By Provider mask, gloves, eye protection and gown   PPE Worn By Patient  None      CLINICAL NUTRITION ASSESSMENT      Reason for Assessment 6/15: Follow up protocol and NPO/Clear/Full x 5 days (patient screened 6/8/22)     H&P  67-year-old male comes in complaining of syncope     Past Medical History:   Diagnosis Date   • Back pain    • Emphysema lung (HCC)    • Hypertension        Past Surgical History:   Procedure Laterality Date   • CARDIAC CATHETERIZATION Right 6/2/2022    Procedure: Coronary angiography;  Surgeon: Frank Giraldo MD;  Location: [facility] CATH INVASIVE LOCATION;  Service: Cardiovascular;  Laterality: Right;   • CARDIAC CATHETERIZATION N/A 6/2/2022    Procedure: Left Heart Cath;  Surgeon: Frank Giraldo MD;  Location: [facility] CATH INVASIVE LOCATION;  Service: Cardiovascular;  Laterality: N/A;   • CARDIAC CATHETERIZATION N/A 6/2/2022    Procedure: Left ventriculography;  Surgeon: Frank Giraldo MD;  Location: [facility] CATH INVASIVE LOCATION;  Service: Cardiovascular;  Laterality: N/A;        Current Problems   Syncope with an elevated troponin  - S/P CABG 6/10    COPD    Acute on chronic kidney injury    Hypertension    Hyperlipidemia    BPH    Alcohol abuse  -Patient reports he currently drinks multiple beers along with whiskey daily  -CIWA  -Psych on board   -behavior issues since admission        Encounter Information        Trending Narrative     6/15: Patient discussed in AM rounds.  Patient sleeping, sitter at bedside.  Noted with limited diet x 5 days but patient now on puree/thin per VFSS today.       Anthropometrics        Current Height, Weight Height: 177.8 cm (70\")  Weight: 104 kg (230 lb 6.1 oz) (06/15/22 0500)       Ideal Body Weight (IBW) 166#   Usual " Body Weight (UBW) Unable to obtain from patient        Trending Weight Hx     This admission: 6/15: 230-236# range (-12.5L per I/Os)             PTA: No weight history past 1 month to note    Wt Readings from Last 30 Encounters:   06/15/22 0500 104 kg (230 lb 6.1 oz)   06/14/22 0600 107 kg (235 lb 10.8 oz)   06/13/22 0430 107 kg (235 lb)   06/12/22 0549 107 kg (235 lb 12.8 oz)   06/11/22 0600 110 kg (243 lb 6.2 oz)   06/10/22 0150 108 kg (238 lb 1.6 oz)   06/09/22 0500 106 kg (233 lb 11 oz)   06/08/22 0440 107 kg (236 lb 8.9 oz)   06/07/22 0500 110 kg (241 lb 6.5 oz)   06/06/22 0500 110 kg (242 lb 1 oz)   06/04/22 0500 111 kg (245 lb 6 oz)   06/03/22 0532 111 kg (245 lb 2.4 oz)   06/02/22 2103 111 kg (245 lb 2.4 oz)   06/01/22 0849 109 kg (241 lb)   06/01/22 0538 110 kg (241 lb 6.5 oz)   06/01/22 0158 111 kg (244 lb 6.4 oz)   06/01/22 0024 111 kg (244 lb 6.4 oz)   05/31/22 2033 104 kg (230 lb)      BMI kg/m2 Body mass index is 33.06 kg/m².       Labs        Pertinent Labs    Results from last 7 days   Lab Units 06/15/22  0439 06/14/22  0430 06/13/22  0530 06/12/22  0231 06/11/22  0355 06/10/22  2201 06/10/22  0403 06/09/22  0500   SODIUM mmol/L 138 139 137   < > 139 137   < > 136   POTASSIUM mmol/L 3.9 4.0 4.1   < > 4.4 4.7   < > 4.2   CHLORIDE mmol/L 98 99 101   < > 103 102   < > 98   CO2 mmol/L 29.0 28.0 24.0   < > 23.0 26.0   < > 28.0   BUN mg/dL 18 17 19   < > 24* 24*   < > 22   CREATININE mg/dL 0.78 0.72* 0.89   < > 1.24 1.36*   < > 0.85   CALCIUM mg/dL 8.2* 8.6 8.0*   < > 8.7 9.0   < > 8.7   BILIRUBIN mg/dL  --   --   --   --  0.6 0.8  --  0.3   ALK PHOS U/L  --   --   --   --  38* 43  --  52   ALT (SGPT) U/L  --   --   --   --  36 40  --  44*   AST (SGOT) U/L  --   --   --   --  47* 46*  --  25   GLUCOSE mg/dL 127* 125* 119*   < > 139* 146*   < > 85    < > = values in this interval not displayed.     Results from last 7 days   Lab Units 06/15/22  0439 06/14/22  0430 06/13/22  0530 06/10/22  0403  22  0500   MAGNESIUM mg/dL 2.5* 2.2 2.3   < >  --    PHOSPHORUS mg/dL 3.7 2.5 2.5   < >  --    HEMOGLOBIN g/dL 9.3* 10.2* 9.8*   < > 13.8   HEMOGLOBIN, POC   --   --   --    < >  --    HEMATOCRIT % 28.8* 30.6* 30.3*   < > 41.7   HEMATOCRIT POC   --   --   --    < >  --    TRIGLYCERIDES mg/dL  --   --   --   --  135    < > = values in this interval not displayed.     COVID19   Date Value Ref Range Status   06/10/2022 Not Detected Not Detected - Ref. Range Final     Lab Results   Component Value Date    HGBA1C 5.5 2022        Medications    Scheduled Medications arformoterol, 15 mcg, Nebulization, BID - RT  aspirin, 81 mg, Oral, Daily  atorvastatin, 40 mg, Oral, Nightly  budesonide, 0.5 mg, Nebulization, BID - RT  [START ON 2022] bumetanide, 1 mg, Oral, Daily  chlorhexidine, 15 mL, Mouth/Throat, Q12H  enoxaparin, 40 mg, Subcutaneous, Q24H  folic acid, 1 mg, Oral, Daily  gabapentin, 100 mg, Oral, Q8H  guaiFENesin, 400 mg, Oral, Q6H  insulin lispro, 0-7 Units, Subcutaneous, TID AC  metoprolol tartrate, 12.5 mg, Oral, Q12H  multivitamin, 1 tablet, Oral, Daily  pantoprazole, 40 mg, Oral, QAM  polyethylene glycol, 17 g, Oral, BID  potassium chloride, 20 mEq, Oral, Daily  QUEtiapine, 25 mg, Oral, Nightly  senna-docusate sodium, 2 tablet, Oral, Nightly  thiamine, 100 mg, Oral, Daily        Infusions      PRN Medications •  acetaminophen **OR** acetaminophen **OR** acetaminophen  •  bisacodyl  •  dextrose  •  dextrose  •  glucagon (human recombinant)  •  HYDROcodone-acetaminophen **OR** HYDROcodone-acetaminophen  •  insulin lispro **AND** insulin lispro  •  ipratropium-albuterol  •  LORazepam **OR** LORazepam **OR** LORazepam **OR** LORazepam **OR** LORazepam **OR** LORazepam **OR** LORazepam **OR** LORazepam  •  magnesium hydroxide  •  [] Morphine **AND** naloxone  •  ondansetron  •  potassium chloride **OR** potassium chloride  •  QUEtiapine     Physical Findings        Trending Physical  "  Appearance, NFPE 6/15: NFPE completed and not consistent with nutrition diagnosis of malnutrition at this time using AND/ASPEN criteria      --  Edema  1+ to legs  2+ to ankles     Bowel Function Last BM 6/9 (x 6 days) - discussed with RN at rounds, Miralax given     Tubes No feeding tube      Chewing/Swallowing SLP saw 6/14 and recommends VFSS prior to any diet initiation     Skin Incisions  Right elbow skin tear        Estimated/Assessed Needs       Energy Requirements    Height for Calculation  Height: 177.8 cm (70\")   Weight for Calculation    Method for Estimation     EST Needs (kcal/day)        Protein Requirements    Weight for Calculation    EST Protein Needs (g/kg)    EST Daily Needs (g/day)        Fluid Requirements     Estimated Needs (mL/day)        Fluid Deficit    Current Na Level (mEq/L)    Desired Na Level (mEq/L)    Estimated Fluid Deficit (L)       Current Nutrition Orders & Evaluation of Intake       Oral Nutrition     Food Allergies NKFA   Current PO Diet NPO Diet NPO Type: Sips with Meds   Supplement None ordered    PO Evaluation     Trending % PO Intake 6/15: NPO, good intakes when on diet prior to surgery      Enteral Nutrition    Enteral Route    TF Modular    TF Delivery Method    Current TF Order    Current Water Flush     TF Residual/Tolerance     TF Observation         Parenteral Nutrition     TPN Route    Current TPN Order    Lipids (mL/%/frequency)     MVI Frequency     Trace Element Frequency     Total # Days on TPN    TPN Observation         Nutrition Course Details    PO Diets: Heart Healthy 6/2-6/8  Heart Healthy, 2mg K+ 6/8-6/10  NPO 6/10  Consistent CHO Heart Healthy Full liquid 6/11-6/14  NPO 6/14 to current 6/15   Nutrition Support: Will monitor for need for nutrition support      Nutritional Risk Screening        NRS-2002 Score          Nutrition Diagnosis         Nutrition Dx Problem 1 Inadequate oral intake related to clinical course as evidenced by NPO/Full liquid x 5 " days.        Nutrition Dx Problem 2        Intervention Goal         Intervention Goal(s) Accept ONS  PO intakes at least 50%     Nutrition Intervention        RD Action Add ONS     Nutrition Prescription          Diet Prescription Puree with thin liquids    Supplement Prescription Boost Plus BID      Enteral Prescription        TPN Prescription      Monitor/Evaluation        Monitor Per protocol, I&O, Pertinent labs, Weight, Skin status, GI status, Symptoms, POC/GOC, Swallow function, Hemodynamic stability       Electronically signed by:  Delmi Cornejo RD  06/15/22 08:54 EDT

## 2022-06-15 NOTE — PROGRESS NOTES
Chief complaint  Fatigue, pain , hearing deficits     Subjective .     History of present illness:  The patient is a 67 y.o. male who was admitted secondary to syncope and eventually required CABG procedure. Psychiatry was consulted due to concern for post-operative delirium.   Pt was  confused at the time of initial assessment, he was  unable to speak clearly, speech dysarthric, and only partially oriented to person and situation.   Today the pt was somnolent but arousable, oriented to person, place, situation, he remember why he was in the hospital and aware that he had surgery.   He denied AVH/SI/HI, he had uneventful night      Review of Systems   All systems were reviewed and negative except for:  Constitution:  positive for fatigue  ENT:  positive for hearing loss  Respiratory: positive for  shortness of air  Cardiovascular: positive for  chest pressure / pain, at rest  Musculoskeletal: positive for  muscle weakness  Behavioral/Psych: positive for  anxiety    History       Medications Prior to Admission   Medication Sig Dispense Refill Last Dose   • amLODIPine (NORVASC) 5 MG tablet Take 5 mg by mouth Daily.   5/31/2022 at Unknown time   • Fluticasone-Umeclidin-Vilant (TRELEGY) 200-62.5-25 MCG/INH inhaler Inhale 1 puff Daily.   5/31/2022 at Unknown time   • gabapentin (NEURONTIN) 600 MG tablet Take 600 mg by mouth 2 (Two) Times a Day.   5/31/2022 at Unknown time   • lisinopril (PRINIVIL,ZESTRIL) 40 MG tablet Take 40 mg by mouth Daily.   5/31/2022 at Unknown time   • pravastatin (PRAVACHOL) 20 MG tablet Take 20 mg by mouth Daily.   5/31/2022 at Unknown time   • tamsulosin (FLOMAX) 0.4 MG capsule 24 hr capsule Take 1 capsule by mouth Daily.   5/31/2022 at Unknown time   • albuterol sulfate  (90 Base) MCG/ACT inhaler Inhale 2 puffs Every 4 (Four) Hours As Needed for Wheezing.           Scheduled Meds:  arformoterol, 15 mcg, Nebulization, BID - RT  aspirin, 81 mg, Oral, Daily  atorvastatin, 40 mg, Oral,  "Nightly  budesonide, 0.5 mg, Nebulization, BID - RT  [START ON 2022] bumetanide, 1 mg, Oral, Daily  chlorhexidine, 15 mL, Mouth/Throat, Q12H  enoxaparin, 40 mg, Subcutaneous, Q24H  folic acid, 1 mg, Oral, Daily  gabapentin, 100 mg, Oral, Q8H  guaiFENesin, 400 mg, Oral, Q6H  insulin lispro, 0-7 Units, Subcutaneous, TID AC  metoprolol tartrate, 12.5 mg, Oral, Q12H  multivitamin, 1 tablet, Oral, Daily  pantoprazole, 40 mg, Oral, QAM  polyethylene glycol, 17 g, Oral, BID  potassium chloride, 20 mEq, Oral, Daily  QUEtiapine, 25 mg, Oral, Nightly  senna-docusate sodium, 2 tablet, Oral, Nightly  tamsulosin, 0.4 mg, Oral, Daily  thiamine, 100 mg, Oral, Daily         Continuous Infusions:       PRN Meds:  •  acetaminophen **OR** acetaminophen **OR** acetaminophen  •  bisacodyl  •  dextrose  •  dextrose  •  glucagon (human recombinant)  •  HYDROcodone-acetaminophen **OR** HYDROcodone-acetaminophen  •  insulin lispro **AND** insulin lispro  •  ipratropium-albuterol  •  LORazepam **OR** LORazepam **OR** LORazepam **OR** LORazepam **OR** LORazepam **OR** LORazepam **OR** LORazepam **OR** LORazepam  •  magnesium hydroxide  •  [] Morphine **AND** naloxone  •  ondansetron  •  potassium chloride **OR** potassium chloride  •  QUEtiapine      Allergies:  Patient has no known allergies.      Objective     Vital Signs   /70 (BP Location: Right arm, Patient Position: Lying)   Pulse 84   Temp 97 °F (36.1 °C) (Oral)   Resp 19   Ht 177.8 cm (70\")   Wt 104 kg (230 lb 6.1 oz)   SpO2 94%   BMI 33.06 kg/m²     Physical Exam:     General Appearance:    somnolent       Mental Status Exam:    Hygiene:   good  Cooperation:  Cooperative  Eye Contact:  Fair  Psychomotor Behavior:  Slow  Affect:  Restricted   Mood: tired   Hopelessness: Denies  Speech:  Monotone  Thought Progress:  Goal directed and Linear  Thought Content:  Mood congruent  Suicidal:  None  Homicidal:  None  Hallucinations:  Not demonstrated today  Delusion:  " None  Memory:  fair   Orientation:  Person, Place and Situation  Reliability:  fair  Insight:  Fair  Judgement:  Fair  Impulse Control:  Fair  Physical/Medical Issues:  Yes      Medications and allergies reviewed     Lab Results   Component Value Date    GLUCOSE 127 (H) 06/15/2022    CALCIUM 8.2 (L) 06/15/2022     06/15/2022    K 3.9 06/15/2022    CO2 29.0 06/15/2022    CL 98 06/15/2022    BUN 18 06/15/2022    CREATININE 0.78 06/15/2022    BCR 23.1 06/15/2022    ANIONGAP 11.0 06/15/2022       Last Urine Toxicity     LAST URINE TOXICITY RESULTS Latest Ref Rng & Units 6/1/2022    BARBITURATES SCREEN Negative Negative    BENZODIAZEPINE SCREEN, URINE Negative Negative    COCAINE SCREEN, URINE Negative Negative    METHADONE SCREEN, URINE Negative Negative          No results found for: PHENYTOIN, PHENOBARB, VALPROATE, CBMZ    Lab Results   Component Value Date     06/15/2022    BUN 18 06/15/2022    CREATININE 0.78 06/15/2022    TSH 0.476 06/03/2022    WBC 8.60 06/15/2022       Brief Urine Lab Results  (Last result in the past 365 days)      Color   Clarity   Blood   Leuk Est   Nitrite   Protein   CREAT   Urine HCG        06/13/22 0917 Yellow   Clear   Moderate (2+)   Negative   Negative   Negative               EKG 6/15/22  HR 94 QTc 474   Assessment & Plan       COPD exacerbation (HCC)    Obesity (BMI 30-39.9)    Abnormal nuclear stress test    Acute renal insufficiency    Alcohol dependence (HCC)    Essential hypertension    Other emphysema (HCC)    Coronary artery disease    Syncope, unspecified syncope type    Postoperative delirium    Assessment: Post operative delirium   Treatment Plan: the pt is more alert and awake,   Complaint with tx,   Cont seroquel 25 mg po QHS and PRN for agitation  QTc 474  Cont to provide support, will discuss alc issues when more stable medically   Will follow     Addendum 6/16/22: AM EKG from today showed PUd=[id number] with 25mg Seroquel, and will continue to monitor QTc with  AM EKG ordered for tomorrow as well. D/c PRN Seroquel since it has not been given and could risk worsening QTc prolongation.    Treatment Plan discussed with: Patient and nursing     I discussed the patients findings and my recommendations with patient and nursing staff    I have reviewed and approved the behavioral health treatment plans and problem list. Yes     Referring MD has access to consult report and progress notes in EMR     Yaritza Morillo MD  06/15/22  14:25 EDT

## 2022-06-15 NOTE — PAYOR COMM NOTE
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 Mouth/Throat, Q12H, Martha Tinoco APRN, 15 mL at 06/13/22 2256  •  dextrose (D50W) (25 g/50 mL) IV injection 10-50 mL, 10-50 mL, Intravenous, Q15 Min PRN, Martha Tinoco APRN  •  dextrose (GLUTOSE) oral gel 15 g, 15 g, Oral, Q15 Min PRN, Martha Tinoco APRN  •  Enoxaparin Sodium (LOVENOX) syringe 40 mg, 40 mg, Subcutaneous, Q24H, Martha Tinoco APRN, 40 mg at 06/13/22 1703  •  folic acid (FOLVITE) tablet 1 mg, 1 mg, Oral, Daily, Thong Watts APRN, 1 mg at 06/13/22 1053  •  gabapentin (NEURONTIN) capsule 100 mg, 100 mg, Oral, Q8H, Christal Vora, APRN, 100 mg at 06/14/22 0551  •  glucagon (human recombinant) (GLUCAGEN DIAGNOSTIC) 1 mg in sterile water (preservative free) 1 mL injection, 1 mg, Intramuscular, Q15 Min PRN, Martha Tinoco APRN  •  guaiFENesin solution 400 mg, 400 mg, Oral, Q6H, Cindy Arellano MD, 400 mg at 06/14/22 0551  •  HYDROcodone-acetaminophen (NORCO) 5-325 MG per tablet 1 tablet, 1 tablet, Oral, Q6H PRN, 1 tablet at 06/13/22 3472 **OR** HYDROcodone-acetaminophen (NORCO) 5-325 MG per tablet 2 tablet, 2 tablet, Oral, Q6H PRN,  Diony, Christal L, APRN, 2 tablet at 22 0712  •  insulin lispro (ADMELOG) injection 0-7 Units, 0-7 Units, Subcutaneous, TID AC **AND** insulin lispro (ADMELOG) injection 0-7 Units, 0-7 Units, Subcutaneous, PRN, Diony, Christal L, APRN  •  ipratropium-albuterol (DUO-NEB) nebulizer solution 3 mL, 3 mL, Nebulization, Q4H PRN, Day, Kori, APRN, 3 mL at 22 0413  •  LORazepam (ATIVAN) tablet 0.5 mg, 0.5 mg, Oral, Q2H PRN **OR** LORazepam (ATIVAN) injection 0.5 mg, 0.5 mg, Intravenous, Q2H PRN, 0.5 mg at 22 2232 **OR** LORazepam (ATIVAN) tablet 1 mg, 1 mg, Oral, Q1H PRN **OR** LORazepam (ATIVAN) injection 1 mg, 1 mg, Intravenous, Q1H PRN, 1 mg at 22 0214 **OR** LORazepam (ATIVAN) injection 1 mg, 1 mg, Intravenous, Q15 Min PRN, 1 mg at 22 0524 **OR** LORazepam (ATIVAN) injection 1 mg, 1 mg, Intramuscular, Q15 Min PRN **OR** LORazepam (ATIVAN) injection 2 mg, 2 mg, Intravenous, Q1H PRN, 2 mg at 22 1417 **OR** LORazepam (ATIVAN) tablet 2 mg, 2 mg, Oral, Q1H PRN, Shivam Recio MD  •  magnesium hydroxide (MILK OF MAGNESIA) suspension 10 mL, 10 mL, Oral, Daily PRN, Martha Tinoco, APRN  •  metoprolol tartrate (LOPRESSOR) half tablet 12.5 mg, 12.5 mg, Oral, Q12H, Martha Tinoco, APRN  •  multivitamin (THERAGRAN) tablet 1 tablet, 1 tablet, Oral, Daily, Thong Watts APRN, 1 tablet at 22 1052  •  mupirocin (BACTROBAN) 2 % nasal ointment 1 application, 1 application, Each Nare, BID, Martha Tinoco APRN, 1 application at 22  •  [] morphine injection 2 mg, 2 mg, Intravenous, Q2H PRN, 2 mg at 22 0358 **AND** naloxone (NARCAN) injection 0.4 mg, 0.4 mg, Intravenous, Q5 Min PRN, Martha Tinoco APRN  •  ondansetron (ZOFRAN) injection 4 mg, 4 mg, Intravenous, Q6H PRN, Martha Tinoco, ERICA  •  [COMPLETED] pantoprazole (PROTONIX) injection 40 mg, 40 mg, Intravenous, Once, 40 mg at 06/10/22 5535 **FOLLOWED BY** pantoprazole (PROTONIX) EC tablet 40  mg, 40 mg, Oral, QAM, Satterly-Yaniv, Christal L, APRN, 40 mg at 06/14/22 0601  •  polyethylene glycol (MIRALAX) packet 17 g, 17 g, Oral, BID, Satterly-Yaniv, Christal L, APRN, 17 g at 06/12/22 2102  •  potassium chloride (K-DUR,KLOR-CON) CR tablet 20 mEq, 20 mEq, Oral, PRN **OR** potassium chloride (KLOR-CON) packet 20 mEq, 20 mEq, Oral, PRN, Martha Tinoco, APRN  •  potassium chloride (K-DUR,KLOR-CON) CR tablet 20 mEq, 20 mEq, Oral, Daily, Satterly-Yaniv, Christal L, APRN, 20 mEq at 06/13/22 0912  •  sennosides-docusate (PERICOLACE) 8.6-50 MG per tablet 2 tablet, 2 tablet, Oral, Nightly, Satterly-Yaniv, Christal L, APRN, 2 tablet at 06/13/22 2056  •  sodium chloride 3 % nebulizer solution 4 mL, 4 mL, Nebulization, BID - RT, DrawDany MD, 4 mL at 06/14/22 0715  •  thiamine (VITAMIN B-1) tablet 100 mg, 100 mg, Oral, Daily, Thong Watts, APRN, 100 mg at 06/13/22 1053    Data Review:  All labs (24hrs):   Recent Results (from the past 24 hour(s))   POC Glucose Once    Collection Time: 06/13/22 11:03 AM    Specimen: Blood   Result Value Ref Range    Glucose 129 (H) 70 - 105 mg/dL   POC Glucose Once    Collection Time: 06/13/22  4:22 PM    Specimen: Blood   Result Value Ref Range    Glucose 128 (H) 70 - 105 mg/dL   POC Glucose Once    Collection Time: 06/13/22  8:59 PM    Specimen: Blood   Result Value Ref Range    Glucose 132 (H) 70 - 105 mg/dL   CBC (No Diff)    Collection Time: 06/14/22  4:30 AM    Specimen: Blood   Result Value Ref Range    WBC 10.10 3.40 - 10.80 10*3/mm3    RBC 3.17 (L) 4.14 - 5.80 10*6/mm3    Hemoglobin 10.2 (L) 13.0 - 17.7 g/dL    Hematocrit 30.6 (L) 37.5 - 51.0 %    MCV 96.7 79.0 - 97.0 fL    MCH 32.1 26.6 - 33.0 pg    MCHC 33.2 31.5 - 35.7 g/dL    RDW 14.0 12.3 - 15.4 %    RDW-SD 47.7 37.0 - 54.0 fl    MPV 8.2 6.0 - 12.0 fL    Platelets 192 140 - 450 10*3/mm3   Basic Metabolic Panel    Collection Time: 06/14/22  4:30 AM    Specimen: Blood   Result Value Ref Range    Glucose 125 (H) 65 -  99 mg/dL    BUN 17 8 - 23 mg/dL    Creatinine 0.72 (L) 0.76 - 1.27 mg/dL    Sodium 139 136 - 145 mmol/L    Potassium 4.0 3.5 - 5.2 mmol/L    Chloride 99 98 - 107 mmol/L    CO2 28.0 22.0 - 29.0 mmol/L    Calcium 8.6 8.6 - 10.5 mg/dL    BUN/Creatinine Ratio 23.6 7.0 - 25.0    Anion Gap 12.0 5.0 - 15.0 mmol/L    eGFR 100.1 >60.0 mL/min/1.73   Magnesium    Collection Time: 06/14/22  4:30 AM    Specimen: Blood   Result Value Ref Range    Magnesium 2.2 1.6 - 2.4 mg/dL   Phosphorus    Collection Time: 06/14/22  4:30 AM    Specimen: Blood   Result Value Ref Range    Phosphorus 2.5 2.5 - 4.5 mg/dL        Imaging:  XR Chest 1 View  Narrative:    DATE OF EXAM:   6/13/2022 7:20 AM     PROCEDURE:   XR CHEST 1 VW-     INDICATIONS:   increase O2 demands; R55-Syncope and collapse; F10.920-Alcohol use,  unspecified with intoxication, uncomplicated; J96.01-Acute respiratory  failure with hypoxia; I95.1-Orthostatic hypotension; R77.8-Other  specified abnormalities of plasma proteins; N17.9-Acute kidney failure,  unspecified; J44.1-Chronic obstructive pulmonary disease with (acute)  exacerbation; R94.39-Abnormal result of other cardiov     COMPARISON:  06/12/2022 and prior     TECHNIQUE:   Portable Chest     FINDINGS:      Patient status post median sternotomy and CABG.     Heart size appears stable. Pulmonary vasculature is mildly congested and  indistinct. This is accentuated by low lung volumes. Vascular sheath  projects of the SVC. There has been interval removal of the [place]-Demarco  catheter.     Mediastinal drains and left sided chest tube appear grossly stable.  Dependent pleural parenchymal changes at the left lung base again noted  without great change given differences in technique. Increased hazy and  interstitial opacities on the right with a perihilar and dependent  predominance again noted. Slight improved aeration right upper lobe  airspace opacity noted. Small amount of fluid noted within the minor  fissure. No definite  pneumothorax or pneumomediastinum noted. Osseous  structures are stable.      Impression: IMPRESSION :      1. Interval removal of [place]-Demarco catheter. Lines and tubes are otherwise  stable.  2. Postsurgical changes from median sternotomy and CABG.  3. Mild pulmonary vascular congestion and dependent opacities at the  lung bases without great change given differences in technique from the  comparison[. Slight improved aeration right upper lobe opacity  peripherally from prior study.     Electronically Signed By-Hossein Alvarez On:2022 7:56 AM  This report was finalized on [id number] by  Hossein Alvarez, .       ASSESSMENT:    S?P CABG with ascending aortic aneurysm repair   COPD exacerbation (HCC)    Obesity (BMI 30-39.9)    Abnormal nuclear stress test    Acute renal insufficiency    Alcohol dependence (HCC)    Essential hypertension    Other emphysema (HCC)    Coronary artery disease    Syncope, unspecified syncope type       PLAN:  Pt/ot  Post op care  IS/ flutter valve   Diuretics    Bronchodilator  Inhaled corticosteroids  Electrolytes/ glycemic control  DVT and GI prophylaxis.    Total Critical care time in direct medical management (   ) minutes. This time specifically excludes time spent performing procedures.  Cindy Arellano MD. D, ABSM.     2022  10:56 EDT     Electronically signed by Cindy Arellano MD at 22 1717     Dre Cooper MD at 22 0809          NEPHROLOGY PROGRESS NOTE------[facility]    [facility]  [phone]  Amara Cooper MD      Patient Care Team:  Deysi Mason APRN as PCP - General (Nurse Practitioner)  Eliseo Casarez MD as Consulting Physician (Nephrology)      Provider:  Amara Cooper MD  Patient Name: Chi Quinteros Sr.  :  1955    SUBJECTIVE:    F/U ARF/FELIBERTO/CRF/CKD    Little more alert today. Still with sitter. No SOB, CP,  "dysuria    Medication:  arformoterol, 15 mcg, Nebulization, BID - RT  aspirin, 81 mg, Oral, Daily  atorvastatin, 40 mg, Oral, Nightly  budesonide, 0.5 mg, Nebulization, BID - RT  bumetanide, 1 mg, Oral, Daily  chlorhexidine, 15 mL, Mouth/Throat, Q12H  enoxaparin, 40 mg, Subcutaneous, Q24H  folic acid, 1 mg, Oral, Daily  gabapentin, 100 mg, Oral, Q8H  guaiFENesin, 400 mg, Oral, Q6H  insulin lispro, 0-7 Units, Subcutaneous, TID AC  multivitamin, 1 tablet, Oral, Daily  mupirocin, 1 application, Each Nare, BID  pantoprazole, 40 mg, Oral, QAM  polyethylene glycol, 17 g, Oral, BID  potassium chloride, 20 mEq, Oral, Daily  senna-docusate sodium, 2 tablet, Oral, Nightly  sodium chloride, 4 mL, Nebulization, BID - RT  thiamine, 100 mg, Oral, Daily      insulin, 0-100 Units/hr, Last Rate: Stopped (06/13/22 0733)  milrinone, 0.125 mcg/kg/min, Last Rate: 0.125 mcg/kg/min (06/13/22 1405)        OBJECTIVE    Vital Sign Min/Max for last 24 hours  Temp  Min: 98.1 °F (36.7 °C)  Max: 99.4 °F (37.4 °C)   BP  Min: 103/63  Max: 138/81   Pulse  Min: 82  Max: 93   Resp  Min: 28  Max: 43   SpO2  Min: 94 %  Max: 100 %   No data recorded   Weight  Min: 107 kg (235 lb 10.8 oz)  Max: 107 kg (235 lb 10.8 oz)     Flowsheet Rows    Flowsheet Row First Filed Value   Admission Height 177.8 cm (70\") Documented at 05/31/2022 2052   Admission Weight 104 kg (230 lb) Documented at 05/31/2022 2033          No intake/output data recorded.  I/O last 3 completed shifts:  In: 634 [I.V.:634]  Out: 2950 [Urine:2500; Chest Tube:450]    Physical Exam:  General Appearance: NAD  Head: normocephalic, without obvious abnormality and atraumatic  Eyes: conjunctivae and sclerae normal and no icterus  Neck: supple and no JVD  Lungs: DECREASED BS BIBASILAR  Heart: regular rhythm & normal rate and normal S1, S2 +ANTONY  Chest: S/P SURGICAL CHANGES ASSOCIATED WITH OPEN HEART SURGERY  Abdomen: +HYPOACTIVE bowel sounds and soft non-tender +GONZALEZ  Extremities: moves extremities " well, no edema, no cyanosis and no redness  Skin: no bleeding, bruising or rash, turgor normal, color normal and no lesions noted  Neurologic: A AND O X 2 TODAY    Labs:    WBC WBC   Date Value Ref Range Status   06/14/2022 10.10 3.40 - 10.80 10*3/mm3 Final   06/13/2022 10.50 3.40 - 10.80 10*3/mm3 Final   06/12/2022 12.40 (H) 3.40 - 10.80 10*3/mm3 Final      HGB Hemoglobin   Date Value Ref Range Status   06/14/2022 10.2 (L) 13.0 - 17.7 g/dL Final   06/13/2022 9.8 (L) 13.0 - 17.7 g/dL Final   06/12/2022 9.8 (L) 13.0 - 17.7 g/dL Final      HCT Hematocrit   Date Value Ref Range Status   06/14/2022 30.6 (L) 37.5 - 51.0 % Final   06/13/2022 30.3 (L) 37.5 - 51.0 % Final   06/12/2022 30.3 (L) 37.5 - 51.0 % Final      Platlets No results found for: LABPLAT   MCV MCV   Date Value Ref Range Status   06/14/2022 96.7 79.0 - 97.0 fL Final   06/13/2022 97.7 (H) 79.0 - 97.0 fL Final   06/12/2022 97.7 (H) 79.0 - 97.0 fL Final          Sodium Sodium   Date Value Ref Range Status   06/14/2022 139 136 - 145 mmol/L Final   06/13/2022 137 136 - 145 mmol/L Final   06/12/2022 137 136 - 145 mmol/L Final      Potassium Potassium   Date Value Ref Range Status   06/14/2022 4.0 3.5 - 5.2 mmol/L Final   06/13/2022 4.1 3.5 - 5.2 mmol/L Final   06/12/2022 4.1 3.5 - 5.2 mmol/L Final      Chloride Chloride   Date Value Ref Range Status   06/14/2022 99 98 - 107 mmol/L Final   06/13/2022 101 98 - 107 mmol/L Final   06/12/2022 105 98 - 107 mmol/L Final      CO2 CO2   Date Value Ref Range Status   06/14/2022 28.0 22.0 - 29.0 mmol/L Final   06/13/2022 24.0 22.0 - 29.0 mmol/L Final   06/12/2022 22.0 22.0 - 29.0 mmol/L Final      BUN BUN   Date Value Ref Range Status   06/14/2022 17 8 - 23 mg/dL Final   06/13/2022 19 8 - 23 mg/dL Final   06/12/2022 16 8 - 23 mg/dL Final      Creatinine Creatinine   Date Value Ref Range Status   06/14/2022 0.72 (L) 0.76 - 1.27 mg/dL Final   06/13/2022 0.89 0.76 - 1.27 mg/dL Final   06/12/2022 0.76 0.76 - 1.27 mg/dL Final       Calcium Calcium   Date Value Ref Range Status   06/14/2022 8.6 8.6 - 10.5 mg/dL Final   06/13/2022 8.0 (L) 8.6 - 10.5 mg/dL Final   06/12/2022 8.1 (L) 8.6 - 10.5 mg/dL Final      PO4 No components found for: PO4   Albumin No results found for: ALBUMIN   Magnesium Magnesium   Date Value Ref Range Status   06/14/2022 2.2 1.6 - 2.4 mg/dL Final   06/13/2022 2.3 1.6 - 2.4 mg/dL Final   06/12/2022 2.5 (H) 1.6 - 2.4 mg/dL Final      Uric Acid No components found for: URIC ACID     Imaging Results (Last 72 Hours)     Procedure Component Value Units Date/Time    XR Chest 1 View [[id number]] Collected: 06/13/22 0752     Updated: 06/13/22 0758    Narrative:         DATE OF EXAM:   6/13/2022 7:20 AM     PROCEDURE:   XR CHEST 1 VW-     INDICATIONS:   increase O2 demands; R55-Syncope and collapse; F10.920-Alcohol use,  unspecified with intoxication, uncomplicated; J96.01-Acute respiratory  failure with hypoxia; I95.1-Orthostatic hypotension; R77.8-Other  specified abnormalities of plasma proteins; N17.9-Acute kidney failure,  unspecified; J44.1-Chronic obstructive pulmonary disease with (acute)  exacerbation; R94.39-Abnormal result of other cardiov     COMPARISON:  06/12/2022 and prior     TECHNIQUE:   Portable Chest     FINDINGS:      Patient status post median sternotomy and CABG.     Heart size appears stable. Pulmonary vasculature is mildly congested and  indistinct. This is accentuated by low lung volumes. Vascular sheath  projects of the SVC. There has been interval removal of the [place]-Demarco  catheter.     Mediastinal drains and left sided chest tube appear grossly stable.  Dependent pleural parenchymal changes at the left lung base again noted  without great change given differences in technique. Increased hazy and  interstitial opacities on the right with a perihilar and dependent  predominance again noted. Slight improved aeration right upper lobe  airspace opacity noted. Small amount of fluid noted within the  minor  fissure. No definite pneumothorax or pneumomediastinum noted. Osseous  structures are stable.        Impression:      IMPRESSION :      1. Interval removal of [place]-Demarco catheter. Lines and tubes are otherwise  stable.  2. Postsurgical changes from median sternotomy and CABG.  3. Mild pulmonary vascular congestion and dependent opacities at the  lung bases without great change given differences in technique from the  comparison[. Slight improved aeration right upper lobe opacity  peripherally from prior study.     Electronically Signed By-Hossein Alvarez On:6/13/2022 7:56 AM  This report was finalized on [id number] by  Hossein Alvarez, .    XR Chest 1 View [[id number]] Collected: 06/12/22 0842     Updated: 06/12/22 0846    Narrative:      EXAMINATION: XR CHEST 1 VW-     DATE OF EXAM: 6/12/2022 2:24 AM     INDICATION: Post-Op Heart Surgery; R55-Syncope and collapse;  F10.920-Alcohol use, unspecified with intoxication, uncomplicated;  J96.01-Acute respiratory failure with hypoxia; I95.1-Orthostatic  hypotension; R77.8-Other specified abnormalities of plasma proteins;  N17.9-Acute kidney failure, unspecified; J44.1-Chronic obstructive  pulmonary disease with (acute) exacerbation; R94.39-Abnormal result of  other cardi.     COMPARISON: Chest radiograph dated 06/11/2022     TECHNIQUE: Portable AP view of the chest was obtained.     FINDINGS:  The patient has been extubated. The [place]-Demarco catheter tip terminates at  the pulmonary outflow tract. There is left basilar chest tube and chest  tube. The gastric suction tube has been removed. There are low lung  volumes with streaky bibasilar atelectasis and right upper lobe airspace  opacity adjacent to the right minor fissure. There is no pneumothorax.  No smoking pleural effusion. Median sternotomy wires are present and  intact. There are multilevel degenerative changes of the thoracic spine.  Temporary pacing wires remain present.       Impression:      1.  Remaining support devices appear in expected position.  2. Low lung volumes with streaky bibasilar airspace opacity and right  upper lobe airspace opacity likely representing atelectasis and/or  infection.  3. No evidence of significant pleural effusion or evidence of  pneumothorax.     Electronically Signed By-Ben Juarez MD On:6/12/2022 8:44 AM  This report was finalized on [id number] by  Ben Juarez MD.    XR Chest 1 View [[id number]] Collected: 06/11/22 0835     Updated: 06/11/22 0841    Narrative:      DATE OF EXAM:  6/11/2022 5:05 AM     PROCEDURE:  XR CHEST 1 VW-     INDICATIONS:  Post-Op Heart Surgery; R55-Syncope and collapse; F10.920-Alcohol use,  unspecified with intoxication, uncomplicated; J96.01-Acute respiratory  failure with hypoxia; I95.1-Orthostatic hypotension; R77.8-Other  specified abnormalities of plasma proteins; N17.9-Acute kidney failure,  unspecified; J44.1-Chronic obstructive pulmonary disease with (acute)  exacerbation; R94.39-Abnormal result of other cardi     COMPARISON:  06/10/2022     TECHNIQUE:   Single radiographic view of the chest was obtained.     FINDINGS:  Endotracheal tube tip is approximately 3 cm above the erika. [place]-Demarco  catheter appears to terminate at the main pulmonary artery.  Status post  median sternotomy and CABG. Heart size and mediastinal contour appear  unchanged.  Enteric tube appears to extend left upper quadrant, tip  beyond the margins of this exam. Is not infiltrate is present and  appears to be a left basilar chest tube. No significant pneumothorax is  seen. There are airspace opacities in the right upper lobe at the  periphery of the right midlung and there are left basilar airspace  opacities. These findings appear similar to the prior exam. No  significant infiltrate is seen. Possible trace left effusion, as before  No definite right pleural effusion.       Impression:      1.Tubes and lines appear in satisfactory in similar  positions.  2.Right upper lobe and left basilar opacities appear grossly unchanged.  3.No definite pneumothorax. Possible trace left effusion, as before.     Electronically Signed By-Adam Anaya MD On:6/11/2022 8:38 AM  This report was finalized on [id number] by  Adam Anaya MD.          Results for orders placed during the hospital encounter of 05/31/22    XR Chest 1 View    Narrative  DATE OF EXAM:  6/13/2022 7:20 AM    PROCEDURE:  XR CHEST 1 VW-    INDICATIONS:  increase O2 demands; R55-Syncope and collapse; F10.920-Alcohol use,  unspecified with intoxication, uncomplicated; J96.01-Acute respiratory  failure with hypoxia; I95.1-Orthostatic hypotension; R77.8-Other  specified abnormalities of plasma proteins; N17.9-Acute kidney failure,  unspecified; J44.1-Chronic obstructive pulmonary disease with (acute)  exacerbation; R94.39-Abnormal result of other cardiov    COMPARISON:  06/12/2022 and prior    TECHNIQUE:  Portable Chest    FINDINGS:    Patient status post median sternotomy and CABG.    Heart size appears stable. Pulmonary vasculature is mildly congested and  indistinct. This is accentuated by low lung volumes. Vascular sheath  projects of the SVC. There has been interval removal of the [place]-Demarco  catheter.    Mediastinal drains and left sided chest tube appear grossly stable.  Dependent pleural parenchymal changes at the left lung base again noted  without great change given differences in technique. Increased hazy and  interstitial opacities on the right with a perihilar and dependent  predominance again noted. Slight improved aeration right upper lobe  airspace opacity noted. Small amount of fluid noted within the minor  fissure. No definite pneumothorax or pneumomediastinum noted. Osseous  structures are stable.    Impression  IMPRESSION :    1. Interval removal of [place]-Demarco catheter. Lines and tubes are otherwise  stable.  2. Postsurgical changes from median sternotomy and CABG.  3. Mild  pulmonary vascular congestion and dependent opacities at the  lung bases without great change given differences in technique from the  comparison[. Slight improved aeration right upper lobe opacity  peripherally from prior study.    Electronically Signed By-Hossein Alvarez On:6/13/2022 7:56 AM  This report was finalized on [id number] by  Hossein Alvarez, .      XR Chest 1 View    Narrative  EXAMINATION: XR CHEST 1 VW-    DATE OF EXAM: 6/12/2022 2:24 AM    INDICATION: Post-Op Heart Surgery; R55-Syncope and collapse;  F10.920-Alcohol use, unspecified with intoxication, uncomplicated;  J96.01-Acute respiratory failure with hypoxia; I95.1-Orthostatic  hypotension; R77.8-Other specified abnormalities of plasma proteins;  N17.9-Acute kidney failure, unspecified; J44.1-Chronic obstructive  pulmonary disease with (acute) exacerbation; R94.39-Abnormal result of  other cardi.    COMPARISON: Chest radiograph dated 06/11/2022    TECHNIQUE: Portable AP view of the chest was obtained.    FINDINGS:  The patient has been extubated. The [place]-Demarco catheter tip terminates at  the pulmonary outflow tract. There is left basilar chest tube and chest  tube. The gastric suction tube has been removed. There are low lung  volumes with streaky bibasilar atelectasis and right upper lobe airspace  opacity adjacent to the right minor fissure. There is no pneumothorax.  No smoking pleural effusion. Median sternotomy wires are present and  intact. There are multilevel degenerative changes of the thoracic spine.  Temporary pacing wires remain present.    Impression  1. Remaining support devices appear in expected position.  2. Low lung volumes with streaky bibasilar airspace opacity and right  upper lobe airspace opacity likely representing atelectasis and/or  infection.  3. No evidence of significant pleural effusion or evidence of  pneumothorax.    Electronically Signed By-Ben Juarez MD On:6/12/2022 8:44 AM  This report was finalized  on [id number] by  Ben Juarez MD.      XR Chest 1 View    Narrative  DATE OF EXAM:  6/11/2022 5:05 AM    PROCEDURE:  XR CHEST 1 VW-    INDICATIONS:  Post-Op Heart Surgery; R55-Syncope and collapse; F10.920-Alcohol use,  unspecified with intoxication, uncomplicated; J96.01-Acute respiratory  failure with hypoxia; I95.1-Orthostatic hypotension; R77.8-Other  specified abnormalities of plasma proteins; N17.9-Acute kidney failure,  unspecified; J44.1-Chronic obstructive pulmonary disease with (acute)  exacerbation; R94.39-Abnormal result of other cardi    COMPARISON:  06/10/2022    TECHNIQUE:  Single radiographic view of the chest was obtained.    FINDINGS:  Endotracheal tube tip is approximately 3 cm above the erika. [place]-Demarco  catheter appears to terminate at the main pulmonary artery.  Status post  median sternotomy and CABG. Heart size and mediastinal contour appear  unchanged.  Enteric tube appears to extend left upper quadrant, tip  beyond the margins of this exam. Is not infiltrate is present and  appears to be a left basilar chest tube. No significant pneumothorax is  seen. There are airspace opacities in the right upper lobe at the  periphery of the right midlung and there are left basilar airspace  opacities. These findings appear similar to the prior exam. No  significant infiltrate is seen. Possible trace left effusion, as before  No definite right pleural effusion.    Impression  1.Tubes and lines appear in satisfactory in similar positions.  2.Right upper lobe and left basilar opacities appear grossly unchanged.  3.No definite pneumothorax. Possible trace left effusion, as before.    Electronically Signed By-Adam Anaya MD On:6/11/2022 8:38 AM  This report was finalized on [id number] by  Adam Anaya MD.      Results for orders placed during the hospital encounter of 05/31/22    Duplex Vein Mapping Lower Extremity - Bilateral CAR    Interpretation Summary  The greater saphenous veins are of  satisfactory quality from the groin to the mid distal thigh bilaterally.  Distal to this the veins are small and inadequate.        ASSESSMENT / PLAN      COPD exacerbation (HCC)    Obesity (BMI 30-39.9)    Abnormal nuclear stress test    Acute renal insufficiency    Alcohol dependence (HCC)    Essential hypertension    Other emphysema (HCC)    Coronary artery disease    Syncope, unspecified syncope type    1. ARF/FELIBERTO------Nonoliguric. BUN/Cr stable    2. CAD S/P CABG------per Cardiology and CT Surgery    3. BENIGN ESSENTIAL HTN------BP okay. No ACE-I for now    4. BPH-------Nelson replaced by CT Surgery. Hold Flomax today to avoid hypotension    5. S/P SYNCOPE    6. ETOH ABUSE    7. HYPERLIPIDEMIA-------On Statin    8. DVT PROPHYLAXIS-------On Lovenox    9. HYPERKALEMIA--------Resolved    10. ANEMIA------H/H stable    11. MILD VOLUME EXCESS------po Bumex at just 0.5 mg a day    12. COPD/EMPHYSEMA-------per Pulmonary    13. DELERIUM      Amara Cooper MD  [facility]  [phone]  06/14/22  08:09 EDT      Electronically signed by Dre Cooper MD at 06/14/22 0968     Frank Giraldo MD at 06/13/22 3495          Cardiology Progress Note      Admiting Physician:  Benson Hughes, *   LOS: 8 days       Reason For Followup:  Multivessel coronary artery disease      Subjective:    Interval History:  Seen and examined.  Chart and labs reviewed.  Patient appears to be in a normal cognitive state.  Status post surgery, sitting upright at bedside  In bed, hemodynamically electrically stable  Lines and tubes noted, lower pressor requirement, did not sleep well he says    Chest tubes in place draining well  Underlying sinus rhythm, pacing rate decreased to allow normal conduction  Epinephrine discontinued today, continues on milrinone, systolics in the 150s to 160s, may need to start afterload reduction with amlodipine or losartan gently    Nursing at bedside discussed  care    Review of Systems:  A complete review of systems negative x14 point review of systems except as mentioned above he denies anginal chest pain or shortness of breath at this time.  Postsurgical pain noted    Assessment & Plan    Impressions:  Syncope  Multivessel coronary artery disease  Hyperlipidemia  Hypertension  Ischemic cardiomyopathy EF 40%  Acute kidney injury-improved  Alcohol dependence  Altered mental status likely secondary to alcohol withdrawal    Recommendations:  Status post four-vessel bypass, LIMA to LAD, SVG to diagonal obtuse marginal and PLV branch  Left leg endovascular vein harvest  Ascending aortic replacement with 30 mm graft    Continue lines and tubes  Continue inotropes and vasopressors, off epi, continues on milrinone, hypertensive, may need losartan or amlodipine added to his regimen for afterload reduction if systolics remain, most recently charted 114/46, will follow blood pressures and use afterload reduction if needed  Does not appear volume overloaded    Renal function has significantly improved   Monitor rate and rhythm closely    Patient is having withdrawal symptoms from alcohol and is being treated appropriately    Appreciate surgical help with postoperative care    Objective:    Medication Review:   Scheduled Meds:arformoterol, 15 mcg, Nebulization, BID - RT  aspirin, 81 mg, Oral, Daily  atorvastatin, 40 mg, Oral, Nightly  budesonide, 0.5 mg, Nebulization, BID - RT  bumetanide, 1 mg, Oral, Daily  chlorhexidine, 15 mL, Mouth/Throat, Q12H  enoxaparin, 40 mg, Subcutaneous, Q24H  folic acid, 1 mg, Oral, Daily  gabapentin, 100 mg, Oral, Q8H  guaiFENesin, 400 mg, Oral, Q6H  insulin lispro, 0-7 Units, Subcutaneous, TID AC  multivitamin, 1 tablet, Oral, Daily  mupirocin, 1 application, Each Nare, BID  pantoprazole, 40 mg, Oral, QAM  polyethylene glycol, 17 g, Oral, BID  potassium chloride, 20 mEq, Oral, Daily  senna-docusate sodium, 2 tablet, Oral, Nightly  sodium chloride, 4 mL,  Nebulization, BID - RT  thiamine, 100 mg, Oral, Daily      Continuous Infusions:insulin, 0-100 Units/hr, Last Rate: Stopped (22 0733)  milrinone, 0.125 mcg/kg/min, Last Rate: 0.125 mcg/kg/min (22 1405)      PRN Meds:.•  acetaminophen **OR** acetaminophen **OR** acetaminophen  •  bisacodyl  •  dextrose  •  dextrose  •  glucagon (human recombinant)  •  HYDROcodone-acetaminophen **OR** HYDROcodone-acetaminophen  •  insulin lispro **AND** insulin lispro  •  ipratropium-albuterol  •  LORazepam **OR** LORazepam **OR** LORazepam **OR** LORazepam **OR** LORazepam **OR** LORazepam **OR** LORazepam **OR** LORazepam  •  magnesium hydroxide  •  [] Morphine **AND** naloxone  •  ondansetron  •  potassium chloride **OR** potassium chloride    Patient Active Problem List   Diagnosis   • COPD exacerbation (HCC)   • Obesity (BMI 30-39.9)   • Abnormal nuclear stress test   • Acute renal insufficiency   • Alcohol dependence (HCC)   • Essential hypertension   • Other emphysema (HCC)   • Coronary artery disease   • Syncope, unspecified syncope type         Physical Exam:    General: Alert, cooperative, no distress, appears stated age, up to chair  Lines and tubes right IJ line was swollen  Head:  Normocephalic, atraumatic, mucous membranes moist  Eyes:  Conjunctivae/corneas clear, EOM's intact     Neck:  Supple,  no bruit  Lungs:  Clear to auscultation bilaterally, no wheezes rhonchi rales are noted, chest tubes  Chest wall: No tenderness  Heart::  Regular rate and rhythm, S1 and S2 normal, 1/6 holosystolic murmur.  No rub or gallop  Abdomen: Soft, non-tender, nondistended bowel sounds active.  Obese  Extremities: No cyanosis, clubbing, or edema, leg bandaged  Pulses: Diminished pedal pulses  Skin:  No rashes or lesions  Neuro/psych: A&O x3. CN II through XII are grossly intact with appropriate affect  Unchanged from prior encounter    Vital Signs:  Vitals:    22 1700 22 1801 22 1802 22 1900    BP: 138/81  109/58 129/75   BP Location:       Patient Position:       Pulse: 91 86     Resp:       Temp:    98.2 °F (36.8 °C)   TempSrc:       SpO2: 98% 97%  98%   Weight:       Height:         Wt Readings from Last 1 Encounters:   06/13/22 107 kg (235 lb)       Intake/Output Summary (Last 24 hours) at 6/13/2022 2115  Last data filed at 6/13/2022 1405  Gross per 24 hour   Intake 600 ml   Output 1640 ml   Net -1040 ml         Results Review:     CBC    Results from last 7 days   Lab Units 06/13/22  0530 06/12/22  0231 06/11/22  0355 06/11/22  0101 06/10/22  2254 06/10/22  2201 06/10/22  2147 06/10/22  1950 06/10/22  1430 06/10/22  0403 06/09/22  0500   WBC 10*3/mm3 10.50 12.40* 15.20*  --   --  22.40*  --  24.90*  --  11.40* 10.10   HEMOGLOBIN g/dL 9.8* 9.8* 10.0*  --   --  10.5*  --  8.5*  --  13.0 13.8   HEMOGLOBIN, POC g/dL  --   --   --  10.6* 11.2*  --  11.8*  --    < >  --   --    PLATELETS 10*3/mm3 127* 139* 145  --   --  170  --  186  --  247 250    < > = values in this interval not displayed.     Cr Clearance Estimated Creatinine Clearance: 98.7 mL/min (by C-G formula based on SCr of 0.89 mg/dL).  Coag   Results from last 7 days   Lab Units 06/10/22  2201 06/10/22  1950 06/09/22  0500   INR  1.14* 1.25* 1.05   APTT seconds 27.7 29.7 26.6     HbA1C   Lab Results   Component Value Date    HGBA1C 5.5 06/03/2022     Blood Glucose   Glucose   Date/Time Value Ref Range Status   06/13/2022 2059 132 (H) 70 - 105 mg/dL Final     Comment:     Serial Number: [id number]:  [id number]   06/13/2022 1622 128 (H) 70 - 105 mg/dL Final     Comment:     Serial Number: [id number]:  [id number]   06/13/2022 1103 129 (H) 70 - 105 mg/dL Final     Comment:     Serial Number: [id number]:  [id number]   06/13/2022 0745 129 (H) 70 - 105 mg/dL Final     Comment:     Serial Number: [id number]:  [id number]   06/13/2022 0634 114 (H) 70 - 105 mg/dL Final     Comment:     Serial Number: [id number]:  [id number]    06/13/2022 0530 122 (H) 70 - 105 mg/dL Final     Comment:     Serial Number: [id number]:  [id number]   06/13/2022 0412 112 (H) 70 - 105 mg/dL Final     Comment:     Serial Number: [id number]:  [id number]   06/13/2022 0310 94 70 - 105 mg/dL Final     Comment:     Serial Number: [id number]:  [id number]     Infection   Results from last 7 days   Lab Units 06/12/22  1537   RESPCX  Rare The culture consists of normal respiratory sanaz. This is a preliminary report; final report to follow.     CMP   Results from last 7 days   Lab Units 06/13/22  0530 06/12/22  0231 06/11/22  0355 06/10/22  2201 06/10/22  0403 06/09/22  0500 06/08/22  1411 06/08/22  0403   SODIUM mmol/L 137 137 139 137 136 136  --  136   POTASSIUM mmol/L 4.1 4.1 4.4 4.7 4.7 4.2 4.7 5.7*   CHLORIDE mmol/L 101 105 103 102 96* 98  --  98   CO2 mmol/L 24.0 22.0 23.0 26.0 30.0* 28.0  --  30.0*   BUN mg/dL 19 16 24* 24* 26* 22  --  27*   CREATININE mg/dL 0.89 0.76 1.24 1.36* 1.17 0.85  --  0.96   GLUCOSE mg/dL 119* 106* 139* 146* 104* 85  --  128*   ALBUMIN g/dL  --   --  3.90 3.50  --  3.00*  --   --    BILIRUBIN mg/dL  --   --  0.6 0.8  --  0.3  --   --    ALK PHOS U/L  --   --  38* 43  --  52  --   --    AST (SGOT) U/L  --   --  47* 46*  --  25  --   --    ALT (SGPT) U/L  --   --  36 40  --  44*  --   --      ABG    Results from last 7 days   Lab Units 06/13/22  0853 06/11/22  1256 06/11/22  1048 06/11/22  0705 06/11/22  0348 06/11/22  0101 06/10/22  2254   PH, ARTERIAL pH units 7.446 7.408 7.427 7.393 7.390 7.359 7.368   PCO2, ARTERIAL mm Hg 36.0 40.1 36.2 41.5 43.9 46.9 45.7   PO2 ART mm Hg 71.2* 75.0* 85.9 110.4* 99.2 139.8* 94.5   O2 SATURATION ART % 94.9 95.0 96.8 98.3* 97.6 99.0* 97.0   BASE EXCESS ART mmol/L 0.8 0.6 -0.3* 0.3 1.3 0.6 0.6     UA    Results from last 7 days   Lab Units 06/13/22  0917   NITRITE UA  Negative   WBC UA /HPF 0-2*   BACTERIA UA /HPF None Seen   SQUAM EPITHEL UA /HPF 0-2     ANGELO  No results found for:  POCMETH, POCAMPHET, POCBARBITUR, POCBENZO, POCCOCAINE, POCOPIATES, POCOXYCODO, POCPHENCYC, POCPROPOXY, POCTHC, POCTRICYC  Lysis Labs   Results from last 7 days   Lab Units 06/13/22  0530 06/12/22  0231 06/11/22  0355 06/11/22  0101 06/10/22  2254 06/10/22  2201 06/10/22  2147 06/10/22  1950 06/10/22  1430 06/10/22  0403 06/09/22  0500 06/08/22  0403   INR   --   --   --   --   --  1.14*  --  1.25*  --   --  1.05  --    APTT seconds  --   --   --   --   --  27.7  --  29.7  --   --  26.6  --    FIBRINOGEN mg/dL  --   --   --   --   --  449  --  436  --   --   --   --    HEMOGLOBIN g/dL 9.8* 9.8* 10.0*  --   --  10.5*  --  8.5*  --  13.0 13.8 13.3   HEMOGLOBIN, POC g/dL  --   --   --  10.6* 11.2*  --  11.8*  --    < >  --   --   --    PLATELETS 10*3/mm3 127* 139* 145  --   --  170  --  186  --  247 250 237   CREATININE mg/dL 0.89 0.76 1.24  --   --  1.36*  --   --   --  1.17 0.85 0.96    < > = values in this interval not displayed.     Radiology(recent) XR Chest 1 View    Result Date: 6/13/2022  IMPRESSION :  1. Interval removal of [place]-Demarco catheter. Lines and tubes are otherwise stable. 2. Postsurgical changes from median sternotomy and CABG. 3. Mild pulmonary vascular congestion and dependent opacities at the lung bases without great change given differences in technique from the comparison[. Slight improved aeration right upper lobe opacity peripherally from prior study.  Electronically Signed By-Hossein Alvarez On:6/13/2022 7:56 AM This report was finalized on [id number] by  Hossein Alvarez, .    XR Chest 1 View    Result Date: 6/12/2022  1. Remaining support devices appear in expected position. 2. Low lung volumes with streaky bibasilar airspace opacity and right upper lobe airspace opacity likely representing atelectasis and/or infection. 3. No evidence of significant pleural effusion or evidence of pneumothorax.  Electronically Signed By-Ben Juarez MD On:6/12/2022 8:44 AM This report was finalized  on [id number] by  Ben Juarez MD.        Results from last 7 days   Lab Units 06/13/22  0530   CK TOTAL U/L 456*       Imaging Results (Last 24 Hours)     Procedure Component Value Units Date/Time    XR Chest 1 View [[id number]] Collected: 06/13/22 0752     Updated: 06/13/22 0758    Narrative:         DATE OF EXAM:   6/13/2022 7:20 AM     PROCEDURE:   XR CHEST 1 VW-     INDICATIONS:   increase O2 demands; R55-Syncope and collapse; F10.920-Alcohol use,  unspecified with intoxication, uncomplicated; J96.01-Acute respiratory  failure with hypoxia; I95.1-Orthostatic hypotension; R77.8-Other  specified abnormalities of plasma proteins; N17.9-Acute kidney failure,  unspecified; J44.1-Chronic obstructive pulmonary disease with (acute)  exacerbation; R94.39-Abnormal result of other cardiov     COMPARISON:  06/12/2022 and prior     TECHNIQUE:   Portable Chest     FINDINGS:      Patient status post median sternotomy and CABG.     Heart size appears stable. Pulmonary vasculature is mildly congested and  indistinct. This is accentuated by low lung volumes. Vascular sheath  projects of the SVC. There has been interval removal of the [place]-Demarco  catheter.     Mediastinal drains and left sided chest tube appear grossly stable.  Dependent pleural parenchymal changes at the left lung base again noted  without great change given differences in technique. Increased hazy and  interstitial opacities on the right with a perihilar and dependent  predominance again noted. Slight improved aeration right upper lobe  airspace opacity noted. Small amount of fluid noted within the minor  fissure. No definite pneumothorax or pneumomediastinum noted. Osseous  structures are stable.        Impression:      IMPRESSION :      1. Interval removal of [place]-Demarco catheter. Lines and tubes are otherwise  stable.  2. Postsurgical changes from median sternotomy and CABG.  3. Mild pulmonary vascular congestion and dependent opacities at the  lung  "bases without great change given differences in technique from the  comparison[. Slight improved aeration right upper lobe opacity  peripherally from prior study.     Electronically Signed By-Hossein Alvarez On:2022 7:56 AM  This report was finalized on [id number] by  Hossein Alvarez, .          Cardiac Studies:  Echo- Results for orders placed during the hospital encounter of 22    Adult Transthoracic Echo Complete With Contrast if Necessary Per Protocol (With Agitated Saline)    Interpretation Summary  · Left ventricular ejection fraction appears to be 56 - 60%.  · The right ventricular cavity is mildly dilated.  · Mild dilation of the aortic root is present.  · No pericardial effusion noted    Stress Myoview-  Cath-        Frank Giraldo MD  22  21:15 EDT    Electronically signed by Frank Giraldo MD at 22 791     Cindy Arellano MD at 22 1015          PULMONARY CRITICAL CARE PROGRESS  NOTE      PATIENT IDENTIFICATION:  Name: Chi Quinteros  MRN: [id number]  :  1955     Age: 67 y.o.  Sex: male    DATE OF Note:  2022   Referring Physician: No admitting provider for patient encounter.                  Subjective:   Still confused  On milrinone  On 4 L  no SOB, no chest pain,   Able to swallow no nausea or vomiting, no change in bowel habit,   Good urine out put no dysuria,  no new  skin rash or itching.      Objective:  tMax 24 hrs: Temp (24hrs), Av °F (37.2 °C), Min:97.6 °F (36.4 °C), Max:99.5 °F (37.5 °C)      Vitals Ranges:   Temp:  [97.6 °F (36.4 °C)-99.5 °F (37.5 °C)] 99.2 °F (37.3 °C)  Heart Rate:  [75-93] 87  Resp:  [20-35] 28  BP: ()/(51-86) 111/69    Intake and Output Last 3 Shifts:   I/O last 3 completed shifts:  In: 3133.5 [P.O.:840; I.V.:2293.5]  Out: 4259 [Urine:3185; Chest Tube:1074]    Exam:  /69   Pulse 87   Temp 99.2 °F (37.3 °C) (Oral)   Resp 28   Ht 177.8 cm (70\")   Wt 107 kg (235 lb)   SpO2 97%   BMI 33.72 kg/m²     General " Appearance:   AA confused   HEENT:  Normocephalic, without obvious abnormality. Conjunctivae/corneas clear.  Normal external ear canals. Nares normal, no drainage     Neck:  Supple, symmetrical, trachea midline. No JVD.  Lungs /Chest wall:   Bilateral basal rhonchi, respirations unlabored, symmetrical wall movement.     Heart:  Regular rate and rhythm, systolic murmur PMI left sternal border  Abdomen: Soft, nontender, no masses, no organomegaly.    Extremities: Trace edema, no clubbing or cyanosis        Medications:    Current Facility-Administered Medications:   •  acetaminophen (TYLENOL) tablet 650 mg, 650 mg, Oral, Q4H PRN **OR** acetaminophen (TYLENOL) 160 MG/5ML solution 650 mg, 650 mg, Oral, Q4H PRN **OR** acetaminophen (TYLENOL) suppository 650 mg, 650 mg, Rectal, Q4H PRN, Martha Tinoco APRN  •  albuterol (PROVENTIL) nebulizer solution 0.083% 2.5 mg/3mL, 2.5 mg, Nebulization, Q6H PRN, Ca Santo MD, 2.5 mg at 06/13/22 0649  •  arformoterol (BROVANA) nebulizer solution 15 mcg, 15 mcg, Nebulization, BID - RT, Martha Tinoco APRN, 15 mcg at 06/13/22 0625  •  aspirin EC tablet 81 mg, 81 mg, Oral, Daily, Martha Tinoco APRN, 81 mg at 06/13/22 0909  •  atorvastatin (LIPITOR) tablet 40 mg, 40 mg, Oral, Nightly, Dre Cooper MD  •  bisacodyl (DULCOLAX) suppository 10 mg, 10 mg, Rectal, Daily PRN, Martha Tinoco APRN  •  budesonide (PULMICORT) nebulizer solution 0.5 mg, 0.5 mg, Nebulization, BID - RT, Martha Tinoco APRN, 0.5 mg at 06/13/22 0630  •  bumetanide (BUMEX) tablet 1 mg, 1 mg, Oral, BID, Dre Cooper MD  •  chlorhexidine (PERIDEX) 0.12 % solution 15 mL, 15 mL, Mouth/Throat, Q12H, Martha Tinoco APRN, 15 mL at 06/13/22 1000  •  dextrose (D50W) (25 g/50 mL) IV injection 10-50 mL, 10-50 mL, Intravenous, Q15 Min PRN, Martha Tinoco APRN  •  dextrose (GLUTOSE) oral gel 15 g, 15 g, Oral, Q15 Min PRN, Martha Tinoco APRN  •  Enoxaparin Sodium (LOVENOX) syringe 40 mg, 40  mg, Subcutaneous, Q24H, Martha Tinoco APRN, 40 mg at 06/12/22 1521  •  gabapentin (NEURONTIN) capsule 100 mg, 100 mg, Oral, Q8H, Christal Vora APRTIFFANIE, 100 mg at 06/13/22 0541  •  glucagon (human recombinant) (GLUCAGEN DIAGNOSTIC) 1 mg in sterile water (preservative free) 1 mL injection, 1 mg, Intramuscular, Q15 Min PRN, Martha Tinoco APRN  •  guaiFENesin (MUCINEX) 12 hr tablet 1,200 mg, 1,200 mg, Oral, Q12H, Martha Tinoco APRN, 1,200 mg at 06/13/22 0217  •  HYDROcodone-acetaminophen (NORCO) 5-325 MG per tablet 1 tablet, 1 tablet, Oral, Q6H PRN **OR** HYDROcodone-acetaminophen (NORCO) 5-325 MG per tablet 2 tablet, 2 tablet, Oral, Q6H PRN, Christal Vora APRN, 2 tablet at 06/13/22 0712  •  insulin lispro (ADMELOG) injection 0-7 Units, 0-7 Units, Subcutaneous, TID AC **AND** insulin lispro (ADMELOG) injection 0-7 Units, 0-7 Units, Subcutaneous, PRN, Christal Vora, APRN  •  insulin regular 1 unit/mL in 0.9% sodium chloride, 0-100 Units/hr, Intravenous, Titrated, Martha Tinoco APRN, Stopped at 06/13/22 0733  •  ipratropium-albuterol (DUO-NEB) nebulizer solution 3 mL, 3 mL, Nebulization, Q4H PRN, Day, Kori, APRN  •  ipratropium-albuterol (DUO-NEB) nebulizer solution 3 mL, 3 mL, Nebulization, Once, Shivam Recio MD  •  LORazepam (ATIVAN) tablet 0.5 mg, 0.5 mg, Oral, Q2H PRN **OR** LORazepam (ATIVAN) injection 0.5 mg, 0.5 mg, Intravenous, Q2H PRN, 0.5 mg at 06/13/22 0216 **OR** LORazepam (ATIVAN) tablet 1 mg, 1 mg, Oral, Q1H PRN **OR** LORazepam (ATIVAN) injection 1 mg, 1 mg, Intravenous, Q1H PRN **OR** LORazepam (ATIVAN) injection 1 mg, 1 mg, Intravenous, Q15 Min PRN, 1 mg at 06/13/22 0524 **OR** LORazepam (ATIVAN) injection 1 mg, 1 mg, Intramuscular, Q15 Min PRN **OR** LORazepam (ATIVAN) injection 2 mg, 2 mg, Intravenous, Q1H PRN, 2 mg at 06/13/22 0707 **OR** LORazepam (ATIVAN) tablet 2 mg, 2 mg, Oral, Q1H PRN, Shivam Recio MD  •  magnesium hydroxide (MILK OF  MAGNESIA) suspension 10 mL, 10 mL, Oral, Daily PRN, Martha Tinoco, APRN  •  milrinone (PRIMACOR) 20 mg in 100 mL D5W infusion, 0.125 mcg/kg/min, Intravenous, Titrated, Christal Vora, APRN, Last Rate: 4.05 mL/hr at 22 1330, 0.125 mcg/kg/min at 22 1330  •  mupirocin (BACTROBAN) 2 % nasal ointment 1 application, 1 application, Each Nare, BID, Martha Tinoco, APRN, 1 application at 22 0909  •  [] morphine injection 2 mg, 2 mg, Intravenous, Q2H PRN, 2 mg at 22 0358 **AND** naloxone (NARCAN) injection 0.4 mg, 0.4 mg, Intravenous, Q5 Min PRN, Martha Tinoco APRN  •  ondansetron (ZOFRAN) injection 4 mg, 4 mg, Intravenous, Q6H PRN, Martha Tinoco, APRTIFFANIE  •  [COMPLETED] pantoprazole (PROTONIX) injection 40 mg, 40 mg, Intravenous, Once, 40 mg at 06/10/22 2313 **FOLLOWED BY** pantoprazole (PROTONIX) EC tablet 40 mg, 40 mg, Oral, QAM, Yesy-Yaniv, Christal L, APRN, 40 mg at 22 0629  •  polyethylene glycol (MIRALAX) packet 17 g, 17 g, Oral, BID, Diony Christal PELON, APRN, 17 g at 22  •  potassium chloride (K-DUR,KLOR-CON) CR tablet 20 mEq, 20 mEq, Oral, PRN **OR** potassium chloride (KLOR-CON) packet 20 mEq, 20 mEq, Oral, PRN, Martha Tinoco, ERICA  •  potassium chloride (K-DUR,KLOR-CON) CR tablet 20 mEq, 20 mEq, Oral, Daily, Diony, Christal L, APRN, 20 mEq at 22 0912  •  sennosides-docusate (PERICOLACE) 8.6-50 MG per tablet 2 tablet, 2 tablet, Oral, Nightly, Christal Vora APRN, 2 tablet at 22  •  sodium chloride 3 % nebulizer solution 4 mL, 4 mL, Nebulization, BID - RT, Draw, MD Dany, 4 mL at 22 0625  •  tamsulosin (FLOMAX) 24 hr capsule 0.4 mg, 0.4 mg, Oral, Daily, Christal Vora APRN, 0.4 mg at 22 1106    Data Review:  All labs (24hrs):   Recent Results (from the past 24 hour(s))   POC Glucose Once    Collection Time: 22 10:37 AM    Specimen: Blood   Result Value Ref Range     Glucose 103 70 - 105 mg/dL   POC Glucose Once    Collection Time: 06/12/22 11:45 AM    Specimen: Blood   Result Value Ref Range    Glucose 120 (H) 70 - 105 mg/dL   POC Glucose Once    Collection Time: 06/12/22 12:53 PM    Specimen: Blood   Result Value Ref Range    Glucose 124 (H) 70 - 105 mg/dL   POC Glucose Once    Collection Time: 06/12/22  1:59 PM    Specimen: Blood   Result Value Ref Range    Glucose 132 (H) 70 - 105 mg/dL   Respiratory Culture - Sputum, ET Suction    Collection Time: 06/12/22  3:37 PM    Specimen: ET Suction; Sputum   Result Value Ref Range    Gram Stain Few (2+) Epithelial cells per low power field     Gram Stain Many (4+) WBCs per low power field     Gram Stain Few (2+) Gram negative bacilli     Gram Stain       Rare (1+) Mixed bacterial morphotypes seen on Gram Stain   POC Glucose Once    Collection Time: 06/12/22  4:00 PM    Specimen: Blood   Result Value Ref Range    Glucose 137 (H) 70 - 105 mg/dL   POC Glucose Once    Collection Time: 06/12/22  4:37 PM    Specimen: Blood   Result Value Ref Range    Glucose 133 (H) 70 - 105 mg/dL   POC Glucose Once    Collection Time: 06/12/22  6:43 PM    Specimen: Blood   Result Value Ref Range    Glucose 103 70 - 105 mg/dL   POC Glucose Once    Collection Time: 06/12/22  8:57 PM    Specimen: Blood   Result Value Ref Range    Glucose 123 (H) 70 - 105 mg/dL   POC Glucose Once    Collection Time: 06/12/22 11:03 PM    Specimen: Blood   Result Value Ref Range    Glucose 125 (H) 70 - 105 mg/dL   POC Glucose Once    Collection Time: 06/13/22  1:06 AM    Specimen: Blood   Result Value Ref Range    Glucose 109 (H) 70 - 105 mg/dL   POC Glucose Once    Collection Time: 06/13/22  3:10 AM    Specimen: Blood   Result Value Ref Range    Glucose 94 70 - 105 mg/dL   POC Glucose Once    Collection Time: 06/13/22  4:12 AM    Specimen: Blood   Result Value Ref Range    Glucose 112 (H) 70 - 105 mg/dL   CBC (No Diff)    Collection Time: 06/13/22  5:30 AM    Specimen: Blood    Result Value Ref Range    WBC 10.50 3.40 - 10.80 10*3/mm3    RBC 3.10 (L) 4.14 - 5.80 10*6/mm3    Hemoglobin 9.8 (L) 13.0 - 17.7 g/dL    Hematocrit 30.3 (L) 37.5 - 51.0 %    MCV 97.7 (H) 79.0 - 97.0 fL    MCH 31.6 26.6 - 33.0 pg    MCHC 32.4 31.5 - 35.7 g/dL    RDW 14.0 12.3 - 15.4 %    RDW-SD 48.1 37.0 - 54.0 fl    MPV 8.3 6.0 - 12.0 fL    Platelets 127 (L) 140 - 450 10*3/mm3   Basic Metabolic Panel    Collection Time: 06/13/22  5:30 AM    Specimen: Blood   Result Value Ref Range    Glucose 119 (H) 65 - 99 mg/dL    BUN 19 8 - 23 mg/dL    Creatinine 0.89 0.76 - 1.27 mg/dL    Sodium 137 136 - 145 mmol/L    Potassium 4.1 3.5 - 5.2 mmol/L    Chloride 101 98 - 107 mmol/L    CO2 24.0 22.0 - 29.0 mmol/L    Calcium 8.0 (L) 8.6 - 10.5 mg/dL    BUN/Creatinine Ratio 21.3 7.0 - 25.0    Anion Gap 12.0 5.0 - 15.0 mmol/L    eGFR 93.9 >60.0 mL/min/1.73   CK    Collection Time: 06/13/22  5:30 AM    Specimen: Blood   Result Value Ref Range    Creatine Kinase 456 (H) 20 - 200 U/L   Magnesium    Collection Time: 06/13/22  5:30 AM    Specimen: Blood   Result Value Ref Range    Magnesium 2.3 1.6 - 2.4 mg/dL   Phosphorus    Collection Time: 06/13/22  5:30 AM    Specimen: Blood   Result Value Ref Range    Phosphorus 2.5 2.5 - 4.5 mg/dL   POC Glucose Once    Collection Time: 06/13/22  5:30 AM    Specimen: Blood   Result Value Ref Range    Glucose 122 (H) 70 - 105 mg/dL   POC Glucose Once    Collection Time: 06/13/22  6:34 AM    Specimen: Blood   Result Value Ref Range    Glucose 114 (H) 70 - 105 mg/dL   POC Glucose Once    Collection Time: 06/13/22  7:45 AM    Specimen: Blood   Result Value Ref Range    Glucose 129 (H) 70 - 105 mg/dL   Blood Gas, Arterial -    Collection Time: 06/13/22  8:53 AM    Specimen: Arterial Blood   Result Value Ref Range    Site Right Brachial     Nathan's Test Positive     pH, Arterial 7.446 7.350 - 7.450 pH units    pCO2, Arterial 36.0 35.0 - 48.0 mm Hg    pO2, Arterial 71.2 (L) 83.0 - 108.0 mm Hg    HCO3,  Arterial 24.8 21.0 - 28.0 mmol/L    Base Excess, Arterial 0.8 0.0 - 3.0 mmol/L    O2 Saturation, Arterial 94.9 94.0 - 98.0 %    CO2 Content 25.9 22 - 29 mmol/L    Barometric Pressure for Blood Gas      Modality Cannula     FIO2 <21 %    Hemodilution No    Urinalysis With Culture If Indicated - Urine, Catheter    Collection Time: 06/13/22  9:17 AM    Specimen: Urine, Catheter   Result Value Ref Range    Color, UA Yellow Yellow, Straw    Appearance, UA Clear Clear    pH, UA <=5.0 5.0 - 8.0    Specific Gravity, UA 1.016 1.005 - 1.030    Glucose, UA Negative Negative    Ketones, UA Trace (A) Negative    Bilirubin, UA Negative Negative    Blood, UA Moderate (2+) (A) Negative    Protein, UA Negative Negative    Leuk Esterase, UA Negative Negative    Nitrite, UA Negative Negative    Urobilinogen, UA 1.0 E.U./dL 0.2 - 1.0 E.U./dL   Urinalysis, Microscopic Only - Urine, Catheter    Collection Time: 06/13/22  9:17 AM    Specimen: Urine, Catheter   Result Value Ref Range    RBC, UA 13-20 (A) None Seen /HPF    WBC, UA 0-2 (A) None Seen /HPF    Bacteria, UA None Seen None Seen /HPF    Squamous Epithelial Cells, UA 0-2 None Seen, 0-2 /HPF    Hyaline Casts, UA 3-6 None Seen /LPF    Methodology Automated Microscopy         Imaging:  XR Chest 1 View  Narrative:    DATE OF EXAM:   6/13/2022 7:20 AM     PROCEDURE:   XR CHEST 1 VW-     INDICATIONS:   increase O2 demands; R55-Syncope and collapse; F10.920-Alcohol use,  unspecified with intoxication, uncomplicated; J96.01-Acute respiratory  failure with hypoxia; I95.1-Orthostatic hypotension; R77.8-Other  specified abnormalities of plasma proteins; N17.9-Acute kidney failure,  unspecified; J44.1-Chronic obstructive pulmonary disease with (acute)  exacerbation; R94.39-Abnormal result of other cardiov     COMPARISON:  06/12/2022 and prior     TECHNIQUE:   Portable Chest     FINDINGS:      Patient status post median sternotomy and CABG.     Heart size appears stable. Pulmonary vasculature  is mildly congested and  indistinct. This is accentuated by low lung volumes. Vascular sheath  projects of the SVC. There has been interval removal of the Buxton-Demarco  catheter.     Mediastinal drains and left sided chest tube appear grossly stable.  Dependent pleural parenchymal changes at the left lung base again noted  without great change given differences in technique. Increased hazy and  interstitial opacities on the right with a perihilar and dependent  predominance again noted. Slight improved aeration right upper lobe  airspace opacity noted. Small amount of fluid noted within the minor  fissure. No definite pneumothorax or pneumomediastinum noted. Osseous  structures are stable.      Impression: IMPRESSION :      1. Interval removal of Buxton-Demarco catheter. Lines and tubes are otherwise  stable.  2. Postsurgical changes from median sternotomy and CABG.  3. Mild pulmonary vascular congestion and dependent opacities at the  lung bases without great change given differences in technique from the  comparison[. Slight improved aeration right upper lobe opacity  peripherally from prior study.     Electronically Signed By-Hossein Alvarez On:6/13/2022 7:56 AM  This report was finalized on [id number] by  Hossein Alvarez, .       ASSESSMENT:    S?P CABG with ascending aortic aneurysm repair   COPD exacerbation (HCC)    Obesity (BMI 30-39.9)    Abnormal nuclear stress test    Acute renal insufficiency    Alcohol dependence (HCC)    Essential hypertension    Other emphysema (HCC)    Coronary artery disease    Syncope, unspecified syncope type       PLAN:  Post op care  Continue to manage behavior  Diuretics    Bronchodilator  Inhaled corticosteroids  Electrolytes/ glycemic control  DVT and GI prophylaxis.    Total Critical care time in direct medical management (   ) minutes. This time specifically excludes time spent performing procedures.  iCndy Arellano MD. D, ABSM.     6/13/2022  10:15 EDT     Electronically  signed by Cindy Arellano MD at 06/13/22 1740          Consult Notes (last 48 hours)      Adam Henao PA-C at 06/14/22 1916      Consult Orders    1. Inpatient Psychiatrist Consult [[id number]] ordered by Martha Tinoco APRN at 06/13/22 1659          Attestation signed by Yaritza Morillo MD at 06/15/22 0740    I have reviewed the mid-level documentation, and agree with the documentation, medical decision making and treatment plan as outlined by the mid-level provider.    This document has been electronically signed by Yaritza Morillo MD  Anika 15, 2022 07:40 EDT                                                               Referring Provider: Benson Hughes * MD  Reason for Consultation: post-operative delirium    Chief Complaint: sleep    Subjective .     History of Present Illness:  The patient is a 67 y.o. male with a hx of alcohol abuse per nursing who was admitted secondary to syncope and eventually required CABG procedure. Psychiatry was consulted due to concern for post-operative delirium. Pt remains confused per nursing and is unable to speak clearly, speech dysarthric, and only partially oriented to person and situation. Confusion prevents pt from confirming hallucinations or delusions, but pt is able to respond occasionally with short answers. He admits to significant sleep difficulty, and is willing to try a medication for his confusion. Pt wasn't able to respond to questioning about anx/dep and SI/HI, but no concerns have been reported by nursing so far.    Review of Systems   Pertinent items are noted in HPI, all other systems reviewed and negative    History  -Past Psychiatric History: Unable to acquire due to confusion. Known hx of alcoholism per nursing.  -Psychiatric Hospitalizations: Unable to acquire due to confusion.  -Suicide Attempts: Unable to acquire due to confusion.  Past Medical History:   Diagnosis Date   • Back pain    • Emphysema lung (HCC)    • Hypertension       History reviewed. No pertinent family history.  Social History     Tobacco Use   • Smoking status: Former Smoker     Quit date: 2021     Years since quittin.0   • Smokeless tobacco: Never Used   Vaping Use   • Vaping Use: Never used   Substance Use Topics   • Alcohol use: Yes     Comment: 3-4 beers daily   • Drug use: Never     Medications Prior to Admission   Medication Sig Dispense Refill Last Dose   • amLODIPine (NORVASC) 5 MG tablet Take 5 mg by mouth Daily.   2022 at Unknown time   • Fluticasone-Umeclidin-Vilant (TRELEGY) 200-62.5-25 MCG/INH inhaler Inhale 1 puff Daily.   2022 at Unknown time   • gabapentin (NEURONTIN) 600 MG tablet Take 600 mg by mouth 2 (Two) Times a Day.   2022 at Unknown time   • lisinopril (PRINIVIL,ZESTRIL) 40 MG tablet Take 40 mg by mouth Daily.   2022 at Unknown time   • pravastatin (PRAVACHOL) 20 MG tablet Take 20 mg by mouth Daily.   2022 at Unknown time   • tamsulosin (FLOMAX) 0.4 MG capsule 24 hr capsule Take 1 capsule by mouth Daily.   2022 at Unknown time   • albuterol sulfate  (90 Base) MCG/ACT inhaler Inhale 2 puffs Every 4 (Four) Hours As Needed for Wheezing.         Scheduled Meds:  arformoterol, 15 mcg, Nebulization, BID - RT  aspirin, 81 mg, Oral, Daily  atorvastatin, 40 mg, Oral, Nightly  budesonide, 0.5 mg, Nebulization, BID - RT  bumetanide, 0.5 mg, Oral, Daily  chlorhexidine, 15 mL, Mouth/Throat, Q12H  enoxaparin, 40 mg, Subcutaneous, Q24H  folic acid, 1 mg, Oral, Daily  gabapentin, 100 mg, Oral, Q8H  guaiFENesin, 400 mg, Oral, Q6H  insulin lispro, 0-7 Units, Subcutaneous, TID AC  metoprolol tartrate, 12.5 mg, Oral, Q12H  multivitamin, 1 tablet, Oral, Daily  mupirocin, 1 application, Each Nare, BID  pantoprazole, 40 mg, Oral, QAM  polyethylene glycol, 17 g, Oral, BID  potassium chloride, 20 mEq, Oral, Daily  QUEtiapine, 25 mg, Oral, Nightly  senna-docusate sodium, 2 tablet, Oral, Nightly  sodium chloride, 4 mL,  "Nebulization, BID - RT  thiamine, 100 mg, Oral, Daily      Continuous Infusions:     PRN Meds:  •  acetaminophen **OR** acetaminophen **OR** acetaminophen  •  bisacodyl  •  dextrose  •  dextrose  •  glucagon (human recombinant)  •  HYDROcodone-acetaminophen **OR** HYDROcodone-acetaminophen  •  insulin lispro **AND** insulin lispro  •  ipratropium-albuterol  •  LORazepam **OR** LORazepam **OR** LORazepam **OR** LORazepam **OR** LORazepam **OR** LORazepam **OR** LORazepam **OR** LORazepam  •  magnesium hydroxide  •  [] Morphine **AND** naloxone  •  ondansetron  •  potassium chloride **OR** potassium chloride  •  QUEtiapine   Allergies:  Patient has no known allergies.  Objective     Physical Exam:  Vital Signs:   /57   Pulse 85   Temp 98.8 °F (37.1 °C) (Oral)   Resp 20   Ht 177.8 cm (70\")   Wt 107 kg (235 lb 10.8 oz)   SpO2 100%   BMI 33.82 kg/m²     General Appearance:  Well-developed, well-nourished, with NAD but confusion, and limited communication ability.   Head:  Normocephalic, without obvious deformity, atraumatic.   Eyes:          Lids and lashes normal, conjunctivae and sclerae normal, no   icterus, no pallor, corneas clear.   Skin:  Neurologic:  Musculoskeletal: No bleeding, bruising or rash.  Cranial nerves 2 - 12 grossly intact, sensation intact.  Muscle strength/tone: normal  Abnormal Movements: No tremors or abnormal involuntary movements  Gait: Deferred, in bed     Mental Status Exam:   Orientation:  To person and Situation  Memory: Recent and remote memory Unable to evaluate  Mood/Affect: unable to assess.  Hopelessness: unable to assess.  Suicidal Ideations: unable to assess.  Homicidal Ideations: unable to assess.  Hallucinations: unable to assess.  Delusions: unable to assess.  Obsessions: unable to assess.  Behavior and Psychomotor Activity: restless, mild.  Speech: dysarthic, minimal  Thought Process: Unable to demonstrate  Thought Content: unable to assess.  Associations: " unable to assess.  Language: unable to assess.  Concentration and computation: Impaired  Attention Span: Impaired  Fund of Knowledge: Impaired  Reliability: poor  Insight into mental health: Poor  Judgement: Impaired  Impulse Control:  Impaired  Hygiene: fair  Cooperation:  Cooperative  Eye Contact:  Poor  Physical/Medical Issues:  Yes see problem list.     Medications and allergies reviewed.    Lab Results   Component Value Date    GLUCOSE 125 (H) 06/14/2022    CALCIUM 8.6 06/14/2022     06/14/2022    K 4.0 06/14/2022    CO2 28.0 06/14/2022    CL 99 06/14/2022    BUN 17 06/14/2022    CREATININE 0.72 (L) 06/14/2022    BCR 23.6 06/14/2022    ANIONGAP 12.0 06/14/2022     Last Urine Toxicity     LAST URINE TOXICITY RESULTS Latest Ref Rng & Units 6/1/2022    BARBITURATES SCREEN Negative Negative    BENZODIAZEPINE SCREEN, URINE Negative Negative    COCAINE SCREEN, URINE Negative Negative    METHADONE SCREEN, URINE Negative Negative        No results found for: PHENYTOIN, PHENOBARB, VALPROATE, CBMZ  Lab Results   Component Value Date     06/14/2022    BUN 17 06/14/2022    CREATININE 0.72 (L) 06/14/2022    TSH 0.476 06/03/2022    WBC 10.10 06/14/2022     Brief Urine Lab Results  (Last result in the past 365 days)      Color   Clarity   Blood   Leuk Est   Nitrite   Protein   CREAT   Urine HCG        06/13/22 0917 Yellow   Clear   Moderate (2+)   Negative   Negative   Negative               Assessment & Plan     COPD exacerbation (HCC)    Obesity (BMI 30-39.9)    Abnormal nuclear stress test    Acute renal insufficiency    Alcohol dependence (HCC)    Essential hypertension    Other emphysema (HCC)    Coronary artery disease    Syncope, unspecified syncope type    Postoperative delirium    EKG Results: Reviewed.  QTc: 441 (6/12/22)    LABS: Reviewed.  Na: 139 (6/14/22)    Assessment:   Post-operative delirium    Treatment Plan: Pt is confused with limited communication abilities currently, and ativan has been  ineffective per nursing. He is able to admit to poor sleep, unclear if AVH, delusions, or SI/HI are current due to confusion. Slightly restless and only able to show orientation to person and some of situation. Agreeable to medication to help with sleep and confusion. Nursing denies pt triggering CIWA protocol currently.    -Cont CIWA protocol for hx of alcoholism leading to possible withdrawal.  -25mg seroquel nightly for delirium and to promote sedation.  -25mg seroquel prn for breakthrough confusion, agitation, or insomnia also left a nursing disposal.  -Will need to discuss extent of alcoholism and desire to get tx when confusion resolves.  -AM EKG order to monitor for QTc prolongation due to known cardiac pathology.  -Will follow and provide support.    Treatment Plan discussed with: Patient and nursing    I discussed the patients findings and my recommendations with patient and nursing staff    I have reviewed and approved the behavioral health treatment plans and problem list. Yes    Thank you for the consult  Referring MD has access to consult report and progress notes in EMR  Patient was examined wearing appropriate PPE.    Adam Henao PA-C  06/14/22  22:58 EDT    EMR Dragon transcription disclaimer:  Part of this note may be an electronic transcription/translation of spoken language to printed text using the Dragon Dictation System.    Electronically signed by Yaritza Morillo MD at 06/15/22 6406

## 2022-06-15 NOTE — THERAPY TREATMENT NOTE
Acute Care - Speech Language Pathology   Swallow Initial Evaluation [facility]     Patient Name: Chi Quinteros Sr.  : 1955  MRN: [id number]  Today's Date: 6/15/2022               Admit Date: 2022    Visit Dx:     ICD-10-CM ICD-9-CM   1. Syncope, unspecified syncope type  R55 780.2   2. Alcoholic intoxication without complication ([facility])  F10.920 305.00   3. Acute respiratory failure with hypoxemia ([facility])  J96.01 518.81   4. Orthostatic hypotension  I95.1 458.0   5. Elevated troponin  R77.8 790.6   6. FELIBERTO (acute kidney injury) ([facility])  N17.9 584.9   7. COPD exacerbation ([facility])  J44.1 491.21   8. Abnormal nuclear stress test  R94.39 794.39   9. Coronary artery disease involving native coronary artery of native heart, unspecified whether angina present  I25.10 414.01     Patient Active Problem List   Diagnosis   • COPD exacerbation ([facility])   • Obesity (BMI 30-39.9)   • Abnormal nuclear stress test   • Acute renal insufficiency   • Alcohol dependence ([facility])   • Essential hypertension   • Other emphysema ([facility])   • Coronary artery disease   • Syncope, unspecified syncope type   • Postoperative delirium     Past Medical History:   Diagnosis Date   • Back pain    • Emphysema lung ([facility])    • Hypertension      Past Surgical History:   Procedure Laterality Date   • CARDIAC CATHETERIZATION Right 2022    Procedure: Coronary angiography;  Surgeon: Frank Giraldo MD;  Location: [facility] INVASIVE LOCATION;  Service: Cardiovascular;  Laterality: Right;   • CARDIAC CATHETERIZATION N/A 2022    Procedure: Left Heart Cath;  Surgeon: Frank Giraldo MD;  Location: [facility] INVASIVE LOCATION;  Service: Cardiovascular;  Laterality: N/A;   • CARDIAC CATHETERIZATION N/A 2022    Procedure: Left ventriculography;  Surgeon: Frank Giraldo MD;  Location: [facility] INVASIVE LOCATION;  Service: Cardiovascular;  Laterality: N/A;       SLP Recommendation and Plan  SLP Swallowing Diagnosis: mild, oral dysphagia, functional  pharyngeal phase (06/15/22 1100)  SLP Diet Recommendation: puree, thin liquids (06/15/22 1100)  Recommended Precautions and Strategies: upright posture during/after eating, small bites of food and sips of liquid (06/15/22 1100)  SLP Rec. for Method of Medication Administration: meds whole, meds crushed, with thin liquids, with pudding or applesauce (06/15/22 1100)     Monitor for Signs of Aspiration: yes, notify SLP if any concerns, cough (06/15/22 1100)     Swallow Criteria for Skilled Therapeutic Interventions Met: demonstrates skilled criteria (06/15/22 1100)     Rehab Potential/Prognosis, Swallowing: good, to achieve stated therapy goals (06/15/22 1100)  Therapy Frequency (Swallow): PRN (06/15/22 1100)  Predicted Duration Therapy Intervention (Days): until discharge (06/15/22 1100)       Patient was not wearing a face mask during this therapy encounter. Therapist used appropriate personal protective equipment including mask, eye protection and gloves.  Mask used was standard procedure mask. Appropriate PPE was worn during the entire therapy session. Hand hygiene was completed before and after therapy session. Patient is not in enhanced droplet precautions.             SWALLOW EVALUATION (last 72 hours)     SLP Adult Swallow Evaluation     Row Name 06/15/22 1100          Document Type re-evaluation  -MM    Subjective Information no complaints  -MM    Patient Observations alert;cooperative;agree to therapy  -MM    Patient Effort good  -MM    Comment RE-evaluation at bedside followed by VFSS completed this date  -MM    Dentition Assessment missing teeth  Patient reports he does not wear dentures  -MM    Secretion Management --    Mucosal Quality --               Oral Motor General Assessment WFL  -MM               Respiratory Support Currently in Use nasal cannula  -MM    Eating/Swallowing Skills self-fed;fed by SLP  -MM    Positioning During Eating upright 90 degree;upright in bed  -MM    Utensils Used  spoon;cup;straw  -MM    Consistencies Trialed pureed;ice chips;thin liquids  -MM               Oral Prep Phase --    Oral Transit --    Oral Residue --    Pharyngeal Phase --    Clinical Swallow Evaluation Summary Patient sitting upright in bed.  Re-evaluation completed with ice chip, water by spoon, cup and straw along with applesauce trials.  Patient initially does well with all trials given, however towards the end patient had consistent coughing following each trial of applesauce given.  recommend patient participate in a VFSS this date to further assess  -MM         Utensils Used spoon;cup;straw  -MM               Oral Prep Phase impaired oral phase of swallowing  -MM    Oral Transit Phase WFL  -MM    Oral Residue WFL  -MM    VFSS Summary VFSS completed with trials of NTL by spoon 2x, NTL consecutive sips by cup, thin by spoon 2x, thin consecutive by cup and straw, applesauce 3x and peaches 2x. Video fluoroscopic swallow study conducted in the lateral position with pt standing/seated upright.   Bolus formation, cohesion, & transit are WFL for all trials. Patient does exhibit prolonged mastication with poor rotary chew.  Patient is missing many teeth and reports not wearing dentures.  Laryngeal elevation, epiglottic deflection, and forward hyolaryngeal movement are WFL. No remarkable cricopharyngeal or UES findings. There is no laryngeal vestibule penetration, aspiration, or significant hypopharyngeal residue.   Pt presents with oropharyngeal swallow which is judged to be within functional limits under fluoroscopy with all trials assessed.      -MM               Oral Preparatory Phase prolonged manipulation;poor rotary chew  -MM    Prolonged Manipulation mechanical soft  -MM    Poor Rotary Chew mechanical soft  -MM               Initiation of Pharyngeal Swallow WFL  -MM    Pharyngeal Phase functional pharyngeal phase of swallowing  -MM               SLP Swallowing Diagnosis mild;oral dysphagia;functional  pharyngeal phase  -MM    Functional Impact risk of aspiration/pneumonia  -MM    Rehab Potential/Prognosis, Swallowing good, to achieve stated therapy goals  -MM    Swallow Criteria for Skilled Therapeutic Interventions Met demonstrates skilled criteria  -MM               Care Plan Review evaluation/treatment results reviewed  -MM               Therapy Frequency (Swallow) PRN  -MM    Predicted Duration Therapy Intervention (Days) until discharge  -MM    SLP Diet Recommendation puree;thin liquids  -MM    Recommended Diagnostics --    Recommended Precautions and Strategies upright posture during/after eating;small bites of food and sips of liquid  -MM    Oral Care Recommendations Oral Care BID/PRN  -MM    SLP Rec. for Method of Medication Administration meds whole;meds crushed;with thin liquids;with pudding or applesauce  -MM    Monitor for Signs of Aspiration yes;notify SLP if any concerns;cough  -MM               Oral Nutrition/Hydration Goal Selection (SLP) oral nutrition/hydration, SLP goal 1;oral nutrition/hydration, SLP goal 2;oral nutrition/hydration, SLP goal (free text)  -MM               Oral Nutrition/Hydration Goal 1, SLP The patient will maximize swallow function for least restrictive po diet, exhibiting no complications associated with dysphagia, adequate po intake, and demonstrating independent use of safe swallow compensations.  -MM    Time Frame (Oral Nutrition/Hydration Goal 1, SLP) by discharge  -MM    Barriers (Oral Nutrition/Hydration Goal 1, SLP) Patient to initiate a puree and thin liquid diet  -MM    Progress/Outcomes (Oral Nutrition/Hydration Goal 1, SLP) goal ongoing  -MM               Oral Nutrition/Hydration Goal 2, SLP The patient will participate in ongoing assessment of swallow, including re-evaluation clinically and/or including instrumental assessment of swallow if indicated, to further assess swallow function in anticipation to initiate a po diet  -MM    Time Frame (Oral  Nutrition/Hydration Goal 2, SLP) 1 day  -MM    Barriers (Oral Nutrition/Hydration Goal 2, SLP) see above note  -MM    Progress/Outcomes (Oral Nutrition/Hydration Goal 2, SLP) goal met  -MM               Oral Nutrition/Hydration Goal, SLP Patient abilio be seen at a meal within 24-48 hours to assess tolerance of current diet  -MM    Time Frame (Oral Nutrition/Hydration Goal, SLP) 2 days  -MM          User Key  (r) = Recorded By, (t) = Taken By, (c) = Cosigned By    Initials Name Effective Dates    MM Christy Garzon, SLP 06/16/21 -     LF Veronique Lujan, OSMANI 06/16/21 -                 EDUCATION  The patient has been educated in the following areas:   Modified Diet Instruction.        SLP GOALS     Row Name 06/15/22 1100          Oral Nutrition/Hydration Goal 1, SLP The patient will maximize swallow function for least restrictive po diet, exhibiting no complications associated with dysphagia, adequate po intake, and demonstrating independent use of safe swallow compensations.  -MM    Time Frame (Oral Nutrition/Hydration Goal 1, SLP) by discharge  -MM    Barriers (Oral Nutrition/Hydration Goal 1, SLP) Patient to initiate a puree and thin liquid diet  -MM    Progress/Outcomes (Oral Nutrition/Hydration Goal 1, SLP) goal ongoing  -MM              Oral Nutrition/Hydration Goal 2, SLP The patient will participate in ongoing assessment of swallow, including re-evaluation clinically and/or including instrumental assessment of swallow if indicated, to further assess swallow function in anticipation to initiate a po diet  -MM    Time Frame (Oral Nutrition/Hydration Goal 2, SLP) 1 day  -MM    Barriers (Oral Nutrition/Hydration Goal 2, SLP) see above note  -MM    Progress/Outcomes (Oral Nutrition/Hydration Goal 2, SLP) goal met  -MM              Oral Nutrition/Hydration Goal, SLP Patient abilio be seen at a meal within 24-48 hours to assess tolerance of current diet  -MM    Time Frame (Oral Nutrition/Hydration Goal, SLP) 2 days  -MM           User Key  (r) = Recorded By, (t) = Taken By, (c) = Cosigned By    Initials Name Provider Type    Christy Burden, SLP Speech and Language Pathologist     Veronique Lujan, SLP Speech and Language Pathologist                   Time Calculation:                OSMANI Johnston  6/15/2022

## 2022-06-16 LAB
ANION GAP SERPL CALCULATED.3IONS-SCNC: 12 MMOL/L (ref 5–15)
BUN SERPL-MCNC: 21 MG/DL (ref 8–23)
BUN/CREAT SERPL: 27.6 (ref 7–25)
CA-I SERPL ISE-MCNC: 1.14 MMOL/L (ref 1.2–1.3)
CALCIUM SPEC-SCNC: 8.3 MG/DL (ref 8.6–10.5)
CHLORIDE SERPL-SCNC: 97 MMOL/L (ref 98–107)
CO2 SERPL-SCNC: 28 MMOL/L (ref 22–29)
CREAT SERPL-MCNC: 0.76 MG/DL (ref 0.76–1.27)
DEPRECATED RDW RBC AUTO: 46.8 FL (ref 37–54)
EGFRCR SERPLBLD CKD-EPI 2021: 98.5 ML/MIN/1.73
ERYTHROCYTE [DISTWIDTH] IN BLOOD BY AUTOMATED COUNT: 13.8 % (ref 12.3–15.4)
GLUCOSE BLDC GLUCOMTR-MCNC: 133 MG/DL (ref 70–105)
GLUCOSE BLDC GLUCOMTR-MCNC: 142 MG/DL (ref 70–105)
GLUCOSE BLDC GLUCOMTR-MCNC: 148 MG/DL (ref 70–105)
GLUCOSE SERPL-MCNC: 128 MG/DL (ref 65–99)
HCT VFR BLD AUTO: 28.2 % (ref 37.5–51)
HGB BLD-MCNC: 9.2 G/DL (ref 13–17.7)
MAGNESIUM SERPL-MCNC: 2 MG/DL (ref 1.6–2.4)
MCH RBC QN AUTO: 31.6 PG (ref 26.6–33)
MCHC RBC AUTO-ENTMCNC: 32.8 G/DL (ref 31.5–35.7)
MCV RBC AUTO: 96.3 FL (ref 79–97)
PHOSPHATE SERPL-MCNC: 3.8 MG/DL (ref 2.5–4.5)
PLATELET # BLD AUTO: 217 10*3/MM3 (ref 140–450)
PMV BLD AUTO: 8.2 FL (ref 6–12)
POTASSIUM SERPL-SCNC: 3.7 MMOL/L (ref 3.5–5.2)
QT INTERVAL: 341 MS
QT INTERVAL: 403 MS
RBC # BLD AUTO: 2.93 10*6/MM3 (ref 4.14–5.8)
SODIUM SERPL-SCNC: 137 MMOL/L (ref 136–145)
WBC NRBC COR # BLD: 7.1 10*3/MM3 (ref 3.4–10.8)

## 2022-06-16 PROCEDURE — 94799 UNLISTED PULMONARY SVC/PX: CPT

## 2022-06-16 PROCEDURE — 84100 ASSAY OF PHOSPHORUS: CPT | Performed by: INTERNAL MEDICINE

## 2022-06-16 PROCEDURE — 94761 N-INVAS EAR/PLS OXIMETRY MLT: CPT

## 2022-06-16 PROCEDURE — 97110 THERAPEUTIC EXERCISES: CPT

## 2022-06-16 PROCEDURE — 97116 GAIT TRAINING THERAPY: CPT

## 2022-06-16 PROCEDURE — 99024 POSTOP FOLLOW-UP VISIT: CPT | Performed by: NURSE PRACTITIONER

## 2022-06-16 PROCEDURE — 25010000002 ENOXAPARIN PER 10 MG: Performed by: NURSE PRACTITIONER

## 2022-06-16 PROCEDURE — 94664 DEMO&/EVAL PT USE INHALER: CPT

## 2022-06-16 PROCEDURE — 93005 ELECTROCARDIOGRAM TRACING: CPT

## 2022-06-16 PROCEDURE — 99024 POSTOP FOLLOW-UP VISIT: CPT | Performed by: THORACIC SURGERY (CARDIOTHORACIC VASCULAR SURGERY)

## 2022-06-16 PROCEDURE — 97530 THERAPEUTIC ACTIVITIES: CPT

## 2022-06-16 PROCEDURE — 85027 COMPLETE CBC AUTOMATED: CPT | Performed by: NURSE PRACTITIONER

## 2022-06-16 PROCEDURE — 25010000002 CALCIUM GLUCONATE-NACL 1-0.675 GM/50ML-% SOLUTION: Performed by: INTERNAL MEDICINE

## 2022-06-16 PROCEDURE — 92526 ORAL FUNCTION THERAPY: CPT

## 2022-06-16 PROCEDURE — 80048 BASIC METABOLIC PNL TOTAL CA: CPT | Performed by: NURSE PRACTITIONER

## 2022-06-16 PROCEDURE — 82330 ASSAY OF CALCIUM: CPT

## 2022-06-16 PROCEDURE — 99232 SBSQ HOSP IP/OBS MODERATE 35: CPT | Performed by: NURSE PRACTITIONER

## 2022-06-16 PROCEDURE — 83735 ASSAY OF MAGNESIUM: CPT | Performed by: INTERNAL MEDICINE

## 2022-06-16 PROCEDURE — 82962 GLUCOSE BLOOD TEST: CPT

## 2022-06-16 RX ORDER — CALCIUM GLUCONATE 20 MG/ML
1 INJECTION, SOLUTION INTRAVENOUS ONCE
Status: COMPLETED | OUTPATIENT
Start: 2022-06-16 | End: 2022-06-16

## 2022-06-16 RX ORDER — AMOXICILLIN 250 MG
2 CAPSULE ORAL 2 TIMES DAILY
Status: DISCONTINUED | OUTPATIENT
Start: 2022-06-16 | End: 2022-06-17 | Stop reason: HOSPADM

## 2022-06-16 RX ADMIN — CHLORHEXIDINE GLUCONATE 15 ML: 1.2 RINSE ORAL at 22:13

## 2022-06-16 RX ADMIN — CHLORHEXIDINE GLUCONATE 15 ML: 1.2 RINSE ORAL at 12:12

## 2022-06-16 RX ADMIN — AMIODARONE HYDROCHLORIDE 400 MG: 200 TABLET ORAL at 20:58

## 2022-06-16 RX ADMIN — GABAPENTIN 100 MG: 100 CAPSULE ORAL at 21:00

## 2022-06-16 RX ADMIN — POTASSIUM CHLORIDE 20 MEQ: 1.5 POWDER, FOR SOLUTION ORAL at 06:06

## 2022-06-16 RX ADMIN — TAMSULOSIN HYDROCHLORIDE 0.4 MG: 0.4 CAPSULE ORAL at 08:37

## 2022-06-16 RX ADMIN — BUDESONIDE 0.5 MG: 0.5 INHALANT RESPIRATORY (INHALATION) at 07:40

## 2022-06-16 RX ADMIN — BUDESONIDE 0.5 MG: 0.5 INHALANT RESPIRATORY (INHALATION) at 19:20

## 2022-06-16 RX ADMIN — ARFORMOTEROL TARTRATE 15 MCG: 15 SOLUTION RESPIRATORY (INHALATION) at 19:14

## 2022-06-16 RX ADMIN — POLYETHYLENE GLYCOL 3350 17 G: 17 POWDER, FOR SOLUTION ORAL at 20:54

## 2022-06-16 RX ADMIN — POLYETHYLENE GLYCOL 3350 17 G: 17 POWDER, FOR SOLUTION ORAL at 08:37

## 2022-06-16 RX ADMIN — AMIODARONE HYDROCHLORIDE 400 MG: 200 TABLET ORAL at 08:37

## 2022-06-16 RX ADMIN — GABAPENTIN 100 MG: 100 CAPSULE ORAL at 13:39

## 2022-06-16 RX ADMIN — GUAIFENESIN 400 MG: 100 SOLUTION ORAL at 06:06

## 2022-06-16 RX ADMIN — BUMETANIDE 1 MG: 1 TABLET ORAL at 08:37

## 2022-06-16 RX ADMIN — METOPROLOL TARTRATE 12.5 MG: 25 TABLET, FILM COATED ORAL at 08:37

## 2022-06-16 RX ADMIN — GUAIFENESIN 400 MG: 100 SOLUTION ORAL at 00:44

## 2022-06-16 RX ADMIN — PANTOPRAZOLE SODIUM 40 MG: 40 TABLET, DELAYED RELEASE ORAL at 06:06

## 2022-06-16 RX ADMIN — Medication 100 MG: at 08:37

## 2022-06-16 RX ADMIN — GUAIFENESIN 400 MG: 100 SOLUTION ORAL at 12:13

## 2022-06-16 RX ADMIN — FOLIC ACID 1 MG: 1 TABLET ORAL at 08:37

## 2022-06-16 RX ADMIN — HYDROCODONE BITARTRATE AND ACETAMINOPHEN 2 TABLET: 5; 325 TABLET ORAL at 03:45

## 2022-06-16 RX ADMIN — SENNOSIDES AND DOCUSATE SODIUM 2 TABLET: 50; 8.6 TABLET ORAL at 20:55

## 2022-06-16 RX ADMIN — ATORVASTATIN CALCIUM 40 MG: 40 TABLET, FILM COATED ORAL at 20:56

## 2022-06-16 RX ADMIN — METOPROLOL TARTRATE 12.5 MG: 25 TABLET, FILM COATED ORAL at 20:54

## 2022-06-16 RX ADMIN — ARFORMOTEROL TARTRATE 15 MCG: 15 SOLUTION RESPIRATORY (INHALATION) at 07:40

## 2022-06-16 RX ADMIN — GABAPENTIN 100 MG: 100 CAPSULE ORAL at 06:06

## 2022-06-16 RX ADMIN — THERA TABS 1 TABLET: TAB at 08:37

## 2022-06-16 RX ADMIN — SENNOSIDES AND DOCUSATE SODIUM 2 TABLET: 50; 8.6 TABLET ORAL at 12:13

## 2022-06-16 RX ADMIN — CALCIUM GLUCONATE 1 G: 20 INJECTION, SOLUTION INTRAVENOUS at 08:37

## 2022-06-16 RX ADMIN — GUAIFENESIN 400 MG: 100 SOLUTION ORAL at 17:52

## 2022-06-16 RX ADMIN — HYDROCODONE BITARTRATE AND ACETAMINOPHEN 2 TABLET: 5; 325 TABLET ORAL at 20:55

## 2022-06-16 RX ADMIN — ASPIRIN 81 MG: 81 TABLET, COATED ORAL at 08:37

## 2022-06-16 RX ADMIN — QUETIAPINE FUMARATE 25 MG: 25 TABLET ORAL at 20:54

## 2022-06-16 RX ADMIN — POTASSIUM CHLORIDE 20 MEQ: 1500 TABLET, EXTENDED RELEASE ORAL at 08:37

## 2022-06-16 RX ADMIN — ENOXAPARIN SODIUM 40 MG: 100 INJECTION SUBCUTANEOUS at 16:09

## 2022-06-16 NOTE — THERAPY TREATMENT NOTE
Subjective: Pt agreeable to therapeutic plan of care.  Cognition: oriented to Person, Place and Time    Objective:     Bed Mobility: N/A or Not attempted.  Functional Transfers: Min-A  Functional Ambulation: Min-A    Lower Body Dressing: Max-A  ADL Position: supported sitting      Vitals: Desaturates    Pain: 7 VAS  Education: Provided education on importance of mobility and skilled verbal / tactile cueing throughout intervention.     Assessment: Chi LYN Neli Martin. presents with ADL impairments below baseline abilities which indicate the need for continued skilled intervention while inpatient. Tolerating session today without incident. Will continue to follow and progress as tolerated.     Plan/Recommendations:   Pt would benefit from Inpatient Rehabilitation placement at discharge from facility.   Pt desires Inpatient Rehabilitation placement at discharge. Pt cooperative; agreeable to therapeutic recommendations and plan of care.     Modified [place]: N/A = No pre-op stroke/TIA    Post-Tx Position: Up in Chair  PPE: gloves, surgical mask, eyewear protection

## 2022-06-16 NOTE — PLAN OF CARE
Transfers - Min-A and Assist x 2 for sit<>stand  Ambulation - 20 x 2 feet with min/mod assist of 2 with rolling walker TDWB BUE plus a 3rd person pushed recliner behind pt in case he needed to sit; pt took 2 standing rest breaks; brynn was slow with assist needed for safe walker use and step sequencing    Vitals: WNL  Cardiac Rehab Initiated  Level 3: AROM Exercises. Ambulation with any level of assistance up to 150 feet.     Sitting tolerance: >10min and supported  Standing tolerance: 5-10min and supported     Assessment: Chi Quinteros Sr. presents with functional mobility impairments which indicate the need for skilled intervention. Tolerating session today without incident. Pt was able to participate in functional ambulation today with significant improvement in alertness. Will continue to follow and progress as tolerated.

## 2022-06-16 NOTE — NURSING NOTE
Patient appears to be asleep. HR per monitor -140s. Pt woken on and denied any s/s of CP or SOB, all other v/s WNL. Asked to void per urinal, unable to void, called Dr. Weinberg, informed of earlier incident and current situation. Previously history of prostate CA and restarted flomax, d/c dawn yesterday, drained >200 of todd urine, HR decrease to NSR in the 80s, patient without any distress, denies pain,  Now up in chair, resting with eyes closed.

## 2022-06-16 NOTE — PROGRESS NOTES
S/P POD# 6 CABG x4 with LIMA/ asc ao replacement--[facility]  EF 40% (cath)    Subjective:  No c/o's today    No events overnight  Required straight cath last night  Diet upgrade to puree/thin liquids yest  Wt is up 3 kgs from preop        Intake/Output Summary (Last 24 hours) at 6/16/2022 0841  Last data filed at 6/16/2022 0600  Gross per 24 hour   Intake 780 ml   Output 2185 ml   Net -1405 ml     Temp:  [97.8 °F (36.6 °C)-98.8 °F (37.1 °C)] 98.8 °F (37.1 °C)  Heart Rate:  [] 79  Resp:  [22-39] 29  BP: ()/(53-97) 109/58       Results from last 7 days   Lab Units 06/16/22  0427 06/15/22  0439 06/10/22  2254 06/10/22  2201 06/10/22  2147 06/10/22  1950   WBC 10*3/mm3 7.10 8.60   < > 22.40*  --  24.90*   HEMOGLOBIN g/dL 9.2* 9.3*   < > 10.5*  --  8.5*   HEMOGLOBIN, POC   --   --    < >  --    < >  --    HEMATOCRIT % 28.2* 28.8*   < > 32.8*  --  25.6*   HEMATOCRIT POC   --   --    < >  --    < >  --    PLATELETS 10*3/mm3 217 189   < > 170  --  186   INR   --   --   --  1.14*  --  1.25*    < > = values in this interval not displayed.     Results from last 7 days   Lab Units 06/16/22  0427   CREATININE mg/dL 0.76   POTASSIUM mmol/L 3.7   SODIUM mmol/L 137   MAGNESIUM mg/dL 2.0   PHOSPHORUS mg/dL 3.8       Physical Exam:  Neuro intact, nad, up in chair  Tele:  SR 80s  Diminished bases, rhonchi exp wheezes, productive cough, 94% 2L  Sternotomy/SVHS healing well  Benign abd, no BM  No edema    Assessment/Plan:  Active Problems:    COPD exacerbation ([facility])    Obesity (BMI 30-39.9)    Abnormal nuclear stress test    Acute renal insufficiency    Alcohol dependence ([facility])    Essential hypertension    Other emphysema ([facility])    Coronary artery disease    Syncope, unspecified syncope type    Postoperative delirium    - MV CAD, 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 Casarez following  - ETOH use--reports 3 beers/day and bourbon--withdrawal while in the hospital, improved  - MRSA nasal colonization--vanc/Bactroban  - Postop leukocytosis, multifactorial--watch closely, improving  - Postop urinary retention--dawn replaced 6/13    POD# 6.  Intermittent confusion improved.  Cont PT/OT/ST.  Pt cleared for puree diet with thins.  Renal following since preop.  On asa/statin/bb.  On Flomax.  Plans for [facility] possible tomorrow.    Routine care--as above  D/w pt/nsg, Dr. Lenard HAWTHORNE plan--tentative [facility] 6/17    Christal Vora, ERICA  6/16/2022  08:41 EDT

## 2022-06-16 NOTE — PROGRESS NOTES
"NEPHROLOGY PROGRESS NOTE------[facility]    [facility]  [phone]  Amara Cooper MD      Patient Care Team:  Deysi Mason APRN as PCP - General (Nurse Practitioner)  Eliseo Casarez MD as Consulting Physician (Nephrology)      Provider:  Amara Cooper MD  Patient Name: Chi Quinteros Sr.  :  1955    SUBJECTIVE:    F/U ARF/FELIBERTO/CRF/CKD    Up in chair. A and O x 4 this AM. Fatigued. No angina. No dysuria.     Medication:  amiodarone, 400 mg, Oral, Q12H  arformoterol, 15 mcg, Nebulization, BID - RT  aspirin, 81 mg, Oral, Daily  atorvastatin, 40 mg, Oral, Nightly  budesonide, 0.5 mg, Nebulization, BID - RT  bumetanide, 1 mg, Oral, Daily  chlorhexidine, 15 mL, Mouth/Throat, Q12H  enoxaparin, 40 mg, Subcutaneous, Q24H  folic acid, 1 mg, Oral, Daily  gabapentin, 100 mg, Oral, Q8H  guaiFENesin, 400 mg, Oral, Q6H  insulin lispro, 0-7 Units, Subcutaneous, TID AC  metoprolol tartrate, 12.5 mg, Oral, Q12H  multivitamin, 1 tablet, Oral, Daily  pantoprazole, 40 mg, Oral, QAM  polyethylene glycol, 17 g, Oral, BID  potassium chloride, 20 mEq, Oral, Daily  QUEtiapine, 25 mg, Oral, Nightly  senna-docusate sodium, 2 tablet, Oral, Nightly  tamsulosin, 0.4 mg, Oral, Daily  thiamine, 100 mg, Oral, Daily           OBJECTIVE    Vital Sign Min/Max for last 24 hours  Temp  Min: 97 °F (36.1 °C)  Max: 98.8 °F (37.1 °C)   BP  Min: 90/53  Max: 126/70   Pulse  Min: 78  Max: 149   Resp  Min: 19  Max: 39   SpO2  Min: 91 %  Max: 100 %   No data recorded   Weight  Min: 102 kg (225 lb 6.4 oz)  Max: 102 kg (225 lb 6.4 oz)     Flowsheet Rows    Flowsheet Row First Filed Value   Admission Height 177.8 cm (70\") Documented at 2022   Admission Weight 104 kg (230 lb) Documented at 2022          No intake/output data recorded.  I/O last 3 completed shifts:  In: 982 [P.O.:780; I.V.:202]  Out: 2665 [Urine:2665]    Physical Exam:  General Appearance: " NAD  Head: normocephalic, without obvious abnormality and atraumatic  Eyes: conjunctivae and sclerae normal and no icterus  Neck: supple and no JVD  Lungs: DECREASED BS BIBASILAR  Heart: regular rhythm & normal rate and normal S1, S2 +ANTONY  Chest: S/P SURGICAL CHANGES ASSOCIATED WITH OPEN HEART SURGERY  Abdomen: +HYPOACTIVE bowel sounds and soft non-tender +GONZALEZ  Extremities: moves extremities well, no edema, no cyanosis and no redness  Skin: no bleeding, bruising or rash, turgor normal, color normal and no lesions noted  Neurologic: A AND O X 2 TODAY    Labs:    WBC WBC   Date Value Ref Range Status   06/16/2022 7.10 3.40 - 10.80 10*3/mm3 Final   06/15/2022 8.60 3.40 - 10.80 10*3/mm3 Final   06/14/2022 10.10 3.40 - 10.80 10*3/mm3 Final      HGB Hemoglobin   Date Value Ref Range Status   06/16/2022 9.2 (L) 13.0 - 17.7 g/dL Final   06/15/2022 9.3 (L) 13.0 - 17.7 g/dL Final   06/14/2022 10.2 (L) 13.0 - 17.7 g/dL Final      HCT Hematocrit   Date Value Ref Range Status   06/16/2022 28.2 (L) 37.5 - 51.0 % Final   06/15/2022 28.8 (L) 37.5 - 51.0 % Final   06/14/2022 30.6 (L) 37.5 - 51.0 % Final      Platlets No results found for: LABPLAT   MCV MCV   Date Value Ref Range Status   06/16/2022 96.3 79.0 - 97.0 fL Final   06/15/2022 97.8 (H) 79.0 - 97.0 fL Final   06/14/2022 96.7 79.0 - 97.0 fL Final          Sodium Sodium   Date Value Ref Range Status   06/16/2022 137 136 - 145 mmol/L Final   06/15/2022 138 136 - 145 mmol/L Final   06/14/2022 139 136 - 145 mmol/L Final      Potassium Potassium   Date Value Ref Range Status   06/16/2022 3.7 3.5 - 5.2 mmol/L Final   06/15/2022 3.9 3.5 - 5.2 mmol/L Final   06/14/2022 4.0 3.5 - 5.2 mmol/L Final      Chloride Chloride   Date Value Ref Range Status   06/16/2022 97 (L) 98 - 107 mmol/L Final   06/15/2022 98 98 - 107 mmol/L Final   06/14/2022 99 98 - 107 mmol/L Final      CO2 CO2   Date Value Ref Range Status   06/16/2022 28.0 22.0 - 29.0 mmol/L Final   06/15/2022 29.0 22.0 - 29.0  mmol/L Final   06/14/2022 28.0 22.0 - 29.0 mmol/L Final      BUN BUN   Date Value Ref Range Status   06/16/2022 21 8 - 23 mg/dL Final   06/15/2022 18 8 - 23 mg/dL Final   06/14/2022 17 8 - 23 mg/dL Final      Creatinine Creatinine   Date Value Ref Range Status   06/16/2022 0.76 0.76 - 1.27 mg/dL Final   06/15/2022 0.78 0.76 - 1.27 mg/dL Final   06/14/2022 0.72 (L) 0.76 - 1.27 mg/dL Final      Calcium Calcium   Date Value Ref Range Status   06/16/2022 8.3 (L) 8.6 - 10.5 mg/dL Final   06/15/2022 8.2 (L) 8.6 - 10.5 mg/dL Final   06/14/2022 8.6 8.6 - 10.5 mg/dL Final      PO4 No components found for: PO4   Albumin No results found for: ALBUMIN   Magnesium Magnesium   Date Value Ref Range Status   06/16/2022 2.0 1.6 - 2.4 mg/dL Final   06/15/2022 2.5 (H) 1.6 - 2.4 mg/dL Final   06/14/2022 2.2 1.6 - 2.4 mg/dL Final      Uric Acid No components found for: URIC ACID     Imaging Results (Last 72 Hours)     Procedure Component Value Units Date/Time    FL Video Swallow With Speech Single Contrast [[id number]] Collected: 06/15/22 1145     Updated: 06/15/22 1148    Narrative:      DATE OF EXAM:  6/15/2022 11:32 AM     PROCEDURE:  FL VIDEO SWALLOW W SPEECH SINGLE-CONTRAST-     INDICATIONS:  dysphagia; R55-Syncope and collapse; F10.920-Alcohol use, unspecified  with intoxication, uncomplicated; J96.01-Acute respiratory failure with  hypoxia; I95.1-Orthostatic hypotension; R77.8-Other specified  abnormalities of plasma proteins; N17.9-Acute kidney failure,  unspecified; J44.1-Chronic obstructive pulmonary disease with (acute)  exacerbation; R94.39-Abnormal result of other cardiovascular fu     COMPARISON:  No Comparisons Available     TECHNIQUE:   This examination was performed in conjunction with speech pathology.  Lateral video fluoroscopic evaluation of the swallowing mechanism was  performed while correlate administering to the patient various  consistency food items mixed with barium.     Fluoroscopic Time: 1.6 minutes         Number of Images: 12 spot fluoroscopic series images        FINDINGS: Modified barium swallow study was performed utilizing  fluoroscopy. The study was performed by dedicated speech pathologist. I  was present during the entire examination.          Impression:      Modified barium swallow study with fluoroscopy. Please refer to the  speech pathologist report for findings and dietary recommendations.     Electronically Signed By-Pauline Mckee MD On:6/15/2022 11:45 AM  This report was finalized on [id number] by  Pauline Mckee MD.    XR Chest 1 View [[id number]] Collected: 06/15/22 0814     Updated: 06/15/22 0818    Narrative:      DATE OF EXAM:  6/15/2022 2:38 AM     PROCEDURE:  XR CHEST 1 VW-     INDICATIONS:  post op open heart     COMPARISON:  AP chest x-ray 05/31/2022, CT chest PE protocol 06/01/2022, AP chest  x-ray 06/14/2022.     TECHNIQUE:   Single radiographic AP view of the chest was obtained.     FINDINGS:  Right internal jugular sheath remains in place. Cardiomediastinal  contours appear stable, including aneurysmal dilatation of the ascending  thoracic aorta and enlarged cardiac silhouette. No pneumothorax is seen.  There are stable bibasilar airspace opacities with increased airspace  opacities noted in the right midlung.        Impression:      1.Stable bibasilar airspace opacities with increased airspace opacities  in the right mid lung, likely atelectasis.  2.No visible pneumothorax.     Electronically Signed By-Navya Zamora MD On:6/15/2022 8:16 AM  This report was finalized on [id number] by  Navya Zamora MD.    XR Chest 1 View [[id number]] Collected: 06/14/22 1310     Updated: 06/14/22 1313    Narrative:         DATE OF EXAM:   6/14/2022 1:04 PM     PROCEDURE:   XR CHEST 1 VW-     INDICATIONS:   s/p chest tube removals; R55-Syncope and collapse; F10.920-Alcohol use,  unspecified with intoxication, uncomplicated; J96.01-Acute respiratory  failure with hypoxia; I95.1-Orthostatic  hypotension; R77.8-Other  specified abnormalities of plasma proteins; N17.9-Acute kidney failure,  unspecified; J44.1-Chronic obstructive pulmonary disease with (acute)  exacerbation; R94.39-Abnormal result of other car     COMPARISON:  06/13/2022     TECHNIQUE:   Portable chest radiograph.     FINDINGS:    There is a right IJ vascular sheath with the tip overlying the upper  SVC. Post surgical changes of sternotomy and CABG. Stable cardiomegaly.  Persistent bibasilar airspace opacities and small bilateral pleural  effusions. Calcified right apical granuloma. No pneumothorax.       Impression:      1. Stable right IJ vascular sheath.  2. Cardiomegaly and central pulmonary vascular congestion with bibasilar  airspace disease and small bilateral pleural effusions, unchanged.  3. No pneumothorax.     Electronically Signed By-Jairon Wong MD On:6/14/2022 1:11 PM  This report was finalized on [id number] by  Jairon Wong MD.    XR Chest 1 View [[id number]] Collected: 06/13/22 0752     Updated: 06/13/22 0758    Narrative:         DATE OF EXAM:   6/13/2022 7:20 AM     PROCEDURE:   XR CHEST 1 VW-     INDICATIONS:   increase O2 demands; R55-Syncope and collapse; F10.920-Alcohol use,  unspecified with intoxication, uncomplicated; J96.01-Acute respiratory  failure with hypoxia; I95.1-Orthostatic hypotension; R77.8-Other  specified abnormalities of plasma proteins; N17.9-Acute kidney failure,  unspecified; J44.1-Chronic obstructive pulmonary disease with (acute)  exacerbation; R94.39-Abnormal result of other cardiov     COMPARISON:  06/12/2022 and prior     TECHNIQUE:   Portable Chest     FINDINGS:      Patient status post median sternotomy and CABG.     Heart size appears stable. Pulmonary vasculature is mildly congested and  indistinct. This is accentuated by low lung volumes. Vascular sheath  projects of the SVC. There has been interval removal of the [place]-Demarco  catheter.     Mediastinal drains and left sided chest tube  appear grossly stable.  Dependent pleural parenchymal changes at the left lung base again noted  without great change given differences in technique. Increased hazy and  interstitial opacities on the right with a perihilar and dependent  predominance again noted. Slight improved aeration right upper lobe  airspace opacity noted. Small amount of fluid noted within the minor  fissure. No definite pneumothorax or pneumomediastinum noted. Osseous  structures are stable.        Impression:      IMPRESSION :      1. Interval removal of [place]-Demarco catheter. Lines and tubes are otherwise  stable.  2. Postsurgical changes from median sternotomy and CABG.  3. Mild pulmonary vascular congestion and dependent opacities at the  lung bases without great change given differences in technique from the  comparison[. Slight improved aeration right upper lobe opacity  peripherally from prior study.     Electronically Signed By-Hossein Alvarez On:6/13/2022 7:56 AM  This report was finalized on [id number] by  Hossein Alvarez, .          Results for orders placed during the hospital encounter of 05/31/22    XR Chest 1 View    Narrative  DATE OF EXAM:  6/14/2022 1:04 PM    PROCEDURE:  XR CHEST 1 VW-    INDICATIONS:  s/p chest tube removals; R55-Syncope and collapse; F10.920-Alcohol use,  unspecified with intoxication, uncomplicated; J96.01-Acute respiratory  failure with hypoxia; I95.1-Orthostatic hypotension; R77.8-Other  specified abnormalities of plasma proteins; N17.9-Acute kidney failure,  unspecified; J44.1-Chronic obstructive pulmonary disease with (acute)  exacerbation; R94.39-Abnormal result of other car    COMPARISON:  06/13/2022    TECHNIQUE:  Portable chest radiograph.    FINDINGS:  There is a right IJ vascular sheath with the tip overlying the upper  SVC. Post surgical changes of sternotomy and CABG. Stable cardiomegaly.  Persistent bibasilar airspace opacities and small bilateral pleural  effusions. Calcified right  apical granuloma. No pneumothorax.    Impression  1. Stable right IJ vascular sheath.  2. Cardiomegaly and central pulmonary vascular congestion with bibasilar  airspace disease and small bilateral pleural effusions, unchanged.  3. No pneumothorax.    Electronically Signed By-Jairon Wong MD On:6/14/2022 1:11 PM  This report was finalized on [id number] by  Jairon Wong MD.      XR Chest 1 View    Narrative  DATE OF EXAM:  6/15/2022 2:38 AM    PROCEDURE:  XR CHEST 1 VW-    INDICATIONS:  post op open heart    COMPARISON:  AP chest x-ray 05/31/2022, CT chest PE protocol 06/01/2022, AP chest  x-ray 06/14/2022.    TECHNIQUE:  Single radiographic AP view of the chest was obtained.    FINDINGS:  Right internal jugular sheath remains in place. Cardiomediastinal  contours appear stable, including aneurysmal dilatation of the ascending  thoracic aorta and enlarged cardiac silhouette. No pneumothorax is seen.  There are stable bibasilar airspace opacities with increased airspace  opacities noted in the right midlung.    Impression  1.Stable bibasilar airspace opacities with increased airspace opacities  in the right mid lung, likely atelectasis.  2.No visible pneumothorax.    Electronically Signed By-Navya Zamora MD On:6/15/2022 8:16 AM  This report was finalized on [id number] by  Navya Zamora MD.      XR Chest 1 View    Narrative  DATE OF EXAM:  6/13/2022 7:20 AM    PROCEDURE:  XR CHEST 1 VW-    INDICATIONS:  increase O2 demands; R55-Syncope and collapse; F10.920-Alcohol use,  unspecified with intoxication, uncomplicated; J96.01-Acute respiratory  failure with hypoxia; I95.1-Orthostatic hypotension; R77.8-Other  specified abnormalities of plasma proteins; N17.9-Acute kidney failure,  unspecified; J44.1-Chronic obstructive pulmonary disease with (acute)  exacerbation; R94.39-Abnormal result of other cardiov    COMPARISON:  06/12/2022 and prior    TECHNIQUE:  Portable Chest    FINDINGS:    Patient status post median  sternotomy and CABG.    Heart size appears stable. Pulmonary vasculature is mildly congested and  indistinct. This is accentuated by low lung volumes. Vascular sheath  projects of the SVC. There has been interval removal of the [place]-Demarco  catheter.    Mediastinal drains and left sided chest tube appear grossly stable.  Dependent pleural parenchymal changes at the left lung base again noted  without great change given differences in technique. Increased hazy and  interstitial opacities on the right with a perihilar and dependent  predominance again noted. Slight improved aeration right upper lobe  airspace opacity noted. Small amount of fluid noted within the minor  fissure. No definite pneumothorax or pneumomediastinum noted. Osseous  structures are stable.    Impression  IMPRESSION :    1. Interval removal of [place]-Demarco catheter. Lines and tubes are otherwise  stable.  2. Postsurgical changes from median sternotomy and CABG.  3. Mild pulmonary vascular congestion and dependent opacities at the  lung bases without great change given differences in technique from the  comparison[. Slight improved aeration right upper lobe opacity  peripherally from prior study.    Electronically Signed By-Hossein Alvarez On:6/13/2022 7:56 AM  This report was finalized on [id number] by  Hossein Alvarez, .      Results for orders placed during the hospital encounter of 05/31/22    Duplex Vein Mapping Lower Extremity - Bilateral CAR    Interpretation Summary  The greater saphenous veins are of satisfactory quality from the groin to the mid distal thigh bilaterally.  Distal to this the veins are small and inadequate.        ASSESSMENT / PLAN      COPD exacerbation (HCC)    Obesity (BMI 30-39.9)    Abnormal nuclear stress test    Acute renal insufficiency    Alcohol dependence (HCC)    Essential hypertension    Other emphysema (HCC)    Coronary artery disease    Syncope, unspecified syncope type    Postoperative delirium    1.  ARF/FELIBERTO------Nonoliguric. BUN/Cr stable    2. CAD S/P CABG------per Cardiology and CT Surgery    3. BENIGN ESSENTIAL HTN------BP okay. No ACE-I for now    4. BPH-------Nelson replaced by CT Surgery. Hold Flomax today to avoid hypotension    5. S/P SYNCOPE    6. ETOH ABUSE    7. HYPERLIPIDEMIA-------On Statin    8. DVT PROPHYLAXIS-------On Lovenox    9. HYPERKALEMIA--------Resolved    10. ANEMIA------H/H stable    11. MILD VOLUME EXCESS------Better with Bumex    12. COPD/EMPHYSEMA-------per Pulmonary    13. DELERIUM--Resolved    14. HYPOCALCEMIA------Replace IV      Amara Cooper MD  [facility]  [phone]  06/16/22  07:49 EDT

## 2022-06-16 NOTE — PLAN OF CARE
Goal Outcome Evaluation:     Pt. Demonstrates improved functional mobility this date w/ ambulatory transfer min A for safety + rolling walker support. Pt. Requires standing rest breaks secondary to fatigue w/ O2 sats WNL. Pt. Continues to require max A for all ADLs secondary to increased pain w/ global weakness. Recommend IP rehab at d/c.

## 2022-06-16 NOTE — PROGRESS NOTES
Cardiology Progress Note      Admiting Physician:  Benson Hughes, *   LOS: 11 days       Reason For Followup:  Multivessel coronary artery disease      Subjective:    Interval History:  Seen and examined.  Chart and labs reviewed.  Patient appears to be in a normal cognitive state.  Status post surgery, sitting upright at bedside.  Did not sleep well he says, possible discharge to rehab facility tomorrow    Nursing at bedside discussed care    Review of Systems:  A complete review of systems negative x14 point review of systems except as mentioned above he denies anginal chest pain or shortness of breath at this time.  Postsurgical pain noted    Assessment & Plan    Impressions:  Syncope  Multivessel coronary artery disease  Hyperlipidemia  Hypertension  Ischemic cardiomyopathy EF 40%  Acute kidney injury-improved  Alcohol dependence  Altered mental status likely secondary to alcohol withdrawal    Recommendations:  Status post four-vessel bypass, LIMA to LAD, SVG to diagonal obtuse marginal and PLV branch  Left leg endovascular vein harvest  Ascending aortic replacement with 30 mm graft    Does not appear volume overloaded    Renal function has significantly improved   Monitor rate and rhythm closely    Patient is having withdrawal symptoms from alcohol and is being treated appropriately  Will need intensivist help in treating the alcohol withdrawal.    Appreciate surgical help with postoperative care    Objective:    Medication Review:   Scheduled Meds:amiodarone, 400 mg, Oral, Q12H  arformoterol, 15 mcg, Nebulization, BID - RT  aspirin, 81 mg, Oral, Daily  atorvastatin, 40 mg, Oral, Nightly  budesonide, 0.5 mg, Nebulization, BID - RT  bumetanide, 1 mg, Oral, Daily  chlorhexidine, 15 mL, Mouth/Throat, Q12H  enoxaparin, 40 mg, Subcutaneous, Q24H  folic acid, 1 mg, Oral, Daily  gabapentin, 100 mg, Oral, Q8H  guaiFENesin, 400 mg, Oral, Q6H  insulin lispro, 0-7 Units, Subcutaneous, TID AC  metoprolol tartrate,  12.5 mg, Oral, Q12H  multivitamin, 1 tablet, Oral, Daily  pantoprazole, 40 mg, Oral, QAM  polyethylene glycol, 17 g, Oral, BID  potassium chloride, 20 mEq, Oral, Daily  QUEtiapine, 25 mg, Oral, Nightly  senna-docusate sodium, 2 tablet, Oral, BID  tamsulosin, 0.4 mg, Oral, Daily  thiamine, 100 mg, Oral, Daily      Continuous Infusions:   PRN Meds:.•  acetaminophen **OR** acetaminophen **OR** acetaminophen  •  bisacodyl  •  dextrose  •  dextrose  •  glucagon (human recombinant)  •  HYDROcodone-acetaminophen **OR** HYDROcodone-acetaminophen  •  insulin lispro **AND** insulin lispro  •  ipratropium-albuterol  •  LORazepam **OR** LORazepam **OR** LORazepam **OR** LORazepam **OR** LORazepam **OR** LORazepam **OR** LORazepam **OR** LORazepam  •  magnesium hydroxide  •  [] Morphine **AND** naloxone  •  ondansetron  •  potassium chloride **OR** potassium chloride  •  QUEtiapine    Patient Active Problem List   Diagnosis   • COPD exacerbation (HCC)   • Obesity (BMI 30-39.9)   • Abnormal nuclear stress test   • Acute renal insufficiency   • Alcohol dependence (HCC)   • Essential hypertension   • Other emphysema (HCC)   • Coronary artery disease   • Syncope, unspecified syncope type   • Postoperative delirium         Physical Exam:    General: Alert, cooperative, no distress, appears stated age, up to chair  Lines and tubes right IJ line was swollen  Head:  Normocephalic, atraumatic, mucous membranes moist  Eyes:  Conjunctivae/corneas clear, EOM's intact     Neck:  Supple,  no bruit  Lungs:  Clear to auscultation bilaterally, no wheezes rhonchi or rales are noted, chest tubes  Chest wall: No tenderness  Heart::  Regular rate and rhythm, S1 and S2 normal, 1/6 holosystolic murmur.  No rub or gallop  Abdomen: Soft, non-tender, nondistended bowel sounds active.  Obese  Extremities: No cyanosis, clubbing, or edema, leg bandaged  Pulses: Diminished pedal pulses  Skin:  No rashes or lesions  Neuro/psych: A&O x3. CN II through  XII are grossly intact with appropriate affect  Unchanged from prior encounter    Vital Signs:  Vitals:    06/16/22 0740 06/16/22 0743 06/16/22 0746 06/16/22 0748   BP:   109/58    BP Location:       Patient Position:   Sitting    Pulse: 81 81 82 79   Resp: 22 (!) 29 (!) 29 (!) 29   Temp:       TempSrc:       SpO2: 95% 99% 100% 100%   Weight:       Height:         Wt Readings from Last 1 Encounters:   06/16/22 102 kg (225 lb 6.4 oz)       Intake/Output Summary (Last 24 hours) at 6/16/2022 1147  Last data filed at 6/16/2022 1100  Gross per 24 hour   Intake 780 ml   Output 1730 ml   Net -950 ml         Results Review:     CBC    Results from last 7 days   Lab Units 06/16/22  0427 06/15/22  0439 06/14/22  0430 06/13/22  0530 06/12/22  0231 06/11/22  0355 06/11/22  0101 06/10/22  2254 06/10/22  2201   WBC 10*3/mm3 7.10 8.60 10.10 10.50 12.40* 15.20*  --   --  22.40*   HEMOGLOBIN g/dL 9.2* 9.3* 10.2* 9.8* 9.8* 10.0*  --   --  10.5*   HEMOGLOBIN, POC g/dL  --   --   --   --   --   --  10.6*   < >  --    PLATELETS 10*3/mm3 217 189 192 127* 139* 145  --   --  170    < > = values in this interval not displayed.     Cr Clearance Estimated Creatinine Clearance: 112.9 mL/min (by C-G formula based on SCr of 0.76 mg/dL).  Coag   Results from last 7 days   Lab Units 06/10/22  2201 06/10/22  1950   INR  1.14* 1.25*   APTT seconds 27.7 29.7     HbA1C   Lab Results   Component Value Date    HGBA1C 5.5 06/03/2022     Blood Glucose   Glucose   Date/Time Value Ref Range Status   06/16/2022 0743 133 (H) 70 - 105 mg/dL Final     Comment:     Serial Number: [id number]:  [id number]   06/15/2022 1926 104 70 - 105 mg/dL Final     Comment:     Serial Number: [id number]:  [id number]   06/15/2022 1553 189 (H) 70 - 105 mg/dL Final     Comment:     Serial Number: [id number]:  [id number]   06/15/2022 1346 123 (H) 70 - 105 mg/dL Final     Comment:     Serial Number: [id number]:  [id number]   06/15/2022 0839 113 (H) 70 - 105  mg/dL Final     Comment:     Serial Number: [id number]:  [id number]   06/14/2022 1622 121 (H) 70 - 105 mg/dL Final     Comment:     Serial Number: [id number]:  [id number]   06/14/2022 1333 134 (H) 70 - 105 mg/dL Final     Comment:     Serial Number: [id number]:  [id number]   06/13/2022 2059 132 (H) 70 - 105 mg/dL Final     Comment:     Serial Number: [id number]:  [id number]     Infection   Results from last 7 days   Lab Units 06/12/22  1537   RESPCX  Rare Normal respiratory sanaz. No S. aureus or Pseudomonas aeruginosa detected. Final report.     CMP   Results from last 7 days   Lab Units 06/16/22  0427 06/15/22  0439 06/14/22  0430 06/13/22  0530 06/12/22  0231 06/11/22  0355 06/10/22  2201   SODIUM mmol/L 137 138 139 137 137 139 137   POTASSIUM mmol/L 3.7 3.9 4.0 4.1 4.1 4.4 4.7   CHLORIDE mmol/L 97* 98 99 101 105 103 102   CO2 mmol/L 28.0 29.0 28.0 24.0 22.0 23.0 26.0   BUN mg/dL 21 18 17 19 16 24* 24*   CREATININE mg/dL 0.76 0.78 0.72* 0.89 0.76 1.24 1.36*   GLUCOSE mg/dL 128* 127* 125* 119* 106* 139* 146*   ALBUMIN g/dL  --   --   --   --   --  3.90 3.50   BILIRUBIN mg/dL  --   --   --   --   --  0.6 0.8   ALK PHOS U/L  --   --   --   --   --  38* 43   AST (SGOT) U/L  --   --   --   --   --  47* 46*   ALT (SGPT) U/L  --   --   --   --   --  36 40     ABG    Results from last 7 days   Lab Units 06/13/22  0853 06/11/22  1256 06/11/22  1048 06/11/22  0705 06/11/22  0348 06/11/22  0101 06/10/22  2254   PH, ARTERIAL pH units 7.446 7.408 7.427 7.393 7.390 7.359 7.368   PCO2, ARTERIAL mm Hg 36.0 40.1 36.2 41.5 43.9 46.9 45.7   PO2 ART mm Hg 71.2* 75.0* 85.9 110.4* 99.2 139.8* 94.5   O2 SATURATION ART % 94.9 95.0 96.8 98.3* 97.6 99.0* 97.0   BASE EXCESS ART mmol/L 0.8 0.6 -0.3* 0.3 1.3 0.6 0.6     UA    Results from last 7 days   Lab Units 06/13/22  0917   NITRITE UA  Negative   WBC UA /HPF 0-2*   BACTERIA UA /HPF None Seen   SQUAM EPITHEL UA /HPF 0-2     ANGELO  No results found for: POCMETH,  POCAMPHET, POCBARBITUR, POCBENZO, POCCOCAINE, POCOPIATES, POCOXYCODO, POCPHENCYC, POCPROPOXY, POCTHC, POCTRICYC  Lysis Labs   Results from last 7 days   Lab Units 06/16/22  0427 06/15/22  0439 06/14/22  0430 06/13/22  0530 06/12/22  0231 06/11/22  0355 06/11/22  0101 06/10/22  2254 06/10/22  2201 06/10/22  2147 06/10/22  1950   INR   --   --   --   --   --   --   --   --  1.14*  --  1.25*   APTT seconds  --   --   --   --   --   --   --   --  27.7  --  29.7   FIBRINOGEN mg/dL  --   --   --   --   --   --   --   --  449  --  436   HEMOGLOBIN g/dL 9.2* 9.3* 10.2* 9.8* 9.8* 10.0*  --   --  10.5*  --  8.5*   HEMOGLOBIN, POC g/dL  --   --   --   --   --   --  10.6*   < >  --    < >  --    PLATELETS 10*3/mm3 217 189 192 127* 139* 145  --   --  170  --  186   CREATININE mg/dL 0.76 0.78 0.72* 0.89 0.76 1.24  --   --  1.36*  --   --     < > = values in this interval not displayed.     Radiology(recent) FL Video Swallow With Speech Single Contrast    Result Date: 6/15/2022  Modified barium swallow study with fluoroscopy. Please refer to the speech pathologist report for findings and dietary recommendations.  Electronically Signed By-Pauline Mckee MD On:6/15/2022 11:45 AM This report was finalized on [id number] by  Pauline Mckee MD.    XR Chest 1 View    Result Date: 6/15/2022  1.Stable bibasilar airspace opacities with increased airspace opacities in the right mid lung, likely atelectasis. 2.No visible pneumothorax.  Electronically Signed By-Navya Zamora MD On:6/15/2022 8:16 AM This report was finalized on [id number] by  Navya Zamora MD.    XR Chest 1 View    Result Date: 6/14/2022  1. Stable right IJ vascular sheath. 2. Cardiomegaly and central pulmonary vascular congestion with bibasilar airspace disease and small bilateral pleural effusions, unchanged. 3. No pneumothorax.  Electronically Signed By-Jairon Wong MD On:6/14/2022 1:11 PM This report was finalized on [id number] by  Jairon Wong,  MD.        Results from last 7 days   Lab Units 06/13/22  0530   CK TOTAL U/L 456*       Imaging Results (Last 24 Hours)     Procedure Component Value Units Date/Time    FL Video Swallow With Speech Single Contrast [[id number]] Collected: 06/15/22 1145     Updated: 06/15/22 1148    Narrative:      DATE OF EXAM:  6/15/2022 11:32 AM     PROCEDURE:  FL VIDEO SWALLOW W SPEECH SINGLE-CONTRAST-     INDICATIONS:  dysphagia; R55-Syncope and collapse; F10.920-Alcohol use, unspecified  with intoxication, uncomplicated; J96.01-Acute respiratory failure with  hypoxia; I95.1-Orthostatic hypotension; R77.8-Other specified  abnormalities of plasma proteins; N17.9-Acute kidney failure,  unspecified; J44.1-Chronic obstructive pulmonary disease with (acute)  exacerbation; R94.39-Abnormal result of other cardiovascular fu     COMPARISON:  No Comparisons Available     TECHNIQUE:   This examination was performed in conjunction with speech pathology.  Lateral video fluoroscopic evaluation of the swallowing mechanism was  performed while correlate administering to the patient various  consistency food items mixed with barium.     Fluoroscopic Time: 1.6 minutes        Number of Images: 12 spot fluoroscopic series images        FINDINGS: Modified barium swallow study was performed utilizing  fluoroscopy. The study was performed by dedicated speech pathologist. I  was present during the entire examination.          Impression:      Modified barium swallow study with fluoroscopy. Please refer to the  speech pathologist report for findings and dietary recommendations.     Electronically Signed By-Pauline Mckee MD On:6/15/2022 11:45 AM  This report was finalized on [id number] by  Pauline Mckee MD.          Cardiac Studies:  Echo- Results for orders placed during the hospital encounter of 05/31/22    Adult Transthoracic Echo Complete With Contrast if Necessary Per Protocol (With Agitated Saline)    Interpretation Summary  · Left ventricular  ejection fraction appears to be 56 - 60%.  · The right ventricular cavity is mildly dilated.  · Mild dilation of the aortic root is present.  · No pericardial effusion noted    Stress Myoview-  Cath-        ERICA Washington  06/16/22  11:47 EDT

## 2022-06-16 NOTE — THERAPY TREATMENT NOTE
Acute Care - Speech Language Pathology Treatment Note    [facility]     Patient Name: Chi Quinteros Sr.  : 1955  MRN: [id number]    Today's Date: 2022                   Admit Date: 2022       Visit Dx:      ICD-10-CM ICD-9-CM   1. Syncope, unspecified syncope type  R55 780.2   2. Alcoholic intoxication without complication ([facility])  F10.920 305.00   3. Acute respiratory failure with hypoxemia ([facility])  J96.01 518.81   4. Orthostatic hypotension  I95.1 458.0   5. Elevated troponin  R77.8 790.6   6. FELIBERTO (acute kidney injury) ([facility])  N17.9 584.9   7. COPD exacerbation ([facility])  J44.1 491.21   8. Abnormal nuclear stress test  R94.39 794.39   9. Coronary artery disease involving native coronary artery of native heart, unspecified whether angina present  I25.10 414.01       Patient Active Problem List   Diagnosis   • COPD exacerbation ([facility])   • Obesity (BMI 30-39.9)   • Abnormal nuclear stress test   • Acute renal insufficiency   • Alcohol dependence ([facility])   • Essential hypertension   • Other emphysema ([facility])   • Coronary artery disease   • Syncope, unspecified syncope type   • Postoperative delirium       Past Medical History:   Diagnosis Date   • Back pain    • Emphysema lung ([facility])    • Hypertension        Past Surgical History:   Procedure Laterality Date   • CARDIAC CATHETERIZATION Right 2022    Procedure: Coronary angiography;  Surgeon: Frank Giraldo MD;  Location: [facility] INVASIVE LOCATION;  Service: Cardiovascular;  Laterality: Right;   • CARDIAC CATHETERIZATION N/A 2022    Procedure: Left Heart Cath;  Surgeon: Frank Giraldo MD;  Location: [facility] INVASIVE LOCATION;  Service: Cardiovascular;  Laterality: N/A;   • CARDIAC CATHETERIZATION N/A 2022    Procedure: Left ventriculography;  Surgeon: Frank Giraldo MD;  Location: [facility] CATH INVASIVE LOCATION;  Service: Cardiovascular;  Laterality: N/A;       SLP Recommendation and Plan       Patient seen for a meal assessment with this date with  his lunch tray.  Patient is sitting upright in a chair feeding himself.  He reports he is missing many teeth so some foods are difficult for him to chew.  He is currently on a pureed diet.  Patient exhibits timely oral transit for mashed potatoes and pureed meat.  Patient given multiple trials of peaches.  Mastication is timely with no oral residue noted.  Patient takes multiple consecutive sips of water by straw with no overt s/s of aspiration.  Recommend patients diet be changed to soft to chew.  ST will continue to follow    EDUCATION    The patient has been educated in the following areas:       Modified Diet Instruction.      Patient was not wearing a face mask during this therapy encounter. Therapist used appropriate personal protective equipment including mask, eye protection and gloves.  Mask used was standard procedure mask. Appropriate PPE was worn during the entire therapy session. Hand hygiene was completed before and after therapy session. Patient is not in enhanced droplet precautions.          SLP GOALS     Row Name 06/16/22 1500          Oral Nutrition/Hydration Goal 1, SLP The patient will maximize swallow function for least restrictive po diet, exhibiting no complications associated with dysphagia, adequate po intake, and demonstrating independent use of safe swallow compensations.  -MM    Time Frame (Oral Nutrition/Hydration Goal 1, SLP) by discharge  -MM    Barriers (Oral Nutrition/Hydration Goal 1, SLP) Patient is currently on a puree and thin liquid diet.  He appears to be tolerating this well  -MM    Progress/Outcomes (Oral Nutrition/Hydration Goal 1, SLP) goal ongoing  -MM    Oral Nutrition/Hydration Goal, SLP Patient abilio be seen at a meal within 24-48 hours to assess tolerance of current diet  -MM    Time Frame (Oral Nutrition/Hydration Goal, SLP) 2 days  -MM    Barriers (Oral Nutrition/Hydration Goal, SLP) Patient seen with his lunch tray.  See above note  -MM    Progress/Outcomes (Oral  Nutrition/Hydration Goal, SLP) goal ongoing  -MM          User Key  (r) = Recorded By, (t) = Taken By, (c) = Cosigned By    Initials Name Provider Type    Christy Burden, SLP Speech and Language Pathologist    Veronique Vo, SLP Speech and Language Pathologist                            Time Calculation:             Therapy Charges for Today     Code Description Service Date Service Provider Modifiers Qty    47900552553 HC ST MOTION FLUORO EVAL SWALLOW 6 6/15/2022 Christy Garzon, SLP GN 1    37346388409 HC ST TREATMENT SWALLOW 3 6/15/2022 Christy Garzon, OSMANI GN 1                           SOMANI Johnston  6/16/2022

## 2022-06-16 NOTE — PROGRESS NOTES
"PULMONARY CRITICAL CARE PROGRESS  NOTE      PATIENT IDENTIFICATION:  Name: Chi Quinteros  MRN: [id number]  :  1955     Age: 67 y.o.  Sex: male    DATE OF Note:  2022   Referring Physician: No admitting provider for patient encounter.                  Subjective:   No new issue   On 2 L  no SOB, no chest pain,       starting diet    no nausea or vomiting, no  Bowel movement yet   Good urine out ,  no new  skin rash or itching.      Objective:  tMax 24 hrs: Temp (24hrs), Av.4 °F (36.9 °C), Min:97.8 °F (36.6 °C), Max:98.8 °F (37.1 °C)      Vitals Ranges:   Temp:  [97.8 °F (36.6 °C)-98.8 °F (37.1 °C)] 98.8 °F (37.1 °C)  Heart Rate:  [] 79  Resp:  [22-39] 29  BP: ()/(53-97) 109/58    Intake and Output Last 3 Shifts:   I/O last 3 completed shifts:  In: 982 [P.O.:780; I.V.:202]  Out: 2665 [Urine:2665]    Exam:  /58 (Patient Position: Sitting)   Pulse 79   Temp 98.8 °F (37.1 °C) (Oral)   Resp (!) 29   Ht 177.8 cm (70\")   Wt 102 kg (225 lb 6.4 oz)   SpO2 100%   BMI 32.34 kg/m²     General Appearance:   AA     HEENT:  Normocephalic, without obvious abnormality. Conjunctivae/corneas clear.  Normal external ear canals. Nares normal, no drainage     Neck:  Supple, symmetrical, trachea midline. No JVD.  Lungs /Chest wall:   Bilateral basal rhonchi, respirations unlabored, symmetrical wall movement.     Heart:  Regular rate and rhythm, systolic murmur PMI left sternal border  Abdomen: Soft, nontender, no masses, no organomegaly.    Extremities: Trace edema, no clubbing or cyanosis        Medications:    Current Facility-Administered Medications:   •  acetaminophen (TYLENOL) tablet 650 mg, 650 mg, Oral, Q4H PRN, 650 mg at 22 1125 **OR** acetaminophen (TYLENOL) 160 MG/5ML solution 650 mg, 650 mg, Oral, Q4H PRN **OR** acetaminophen (TYLENOL) suppository 650 mg, 650 mg, Rectal, Q4H PRN, Martha Tinoco APRN  •  amiodarone (PACERONE) tablet 400 mg, 400 mg, Oral, Q12H, Marcia Person " SIGIFREDO Morgan, 400 mg at 06/16/22 0837  •  arformoterol (BROVANA) nebulizer solution 15 mcg, 15 mcg, Nebulization, BID - RT, Martha Tinoco APRN, 15 mcg at 06/16/22 0740  •  aspirin EC tablet 81 mg, 81 mg, Oral, Daily, Martha Tinoco APRN, 81 mg at 06/16/22 0837  •  atorvastatin (LIPITOR) tablet 40 mg, 40 mg, Oral, Nightly, Dre Cooper MD, 40 mg at 06/15/22 2044  •  bisacodyl (DULCOLAX) suppository 10 mg, 10 mg, Rectal, Daily PRN, Martha Tinoco APRN  •  budesonide (PULMICORT) nebulizer solution 0.5 mg, 0.5 mg, Nebulization, BID - RT, Martha Tinoco APRN, 0.5 mg at 06/16/22 0740  •  bumetanide (BUMEX) tablet 1 mg, 1 mg, Oral, Daily, Dre Cooper MD, 1 mg at 06/16/22 0837  •  chlorhexidine (PERIDEX) 0.12 % solution 15 mL, 15 mL, Mouth/Throat, Q12H, Martha Tinoco APRN, 15 mL at 06/15/22 2133  •  dextrose (D50W) (25 g/50 mL) IV injection 10-50 mL, 10-50 mL, Intravenous, Q15 Min PRN, Martha Tinoco APRN  •  dextrose (GLUTOSE) oral gel 15 g, 15 g, Oral, Q15 Min PRN, Martha Tinoco APRN  •  Enoxaparin Sodium (LOVENOX) syringe 40 mg, 40 mg, Subcutaneous, Q24H, Martha Tinoco APRN, 40 mg at 06/14/22 1734  •  folic acid (FOLVITE) tablet 1 mg, 1 mg, Oral, Daily, Thong Watts APRN, 1 mg at 06/16/22 0837  •  gabapentin (NEURONTIN) capsule 100 mg, 100 mg, Oral, Q8H, Christal Vora APRN, 100 mg at 06/16/22 0606  •  glucagon (human recombinant) (GLUCAGEN DIAGNOSTIC) 1 mg in sterile water (preservative free) 1 mL injection, 1 mg, Intramuscular, Q15 Min PRN, Martha Tinoco APRN  •  guaiFENesin solution 400 mg, 400 mg, Oral, Q6H, Cindy Arellano MD, 400 mg at 06/16/22 0606  •  HYDROcodone-acetaminophen (NORCO) 5-325 MG per tablet 1 tablet, 1 tablet, Oral, Q6H PRN, 1 tablet at 06/13/22 2232 **OR** HYDROcodone-acetaminophen (NORCO) 5-325 MG per tablet 2 tablet, 2 tablet, Oral, Q6H PRN, Christal Vora APRN, 2 tablet at 06/16/22 7865  •  insulin lispro (ADMELOG)  injection 0-7 Units, 0-7 Units, Subcutaneous, TID AC, 2 Units at 06/15/22 1747 **AND** insulin lispro (ADMELOG) injection 0-7 Units, 0-7 Units, Subcutaneous, PRN, Christal Vora, APRN  •  ipratropium-albuterol (DUO-NEB) nebulizer solution 3 mL, 3 mL, Nebulization, Q4H PRN, Day, Kori, APRN, 3 mL at 06/15/22 0012  •  LORazepam (ATIVAN) tablet 0.5 mg, 0.5 mg, Oral, Q2H PRN **OR** LORazepam (ATIVAN) injection 0.5 mg, 0.5 mg, Intravenous, Q2H PRN, 0.5 mg at 22 2232 **OR** LORazepam (ATIVAN) tablet 1 mg, 1 mg, Oral, Q1H PRN **OR** LORazepam (ATIVAN) injection 1 mg, 1 mg, Intravenous, Q1H PRN, 1 mg at 22 0214 **OR** LORazepam (ATIVAN) injection 1 mg, 1 mg, Intravenous, Q15 Min PRN, 1 mg at 22 0524 **OR** LORazepam (ATIVAN) injection 1 mg, 1 mg, Intramuscular, Q15 Min PRN **OR** LORazepam (ATIVAN) injection 2 mg, 2 mg, Intravenous, Q1H PRN, 2 mg at 22 1417 **OR** LORazepam (ATIVAN) tablet 2 mg, 2 mg, Oral, Q1H PRN, Shivam Recio MD  •  magnesium hydroxide (MILK OF MAGNESIA) suspension 10 mL, 10 mL, Oral, Daily PRN, Martha Tinoco, APRN  •  metoprolol tartrate (LOPRESSOR) half tablet 12.5 mg, 12.5 mg, Oral, Q12H, Martha Tinoco APRN, 12.5 mg at 22 0837  •  multivitamin (THERAGRAN) tablet 1 tablet, 1 tablet, Oral, Daily, Thong Watts APRN, 1 tablet at 22 0837  •  [] morphine injection 2 mg, 2 mg, Intravenous, Q2H PRN, 2 mg at 22 0358 **AND** naloxone (NARCAN) injection 0.4 mg, 0.4 mg, Intravenous, Q5 Min PRN, Martha Tinoco APRN  •  ondansetron (ZOFRAN) injection 4 mg, 4 mg, Intravenous, Q6H PRN, Martha Tinoco APRN  •  [COMPLETED] pantoprazole (PROTONIX) injection 40 mg, 40 mg, Intravenous, Once, 40 mg at 06/10/22 2313 **FOLLOWED BY** pantoprazole (PROTONIX) EC tablet 40 mg, 40 mg, Oral, QAM, Christal Vora APRN, 40 mg at 22 0606  •  polyethylene glycol (MIRALAX) packet 17 g, 17 g, Oral, BID, Christal Vora APRN, 17 g  at 06/16/22 0837  •  potassium chloride (K-DUR,KLOR-CON) CR tablet 20 mEq, 20 mEq, Oral, PRN **OR** potassium chloride (KLOR-CON) packet 20 mEq, 20 mEq, Oral, PRN, Martha Tinoco, APRN, 20 mEq at 06/16/22 0606  •  potassium chloride (K-DUR,KLOR-CON) CR tablet 20 mEq, 20 mEq, Oral, Daily, Satterly-Yaniv, Christal L, APRN, 20 mEq at 06/16/22 0837  •  QUEtiapine (SEROquel) tablet 25 mg, 25 mg, Oral, Nightly, Adam Henao PA-C, 25 mg at 06/15/22 2043  •  QUEtiapine (SEROquel) tablet 25 mg, 25 mg, Oral, Daily PRN, Adam Henao PA-C  •  sennosides-docusate (PERICOLACE) 8.6-50 MG per tablet 2 tablet, 2 tablet, Oral, Nightly, Satterly-Yaniv, Christal L, APRN, 2 tablet at 06/15/22 2043  •  tamsulosin (FLOMAX) 24 hr capsule 0.4 mg, 0.4 mg, Oral, Daily, Satterly-Yaniv, Christal L, APRN, 0.4 mg at 06/16/22 0837  •  thiamine (VITAMIN B-1) tablet 100 mg, 100 mg, Oral, Daily, Thong Watts, APRN, 100 mg at 06/16/22 0837    Data Review:  All labs (24hrs):   Recent Results (from the past 24 hour(s))   POC Glucose Once    Collection Time: 06/15/22  1:46 PM    Specimen: Blood   Result Value Ref Range    Glucose 123 (H) 70 - 105 mg/dL   POC Glucose Once    Collection Time: 06/15/22  3:53 PM    Specimen: Blood   Result Value Ref Range    Glucose 189 (H) 70 - 105 mg/dL   POC Glucose Once    Collection Time: 06/15/22  7:26 PM    Specimen: Blood   Result Value Ref Range    Glucose 104 70 - 105 mg/dL   ECG 12 Lead    Collection Time: 06/16/22  3:26 AM   Result Value Ref Range    QT Interval 341 ms   Calcium, Ionized    Collection Time: 06/16/22  4:27 AM    Specimen: Blood   Result Value Ref Range    Ionized Calcium 1.14 (L) 1.20 - 1.30 mmol/L   CBC (No Diff)    Collection Time: 06/16/22  4:27 AM    Specimen: Blood   Result Value Ref Range    WBC 7.10 3.40 - 10.80 10*3/mm3    RBC 2.93 (L) 4.14 - 5.80 10*6/mm3    Hemoglobin 9.2 (L) 13.0 - 17.7 g/dL    Hematocrit 28.2 (L) 37.5 - 51.0 %    MCV 96.3 79.0 - 97.0 fL     MCH 31.6 26.6 - 33.0 pg    MCHC 32.8 31.5 - 35.7 g/dL    RDW 13.8 12.3 - 15.4 %    RDW-SD 46.8 37.0 - 54.0 fl    MPV 8.2 6.0 - 12.0 fL    Platelets 217 140 - 450 10*3/mm3   Basic Metabolic Panel    Collection Time: 06/16/22  4:27 AM    Specimen: Blood   Result Value Ref Range    Glucose 128 (H) 65 - 99 mg/dL    BUN 21 8 - 23 mg/dL    Creatinine 0.76 0.76 - 1.27 mg/dL    Sodium 137 136 - 145 mmol/L    Potassium 3.7 3.5 - 5.2 mmol/L    Chloride 97 (L) 98 - 107 mmol/L    CO2 28.0 22.0 - 29.0 mmol/L    Calcium 8.3 (L) 8.6 - 10.5 mg/dL    BUN/Creatinine Ratio 27.6 (H) 7.0 - 25.0    Anion Gap 12.0 5.0 - 15.0 mmol/L    eGFR 98.5 >60.0 mL/min/1.73   Magnesium    Collection Time: 06/16/22  4:27 AM    Specimen: Blood   Result Value Ref Range    Magnesium 2.0 1.6 - 2.4 mg/dL   Phosphorus    Collection Time: 06/16/22  4:27 AM    Specimen: Blood   Result Value Ref Range    Phosphorus 3.8 2.5 - 4.5 mg/dL   POC Glucose Once    Collection Time: 06/16/22  7:43 AM    Specimen: Blood   Result Value Ref Range    Glucose 133 (H) 70 - 105 mg/dL        Imaging:  FL Video Swallow With Speech Single Contrast  Narrative: DATE OF EXAM:  6/15/2022 11:32 AM     PROCEDURE:  FL VIDEO SWALLOW W SPEECH SINGLE-CONTRAST-     INDICATIONS:  dysphagia; R55-Syncope and collapse; F10.920-Alcohol use, unspecified  with intoxication, uncomplicated; J96.01-Acute respiratory failure with  hypoxia; I95.1-Orthostatic hypotension; R77.8-Other specified  abnormalities of plasma proteins; N17.9-Acute kidney failure,  unspecified; J44.1-Chronic obstructive pulmonary disease with (acute)  exacerbation; R94.39-Abnormal result of other cardiovascular fu     COMPARISON:  No Comparisons Available     TECHNIQUE:   This examination was performed in conjunction with speech pathology.  Lateral video fluoroscopic evaluation of the swallowing mechanism was  performed while correlate administering to the patient various  consistency food items mixed with barium.      Fluoroscopic Time: 1.6 minutes        Number of Images: 12 spot fluoroscopic series images        FINDINGS: Modified barium swallow study was performed utilizing  fluoroscopy. The study was performed by dedicated speech pathologist. I  was present during the entire examination.        Impression: Modified barium swallow study with fluoroscopy. Please refer to the  speech pathologist report for findings and dietary recommendations.     Electronically Signed By-Pauline Mckee MD On:6/15/2022 11:45 AM  This report was finalized on [id number] by  Pauline Mckee MD.  XR Chest 1 View  Narrative: DATE OF EXAM:  6/15/2022 2:38 AM     PROCEDURE:  XR CHEST 1 VW-     INDICATIONS:  post op open heart     COMPARISON:  AP chest x-ray 05/31/2022, CT chest PE protocol 06/01/2022, AP chest  x-ray 06/14/2022.     TECHNIQUE:   Single radiographic AP view of the chest was obtained.     FINDINGS:  Right internal jugular sheath remains in place. Cardiomediastinal  contours appear stable, including aneurysmal dilatation of the ascending  thoracic aorta and enlarged cardiac silhouette. No pneumothorax is seen.  There are stable bibasilar airspace opacities with increased airspace  opacities noted in the right midlung.      Impression: 1.Stable bibasilar airspace opacities with increased airspace opacities  in the right mid lung, likely atelectasis.  2.No visible pneumothorax.     Electronically Signed By-Navya Zamora MD On:6/15/2022 8:16 AM  This report was finalized on [id number] by  Navya Zamora MD.       ASSESSMENT:    S?P CABG with ascending aortic aneurysm repair   COPD exacerbation (HCC)    Obesity (BMI 30-39.9)    Abnormal nuclear stress test    Acute renal insufficiency    Alcohol dependence (HCC)    Essential hypertension    Other emphysema (HCC)    Coronary artery disease    Syncope, unspecified syncope type       PLAN:  Start oral diet   Pt/ot  Post op care  IS/ flutter valve   Diuretics    Monitor urine out pt    Bronchodilator  Inhaled corticosteroids  Electrolytes/ glycemic control  DVT and GI prophylaxis.    Total Critical care time in direct medical management (   ) minutes. This time specifically excludes time spent performing procedures.  Cindy Arellano MD. D, ABSM.     6/16/2022  10:18 EDT

## 2022-06-16 NOTE — CASE MANAGEMENT/SOCIAL WORK
Continued Stay Note   [facility]     Patient Name: Chi Quinteros Sr.  MRN: [id number]  Today's Date: 6/16/2022    Admit Date: 5/31/2022     Discharge Plan     Row Name 06/16/22 1558       Plan    Plan DC Plan: [facility] pending clinical course. Precert started 6/16/22. PASSR per facility. CABG 6/10/22    Patient/Family in Agreement with Plan yes    Provided Post Acute Provider List? N/A    Provided Post Acute Provider Quality & Resource List? N/A    Plan Comments CM spoke with patient’s nurse and [facility] NP Christal Painter to obtain clinical updates. Sitter discontinued at 00:01 6/16/22. CM reached out to liaison to liaison Eli to inquire about precert status requested to start on 6/15/22. Eli reports precert has not yet been started, but will start today 6/16/22. Eli expects a quick response. Eli states a bed will be available when precert approved.CM will continue to follow for any additional needs that may develop and adjust discharge plan accordingly. DC Barriers: Sitter free for 24 hours, precert pending.              Expected Discharge Date and Time     Expected Discharge Date Expected Discharge Time    Jun 17, 2022         Phone communication or documentation only- no physical contact with patient or family.        Aurelia Rios RN     Office Phone: [phone]  Office Cell:     [phone]

## 2022-06-16 NOTE — PLAN OF CARE
Goal Outcome Evaluation:   Pt post op day 2. Discharging to [place] tomorrow possibly. Pt has been up with PT and in chair for most of shift. No complaints. Speech increased diet tolerance to soft chews as pt tolerated well. Urinates with urinal and no straight cath has been necessary.

## 2022-06-16 NOTE — PLAN OF CARE
Goal Outcome Evaluation:              Outcome Evaluation: Pt with seveal episodes of ST corrected without medication, straight cath and pt in NSR, rested well in night, no sitter after midnight, sat in chair twice, mentation WNL, no episodes of withdrawl.  NAD, no new symptoms, VS WNL for patient, will continue to monitor.

## 2022-06-16 NOTE — THERAPY TREATMENT NOTE
Subjective: Pt agreeable to therapeutic plan of care. Pt was alert and oriented x 4.    Objective:   PT reviewed sternal precautions with pt, further education needed.   Bed mobility - N/A or Not attempted. Pt up in recliner  Transfers - Min-A and Assist x 2 for sit<>stand  Ambulation - 20 x 2 feet with min/mod assist of 2 with rolling walker TDWB BUE plus a 3rd person pushed recliner behind pt in case he needed to sit; pt took 2 standing rest breaks; brynn was slow with assist needed for safe walker use and step sequencing    Vitals: WNL  Cardiac Rehab Initiated  Level 3: AROM Exercises. Ambulation with any level of assistance up to 150 feet.     Sitting tolerance: >10min and supported  Standing tolerance: 5-10min and supported    Precautions:   Mid-sternal incision; avoid scapular retraction and depression.   Cardiovascular impairment post-sx; encourage energy conservation strategies.    Therapeutic Exercises: 10 reps UE and LE AROM in seated position.     MET level equivalent: 2.0-3.0 (Unlimited sitting, ambulation on level ground <2mph, light housework)    Pain: 8 VAS  Education: Provided education on importance of mobility and skilled verbal / tactile cueing throughout intervention.     Assessment: Chi NICHOLSON Neli Martin. presents with functional mobility impairments which indicate the need for skilled intervention. Tolerating session today without incident. Pt was able to participate in functional ambulation today with significant improvement in alertness. Will continue to follow and progress as tolerated.     Plan/Recommendations:   High Intensity Therapy recommended post-acute care. This is recommended as therapy feels the patient would require 5-6 days per week, 2-3 hours per day. At this time, inpatient rehabilitation (acute rehab) would be the first choice and SNF would be second.. Pt requires no DME at discharge.     Pt desires Inpatient Rehabilitation placement at discharge. Pt cooperative; agreeable to  therapeutic recommendations and plan of care.         Basic Mobility 6-click:  Rollin = Total, A lot = 2, A little = 3; 4 = None  Supine>Sit:   1 = Total, A lot = 2, A little = 3; 4 = None   Sit>Stand with arms:  1 = Total, A lot = 2, A little = 3; 4 = None  Bed>Chair:   1 = Total, A lot = 2, A little = 3; 4 = None  Ambulate in room:  1 = Total, A lot = 2, A little = 3; 4 = None  3-5 Steps with railin = Total, A lot = 2, A little = 3; 4 = None  Score: 11    Modified [place]: 4 = Moderately severe disability (Unable to attend to own bodily needs without assistance, and unable to walk unassisted)     Post-Tx Position: Up in Chair, Alarms activated and Call light and personal items within reach  PPE: gloves, surgical mask, eyewear protection

## 2022-06-16 NOTE — NURSING NOTE
Patient HR noted on monitor to be in the 140s. Patient assessed and was asleep. Auscultated and palpated HR and was in synch with the monitor. EKG obtained, showed sinus tachycardia with rate in the 140s. Patient unable to decrease HR with vagal maneuvers. Patient denied CP, SOB, headache, dizziness or any new symptom. Patient had not voided since beginning of shift. Able to palpate bladder. Attempts to get pt to void via urinal unsuccessful. I/O cath yielded 275 of todd urine, patient stated that he experienced immediate relief. Repositioned patient in bed and pt HR now in the 80s. C/O of incisional pain that radiates to back 7/10. PRN hydrocodone given. Patient resting in bed. All other VS WNL for patient, on 2L NC. Will monitor closely.

## 2022-06-17 VITALS
DIASTOLIC BLOOD PRESSURE: 63 MMHG | HEIGHT: 70 IN | SYSTOLIC BLOOD PRESSURE: 111 MMHG | OXYGEN SATURATION: 100 % | TEMPERATURE: 97.6 F | WEIGHT: 235.01 LBS | RESPIRATION RATE: 14 BRPM | HEART RATE: 83 BPM | BODY MASS INDEX: 33.64 KG/M2

## 2022-06-17 LAB
ANION GAP SERPL CALCULATED.3IONS-SCNC: 11 MMOL/L (ref 5–15)
BUN SERPL-MCNC: 21 MG/DL (ref 8–23)
BUN/CREAT SERPL: 22.8 (ref 7–25)
CA-I SERPL ISE-MCNC: 1.13 MMOL/L (ref 1.2–1.3)
CALCIUM SPEC-SCNC: 7.9 MG/DL (ref 8.6–10.5)
CHLORIDE SERPL-SCNC: 95 MMOL/L (ref 98–107)
CO2 SERPL-SCNC: 30 MMOL/L (ref 22–29)
CREAT SERPL-MCNC: 0.92 MG/DL (ref 0.76–1.27)
DEPRECATED RDW RBC AUTO: 45.9 FL (ref 37–54)
EGFRCR SERPLBLD CKD-EPI 2021: 91.2 ML/MIN/1.73
ERYTHROCYTE [DISTWIDTH] IN BLOOD BY AUTOMATED COUNT: 13.7 % (ref 12.3–15.4)
GLUCOSE BLDC GLUCOMTR-MCNC: 103 MG/DL (ref 70–105)
GLUCOSE BLDC GLUCOMTR-MCNC: 164 MG/DL (ref 70–105)
GLUCOSE SERPL-MCNC: 119 MG/DL (ref 65–99)
HCT VFR BLD AUTO: 25.9 % (ref 37.5–51)
HGB BLD-MCNC: 8.8 G/DL (ref 13–17.7)
MAGNESIUM SERPL-MCNC: 2 MG/DL (ref 1.6–2.4)
MCH RBC QN AUTO: 32.4 PG (ref 26.6–33)
MCHC RBC AUTO-ENTMCNC: 33.9 G/DL (ref 31.5–35.7)
MCV RBC AUTO: 95.6 FL (ref 79–97)
PHOSPHATE SERPL-MCNC: 3.5 MG/DL (ref 2.5–4.5)
PLATELET # BLD AUTO: 210 10*3/MM3 (ref 140–450)
PMV BLD AUTO: 7.8 FL (ref 6–12)
POTASSIUM SERPL-SCNC: 4 MMOL/L (ref 3.5–5.2)
QT INTERVAL: 379 MS
QT INTERVAL: 398 MS
RBC # BLD AUTO: 2.71 10*6/MM3 (ref 4.14–5.8)
SODIUM SERPL-SCNC: 136 MMOL/L (ref 136–145)
WBC NRBC COR # BLD: 7.7 10*3/MM3 (ref 3.4–10.8)

## 2022-06-17 PROCEDURE — 94664 DEMO&/EVAL PT USE INHALER: CPT

## 2022-06-17 PROCEDURE — 92526 ORAL FUNCTION THERAPY: CPT

## 2022-06-17 PROCEDURE — 25010000002 CALCIUM GLUCONATE-NACL 1-0.675 GM/50ML-% SOLUTION: Performed by: INTERNAL MEDICINE

## 2022-06-17 PROCEDURE — 82962 GLUCOSE BLOOD TEST: CPT

## 2022-06-17 PROCEDURE — 94799 UNLISTED PULMONARY SVC/PX: CPT

## 2022-06-17 PROCEDURE — 80048 BASIC METABOLIC PNL TOTAL CA: CPT | Performed by: NURSE PRACTITIONER

## 2022-06-17 PROCEDURE — 94761 N-INVAS EAR/PLS OXIMETRY MLT: CPT

## 2022-06-17 PROCEDURE — 83735 ASSAY OF MAGNESIUM: CPT | Performed by: INTERNAL MEDICINE

## 2022-06-17 PROCEDURE — 84100 ASSAY OF PHOSPHORUS: CPT | Performed by: INTERNAL MEDICINE

## 2022-06-17 PROCEDURE — 99024 POSTOP FOLLOW-UP VISIT: CPT | Performed by: NURSE PRACTITIONER

## 2022-06-17 PROCEDURE — 85027 COMPLETE CBC AUTOMATED: CPT | Performed by: NURSE PRACTITIONER

## 2022-06-17 PROCEDURE — 82330 ASSAY OF CALCIUM: CPT

## 2022-06-17 RX ORDER — FOLIC ACID 1 MG/1
1 TABLET ORAL DAILY
Qty: 30 TABLET | Refills: 0 | Status: SHIPPED | OUTPATIENT
Start: 2022-06-18 | End: 2022-08-18

## 2022-06-17 RX ORDER — AMOXICILLIN 250 MG
1 CAPSULE ORAL DAILY
Qty: 30 TABLET | Refills: 2 | Status: SHIPPED | OUTPATIENT
Start: 2022-06-17 | End: 2022-08-18

## 2022-06-17 RX ORDER — METOPROLOL SUCCINATE 25 MG/1
12.5 TABLET, EXTENDED RELEASE ORAL
Status: DISCONTINUED | OUTPATIENT
Start: 2022-06-18 | End: 2022-06-17 | Stop reason: HOSPADM

## 2022-06-17 RX ORDER — HYDROCODONE BITARTRATE AND ACETAMINOPHEN 5; 325 MG/1; MG/1
1 TABLET ORAL EVERY 6 HOURS PRN
Qty: 40 TABLET | Refills: 0 | Status: SHIPPED | OUTPATIENT
Start: 2022-06-17 | End: 2022-08-18

## 2022-06-17 RX ORDER — TAMSULOSIN HYDROCHLORIDE 0.4 MG/1
1 CAPSULE ORAL DAILY
Qty: 30 CAPSULE
Start: 2022-06-17 | End: 2022-08-18

## 2022-06-17 RX ORDER — QUETIAPINE FUMARATE 25 MG/1
25 TABLET, FILM COATED ORAL NIGHTLY
Qty: 30 TABLET | Refills: 2 | Status: SHIPPED | OUTPATIENT
Start: 2022-06-17 | End: 2022-08-18

## 2022-06-17 RX ORDER — METOPROLOL SUCCINATE 25 MG/1
12.5 TABLET, EXTENDED RELEASE ORAL
Qty: 15 TABLET | Refills: 3 | Status: SHIPPED | OUTPATIENT
Start: 2022-06-18 | End: 2022-08-18

## 2022-06-17 RX ORDER — BUMETANIDE 1 MG/1
1 TABLET ORAL DAILY
Qty: 30 TABLET | Refills: 1 | Status: SHIPPED | OUTPATIENT
Start: 2022-06-18 | End: 2022-08-18

## 2022-06-17 RX ORDER — ASPIRIN 81 MG/1
81 TABLET ORAL DAILY
Qty: 100 TABLET | Refills: 99 | Status: SHIPPED | OUTPATIENT
Start: 2022-06-18

## 2022-06-17 RX ORDER — CALCIUM GLUCONATE 20 MG/ML
1 INJECTION, SOLUTION INTRAVENOUS 2 TIMES DAILY
Status: DISCONTINUED | OUTPATIENT
Start: 2022-06-17 | End: 2022-06-17 | Stop reason: HOSPADM

## 2022-06-17 RX ORDER — AMIODARONE HYDROCHLORIDE 400 MG/1
200 TABLET ORAL 2 TIMES DAILY
Qty: 60 TABLET | Refills: 0 | Status: SHIPPED | OUTPATIENT
Start: 2022-06-17 | End: 2022-08-18

## 2022-06-17 RX ORDER — PRAVASTATIN SODIUM 20 MG
20 TABLET ORAL DAILY
Qty: 90 TABLET
Start: 2022-06-17

## 2022-06-17 RX ORDER — ACETAMINOPHEN 325 MG/1
650 TABLET ORAL EVERY 4 HOURS PRN
Start: 2022-06-17

## 2022-06-17 RX ORDER — CLOPIDOGREL BISULFATE 75 MG/1
75 TABLET ORAL DAILY
Qty: 30 TABLET | Refills: 2 | Status: SHIPPED | OUTPATIENT
Start: 2022-06-17 | End: 2022-08-18

## 2022-06-17 RX ORDER — GABAPENTIN 100 MG/1
100 CAPSULE ORAL EVERY 8 HOURS SCHEDULED
Qty: 90 CAPSULE | Refills: 2 | Status: SHIPPED | OUTPATIENT
Start: 2022-06-17 | End: 2022-11-29 | Stop reason: SDUPTHER

## 2022-06-17 RX ORDER — DIPHENOXYLATE HYDROCHLORIDE AND ATROPINE SULFATE 2.5; .025 MG/1; MG/1
1 TABLET ORAL DAILY
Qty: 30 TABLET | Refills: 0 | Status: SHIPPED | OUTPATIENT
Start: 2022-06-18 | End: 2022-08-18

## 2022-06-17 RX ORDER — POTASSIUM CHLORIDE 20 MEQ/1
20 TABLET, EXTENDED RELEASE ORAL DAILY
Qty: 30 TABLET | Refills: 1 | Status: SHIPPED | OUTPATIENT
Start: 2022-06-18 | End: 2022-08-18

## 2022-06-17 RX ADMIN — GABAPENTIN 100 MG: 100 CAPSULE ORAL at 12:47

## 2022-06-17 RX ADMIN — BUMETANIDE 1 MG: 1 TABLET ORAL at 09:46

## 2022-06-17 RX ADMIN — ASPIRIN 81 MG: 81 TABLET, COATED ORAL at 09:46

## 2022-06-17 RX ADMIN — CALCIUM GLUCONATE 1 G: 20 INJECTION, SOLUTION INTRAVENOUS at 09:46

## 2022-06-17 RX ADMIN — HYDROCODONE BITARTRATE AND ACETAMINOPHEN 2 TABLET: 5; 325 TABLET ORAL at 03:16

## 2022-06-17 RX ADMIN — METOPROLOL TARTRATE 12.5 MG: 25 TABLET, FILM COATED ORAL at 09:46

## 2022-06-17 RX ADMIN — FOLIC ACID 1 MG: 1 TABLET ORAL at 09:46

## 2022-06-17 RX ADMIN — AMIODARONE HYDROCHLORIDE 400 MG: 200 TABLET ORAL at 09:46

## 2022-06-17 RX ADMIN — PANTOPRAZOLE SODIUM 40 MG: 40 TABLET, DELAYED RELEASE ORAL at 06:11

## 2022-06-17 RX ADMIN — POTASSIUM CHLORIDE 20 MEQ: 1500 TABLET, EXTENDED RELEASE ORAL at 09:48

## 2022-06-17 RX ADMIN — GUAIFENESIN 400 MG: 100 SOLUTION ORAL at 06:11

## 2022-06-17 RX ADMIN — HYDROCODONE BITARTRATE AND ACETAMINOPHEN 2 TABLET: 5; 325 TABLET ORAL at 12:47

## 2022-06-17 RX ADMIN — THERA TABS 1 TABLET: TAB at 09:46

## 2022-06-17 RX ADMIN — GUAIFENESIN 400 MG: 100 SOLUTION ORAL at 12:47

## 2022-06-17 RX ADMIN — TAMSULOSIN HYDROCHLORIDE 0.4 MG: 0.4 CAPSULE ORAL at 09:49

## 2022-06-17 RX ADMIN — POLYETHYLENE GLYCOL 3350 17 G: 17 POWDER, FOR SOLUTION ORAL at 09:46

## 2022-06-17 RX ADMIN — CHLORHEXIDINE GLUCONATE 15 ML: 1.2 RINSE ORAL at 09:46

## 2022-06-17 RX ADMIN — BUDESONIDE 0.5 MG: 0.5 INHALANT RESPIRATORY (INHALATION) at 06:48

## 2022-06-17 RX ADMIN — GUAIFENESIN 400 MG: 100 SOLUTION ORAL at 00:13

## 2022-06-17 RX ADMIN — ARFORMOTEROL TARTRATE 15 MCG: 15 SOLUTION RESPIRATORY (INHALATION) at 06:44

## 2022-06-17 RX ADMIN — SENNOSIDES AND DOCUSATE SODIUM 2 TABLET: 50; 8.6 TABLET ORAL at 09:51

## 2022-06-17 RX ADMIN — GABAPENTIN 100 MG: 100 CAPSULE ORAL at 06:11

## 2022-06-17 RX ADMIN — Medication 100 MG: at 09:48

## 2022-06-17 NOTE — PROGRESS NOTES
"PULMONARY CRITICAL CARE PROGRESS  NOTE      PATIENT IDENTIFICATION:  Name: Chi Quinteros  MRN: [id number]  :  1955     Age: 67 y.o.  Sex: male    DATE OF Note:  2022   Referring Physician: No admitting provider for patient encounter.                  Subjective:   No new issue   On 2 L  no SOB, no chest pain,       starting diet    no nausea or vomiting,  _+  Bowel movement yet   Good urine out ,  no new  skin rash or itching.      Objective:  tMax 24 hrs: Temp (24hrs), Av.2 °F (36.8 °C), Min:97.8 °F (36.6 °C), Max:99 °F (37.2 °C)      Vitals Ranges:   Temp:  [97.8 °F (36.6 °C)-99 °F (37.2 °C)] 97.9 °F (36.6 °C)  Heart Rate:  [71-93] 82  Resp:  [16-27] 22  BP: ()/(52-70) 101/53    Intake and Output Last 3 Shifts:   I/O last 3 completed shifts:  In: 780 [P.O.:780]  Out: 1830 [Urine:1830]    Exam:  /53   Pulse 82   Temp 97.9 °F (36.6 °C) (Oral)   Resp 22   Ht 177.8 cm (70\")   Wt 107 kg (235 lb 0.2 oz)   SpO2 96%   BMI 33.72 kg/m²     General Appearance:   AA     HEENT:  Normocephalic, without obvious abnormality. Conjunctivae/corneas clear.  Normal external ear canals. Nares normal, no drainage     Neck:  Supple, symmetrical, trachea midline. No JVD.  Lungs /Chest wall:   Bilateral basal rhonchi, respirations unlabored, symmetrical wall movement.     Heart:  Regular rate and rhythm, systolic murmur PMI left sternal border  Abdomen: Soft, nontender, no masses, no organomegaly.    Extremities: Trace edema, no clubbing or cyanosis        Medications:    Current Facility-Administered Medications:   •  acetaminophen (TYLENOL) tablet 650 mg, 650 mg, Oral, Q4H PRN, 650 mg at 22 1125 **OR** acetaminophen (TYLENOL) 160 MG/5ML solution 650 mg, 650 mg, Oral, Q4H PRN **OR** acetaminophen (TYLENOL) suppository 650 mg, 650 mg, Rectal, Q4H PRN, Martha Tinoco APRN  •  amiodarone (PACERONE) tablet 400 mg, 400 mg, Oral, Q12H, Marcia Person PA, 400 mg at 22 0946  •  " arformoterol (BROVANA) nebulizer solution 15 mcg, 15 mcg, Nebulization, BID - RT, Martha Tinoco APRN, 15 mcg at 06/17/22 0644  •  aspirin EC tablet 81 mg, 81 mg, Oral, Daily, Martha Tinoco APRN, 81 mg at 06/17/22 0946  •  atorvastatin (LIPITOR) tablet 40 mg, 40 mg, Oral, Nightly, Dre Cooper MD, 40 mg at 06/16/22 2056  •  bisacodyl (DULCOLAX) suppository 10 mg, 10 mg, Rectal, Daily PRN, Martha Tinoco APRN  •  budesonide (PULMICORT) nebulizer solution 0.5 mg, 0.5 mg, Nebulization, BID - RT, Martha Tinoco APRN, 0.5 mg at 06/17/22 0648  •  bumetanide (BUMEX) tablet 1 mg, 1 mg, Oral, Daily, Dre Cooper MD, 1 mg at 06/17/22 0946  •  calcium gluconate 1g/50ml 0.675% NaCl IV SOLN, 1 g, Intravenous, BID, Dre Cooper MD, 1 g at 06/17/22 0946  •  chlorhexidine (PERIDEX) 0.12 % solution 15 mL, 15 mL, Mouth/Throat, Q12H, Martha Tinoco APRN, 15 mL at 06/17/22 0946  •  dextrose (D50W) (25 g/50 mL) IV injection 10-50 mL, 10-50 mL, Intravenous, Q15 Min PRN, Martha Tinoco APRN  •  dextrose (GLUTOSE) oral gel 15 g, 15 g, Oral, Q15 Min PRN, Martha Tinoco APRN  •  Enoxaparin Sodium (LOVENOX) syringe 40 mg, 40 mg, Subcutaneous, Q24H, Martha Tinoco APRN, 40 mg at 06/16/22 1609  •  folic acid (FOLVITE) tablet 1 mg, 1 mg, Oral, Daily, Thong Watts APRN, 1 mg at 06/17/22 0946  •  gabapentin (NEURONTIN) capsule 100 mg, 100 mg, Oral, Q8H, Christal Vora, APRN, 100 mg at 06/17/22 0611  •  glucagon (human recombinant) (GLUCAGEN DIAGNOSTIC) 1 mg in sterile water (preservative free) 1 mL injection, 1 mg, Intramuscular, Q15 Min PRN, Martha Tinoco APRN  •  guaiFENesin solution 400 mg, 400 mg, Oral, Q6H, Cindy Arellano MD, 400 mg at 06/17/22 0611  •  HYDROcodone-acetaminophen (NORCO) 5-325 MG per tablet 1 tablet, 1 tablet, Oral, Q6H PRN, 1 tablet at 06/13/22 2232 **OR** HYDROcodone-acetaminophen (NORCO) 5-325 MG per tablet 2 tablet, 2 tablet, Oral, Q6H PRN,  Christal Vora, APRN, 2 tablet at 22 0316  •  insulin lispro (ADMELOG) injection 0-7 Units, 0-7 Units, Subcutaneous, TID AC, 2 Units at 06/15/22 1747 **AND** insulin lispro (ADMELOG) injection 0-7 Units, 0-7 Units, Subcutaneous, PRN, Christal Vora, APRN  •  ipratropium-albuterol (DUO-NEB) nebulizer solution 3 mL, 3 mL, Nebulization, Q4H PRN, Day, Kori, APRN, 3 mL at 06/15/22 0012  •  LORazepam (ATIVAN) tablet 0.5 mg, 0.5 mg, Oral, Q2H PRN **OR** LORazepam (ATIVAN) injection 0.5 mg, 0.5 mg, Intravenous, Q2H PRN, 0.5 mg at 22 2232 **OR** LORazepam (ATIVAN) tablet 1 mg, 1 mg, Oral, Q1H PRN **OR** LORazepam (ATIVAN) injection 1 mg, 1 mg, Intravenous, Q1H PRN, 1 mg at 22 0214 **OR** LORazepam (ATIVAN) injection 1 mg, 1 mg, Intravenous, Q15 Min PRN, 1 mg at 22 0524 **OR** LORazepam (ATIVAN) injection 1 mg, 1 mg, Intramuscular, Q15 Min PRN **OR** LORazepam (ATIVAN) injection 2 mg, 2 mg, Intravenous, Q1H PRN, 2 mg at 22 1417 **OR** LORazepam (ATIVAN) tablet 2 mg, 2 mg, Oral, Q1H PRN, Shivam Recio MD  •  magnesium hydroxide (MILK OF MAGNESIA) suspension 10 mL, 10 mL, Oral, Daily PRN, Martha Tinoco APRN  •  [START ON 2022] metoprolol succinate XL (TOPROL-XL) 24 hr tablet 12.5 mg, 12.5 mg, Oral, Q24H, Martha Tinoco APRN  •  multivitamin (THERAGRAN) tablet 1 tablet, 1 tablet, Oral, Daily, Love, Thong E, APRN, 1 tablet at 22 0946  •  [] morphine injection 2 mg, 2 mg, Intravenous, Q2H PRN, 2 mg at 22 0358 **AND** naloxone (NARCAN) injection 0.4 mg, 0.4 mg, Intravenous, Q5 Min PRN, Martha Tinoco APRN  •  ondansetron (ZOFRAN) injection 4 mg, 4 mg, Intravenous, Q6H PRN, Martha Tinoco APRN  •  [COMPLETED] pantoprazole (PROTONIX) injection 40 mg, 40 mg, Intravenous, Once, 40 mg at 06/10/22 2313 **FOLLOWED BY** pantoprazole (PROTONIX) EC tablet 40 mg, 40 mg, Oral, Diony VICTOR Tabitha L, APRTIFFANIE, 40 mg at 22 0611  •   polyethylene glycol (MIRALAX) packet 17 g, 17 g, Oral, BID, Satterly-Yaniv, Christal L, APRN, 17 g at 06/17/22 0946  •  potassium chloride (K-DUR,KLOR-CON) CR tablet 20 mEq, 20 mEq, Oral, PRN **OR** potassium chloride (KLOR-CON) packet 20 mEq, 20 mEq, Oral, PRN, Martha Tinoco, APRN, 20 mEq at 06/16/22 0606  •  potassium chloride (K-DUR,KLOR-CON) CR tablet 20 mEq, 20 mEq, Oral, Daily, Satterly-Yaniv, Christal L, APRN, 20 mEq at 06/17/22 0948  •  QUEtiapine (SEROquel) tablet 25 mg, 25 mg, Oral, Nightly, Adam Henao PA-C, 25 mg at 06/16/22 2054  •  sennosides-docusate (PERICOLACE) 8.6-50 MG per tablet 2 tablet, 2 tablet, Oral, BID, Cindy Arellano MD, 2 tablet at 06/17/22 0951  •  tamsulosin (FLOMAX) 24 hr capsule 0.4 mg, 0.4 mg, Oral, Daily, Satterly-Yaniv, Christal L, APRN, 0.4 mg at 06/17/22 0949  •  thiamine (VITAMIN B-1) tablet 100 mg, 100 mg, Oral, Daily, Thong Watts APRN, 100 mg at 06/17/22 0948    Data Review:  All labs (24hrs):   Recent Results (from the past 24 hour(s))   POC Glucose Once    Collection Time: 06/16/22 11:51 AM    Specimen: Blood   Result Value Ref Range    Glucose 148 (H) 70 - 105 mg/dL   ECG 12 Lead    Collection Time: 06/16/22 12:50 PM   Result Value Ref Range    QT Interval 403 ms   POC Glucose Once    Collection Time: 06/16/22  5:50 PM    Specimen: Blood   Result Value Ref Range    Glucose 142 (H) 70 - 105 mg/dL   CBC (No Diff)    Collection Time: 06/17/22  4:17 AM    Specimen: Blood   Result Value Ref Range    WBC 7.70 3.40 - 10.80 10*3/mm3    RBC 2.71 (L) 4.14 - 5.80 10*6/mm3    Hemoglobin 8.8 (L) 13.0 - 17.7 g/dL    Hematocrit 25.9 (L) 37.5 - 51.0 %    MCV 95.6 79.0 - 97.0 fL    MCH 32.4 26.6 - 33.0 pg    MCHC 33.9 31.5 - 35.7 g/dL    RDW 13.7 12.3 - 15.4 %    RDW-SD 45.9 37.0 - 54.0 fl    MPV 7.8 6.0 - 12.0 fL    Platelets 210 140 - 450 10*3/mm3   Basic Metabolic Panel    Collection Time: 06/17/22  4:17 AM    Specimen: Blood   Result Value Ref Range    Glucose 119  (H) 65 - 99 mg/dL    BUN 21 8 - 23 mg/dL    Creatinine 0.92 0.76 - 1.27 mg/dL    Sodium 136 136 - 145 mmol/L    Potassium 4.0 3.5 - 5.2 mmol/L    Chloride 95 (L) 98 - 107 mmol/L    CO2 30.0 (H) 22.0 - 29.0 mmol/L    Calcium 7.9 (L) 8.6 - 10.5 mg/dL    BUN/Creatinine Ratio 22.8 7.0 - 25.0    Anion Gap 11.0 5.0 - 15.0 mmol/L    eGFR 91.2 >60.0 mL/min/1.73   Calcium, Ionized    Collection Time: 06/17/22  4:17 AM    Specimen: Blood   Result Value Ref Range    Ionized Calcium 1.13 (L) 1.20 - 1.30 mmol/L   Magnesium    Collection Time: 06/17/22  4:17 AM    Specimen: Blood   Result Value Ref Range    Magnesium 2.0 1.6 - 2.4 mg/dL   Phosphorus    Collection Time: 06/17/22  4:17 AM    Specimen: Blood   Result Value Ref Range    Phosphorus 3.5 2.5 - 4.5 mg/dL   POC Glucose Once    Collection Time: 06/17/22  7:28 AM    Specimen: Blood   Result Value Ref Range    Glucose 103 70 - 105 mg/dL        Imaging:  FL Video Swallow With Speech Single Contrast  Narrative: DATE OF EXAM:  6/15/2022 11:32 AM     PROCEDURE:  FL VIDEO SWALLOW W SPEECH SINGLE-CONTRAST-     INDICATIONS:  dysphagia; R55-Syncope and collapse; F10.920-Alcohol use, unspecified  with intoxication, uncomplicated; J96.01-Acute respiratory failure with  hypoxia; I95.1-Orthostatic hypotension; R77.8-Other specified  abnormalities of plasma proteins; N17.9-Acute kidney failure,  unspecified; J44.1-Chronic obstructive pulmonary disease with (acute)  exacerbation; R94.39-Abnormal result of other cardiovascular fu     COMPARISON:  No Comparisons Available     TECHNIQUE:   This examination was performed in conjunction with speech pathology.  Lateral video fluoroscopic evaluation of the swallowing mechanism was  performed while correlate administering to the patient various  consistency food items mixed with barium.     Fluoroscopic Time: 1.6 minutes        Number of Images: 12 spot fluoroscopic series images        FINDINGS: Modified barium swallow study was performed  utilizing  fluoroscopy. The study was performed by dedicated speech pathologist. I  was present during the entire examination.        Impression: Modified barium swallow study with fluoroscopy. Please refer to the  speech pathologist report for findings and dietary recommendations.     Electronically Signed By-Pauline Mckee MD On:6/15/2022 11:45 AM  This report was finalized on [id number] by  Pauline Mckee MD.  XR Chest 1 View  Narrative: DATE OF EXAM:  6/15/2022 2:38 AM     PROCEDURE:  XR CHEST 1 VW-     INDICATIONS:  post op open heart     COMPARISON:  AP chest x-ray 05/31/2022, CT chest PE protocol 06/01/2022, AP chest  x-ray 06/14/2022.     TECHNIQUE:   Single radiographic AP view of the chest was obtained.     FINDINGS:  Right internal jugular sheath remains in place. Cardiomediastinal  contours appear stable, including aneurysmal dilatation of the ascending  thoracic aorta and enlarged cardiac silhouette. No pneumothorax is seen.  There are stable bibasilar airspace opacities with increased airspace  opacities noted in the right midlung.      Impression: 1.Stable bibasilar airspace opacities with increased airspace opacities  in the right mid lung, likely atelectasis.  2.No visible pneumothorax.     Electronically Signed By-Navya Zamora MD On:6/15/2022 8:16 AM  This report was finalized on [id number] by  Navya Zamora MD.       ASSESSMENT:    S?P CABG with ascending aortic aneurysm repair   COPD exacerbation (HCC)    Obesity (BMI 30-39.9)    Abnormal nuclear stress test    Acute renal insufficiency    Alcohol dependence (HCC)    Essential hypertension    Other emphysema (HCC)    Coronary artery disease    Syncope, unspecified syncope type       PLAN:  Ok to dc   Pt/ot  Post op care  IS/ flutter valve   Diuretics    Monitor urine out pt   Bronchodilator  Inhaled corticosteroids  Electrolytes/ glycemic control  DVT and GI prophylaxis.    Total Critical care time in direct medical management (   )  minutes. This time specifically excludes time spent performing procedures.  Cindy Arellano MD. D, ABSM.     6/17/2022  10:54 EDT

## 2022-06-17 NOTE — PROGRESS NOTES
"NEPHROLOGY PROGRESS NOTE------[facility]    [facility]  [phone]  Amara Cooper MD      Patient Care Team:  Deysi Mason APRN as PCP - General (Nurse Practitioner)  Eliseo Casarez MD as Consulting Physician (Nephrology)      Provider:  Amara Cooper MD  Patient Name: Chi Quinteros Sr.  :  1955    SUBJECTIVE:    F/U ARF/FELIBERTO/CRF/CKD    No complaints. No SOB, CP, dysuria.     Medication:  amiodarone, 400 mg, Oral, Q12H  arformoterol, 15 mcg, Nebulization, BID - RT  aspirin, 81 mg, Oral, Daily  atorvastatin, 40 mg, Oral, Nightly  budesonide, 0.5 mg, Nebulization, BID - RT  bumetanide, 1 mg, Oral, Daily  chlorhexidine, 15 mL, Mouth/Throat, Q12H  enoxaparin, 40 mg, Subcutaneous, Q24H  folic acid, 1 mg, Oral, Daily  gabapentin, 100 mg, Oral, Q8H  guaiFENesin, 400 mg, Oral, Q6H  insulin lispro, 0-7 Units, Subcutaneous, TID AC  metoprolol tartrate, 12.5 mg, Oral, Q12H  multivitamin, 1 tablet, Oral, Daily  pantoprazole, 40 mg, Oral, QAM  polyethylene glycol, 17 g, Oral, BID  potassium chloride, 20 mEq, Oral, Daily  QUEtiapine, 25 mg, Oral, Nightly  senna-docusate sodium, 2 tablet, Oral, BID  tamsulosin, 0.4 mg, Oral, Daily  thiamine, 100 mg, Oral, Daily           OBJECTIVE    Vital Sign Min/Max for last 24 hours  Temp  Min: 97.8 °F (36.6 °C)  Max: 99 °F (37.2 °C)   BP  Min: 85/55  Max: 121/65   Pulse  Min: 71  Max: 93   Resp  Min: 16  Max: 29   SpO2  Min: 91 %  Max: 100 %   No data recorded   Weight  Min: 107 kg (235 lb 0.2 oz)  Max: 107 kg (235 lb 0.2 oz)     Flowsheet Rows    Flowsheet Row First Filed Value   Admission Height 177.8 cm (70\") Documented at 2022   Admission Weight 104 kg (230 lb) Documented at 2022          I/O this shift:  In: 480 [P.O.:480]  Out: 500 [Urine:500]  I/O last 3 completed shifts:  In: 780 [P.O.:780]  Out: 3010 [Urine:3010]    Physical Exam:  General Appearance: NAD  Head: " normocephalic, without obvious abnormality and atraumatic  Eyes: conjunctivae and sclerae normal and no icterus  Neck: supple and no JVD  Lungs: DECREASED BS BIBASILAR  Heart: regular rhythm & normal rate and normal S1, S2 +ANTONY  Chest: S/P SURGICAL CHANGES ASSOCIATED WITH OPEN HEART SURGERY  Abdomen: +HYPOACTIVE bowel sounds and soft non-tender    Extremities: moves extremities well, no edema, no cyanosis and no redness  Skin: no bleeding, bruising or rash, turgor normal, color normal and no lesions noted  Neurologic: A AND O X 3 today without focal neurological deficits    Labs:    WBC WBC   Date Value Ref Range Status   06/17/2022 7.70 3.40 - 10.80 10*3/mm3 Final   06/16/2022 7.10 3.40 - 10.80 10*3/mm3 Final   06/15/2022 8.60 3.40 - 10.80 10*3/mm3 Final      HGB Hemoglobin   Date Value Ref Range Status   06/17/2022 8.8 (L) 13.0 - 17.7 g/dL Final   06/16/2022 9.2 (L) 13.0 - 17.7 g/dL Final   06/15/2022 9.3 (L) 13.0 - 17.7 g/dL Final      HCT Hematocrit   Date Value Ref Range Status   06/17/2022 25.9 (L) 37.5 - 51.0 % Final   06/16/2022 28.2 (L) 37.5 - 51.0 % Final   06/15/2022 28.8 (L) 37.5 - 51.0 % Final      Platlets No results found for: LABPLAT   MCV MCV   Date Value Ref Range Status   06/17/2022 95.6 79.0 - 97.0 fL Final   06/16/2022 96.3 79.0 - 97.0 fL Final   06/15/2022 97.8 (H) 79.0 - 97.0 fL Final          Sodium Sodium   Date Value Ref Range Status   06/17/2022 136 136 - 145 mmol/L Final   06/16/2022 137 136 - 145 mmol/L Final   06/15/2022 138 136 - 145 mmol/L Final      Potassium Potassium   Date Value Ref Range Status   06/17/2022 4.0 3.5 - 5.2 mmol/L Final   06/16/2022 3.7 3.5 - 5.2 mmol/L Final   06/15/2022 3.9 3.5 - 5.2 mmol/L Final      Chloride Chloride   Date Value Ref Range Status   06/17/2022 95 (L) 98 - 107 mmol/L Final   06/16/2022 97 (L) 98 - 107 mmol/L Final   06/15/2022 98 98 - 107 mmol/L Final      CO2 CO2   Date Value Ref Range Status   06/17/2022 30.0 (H) 22.0 - 29.0 mmol/L Final    06/16/2022 28.0 22.0 - 29.0 mmol/L Final   06/15/2022 29.0 22.0 - 29.0 mmol/L Final      BUN BUN   Date Value Ref Range Status   06/17/2022 21 8 - 23 mg/dL Final   06/16/2022 21 8 - 23 mg/dL Final   06/15/2022 18 8 - 23 mg/dL Final      Creatinine Creatinine   Date Value Ref Range Status   06/17/2022 0.92 0.76 - 1.27 mg/dL Final   06/16/2022 0.76 0.76 - 1.27 mg/dL Final   06/15/2022 0.78 0.76 - 1.27 mg/dL Final      Calcium Calcium   Date Value Ref Range Status   06/17/2022 7.9 (L) 8.6 - 10.5 mg/dL Final   06/16/2022 8.3 (L) 8.6 - 10.5 mg/dL Final   06/15/2022 8.2 (L) 8.6 - 10.5 mg/dL Final      PO4 No components found for: PO4   Albumin No results found for: ALBUMIN   Magnesium Magnesium   Date Value Ref Range Status   06/17/2022 2.0 1.6 - 2.4 mg/dL Final   06/16/2022 2.0 1.6 - 2.4 mg/dL Final   06/15/2022 2.5 (H) 1.6 - 2.4 mg/dL Final      Uric Acid No components found for: URIC ACID     Imaging Results (Last 72 Hours)     Procedure Component Value Units Date/Time    FL Video Swallow With Speech Single Contrast [[id number]] Collected: 06/15/22 1145     Updated: 06/15/22 1148    Narrative:      DATE OF EXAM:  6/15/2022 11:32 AM     PROCEDURE:  FL VIDEO SWALLOW W SPEECH SINGLE-CONTRAST-     INDICATIONS:  dysphagia; R55-Syncope and collapse; F10.920-Alcohol use, unspecified  with intoxication, uncomplicated; J96.01-Acute respiratory failure with  hypoxia; I95.1-Orthostatic hypotension; R77.8-Other specified  abnormalities of plasma proteins; N17.9-Acute kidney failure,  unspecified; J44.1-Chronic obstructive pulmonary disease with (acute)  exacerbation; R94.39-Abnormal result of other cardiovascular fu     COMPARISON:  No Comparisons Available     TECHNIQUE:   This examination was performed in conjunction with speech pathology.  Lateral video fluoroscopic evaluation of the swallowing mechanism was  performed while correlate administering to the patient various  consistency food items mixed with barium.      Fluoroscopic Time: 1.6 minutes        Number of Images: 12 spot fluoroscopic series images        FINDINGS: Modified barium swallow study was performed utilizing  fluoroscopy. The study was performed by dedicated speech pathologist. I  was present during the entire examination.          Impression:      Modified barium swallow study with fluoroscopy. Please refer to the  speech pathologist report for findings and dietary recommendations.     Electronically Signed By-Pauline Mckee MD On:6/15/2022 11:45 AM  This report was finalized on [id number] by  Pauline Mckee MD.    XR Chest 1 View [[id number]] Collected: 06/15/22 0814     Updated: 06/15/22 0818    Narrative:      DATE OF EXAM:  6/15/2022 2:38 AM     PROCEDURE:  XR CHEST 1 VW-     INDICATIONS:  post op open heart     COMPARISON:  AP chest x-ray 05/31/2022, CT chest PE protocol 06/01/2022, AP chest  x-ray 06/14/2022.     TECHNIQUE:   Single radiographic AP view of the chest was obtained.     FINDINGS:  Right internal jugular sheath remains in place. Cardiomediastinal  contours appear stable, including aneurysmal dilatation of the ascending  thoracic aorta and enlarged cardiac silhouette. No pneumothorax is seen.  There are stable bibasilar airspace opacities with increased airspace  opacities noted in the right midlung.        Impression:      1.Stable bibasilar airspace opacities with increased airspace opacities  in the right mid lung, likely atelectasis.  2.No visible pneumothorax.     Electronically Signed By-Navya Zamora MD On:6/15/2022 8:16 AM  This report was finalized on [id number] by  Navya Zamora MD.    XR Chest 1 View [[id number]] Collected: 06/14/22 1310     Updated: 06/14/22 1313    Narrative:         DATE OF EXAM:   6/14/2022 1:04 PM     PROCEDURE:   XR CHEST 1 VW-     INDICATIONS:   s/p chest tube removals; R55-Syncope and collapse; F10.920-Alcohol use,  unspecified with intoxication, uncomplicated; J96.01-Acute respiratory  failure  with hypoxia; I95.1-Orthostatic hypotension; R77.8-Other  specified abnormalities of plasma proteins; N17.9-Acute kidney failure,  unspecified; J44.1-Chronic obstructive pulmonary disease with (acute)  exacerbation; R94.39-Abnormal result of other car     COMPARISON:  06/13/2022     TECHNIQUE:   Portable chest radiograph.     FINDINGS:    There is a right IJ vascular sheath with the tip overlying the upper  SVC. Post surgical changes of sternotomy and CABG. Stable cardiomegaly.  Persistent bibasilar airspace opacities and small bilateral pleural  effusions. Calcified right apical granuloma. No pneumothorax.       Impression:      1. Stable right IJ vascular sheath.  2. Cardiomegaly and central pulmonary vascular congestion with bibasilar  airspace disease and small bilateral pleural effusions, unchanged.  3. No pneumothorax.     Electronically Signed By-Jairon Wong MD On:6/14/2022 1:11 PM  This report was finalized on [id number] by  Jairon Wong MD.          Results for orders placed during the hospital encounter of 05/31/22    XR Chest 1 View    Narrative  DATE OF EXAM:  6/14/2022 1:04 PM    PROCEDURE:  XR CHEST 1 VW-    INDICATIONS:  s/p chest tube removals; R55-Syncope and collapse; F10.920-Alcohol use,  unspecified with intoxication, uncomplicated; J96.01-Acute respiratory  failure with hypoxia; I95.1-Orthostatic hypotension; R77.8-Other  specified abnormalities of plasma proteins; N17.9-Acute kidney failure,  unspecified; J44.1-Chronic obstructive pulmonary disease with (acute)  exacerbation; R94.39-Abnormal result of other car    COMPARISON:  06/13/2022    TECHNIQUE:  Portable chest radiograph.    FINDINGS:  There is a right IJ vascular sheath with the tip overlying the upper  SVC. Post surgical changes of sternotomy and CABG. Stable cardiomegaly.  Persistent bibasilar airspace opacities and small bilateral pleural  effusions. Calcified right apical granuloma. No pneumothorax.    Impression  1. Stable  right IJ vascular sheath.  2. Cardiomegaly and central pulmonary vascular congestion with bibasilar  airspace disease and small bilateral pleural effusions, unchanged.  3. No pneumothorax.    Electronically Signed By-Jairon Wong MD On:6/14/2022 1:11 PM  This report was finalized on [id number] by  Jairon Wong MD.      XR Chest 1 View    Narrative  DATE OF EXAM:  6/15/2022 2:38 AM    PROCEDURE:  XR CHEST 1 VW-    INDICATIONS:  post op open heart    COMPARISON:  AP chest x-ray 05/31/2022, CT chest PE protocol 06/01/2022, AP chest  x-ray 06/14/2022.    TECHNIQUE:  Single radiographic AP view of the chest was obtained.    FINDINGS:  Right internal jugular sheath remains in place. Cardiomediastinal  contours appear stable, including aneurysmal dilatation of the ascending  thoracic aorta and enlarged cardiac silhouette. No pneumothorax is seen.  There are stable bibasilar airspace opacities with increased airspace  opacities noted in the right midlung.    Impression  1.Stable bibasilar airspace opacities with increased airspace opacities  in the right mid lung, likely atelectasis.  2.No visible pneumothorax.    Electronically Signed By-Navya Zamora MD On:6/15/2022 8:16 AM  This report was finalized on [id number] by  Navya Zamora MD.      XR Chest 1 View    Narrative  DATE OF EXAM:  6/13/2022 7:20 AM    PROCEDURE:  XR CHEST 1 VW-    INDICATIONS:  increase O2 demands; R55-Syncope and collapse; F10.920-Alcohol use,  unspecified with intoxication, uncomplicated; J96.01-Acute respiratory  failure with hypoxia; I95.1-Orthostatic hypotension; R77.8-Other  specified abnormalities of plasma proteins; N17.9-Acute kidney failure,  unspecified; J44.1-Chronic obstructive pulmonary disease with (acute)  exacerbation; R94.39-Abnormal result of other cardiov    COMPARISON:  06/12/2022 and prior    TECHNIQUE:  Portable Chest    FINDINGS:    Patient status post median sternotomy and CABG.    Heart size appears stable.  Pulmonary vasculature is mildly congested and  indistinct. This is accentuated by low lung volumes. Vascular sheath  projects of the SVC. There has been interval removal of the [place]-Demarco  catheter.    Mediastinal drains and left sided chest tube appear grossly stable.  Dependent pleural parenchymal changes at the left lung base again noted  without great change given differences in technique. Increased hazy and  interstitial opacities on the right with a perihilar and dependent  predominance again noted. Slight improved aeration right upper lobe  airspace opacity noted. Small amount of fluid noted within the minor  fissure. No definite pneumothorax or pneumomediastinum noted. Osseous  structures are stable.    Impression  IMPRESSION :    1. Interval removal of [place]-Demarco catheter. Lines and tubes are otherwise  stable.  2. Postsurgical changes from median sternotomy and CABG.  3. Mild pulmonary vascular congestion and dependent opacities at the  lung bases without great change given differences in technique from the  comparison[. Slight improved aeration right upper lobe opacity  peripherally from prior study.    Electronically Signed By-Hossein Alvarez On:6/13/2022 7:56 AM  This report was finalized on [id number] by  Hossein Alvarez, .      Results for orders placed during the hospital encounter of 05/31/22    Duplex Vein Mapping Lower Extremity - Bilateral CAR    Interpretation Summary  The greater saphenous veins are of satisfactory quality from the groin to the mid distal thigh bilaterally.  Distal to this the veins are small and inadequate.        ASSESSMENT / PLAN      COPD exacerbation (HCC)    Obesity (BMI 30-39.9)    Abnormal nuclear stress test    Acute renal insufficiency    Alcohol dependence (HCC)    Essential hypertension    Other emphysema (HCC)    Coronary artery disease    Syncope, unspecified syncope type    Postoperative delirium    1. ARF/FELIBERTO------Nonoliguric. BUN/Cr stable    2. CAD S/P  CABG------per Cardiology and CT Surgery    3. BENIGN ESSENTIAL HTN------BP okay. No ACE-I for now    4. BPH-------Nelson replaced by CT Surgery. Hold Flomax today to avoid hypotension    5. S/P SYNCOPE    6. ETOH ABUSE    7. HYPERLIPIDEMIA-------On Statin    8. DVT PROPHYLAXIS-------On Lovenox    9. HYPERKALEMIA--------Resolved    10. ANEMIA------H/H stable    11. MILD VOLUME EXCESS------Better with Bumex    12. COPD/EMPHYSEMA-------per Pulmonary    13. DELERIUM--Resolved    14. HYPOCALCEMIA------Replace IV      Amara Cooper MD  [facility]  [phone]  06/17/22  06:54 EDT

## 2022-06-17 NOTE — CASE MANAGEMENT/SOCIAL WORK
Transportation Services  Private: Car (with family and portable Oxygen)    Final Discharge Disposition Code: 03 - skilled nursing facility (SNF) ([place])

## 2022-06-17 NOTE — DISCHARGE SUMMARY
Date of Admission: 6/1/2022  Date of Discharge:  6/17/2022    Discharge Diagnosis: CAD status post CABG, ascending aortic aneurysm status post replacement, COPD, hypertension, hyperlipidemia, lumbar herniation (back surgery pending), early prostate cancer, EtOH abuse, MRSA nasal colonization, postop urinary retention resolved    Presenting Problem/History of Present Illness  Orthostatic hypotension [I95.1]  Syncope [R55]  Elevated troponin [R77.8]  COPD exacerbation ([facility]) [J44.1]  FELIBERTO (acute kidney injury) ([facility]) [N17.9]  Alcoholic intoxication without complication ([facility]) [F10.920]  Acute respiratory failure with hypoxemia ([facility]) [J96.01]  Syncope, unspecified syncope type [R55]     Hospital Course  Patient is a 67 y.o. male presented to the emergency department after witnessed syncopal events, found to have elevated troponin and creatinine levels and ultimately underwent abnormal stress test and cardiac cath demonstrated three-vessel coronary disease in addition to a CT of the chest that demonstrated an ascending aortic aneurysm of 5.0 cm.  We were consulted for surgical intervention, his surgery was held until he recovered from an acute COPD exacerbation and acute withdrawal symptomology.  Endocrinology evaluated the patient preoperatively due to elevated glucose levels, his A1c was 5.5, elevation was thought to be due to steroids given during COPD treatment, glucose levels normalized postoperatively.  He ultimately underwent CABG x4 with LIMA to LAD, SVG to D1, SVG to OM, and SVG to PV with left leg EVH, and ascending aortic replacement with Dacron grafting on 6/10/2022 with Dr. Hughes (see op report for full details).  His postop course was complicated with acute delirium which has since resolved (psychiatry was consulted for assistance and medication treatment) and acute urinary retention without infection requiring Nelson replacement on 6/13/2022, Nelson has since been removed and he has had at minimal 24  hours of appropriate urinary output without the need for straight cath, will continue Flomax.  Nephrology has been following as well with his preop acute kidney injury, his renal function has significantly improved, continue current medication regimen.  He was reevaluated by speech therapy and had no signs or symptoms of aspiration, he does have some mastication issues due to missing several teeth, but his diet has been upgraded to soft diet with thin liquids.  The patient is now ambulating with assistance, still has some weakness related to prolonged hospitalization and is appropriate for rehab facility prior to returning home.  He is to follow-up as per appointments below.  Plavix has been added due to non-STEMI presentation and beta-blockade has been changed to Toprol-XL with mild cardiomyopathy.     Discharge care included:     post procedure instructions given     discharge care discussed with the nurse and the patient     post procedure appointments made    Procedures Performed  Procedure(s):  CORONARY ARTERY BYPASS GRAFTING  AORTIC VALVE ASCENDING ANEURYSM REPAIR       Consults:   Consults     Date and Time Order Name Status Description    6/13/2022  4:59 PM Inpatient Psychiatrist Consult Completed     6/5/2022 10:48 AM Inpatient Endocrinology Consult      6/5/2022  8:54 AM Inpatient Pulmonology Consult Completed     6/4/2022 12:33 PM Inpatient Psychiatrist Consult Completed     6/1/2022  5:23 PM Inpatient Nephrology Consult Completed     6/1/2022 12:34 AM Inpatient Cardiology Consult Completed           Pertinent Test Results:    Lab Results   Component Value Date    WBC 7.70 06/17/2022    HGB 8.8 (L) 06/17/2022    HCT 25.9 (L) 06/17/2022    MCV 95.6 06/17/2022     06/17/2022      Lab Results   Component Value Date    GLUCOSE 119 (H) 06/17/2022    CALCIUM 7.9 (L) 06/17/2022     06/17/2022    K 4.0 06/17/2022    CO2 30.0 (H) 06/17/2022    CL 95 (L) 06/17/2022    BUN 21 06/17/2022    CREATININE  0.92 06/17/2022    BCR 22.8 06/17/2022    ANIONGAP 11.0 06/17/2022     Lab Results   Component Value Date    INR 1.14 (H) 06/10/2022    PROTIME 11.7 06/10/2022         Condition on Discharge: Stable    Vital Signs  Temp:  [97.6 °F (36.4 °C)-99 °F (37.2 °C)] 97.6 °F (36.4 °C)  Heart Rate:  [71-93] 82  Resp:  [14-26] 14  BP: ()/(52-70) 109/61        Discharge Disposition  Home or Self Care    Discharge Medications     Discharge Medications      New Medications      Instructions Start Date   acetaminophen 325 MG tablet  Commonly known as: TYLENOL   650 mg, Oral, Every 4 Hours PRN      amiodarone 400 MG tablet  Commonly known as: PACERONE   200 mg, Oral, 2 Times Daily      aspirin 81 MG EC tablet   81 mg, Oral, Daily   Start Date: June 18, 2022     bumetanide 1 MG tablet  Commonly known as: BUMEX   1 mg, Oral, Daily   Start Date: June 18, 2022     clopidogrel 75 MG tablet  Commonly known as: PLAVIX   75 mg, Oral, Daily      folic acid 1 MG tablet  Commonly known as: FOLVITE   1 mg, Oral, Daily   Start Date: June 18, 2022     gabapentin 100 MG capsule  Commonly known as: NEURONTIN  Replaces: gabapentin 600 MG tablet   100 mg, Oral, Every 8 Hours Scheduled      guaiFENesin 300 MG/15ML solution   400 mg, Oral, Every 6 Hours Scheduled      HYDROcodone-acetaminophen 5-325 MG per tablet  Commonly known as: NORCO   1 tablet, Oral, Every 6 Hours PRN      metoprolol succinate XL 25 MG 24 hr tablet  Commonly known as: TOPROL-XL   12.5 mg, Oral, Every 24 Hours Scheduled   Start Date: June 18, 2022     multivitamin tablet tablet   1 tablet, Oral, Daily   Start Date: June 18, 2022     potassium chloride 20 MEQ CR tablet  Commonly known as: K-DUR,KLOR-CON   20 mEq, Oral, Daily   Start Date: June 18, 2022     QUEtiapine 25 MG tablet  Commonly known as: SEROquel   25 mg, Oral, Nightly      sennosides-docusate 8.6-50 MG per tablet  Commonly known as: PERICOLACE   1 tablet, Oral, Daily      thiamine 100 MG tablet  tablet  Commonly  known as: VITAMIN B-1   100 mg, Oral, Daily   Start Date: June 18, 2022        Continue These Medications      Instructions Start Date   albuterol sulfate  (90 Base) MCG/ACT inhaler  Commonly known as: PROVENTIL HFA;VENTOLIN HFA;PROAIR HFA   2 puffs, Inhalation, Every 4 Hours PRN      Fluticasone-Umeclidin-Vilant 200-62.5-25 MCG/INH inhaler  Commonly known as: TRELEGY   1 puff, Inhalation, Daily - RT      pravastatin 20 MG tablet  Commonly known as: PRAVACHOL   20 mg, Oral, Daily      tamsulosin 0.4 MG capsule 24 hr capsule  Commonly known as: FLOMAX   0.4 mg, Oral, Daily         Stop These Medications    amLODIPine 5 MG tablet  Commonly known as: NORVASC     gabapentin 600 MG tablet  Commonly known as: NEURONTIN  Replaced by: gabapentin 100 MG capsule     lisinopril 40 MG tablet  Commonly known as: PRINIVIL,ZESTRIL        ASK your doctor about these medications      Instructions Start Date   predniSONE 20 MG tablet  Commonly known as: DELTASONE  Ask about: Should I take this medication?   40 mg, Oral, Daily With Breakfast             Discharge Diet:     Activity at Discharge:     Follow-up Appointments  Future Appointments   Date Time Provider Department Center   7/18/2022 10:15 AM Christal Vora APRN [facility]     Additional Instructions for the Follow-ups that You Need to Schedule     Call MD With Problems / Concerns   As directed      Instructions:  Call office at [phone] for any drainage, increased redness, or fever over 100.5    Order Comments: Instructions:  Call office at [phone] for any drainage, increased redness, or fever over 100.5          Discharge Follow-up with PCP   As directed       Currently Documented PCP:    Deysi Mason APRN    PCP Phone Number:    [phone]     Follow Up Details: in 1 week         Discharge Follow-up with Specified Provider: CardiologistDr. Giraldo, call for appointment; 1 Month   As directed      To: CardiologistDr. Giraldo, call  for appointment    Follow Up: 1 Month         Discharge Follow-up with Specified Provider: Dr. Wood MALDONADO, July 18th at 10:15 pm for incision check   As directed      To: Dr. Wood MALDONADO, July 18th at 10:15 pm for incision check         Basic Metabolic Panel    Jun 24, 2022 (Approximate)      Release to patient: Immediate               Test Results Pending at Discharge         ERICA Johnson  06/17/22  12:04 EDT

## 2022-06-17 NOTE — PLAN OF CARE
Goal Outcome Evaluation:              Outcome Evaluation: Pt in OR currently - will evaluate when pt arrives to floor and is appropriate

## 2022-06-17 NOTE — PAYOR COMM NOTE
"This is discharge notification for Chi Bowers Sr.  Reference/Auth # [id number]  Pt discharged to skilled nsg facility on 6/17/22.    Please send confirmation of all days approved please.     Lizzette Richardson RN, BSN  Utilization Review Nurse  [facility]  Direct & confidential phone # [phone]  Fax # [phone]      Chi Bowers Sr. (67 y.o. Male)             Date of Birth   1955    Social Security Number       Address   [address] [place] [place] [address]    Home Phone   [phone]    MRN   [id number]       Anglican   None    Marital Status                               Admission Date   5/31/22    Admission Type   Emergency    Admitting Provider       Attending Provider       Department, Room/Bed   [facility] CARDIOVASCULAR CARE UNIT, 2216/1       Discharge Date   6/17/2022    Discharge Disposition   Rehab Facility or Unit (DC - External)    Discharge Destination                               Attending Provider: (none)   Allergies: No Known Allergies    Isolation: Contact   Infection: MRSA (06/08/22)   Code Status: Prior   Advance Care Planning Activity    Ht: 177.8 cm (70\")   Wt: 107 kg (235 lb 0.2 oz)    Admission Cmt: None   Principal Problem: Syncope [R55]                 Active Insurance as of 5/31/2022     Primary Coverage     Payor Plan Insurance Group Employer/Plan Group    [id number] [facility] [employer] BLUE [facility] PPO [id number]     Payor Plan Address Payor Plan Phone Number Payor Plan Fax Number Effective Dates    [id number] [phone]  1/1/2022 - None Entered    [place] [address]       Subscriber Name Subscriber Birth Date Member ID       CHI BOWERS LYN SR. 1955 [id number]           Secondary Coverage     Payor Plan Insurance Group Employer/Plan Group    HUMANA MEDICARE REPLACEMENT HU[employer] MEDICARE REPLACEMENT [id number]     Payor Plan Address Payor Plan Phone Number Payor Plan Fax Number Effective Dates    [address] [address] [phone]  " 5/1/2022 - None Entered    [facility] [id number]       Subscriber Name Subscriber Birth Date Member ID       CHI BOWERS SR. 1955 [id number]                 Emergency Contacts      (Rel.) Home Phone Work Phone Mobile Phone    ELIZ LOWRY (Son) -- -- [phone]    Chi Bowers Jr (Son) -- -- [phone]               Discharge Summary      Martha Tinoco APRN at 06/17/22 1101          Date of Admission: 6/1/2022  Date of Discharge:  6/17/2022    Discharge Diagnosis: CAD status post CABG, ascending aortic aneurysm status post replacement, COPD, hypertension, hyperlipidemia, lumbar herniation (back surgery pending), early prostate cancer, EtOH abuse, MRSA nasal colonization, postop urinary retention resolved    Presenting Problem/History of Present Illness  Orthostatic hypotension [I95.1]  Syncope [R55]  Elevated troponin [R77.8]  COPD exacerbation (HCC) [J44.1]  FELIBERTO (acute kidney injury) (HCC) [N17.9]  Alcoholic intoxication without complication (HCC) [F10.920]  Acute respiratory failure with hypoxemia ([facility]) [J96.01]  Syncope, unspecified syncope type [R55]     Hospital Course  Patient is a 67 y.o. male presented to the emergency department after witnessed syncopal events, found to have elevated troponin and creatinine levels and ultimately underwent abnormal stress test and cardiac cath demonstrated three-vessel coronary disease in addition to a CT of the chest that demonstrated an ascending aortic aneurysm of 5.0 cm.  We were consulted for surgical intervention, his surgery was held until he recovered from an acute COPD exacerbation and acute withdrawal symptomology.  Endocrinology evaluated the patient preoperatively due to elevated glucose levels, his A1c was 5.5, elevation was thought to be due to steroids given during COPD treatment, glucose levels normalized postoperatively.  He ultimately underwent CABG x4 with LIMA to LAD, SVG to D1, SVG to OM, and SVG to PV with left leg EVH,  and ascending aortic replacement with Dacron grafting on 6/10/2022 with Dr. Hughes (see op report for full details).  His postop course was complicated with acute delirium which has since resolved (psychiatry was consulted for assistance and medication treatment) and acute urinary retention without infection requiring Nelson replacement on 6/13/2022, Nelson has since been removed and he has had at minimal 24 hours of appropriate urinary output without the need for straight cath, will continue Flomax.  Nephrology has been following as well with his preop acute kidney injury, his renal function has significantly improved, continue current medication regimen.  He was reevaluated by speech therapy and had no signs or symptoms of aspiration, he does have some mastication issues due to missing several teeth, but his diet has been upgraded to soft diet with thin liquids.  The patient is now ambulating with assistance, still has some weakness related to prolonged hospitalization and is appropriate for rehab facility prior to returning home.  He is to follow-up as per appointments below.  Plavix has been added due to non-STEMI presentation and beta-blockade has been changed to Toprol-XL with mild cardiomyopathy.     Discharge care included:     post procedure instructions given     discharge care discussed with the nurse and the patient     post procedure appointments made    Procedures Performed  Procedure(s):  CORONARY ARTERY BYPASS GRAFTING  AORTIC VALVE ASCENDING ANEURYSM REPAIR       Consults:   Consults     Date and Time Order Name Status Description    6/13/2022  4:59 PM Inpatient Psychiatrist Consult Completed     6/5/2022 10:48 AM Inpatient Endocrinology Consult      6/5/2022  8:54 AM Inpatient Pulmonology Consult Completed     6/4/2022 12:33 PM Inpatient Psychiatrist Consult Completed     6/1/2022  5:23 PM Inpatient Nephrology Consult Completed     6/1/2022 12:34 AM Inpatient Cardiology Consult Completed            Pertinent Test Results:    Lab Results   Component Value Date    WBC 7.70 06/17/2022    HGB 8.8 (L) 06/17/2022    HCT 25.9 (L) 06/17/2022    MCV 95.6 06/17/2022     06/17/2022      Lab Results   Component Value Date    GLUCOSE 119 (H) 06/17/2022    CALCIUM 7.9 (L) 06/17/2022     06/17/2022    K 4.0 06/17/2022    CO2 30.0 (H) 06/17/2022    CL 95 (L) 06/17/2022    BUN 21 06/17/2022    CREATININE 0.92 06/17/2022    BCR 22.8 06/17/2022    ANIONGAP 11.0 06/17/2022     Lab Results   Component Value Date    INR 1.14 (H) 06/10/2022    PROTIME 11.7 06/10/2022         Condition on Discharge: Stable    Vital Signs  Temp:  [97.6 °F (36.4 °C)-99 °F (37.2 °C)] 97.6 °F (36.4 °C)  Heart Rate:  [71-93] 82  Resp:  [14-26] 14  BP: ()/(52-70) 109/61        Discharge Disposition  Home or Self Care    Discharge Medications     Discharge Medications      New Medications      Instructions Start Date   acetaminophen 325 MG tablet  Commonly known as: TYLENOL   650 mg, Oral, Every 4 Hours PRN      amiodarone 400 MG tablet  Commonly known as: PACERONE   200 mg, Oral, 2 Times Daily      aspirin 81 MG EC tablet   81 mg, Oral, Daily   Start Date: June 18, 2022     bumetanide 1 MG tablet  Commonly known as: BUMEX   1 mg, Oral, Daily   Start Date: June 18, 2022     clopidogrel 75 MG tablet  Commonly known as: PLAVIX   75 mg, Oral, Daily      folic acid 1 MG tablet  Commonly known as: FOLVITE   1 mg, Oral, Daily   Start Date: June 18, 2022     gabapentin 100 MG capsule  Commonly known as: NEURONTIN  Replaces: gabapentin 600 MG tablet   100 mg, Oral, Every 8 Hours Scheduled      guaiFENesin 300 MG/15ML solution   400 mg, Oral, Every 6 Hours Scheduled      HYDROcodone-acetaminophen 5-325 MG per tablet  Commonly known as: NORCO   1 tablet, Oral, Every 6 Hours PRN      metoprolol succinate XL 25 MG 24 hr tablet  Commonly known as: TOPROL-XL   12.5 mg, Oral, Every 24 Hours Scheduled   Start Date: June 18, 2022     multivitamin  tablet tablet   1 tablet, Oral, Daily   Start Date: June 18, 2022     potassium chloride 20 MEQ CR tablet  Commonly known as: K-DUR,KLOR-CON   20 mEq, Oral, Daily   Start Date: June 18, 2022     QUEtiapine 25 MG tablet  Commonly known as: SEROquel   25 mg, Oral, Nightly      sennosides-docusate 8.6-50 MG per tablet  Commonly known as: PERICOLACE   1 tablet, Oral, Daily      thiamine 100 MG tablet  tablet  Commonly known as: VITAMIN B-1   100 mg, Oral, Daily   Start Date: June 18, 2022        Continue These Medications      Instructions Start Date   albuterol sulfate  (90 Base) MCG/ACT inhaler  Commonly known as: PROVENTIL HFA;VENTOLIN HFA;PROAIR HFA   2 puffs, Inhalation, Every 4 Hours PRN      Fluticasone-Umeclidin-Vilant 200-62.5-25 MCG/INH inhaler  Commonly known as: TRELEGY   1 puff, Inhalation, Daily - RT      pravastatin 20 MG tablet  Commonly known as: PRAVACHOL   20 mg, Oral, Daily      tamsulosin 0.4 MG capsule 24 hr capsule  Commonly known as: FLOMAX   0.4 mg, Oral, Daily         Stop These Medications    amLODIPine 5 MG tablet  Commonly known as: NORVASC     gabapentin 600 MG tablet  Commonly known as: NEURONTIN  Replaced by: gabapentin 100 MG capsule     lisinopril 40 MG tablet  Commonly known as: PRINIVIL,ZESTRIL        ASK your doctor about these medications      Instructions Start Date   predniSONE 20 MG tablet  Commonly known as: DELTASONE  Ask about: Should I take this medication?   40 mg, Oral, Daily With Breakfast             Discharge Diet:     Activity at Discharge:     Follow-up Appointments  Future Appointments   Date Time Provider Department Center   7/18/2022 10:15 AM Christal Vora APRN [facility]     Additional Instructions for the Follow-ups that You Need to Schedule     Call MD With Problems / Concerns   As directed      Instructions:  Call office at [phone] for any drainage, increased redness, or fever over 100.5    Order Comments: Instructions:  Call  office at [phone] for any drainage, increased redness, or fever over 100.5          Discharge Follow-up with PCP   As directed       Currently Documented PCP:    Deysi Mason APRN    PCP Phone Number:    [phone]     Follow Up Details: in 1 week         Discharge Follow-up with Specified Provider: Cardiologist, Dr. Giraldo, call for appointment; 1 Month   As directed      To: Cardiologist, Dr. Giraldo, call for appointment    Follow Up: 1 Month         Discharge Follow-up with Specified Provider: Dr. Hughes'karly MALDONADO, July 18th at 10:15 pm for incision check   As directed      To: Dr. Wood MALDONADO, July 18th at 10:15 pm for incision check         Basic Metabolic Panel    Jun 24, 2022 (Approximate)      Release to patient: Immediate               Test Results Pending at Discharge         ERICA Johnson  06/17/22  12:04 EDT      Electronically signed by Martha Tinoco APRN at 06/17/22 7661

## 2022-06-17 NOTE — THERAPY TREATMENT NOTE
Acute Care - Speech Language Pathology   Swallow Treatment Note [facility]     Patient Name: Chi Quinteros Sr.  : 1955  MRN: [id number]  Today's Date: 2022               Admit Date: 2022    Visit Dx:     ICD-10-CM ICD-9-CM   1. Syncope, unspecified syncope type  R55 780.2   2. Alcoholic intoxication without complication ([facility])  F10.920 305.00   3. Acute respiratory failure with hypoxemia ([facility])  J96.01 518.81   4. Orthostatic hypotension  I95.1 458.0   5. Elevated troponin  R77.8 790.6   6. FELIBERTO (acute kidney injury) ([facility])  N17.9 584.9   7. COPD exacerbation ([facility])  J44.1 491.21   8. Abnormal nuclear stress test  R94.39 794.39   9. Coronary artery disease involving native coronary artery of native heart, unspecified whether angina present  I25.10 414.01   10. Acute renal insufficiency  N28.9 593.9   11. S/P CABG (coronary artery bypass graft)  Z95.1 V45.81     Patient Active Problem List   Diagnosis   • COPD exacerbation ([facility])   • Obesity (BMI 30-39.9)   • Abnormal nuclear stress test   • Acute renal insufficiency   • Alcohol dependence ([facility])   • Essential hypertension   • Other emphysema ([facility])   • Coronary artery disease   • Syncope, unspecified syncope type   • Postoperative delirium     Past Medical History:   Diagnosis Date   • Back pain    • Emphysema lung ([facility])    • Hypertension      Past Surgical History:   Procedure Laterality Date   • CARDIAC CATHETERIZATION Right 2022    Procedure: Coronary angiography;  Surgeon: Frank Giraldo MD;  Location: [facility] CATH INVASIVE LOCATION;  Service: Cardiovascular;  Laterality: Right;   • CARDIAC CATHETERIZATION N/A 2022    Procedure: Left Heart Cath;  Surgeon: Frank Giraldo MD;  Location: [facility] CATH INVASIVE LOCATION;  Service: Cardiovascular;  Laterality: N/A;   • CARDIAC CATHETERIZATION N/A 2022    Procedure: Left ventriculography;  Surgeon: Frank Giraldo MD;  Location: [facility] CATH INVASIVE LOCATION;  Service: Cardiovascular;  Laterality: N/A;        SLP Recommendation and Plan     SLP Diet Recommendation: soft textures, thin liquids (06/17/22 1100)  Recommended Precautions and Strategies: upright posture during/after eating, small bites of food and sips of liquid (06/17/22 1100)  SLP Rec. for Method of Medication Administration: meds whole, meds crushed, with thin liquids, with pudding or applesauce (06/17/22 1100)     Monitor for Signs of Aspiration: yes, notify SLP if any concerns, cough (06/17/22 1100)              Therapy Frequency (Swallow): PRN (06/17/22 1100)  Predicted Duration Therapy Intervention (Days): until discharge (06/17/22 1100)                        Plan for Continued Treatment (SLP): continue treatment per plan of care (06/17/22 1100)               SWALLOW EVALUATION (last 72 hours)     SLP Adult Swallow Evaluation     Row Name 06/17/22 1100       Rehab Evaluation    Document Type therapy note (daily note)  -LF    Subjective Information no complaints  -LF    Patient Observations alert;cooperative;agree to therapy  -LF    Patient Effort good  -    Comment --    Symptoms Noted During/After Treatment --            General Information    Patient Profile Reviewed yes  -LF       SLP Treatment Clinical Impressions    Plan for Continued Treatment (SLP) continue treatment per plan of care  -LF    Care Plan Review --            Recommendations    Therapy Frequency (Swallow) PRN  -LF    Predicted Duration Therapy Intervention (Days) until discharge  -    SLP Diet Recommendation soft textures;thin liquids  -LF    Recommended Diagnostics --    Recommended Precautions and Strategies upright posture during/after eating;small bites of food and sips of liquid  -LF    Oral Care Recommendations Oral Care BID/PRN  -LF    SLP Rec. for Method of Medication Administration meds whole;meds crushed;with thin liquids;with pudding or applesauce  -LF    Monitor for Signs of Aspiration yes;notify SLP if any concerns;cough  -LF            Swallow Goals (SLP)     Oral Nutrition/Hydration Goal Selection (SLP) oral nutrition/hydration, SLP goal 1;oral nutrition/hydration, SLP goal (free text)  -LF            Oral Nutrition/Hydration Goal 1 (SLP)    Oral Nutrition/Hydration Goal 1, SLP The patient will maximize swallow function for least restrictive po diet, exhibiting no complications associated with dysphagia, adequate po intake, and demonstrating independent use of safe swallow compensations.  -LF    Time Frame (Oral Nutrition/Hydration Goal 1, SLP) by discharge  -LF    Barriers (Oral Nutrition/Hydration Goal 1, SLP) see above note  -LF    Progress/Outcomes (Oral Nutrition/Hydration Goal 1, SLP) goal ongoing  -LF            Oral Nutrition/Hydration Goal 2 (SLP)    Oral Nutrition/Hydration Goal 2, SLP The patient will participate in ongoing assessment of swallow, including re-evaluation clinically and/or including instrumental assessment of swallow if indicated, to further assess swallow function in anticipation to initiate a po diet  -LF    Time Frame (Oral Nutrition/Hydration Goal 2, SLP) 1 day  -LF    Barriers (Oral Nutrition/Hydration Goal 2, SLP) see above note  -LF    Progress/Outcomes (Oral Nutrition/Hydration Goal 2, SLP) goal met  -LF            Oral Nutrition/Hydration Goal (SLP)    Oral Nutrition/Hydration Goal, SLP Patient abilio be seen at a meal within 24-48 hours to assess tolerance of current diet  -LF    Time Frame (Oral Nutrition/Hydration Goal, SLP) 2 days  -LF    Barriers (Oral Nutrition/Hydration Goal, SLP) see above note  -LF    Progress/Outcomes (Oral Nutrition/Hydration Goal, SLP) good progress toward goal  -LF          User Key  (r) = Recorded By, (t) = Taken By, (c) = Cosigned By    Initials Name Effective Dates    MM Christy Garzon, OSMANI 06/16/21 -     LF Veronique Lujan, OSMANI 06/16/21 -                 EDUCATION  The patient has been educated in the following areas:   Dysphagia (Swallowing Impairment).       Patient was not wearing a face mask during  this therapy encounter. Therapist used appropriate personal protective equipment including mask, eye protection and gloves.  Mask used was standard procedure mask. Appropriate PPE was worn during the entire therapy session. Hand hygiene was completed before and after therapy session. Patient is not in enhanced droplet precautions.                  Time Calculation:                Veronique Lujan, SLP  6/17/2022

## 2022-06-17 NOTE — CASE MANAGEMENT/SOCIAL WORK
Continued Stay Note   [facility]     Patient Name: Chi Quinteros Sr.  MRN: [id number]  Today's Date: 6/17/2022    Admit Date: 5/31/2022     Discharge Plan                      Row Name 06/17/22 1623       Plan    Plan DC Plan: [facility] accepted and Precert approved. PASSR per facility. CABG 6/10/22.    Patient/Family in Agreement with Plan yes    Provided Post Acute Provider List? N/A    Provided Post Acute Provider Quality & Resource List? N/A    Plan Comments CM spoke with patient’s nurse and [facility] NP Christal Painter to obtain clinical updates. NP informed patient precert is approved and bed is available. plan to discharge to [place] today. Patient still remains on 4 l nasal cannula of oxygen.CM placed referral in basket for [facility] and liabakari Stewart notified of need for transport tank loaner for family for transport. Oxygen tank delivered to patient room for discharge. Patient will transport via private vehicle with family and portable oxygen.              Expected Discharge Date and Time     Expected Discharge Date Expected Discharge Time    Jun 17, 2022         Phone communication or documentation only- no physical contact with patient or family.      Aurelia Rios RN     Office Phone: [phone]  Office Cell:     [phone]

## 2022-06-17 NOTE — PAYOR COMM NOTE
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 7.359 7.368   PCO2, ARTERIAL mm Hg 36.0 40.1 36.2 41.5 43.9 46.9 45.7   PO2 ART mm Hg 71.2* 75.0* 85.9 110.4* 99.2 139.8* 94.5   O2 SATURATION ART % 94.9 95.0 96.8 98.3* 97.6 99.0* 97.0   BASE EXCESS ART mmol/L 0.8 0.6 -0.3* 0.3 1.3 0.6 0.6     UA    Results from last 7 days   Lab Units 06/13/22  0917   NITRITE UA  Negative   WBC UA /HPF 0-2*   BACTERIA UA /HPF None Seen   SQUAM EPITHEL UA /HPF 0-2     ANGELO  No results found for: POCMETH, POCAMPHET, POCBARBITUR, POCBENZO, POCCOCAINE, POCOPIATES, POCOXYCODO, POCPHENCYC, POCPROPOXY, POCTHC, POCTRICYC  Lysis Labs   Results from last 7 days   Lab Units 06/16/22  0427 06/15/22  0439 06/14/22  0430 06/13/22  0530 06/12/22  0231 06/11/22  0355 06/11/22  0101 06/10/22  2254 06/10/22  2201 06/10/22  2147 06/10/22  1950   INR   --   --   --   --   --   --   --   --  1.14*  --  1.25*   APTT seconds  --   --   --   --   --   --   --   --  27.7  --  29.7   FIBRINOGEN mg/dL  --   --   --   --   --   --   --   --  449  --  436   HEMOGLOBIN g/dL 9.2* 9.3* 10.2* 9.8* 9.8* 10.0*  --   --   10.5*  --  8.5*   HEMOGLOBIN, POC g/dL  --   --   --   --   --   --  10.6*   < >  --    < >  --    PLATELETS 10*3/mm3 217 189 192 127* 139* 145  --   --  170  --  186   CREATININE mg/dL 0.76 0.78 0.72* 0.89 0.76 1.24  --   --  1.36*  --   --     < > = values in this interval not displayed.     Radiology(recent) FL Video Swallow With Speech Single Contrast    Result Date: 6/15/2022  Modified barium swallow study with fluoroscopy. Please refer to the speech pathologist report for findings and dietary recommendations.  Electronically Signed By-Pauline Mckee MD On:6/15/2022 11:45 AM This report was finalized on [id number] by  Pauline Mckee MD.    XR Chest 1 View    Result Date: 6/15/2022  1.Stable bibasilar airspace opacities with increased airspace opacities in the right mid lung, likely atelectasis. 2.No visible pneumothorax.  Electronically Signed By-Navya Zamora MD On:6/15/2022 8:16 AM This report was finalized on [id number] by  Navya Zamora MD.    XR Chest 1 View    Result Date: 6/14/2022  1. Stable right IJ vascular sheath. 2. Cardiomegaly and central pulmonary vascular congestion with bibasilar airspace disease and small bilateral pleural effusions, unchanged. 3. No pneumothorax.  Electronically Signed By-Jairon Wong MD On:6/14/2022 1:11 PM This report was finalized on [id number] by  Jairon Wong MD.        Results from last 7 days   Lab Units 06/13/22  0530   CK TOTAL U/L 456*       Imaging Results (Last 24 Hours)     Procedure Component Value Units Date/Time    FL Video Swallow With Speech Single Contrast [[id number]] Collected: 06/15/22 1145     Updated: 06/15/22 1148    Narrative:      DATE OF EXAM:  6/15/2022 11:32 AM     PROCEDURE:  FL VIDEO SWALLOW W SPEECH SINGLE-CONTRAST-     INDICATIONS:  dysphagia; R55-Syncope and collapse; F10.920-Alcohol use, unspecified  with intoxication, uncomplicated; J96.01-Acute respiratory failure with  hypoxia; I95.1-Orthostatic hypotension; R77.8-Other  specified  abnormalities of plasma proteins; N17.9-Acute kidney failure,  unspecified; J44.1-Chronic obstructive pulmonary disease with (acute)  exacerbation; R94.39-Abnormal result of other cardiovascular fu     COMPARISON:  No Comparisons Available     TECHNIQUE:   This examination was performed in conjunction with speech pathology.  Lateral video fluoroscopic evaluation of the swallowing mechanism was  performed while correlate administering to the patient various  consistency food items mixed with barium.     Fluoroscopic Time: 1.6 minutes        Number of Images: 12 spot fluoroscopic series images        FINDINGS: Modified barium swallow study was performed utilizing  fluoroscopy. The study was performed by dedicated speech pathologist. I  was present during the entire examination.          Impression:      Modified barium swallow study with fluoroscopy. Please refer to the  speech pathologist report for findings and dietary recommendations.     Electronically Signed By-Pauline Mckee MD On:6/15/2022 11:45 AM  This report was finalized on [id number] by  Pauline Mckee MD.          Cardiac Studies:  Echo- Results for orders placed during the hospital encounter of 22    Adult Transthoracic Echo Complete With Contrast if Necessary Per Protocol (With Agitated Saline)    Interpretation Summary  · Left ventricular ejection fraction appears to be 56 - 60%.  · The right ventricular cavity is mildly dilated.  · Mild dilation of the aortic root is present.  · No pericardial effusion noted    Stress Myoview-  Cath-        ERICA Washington  22  11:47 EDT    Electronically signed by Jessica Betancur APRN at 22 1233     Cindy Arellano MD at 22 1018          PULMONARY CRITICAL CARE PROGRESS  NOTE      PATIENT IDENTIFICATION:  Name: Chi Quinteros  MRN: [id number]  :  1955     Age: 67 y.o.  Sex: male    DATE OF Note:  2022   Referring Physician: No admitting provider for patient  "encounter.                  Subjective:   No new issue   On 2 L  no SOB, no chest pain,       starting diet    no nausea or vomiting, no  Bowel movement yet   Good urine out ,  no new  skin rash or itching.      Objective:  tMax 24 hrs: Temp (24hrs), Av.4 °F (36.9 °C), Min:97.8 °F (36.6 °C), Max:98.8 °F (37.1 °C)      Vitals Ranges:   Temp:  [97.8 °F (36.6 °C)-98.8 °F (37.1 °C)] 98.8 °F (37.1 °C)  Heart Rate:  [] 79  Resp:  [22-39] 29  BP: ()/(53-97) 109/58    Intake and Output Last 3 Shifts:   I/O last 3 completed shifts:  In: 982 [P.O.:780; I.V.:202]  Out: 2665 [Urine:2665]    Exam:  /58 (Patient Position: Sitting)   Pulse 79   Temp 98.8 °F (37.1 °C) (Oral)   Resp (!) 29   Ht 177.8 cm (70\")   Wt 102 kg (225 lb 6.4 oz)   SpO2 100%   BMI 32.34 kg/m²     General Appearance:   AA     HEENT:  Normocephalic, without obvious abnormality. Conjunctivae/corneas clear.  Normal external ear canals. Nares normal, no drainage     Neck:  Supple, symmetrical, trachea midline. No JVD.  Lungs /Chest wall:   Bilateral basal rhonchi, respirations unlabored, symmetrical wall movement.     Heart:  Regular rate and rhythm, systolic murmur PMI left sternal border  Abdomen: Soft, nontender, no masses, no organomegaly.    Extremities: Trace edema, no clubbing or cyanosis        Medications:    Current Facility-Administered Medications:   •  acetaminophen (TYLENOL) tablet 650 mg, 650 mg, Oral, Q4H PRN, 650 mg at 22 1125 **OR** acetaminophen (TYLENOL) 160 MG/5ML solution 650 mg, 650 mg, Oral, Q4H PRN **OR** acetaminophen (TYLENOL) suppository 650 mg, 650 mg, Rectal, Q4H PRN, Martha Tinoco APRN  •  amiodarone (PACERONE) tablet 400 mg, 400 mg, Oral, Q12H, Marcia Person PA, 400 mg at 22 0837  •  arformoterol (BROVANA) nebulizer solution 15 mcg, 15 mcg, Nebulization, BID - RT, Martha Tinoco APRN, 15 mcg at 22 0740  •  aspirin EC tablet 81 mg, 81 mg, Oral, Daily, Martha Tinoco APRN, " 81 mg at 06/16/22 0837  •  atorvastatin (LIPITOR) tablet 40 mg, 40 mg, Oral, Nightly, Dre Cooper MD, 40 mg at 06/15/22 2044  •  bisacodyl (DULCOLAX) suppository 10 mg, 10 mg, Rectal, Daily PRN, Martha Tinoco, APRN  •  budesonide (PULMICORT) nebulizer solution 0.5 mg, 0.5 mg, Nebulization, BID - RT, Martha Tinoco APRN, 0.5 mg at 06/16/22 0740  •  bumetanide (BUMEX) tablet 1 mg, 1 mg, Oral, Daily, Dre Cooper MD, 1 mg at 06/16/22 0837  •  chlorhexidine (PERIDEX) 0.12 % solution 15 mL, 15 mL, Mouth/Throat, Q12H, Martha Tinoco APRN, 15 mL at 06/15/22 2133  •  dextrose (D50W) (25 g/50 mL) IV injection 10-50 mL, 10-50 mL, Intravenous, Q15 Min PRN, Martha Tinoco APRN  •  dextrose (GLUTOSE) oral gel 15 g, 15 g, Oral, Q15 Min PRN, Martha Tinoco APRN  •  Enoxaparin Sodium (LOVENOX) syringe 40 mg, 40 mg, Subcutaneous, Q24H, Martha Tinoco APRN, 40 mg at 06/14/22 1734  •  folic acid (FOLVITE) tablet 1 mg, 1 mg, Oral, Daily, Thong Watts APRN, 1 mg at 06/16/22 0837  •  gabapentin (NEURONTIN) capsule 100 mg, 100 mg, Oral, Q8H, Christal Vora APRN, 100 mg at 06/16/22 0606  •  glucagon (human recombinant) (GLUCAGEN DIAGNOSTIC) 1 mg in sterile water (preservative free) 1 mL injection, 1 mg, Intramuscular, Q15 Min PRN, Martha Tinoco, APRN  •  guaiFENesin solution 400 mg, 400 mg, Oral, Q6H, Cindy Arellano MD, 400 mg at 06/16/22 0606  •  HYDROcodone-acetaminophen (NORCO) 5-325 MG per tablet 1 tablet, 1 tablet, Oral, Q6H PRN, 1 tablet at 06/13/22 2232 **OR** HYDROcodone-acetaminophen (NORCO) 5-325 MG per tablet 2 tablet, 2 tablet, Oral, Q6H PRN, Christal Vora, APRN, 2 tablet at 06/16/22 0345  •  insulin lispro (ADMELOG) injection 0-7 Units, 0-7 Units, Subcutaneous, TID AC, 2 Units at 06/15/22 1747 **AND** insulin lispro (ADMELOG) injection 0-7 Units, 0-7 Units, Subcutaneous, PRN, Christal Vora L, APRN  •  ipratropium-albuterol (DUO-NEB) nebulizer  solution 3 mL, 3 mL, Nebulization, Q4H PRN, Day, Kori, APRN, 3 mL at 06/15/22 0012  •  LORazepam (ATIVAN) tablet 0.5 mg, 0.5 mg, Oral, Q2H PRN **OR** LORazepam (ATIVAN) injection 0.5 mg, 0.5 mg, Intravenous, Q2H PRN, 0.5 mg at 22 2232 **OR** LORazepam (ATIVAN) tablet 1 mg, 1 mg, Oral, Q1H PRN **OR** LORazepam (ATIVAN) injection 1 mg, 1 mg, Intravenous, Q1H PRN, 1 mg at 22 0214 **OR** LORazepam (ATIVAN) injection 1 mg, 1 mg, Intravenous, Q15 Min PRN, 1 mg at 22 0524 **OR** LORazepam (ATIVAN) injection 1 mg, 1 mg, Intramuscular, Q15 Min PRN **OR** LORazepam (ATIVAN) injection 2 mg, 2 mg, Intravenous, Q1H PRN, 2 mg at 22 1417 **OR** LORazepam (ATIVAN) tablet 2 mg, 2 mg, Oral, Q1H PRN, Shivam Recio MD  •  magnesium hydroxide (MILK OF MAGNESIA) suspension 10 mL, 10 mL, Oral, Daily PRN, Martha Tinoco, APRN  •  metoprolol tartrate (LOPRESSOR) half tablet 12.5 mg, 12.5 mg, Oral, Q12H, Martha Tinoco APRN, 12.5 mg at 22 0837  •  multivitamin (THERAGRAN) tablet 1 tablet, 1 tablet, Oral, Daily, Thong Watts, APRN, 1 tablet at 22 0837  •  [] morphine injection 2 mg, 2 mg, Intravenous, Q2H PRN, 2 mg at 22 0358 **AND** naloxone (NARCAN) injection 0.4 mg, 0.4 mg, Intravenous, Q5 Min PRN, Martha Tinoco, APRN  •  ondansetron (ZOFRAN) injection 4 mg, 4 mg, Intravenous, Q6H PRN, Alejandrina, Martha, APRN  •  [COMPLETED] pantoprazole (PROTONIX) injection 40 mg, 40 mg, Intravenous, Once, 40 mg at 06/10/22 2313 **FOLLOWED BY** pantoprazole (PROTONIX) EC tablet 40 mg, 40 mg, Oral, QAM, Christal Vora APRN, 40 mg at 22 0606  •  polyethylene glycol (MIRALAX) packet 17 g, 17 g, Oral, BID, Christal Vora APRN, 17 g at 22 0837  •  potassium chloride (K-DUR,KLOR-CON) CR tablet 20 mEq, 20 mEq, Oral, PRN **OR** potassium chloride (KLOR-CON) packet 20 mEq, 20 mEq, Oral, PRN, Martha Tinoco APRN, 20 mEq at 22 06  •  potassium chloride  (K-DUR,KLOR-CON) CR tablet 20 mEq, 20 mEq, Oral, Daily, Satterly-Yaniv, Christal L, APRN, 20 mEq at 06/16/22 0837  •  QUEtiapine (SEROquel) tablet 25 mg, 25 mg, Oral, Nightly, Adam Henao PA-C, 25 mg at 06/15/22 2043  •  QUEtiapine (SEROquel) tablet 25 mg, 25 mg, Oral, Daily PRN, Adam Henao PA-C  •  sennosides-docusate (PERICOLACE) 8.6-50 MG per tablet 2 tablet, 2 tablet, Oral, Nightly, Satterly-Yaniv, Christal L, APRN, 2 tablet at 06/15/22 2043  •  tamsulosin (FLOMAX) 24 hr capsule 0.4 mg, 0.4 mg, Oral, Daily, Satterly-Yaniv, Christal L, APRN, 0.4 mg at 06/16/22 0837  •  thiamine (VITAMIN B-1) tablet 100 mg, 100 mg, Oral, Daily, Thong Watts, APRN, 100 mg at 06/16/22 0837    Data Review:  All labs (24hrs):   Recent Results (from the past 24 hour(s))   POC Glucose Once    Collection Time: 06/15/22  1:46 PM    Specimen: Blood   Result Value Ref Range    Glucose 123 (H) 70 - 105 mg/dL   POC Glucose Once    Collection Time: 06/15/22  3:53 PM    Specimen: Blood   Result Value Ref Range    Glucose 189 (H) 70 - 105 mg/dL   POC Glucose Once    Collection Time: 06/15/22  7:26 PM    Specimen: Blood   Result Value Ref Range    Glucose 104 70 - 105 mg/dL   ECG 12 Lead    Collection Time: 06/16/22  3:26 AM   Result Value Ref Range    QT Interval 341 ms   Calcium, Ionized    Collection Time: 06/16/22  4:27 AM    Specimen: Blood   Result Value Ref Range    Ionized Calcium 1.14 (L) 1.20 - 1.30 mmol/L   CBC (No Diff)    Collection Time: 06/16/22  4:27 AM    Specimen: Blood   Result Value Ref Range    WBC 7.10 3.40 - 10.80 10*3/mm3    RBC 2.93 (L) 4.14 - 5.80 10*6/mm3    Hemoglobin 9.2 (L) 13.0 - 17.7 g/dL    Hematocrit 28.2 (L) 37.5 - 51.0 %    MCV 96.3 79.0 - 97.0 fL    MCH 31.6 26.6 - 33.0 pg    MCHC 32.8 31.5 - 35.7 g/dL    RDW 13.8 12.3 - 15.4 %    RDW-SD 46.8 37.0 - 54.0 fl    MPV 8.2 6.0 - 12.0 fL    Platelets 217 140 - 450 10*3/mm3   Basic Metabolic Panel    Collection Time: 06/16/22  4:27 AM     Specimen: Blood   Result Value Ref Range    Glucose 128 (H) 65 - 99 mg/dL    BUN 21 8 - 23 mg/dL    Creatinine 0.76 0.76 - 1.27 mg/dL    Sodium 137 136 - 145 mmol/L    Potassium 3.7 3.5 - 5.2 mmol/L    Chloride 97 (L) 98 - 107 mmol/L    CO2 28.0 22.0 - 29.0 mmol/L    Calcium 8.3 (L) 8.6 - 10.5 mg/dL    BUN/Creatinine Ratio 27.6 (H) 7.0 - 25.0    Anion Gap 12.0 5.0 - 15.0 mmol/L    eGFR 98.5 >60.0 mL/min/1.73   Magnesium    Collection Time: 06/16/22  4:27 AM    Specimen: Blood   Result Value Ref Range    Magnesium 2.0 1.6 - 2.4 mg/dL   Phosphorus    Collection Time: 06/16/22  4:27 AM    Specimen: Blood   Result Value Ref Range    Phosphorus 3.8 2.5 - 4.5 mg/dL   POC Glucose Once    Collection Time: 06/16/22  7:43 AM    Specimen: Blood   Result Value Ref Range    Glucose 133 (H) 70 - 105 mg/dL        Imaging:  FL Video Swallow With Speech Single Contrast  Narrative: DATE OF EXAM:  6/15/2022 11:32 AM     PROCEDURE:  FL VIDEO SWALLOW W SPEECH SINGLE-CONTRAST-     INDICATIONS:  dysphagia; R55-Syncope and collapse; F10.920-Alcohol use, unspecified  with intoxication, uncomplicated; J96.01-Acute respiratory failure with  hypoxia; I95.1-Orthostatic hypotension; R77.8-Other specified  abnormalities of plasma proteins; N17.9-Acute kidney failure,  unspecified; J44.1-Chronic obstructive pulmonary disease with (acute)  exacerbation; R94.39-Abnormal result of other cardiovascular fu     COMPARISON:  No Comparisons Available     TECHNIQUE:   This examination was performed in conjunction with speech pathology.  Lateral video fluoroscopic evaluation of the swallowing mechanism was  performed while correlate administering to the patient various  consistency food items mixed with barium.     Fluoroscopic Time: 1.6 minutes        Number of Images: 12 spot fluoroscopic series images        FINDINGS: Modified barium swallow study was performed utilizing  fluoroscopy. The study was performed by dedicated speech pathologist. I  was  present during the entire examination.        Impression: Modified barium swallow study with fluoroscopy. Please refer to the  speech pathologist report for findings and dietary recommendations.     Electronically Signed By-Pauline Mckee MD On:6/15/2022 11:45 AM  This report was finalized on [id number] by  Pauline Mckee MD.  XR Chest 1 View  Narrative: DATE OF EXAM:  6/15/2022 2:38 AM     PROCEDURE:  XR CHEST 1 VW-     INDICATIONS:  post op open heart     COMPARISON:  AP chest x-ray 05/31/2022, CT chest PE protocol 06/01/2022, AP chest  x-ray 06/14/2022.     TECHNIQUE:   Single radiographic AP view of the chest was obtained.     FINDINGS:  Right internal jugular sheath remains in place. Cardiomediastinal  contours appear stable, including aneurysmal dilatation of the ascending  thoracic aorta and enlarged cardiac silhouette. No pneumothorax is seen.  There are stable bibasilar airspace opacities with increased airspace  opacities noted in the right midlung.      Impression: 1.Stable bibasilar airspace opacities with increased airspace opacities  in the right mid lung, likely atelectasis.  2.No visible pneumothorax.     Electronically Signed By-Navya Zamora MD On:6/15/2022 8:16 AM  This report was finalized on [id number] by  Navya Zamora MD.       ASSESSMENT:    S?P CABG with ascending aortic aneurysm repair   COPD exacerbation (HCC)    Obesity (BMI 30-39.9)    Abnormal nuclear stress test    Acute renal insufficiency    Alcohol dependence (HCC)    Essential hypertension    Other emphysema (HCC)    Coronary artery disease    Syncope, unspecified syncope type       PLAN:  Start oral diet   Pt/ot  Post op care  IS/ flutter valve   Diuretics    Monitor urine out pt   Bronchodilator  Inhaled corticosteroids  Electrolytes/ glycemic control  DVT and GI prophylaxis.    Total Critical care time in direct medical management (   ) minutes. This time specifically excludes time spent performing procedures.  Cindy  MD Adan. D, ABSM.     6/16/2022  10:18 EDT     Electronically signed by Cindy Arellano MD at 06/16/22 1827     Satteranastasiia-Christal Purvis APRN at 06/16/22 0841          S/P POD# 6 CABG x4 with LIMA/ asc ao replacement--Camporrotondo  EF 40% (cath)    Subjective:  No c/o's today    No events overnight  Required straight cath last night  Diet upgrade to puree/thin liquids yest  Wt is up 3 kgs from preop        Intake/Output Summary (Last 24 hours) at 6/16/2022 0841  Last data filed at 6/16/2022 0600  Gross per 24 hour   Intake 780 ml   Output 2185 ml   Net -1405 ml     Temp:  [97.8 °F (36.6 °C)-98.8 °F (37.1 °C)] 98.8 °F (37.1 °C)  Heart Rate:  [] 79  Resp:  [22-39] 29  BP: ()/(53-97) 109/58       Results from last 7 days   Lab Units 06/16/22  0427 06/15/22  0439 06/10/22  2254 06/10/22  2201 06/10/22  2147 06/10/22  1950   WBC 10*3/mm3 7.10 8.60   < > 22.40*  --  24.90*   HEMOGLOBIN g/dL 9.2* 9.3*   < > 10.5*  --  8.5*   HEMOGLOBIN, POC   --   --    < >  --    < >  --    HEMATOCRIT % 28.2* 28.8*   < > 32.8*  --  25.6*   HEMATOCRIT POC   --   --    < >  --    < >  --    PLATELETS 10*3/mm3 217 189   < > 170  --  186   INR   --   --   --  1.14*  --  1.25*    < > = values in this interval not displayed.     Results from last 7 days   Lab Units 06/16/22 0427   CREATININE mg/dL 0.76   POTASSIUM mmol/L 3.7   SODIUM mmol/L 137   MAGNESIUM mg/dL 2.0   PHOSPHORUS mg/dL 3.8       Physical Exam:  Neuro intact, nad, up in chair  Tele:  SR 80s  Diminished bases, rhonchi exp wheezes, productive cough, 94% 2L  Sternotomy/SVHS healing well  Benign abd, no BM  No edema    Assessment/Plan:  Active Problems:    COPD exacerbation (HCC)    Obesity (BMI 30-39.9)    Abnormal nuclear stress test    Acute renal insufficiency    Alcohol dependence (HCC)    Essential hypertension    Other emphysema (HCC)    Coronary artery disease    Syncope, unspecified syncope type    Postoperative delirium    - MV CAD, asc ao aneurysm  (4.9-5 cm),  EF 40% (cath)--s/p CABG x4 with LIMA/ asc ao replacement (Camporrotondo)  - NSTEMI presentation  - Admit with syncopal event x2--orthostatic on admit  - Severe COPD--pulm following, on steroids  - HTN--stable  - HLD--statin  - Lumbar herniation--back surgery pending  - Early prostate cancer--has follow up as outpatient, probable radiation treatment per patient  - FELIBERTO--resolved with volume repletion, Dr. Casarez following  - ETOH use--reports 3 beers/day and bourbon--withdrawal while in the hospital, improved  - MRSA nasal colonization--vanc/Bactroban  - Postop leukocytosis, multifactorial--watch closely, improving  - Postop urinary retention--dawn replaced     POD# 6.  Intermittent confusion improved.  Cont PT/OT/ST.  Pt cleared for puree diet with thins.  Renal following since preop.  On asa/statin/bb.  On Flomax.  Plans for [facility] possible tomorrow.    Routine care--as above  D/w pt/nsg, Dr. Lenard HAWTHORNE plan--tentative [facility]     ERICA Decker  2022  08:41 EDT    Electronically signed by Christal Vora APRN at 22 1441     Dre Cooper MD at 22 0719          NEPHROLOGY PROGRESS NOTE------[facility]    [facility]  [phone]  Amara Cooper MD      Patient Care Team:  Deysi Mason APRN as PCP - General (Nurse Practitioner)  Eliseo Casarez MD as Consulting Physician (Nephrology)      Provider:  Amara Cooper MD  Patient Name: Chi Quinteros .  :  1955    SUBJECTIVE:    F/U ARF/FELIBERTO/CRF/CKD    Up in chair. A and O x 4 this AM. Fatigued. No angina. No dysuria.     Medication:  amiodarone, 400 mg, Oral, Q12H  arformoterol, 15 mcg, Nebulization, BID - RT  aspirin, 81 mg, Oral, Daily  atorvastatin, 40 mg, Oral, Nightly  budesonide, 0.5 mg, Nebulization, BID - RT  bumetanide, 1 mg, Oral, Daily  chlorhexidine, 15 mL, Mouth/Throat,  "Q12H  enoxaparin, 40 mg, Subcutaneous, Q24H  folic acid, 1 mg, Oral, Daily  gabapentin, 100 mg, Oral, Q8H  guaiFENesin, 400 mg, Oral, Q6H  insulin lispro, 0-7 Units, Subcutaneous, TID AC  metoprolol tartrate, 12.5 mg, Oral, Q12H  multivitamin, 1 tablet, Oral, Daily  pantoprazole, 40 mg, Oral, QAM  polyethylene glycol, 17 g, Oral, BID  potassium chloride, 20 mEq, Oral, Daily  QUEtiapine, 25 mg, Oral, Nightly  senna-docusate sodium, 2 tablet, Oral, Nightly  tamsulosin, 0.4 mg, Oral, Daily  thiamine, 100 mg, Oral, Daily           OBJECTIVE    Vital Sign Min/Max for last 24 hours  Temp  Min: 97 °F (36.1 °C)  Max: 98.8 °F (37.1 °C)   BP  Min: 90/53  Max: 126/70   Pulse  Min: 78  Max: 149   Resp  Min: 19  Max: 39   SpO2  Min: 91 %  Max: 100 %   No data recorded   Weight  Min: 102 kg (225 lb 6.4 oz)  Max: 102 kg (225 lb 6.4 oz)     Flowsheet Rows    Flowsheet Row First Filed Value   Admission Height 177.8 cm (70\") Documented at 05/31/2022 2052   Admission Weight 104 kg (230 lb) Documented at 05/31/2022 2033          No intake/output data recorded.  I/O last 3 completed shifts:  In: 982 [P.O.:780; I.V.:202]  Out: 2665 [Urine:2665]    Physical Exam:  General Appearance: NAD  Head: normocephalic, without obvious abnormality and atraumatic  Eyes: conjunctivae and sclerae normal and no icterus  Neck: supple and no JVD  Lungs: DECREASED BS BIBASILAR  Heart: regular rhythm & normal rate and normal S1, S2 +ANTONY  Chest: S/P SURGICAL CHANGES ASSOCIATED WITH OPEN HEART SURGERY  Abdomen: +HYPOACTIVE bowel sounds and soft non-tender +GONZALEZ  Extremities: moves extremities well, no edema, no cyanosis and no redness  Skin: no bleeding, bruising or rash, turgor normal, color normal and no lesions noted  Neurologic: A AND O X 2 TODAY    Labs:    WBC WBC   Date Value Ref Range Status   06/16/2022 7.10 3.40 - 10.80 10*3/mm3 Final   06/15/2022 8.60 3.40 - 10.80 10*3/mm3 Final   06/14/2022 10.10 3.40 - 10.80 10*3/mm3 Final      HGB Hemoglobin "   Date Value Ref Range Status   06/16/2022 9.2 (L) 13.0 - 17.7 g/dL Final   06/15/2022 9.3 (L) 13.0 - 17.7 g/dL Final   06/14/2022 10.2 (L) 13.0 - 17.7 g/dL Final      HCT Hematocrit   Date Value Ref Range Status   06/16/2022 28.2 (L) 37.5 - 51.0 % Final   06/15/2022 28.8 (L) 37.5 - 51.0 % Final   06/14/2022 30.6 (L) 37.5 - 51.0 % Final      Platlets No results found for: LABPLAT   MCV MCV   Date Value Ref Range Status   06/16/2022 96.3 79.0 - 97.0 fL Final   06/15/2022 97.8 (H) 79.0 - 97.0 fL Final   06/14/2022 96.7 79.0 - 97.0 fL Final          Sodium Sodium   Date Value Ref Range Status   06/16/2022 137 136 - 145 mmol/L Final   06/15/2022 138 136 - 145 mmol/L Final   06/14/2022 139 136 - 145 mmol/L Final      Potassium Potassium   Date Value Ref Range Status   06/16/2022 3.7 3.5 - 5.2 mmol/L Final   06/15/2022 3.9 3.5 - 5.2 mmol/L Final   06/14/2022 4.0 3.5 - 5.2 mmol/L Final      Chloride Chloride   Date Value Ref Range Status   06/16/2022 97 (L) 98 - 107 mmol/L Final   06/15/2022 98 98 - 107 mmol/L Final   06/14/2022 99 98 - 107 mmol/L Final      CO2 CO2   Date Value Ref Range Status   06/16/2022 28.0 22.0 - 29.0 mmol/L Final   06/15/2022 29.0 22.0 - 29.0 mmol/L Final   06/14/2022 28.0 22.0 - 29.0 mmol/L Final      BUN BUN   Date Value Ref Range Status   06/16/2022 21 8 - 23 mg/dL Final   06/15/2022 18 8 - 23 mg/dL Final   06/14/2022 17 8 - 23 mg/dL Final      Creatinine Creatinine   Date Value Ref Range Status   06/16/2022 0.76 0.76 - 1.27 mg/dL Final   06/15/2022 0.78 0.76 - 1.27 mg/dL Final   06/14/2022 0.72 (L) 0.76 - 1.27 mg/dL Final      Calcium Calcium   Date Value Ref Range Status   06/16/2022 8.3 (L) 8.6 - 10.5 mg/dL Final   06/15/2022 8.2 (L) 8.6 - 10.5 mg/dL Final   06/14/2022 8.6 8.6 - 10.5 mg/dL Final      PO4 No components found for: PO4   Albumin No results found for: ALBUMIN   Magnesium Magnesium   Date Value Ref Range Status   06/16/2022 2.0 1.6 - 2.4 mg/dL Final   06/15/2022 2.5 (H) 1.6 - 2.4  mg/dL Final   06/14/2022 2.2 1.6 - 2.4 mg/dL Final      Uric Acid No components found for: URIC ACID     Imaging Results (Last 72 Hours)     Procedure Component Value Units Date/Time    FL Video Swallow With Speech Single Contrast [[id number]] Collected: 06/15/22 1145     Updated: 06/15/22 1148    Narrative:      DATE OF EXAM:  6/15/2022 11:32 AM     PROCEDURE:  FL VIDEO SWALLOW W SPEECH SINGLE-CONTRAST-     INDICATIONS:  dysphagia; R55-Syncope and collapse; F10.920-Alcohol use, unspecified  with intoxication, uncomplicated; J96.01-Acute respiratory failure with  hypoxia; I95.1-Orthostatic hypotension; R77.8-Other specified  abnormalities of plasma proteins; N17.9-Acute kidney failure,  unspecified; J44.1-Chronic obstructive pulmonary disease with (acute)  exacerbation; R94.39-Abnormal result of other cardiovascular fu     COMPARISON:  No Comparisons Available     TECHNIQUE:   This examination was performed in conjunction with speech pathology.  Lateral video fluoroscopic evaluation of the swallowing mechanism was  performed while correlate administering to the patient various  consistency food items mixed with barium.     Fluoroscopic Time: 1.6 minutes        Number of Images: 12 spot fluoroscopic series images        FINDINGS: Modified barium swallow study was performed utilizing  fluoroscopy. The study was performed by dedicated speech pathologist. I  was present during the entire examination.          Impression:      Modified barium swallow study with fluoroscopy. Please refer to the  speech pathologist report for findings and dietary recommendations.     Electronically Signed By-Pauline Mckee MD On:6/15/2022 11:45 AM  This report was finalized on [id number] by  Pauline Mckee MD.    XR Chest 1 View [[id number]] Collected: 06/15/22 0814     Updated: 06/15/22 0818    Narrative:      DATE OF EXAM:  6/15/2022 2:38 AM     PROCEDURE:  XR CHEST 1 VW-     INDICATIONS:  post op open heart     COMPARISON:  AP chest  x-ray 05/31/2022, CT chest PE protocol 06/01/2022, AP chest  x-ray 06/14/2022.     TECHNIQUE:   Single radiographic AP view of the chest was obtained.     FINDINGS:  Right internal jugular sheath remains in place. Cardiomediastinal  contours appear stable, including aneurysmal dilatation of the ascending  thoracic aorta and enlarged cardiac silhouette. No pneumothorax is seen.  There are stable bibasilar airspace opacities with increased airspace  opacities noted in the right midlung.        Impression:      1.Stable bibasilar airspace opacities with increased airspace opacities  in the right mid lung, likely atelectasis.  2.No visible pneumothorax.     Electronically Signed By-Navya Zamora MD On:6/15/2022 8:16 AM  This report was finalized on [id number] by  Navya Zamora MD.    XR Chest 1 View [[id number]] Collected: 06/14/22 1310     Updated: 06/14/22 1313    Narrative:         DATE OF EXAM:   6/14/2022 1:04 PM     PROCEDURE:   XR CHEST 1 VW-     INDICATIONS:   s/p chest tube removals; R55-Syncope and collapse; F10.920-Alcohol use,  unspecified with intoxication, uncomplicated; J96.01-Acute respiratory  failure with hypoxia; I95.1-Orthostatic hypotension; R77.8-Other  specified abnormalities of plasma proteins; N17.9-Acute kidney failure,  unspecified; J44.1-Chronic obstructive pulmonary disease with (acute)  exacerbation; R94.39-Abnormal result of other car     COMPARISON:  06/13/2022     TECHNIQUE:   Portable chest radiograph.     FINDINGS:    There is a right IJ vascular sheath with the tip overlying the upper  SVC. Post surgical changes of sternotomy and CABG. Stable cardiomegaly.  Persistent bibasilar airspace opacities and small bilateral pleural  effusions. Calcified right apical granuloma. No pneumothorax.       Impression:      1. Stable right IJ vascular sheath.  2. Cardiomegaly and central pulmonary vascular congestion with bibasilar  airspace disease and small bilateral pleural effusions,  unchanged.  3. No pneumothorax.     Electronically Signed By-Jairon Wong MD On:6/14/2022 1:11 PM  This report was finalized on [id number] by  Jairon Wong MD.    XR Chest 1 View [[id number]] Collected: 06/13/22 0752     Updated: 06/13/22 0758    Narrative:         DATE OF EXAM:   6/13/2022 7:20 AM     PROCEDURE:   XR CHEST 1 VW-     INDICATIONS:   increase O2 demands; R55-Syncope and collapse; F10.920-Alcohol use,  unspecified with intoxication, uncomplicated; J96.01-Acute respiratory  failure with hypoxia; I95.1-Orthostatic hypotension; R77.8-Other  specified abnormalities of plasma proteins; N17.9-Acute kidney failure,  unspecified; J44.1-Chronic obstructive pulmonary disease with (acute)  exacerbation; R94.39-Abnormal result of other cardiov     COMPARISON:  06/12/2022 and prior     TECHNIQUE:   Portable Chest     FINDINGS:      Patient status post median sternotomy and CABG.     Heart size appears stable. Pulmonary vasculature is mildly congested and  indistinct. This is accentuated by low lung volumes. Vascular sheath  projects of the SVC. There has been interval removal of the [place]-Demarco  catheter.     Mediastinal drains and left sided chest tube appear grossly stable.  Dependent pleural parenchymal changes at the left lung base again noted  without great change given differences in technique. Increased hazy and  interstitial opacities on the right with a perihilar and dependent  predominance again noted. Slight improved aeration right upper lobe  airspace opacity noted. Small amount of fluid noted within the minor  fissure. No definite pneumothorax or pneumomediastinum noted. Osseous  structures are stable.        Impression:      IMPRESSION :      1. Interval removal of [place]-Demarco catheter. Lines and tubes are otherwise  stable.  2. Postsurgical changes from median sternotomy and CABG.  3. Mild pulmonary vascular congestion and dependent opacities at the  lung bases without great change given differences  in technique from the  comparison[. Slight improved aeration right upper lobe opacity  peripherally from prior study.     Electronically Signed By-Hossein Alvarez On:6/13/2022 7:56 AM  This report was finalized on [id number] by  Hossein Alvarez, .          Results for orders placed during the hospital encounter of 05/31/22    XR Chest 1 View    Narrative  DATE OF EXAM:  6/14/2022 1:04 PM    PROCEDURE:  XR CHEST 1 VW-    INDICATIONS:  s/p chest tube removals; R55-Syncope and collapse; F10.920-Alcohol use,  unspecified with intoxication, uncomplicated; J96.01-Acute respiratory  failure with hypoxia; I95.1-Orthostatic hypotension; R77.8-Other  specified abnormalities of plasma proteins; N17.9-Acute kidney failure,  unspecified; J44.1-Chronic obstructive pulmonary disease with (acute)  exacerbation; R94.39-Abnormal result of other car    COMPARISON:  06/13/2022    TECHNIQUE:  Portable chest radiograph.    FINDINGS:  There is a right IJ vascular sheath with the tip overlying the upper  SVC. Post surgical changes of sternotomy and CABG. Stable cardiomegaly.  Persistent bibasilar airspace opacities and small bilateral pleural  effusions. Calcified right apical granuloma. No pneumothorax.    Impression  1. Stable right IJ vascular sheath.  2. Cardiomegaly and central pulmonary vascular congestion with bibasilar  airspace disease and small bilateral pleural effusions, unchanged.  3. No pneumothorax.    Electronically Signed By-Jairon Wong MD On:6/14/2022 1:11 PM  This report was finalized on [id number] by  Jairon Wong MD.      XR Chest 1 View    Narrative  DATE OF EXAM:  6/15/2022 2:38 AM    PROCEDURE:  XR CHEST 1 VW-    INDICATIONS:  post op open heart    COMPARISON:  AP chest x-ray 05/31/2022, CT chest PE protocol 06/01/2022, AP chest  x-ray 06/14/2022.    TECHNIQUE:  Single radiographic AP view of the chest was obtained.    FINDINGS:  Right internal jugular sheath remains in place. Cardiomediastinal  contours  appear stable, including aneurysmal dilatation of the ascending  thoracic aorta and enlarged cardiac silhouette. No pneumothorax is seen.  There are stable bibasilar airspace opacities with increased airspace  opacities noted in the right midlung.    Impression  1.Stable bibasilar airspace opacities with increased airspace opacities  in the right mid lung, likely atelectasis.  2.No visible pneumothorax.    Electronically Signed By-Navya Zamora MD On:6/15/2022 8:16 AM  This report was finalized on [id number] by  Navya Zamora MD.      XR Chest 1 View    Narrative  DATE OF EXAM:  6/13/2022 7:20 AM    PROCEDURE:  XR CHEST 1 VW-    INDICATIONS:  increase O2 demands; R55-Syncope and collapse; F10.920-Alcohol use,  unspecified with intoxication, uncomplicated; J96.01-Acute respiratory  failure with hypoxia; I95.1-Orthostatic hypotension; R77.8-Other  specified abnormalities of plasma proteins; N17.9-Acute kidney failure,  unspecified; J44.1-Chronic obstructive pulmonary disease with (acute)  exacerbation; R94.39-Abnormal result of other cardiov    COMPARISON:  06/12/2022 and prior    TECHNIQUE:  Portable Chest    FINDINGS:    Patient status post median sternotomy and CABG.    Heart size appears stable. Pulmonary vasculature is mildly congested and  indistinct. This is accentuated by low lung volumes. Vascular sheath  projects of the SVC. There has been interval removal of the [place]-Demarco  catheter.    Mediastinal drains and left sided chest tube appear grossly stable.  Dependent pleural parenchymal changes at the left lung base again noted  without great change given differences in technique. Increased hazy and  interstitial opacities on the right with a perihilar and dependent  predominance again noted. Slight improved aeration right upper lobe  airspace opacity noted. Small amount of fluid noted within the minor  fissure. No definite pneumothorax or pneumomediastinum noted. Osseous  structures are  stable.    Impression  IMPRESSION :    1. Interval removal of [place]-Demarco catheter. Lines and tubes are otherwise  stable.  2. Postsurgical changes from median sternotomy and CABG.  3. Mild pulmonary vascular congestion and dependent opacities at the  lung bases without great change given differences in technique from the  comparison[. Slight improved aeration right upper lobe opacity  peripherally from prior study.    Electronically Signed By-Hossein Alvarez On:6/13/2022 7:56 AM  This report was finalized on [id number] by  Hossein Alvarez, .      Results for orders placed during the hospital encounter of 05/31/22    Duplex Vein Mapping Lower Extremity - Bilateral CAR    Interpretation Summary  The greater saphenous veins are of satisfactory quality from the groin to the mid distal thigh bilaterally.  Distal to this the veins are small and inadequate.        ASSESSMENT / PLAN      COPD exacerbation (HCC)    Obesity (BMI 30-39.9)    Abnormal nuclear stress test    Acute renal insufficiency    Alcohol dependence (HCC)    Essential hypertension    Other emphysema (HCC)    Coronary artery disease    Syncope, unspecified syncope type    Postoperative delirium    1. ARF/FELIBERTO------Nonoliguric. BUN/Cr stable    2. CAD S/P CABG------per Cardiology and CT Surgery    3. BENIGN ESSENTIAL HTN------BP okay. No ACE-I for now    4. BPH-------Nelson replaced by CT Surgery. Hold Flomax today to avoid hypotension    5. S/P SYNCOPE    6. ETOH ABUSE    7. HYPERLIPIDEMIA-------On Statin    8. DVT PROPHYLAXIS-------On Lovenox    9. HYPERKALEMIA--------Resolved    10. ANEMIA------H/H stable    11. MILD VOLUME EXCESS------Better with Bumex    12. COPD/EMPHYSEMA-------per Pulmonary    13. DELERIUM--Resolved    14. HYPOCALCEMIA------Replace IV      Amara Cooper MD  [facility]  [phone]  06/16/22  07:49 EDT      Electronically signed by Dre Cooper MD at 06/16/22 9361     Juliano  Adam Merida PA-C at 06/15/22 1424     Attestation signed by Yaritza Morillo MD at 06/16/22 1523    I have reviewed the mid-level documentation, and agree with the documentation, medical decision making and treatment plan as outlined by the mid-level provider.    This document has been electronically signed by Yaritza Morillo MD  June 16, 2022 15:23 EDT                                                                 Chief complaint  Fatigue, pain , hearing deficits     Subjective .     History of present illness:  The patient is a 67 y.o. male who was admitted secondary to syncope and eventually required CABG procedure. Psychiatry was consulted due to concern for post-operative delirium.   Pt was  confused at the time of initial assessment, he was  unable to speak clearly, speech dysarthric, and only partially oriented to person and situation.   Today the pt was somnolent but arousable, oriented to person, place, situation, he remember why he was in the hospital and aware that he had surgery.   He denied AVH/SI/HI, he had uneventful night      Review of Systems   All systems were reviewed and negative except for:  Constitution:  positive for fatigue  ENT:  positive for hearing loss  Respiratory: positive for  shortness of air  Cardiovascular: positive for  chest pressure / pain, at rest  Musculoskeletal: positive for  muscle weakness  Behavioral/Psych: positive for  anxiety    History       Medications Prior to Admission   Medication Sig Dispense Refill Last Dose   • amLODIPine (NORVASC) 5 MG tablet Take 5 mg by mouth Daily.   5/31/2022 at Unknown time   • Fluticasone-Umeclidin-Vilant (TRELEGY) 200-62.5-25 MCG/INH inhaler Inhale 1 puff Daily.   5/31/2022 at Unknown time   • gabapentin (NEURONTIN) 600 MG tablet Take 600 mg by mouth 2 (Two) Times a Day.   5/31/2022 at Unknown time   • lisinopril (PRINIVIL,ZESTRIL) 40 MG tablet Take 40 mg by mouth Daily.   5/31/2022 at Unknown time   • pravastatin (PRAVACHOL) 20  "MG tablet Take 20 mg by mouth Daily.   2022 at Unknown time   • tamsulosin (FLOMAX) 0.4 MG capsule 24 hr capsule Take 1 capsule by mouth Daily.   2022 at Unknown time   • albuterol sulfate  (90 Base) MCG/ACT inhaler Inhale 2 puffs Every 4 (Four) Hours As Needed for Wheezing.           Scheduled Meds:  arformoterol, 15 mcg, Nebulization, BID - RT  aspirin, 81 mg, Oral, Daily  atorvastatin, 40 mg, Oral, Nightly  budesonide, 0.5 mg, Nebulization, BID - RT  [START ON 2022] bumetanide, 1 mg, Oral, Daily  chlorhexidine, 15 mL, Mouth/Throat, Q12H  enoxaparin, 40 mg, Subcutaneous, Q24H  folic acid, 1 mg, Oral, Daily  gabapentin, 100 mg, Oral, Q8H  guaiFENesin, 400 mg, Oral, Q6H  insulin lispro, 0-7 Units, Subcutaneous, TID AC  metoprolol tartrate, 12.5 mg, Oral, Q12H  multivitamin, 1 tablet, Oral, Daily  pantoprazole, 40 mg, Oral, QAM  polyethylene glycol, 17 g, Oral, BID  potassium chloride, 20 mEq, Oral, Daily  QUEtiapine, 25 mg, Oral, Nightly  senna-docusate sodium, 2 tablet, Oral, Nightly  tamsulosin, 0.4 mg, Oral, Daily  thiamine, 100 mg, Oral, Daily         Continuous Infusions:       PRN Meds:  •  acetaminophen **OR** acetaminophen **OR** acetaminophen  •  bisacodyl  •  dextrose  •  dextrose  •  glucagon (human recombinant)  •  HYDROcodone-acetaminophen **OR** HYDROcodone-acetaminophen  •  insulin lispro **AND** insulin lispro  •  ipratropium-albuterol  •  LORazepam **OR** LORazepam **OR** LORazepam **OR** LORazepam **OR** LORazepam **OR** LORazepam **OR** LORazepam **OR** LORazepam  •  magnesium hydroxide  •  [] Morphine **AND** naloxone  •  ondansetron  •  potassium chloride **OR** potassium chloride  •  QUEtiapine      Allergies:  Patient has no known allergies.      Objective     Vital Signs   /70 (BP Location: Right arm, Patient Position: Lying)   Pulse 84   Temp 97 °F (36.1 °C) (Oral)   Resp 19   Ht 177.8 cm (70\")   Wt 104 kg (230 lb 6.1 oz)   SpO2 94%   BMI 33.06 kg/m² "     Physical Exam:     General Appearance:    somnolent       Mental Status Exam:    Hygiene:   good  Cooperation:  Cooperative  Eye Contact:  Fair  Psychomotor Behavior:  Slow  Affect:  Restricted   Mood: tired   Hopelessness: Denies  Speech:  Monotone  Thought Progress:  Goal directed and Linear  Thought Content:  Mood congruent  Suicidal:  None  Homicidal:  None  Hallucinations:  Not demonstrated today  Delusion:  None  Memory:  fair   Orientation:  Person, Place and Situation  Reliability:  fair  Insight:  Fair  Judgement:  Fair  Impulse Control:  Fair  Physical/Medical Issues:  Yes      Medications and allergies reviewed     Lab Results   Component Value Date    GLUCOSE 127 (H) 06/15/2022    CALCIUM 8.2 (L) 06/15/2022     06/15/2022    K 3.9 06/15/2022    CO2 29.0 06/15/2022    CL 98 06/15/2022    BUN 18 06/15/2022    CREATININE 0.78 06/15/2022    BCR 23.1 06/15/2022    ANIONGAP 11.0 06/15/2022       Last Urine Toxicity     LAST URINE TOXICITY RESULTS Latest Ref Rng & Units 6/1/2022    BARBITURATES SCREEN Negative Negative    BENZODIAZEPINE SCREEN, URINE Negative Negative    COCAINE SCREEN, URINE Negative Negative    METHADONE SCREEN, URINE Negative Negative          No results found for: PHENYTOIN, PHENOBARB, VALPROATE, CBMZ    Lab Results   Component Value Date     06/15/2022    BUN 18 06/15/2022    CREATININE 0.78 06/15/2022    TSH 0.476 06/03/2022    WBC 8.60 06/15/2022       Brief Urine Lab Results  (Last result in the past 365 days)      Color   Clarity   Blood   Leuk Est   Nitrite   Protein   CREAT   Urine HCG        06/13/22 0917 Yellow   Clear   Moderate (2+)   Negative   Negative   Negative               EKG 6/15/22  HR 94 QTc 474   Assessment & Plan       COPD exacerbation (HCC)    Obesity (BMI 30-39.9)    Abnormal nuclear stress test    Acute renal insufficiency    Alcohol dependence (HCC)    Essential hypertension    Other emphysema (HCC)    Coronary artery disease    Syncope,  unspecified syncope type    Postoperative delirium    Assessment: Post operative delirium   Treatment Plan: the pt is more alert and awake,   Complaint with tx,   Cont seroquel 25 mg po QHS and PRN for agitation  QTc 474  Cont to provide support, will discuss alc issues when more stable medically   Will follow     Addendum 6/16/22: AM EKG from today showed CAd=020 with 25mg Seroquel, and will continue to monitor QTc with AM EKG ordered for tomorrow as well. D/c PRN Seroquel since it has not been given and could risk worsening QTc prolongation.    Treatment Plan discussed with: Patient and nursing     I discussed the patients findings and my recommendations with patient and nursing staff    I have reviewed and approved the behavioral health treatment plans and problem list. Yes     Referring MD has access to consult report and progress notes in EMR     Yaritza Morillo MD  06/15/22  14:25 EDT              Electronically signed by Yaritza Morillo MD at 06/16/22 1523     Satterly-Christal Purvis, APRN at 06/15/22 1315          S/P POD# 5 CABG x4 with LIMA/ asc ao replacement--Camporrotondo  EF 40% (cath)    Subjective:  No c/o's today, wants to get up in chair    No events overnight  Video swallow scheduled for today  Wt is down 7 kgs from preop  CXR:  Atel, vasc congestion      Intake/Output Summary (Last 24 hours) at 6/15/2022 1315  Last data filed at 6/15/2022 0623  Gross per 24 hour   Intake 202 ml   Output 1050 ml   Net -848 ml     Temp:  [97 °F (36.1 °C)-99.2 °F (37.3 °C)] 97 °F (36.1 °C)  Heart Rate:  [77-94] 84  Resp:  [16-25] 19  BP: ()/(45-79) 126/70         Results from last 7 days   Lab Units 06/15/22  0439 06/14/22  0430 06/10/22  2254 06/10/22  2201 06/10/22  2147 06/10/22  1950 06/10/22  0403 06/09/22  0500   WBC 10*3/mm3 8.60 10.10   < > 22.40*  --  24.90*   < > 10.10   HEMOGLOBIN g/dL 9.3* 10.2*   < > 10.5*  --  8.5*   < > 13.8   HEMOGLOBIN, POC   --   --    < >  --    < >  --    < >  --     HEMATOCRIT % 28.8* 30.6*   < > 32.8*  --  25.6*   < > 41.7   HEMATOCRIT POC   --   --    < >  --    < >  --    < >  --    PLATELETS 10*3/mm3 189 192   < > 170  --  186   < > 250   INR   --   --   --  1.14*  --  1.25*  --  1.05    < > = values in this interval not displayed.     Results from last 7 days   Lab Units 06/15/22  0439   CREATININE mg/dL 0.78   POTASSIUM mmol/L 3.9   SODIUM mmol/L 138   MAGNESIUM mg/dL 2.5*   PHOSPHORUS mg/dL 3.7       Physical Exam:  Neuro intact, nad, resting in bed  Tele:  SR 80s  Diminished bases, rhonchi, productive cough, 94% 2L  Sternotomy/SVHS healing well  Benign abd, no BM  No edema    Assessment/Plan:  Active Problems:    COPD exacerbation (HCC)    Obesity (BMI 30-39.9)    Abnormal nuclear stress test    Acute renal insufficiency    Alcohol dependence (HCC)    Essential hypertension    Other emphysema (HCC)    Coronary artery disease    Syncope, unspecified syncope type    Postoperative delirium    - MV CAD, 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 Casarez following  - ETOH use--reports 3 beers/day and bourbon--withdrawal while in the hospital, improved  - MRSA nasal colonization--vanc/Bactroban  - Postop leukocytosis, multifactorial--watch closely, improving  - Postop urinary retention--dawn replaced 6/13    POD# 5.  Intermittent confusion.  Wants to sit in chair, nsg wants to keep in bed d/t transport to video swallow.  Pt cleared for puree diet with thins.  Renal following since preop.  On asa/statin/bb.  Restart Flomax and dc dawn tomorrow.  DC wires/cordis.      Routine care--as  "above  D/w pt/nsg, Dr. Lenard HAWTHORNE plan--tentative [facility] at end of week    ERICA Decker  6/15/2022  13:15 EDT    Electronically signed by Christal Vora APRN at 22 0891     Cindy Arellano MD at 06/15/22 1258          PULMONARY CRITICAL CARE PROGRESS  NOTE      PATIENT IDENTIFICATION:  Name: Chi Quinteros  MRN: [id number]  :  1955     Age: 67 y.o.  Sex: male    DATE OF Note:  6/15/2022   Referring Physician: No admitting provider for patient encounter.                  Subjective:   More responsive      On 2 L  no SOB, no chest pain,       starting diet    no nausea or vomiting, no change in bowel habit,   Good urine out ,  no new  skin rash or itching.      Objective:  tMax 24 hrs: Temp (24hrs), Av °F (36.7 °C), Min:97 °F (36.1 °C), Max:99.2 °F (37.3 °C)      Vitals Ranges:   Temp:  [97 °F (36.1 °C)-99.2 °F (37.3 °C)] 97 °F (36.1 °C)  Heart Rate:  [77-94] 84  Resp:  [16-25] 19  BP: ()/(45-79) 126/70    Intake and Output Last 3 Shifts:   I/O last 3 completed shifts:  In: 236 [I.V.:236]  Out: 2360 [Urine:2350; Chest Tube:10]    Exam:  /70 (BP Location: Right arm, Patient Position: Lying)   Pulse 84   Temp 97 °F (36.1 °C) (Oral)   Resp 19   Ht 177.8 cm (70\")   Wt 104 kg (230 lb 6.1 oz)   SpO2 94%   BMI 33.06 kg/m²     General Appearance:   AA     HEENT:  Normocephalic, without obvious abnormality. Conjunctivae/corneas clear.  Normal external ear canals. Nares normal, no drainage     Neck:  Supple, symmetrical, trachea midline. No JVD.  Lungs /Chest wall:   Bilateral basal rhonchi, respirations unlabored, symmetrical wall movement.     Heart:  Regular rate and rhythm, systolic murmur PMI left sternal border  Abdomen: Soft, nontender, no masses, no organomegaly.    Extremities: Trace edema, no clubbing or cyanosis        Medications:    Current Facility-Administered Medications:   •  acetaminophen (TYLENOL) tablet 650 mg, 650 mg, Oral, Q4H " PRN, 650 mg at 06/14/22 1125 **OR** acetaminophen (TYLENOL) 160 MG/5ML solution 650 mg, 650 mg, Oral, Q4H PRN **OR** acetaminophen (TYLENOL) suppository 650 mg, 650 mg, Rectal, Q4H PRN, Martha Tinoco APRN  •  arformoterol (BROVANA) nebulizer solution 15 mcg, 15 mcg, Nebulization, BID - RT, Martha Tinoco APRN, 15 mcg at 06/15/22 0742  •  aspirin EC tablet 81 mg, 81 mg, Oral, Daily, Martha Tinoco APRN, 81 mg at 06/15/22 0820  •  atorvastatin (LIPITOR) tablet 40 mg, 40 mg, Oral, Nightly, Dre Cooper MD, 40 mg at 06/14/22 2109  •  bisacodyl (DULCOLAX) suppository 10 mg, 10 mg, Rectal, Daily PRN, Martha Tinoco APRN  •  budesonide (PULMICORT) nebulizer solution 0.5 mg, 0.5 mg, Nebulization, BID - RT, Martha Tinoco APRN, 0.5 mg at 06/15/22 0746  •  [START ON 6/16/2022] bumetanide (BUMEX) tablet 1 mg, 1 mg, Oral, Daily, Dre Cooper MD  •  chlorhexidine (PERIDEX) 0.12 % solution 15 mL, 15 mL, Mouth/Throat, Q12H, Martha Tinoco APRN, 15 mL at 06/14/22 2219  •  dextrose (D50W) (25 g/50 mL) IV injection 10-50 mL, 10-50 mL, Intravenous, Q15 Min PRN, Martha Tinoco APRN  •  dextrose (GLUTOSE) oral gel 15 g, 15 g, Oral, Q15 Min PRN, Martha Tinoco APRN  •  Enoxaparin Sodium (LOVENOX) syringe 40 mg, 40 mg, Subcutaneous, Q24H, Martha Tinoco APRN, 40 mg at 06/14/22 1734  •  folic acid (FOLVITE) tablet 1 mg, 1 mg, Oral, Daily, Thong Watts, APRN, 1 mg at 06/15/22 0820  •  gabapentin (NEURONTIN) capsule 100 mg, 100 mg, Oral, Q8H, Christal Vora APRN, 100 mg at 06/15/22 0623  •  glucagon (human recombinant) (GLUCAGEN DIAGNOSTIC) 1 mg in sterile water (preservative free) 1 mL injection, 1 mg, Intramuscular, Q15 Min PRN, Martha Tinoco APRN  •  guaiFENesin solution 400 mg, 400 mg, Oral, Q6H, Cindy Arellano MD, 400 mg at 06/14/22 1329  •  HYDROcodone-acetaminophen (NORCO) 5-325 MG per tablet 1 tablet, 1 tablet, Oral, Q6H PRN, 1 tablet at 06/13/22 9394 **OR**  HYDROcodone-acetaminophen (NORCO) 5-325 MG per tablet 2 tablet, 2 tablet, Oral, Q6H PRN, Yesy-Yaniv, Christal L, APRN, 2 tablet at 06/15/22 0820  •  insulin lispro (ADMELOG) injection 0-7 Units, 0-7 Units, Subcutaneous, TID AC **AND** insulin lispro (ADMELOG) injection 0-7 Units, 0-7 Units, Subcutaneous, PRN, Yesy-Christal Purvis, APRN  •  ipratropium-albuterol (DUO-NEB) nebulizer solution 3 mL, 3 mL, Nebulization, Q4H PRN, Day, Kori, APRN, 3 mL at 06/15/22 0012  •  LORazepam (ATIVAN) tablet 0.5 mg, 0.5 mg, Oral, Q2H PRN **OR** LORazepam (ATIVAN) injection 0.5 mg, 0.5 mg, Intravenous, Q2H PRN, 0.5 mg at 22 2232 **OR** LORazepam (ATIVAN) tablet 1 mg, 1 mg, Oral, Q1H PRN **OR** LORazepam (ATIVAN) injection 1 mg, 1 mg, Intravenous, Q1H PRN, 1 mg at 22 0214 **OR** LORazepam (ATIVAN) injection 1 mg, 1 mg, Intravenous, Q15 Min PRN, 1 mg at 22 0524 **OR** LORazepam (ATIVAN) injection 1 mg, 1 mg, Intramuscular, Q15 Min PRN **OR** LORazepam (ATIVAN) injection 2 mg, 2 mg, Intravenous, Q1H PRN, 2 mg at 22 1417 **OR** LORazepam (ATIVAN) tablet 2 mg, 2 mg, Oral, Q1H PRN, Shivam Recio MD  •  magnesium hydroxide (MILK OF MAGNESIA) suspension 10 mL, 10 mL, Oral, Daily PRN, Martha Tinoco, APRN  •  metoprolol tartrate (LOPRESSOR) half tablet 12.5 mg, 12.5 mg, Oral, Q12H, Martha Tinoco, APRN, 12.5 mg at 06/15/22 0820  •  multivitamin (THERAGRAN) tablet 1 tablet, 1 tablet, Oral, Daily, Thong Watts APRN, 1 tablet at 06/15/22 0820  •  [] morphine injection 2 mg, 2 mg, Intravenous, Q2H PRN, 2 mg at 22 0358 **AND** naloxone (NARCAN) injection 0.4 mg, 0.4 mg, Intravenous, Q5 Min PRN, Martha Tinoco APRN  •  ondansetron (ZOFRAN) injection 4 mg, 4 mg, Intravenous, Q6H PRN, Martha Tinoco APRN  •  [COMPLETED] pantoprazole (PROTONIX) injection 40 mg, 40 mg, Intravenous, Once, 40 mg at 06/10/22 2313 **FOLLOWED BY** pantoprazole (PROTONIX) EC tablet 40 mg, 40 mg, Oral, QAM,  Satterly-Yaniv, Christal L, APRN, 40 mg at 06/15/22 0623  •  polyethylene glycol (MIRALAX) packet 17 g, 17 g, Oral, BID, Satterly-Yaniv, Christal L, APRN, 17 g at 06/15/22 0830  •  potassium chloride (K-DUR,KLOR-CON) CR tablet 20 mEq, 20 mEq, Oral, PRN **OR** potassium chloride (KLOR-CON) packet 20 mEq, 20 mEq, Oral, PRN, Martha Tinoco, APRN, 20 mEq at 06/15/22 0820  •  potassium chloride (K-DUR,KLOR-CON) CR tablet 20 mEq, 20 mEq, Oral, Daily, Satterly-Yaniv, Christal L, APRN, 20 mEq at 06/13/22 0912  •  QUEtiapine (SEROquel) tablet 25 mg, 25 mg, Oral, Nightly, Adam Henao PA-C, 25 mg at 06/14/22 2109  •  QUEtiapine (SEROquel) tablet 25 mg, 25 mg, Oral, Daily PRN, Adam Henao PA-C  •  sennosides-docusate (PERICOLACE) 8.6-50 MG per tablet 2 tablet, 2 tablet, Oral, Nightly, Satterly-Yaniv, Christal L, APRN, 2 tablet at 06/14/22 2109  •  thiamine (VITAMIN B-1) tablet 100 mg, 100 mg, Oral, Daily, Thong Watts, APRN, 100 mg at 06/15/22 0820    Data Review:  All labs (24hrs):   Recent Results (from the past 24 hour(s))   POC Glucose Once    Collection Time: 06/14/22  1:33 PM    Specimen: Blood   Result Value Ref Range    Glucose 134 (H) 70 - 105 mg/dL   POC Glucose Once    Collection Time: 06/14/22  4:22 PM    Specimen: Blood   Result Value Ref Range    Glucose 121 (H) 70 - 105 mg/dL   ECG 12 Lead    Collection Time: 06/15/22 12:47 AM   Result Value Ref Range    QT Interval 398 ms   ECG 12 Lead    Collection Time: 06/15/22  3:10 AM   Result Value Ref Range    QT Interval 379 ms   CBC (No Diff)    Collection Time: 06/15/22  4:39 AM    Specimen: Blood   Result Value Ref Range    WBC 8.60 3.40 - 10.80 10*3/mm3    RBC 2.94 (L) 4.14 - 5.80 10*6/mm3    Hemoglobin 9.3 (L) 13.0 - 17.7 g/dL    Hematocrit 28.8 (L) 37.5 - 51.0 %    MCV 97.8 (H) 79.0 - 97.0 fL    MCH 31.8 26.6 - 33.0 pg    MCHC 32.5 31.5 - 35.7 g/dL    RDW 14.0 12.3 - 15.4 %    RDW-SD 48.6 37.0 - 54.0 fl    MPV 8.3 6.0 - 12.0 fL     Platelets 189 140 - 450 10*3/mm3   Basic Metabolic Panel    Collection Time: 06/15/22  4:39 AM    Specimen: Blood   Result Value Ref Range    Glucose 127 (H) 65 - 99 mg/dL    BUN 18 8 - 23 mg/dL    Creatinine 0.78 0.76 - 1.27 mg/dL    Sodium 138 136 - 145 mmol/L    Potassium 3.9 3.5 - 5.2 mmol/L    Chloride 98 98 - 107 mmol/L    CO2 29.0 22.0 - 29.0 mmol/L    Calcium 8.2 (L) 8.6 - 10.5 mg/dL    BUN/Creatinine Ratio 23.1 7.0 - 25.0    Anion Gap 11.0 5.0 - 15.0 mmol/L    eGFR 97.7 >60.0 mL/min/1.73   Magnesium    Collection Time: 06/15/22  4:39 AM    Specimen: Blood   Result Value Ref Range    Magnesium 2.5 (H) 1.6 - 2.4 mg/dL   Phosphorus    Collection Time: 06/15/22  4:39 AM    Specimen: Blood   Result Value Ref Range    Phosphorus 3.7 2.5 - 4.5 mg/dL   Calcium, Ionized    Collection Time: 06/15/22  5:43 AM    Specimen: Blood   Result Value Ref Range    Ionized Calcium 1.17 (L) 1.20 - 1.30 mmol/L   POC Glucose Once    Collection Time: 06/15/22  8:39 AM    Specimen: Blood   Result Value Ref Range    Glucose 113 (H) 70 - 105 mg/dL        Imaging:  FL Video Swallow With Speech Single Contrast  Narrative: DATE OF EXAM:  6/15/2022 11:32 AM     PROCEDURE:  FL VIDEO SWALLOW W SPEECH SINGLE-CONTRAST-     INDICATIONS:  dysphagia; R55-Syncope and collapse; F10.920-Alcohol use, unspecified  with intoxication, uncomplicated; J96.01-Acute respiratory failure with  hypoxia; I95.1-Orthostatic hypotension; R77.8-Other specified  abnormalities of plasma proteins; N17.9-Acute kidney failure,  unspecified; J44.1-Chronic obstructive pulmonary disease with (acute)  exacerbation; R94.39-Abnormal result of other cardiovascular fu     COMPARISON:  No Comparisons Available     TECHNIQUE:   This examination was performed in conjunction with speech pathology.  Lateral video fluoroscopic evaluation of the swallowing mechanism was  performed while correlate administering to the patient various  consistency food items mixed with barium.      Fluoroscopic Time: 1.6 minutes        Number of Images: 12 spot fluoroscopic series images        FINDINGS: Modified barium swallow study was performed utilizing  fluoroscopy. The study was performed by dedicated speech pathologist. I  was present during the entire examination.        Impression: Modified barium swallow study with fluoroscopy. Please refer to the  speech pathologist report for findings and dietary recommendations.     Electronically Signed By-Pauline Mckee MD On:6/15/2022 11:45 AM  This report was finalized on [id number] by  Pauline Mckee MD.  XR Chest 1 View  Narrative: DATE OF EXAM:  6/15/2022 2:38 AM     PROCEDURE:  XR CHEST 1 VW-     INDICATIONS:  post op open heart     COMPARISON:  AP chest x-ray 05/31/2022, CT chest PE protocol 06/01/2022, AP chest  x-ray 06/14/2022.     TECHNIQUE:   Single radiographic AP view of the chest was obtained.     FINDINGS:  Right internal jugular sheath remains in place. Cardiomediastinal  contours appear stable, including aneurysmal dilatation of the ascending  thoracic aorta and enlarged cardiac silhouette. No pneumothorax is seen.  There are stable bibasilar airspace opacities with increased airspace  opacities noted in the right midlung.      Impression: 1.Stable bibasilar airspace opacities with increased airspace opacities  in the right mid lung, likely atelectasis.  2.No visible pneumothorax.     Electronically Signed By-Navya Zamora MD On:6/15/2022 8:16 AM  This report was finalized on [id number] by  Navya Zamora MD.       ASSESSMENT:    S?P CABG with ascending aortic aneurysm repair   COPD exacerbation (HCC)    Obesity (BMI 30-39.9)    Abnormal nuclear stress test    Acute renal insufficiency    Alcohol dependence (HCC)    Essential hypertension    Other emphysema (HCC)    Coronary artery disease    Syncope, unspecified syncope type       PLAN:  Start oral diet   Pt/ot  Post op care  IS/ flutter valve   Diuretics    Bronchodilator  Inhaled  corticosteroids  Electrolytes/ glycemic control  DVT and GI prophylaxis.    Total Critical care time in direct medical management (   ) minutes. This time specifically excludes time spent performing procedures.  Cindy Arellano MD. D, ABSM.     6/15/2022  12:58 EDT     Electronically signed by Cindy Arellano MD at 06/15/22 1745       Consult Notes (last 48 hours)  Notes from 06/15/22 0843 through 06/17/22 0843   No notes of this type exist for this encounter.

## 2022-06-28 ENCOUNTER — TRANSCRIBE ORDERS (OUTPATIENT)
Dept: CARDIAC REHAB | Facility: HOSPITAL | Age: 67
End: 2022-06-28

## 2022-06-28 ENCOUNTER — TELEPHONE (OUTPATIENT)
Dept: CARDIAC REHAB | Facility: HOSPITAL | Age: 67
End: 2022-06-28

## 2022-06-28 DIAGNOSIS — Z95.1 S/P CABG (CORONARY ARTERY BYPASS GRAFT): Primary | ICD-10-CM

## 2022-06-28 NOTE — TELEPHONE ENCOUNTER
[place] [employer]: Deductible met 1500.00.  OOP: 3000/2904.74 met, 95.26 left.  Co-Insurance: 20% until OOP met then covered at 100%.  36v/eder yr none used.    [facility] Repl:  OOP: 5900/180.00 met, 5720.00 left.  Co-pay 10.00.  Once OOP met covered @ 100% no copay.  Visits follow  guidelines.  ~mckenna

## 2022-07-14 ENCOUNTER — TELEPHONE (OUTPATIENT)
Dept: CARDIOLOGY | Facility: CLINIC | Age: 67
End: 2022-07-14

## 2022-07-14 NOTE — TELEPHONE ENCOUNTER
ELIZ LEFT MESSAGE ASKING FOR CALL BACK.  THEY BELIEVE ONE OF PATIENTS MEDICATIONS IS MAKING HIM DIZZY. HE IS DIZZY WHEN HE IS SITTING DOWN OR STANDING UP.

## 2022-07-15 NOTE — TELEPHONE ENCOUNTER
Called Eladio, advised to keep tracking of blood pressure/heart rate for one full week, so we can see if those are running low causing his dizziness. Eladio understood. Will call back in one week with readings.

## 2022-07-25 ENCOUNTER — OFFICE VISIT (OUTPATIENT)
Dept: CARDIAC SURGERY | Facility: CLINIC | Age: 67
End: 2022-07-25

## 2022-07-25 VITALS
DIASTOLIC BLOOD PRESSURE: 67 MMHG | OXYGEN SATURATION: 91 % | HEIGHT: 70 IN | RESPIRATION RATE: 20 BRPM | WEIGHT: 218 LBS | SYSTOLIC BLOOD PRESSURE: 109 MMHG | TEMPERATURE: 97.1 F | HEART RATE: 75 BPM | BODY MASS INDEX: 31.21 KG/M2

## 2022-07-25 DIAGNOSIS — Z95.1 S/P CABG X 4: Primary | ICD-10-CM

## 2022-07-25 DIAGNOSIS — Z86.79 S/P ASCENDING AORTIC ANEURYSM REPAIR: ICD-10-CM

## 2022-07-25 DIAGNOSIS — Z98.890 S/P ASCENDING AORTIC ANEURYSM REPAIR: ICD-10-CM

## 2022-07-25 PROCEDURE — 99024 POSTOP FOLLOW-UP VISIT: CPT | Performed by: NURSE PRACTITIONER

## 2022-07-25 RX ORDER — SULFAMETHOXAZOLE AND TRIMETHOPRIM 800; 160 MG/1; MG/1
1 TABLET ORAL 2 TIMES DAILY
Qty: 20 TABLET | Refills: 0 | Status: SHIPPED | OUTPATIENT
Start: 2022-07-25 | End: 2022-08-18

## 2022-07-25 RX ORDER — SODIUM HYPOCHLORITE 1.25 MG/ML
15 SOLUTION TOPICAL 2 TIMES DAILY
Qty: 473 ML | Refills: 0 | Status: SHIPPED | OUTPATIENT
Start: 2022-07-25 | End: 2022-08-04 | Stop reason: SDUPTHER

## 2022-07-25 NOTE — PROGRESS NOTES
"CARDIOVASCULAR SURGERY FOLLOW-UP PROGRESS NOTE  Chief Complaint: Post-op Follow Up        HPI:   Dear Deysi Mckay APRN and colleagues:    It was nice to see Chi Quinteros Sr. in follow up today after cardiac surgery.  As you know, he is a 67 y.o. male with multivessel CAD, ascending aortic aneurysm, COPD, HTN, HLD, lumbar herniation with back surgery pending, early prostate cancer, ETOH abuse, and MRSA nasal colonization who underwent CABG x4 with LIMA and ascending aortic aneurysm repair at [facility] by Ellen on 6/10/2022. He did well postoperatively except for delirium and urinary retention that resolved before transfer to rehab. Psychiatry was consulted to assist with his delirium.  He comes in today with concerns regarding his left SVHS just below his knee.  The wound has dehisced and he has noted yellow slough.  His activity level has been fair.  His grandson is with him today.  Additionally, he reports he has had some dizziness during the daytime.  His family has been in touch with Dr. Giraldo's office regarding his dizziness and BPs.  They are keeping a BP log and will be d/w cardiology.    Physical Exam:         /67 (BP Location: Left arm, Patient Position: Sitting, Cuff Size: Adult)   Pulse 75   Temp 97.1 °F (36.2 °C)   Resp 20   Ht 177.8 cm (70\")   Wt 98.9 kg (218 lb)   SpO2 91%   BMI 31.28 kg/m²   Heart:  regular rate and rhythm  Lungs:  clear to auscultation bilaterally  Extremities:  no edema, forearms with ecchymotic areas noted bilaterally  Incision(s):  mid chest healing well, left leg slow healing, no drainage noted but thick yellow slough present, dehiscence present,  2 sutures were removed, sternum stable    Assessment/Plan:     S/P CABG x4 with LIMA/ ascending aortic aneurysm repair. Overall, he is doing well.  His sternal incision is well healed and sternum is stable.  His left vein harvest site just below his knee dehisced.  Two sutures that had not dissolved were " noted and removed.  Instructions given to pack wound with Dakin's solution BID.  Bactrim DS ordered for 10 days.  His last BMP from rehab showed creatinine 1.  As for his dizziness and low BP--his BP today was 109/67.  He has been in touch with Dr. Giraldo's office.  He/his family will call the office with any fevers/ increased wound drainage/ erythema.  Dr. Hughes aware and in agreement with [facility].    No significant post-op complications    OK to begin cardiac rehab  Follow-up as scheduled with cardiology--Dr. Giraldo  Follow-up as scheduled with PCP--ERICA Frankel  Follow-up with CT surgery--next Thursday 8/4    Continue lifting restriction of 10 lbs until 6 weeks and 50 lbs until 12 weeks from the date of surgery, no excessive jarring motions or twisting motions until 12 weeks from the date of surgery.    Pt will also need longitudinal surveillance of his aneurysmal disease.  He should be placed on the [facility] recall list and have a CT chest without contrast next June.      Thank you for allowing me to participate in the care of your patient.    Regards,  ERICA Decker

## 2022-08-01 ENCOUNTER — OFFICE VISIT (OUTPATIENT)
Dept: CARDIOLOGY | Facility: CLINIC | Age: 67
End: 2022-08-01

## 2022-08-01 VITALS
SYSTOLIC BLOOD PRESSURE: 106 MMHG | HEIGHT: 70 IN | OXYGEN SATURATION: 94 % | WEIGHT: 226 LBS | BODY MASS INDEX: 32.35 KG/M2 | HEART RATE: 76 BPM | DIASTOLIC BLOOD PRESSURE: 69 MMHG

## 2022-08-01 DIAGNOSIS — I48.0 PAROXYSMAL ATRIAL FIBRILLATION: ICD-10-CM

## 2022-08-01 DIAGNOSIS — I10 PRIMARY HYPERTENSION: ICD-10-CM

## 2022-08-01 DIAGNOSIS — I25.10 CORONARY ARTERY DISEASE INVOLVING NATIVE CORONARY ARTERY OF NATIVE HEART WITHOUT ANGINA PECTORIS: Primary | ICD-10-CM

## 2022-08-01 DIAGNOSIS — I71.21 ASCENDING AORTIC ANEURYSM: ICD-10-CM

## 2022-08-01 DIAGNOSIS — E78.00 PURE HYPERCHOLESTEROLEMIA: ICD-10-CM

## 2022-08-01 PROCEDURE — 99214 OFFICE O/P EST MOD 30 MIN: CPT | Performed by: INTERNAL MEDICINE

## 2022-08-01 NOTE — PROGRESS NOTES
Subjective:     Encounter Date:08/01/2022      Patient ID: Chi Quinteros Sr. is a 67 y.o. male.    Chief Complaint:  History of Present Illness 67-year-old white male with history of coronary disease status post coronary bypass surgery history of hypertension hyperlipidemia paroxysmal fibrillation and ascending aortic aneurysm presents to my office for follow-up.  Patient is currently stable without any symptoms of chest pain or shortness of breath at rest on exertion.  No complains any PND orthopnea.  No palpitation dizziness syncope or swelling the feet but is not smoking anymore.  He is taking his medicines regularly.    The following portions of the patient's history were reviewed and updated as appropriate: allergies, current medications, past family history, past medical history, past social history, past surgical history and problem list.  Past Medical History:   Diagnosis Date   • Aneurysm (HCC)    • Back pain    • Emphysema lung (HCC)    • Hypertension      Past Surgical History:   Procedure Laterality Date   • ASCENDING AORTIC ANEURYSM REPAIR W/ MECHANICAL AORTIC VALVE REPLACEMENT N/A 6/10/2022    Procedure: AORTIC VALVE ASCENDING ANEURYSM REPAIR;  Surgeon: Benson Hughes MD;  [facility];  Service: Cardiothoracic;  Laterality: N/A;   • CARDIAC CATHETERIZATION Right 6/2/2022    Procedure: Coronary angiography;  Surgeon: Frank Giraldo MD;  Location: [facility] CATH INVASIVE LOCATION;  Service: Cardiovascular;  Laterality: Right;   • CARDIAC CATHETERIZATION N/A 6/2/2022    Procedure: Left Heart Cath;  Surgeon: Frank Giraldo MD;  Location: [facility] CATH INVASIVE LOCATION;  Service: Cardiovascular;  Laterality: N/A;   • CARDIAC CATHETERIZATION N/A 6/2/2022    Procedure: Left ventriculography;  Surgeon: Frank Giraldo MD;  Location: [facility] CATH INVASIVE LOCATION;  Service: Cardiovascular;  Laterality: N/A;   • CORONARY ARTERY BYPASS GRAFT N/A 6/10/2022    Procedure: CORONARY ARTERY BYPASS  "GRAFTING;  Surgeon: Benson Hughes MD;  Location: [facility];  Service: Cardiothoracic;  Laterality: N/A;     /69 (BP Location: Right arm, Patient Position: Sitting)   Pulse 76   Ht 177.8 cm (70\")   Wt 103 kg (226 lb)   SpO2 94%   BMI 32.43 kg/m²   History reviewed. No pertinent family history.    Current Outpatient Medications:   •  acetaminophen (TYLENOL) 325 MG tablet, Take 2 tablets by mouth Every 4 (Four) Hours As Needed for Mild Pain ., Disp: , Rfl:   •  albuterol sulfate  (90 Base) MCG/ACT inhaler, Inhale 2 puffs Every 4 (Four) Hours As Needed for Wheezing., Disp: , Rfl:   •  amiodarone (PACERONE) 400 MG tablet, Take 0.5 tablets by mouth 2 (Two) Times a Day., Disp: 60 tablet, Rfl: 0  •  aspirin 81 MG EC tablet, Take 1 tablet by mouth Daily., Disp: 100 tablet, Rfl: 99  •  bumetanide (BUMEX) 1 MG tablet, Take 1 tablet by mouth Daily., Disp: 30 tablet, Rfl: 1  •  clopidogrel (PLAVIX) 75 MG tablet, Take 1 tablet by mouth Daily., Disp: 30 tablet, Rfl: 2  •  Fluticasone-Umeclidin-Vilant (TRELEGY) 200-62.5-25 MCG/INH inhaler, Inhale 1 puff Daily., Disp: , Rfl:   •  folic acid (FOLVITE) 1 MG tablet, Take 1 tablet by mouth Daily., Disp: 30 tablet, Rfl: 0  •  gabapentin (NEURONTIN) 100 MG capsule, Take 1 capsule by mouth Every 8 (Eight) Hours., Disp: 90 capsule, Rfl: 2  •  guaiFENesin 300 MG/15ML solution, Take 20 mL by mouth Every 6 (Six) Hours., Disp: 840 mL, Rfl: 0  •  HYDROcodone-acetaminophen (NORCO) 5-325 MG per tablet, Take 1 tablet by mouth Every 6 (Six) Hours As Needed for Moderate Pain ., Disp: 40 tablet, Rfl: 0  •  metoprolol succinate XL (TOPROL-XL) 25 MG 24 hr tablet, Take 0.5 tablets by mouth Daily., Disp: 15 tablet, Rfl: 3  •  multivitamin (THERAGRAN) tablet tablet, Take 1 tablet by mouth Daily., Disp: 30 tablet, Rfl: 0  •  potassium chloride (K-DUR,KLOR-CON) 20 MEQ CR tablet, Take 1 tablet by mouth Daily., Disp: 30 tablet, Rfl: 1  •  pravastatin (PRAVACHOL) 20 MG tablet, Take " 1 tablet by mouth Daily., Disp: 90 tablet, Rfl:   •  QUEtiapine (SEROquel) 25 MG tablet, Take 1 tablet by mouth Every Night., Disp: 30 tablet, Rfl: 2  •  sennosides-docusate (PERICOLACE) 8.6-50 MG per tablet, Take 1 tablet by mouth Daily., Disp: 30 tablet, Rfl: 2  •  sodium hypochlorite (DAKIN'S 1/4 STRENGTH) 0.125 % solution topical solution 0.125%, Apply 15 mL topically to the appropriate area as directed 2 (Two) Times a Day., Disp: 473 mL, Rfl: 0  •  sulfamethoxazole-trimethoprim (BACTRIM DS,SEPTRA DS) 800-160 MG per tablet, Take 1 tablet by mouth 2 (Two) Times a Day., Disp: 20 tablet, Rfl: 0  •  tamsulosin (FLOMAX) 0.4 MG capsule 24 hr capsule, Take 1 capsule by mouth Daily., Disp: 30 capsule, Rfl:   •  thiamine (VITAMIN B-1) 100 MG tablet  tablet, Take 1 tablet by mouth Daily., Disp: 30 tablet, Rfl: 0  No Known Allergies  Social History     Socioeconomic History   • Marital status:    Tobacco Use   • Smoking status: Former Smoker     Quit date: 2021     Years since quittin.1   • Smokeless tobacco: Never Used   Vaping Use   • Vaping Use: Never used   Substance and Sexual Activity   • Alcohol use: Yes     Comment: 3-4 beers daily   • Drug use: Never   • Sexual activity: Defer     Review of Systems   Constitutional: Positive for malaise/fatigue. Negative for fever.   Cardiovascular: Negative for chest pain, dyspnea on exertion and palpitations.   Respiratory: Negative for cough and shortness of breath.    Skin: Negative for rash.   Gastrointestinal: Negative for abdominal pain, nausea and vomiting.   Neurological: Negative for dizziness, focal weakness and headaches.   All other systems reviewed and are negative.             Objective:     Constitutional:       Appearance: Well-developed.   Eyes:      General: No scleral icterus.     Conjunctiva/sclera: Conjunctivae normal.   HENT:      Head: Normocephalic and atraumatic.   Neck:      Vascular: No carotid bruit or JVD.   Pulmonary:      Effort:  Pulmonary effort is normal.      Breath sounds: Normal breath sounds. No wheezing. No rales.   Cardiovascular:      Normal rate. Regular rhythm.   Pulses:     Intact distal pulses.   Abdominal:      General: Bowel sounds are normal.      Palpations: Abdomen is soft.   Musculoskeletal:      Cervical back: Normal range of motion and neck supple. Skin:     General: Skin is warm and dry.      Findings: No rash.   Neurological:      Mental Status: Alert.       Procedures    Lab Review:         MDM  1.  Coronary disease  Patient has coronary bypass surgery x4 vessels with a LIMA to LAD and saphenous graft to the diagonal branch marginal branch and RCA and is currently stable on medical therapy with normal V function  2.  Hypertension  Patient blood pressure is actually lower side and hence I will stop metoprolol  3 hyperlipidemia  Is on pravastatin the lipid levels are well within normal meds  4.  Atrial fibrillation  Patient has postoperative paroxysmal fibrillation is currently in sinus rhythm with amiodarone and have decreased the dose to 20 mg twice daily  5.  Aortic aneurysm  Patient also had ascending aortic aneurysm which was repaired and is stable.      Patient's previous medical records, labs, and EKG were reviewed and discussed with the patient at today's visit.

## 2022-08-04 ENCOUNTER — OFFICE VISIT (OUTPATIENT)
Dept: CARDIAC SURGERY | Facility: CLINIC | Age: 67
End: 2022-08-04

## 2022-08-04 VITALS
SYSTOLIC BLOOD PRESSURE: 120 MMHG | OXYGEN SATURATION: 94 % | DIASTOLIC BLOOD PRESSURE: 84 MMHG | HEIGHT: 70 IN | BODY MASS INDEX: 31.64 KG/M2 | WEIGHT: 221 LBS | HEART RATE: 74 BPM | TEMPERATURE: 97.5 F | RESPIRATION RATE: 20 BRPM

## 2022-08-04 DIAGNOSIS — Z86.79 S/P ASCENDING AORTIC ANEURYSM REPAIR: ICD-10-CM

## 2022-08-04 DIAGNOSIS — Z95.1 S/P CABG X 4: Primary | ICD-10-CM

## 2022-08-04 DIAGNOSIS — Z98.890 S/P ASCENDING AORTIC ANEURYSM REPAIR: ICD-10-CM

## 2022-08-04 PROCEDURE — 99024 POSTOP FOLLOW-UP VISIT: CPT | Performed by: NURSE PRACTITIONER

## 2022-08-04 RX ORDER — SODIUM HYPOCHLORITE 1.25 MG/ML
15 SOLUTION TOPICAL 2 TIMES DAILY
Qty: 473 ML | Refills: 0 | Status: SHIPPED | OUTPATIENT
Start: 2022-08-04

## 2022-08-05 NOTE — PROGRESS NOTES
"CARDIOVASCULAR SURGERY FOLLOW-UP PROGRESS NOTE  Chief Complaint: Postop follow-up, left EVH wound surveillance        HPI:   Dear Deysi Mckay APRN and colleagues:    It was nice to see Chi Quinteros SrJony in follow up 8 weeks after surgery.  As you know, he is a 67 y.o. male withmultivessel CAD, ascending aortic aneurysm, COPD, HTN, HLD, lumbar herniation with back surgery pending, early prostate cancer, ETOH abuse, and MRSA nasal colonization who underwent CABG x4 with LIMA and ascending aortic aneurysm repair at [facility] by Ellen on 6/10/2022.  He did well postoperatively with the exception of urinary retention that resolved and delirium for which psychiatry was consulted to assist with.  He comes in today complaining of nothing.  His activity level has been good.  From a surgical standpoint, the sternal incision is well approximated without erythema, edema, or drainage.  The sternum is stable to palpation, and the patient denies any popping or clicking with deep inspiration or coughing.  He was last evaluated on 7/25/2022 at which time stitches were removed from his left EVH site and he continued to have a small amount of yellow slough in the base of the wound that had opened.  He states that it looks much better than it did at our last office visit, he does still have some slough in the wound base, it is quite shallow, there is no underlying or surrounding fluctuance, no erythema, and no drainage is noted.  Have instructed the patient to continue Dakin's twice daily wet-to-dry dressings.    **Picture of wound in media tab    Physical Exam:         /84 (BP Location: Right arm, Patient Position: Sitting, Cuff Size: Adult)   Pulse 74   Temp 97.5 °F (36.4 °C) (Oral)   Resp 20   Ht 177.8 cm (70\")   Wt 100 kg (221 lb)   SpO2 94%   BMI 31.71 kg/m²       Assessment/Plan:     S/P CABG and ascending aortic aneurysm repair. Overall, he is doing well.    No significant post-op " complications    Return to clinic in 2 week(s) with no new studies, discussed signs and symptoms of infection and to call our office if become present    Continue lifting restriction of 10 lbs until 6 weeks and 50 lbs until 12 weeks from the date of surgery, no excessive jarring motions or twisting motions until 12 weeks from the date of surgery    Return to clinic if any signs or symptoms of infection or sternal instability develop       Pt will also need longitudinal surveillance of his aneurysmal disease.  He should be placed on the [facility] recall list and have a CT chest without contrast next June.      Thank you for allowing me to participate in the care of your patient.    Regards,  ERICA Johnson

## 2022-08-18 ENCOUNTER — OFFICE VISIT (OUTPATIENT)
Dept: CARDIAC SURGERY | Facility: CLINIC | Age: 67
End: 2022-08-18

## 2022-08-18 VITALS
OXYGEN SATURATION: 94 % | HEIGHT: 70 IN | WEIGHT: 225.5 LBS | HEART RATE: 89 BPM | BODY MASS INDEX: 32.28 KG/M2 | DIASTOLIC BLOOD PRESSURE: 93 MMHG | RESPIRATION RATE: 20 BRPM | SYSTOLIC BLOOD PRESSURE: 147 MMHG | TEMPERATURE: 97.3 F

## 2022-08-18 DIAGNOSIS — Z95.1 S/P CABG X 4: Primary | ICD-10-CM

## 2022-08-18 DIAGNOSIS — Z86.79 S/P ASCENDING AORTIC ANEURYSM REPAIR: ICD-10-CM

## 2022-08-18 DIAGNOSIS — Z98.890 S/P ASCENDING AORTIC ANEURYSM REPAIR: ICD-10-CM

## 2022-08-18 PROCEDURE — 99024 POSTOP FOLLOW-UP VISIT: CPT | Performed by: NURSE PRACTITIONER

## 2022-08-22 NOTE — PROGRESS NOTES
"CARDIOVASCULAR SURGERY FOLLOW-UP PROGRESS NOTE  Chief Complaint: Postop follow-up        HPI:   Dear Deysi Mckay APRN and colleagues:    It was nice to see Chi Quinteros Sr. in follow up 10 weeks after surgery.  As you know, he is a 67 y.o. male with multivessel CAD, ascending aortic aneurysm, COPD, HTN, HLD, lumbar herniation with back surgery pending, early prostate cancer, ETOH abuse, and MRSA nasal colonization who underwent CABG x4 with LIMA and ascending aortic aneurysm repair at [facility] by Ellen on 6/10/2022.  He did well postoperatively with the exception of urinary retention that resolved and delirium for which psychiatry was consulted to assist with which resolved prior to discharge.  He comes in today complaining of nothing.  We have been seeing him every 1 to 2 weeks due to stitch removal from left EVH site and continued yellow slough at the base of the wound.    His wound continues to become smaller, there is no longer any yellow slough present, no underlying fluctuance, wound edges are open with dusky discoloration.  No sign of infectious process.  Patient to continue wet-to-dry Dakin's solution, discontinue Betadine.    **Picture of wound in media tab    Physical Exam:         /93 (BP Location: Left arm, Patient Position: Sitting, Cuff Size: Adult)   Pulse 89   Temp 97.3 °F (36.3 °C)   Resp 20   Ht 177.8 cm (70\")   Wt 102 kg (225 lb 8 oz)   SpO2 94%   BMI 32.36 kg/m²       Assessment/Plan:     S/P CABG and ascending aortic aneurysm repair. Overall, he is doing well.    Slow post-op rehabilitation progress    Keep incisions clean and dry  Return to clinic in 2 week(s) with no new studies    Continue lifting restriction of 10 lbs until 6 weeks and 50 lbs until 12 weeks from the date of surgery, no excessive jarring motions or twisting motions until 12 weeks from the date of surgery    Return to clinic if any signs or symptoms of infection or sternal instability develop "       Thank you for allowing me to participate in the care of your patient.    Regards,  ERICA Johnson

## 2022-08-22 NOTE — PATIENT INSTRUCTIONS
Prophylactic antibiotics before dental work and other surgeries lifelong with artificial valve, prosthesis, or grafting

## 2022-09-09 ENCOUNTER — TELEPHONE (OUTPATIENT)
Dept: CARDIAC SURGERY | Facility: CLINIC | Age: 67
End: 2022-09-09

## 2022-09-09 NOTE — TELEPHONE ENCOUNTER
FELY for return call.   Calling to request imaging of wound for APRN to review for healing.     Mr. Quinteros was scheduled for 2 week wound f/u on 9/8/22, patient cancelled appt, did not wish to reschedule.

## 2022-09-09 NOTE — TELEPHONE ENCOUNTER
Pt returned call and will upload picture to [employer]. States wound is completely healed and is not requiring any wound care.

## 2022-11-16 ENCOUNTER — LAB (OUTPATIENT)
Dept: LAB | Facility: HOSPITAL | Age: 67
End: 2022-11-16

## 2022-11-16 ENCOUNTER — TRANSCRIBE ORDERS (OUTPATIENT)
Dept: ADMINISTRATIVE | Facility: HOSPITAL | Age: 67
End: 2022-11-16

## 2022-11-16 DIAGNOSIS — Z01.818 PRE-OP TESTING: ICD-10-CM

## 2022-11-16 DIAGNOSIS — Z01.818 PRE-OP TESTING: Primary | ICD-10-CM

## 2022-11-16 LAB
BASOPHILS # BLD AUTO: 0.02 10*3/MM3 (ref 0–0.2)
BASOPHILS NFR BLD AUTO: 0.6 % (ref 0–1.5)
DEPRECATED RDW RBC AUTO: 44.9 FL (ref 37–54)
EOSINOPHIL # BLD AUTO: 0.02 10*3/MM3 (ref 0–0.4)
EOSINOPHIL NFR BLD AUTO: 0.6 % (ref 0.3–6.2)
ERYTHROCYTE [DISTWIDTH] IN BLOOD BY AUTOMATED COUNT: 13.8 % (ref 12.3–15.4)
HCT VFR BLD AUTO: 42.5 % (ref 37.5–51)
HGB BLD-MCNC: 13.6 G/DL (ref 13–17.7)
IMM GRANULOCYTES # BLD AUTO: 0.02 10*3/MM3 (ref 0–0.05)
IMM GRANULOCYTES NFR BLD AUTO: 0.6 % (ref 0–0.5)
INR PPP: 5.03 (ref 0.93–1.1)
LYMPHOCYTES # BLD AUTO: 0.85 10*3/MM3 (ref 0.7–3.1)
LYMPHOCYTES NFR BLD AUTO: 23.4 % (ref 19.6–45.3)
MCH RBC QN AUTO: 28.6 PG (ref 26.6–33)
MCHC RBC AUTO-ENTMCNC: 32 G/DL (ref 31.5–35.7)
MCV RBC AUTO: 89.5 FL (ref 79–97)
MONOCYTES # BLD AUTO: 0.26 10*3/MM3 (ref 0.1–0.9)
MONOCYTES NFR BLD AUTO: 7.2 % (ref 5–12)
NEUTROPHILS NFR BLD AUTO: 2.46 10*3/MM3 (ref 1.7–7)
NEUTROPHILS NFR BLD AUTO: 67.6 % (ref 42.7–76)
NRBC BLD AUTO-RTO: 0 /100 WBC (ref 0–0.2)
PLATELET # BLD AUTO: 145 10*3/MM3 (ref 140–450)
PMV BLD AUTO: 11.3 FL (ref 6–12)
PROTHROMBIN TIME: 47.3 SECONDS (ref 9.6–11.7)
RBC # BLD AUTO: 4.75 10*6/MM3 (ref 4.14–5.8)
WBC NRBC COR # BLD: 3.63 10*3/MM3 (ref 3.4–10.8)

## 2022-11-16 PROCEDURE — 85025 COMPLETE CBC W/AUTO DIFF WBC: CPT

## 2022-11-16 PROCEDURE — 36415 COLL VENOUS BLD VENIPUNCTURE: CPT

## 2022-11-16 PROCEDURE — 85610 PROTHROMBIN TIME: CPT

## 2022-11-17 ENCOUNTER — TELEPHONE (OUTPATIENT)
Dept: CARDIOLOGY | Facility: CLINIC | Age: 67
End: 2022-11-17

## 2022-11-17 NOTE — TELEPHONE ENCOUNTER
Leela called from [facility] Deysi Mason's office. Pt had abnormal Lab work. INR which is followed by hematology and they were contacted and an elevated BNP. Patient's PCP would like patient to be contacted for an appointment regarding elevated BNP. Pt was advised by pcp to schedule and make and appt and the pt was made aware of the abnormal lab and what it indicates. Pt has not contacted our office for a visit and they are requesting we contact the patient. I contacted the patient he wishes to see Dr. Kang in [place]. Phone number provided and patient verbalizes he will call the office immediately to schedule a visit.

## 2022-11-28 NOTE — PROGRESS NOTES
Encounter Date:11/30/2022      Patient ID: Chi Quinteros Sr. is a 67 y.o. male.    Chief Complaint:      History of Present Illness  67-year-old white male with history of coronary disease status post coronary bypass surgeryX4 June 2022, history of hypertension, hyperlipidemia, paroxysmal fibrillation and ascending aortic aneurysm presents to the cardiology office for follow-up.  Patient is currently stable without any symptoms of chest pain or shortness of breath at rest on exertion.  No complains any PND orthopnea.  No palpitation dizziness syncope or swelling the feet but is not smoking anymore.  He is taking his medicines regularly.  CABG in June 2020 to involve LIMA to LAD, vein graft to diagonal, vein graft to obtuse marginal, vein graft to distal RCA and ascending aorta replacement with 30 mm graft.    Today he comes in with complaints of leg edema, shortness of breath.  He recently stopped taking most of his medications and is unsure of what he should take.  He is also not taking aspirin or any medications for his blood pressure.      The following portions of the patient's history were reviewed and updated as appropriate: allergies, current medications, past family history, past medical history, past social history, past surgical history and problem list.    Review of Systems   Constitutional: Positive for malaise/fatigue.   Cardiovascular: Positive for dyspnea on exertion and leg swelling. Negative for chest pain and palpitations.   Respiratory: Positive for shortness of breath. Negative for cough.    Gastrointestinal: Negative for abdominal pain, nausea and vomiting.   Neurological: Negative for dizziness, focal weakness, headaches, light-headedness and numbness.   All other systems reviewed and are negative.        Current Outpatient Medications:   •  albuterol sulfate  (90 Base) MCG/ACT inhaler, Inhale 2 puffs Every 4 (Four) Hours As Needed for Wheezing., Disp: , Rfl:   •   Fluticasone-Umeclidin-Vilant (TRELEGY) 200-62.5-25 MCG/INH inhaler, Inhale 1 puff Daily., Disp: , Rfl:   •  gabapentin (NEURONTIN) 600 MG tablet, Take 600 mg by mouth 2 (Two) Times a Day., Disp: , Rfl:   •  potassium chloride (K-DUR,KLOR-CON) 20 MEQ CR tablet, potassium chloride ER 20 mEq tablet,extended release  TAKE 1 TABLET BY MOUTH EVERY DAY, Disp: , Rfl:   •  tamsulosin (FLOMAX) 0.4 MG capsule 24 hr capsule, Take 1 capsule by mouth every night at bedtime., Disp: , Rfl:   •  acetaminophen (TYLENOL) 325 MG tablet, Take 2 tablets by mouth Every 4 (Four) Hours As Needed for Mild Pain ., Disp: , Rfl:   •  amiodarone (PACERONE) 200 MG tablet, amiodarone 200 mg tablet  TAKE 1 TABLET BY MOUTH TWICE A DAY, Disp: , Rfl:   •  aspirin 81 MG EC tablet, Take 1 tablet by mouth Daily., Disp: 100 tablet, Rfl: 99  •  carvedilol (COREG) 3.125 MG tablet, Take 1 tablet by mouth 2 (Two) Times a Day., Disp: 180 tablet, Rfl: 3  •  clopidogrel (PLAVIX) 75 MG tablet, clopidogrel 75 mg tablet  TAKE 1 TABLET BY MOUTH EVERY DAY, Disp: , Rfl:   •  furosemide (LASIX) 40 MG tablet, Take 1 tablet by mouth Daily., Disp: 90 tablet, Rfl: 3  •  lisinopril (PRINIVIL,ZESTRIL) 40 MG tablet, lisinopril 40 mg tablet, Disp: , Rfl:   •  potassium chloride (K-TAB) 20 MEQ tablet controlled-release ER tablet, Take 1 tablet by mouth Daily., Disp: 90 tablet, Rfl: 3  •  pravastatin (PRAVACHOL) 20 MG tablet, Take 1 tablet by mouth Daily., Disp: 90 tablet, Rfl:   •  QUEtiapine (SEROquel) 25 MG tablet, quetiapine 25 mg tablet  TAKE 1 TABLET BY MOUTH DAILY, Disp: , Rfl:   •  sennosides-docusate (PERICOLACE) 8.6-50 MG per tablet, Senna Plus 8.6 mg-50 mg tablet  TAKE 1 TABLET BY MOUTH EVERY DAY, Disp: , Rfl:   •  sodium hypochlorite (DAKIN'S 1/4 STRENGTH) 0.125 % solution topical solution 0.125%, Apply 15 mL topically to the appropriate area as directed 2 (Two) Times a Day., Disp: 473 mL, Rfl: 0    Current outpatient and discharge medications have been reconciled  for the patient.  Reviewed by: Jeff Kang MD       No Known Allergies    No family history on file.    Past Surgical History:   Procedure Laterality Date   • ASCENDING AORTIC ANEURYSM REPAIR W/ MECHANICAL AORTIC VALVE REPLACEMENT N/A 6/10/2022    Procedure: AORTIC VALVE ASCENDING ANEURYSM REPAIR;  Surgeon: Benson Hughes MD;  Location: [facility];  Service: Cardiothoracic;  Laterality: N/A;   • CARDIAC CATHETERIZATION Right 2022    Procedure: Coronary angiography;  Surgeon: Frank Giraldo MD;  Location: [facility] CATH INVASIVE LOCATION;  Service: Cardiovascular;  Laterality: Right;   • CARDIAC CATHETERIZATION N/A 2022    Procedure: Left Heart Cath;  Surgeon: Frank Giraldo MD;  Location: [facility] CATH INVASIVE LOCATION;  Service: Cardiovascular;  Laterality: N/A;   • CARDIAC CATHETERIZATION N/A 2022    Procedure: Left ventriculography;  Surgeon: Frank Giraldo MD;  Location: [facility] CATH INVASIVE LOCATION;  Service: Cardiovascular;  Laterality: N/A;   • CORONARY ARTERY BYPASS GRAFT N/A 6/10/2022    Procedure: CORONARY ARTERY BYPASS GRAFTING;  Surgeon: Benson Hughes MD;  Location: [facility];  Service: Cardiothoracic;  Laterality: N/A;       Past Medical History:   Diagnosis Date   • Aneurysm (HCC)    • Back pain    • Emphysema lung (HCC)    • Hypertension        No family history on file.    Social History     Socioeconomic History   • Marital status:    Tobacco Use   • Smoking status: Former     Types: Cigarettes     Quit date: 2021     Years since quittin.4   • Smokeless tobacco: Never   Vaping Use   • Vaping Use: Never used   Substance and Sexual Activity   • Alcohol use: Yes     Comment: 3-4 beers daily   • Drug use: Never   • Sexual activity: Defer         Procedures    ECG in the office today shows sinus rhythm, left atrial enlargement, nonspecific ST-T wave changes.  Heart rate 77 bpm, NM interval 164 ms, QRS duration 120 ms, QTC 5 4 6 ms  Objective:       Physical  Exam  Blood pressure 132/70 mmHg, heart rate 78 bpm, height 70 inches, weight 245 pounds, oxygen saturation 94%  The patient is alert, oriented and in no distress.    Vital signs as noted above.    Head and neck revealed no carotid bruits or jugular venous distension.  No thyromegaly or lymphadenopathy is present.    Lungs clear.  No wheezing.  Breath sounds are normal bilaterally.    Heart normal first and second heart sounds.  No murmur..  No pericardial rub is present.  No gallop is present.    Abdomen soft and nontender.  No organomegaly is present.    Extremities revealed good peripheral pulses without any pedal edema.    Skin warm and dry.    Musculoskeletal system is grossly normal.    CNS grossly normal.           Diagnosis Plan   1. Coronary artery disease involving native coronary artery of native heart without angina pectoris        2. Primary hypertension        3. Paroxysmal atrial fibrillation (HCC)        4. Aneurysm of ascending aorta without rupture        5. S/P CABG (coronary artery bypass graft)        6. Abnormal nuclear stress test        7. Chronic heart failure with preserved ejection fraction (HFpEF) ([facility])  furosemide (LASIX) 40 MG tablet    potassium chloride (K-TAB) 20 MEQ tablet controlled-release ER tablet    carvedilol (COREG) 3.125 MG tablet      LAB RESULTS (LAST 7 DAYS)    CBC        BMP        CMP         BNP        TROPONIN        CoAg        Creatinine Clearance  CrCl cannot be calculated (Patient's most recent lab result is older than the maximum 30 days allowed.).    ABG        Radiology  No radiology results for the last day        Assessment and Plan       Diagnoses and all orders for this visit:    1. Coronary artery disease involving native coronary artery of native heart without angina pectoris (Primary)    2. Primary hypertension    3. Paroxysmal atrial fibrillation (HCC)    4. Aneurysm of ascending aorta without rupture    5. S/P CABG (coronary artery bypass graft)    6.  Abnormal nuclear stress test  Overview:  Added automatically from request for surgery [id number]      7. Chronic heart failure with preserved ejection fraction (HFpEF) ([facility])  -     furosemide (LASIX) 40 MG tablet; Take 1 tablet by mouth Daily.  Dispense: 90 tablet; Refill: 3  -     potassium chloride (K-TAB) 20 MEQ tablet controlled-release ER tablet; Take 1 tablet by mouth Daily.  Dispense: 90 tablet; Refill: 3  -     carvedilol (COREG) 3.125 MG tablet; Take 1 tablet by mouth 2 (Two) Times a Day.  Dispense: 180 tablet; Refill: 3       1.  Coronary disease  Multivessel coronary disease status post CABG x4 as described above.  Continue aspirin and statin.  I will restart his beta-blocker today.  Obtain an echocardiogram due to worsening symptoms of heart failure    2.  Hypertension  Blood pressure is mildly elevated today.  I will restart his Coreg to be taken with meals  3 hyperlipidemia  Continue pravastatin due to previous intolerance to high intensity statin.    4.  Atrial fibrillation  Postop A. fib with no recurrence.  ECG today is sinus rhythm.  He is no longer amiodarone or anticoagulation.    5.  Aortic aneurysm  Patient also had ascending aortic aneurysm which was repaired with a 30 mm graft and is stable.  Will restart beta-blocker and obtain an echocardiogram    6.  Heart failure with preserved ejection fraction  I will add furosemide 40 mg p.o. daily, potassium and Coreg.  Obtain an echocardiogram to evaluate LV function and valve function  Patient was advised to weigh himself and watch the salt intake.    7.  Prostate cancer  Currently on treatment    8.  Neuropathy  Continue gabapentin

## 2022-11-29 RX ORDER — AMIODARONE HYDROCHLORIDE 200 MG/1
TABLET ORAL
COMMUNITY

## 2022-11-29 RX ORDER — LISINOPRIL 40 MG/1
TABLET ORAL
COMMUNITY
End: 2022-12-28

## 2022-11-29 RX ORDER — CLOPIDOGREL BISULFATE 75 MG/1
TABLET ORAL
COMMUNITY

## 2022-11-29 RX ORDER — METOPROLOL SUCCINATE 25 MG/1
TABLET, EXTENDED RELEASE ORAL
COMMUNITY
End: 2022-11-30

## 2022-11-29 RX ORDER — TAMSULOSIN HYDROCHLORIDE 0.4 MG/1
1 CAPSULE ORAL
COMMUNITY
Start: 2022-11-12

## 2022-11-29 RX ORDER — FUROSEMIDE 20 MG/1
20 TABLET ORAL 3 TIMES DAILY
COMMUNITY
Start: 2022-11-07 | End: 2022-11-30

## 2022-11-29 RX ORDER — BUMETANIDE 1 MG/1
TABLET ORAL
COMMUNITY
End: 2022-11-30

## 2022-11-29 RX ORDER — QUETIAPINE FUMARATE 25 MG/1
TABLET, FILM COATED ORAL
COMMUNITY

## 2022-11-29 RX ORDER — GABAPENTIN 600 MG/1
600 TABLET ORAL 2 TIMES DAILY
COMMUNITY
Start: 2022-11-14

## 2022-11-29 RX ORDER — AMOXICILLIN 250 MG
CAPSULE ORAL
COMMUNITY

## 2022-11-29 RX ORDER — POTASSIUM CHLORIDE 20 MEQ/1
TABLET, EXTENDED RELEASE ORAL
COMMUNITY

## 2022-11-30 ENCOUNTER — OFFICE VISIT (OUTPATIENT)
Dept: CARDIOLOGY | Facility: CLINIC | Age: 67
End: 2022-11-30

## 2022-11-30 DIAGNOSIS — R94.39 ABNORMAL NUCLEAR STRESS TEST: ICD-10-CM

## 2022-11-30 DIAGNOSIS — I10 PRIMARY HYPERTENSION: ICD-10-CM

## 2022-11-30 DIAGNOSIS — I25.10 CORONARY ARTERY DISEASE INVOLVING NATIVE CORONARY ARTERY OF NATIVE HEART WITHOUT ANGINA PECTORIS: Primary | ICD-10-CM

## 2022-11-30 DIAGNOSIS — I71.21 ANEURYSM OF ASCENDING AORTA WITHOUT RUPTURE: ICD-10-CM

## 2022-11-30 DIAGNOSIS — I50.32 CHRONIC HEART FAILURE WITH PRESERVED EJECTION FRACTION (HFPEF): ICD-10-CM

## 2022-11-30 DIAGNOSIS — Z95.1 S/P CABG (CORONARY ARTERY BYPASS GRAFT): ICD-10-CM

## 2022-11-30 DIAGNOSIS — I48.0 PAROXYSMAL ATRIAL FIBRILLATION: ICD-10-CM

## 2022-11-30 PROCEDURE — 99214 OFFICE O/P EST MOD 30 MIN: CPT | Performed by: INTERNAL MEDICINE

## 2022-11-30 PROCEDURE — 93010 ELECTROCARDIOGRAM REPORT: CPT | Performed by: INTERNAL MEDICINE

## 2022-11-30 RX ORDER — CARVEDILOL 3.12 MG/1
3.12 TABLET ORAL 2 TIMES DAILY
Qty: 180 TABLET | Refills: 3 | Status: SHIPPED | OUTPATIENT
Start: 2022-11-30

## 2022-11-30 RX ORDER — POTASSIUM CHLORIDE 600 MG/1
CAPSULE, EXTENDED RELEASE ORAL
COMMUNITY
Start: 2022-11-29 | End: 2022-11-30

## 2022-11-30 RX ORDER — FUROSEMIDE 40 MG/1
40 TABLET ORAL DAILY
Qty: 90 TABLET | Refills: 3 | Status: SHIPPED | OUTPATIENT
Start: 2022-11-30

## 2022-11-30 RX ORDER — POTASSIUM CHLORIDE 1500 MG/1
20 TABLET, FILM COATED, EXTENDED RELEASE ORAL DAILY
Qty: 90 TABLET | Refills: 3 | Status: SHIPPED | OUTPATIENT
Start: 2022-11-30

## 2022-12-28 ENCOUNTER — OFFICE VISIT (OUTPATIENT)
Dept: CARDIOLOGY | Facility: CLINIC | Age: 67
End: 2022-12-28

## 2022-12-28 VITALS
SYSTOLIC BLOOD PRESSURE: 141 MMHG | BODY MASS INDEX: 33.79 KG/M2 | HEART RATE: 83 BPM | WEIGHT: 236 LBS | DIASTOLIC BLOOD PRESSURE: 77 MMHG | HEIGHT: 70 IN | OXYGEN SATURATION: 96 %

## 2022-12-28 DIAGNOSIS — E78.2 MIXED HYPERLIPIDEMIA: ICD-10-CM

## 2022-12-28 DIAGNOSIS — I10 PRIMARY HYPERTENSION: ICD-10-CM

## 2022-12-28 DIAGNOSIS — I50.32 CHRONIC HEART FAILURE WITH PRESERVED EJECTION FRACTION (HFPEF): ICD-10-CM

## 2022-12-28 DIAGNOSIS — R94.39 ABNORMAL NUCLEAR STRESS TEST: ICD-10-CM

## 2022-12-28 DIAGNOSIS — I25.10 CORONARY ARTERY DISEASE INVOLVING NATIVE CORONARY ARTERY OF NATIVE HEART WITHOUT ANGINA PECTORIS: Primary | ICD-10-CM

## 2022-12-28 DIAGNOSIS — I71.21 ANEURYSM OF ASCENDING AORTA WITHOUT RUPTURE: ICD-10-CM

## 2022-12-28 DIAGNOSIS — I48.0 PAROXYSMAL ATRIAL FIBRILLATION: ICD-10-CM

## 2022-12-28 DIAGNOSIS — I10 ESSENTIAL HYPERTENSION: ICD-10-CM

## 2022-12-28 DIAGNOSIS — Z95.1 S/P CABG (CORONARY ARTERY BYPASS GRAFT): ICD-10-CM

## 2022-12-28 PROCEDURE — 99214 OFFICE O/P EST MOD 30 MIN: CPT | Performed by: INTERNAL MEDICINE

## 2022-12-28 RX ORDER — LISINOPRIL 10 MG/1
10 TABLET ORAL DAILY
Qty: 90 TABLET | Refills: 3 | Status: SHIPPED | OUTPATIENT
Start: 2022-12-28 | End: 2023-03-08

## 2022-12-28 RX ORDER — FLUTICASONE FUROATE, UMECLIDINIUM BROMIDE AND VILANTEROL TRIFENATATE 100; 62.5; 25 UG/1; UG/1; UG/1
1 POWDER RESPIRATORY (INHALATION) DAILY
COMMUNITY
Start: 2022-11-28

## 2022-12-28 NOTE — PROGRESS NOTES
Encounter Date:12/28/2022      Patient ID: Chi Quinteros Sr. is a 67 y.o. male.    Chief Complaint:      History of Present Illness  67-year-old white male with history of coronary disease status post coronary bypass surgeryX4 June 2022, history of hypertension, hyperlipidemia, paroxysmal fibrillation and ascending aortic aneurysm presents to the cardiology office for follow-up.   CABG in June 2022 to involve LIMA to LAD, vein graft to diagonal, vein graft to obtuse marginal, vein graft to distal RCA and ascending aorta replacement with 30 mm graft.     During the last visit he came in with complaints of leg edema, shortness of breath.  He recently stopped taking most of his medications and is unsure of what he should take.  He is also not taking aspirin or any medications for his blood pressure.  I had restarted his appropriate medications and also obtain an echocardiogram.  Today he comes in again unsure of what medications to take.    The following portions of the patient's history were reviewed and updated as appropriate: allergies, current medications, past family history, past medical history, past social history, past surgical history and problem list.    Review of Systems   Constitutional: Positive for malaise/fatigue.   Cardiovascular: Negative for chest pain, dyspnea on exertion, leg swelling and palpitations.   Respiratory: Positive for shortness of breath. Negative for cough.    Gastrointestinal: Negative for abdominal pain, nausea and vomiting.   Neurological: Negative for dizziness, focal weakness, headaches, light-headedness and numbness.   All other systems reviewed and are negative.        Current Outpatient Medications:   •  acetaminophen (TYLENOL) 325 MG tablet, Take 2 tablets by mouth Every 4 (Four) Hours As Needed for Mild Pain ., Disp: , Rfl:   •  albuterol sulfate  (90 Base) MCG/ACT inhaler, Inhale 2 puffs Every 4 (Four) Hours As Needed for Wheezing., Disp: , Rfl:   •  aspirin 81 MG EC  tablet, Take 1 tablet by mouth Daily., Disp: 100 tablet, Rfl: 99  •  carvedilol (COREG) 3.125 MG tablet, Take 1 tablet by mouth 2 (Two) Times a Day., Disp: 180 tablet, Rfl: 3  •  Fluticasone-Umeclidin-Vilant (TRELEGY) 200-62.5-25 MCG/INH inhaler, Inhale 1 puff Daily., Disp: , Rfl:   •  furosemide (LASIX) 40 MG tablet, Take 1 tablet by mouth Daily., Disp: 90 tablet, Rfl: 3  •  potassium chloride (K-TAB) 20 MEQ tablet controlled-release ER tablet, Take 1 tablet by mouth Daily., Disp: 90 tablet, Rfl: 3  •  tamsulosin (FLOMAX) 0.4 MG capsule 24 hr capsule, Take 1 capsule by mouth every night at bedtime., Disp: , Rfl:   •  Trelegy Ellipta 100-62.5-25 MCG/ACT inhaler, Inhale 1 puff Daily., Disp: , Rfl:   •  amiodarone (PACERONE) 200 MG tablet, amiodarone 200 mg tablet  TAKE 1 TABLET BY MOUTH TWICE A DAY, Disp: , Rfl:   •  clopidogrel (PLAVIX) 75 MG tablet, clopidogrel 75 mg tablet  TAKE 1 TABLET BY MOUTH EVERY DAY, Disp: , Rfl:   •  gabapentin (NEURONTIN) 600 MG tablet, Take 600 mg by mouth 2 (Two) Times a Day., Disp: , Rfl:   •  lisinopril (PRINIVIL,ZESTRIL) 40 MG tablet, lisinopril 40 mg tablet, Disp: , Rfl:   •  potassium chloride (K-DUR,KLOR-CON) 20 MEQ CR tablet, potassium chloride ER 20 mEq tablet,extended release  TAKE 1 TABLET BY MOUTH EVERY DAY, Disp: , Rfl:   •  pravastatin (PRAVACHOL) 20 MG tablet, Take 1 tablet by mouth Daily., Disp: 90 tablet, Rfl:   •  QUEtiapine (SEROquel) 25 MG tablet, quetiapine 25 mg tablet  TAKE 1 TABLET BY MOUTH DAILY, Disp: , Rfl:   •  sennosides-docusate (PERICOLACE) 8.6-50 MG per tablet, Senna Plus 8.6 mg-50 mg tablet  TAKE 1 TABLET BY MOUTH EVERY DAY, Disp: , Rfl:   •  sodium hypochlorite (DAKIN'S 1/4 STRENGTH) 0.125 % solution topical solution 0.125%, Apply 15 mL topically to the appropriate area as directed 2 (Two) Times a Day., Disp: 473 mL, Rfl: 0    Current outpatient and discharge medications have been reconciled for the patient.  Reviewed by: Jeff Kang MD       No Known  "Allergies    No family history on file.    Past Surgical History:   Procedure Laterality Date   • ASCENDING AORTIC ANEURYSM REPAIR W/ MECHANICAL AORTIC VALVE REPLACEMENT N/A 6/10/2022    Procedure: AORTIC VALVE ASCENDING ANEURYSM REPAIR;  Surgeon: Benson Hughes MD;  Location: [facility];  Service: Cardiothoracic;  Laterality: N/A;   • CARDIAC CATHETERIZATION Right 2022    Procedure: Coronary angiography;  Surgeon: Frank Giraldo MD;  Location: [facility] CATH INVASIVE LOCATION;  Service: Cardiovascular;  Laterality: Right;   • CARDIAC CATHETERIZATION N/A 2022    Procedure: Left Heart Cath;  Surgeon: Frank Giraldo MD;  Location: [facility] CATH INVASIVE LOCATION;  Service: Cardiovascular;  Laterality: N/A;   • CARDIAC CATHETERIZATION N/A 2022    Procedure: Left ventriculography;  Surgeon: Frank Giraldo MD;  Location: [facility] CATH INVASIVE LOCATION;  Service: Cardiovascular;  Laterality: N/A;   • CORONARY ARTERY BYPASS GRAFT N/A 6/10/2022    Procedure: CORONARY ARTERY BYPASS GRAFTING;  Surgeon: Benson Hughes MD;  Location: [facility];  Service: Cardiothoracic;  Laterality: N/A;       Past Medical History:   Diagnosis Date   • Aneurysm (HCC)    • Back pain    • Emphysema lung (HCC)    • Hypertension        No family history on file.    Social History     Socioeconomic History   • Marital status:    Tobacco Use   • Smoking status: Former     Types: Cigarettes     Quit date: 2021     Years since quittin.5   • Smokeless tobacco: Never   Vaping Use   • Vaping Use: Never used   Substance and Sexual Activity   • Alcohol use: Yes     Comment: 3-4 beers daily   • Drug use: Never   • Sexual activity: Defer         Procedures      Objective:       Physical Exam    /77 (BP Location: Left arm, Patient Position: Sitting, Cuff Size: Adult)   Pulse 83   Ht 177.8 cm (70\")   Wt 107 kg (236 lb)   SpO2 96%   BMI 33.86 kg/m²   The patient is alert, oriented and in no distress.    Vital " signs as noted above.    Head and neck revealed no carotid bruits or jugular venous distension.  No thyromegaly or lymphadenopathy is present.    Lungs clear.  No wheezing.  Breath sounds are normal bilaterally.    Heart normal first and second heart sounds.  No murmur..  No pericardial rub is present.  No gallop is present.    Abdomen soft and nontender.  No organomegaly is present.    Extremities revealed good peripheral pulses without any pedal edema.    Skin warm and dry.    Musculoskeletal system is grossly normal.    CNS grossly normal.           Diagnosis Plan   1. Coronary artery disease involving native coronary artery of native heart without angina pectoris        2. Primary hypertension        3. Paroxysmal atrial fibrillation (HCC)        4. Aneurysm of ascending aorta without rupture        5. S/P CABG (coronary artery bypass graft)        6. Abnormal nuclear stress test        7. Chronic heart failure with preserved ejection fraction (HFpEF) (HCC)        8. Essential hypertension        9. Mixed hyperlipidemia        LAB RESULTS (LAST 7 DAYS)    CBC        BMP        CMP         BNP        TROPONIN        CoAg        Creatinine Clearance  CrCl cannot be calculated (Patient's most recent lab result is older than the maximum 30 days allowed.).    ABG        Radiology  No radiology results for the last day        Assessment and Plan       Diagnoses and all orders for this visit:    1. Coronary artery disease involving native coronary artery of native heart without angina pectoris (Primary)    2. Primary hypertension    3. Paroxysmal atrial fibrillation (HCC)    4. Aneurysm of ascending aorta without rupture    5. S/P CABG (coronary artery bypass graft)    6. Abnormal nuclear stress test  Overview:  Added automatically from request for surgery [id number]      7. Chronic heart failure with preserved ejection fraction (HFpEF) (HCC)    8. Essential hypertension    9. Mixed hyperlipidemia       1.  Coronary  disease  Multivessel coronary disease status post CABG x4 as described above.  Continue aspirin and statin.  I will restart his beta-blocker today.    I will also add lisinopril 5 mg p.o. daily  Echocardiogram shows EF of 40 to 45%     2.  Hypertension  Blood pressure is mildly elevated today.  I will add lisinopril to Coreg today.  3 hyperlipidemia  Continue pravastatin due to previous intolerance to high intensity statin.     4.  Atrial fibrillation  Postop A. fib with no recurrence.  ECG today is sinus rhythm.  He is no longer amiodarone or anticoagulation.       5.  Aortic aneurysm  Patient also had ascending aortic aneurysm which was repaired with a 30 mm graft and is stable.  Will restart ACE inhibitor and beta-blocker.  Recent echocardiogram shows EF of 40 to 45%, no evidence of aortic root enlargement.     6.  Heart failure with preserved ejection fraction  Added ACE inhibitor, beta-blocker, Lasix.  Echocardiogram with EF of 40 to 45%  Discussed salt intake, water intake and daily weights     7.  Prostate cancer  Currently on treatment.  2 more radiation therapy left     8.  Neuropathy  Continue gabapentin

## 2023-03-07 NOTE — PROGRESS NOTES
Encounter Date:03/08/2023        Patient ID: Chi Quinteros Sr. is a 68 y.o. male.      Chief Complaint:      History of Present Illness  67-year-old white male with history of coronary disease status post coronary bypass surgeryX4 June 2022, history of hypertension, hyperlipidemia, paroxysmal fibrillation and ascending aortic aneurysm presents to the cardiology office for follow-up.   CABG in June 2022 with LIMA to LAD, vein graft to diagonal, vein graft to obtuse marginal, vein graft to distal RCA and ascending aorta replacement with 30 mm graft.     During the last visit he came in with complaints of leg edema, shortness of breath.  He recently stopped taking most of his medications and is unsure of what he should take.  He is also not taking aspirin or any medications for his blood pressure.  I had restarted his appropriate medications and also obtain an echocardiogram.  Today he comes in again unsure of what medications to take.    The following portions of the patient's history were reviewed and updated as appropriate: allergies, current medications, past family history, past medical history, past social history, past surgical history and problem list.    Review of Systems   Constitutional: Negative for fever and malaise/fatigue.   HENT: Positive for hearing loss. Negative for congestion.    Eyes: Negative for double vision and visual disturbance.   Cardiovascular: Positive for dyspnea on exertion. Negative for chest pain, claudication, leg swelling and syncope.   Respiratory: Positive for shortness of breath. Negative for cough.    Endocrine: Negative for cold intolerance.   Skin: Negative for color change and rash.   Musculoskeletal: Negative for arthritis and joint pain.   Gastrointestinal: Negative for abdominal pain and heartburn.   Genitourinary: Negative for hematuria.   Neurological: Negative for excessive daytime sleepiness and dizziness.   Psychiatric/Behavioral: Negative for depression. The patient  is not nervous/anxious.    All other systems reviewed and are negative.        Current Outpatient Medications:   •  aspirin 81 MG EC tablet, Take 1 tablet by mouth Daily., Disp: 100 tablet, Rfl: 99  •  Breztri Aerosphere 160-9-4.8 MCG/ACT aerosol inhaler, , Disp: , Rfl:   •  carvedilol (COREG) 3.125 MG tablet, Take 1 tablet by mouth 2 (Two) Times a Day., Disp: 180 tablet, Rfl: 3  •  furosemide (LASIX) 40 MG tablet, Take 1 tablet by mouth Daily., Disp: 90 tablet, Rfl: 3  •  lisinopril (PRINIVIL,ZESTRIL) 10 MG tablet, Take 1 tablet by mouth Daily., Disp: 90 tablet, Rfl: 3  •  methylPREDNISolone (MEDROL) 4 MG dose pack, , Disp: , Rfl:   •  potassium chloride (K-DUR,KLOR-CON) 20 MEQ CR tablet, potassium chloride ER 20 mEq tablet,extended release  TAKE 1 TABLET BY MOUTH EVERY DAY, Disp: , Rfl:   •  potassium chloride (K-TAB) 20 MEQ tablet controlled-release ER tablet, Take 1 tablet by mouth Daily. (Patient taking differently: Take 8 mEq by mouth Daily. Take 8 meq 3x day), Disp: 90 tablet, Rfl: 3  •  tamsulosin (FLOMAX) 0.4 MG capsule 24 hr capsule, Take 1 capsule by mouth every night at bedtime., Disp: , Rfl:   •  traMADol (ULTRAM) 50 MG tablet, , Disp: , Rfl:   •  acetaminophen (TYLENOL) 325 MG tablet, Take 2 tablets by mouth Every 4 (Four) Hours As Needed for Mild Pain ., Disp: , Rfl:   •  albuterol sulfate  (90 Base) MCG/ACT inhaler, Inhale 2 puffs Every 4 (Four) Hours As Needed for Wheezing., Disp: , Rfl:   •  amiodarone (PACERONE) 200 MG tablet, amiodarone 200 mg tablet  TAKE 1 TABLET BY MOUTH TWICE A DAY, Disp: , Rfl:   •  clopidogrel (PLAVIX) 75 MG tablet, clopidogrel 75 mg tablet  TAKE 1 TABLET BY MOUTH EVERY DAY, Disp: , Rfl:   •  Fluticasone-Umeclidin-Vilant (TRELEGY) 200-62.5-25 MCG/INH inhaler, Inhale 1 puff Daily., Disp: , Rfl:   •  gabapentin (NEURONTIN) 600 MG tablet, Take 1 tablet by mouth 2 (Two) Times a Day., Disp: , Rfl:   •  pravastatin (PRAVACHOL) 20 MG tablet, Take 1 tablet by mouth Daily.,  Disp: 90 tablet, Rfl:   •  QUEtiapine (SEROquel) 25 MG tablet, quetiapine 25 mg tablet  TAKE 1 TABLET BY MOUTH DAILY, Disp: , Rfl:   •  sennosides-docusate (PERICOLACE) 8.6-50 MG per tablet, Senna Plus 8.6 mg-50 mg tablet  TAKE 1 TABLET BY MOUTH EVERY DAY, Disp: , Rfl:   •  sodium hypochlorite (DAKIN'S 1/4 STRENGTH) 0.125 % solution topical solution 0.125%, Apply 15 mL topically to the appropriate area as directed 2 (Two) Times a Day., Disp: 473 mL, Rfl: 0  •  Trelegy Ellipta 100-62.5-25 MCG/ACT inhaler, Inhale 1 puff Daily., Disp: , Rfl:     Current outpatient and discharge medications have been reconciled for the patient.  Reviewed by: Jeff Kang MD       No Known Allergies    No family history on file.    Past Surgical History:   Procedure Laterality Date   • ASCENDING AORTIC ANEURYSM REPAIR W/ MECHANICAL AORTIC VALVE REPLACEMENT N/A 6/10/2022    Procedure: AORTIC VALVE ASCENDING ANEURYSM REPAIR;  Surgeon: Benson Hughes MD;  Location: [facility];  Service: Cardiothoracic;  Laterality: N/A;   • CARDIAC CATHETERIZATION Right 6/2/2022    Procedure: Coronary angiography;  Surgeon: Frank Giraldo MD;  Location: [facility] CATH INVASIVE LOCATION;  Service: Cardiovascular;  Laterality: Right;   • CARDIAC CATHETERIZATION N/A 6/2/2022    Procedure: Left Heart Cath;  Surgeon: Frank Giraldo MD;  Location: [facility] CATH INVASIVE LOCATION;  Service: Cardiovascular;  Laterality: N/A;   • CARDIAC CATHETERIZATION N/A 6/2/2022    Procedure: Left ventriculography;  Surgeon: Frank Giraldo MD;  Location: [facility] CATH INVASIVE LOCATION;  Service: Cardiovascular;  Laterality: N/A;   • CORONARY ARTERY BYPASS GRAFT N/A 6/10/2022    Procedure: CORONARY ARTERY BYPASS GRAFTING;  Surgeon: Benson Hughes MD;  Location: [facility];  Service: Cardiothoracic;  Laterality: N/A;       Past Medical History:   Diagnosis Date   • Aneurysm (HCC)    • Back pain    • Emphysema lung (HCC)    • Hypertension        No family history on  "file.    Social History     Socioeconomic History   • Marital status:    Tobacco Use   • Smoking status: Former     Types: Cigarettes     Quit date: 2021     Years since quittin.7   • Smokeless tobacco: Never   Vaping Use   • Vaping Use: Never used   Substance and Sexual Activity   • Alcohol use: Yes     Comment: 3-4 beers daily   • Drug use: Never   • Sexual activity: Defer               Objective:       Physical Exam    /81 (BP Location: Left arm, Patient Position: Sitting, Cuff Size: Adult) Comment: 151/87 right  Pulse 72   Ht 177.8 cm (70\")   Wt 109 kg (240 lb)   SpO2 97%   BMI 34.44 kg/m²   The patient is alert, oriented and in no distress.    Vital signs as noted above.    Head and neck revealed no carotid bruits or jugular venous distension.  No thyromegaly or lymphadenopathy is present.    Lungs clear.  No wheezing.  Breath sounds are normal bilaterally.    Heart normal first and second heart sounds.  No murmur..  No pericardial rub is present.  No gallop is present.    Abdomen soft and nontender.  No organomegaly is present.    Extremities revealed good peripheral pulses without any pedal edema.    Skin warm and dry.    Musculoskeletal system is grossly normal.    CNS grossly normal.           Diagnosis Plan   1. Coronary artery disease involving native coronary artery of native heart without angina pectoris        2. Primary hypertension        3. Paroxysmal atrial fibrillation (HCC)        4. Aneurysm of ascending aorta without rupture        5. S/P CABG (coronary artery bypass graft)        6. Abnormal nuclear stress test        7. Chronic heart failure with preserved ejection fraction (HFpEF) (HCC)        8. Essential hypertension        9. Mixed hyperlipidemia        10. Pure hypercholesterolemia        LAB RESULTS (LAST 7 DAYS)    CBC        BMP        CMP         BNP        TROPONIN        CoAg        Creatinine Clearance  CrCl cannot be calculated (Patient's most recent lab " result is older than the maximum 30 days allowed.).    ABG        Radiology  No radiology results for the last day    EKG  Procedures    Stress test  Results for orders placed during the hospital encounter of 22    Stress Test With Myocardial Perfusion One Day    Interpretation Summary  · Left ventricular ejection fraction is mildly reduced. (Calculated EF = 45%).  · Myocardial perfusion imaging indicates a large-sized, severe area of ischemia located in the inferior wall and lateral wall.  · Impressions are consistent with a high risk study.      Echocardiogram  Results for orders placed during the hospital encounter of 22    Adult Transthoracic Echo Complete With Contrast if Necessary Per Protocol (With Agitated Saline)    Interpretation Summary  · Left ventricular ejection fraction appears to be 56 - 60%.  · The right ventricular cavity is mildly dilated.  · Mild dilation of the aortic root is present.  · No pericardial effusion noted      Cardiac catheterization  Results for orders placed during the hospital encounter of 22    Cardiac Catheterization/Vascular Study    Narrative  Heart Cath Report    NAME:              Chi Torresord .  :                1955  AGE/SEX:        67 y.o. male  MRN:                [id number]  ADM DATE:      [unfilled]  DOS:      Pre-Procedure Notes  H&P Performed  [x]  Yes []  No       []  N/A    Indications:  []  ACS <= 24 HRS  []  ACS >24 HRS  []  New Onset Angina <= 2 mos  []  Worsening Angina  []  Resuscitated Cardiac Arrest  []  Angina on Exertion:  []  Suspected CAD  []  Valvular Disease  []  Pericardial Disease  []  Cardiac Arrythmia  []  Cardiomyopathy  []  LV Dysfunction  [x]  Syncope  []  Post Cardiac Transplant  []  Eval. For Exercise Clearance  [x]  Abnormal Stress Test  []  Other  []  Pre-Operative Evaluation  If Pre-Op Eval:  Evaluation for Surgery Type:  []  Cardiac Surgery   []  Non-Cardiac Surgery  Functional Capacity:  []  <4 METS  []  >=4  METS w/o symptoms  []  >= 4 METS with symptoms  []  Unknown  Surgical Risk:  []  Low  []  Intermediate  []  High Risk: Vascular  []  High Risk Non-Vascular    Risks, Benefits, & Complications Discussed:  [x]  Yes  []  No  []  N/A    Questions Answered:  [x]  Yes  []  No  []  N/A    Consent Obtained:  [x]  Yes  []  No  []  N/A    CHF: [x]  Yes  []  No  If Yes:  Newly Diagnosed?  [x]  Yes  []  No  If Yes:  HF Type:  []  Diastolic  [x]  Systolic  []  Unknown      Procedure Description  The patient underwent successful [x]  Left  []  Right  []  Left & Right  Heart catheterization and coronary angiography via the  [x]  Femoral approach  []  Radial approach  []  Brachial approach    Procedure Narrative:  Informed consent was obtained from the patient after explaining risks and benefits.  Patient was brought to the cardiac catheterization laboratory and placed on the table in the usual fashion.  Right groin was shaved and prepped in sterile fashion. Moderate conscious sedation was given utilizing IV Versed and fentanyl administered by RN with continuous EKG oximetry and hemodynamic monitoring supervised by me throughout the entire case, conscious sedation time was 30 minutes.  I was present with the patient for the duration of moderate sedation and supervised staff who had no other duties and monitored the patient for the entire procedure patient had Regalado 2-3 sedation scale. 2% lidocaine was used for local anesthesia to the right groin.  A total of 20 cc was used.  A 6 Kuwaiti sheath was placed in the right femoral artery.  A 6 Kuwaiti pigtail catheter, 6 Kuwaiti JR4 catheter and a 6 Kuwaiti JL4 catheter was used for the procedure.  After the cardiac catheterization is complete, all the catheters and sheath were removed.  Patient tolerated the procedure very well.  No complications noted.      Hemodynamic    LVEDP:8-12   Estimated EF %: 40%    Initial Aortic Pressure: 140/80    AV Gradient: No gradient    Rt. Heart  Pressure:    Wall Motion:  Dominance:  []  Left  []  Right  [x]  Co-Dominant    Coronary Arteriography: (Please Code highest degree of stenosis)    Left Main %:   0  Proximal LAD %: 70%  Mid/Distal LAD %:  0.  Diagonal branch has an ostial 70%  LCX %: Subtotal occlusion with collaterals from the LAD  Ramus:  RCA %: 100% with collaterals from the left to the right  [place]ima %:  SVG(s) %:      Complications: No complications    Estimated Blood Loss:  None      Impression and Recommendation: Severe three-vessel coronary disease  Mild LV dysfunction  We will review films with surgeons for possible coronary bypass    Electronically signed by Frank Giraldo MD, 06/02/22, 7:03 PM EDT          Assessment and Plan       Diagnoses and all orders for this visit:    1. Coronary artery disease involving native coronary artery of native heart without angina pectoris (Primary)    2. Primary hypertension    3. Paroxysmal atrial fibrillation (HCC)    4. Aneurysm of ascending aorta without rupture    5. S/P CABG (coronary artery bypass graft)    6. Abnormal nuclear stress test  Overview:  Added automatically from request for surgery [id number]      7. Chronic heart failure with preserved ejection fraction (HFpEF) (HCC)    8. Essential hypertension    9. Mixed hyperlipidemia    10. Pure hypercholesterolemia       1.  Coronary disease  Multivessel coronary disease status post CABG x4 as described above.  Continue aspirin and statin.  He is on Coreg and I will increase lisinopril for better blood pressure control  Echocardiogram shows EF of 45%     2.  Hypertension  Blood pressure is mildly elevated today.  Continue Coreg.  I will increase lisinopril to 40 mg p.o. daily    3 hyperlipidemia  Continue pravastatin due to previous intolerance to high intensity statin.     4.  Atrial fibrillation  Postop A. fib with no recurrence.  ECG today is sinus rhythm.  He is no longer amiodarone or anticoagulation.       5.  Aortic aneurysm  Patient also had  ascending aortic aneurysm which was repaired with a 30 mm graft and is stable.  Will restart ACE inhibitor and beta-blocker.  Recent echocardiogram shows EF of 45%, no evidence of aortic root enlargement.     6.  Heart failure with preserved ejection fraction  Added ACE inhibitor, beta-blocker, Lasix.  Echocardiogram with EF of 45%  Discussed salt intake, water intake and daily weights     7.  Prostate cancer  Currently on treatment.  2 more radiation therapy left     8.  Neuropathy  Continue gabapentin

## 2023-03-08 ENCOUNTER — OFFICE VISIT (OUTPATIENT)
Dept: CARDIOLOGY | Facility: CLINIC | Age: 68
End: 2023-03-08
Payer: MEDICARE

## 2023-03-08 VITALS
DIASTOLIC BLOOD PRESSURE: 81 MMHG | HEART RATE: 72 BPM | OXYGEN SATURATION: 97 % | SYSTOLIC BLOOD PRESSURE: 152 MMHG | WEIGHT: 240 LBS | HEIGHT: 70 IN | BODY MASS INDEX: 34.36 KG/M2

## 2023-03-08 DIAGNOSIS — I48.0 PAROXYSMAL ATRIAL FIBRILLATION: ICD-10-CM

## 2023-03-08 DIAGNOSIS — I71.21 ANEURYSM OF ASCENDING AORTA WITHOUT RUPTURE: ICD-10-CM

## 2023-03-08 DIAGNOSIS — E78.2 MIXED HYPERLIPIDEMIA: ICD-10-CM

## 2023-03-08 DIAGNOSIS — R94.39 ABNORMAL NUCLEAR STRESS TEST: ICD-10-CM

## 2023-03-08 DIAGNOSIS — I25.10 CORONARY ARTERY DISEASE INVOLVING NATIVE CORONARY ARTERY OF NATIVE HEART WITHOUT ANGINA PECTORIS: Primary | ICD-10-CM

## 2023-03-08 DIAGNOSIS — I50.32 CHRONIC HEART FAILURE WITH PRESERVED EJECTION FRACTION (HFPEF): ICD-10-CM

## 2023-03-08 DIAGNOSIS — Z95.1 S/P CABG (CORONARY ARTERY BYPASS GRAFT): ICD-10-CM

## 2023-03-08 DIAGNOSIS — E78.00 PURE HYPERCHOLESTEROLEMIA: ICD-10-CM

## 2023-03-08 DIAGNOSIS — I10 ESSENTIAL HYPERTENSION: ICD-10-CM

## 2023-03-08 DIAGNOSIS — I10 PRIMARY HYPERTENSION: ICD-10-CM

## 2023-03-08 PROCEDURE — 1160F RVW MEDS BY RX/DR IN RCRD: CPT | Performed by: INTERNAL MEDICINE

## 2023-03-08 PROCEDURE — 3079F DIAST BP 80-89 MM HG: CPT | Performed by: INTERNAL MEDICINE

## 2023-03-08 PROCEDURE — 1159F MED LIST DOCD IN RCRD: CPT | Performed by: INTERNAL MEDICINE

## 2023-03-08 PROCEDURE — 3077F SYST BP >= 140 MM HG: CPT | Performed by: INTERNAL MEDICINE

## 2023-03-08 PROCEDURE — 99214 OFFICE O/P EST MOD 30 MIN: CPT | Performed by: INTERNAL MEDICINE

## 2023-03-08 RX ORDER — TRAMADOL HYDROCHLORIDE 50 MG/1
TABLET ORAL
COMMUNITY
Start: 2023-03-06

## 2023-03-08 RX ORDER — METHYLPREDNISOLONE 4 MG/1
TABLET ORAL
COMMUNITY
Start: 2023-03-07

## 2023-03-08 RX ORDER — BUDESONIDE, GLYCOPYRROLATE, AND FORMOTEROL FUMARATE 160; 9; 4.8 UG/1; UG/1; UG/1
AEROSOL, METERED RESPIRATORY (INHALATION)
COMMUNITY
Start: 2023-03-06

## 2023-03-08 RX ORDER — LISINOPRIL 40 MG/1
40 TABLET ORAL DAILY
Qty: 90 TABLET | Refills: 3 | Status: SHIPPED | OUTPATIENT
Start: 2023-03-08

## 2023-04-04 ENCOUNTER — LAB (OUTPATIENT)
Dept: LAB | Facility: HOSPITAL | Age: 68
End: 2023-04-04
Payer: MEDICARE

## 2023-04-04 ENCOUNTER — TRANSCRIBE ORDERS (OUTPATIENT)
Dept: ADMINISTRATIVE | Facility: HOSPITAL | Age: 68
End: 2023-04-04
Payer: MEDICARE

## 2023-04-04 DIAGNOSIS — M54.50 LOW BACK PAIN, UNSPECIFIED BACK PAIN LATERALITY, UNSPECIFIED CHRONICITY, UNSPECIFIED WHETHER SCIATICA PRESENT: ICD-10-CM

## 2023-04-04 DIAGNOSIS — M54.50 LOW BACK PAIN, UNSPECIFIED BACK PAIN LATERALITY, UNSPECIFIED CHRONICITY, UNSPECIFIED WHETHER SCIATICA PRESENT: Primary | ICD-10-CM

## 2023-04-04 LAB
BASOPHILS # BLD AUTO: 0.03 10*3/MM3 (ref 0–0.2)
BASOPHILS NFR BLD AUTO: 0.7 % (ref 0–1.5)
DEPRECATED RDW RBC AUTO: 46.2 FL (ref 37–54)
EOSINOPHIL # BLD AUTO: 0.08 10*3/MM3 (ref 0–0.4)
EOSINOPHIL NFR BLD AUTO: 1.8 % (ref 0.3–6.2)
ERYTHROCYTE [DISTWIDTH] IN BLOOD BY AUTOMATED COUNT: 12.3 % (ref 12.3–15.4)
HCT VFR BLD AUTO: 36.6 % (ref 37.5–51)
HGB BLD-MCNC: 12.6 G/DL (ref 13–17.7)
IMM GRANULOCYTES # BLD AUTO: 0.02 10*3/MM3 (ref 0–0.05)
IMM GRANULOCYTES NFR BLD AUTO: 0.4 % (ref 0–0.5)
INR PPP: 1.03 (ref 0.93–1.1)
LYMPHOCYTES # BLD AUTO: 1.05 10*3/MM3 (ref 0.7–3.1)
LYMPHOCYTES NFR BLD AUTO: 23.1 % (ref 19.6–45.3)
MCH RBC QN AUTO: 35.2 PG (ref 26.6–33)
MCHC RBC AUTO-ENTMCNC: 34.4 G/DL (ref 31.5–35.7)
MCV RBC AUTO: 102.2 FL (ref 79–97)
MONOCYTES # BLD AUTO: 0.47 10*3/MM3 (ref 0.1–0.9)
MONOCYTES NFR BLD AUTO: 10.4 % (ref 5–12)
NEUTROPHILS NFR BLD AUTO: 2.89 10*3/MM3 (ref 1.7–7)
NEUTROPHILS NFR BLD AUTO: 63.6 % (ref 42.7–76)
NRBC BLD AUTO-RTO: 0 /100 WBC (ref 0–0.2)
PLATELET # BLD AUTO: 169 10*3/MM3 (ref 140–450)
PMV BLD AUTO: 10.6 FL (ref 6–12)
PROTHROMBIN TIME: 10.6 SECONDS (ref 9.6–11.7)
RBC # BLD AUTO: 3.58 10*6/MM3 (ref 4.14–5.8)
WBC NRBC COR # BLD: 4.54 10*3/MM3 (ref 3.4–10.8)

## 2023-04-04 PROCEDURE — 85610 PROTHROMBIN TIME: CPT

## 2023-04-04 PROCEDURE — 85025 COMPLETE CBC W/AUTO DIFF WBC: CPT

## 2023-04-04 PROCEDURE — 36415 COLL VENOUS BLD VENIPUNCTURE: CPT

## 2023-04-19 ENCOUNTER — TELEPHONE (OUTPATIENT)
Dept: CARDIOLOGY | Facility: CLINIC | Age: 68
End: 2023-04-19
Payer: MEDICARE

## 2023-04-19 NOTE — TELEPHONE ENCOUNTER
[facility]  Dr. Carroll  L5-S1 lumbar epidural steroid injection  Scheduled 4/25/23  Phone# [phone]  Fax# [phone]          Placed on Dr. Kang's MA desk

## 2023-05-24 DIAGNOSIS — I71.20 THORACIC AORTIC ANEURYSM WITHOUT RUPTURE, UNSPECIFIED PART: ICD-10-CM

## 2023-05-24 DIAGNOSIS — Z86.79 S/P ASCENDING AORTIC ANEURYSM REPAIR: Primary | ICD-10-CM

## 2023-05-24 DIAGNOSIS — Z98.890 S/P ASCENDING AORTIC ANEURYSM REPAIR: Primary | ICD-10-CM

## 2023-06-07 ENCOUNTER — HOSPITAL ENCOUNTER (OUTPATIENT)
Dept: CT IMAGING | Facility: HOSPITAL | Age: 68
Discharge: HOME | End: 2023-06-07
Admitting: NURSE PRACTITIONER
Payer: MEDICARE

## 2023-06-07 DIAGNOSIS — I71.20 THORACIC AORTIC ANEURYSM WITHOUT RUPTURE, UNSPECIFIED PART: ICD-10-CM

## 2023-06-07 DIAGNOSIS — Z98.890 S/P ASCENDING AORTIC ANEURYSM REPAIR: ICD-10-CM

## 2023-06-07 DIAGNOSIS — Z86.79 S/P ASCENDING AORTIC ANEURYSM REPAIR: ICD-10-CM

## 2023-06-07 PROCEDURE — 71250 CT THORAX DX C-: CPT

## 2023-06-09 ENCOUNTER — HOSPITAL ENCOUNTER (OUTPATIENT)
Dept: CT IMAGING | Facility: HOSPITAL | Age: 68
Discharge: HOME | End: 2023-06-09
Payer: MEDICARE

## 2023-06-09 ENCOUNTER — TELEPHONE (OUTPATIENT)
Dept: CARDIAC SURGERY | Facility: CLINIC | Age: 68
End: 2023-06-09
Payer: MEDICARE

## 2023-06-09 DIAGNOSIS — Z98.890 S/P ASCENDING AORTIC ANEURYSM REPAIR: ICD-10-CM

## 2023-06-09 DIAGNOSIS — Z86.79 S/P ASCENDING AORTIC ANEURYSM REPAIR: ICD-10-CM

## 2023-06-09 DIAGNOSIS — I71.00 DISSECTION OF AORTA, UNSPECIFIED PORTION OF AORTA: Primary | ICD-10-CM

## 2023-06-09 DIAGNOSIS — I71.00 DISSECTION OF AORTA, UNSPECIFIED PORTION OF AORTA: ICD-10-CM

## 2023-06-09 LAB
CREAT BLDA-MCNC: 1 MG/DL (ref 0.6–1.3)
EGFRCR SERPLBLD CKD-EPI 2021: 82 ML/MIN/1.73

## 2023-06-09 PROCEDURE — 25510000001 IOPAMIDOL PER 1 ML: Performed by: NURSE PRACTITIONER

## 2023-06-09 PROCEDURE — 74174 CTA ABD&PLVS W/CONTRAST: CPT

## 2023-06-09 PROCEDURE — 82565 ASSAY OF CREATININE: CPT

## 2023-06-09 RX ADMIN — IOPAMIDOL 100 ML: 755 INJECTION, SOLUTION INTRAVENOUS at 15:33

## 2023-06-09 NOTE — TELEPHONE ENCOUNTER
Deysi MALDONADO has reviewed stat CTA abd/pelvis no dissection, finding of infrarenal aneurysm.  Office f/u appt to be scheduled with

## 2023-06-09 NOTE — TELEPHONE ENCOUNTER
----- Message from ERICA Mitchell sent at 6/9/2023 12:55 PM EDT -----  I reviewed the CT with Dr. Hughes. He is unsure that this is a dissection as there is no contrast. Can you see if the patient is having pain of any kind? I would like to check a stat CTA of the abdomen and pelvis  ----- Message -----  From: [employer] [place]  Sent: 6/8/2023   4:11 PM EDT  To: ERICA Mitchell

## 2023-06-09 NOTE — TELEPHONE ENCOUNTER
Spoke with , he is not having any type off pain or discomfort. Advised Deysi MALDONADO and  reviewed CT chest and have recommended stat CTA abdomen and pelvis to rule out abdominal aortic dissection.    Imaging scheduled at [facility] [place], instructed patient to proceed to [facility] [place], No eating or drinking until after imaging completed. Advised patient to go straight to main entrance registration at [facility] [place].

## 2023-06-12 ENCOUNTER — OFFICE VISIT (OUTPATIENT)
Dept: CARDIAC SURGERY | Facility: CLINIC | Age: 68
End: 2023-06-12
Payer: MEDICARE

## 2023-06-12 VITALS
TEMPERATURE: 97.3 F | RESPIRATION RATE: 18 BRPM | HEART RATE: 79 BPM | BODY MASS INDEX: 33.07 KG/M2 | WEIGHT: 231 LBS | OXYGEN SATURATION: 95 % | SYSTOLIC BLOOD PRESSURE: 99 MMHG | DIASTOLIC BLOOD PRESSURE: 66 MMHG | HEIGHT: 70 IN

## 2023-06-12 DIAGNOSIS — I71.20 THORACIC AORTIC ANEURYSM WITHOUT RUPTURE, UNSPECIFIED PART: ICD-10-CM

## 2023-06-12 DIAGNOSIS — Z95.1 S/P CABG X 4: Primary | ICD-10-CM

## 2023-06-12 DIAGNOSIS — I71.40 ABDOMINAL ANEURYSM: Primary | ICD-10-CM

## 2023-06-12 RX ORDER — OXYCODONE HYDROCHLORIDE AND ACETAMINOPHEN 5; 325 MG/1; MG/1
TABLET ORAL EVERY 12 HOURS SCHEDULED
COMMUNITY

## 2023-06-12 NOTE — PROGRESS NOTES
"Chief Complaint  No chief complaint on file.    Subjective        Chi Quinteros Sr. presents to [facility] CARDIAC SURGERY  History of Present Illness  It was nice to see Chi Quinteros Sr. in follow up.  As you know, he is a 67 y.o. male with multivessel CAD, ascending aortic aneurysm, COPD, HTN, HLD, lumbar herniation with back surgery pending, early prostate cancer, ETOH abuse, and MRSA nasal colonization who underwent CABG x4 with LIMA and ascending aortic aneurysm repair at [facility] by me on 6/10/2022.  He did well postoperatively with the exception of urinary retention that resolved and delirium for which psychiatry was consulted to assist with which resolved prior to discharge.  He comes in today with an incidental finding in a non-contrast CT 6/7/23 of a suspicious Dissection involving the abdominal aorta. Recommend correlation with a CTA for further evaluation. CTA on 6/9/23 showed Infrarenal abdominal aortic aneurysm measuring 3.3 cm without evidence of impending aneurysm rupture and no signs of dissection.           Objective   Vital Signs:  There were no vitals taken for this visit.  Estimated body mass index is 34.44 kg/m² as calculated from the following:    Height as of 3/8/23: 177.8 cm (70\").    Weight as of 3/8/23: 109 kg (240 lb).             Physical Exam  Cardiovascular:      Rate and Rhythm: Normal rate and regular rhythm.      Heart sounds: Normal heart sounds.   Pulmonary:      Effort: Pulmonary effort is normal.      Breath sounds: Normal breath sounds.   Neurological:      General: No focal deficit present.      Mental Status: He is alert and oriented to person, place, and time. Mental status is at baseline.   Psychiatric:         Mood and Affect: Mood normal.         Behavior: Behavior normal.         Thought Content: Thought content normal.         Judgment: Judgment normal.        Result Review :                   Assessment and Plan   There are no diagnoses linked to " this encounter.    - S/p CABG x4 and Ascending aorta repair 6/10/22. Doing well.     Comes to office today one year after surgery because a Non contrast CT showed a thoracic aorta without any signs of complication but a doubt about a dissection in the abdomial aorta. CTA of the abdomen showed an abdominal aorta of 3.3cm without signs of complication. I would recommend a follow up with vascular surgery.          Follow Up   No follow-ups on file.  Patient was given instructions and counseling regarding his condition or for health maintenance advice. Please see specific information pulled into the AVS if appropriate.

## 2023-07-24 ENCOUNTER — APPOINTMENT (OUTPATIENT)
Dept: VASCULAR SURGERY | Facility: HOSPITAL | Age: 68
End: 2023-07-24
Payer: MEDICARE

## 2023-07-24 ENCOUNTER — TRANSCRIBE ORDERS (OUTPATIENT)
Dept: ADMINISTRATIVE | Facility: HOSPITAL | Age: 68
End: 2023-07-24
Payer: MEDICARE

## 2023-07-24 DIAGNOSIS — I71.40 ABDOMINAL AORTIC ANEURYSM (AAA) WITHOUT RUPTURE, UNSPECIFIED PART: Primary | ICD-10-CM

## 2023-07-24 PROCEDURE — G0463 HOSPITAL OUTPT CLINIC VISIT: HCPCS

## 2023-09-03 NOTE — PROGRESS NOTES
Encounter Date:09/06/2023        Patient ID: Chi Quinteros Sr. is a 68 y.o. male.      Chief Complaint:      History of Present Illness  68-year-old white male with history of coronary disease status post coronary bypass surgeryX4 June 2022, history of hypertension, hyperlipidemia, paroxysmal fibrillation and ascending aortic aneurysm presents to the cardiology office for follow-up.   CABG in June 2022 with LIMA to LAD, vein graft to diagonal, vein graft to obtuse marginal, vein graft to distal RCA and ascending aorta replacement with 30 mm graft.     Again today he is unsure of what medications he takes.  His blood pressure is low today.  He is cutting some medicines into half however he does not know which one.  He denies any chest pain.He does complain of occasional shortness of breath especially in warm weather.  ECG in the office today shows normal sinus rhythm, ST-T wave abnormality with anterolateral ischemia ST depressions and T wave inversions.  Heart rate 71, MD interval 164 ms, QRS duration 112 and QTc 491 ms.  These ischemic ECG changes are new when compared to previous ECG in November 2022.    The following portions of the patient's history were reviewed and updated as appropriate: allergies, current medications, past family history, past medical history, past social history, past surgical history, and problem list.    Review of Systems   Constitutional: Negative for malaise/fatigue.   Cardiovascular:  Negative for chest pain, dyspnea on exertion, leg swelling and palpitations.   Respiratory:  Positive for shortness of breath. Negative for cough.    Gastrointestinal:  Negative for abdominal pain, nausea and vomiting.   Neurological:  Positive for dizziness and light-headedness. Negative for focal weakness, headaches and numbness.   All other systems reviewed and are negative.      Current Outpatient Medications:     acetaminophen (TYLENOL) 325 MG tablet, Take 2 tablets by mouth Every 4 (Four) Hours As  Needed for Mild Pain ., Disp: , Rfl:     albuterol sulfate  (90 Base) MCG/ACT inhaler, Inhale 2 puffs Every 4 (Four) Hours As Needed for Wheezing., Disp: , Rfl:     furosemide (LASIX) 40 MG tablet, Take 1 tablet by mouth Daily., Disp: 90 tablet, Rfl: 3    lisinopril (PRINIVIL,ZESTRIL) 40 MG tablet, Take 1 tablet by mouth Daily., Disp: 90 tablet, Rfl: 3    Percocet 5-325 MG per tablet, Every 12 (Twelve) Hours., Disp: , Rfl:     potassium chloride (K-DUR,KLOR-CON) 20 MEQ CR tablet, potassium chloride ER 20 mEq tablet,extended release  TAKE 1 TABLET BY MOUTH EVERY DAY, Disp: , Rfl:     tamsulosin (FLOMAX) 0.4 MG capsule 24 hr capsule, Take 1 capsule by mouth every night at bedtime., Disp: , Rfl:     amiodarone (PACERONE) 200 MG tablet, Take 1 tablet by mouth 2 (Two) Times a Day., Disp: , Rfl:     aspirin 81 MG EC tablet, Take 1 tablet by mouth Daily., Disp: , Rfl:     Current outpatient and discharge medications have been reconciled for the patient.  Reviewed by: Jeff Kang MD       No Known Allergies    History reviewed. No pertinent family history.    Past Surgical History:   Procedure Laterality Date    ASCENDING AORTIC ANEURYSM REPAIR W/ MECHANICAL AORTIC VALVE REPLACEMENT N/A 6/10/2022    Procedure: AORTIC VALVE ASCENDING ANEURYSM REPAIR;  Surgeon: Benson Hughes MD;  Location: [facility];  Service: Cardiothoracic;  Laterality: N/A;    CARDIAC CATHETERIZATION Right 6/2/2022    Procedure: Coronary angiography;  Surgeon: Frank Giraldo MD;  Location: [facility] CATH INVASIVE LOCATION;  Service: Cardiovascular;  Laterality: Right;    CARDIAC CATHETERIZATION N/A 6/2/2022    Procedure: Left Heart Cath;  Surgeon: Frank Giraldo MD;  Location: [facility] CATH INVASIVE LOCATION;  Service: Cardiovascular;  Laterality: N/A;    CARDIAC CATHETERIZATION N/A 6/2/2022    Procedure: Left ventriculography;  Surgeon: Frank Giraldo MD;  Location: [facility] CATH INVASIVE LOCATION;  Service: Cardiovascular;  Laterality: N/A;  "   CORONARY ARTERY BYPASS GRAFT N/A 6/10/2022    Procedure: CORONARY ARTERY BYPASS GRAFTING;  Surgeon: Benson Hughes MD;  Location: [facility];  Service: Cardiothoracic;  Laterality: N/A;       Past Medical History:   Diagnosis Date    Aneurysm     Back pain     Emphysema lung     Hypertension        History reviewed. No pertinent family history.    Social History     Socioeconomic History    Marital status:    Tobacco Use    Smoking status: Former     Types: Cigarettes     Quit date: 2021     Years since quittin.2    Smokeless tobacco: Never   Vaping Use    Vaping Use: Never used   Substance and Sexual Activity    Alcohol use: Yes     Comment: 3-4 beers daily    Drug use: Never    Sexual activity: Defer               Objective:       Physical Exam    BP (!) 87/56 (BP Location: Right arm, Patient Position: Sitting)   Pulse 73   Ht 177.8 cm (70\")   Wt 104 kg (229 lb 8 oz)   SpO2 95%   BMI 32.93 kg/m²   The patient is alert, oriented and in no distress.    Vital signs as noted above.    Head and neck revealed no carotid bruits or jugular venous distension.  No thyromegaly or lymphadenopathy is present.    Lungs clear.  No wheezing.  Breath sounds are normal bilaterally.    Heart normal first and second heart sounds.  No murmur..  No pericardial rub is present.  No gallop is present.    Abdomen soft and nontender.  No organomegaly is present.    Extremities revealed good peripheral pulses without any pedal edema.    Skin warm and dry.    Musculoskeletal system is grossly normal.    CNS grossly normal.           Diagnosis Plan   1. Coronary artery disease involving native coronary artery of native heart without angina pectoris        2. Primary hypertension        3. Paroxysmal atrial fibrillation        4. Aneurysm of ascending aorta without rupture        5. S/P CABG (coronary artery bypass graft)        6. Abnormal nuclear stress test        7. Chronic heart failure with preserved " ejection fraction (HFpEF)        8. Essential hypertension        9. Mixed hyperlipidemia        LAB RESULTS (LAST 7 DAYS)    CBC        BMP        CMP         BNP        TROPONIN        CoAg        Creatinine Clearance  CrCl cannot be calculated (Patient's most recent lab result is older than the maximum 30 days allowed.).    ABG        Radiology  No radiology results for the last day    EKG  Procedures    Stress test  Results for orders placed during the hospital encounter of 22    Stress Test With Myocardial Perfusion One Day    Interpretation Summary  · Left ventricular ejection fraction is mildly reduced. (Calculated EF = 45%).  · Myocardial perfusion imaging indicates a large-sized, severe area of ischemia located in the inferior wall and lateral wall.  · Impressions are consistent with a high risk study.      Echocardiogram  Results for orders placed during the hospital encounter of 22    Adult Transthoracic Echo Complete With Contrast if Necessary Per Protocol (With Agitated Saline)    Interpretation Summary  · Left ventricular ejection fraction appears to be 56 - 60%.  · The right ventricular cavity is mildly dilated.  · Mild dilation of the aortic root is present.  · No pericardial effusion noted      Cardiac catheterization  Results for orders placed during the hospital encounter of 22    Cardiac Catheterization/Vascular Study    Narrative  Heart Cath Report    NAME:              Chi Quinteros Sr.  :                1955  AGE/SEX:        67 y.o. male  MRN:                [id number]  ADM DATE:      [unfilled]  DOS:      Pre-Procedure Notes  H&P Performed  [x]  Yes []  No       []  N/A    Indications:  []  ACS <= 24 HRS  []  ACS >24 HRS  []  New Onset Angina <= 2 mos  []  Worsening Angina  []  Resuscitated Cardiac Arrest  []  Angina on Exertion:  []  Suspected CAD  []  Valvular Disease  []  Pericardial Disease  []  Cardiac Arrythmia  []  Cardiomyopathy  []  LV Dysfunction  [x]   Syncope  []  Post Cardiac Transplant  []  Eval. For Exercise Clearance  [x]  Abnormal Stress Test  []  Other  []  Pre-Operative Evaluation  If Pre-Op Eval:  Evaluation for Surgery Type:  []  Cardiac Surgery   []  Non-Cardiac Surgery  Functional Capacity:  []  <4 METS  []  >=4 METS w/o symptoms  []  >= 4 METS with symptoms  []  Unknown  Surgical Risk:  []  Low  []  Intermediate  []  High Risk: Vascular  []  High Risk Non-Vascular    Risks, Benefits, & Complications Discussed:  [x]  Yes  []  No  []  N/A    Questions Answered:  [x]  Yes  []  No  []  N/A    Consent Obtained:  [x]  Yes  []  No  []  N/A    CHF: [x]  Yes  []  No  If Yes:  Newly Diagnosed?  [x]  Yes  []  No  If Yes:  HF Type:  []  Diastolic  [x]  Systolic  []  Unknown      Procedure Description  The patient underwent successful [x]  Left  []  Right  []  Left & Right  Heart catheterization and coronary angiography via the  [x]  Femoral approach  []  Radial approach  []  Brachial approach    Procedure Narrative:  Informed consent was obtained from the patient after explaining risks and benefits.  Patient was brought to the cardiac catheterization laboratory and placed on the table in the usual fashion.  Right groin was shaved and prepped in sterile fashion. Moderate conscious sedation was given utilizing IV Versed and fentanyl administered by RN with continuous EKG oximetry and hemodynamic monitoring supervised by me throughout the entire case, conscious sedation time was 30 minutes.  I was present with the patient for the duration of moderate sedation and supervised staff who had no other duties and monitored the patient for the entire procedure patient had Regalado 2-3 sedation scale. 2% lidocaine was used for local anesthesia to the right groin.  A total of 20 cc was used.  A 6 [place] sheath was placed in the right femoral artery.  A 6 [place] pigtail catheter, 6 [place] JR4 catheter and a 6 [place] JL4 catheter was used for the procedure.  After the cardiac  catheterization is complete, all the catheters and sheath were removed.  Patient tolerated the procedure very well.  No complications noted.      Hemodynamic    LVEDP:8-12   Estimated EF %: 40%    Initial Aortic Pressure: 140/80    AV Gradient: No gradient    Rt. Heart Pressure:    Wall Motion:  Dominance:  []  Left  []  Right  [x]  Co-Dominant    Coronary Arteriography: (Please Code highest degree of stenosis)    Left Main %:   0  Proximal LAD %: 70%  Mid/Distal LAD %:  0.  Diagonal branch has an ostial 70%  LCX %: Subtotal occlusion with collaterals from the LAD  Ramus:  RCA %: 100% with collaterals from the left to the right  [place] %:  SVG(s) %:      Complications: No complications    Estimated Blood Loss:  None      Impression and Recommendation: Severe three-vessel coronary disease  Mild LV dysfunction  We will review films with surgeons for possible coronary bypass    Electronically signed by Frank Giraldo MD, 06/02/22, 7:03 PM EDT          Assessment and Plan       Diagnoses and all orders for this visit:    1. Coronary artery disease involving native coronary artery of native heart without angina pectoris (Primary)    2. Primary hypertension    3. Paroxysmal atrial fibrillation    4. Aneurysm of ascending aorta without rupture    5. S/P CABG (coronary artery bypass graft)    6. Abnormal nuclear stress test  Overview:  Added automatically from request for surgery [id number]      7. Chronic heart failure with preserved ejection fraction (HFpEF)    8. Essential hypertension    9. Mixed hyperlipidemia       1.  Coronary disease  Multivessel coronary disease status post CABG x4   LIMA to LAD, vein graft to diagonal, vein graft to obtuse marginal, vein graft to distal RCA  I started him on aspirin and high intensity statin.  Continue beta-blocker  Echocardiogram shows EF of 45% with global hypokinesis.  Grade 1 diastolic dysfunction and RVSP of 36 mmHg with mild mitral regurgitation.      2.  Hypertension  Was  previously on lisinopril and Coreg.  He is hypotensive.  I will decrease the dose of lisinopril to 10 mg p.o. daily.     3 hyperlipidemia  He was previously on pravastatin due to intolerance to high intensity statin.     4.  Atrial fibrillation  Postop A. fib with no recurrence.  ECG today is sinus rhythm.  I will discontinue his amiodarone.     5.  Aortic aneurysm  Patient also had ascending aortic aneurysm which was repaired with a 30 mm graft and is stable.  Will restart ACE inhibitor and beta-blocker.  Recent echocardiogram shows EF of 45%, no evidence of aortic root enlargement.     6.  Heart failure with preserved ejection fraction  Added ACE inhibitor, beta-blocker, Lasix.  Echocardiogram with EF of 45%  Discussed salt intake, water intake and daily weights     7.  Prostate cancer  Radiation therapy.     8.  Neuropathy  Continue gabapentin

## 2023-09-06 ENCOUNTER — OFFICE VISIT (OUTPATIENT)
Dept: CARDIOLOGY | Facility: CLINIC | Age: 68
End: 2023-09-06
Payer: MEDICARE

## 2023-09-06 VITALS
OXYGEN SATURATION: 95 % | BODY MASS INDEX: 32.86 KG/M2 | HEART RATE: 73 BPM | HEIGHT: 70 IN | SYSTOLIC BLOOD PRESSURE: 87 MMHG | DIASTOLIC BLOOD PRESSURE: 56 MMHG | WEIGHT: 229.5 LBS

## 2023-09-06 DIAGNOSIS — E78.2 MIXED HYPERLIPIDEMIA: ICD-10-CM

## 2023-09-06 DIAGNOSIS — I71.21 ANEURYSM OF ASCENDING AORTA WITHOUT RUPTURE: ICD-10-CM

## 2023-09-06 DIAGNOSIS — I10 ESSENTIAL HYPERTENSION: ICD-10-CM

## 2023-09-06 DIAGNOSIS — I48.0 PAROXYSMAL ATRIAL FIBRILLATION: ICD-10-CM

## 2023-09-06 DIAGNOSIS — I25.10 CORONARY ARTERY DISEASE INVOLVING NATIVE CORONARY ARTERY OF NATIVE HEART WITHOUT ANGINA PECTORIS: Primary | ICD-10-CM

## 2023-09-06 DIAGNOSIS — R94.39 ABNORMAL NUCLEAR STRESS TEST: ICD-10-CM

## 2023-09-06 DIAGNOSIS — I10 PRIMARY HYPERTENSION: ICD-10-CM

## 2023-09-06 DIAGNOSIS — Z95.1 S/P CABG (CORONARY ARTERY BYPASS GRAFT): ICD-10-CM

## 2023-09-06 DIAGNOSIS — I50.32 CHRONIC HEART FAILURE WITH PRESERVED EJECTION FRACTION (HFPEF): ICD-10-CM

## 2023-09-06 RX ORDER — ASPIRIN 81 MG/1
1 TABLET ORAL DAILY
COMMUNITY

## 2023-09-06 RX ORDER — AMIODARONE HYDROCHLORIDE 200 MG/1
1 TABLET ORAL 2 TIMES DAILY
COMMUNITY
End: 2023-09-06

## 2023-09-06 RX ORDER — LISINOPRIL 10 MG/1
10 TABLET ORAL DAILY
Qty: 90 TABLET | Refills: 3 | Status: SHIPPED | OUTPATIENT
Start: 2023-09-06

## 2023-09-06 RX ORDER — CARVEDILOL 3.12 MG/1
3.12 TABLET ORAL 2 TIMES DAILY WITH MEALS
COMMUNITY

## 2023-11-28 DIAGNOSIS — I50.32 CHRONIC DIASTOLIC (CONGESTIVE) HEART FAILURE: ICD-10-CM

## 2023-11-28 RX ORDER — CARVEDILOL 3.12 MG/1
3.12 TABLET ORAL 2 TIMES DAILY
Qty: 180 TABLET | Refills: 3 | Status: SHIPPED | OUTPATIENT
Start: 2023-11-28

## 2023-11-28 NOTE — TELEPHONE ENCOUNTER
Rx Refill Note  Requested Prescriptions     Pending Prescriptions Disp Refills    carvedilol (COREG) 3.125 MG tablet [Pharmacy Med Name: CARVEDILOL 3.125 MG TABLET] 180 tablet 3     Sig: TAKE 1 TABLET BY MOUTH TWICE A DAY      Last office visit with prescribing clinician: 9/6/2023   Last telemedicine visit with prescribing clinician: Visit date not found   Next office visit with prescribing clinician: Unable to see [place] appointments beforehand.                             Deysi Barksdale MA  11/28/23, 09:06 EST

## 2023-12-02 DIAGNOSIS — I50.32 CHRONIC HEART FAILURE WITH PRESERVED EJECTION FRACTION (HFPEF): ICD-10-CM

## 2023-12-04 DIAGNOSIS — I10 ESSENTIAL HYPERTENSION: ICD-10-CM

## 2023-12-04 DIAGNOSIS — I50.32 CHRONIC HEART FAILURE WITH PRESERVED EJECTION FRACTION (HFPEF): Primary | ICD-10-CM

## 2023-12-04 RX ORDER — FUROSEMIDE 40 MG/1
40 TABLET ORAL DAILY
Qty: 30 TABLET | Refills: 0 | Status: SHIPPED | OUTPATIENT
Start: 2023-12-04

## 2023-12-22 ENCOUNTER — TELEPHONE (OUTPATIENT)
Dept: CARDIOLOGY | Facility: CLINIC | Age: 68
End: 2023-12-22
Payer: MEDICARE

## 2023-12-22 NOTE — TELEPHONE ENCOUNTER
Caller: Chi Quinteros Sr.    Relationship: Self    Best call back number: [phone]    What is the best time to reach you: ANY    Who are you requesting to speak with (clinical staff, provider,  specific staff member): UNKNOWN    Do you know the name of the person who called: NO    What was the call regarding: SCHEDULE FOR CARDIAC CLEARANCE.     Is it okay if the provider responds through MyChart: CALL.     PT STATES HE MISSED A CALL FROM A MALE WHO WANTED TO SCHEDULE HIM TO SEE DR. SCHNEIDER FOR CARDIAC CLEARANCE AS HE IS HAVING SURGERY.

## 2023-12-22 NOTE — TELEPHONE ENCOUNTER
I SPOKE WITH PT TO LET HIM KNOW THAT IT WAS NOT ME THAT CONTACTED HIM TO SCHEDULE AN APPT WITH DR SCHNEIDER AT OUR [facility] OFFICE. PT GIVEN CONTACT INFO FOR THE [facility] OFFICE AND ADVISED TO CONTACT THEM TO SCHEDULE WITH DR SCHNEIDER. PT VOICED UNDERSTANDING.

## 2023-12-30 DIAGNOSIS — I50.32 CHRONIC HEART FAILURE WITH PRESERVED EJECTION FRACTION (HFPEF): ICD-10-CM

## 2024-01-02 RX ORDER — FUROSEMIDE 40 MG/1
40 TABLET ORAL DAILY
Qty: 30 TABLET | Refills: 0 | Status: SHIPPED | OUTPATIENT
Start: 2024-01-02

## 2024-01-03 ENCOUNTER — TELEPHONE (OUTPATIENT)
Dept: CARDIOLOGY | Facility: CLINIC | Age: 69
End: 2024-01-03
Payer: MEDICARE

## 2024-01-04 NOTE — PROGRESS NOTES
Encounter Date:01/10/2024        Patient ID: Chi Quinteros Sr. is a 68 y.o. male.      Chief Complaint:      History of Present Illness  68-year-old white male with history of coronary disease status post coronary bypass surgeryX4 June 2022, history of hypertension, hyperlipidemia, paroxysmal fibrillation and ascending aortic aneurysm presents to the cardiology office for follow-up.   CABG in June 2022 with LIMA to LAD, vein graft to diagonal, vein graft to obtuse marginal, vein graft to distal RCA and ascending aorta replacement with 30 mm graft.     Previously presented with low blood pressure when he was unsure of what medication he takes.  He was cutting some medications and to half the dose without knowing which ones.  Complains of shortness of breath and warm weather.  ECG shows normal sinus rhythm with occasional PVC.  ST-T wave abnormalities with anterolateral ischemia.  Heart rate 88, NY interval 150, , QTc 503 ms.  Unchanged when compared to previous ECG    He is here for preoperative cardiovascular risk assessment prior to undergoing spine surgery.  He is no longer taking lisinopril as he was becoming dizzy and hypotensive.    The following portions of the patient's history were reviewed and updated as appropriate: allergies, current medications, past family history, past medical history, past social history, past surgical history, and problem list.    Review of Systems   Constitutional: Positive for malaise/fatigue.   Cardiovascular:  Positive for dyspnea on exertion. Negative for chest pain, leg swelling and palpitations.   Respiratory:  Positive for shortness of breath. Negative for cough.    Gastrointestinal:  Negative for abdominal pain, nausea and vomiting.   Neurological:  Negative for dizziness, focal weakness, headaches, light-headedness and numbness.   All other systems reviewed and are negative.        Current Outpatient Medications:     acetaminophen (TYLENOL) 325 MG tablet, Take 2  tablets by mouth Every 4 (Four) Hours As Needed for Mild Pain ., Disp: , Rfl:     albuterol sulfate  (90 Base) MCG/ACT inhaler, Inhale 2 puffs Every 4 (Four) Hours As Needed for Wheezing., Disp: , Rfl:     aspirin 81 MG EC tablet, Take 1 tablet by mouth Daily., Disp: , Rfl:     Breztri Aerosphere 160-9-4.8 MCG/ACT aerosol inhaler, Inhale 2 puffs., Disp: , Rfl:     carvedilol (COREG) 3.125 MG tablet, TAKE 1 TABLET BY MOUTH TWICE A DAY, Disp: 180 tablet, Rfl: 3    finasteride (PROSCAR) 5 MG tablet, Take 1 tablet by mouth Daily., Disp: , Rfl:     furosemide (LASIX) 40 MG tablet, TAKE 1 TABLET BY MOUTH EVERY DAY, Disp: 30 tablet, Rfl: 0    gabapentin (NEURONTIN) 400 MG capsule, Take 1 capsule by mouth 2 (Two) Times a Day As Needed., Disp: , Rfl:     potassium chloride (K-DUR,KLOR-CON) 20 MEQ CR tablet, potassium chloride ER 20 mEq tablet,extended release  TAKE 1 TABLET BY MOUTH EVERY DAY, Disp: , Rfl:     Current outpatient and discharge medications have been reconciled for the patient.  Reviewed by: Jeff Kang MD       No Known Allergies    History reviewed. No pertinent family history.    Past Surgical History:   Procedure Laterality Date    ASCENDING AORTIC ANEURYSM REPAIR W/ MECHANICAL AORTIC VALVE REPLACEMENT N/A 6/10/2022    Procedure: AORTIC VALVE ASCENDING ANEURYSM REPAIR;  Surgeon: Benson Hughes MD;  Location: [facility];  Service: Cardiothoracic;  Laterality: N/A;    CARDIAC CATHETERIZATION Right 6/2/2022    Procedure: Coronary angiography;  Surgeon: Frank Giraldo MD;  Location: [facility] CATH INVASIVE LOCATION;  Service: Cardiovascular;  Laterality: Right;    CARDIAC CATHETERIZATION N/A 6/2/2022    Procedure: Left Heart Cath;  Surgeon: Frank Giraldo MD;  Location: [facility] CATH INVASIVE LOCATION;  Service: Cardiovascular;  Laterality: N/A;    CARDIAC CATHETERIZATION N/A 6/2/2022    Procedure: Left ventriculography;  Surgeon: Frank Giraldo MD;  Location: [facility] CATH INVASIVE LOCATION;   "Service: Cardiovascular;  Laterality: N/A;    CORONARY ARTERY BYPASS GRAFT N/A 6/10/2022    Procedure: CORONARY ARTERY BYPASS GRAFTING;  Surgeon: Benson Hughes MD;  Location: [facility];  Service: Cardiothoracic;  Laterality: N/A;       Past Medical History:   Diagnosis Date    Aneurysm     Back pain     Emphysema lung     Hypertension        History reviewed. No pertinent family history.    Social History     Socioeconomic History    Marital status:    Tobacco Use    Smoking status: Former     Types: Cigarettes     Quit date: 2021     Years since quittin.6    Smokeless tobacco: Never   Vaping Use    Vaping Use: Never used   Substance and Sexual Activity    Alcohol use: Yes     Comment: 3-4 beers daily    Drug use: Never    Sexual activity: Defer               Objective:       Physical Exam    /87 (BP Location: Left arm, Patient Position: Sitting)   Pulse 88   Ht 177.8 cm (70\")   Wt 103 kg (228 lb)   SpO2 96%   BMI 32.71 kg/m²   The patient is alert, oriented and in no distress.    Vital signs as noted above.    Head and neck revealed no carotid bruits or jugular venous distension.  No thyromegaly or lymphadenopathy is present.    Lungs clear.  No wheezing.  Breath sounds are normal bilaterally.    Heart normal first and second heart sounds.  No murmur..  No pericardial rub is present.  No gallop is present.    Abdomen soft and nontender.  No organomegaly is present.    Extremities revealed good peripheral pulses without any pedal edema.    Skin warm and dry.    Musculoskeletal system is grossly normal.    CNS grossly normal.           Diagnosis Plan   1. Chronic heart failure with preserved ejection fraction (HFpEF)        2. Essential hypertension        3. Chronic diastolic (congestive) heart failure        4. Coronary artery disease involving native coronary artery of native heart without angina pectoris        5. Primary hypertension        6. Paroxysmal atrial fibrillation  "       7. Aneurysm of ascending aorta without rupture        8. S/P CABG (coronary artery bypass graft)        9. Abnormal nuclear stress test        10. Mixed hyperlipidemia        11. Pre-operative cardiovascular examination        LAB RESULTS (LAST 7 DAYS)    CBC        BMP        CMP         BNP        TROPONIN        CoAg        Creatinine Clearance  CrCl cannot be calculated (Patient's most recent lab result is older than the maximum 30 days allowed.).    ABG        Radiology  No radiology results for the last day    EKG  Procedures    Stress test  Results for orders placed during the hospital encounter of 22    Stress Test With Myocardial Perfusion One Day    Interpretation Summary  · Left ventricular ejection fraction is mildly reduced. (Calculated EF = 45%).  · Myocardial perfusion imaging indicates a large-sized, severe area of ischemia located in the inferior wall and lateral wall.  · Impressions are consistent with a high risk study.      Echocardiogram  Results for orders placed during the hospital encounter of 22    Adult Transthoracic Echo Complete With Contrast if Necessary Per Protocol (With Agitated Saline)    Interpretation Summary  · Left ventricular ejection fraction appears to be 56 - 60%.  · The right ventricular cavity is mildly dilated.  · Mild dilation of the aortic root is present.  · No pericardial effusion noted      Cardiac catheterization  Results for orders placed during the hospital encounter of 22    Cardiac Catheterization/Vascular Study    Narrative  Heart Cath Report    NAME:              Chi Quinteros .  :                1955  AGE/SEX:        67 y.o. male  MRN:                [id number]  ADM DATE:      @@  DOS:      Pre-Procedure Notes  H&P Performed  [x]  Yes []  No       []  N/A    Indications:  []  ACS <= 24 HRS  []  ACS >24 HRS  []  New Onset Angina <= 2 mos  []  Worsening Angina  []  Resuscitated Cardiac Arrest  []  Angina on Exertion:  []   Suspected CAD  []  Valvular Disease  []  Pericardial Disease  []  Cardiac Arrythmia  []  Cardiomyopathy  []  LV Dysfunction  [x]  Syncope  []  Post Cardiac Transplant  []  Eval. For Exercise Clearance  [x]  Abnormal Stress Test  []  Other  []  Pre-Operative Evaluation  If Pre-Op Eval:  Evaluation for Surgery Type:  []  Cardiac Surgery   []  Non-Cardiac Surgery  Functional Capacity:  []  <4 METS  []  >=4 METS w/o symptoms  []  >= 4 METS with symptoms  []  Unknown  Surgical Risk:  []  Low  []  Intermediate  []  High Risk: Vascular  []  High Risk Non-Vascular    Risks, Benefits, & Complications Discussed:  [x]  Yes  []  No  []  N/A    Questions Answered:  [x]  Yes  []  No  []  N/A    Consent Obtained:  [x]  Yes  []  No  []  N/A    CHF: [x]  Yes  []  No  If Yes:  Newly Diagnosed?  [x]  Yes  []  No  If Yes:  HF Type:  []  Diastolic  [x]  Systolic  []  Unknown      Procedure Description  The patient underwent successful [x]  Left  []  Right  []  Left & Right  Heart catheterization and coronary angiography via the  [x]  Femoral approach  []  Radial approach  []  Brachial approach    Procedure Narrative:  Informed consent was obtained from the patient after explaining risks and benefits.  Patient was brought to the cardiac catheterization laboratory and placed on the table in the usual fashion.  Right groin was shaved and prepped in sterile fashion. Moderate conscious sedation was given utilizing IV Versed and fentanyl administered by RN with continuous EKG oximetry and hemodynamic monitoring supervised by me throughout the entire case, conscious sedation time was 30 minutes.  I was present with the patient for the duration of moderate sedation and supervised staff who had no other duties and monitored the patient for the entire procedure patient had Regalado 2-3 sedation scale. 2% lidocaine was used for local anesthesia to the right groin.  A total of 20 cc was used.  A 6 Latvian sheath was placed in the right femoral artery.   A 6 [place] pigtail catheter, 6 [place] JR4 catheter and a 6 [place] JL4 catheter was used for the procedure.  After the cardiac catheterization is complete, all the catheters and sheath were removed.  Patient tolerated the procedure very well.  No complications noted.      Hemodynamic    LVEDP:8-12   Estimated EF %: 40%    Initial Aortic Pressure: 140/80    AV Gradient: No gradient    Rt. Heart Pressure:    Wall Motion:  Dominance:  []  Left  []  Right  [x]  Co-Dominant    Coronary Arteriography: (Please Code highest degree of stenosis)    Left Main %:   0  Proximal LAD %: 70%  Mid/Distal LAD %:  0.  Diagonal branch has an ostial 70%  LCX %: Subtotal occlusion with collaterals from the LAD  Ramus:  RCA %: 100% with collaterals from the left to the right  Lima %:  SVG(s) %:      Complications: No complications    Estimated Blood Loss:  None      Impression and Recommendation: Severe three-vessel coronary disease  Mild LV dysfunction  We will review films with surgeons for possible coronary bypass    Electronically signed by Frank Giraldo MD, 06/02/22, 7:03 PM EDT          Assessment and Plan       Diagnoses and all orders for this visit:    1. Chronic heart failure with preserved ejection fraction (HFpEF) (Primary)    2. Essential hypertension    3. Chronic diastolic (congestive) heart failure    4. Coronary artery disease involving native coronary artery of native heart without angina pectoris    5. Primary hypertension    6. Paroxysmal atrial fibrillation    7. Aneurysm of ascending aorta without rupture    8. S/P CABG (coronary artery bypass graft)    9. Abnormal nuclear stress test  Overview:  Added automatically from request for surgery [id number]      10. Mixed hyperlipidemia    11. Pre-operative cardiovascular examination         1.  Coronary disease  Multivessel coronary disease status post CABG x4   LIMA to LAD, vein graft to diagonal, vein graft to obtuse marginal, vein graft to distal RCA  Continue aspirin and  beta-blocker  Unable to tolerate statin  Echocardiogram shows EF of 45% with global hypokinesis.  Grade 1 diastolic dysfunction and RVSP of 36 mmHg with mild mitral regurgitation.      2.  Hypertension  Previously hypotensive and lisinopril was discontinued and Coreg was cut into half  Now he is hypertensive  I will restart low-dose lisinopril today.     3 hyperlipidemia  Unable to tolerate statin     4.  Atrial fibrillation  Postop A. fib with no recurrence.  ECG today is sinus rhythm.  Amiodarone was previously discontinued     5.  Aortic aneurysm  Patient also had ascending aortic aneurysm which was repaired with a 30 mm graft and is stable.  Will restart ACE inhibitor and beta-blocker.  Recent echocardiogram shows EF of 45%, no evidence of aortic root enlargement.     6.  Heart failure with preserved ejection fraction  Added ACE inhibitor, beta-blocker, Lasix.  Echocardiogram with EF of 45%  Discussed salt intake, water intake and daily weights     7.  Prostate cancer  Radiation therapy.     8.  Neuropathy  Continue gabapentin    9.  Preop cardiovascular risk assessment    Lulu Perioperative Risk for Myocardial Infarction or Cardiac Arrest (ISADORA):  0.4% Risk of myocardial infarction or cardiac arrest, intraoperatively or up to 30 days post-op    Revised Cardiac Risk Index for Pre-Operative Risk: 2 point  10.1% 30-day risk of death, MI, or cardiac arrest     He may proceed with spine surgery with no further cardiac workup.  Continue perioperative beta-blocker  Preferably cardiac anesthesia during the procedure.

## 2024-01-10 ENCOUNTER — OFFICE VISIT (OUTPATIENT)
Dept: CARDIOLOGY | Facility: CLINIC | Age: 69
End: 2024-01-10
Payer: MEDICARE

## 2024-01-10 VITALS
HEART RATE: 88 BPM | BODY MASS INDEX: 32.64 KG/M2 | WEIGHT: 228 LBS | HEIGHT: 70 IN | DIASTOLIC BLOOD PRESSURE: 87 MMHG | OXYGEN SATURATION: 96 % | SYSTOLIC BLOOD PRESSURE: 144 MMHG

## 2024-01-10 DIAGNOSIS — R94.39 ABNORMAL NUCLEAR STRESS TEST: ICD-10-CM

## 2024-01-10 DIAGNOSIS — Z95.1 S/P CABG (CORONARY ARTERY BYPASS GRAFT): ICD-10-CM

## 2024-01-10 DIAGNOSIS — I50.32 CHRONIC DIASTOLIC (CONGESTIVE) HEART FAILURE: ICD-10-CM

## 2024-01-10 DIAGNOSIS — Z01.810 PRE-OPERATIVE CARDIOVASCULAR EXAMINATION: ICD-10-CM

## 2024-01-10 DIAGNOSIS — I71.21 ANEURYSM OF ASCENDING AORTA WITHOUT RUPTURE: ICD-10-CM

## 2024-01-10 DIAGNOSIS — I25.10 CORONARY ARTERY DISEASE INVOLVING NATIVE CORONARY ARTERY OF NATIVE HEART WITHOUT ANGINA PECTORIS: ICD-10-CM

## 2024-01-10 DIAGNOSIS — I48.0 PAROXYSMAL ATRIAL FIBRILLATION: ICD-10-CM

## 2024-01-10 DIAGNOSIS — I10 ESSENTIAL HYPERTENSION: ICD-10-CM

## 2024-01-10 DIAGNOSIS — E78.2 MIXED HYPERLIPIDEMIA: ICD-10-CM

## 2024-01-10 DIAGNOSIS — I10 PRIMARY HYPERTENSION: ICD-10-CM

## 2024-01-10 DIAGNOSIS — I50.32 CHRONIC HEART FAILURE WITH PRESERVED EJECTION FRACTION (HFPEF): Primary | ICD-10-CM

## 2024-01-10 RX ORDER — FINASTERIDE 5 MG/1
5 TABLET, FILM COATED ORAL DAILY
COMMUNITY

## 2024-01-10 RX ORDER — LISINOPRIL 5 MG/1
5 TABLET ORAL DAILY
Qty: 90 TABLET | Refills: 3 | Status: SHIPPED | OUTPATIENT
Start: 2024-01-10

## 2024-01-10 RX ORDER — BUDESONIDE, GLYCOPYRROLATE, AND FORMOTEROL FUMARATE 160; 9; 4.8 UG/1; UG/1; UG/1
2 AEROSOL, METERED RESPIRATORY (INHALATION)
COMMUNITY
Start: 2023-12-28

## 2024-01-10 RX ORDER — GABAPENTIN 400 MG/1
400 CAPSULE ORAL 2 TIMES DAILY PRN
COMMUNITY

## 2024-01-25 ENCOUNTER — TELEPHONE (OUTPATIENT)
Dept: CARDIOLOGY | Facility: CLINIC | Age: 69
End: 2024-01-25
Payer: MEDICARE

## 2024-01-25 DIAGNOSIS — I50.32 CHRONIC HEART FAILURE WITH PRESERVED EJECTION FRACTION (HFPEF): ICD-10-CM

## 2024-01-25 RX ORDER — FUROSEMIDE 40 MG/1
40 TABLET ORAL DAILY
Qty: 90 TABLET | Refills: 0 | Status: SHIPPED | OUTPATIENT
Start: 2024-01-25

## 2024-01-25 NOTE — TELEPHONE ENCOUNTER
Received fax from [facility] pharmacy for refill . Refilled patients prescription and sent electronic order 1/25/2024

## 2024-01-25 NOTE — TELEPHONE ENCOUNTER
Rx Refill Note  Requested Prescriptions     Pending Prescriptions Disp Refills    furosemide (LASIX) 40 MG tablet 90 tablet 0     Sig: Take 1 tablet by mouth Daily.      Last office visit with prescribing clinician: Dr. Kang 1/10/2024  Last telemedicine visit with prescribing clinician: Visit date not found   Next office visit with prescribing clinician: Unable to see future Elmo visits                        Would you like a call back once the refill request has been completed: [] Yes [] No    If the office needs to give you a call back, can they leave a voicemail: [] Yes [] No    Deysi Barksdale MA  01/25/24, 15:04 EST

## 2024-01-30 ENCOUNTER — TELEPHONE (OUTPATIENT)
Dept: CARDIOLOGY | Facility: CLINIC | Age: 69
End: 2024-01-30
Payer: MEDICARE

## 2024-01-30 NOTE — TELEPHONE ENCOUNTER
FACILITY: [facility]   LUIS Marcelo  PHONE:    FAX: [phone]  PROCEDURE: Tlif L5-S1 Left  SCHEDULED: 03/18/24  MEDS TO HOLD: ASA

## 2024-01-31 NOTE — TELEPHONE ENCOUNTER
Clearance note signed  by Dr. Kang and faxed back to [facility] to [phone].

## 2024-03-07 ENCOUNTER — HOSPITAL ENCOUNTER (OUTPATIENT)
Dept: CARDIOLOGY | Facility: HOSPITAL | Age: 69
Discharge: HOME | End: 2024-03-07
Payer: MEDICARE

## 2024-03-07 ENCOUNTER — LAB (OUTPATIENT)
Dept: LAB | Facility: HOSPITAL | Age: 69
End: 2024-03-07
Payer: MEDICARE

## 2024-03-07 ENCOUNTER — TRANSCRIBE ORDERS (OUTPATIENT)
Dept: ADMINISTRATIVE | Facility: HOSPITAL | Age: 69
End: 2024-03-07
Payer: MEDICARE

## 2024-03-07 DIAGNOSIS — Z01.818 OTHER SPECIFIED PRE-OPERATIVE EXAMINATION: ICD-10-CM

## 2024-03-07 DIAGNOSIS — Z01.818 OTHER SPECIFIED PRE-OPERATIVE EXAMINATION: Primary | ICD-10-CM

## 2024-03-07 LAB
ABO GROUP BLD: NORMAL
ANION GAP SERPL CALCULATED.3IONS-SCNC: 12 MMOL/L (ref 5–15)
APTT PPP: 26.3 SECONDS (ref 24–31)
BASOPHILS # BLD AUTO: 0 10*3/MM3 (ref 0–0.2)
BASOPHILS NFR BLD AUTO: 0.7 % (ref 0–1.5)
BLD GP AB SCN SERPL QL: NEGATIVE
BUN SERPL-MCNC: 10 MG/DL (ref 8–23)
BUN/CREAT SERPL: 10.1 (ref 7–25)
CALCIUM SPEC-SCNC: 9.4 MG/DL (ref 8.6–10.5)
CHLORIDE SERPL-SCNC: 102 MMOL/L (ref 98–107)
CO2 SERPL-SCNC: 30 MMOL/L (ref 22–29)
CREAT SERPL-MCNC: 0.99 MG/DL (ref 0.76–1.27)
DEPRECATED RDW RBC AUTO: 52.1 FL (ref 37–54)
EGFRCR SERPLBLD CKD-EPI 2021: 82.5 ML/MIN/1.73
EOSINOPHIL # BLD AUTO: 0 10*3/MM3 (ref 0–0.4)
EOSINOPHIL NFR BLD AUTO: 0.8 % (ref 0.3–6.2)
ERYTHROCYTE [DISTWIDTH] IN BLOOD BY AUTOMATED COUNT: 14.2 % (ref 12.3–15.4)
GLUCOSE SERPL-MCNC: 108 MG/DL (ref 65–99)
HCT VFR BLD AUTO: 40.9 % (ref 37.5–51)
HGB BLD-MCNC: 13.4 G/DL (ref 13–17.7)
INR PPP: 0.98 (ref 0.93–1.1)
LYMPHOCYTES # BLD AUTO: 1 10*3/MM3 (ref 0.7–3.1)
LYMPHOCYTES NFR BLD AUTO: 17.9 % (ref 19.6–45.3)
MCH RBC QN AUTO: 34.4 PG (ref 26.6–33)
MCHC RBC AUTO-ENTMCNC: 32.7 G/DL (ref 31.5–35.7)
MCV RBC AUTO: 105 FL (ref 79–97)
MONOCYTES # BLD AUTO: 0.4 10*3/MM3 (ref 0.1–0.9)
MONOCYTES NFR BLD AUTO: 7.4 % (ref 5–12)
NEUTROPHILS NFR BLD AUTO: 4 10*3/MM3 (ref 1.7–7)
NEUTROPHILS NFR BLD AUTO: 73.2 % (ref 42.7–76)
NRBC BLD AUTO-RTO: 0 /100 WBC (ref 0–0.2)
PLATELET # BLD AUTO: 213 10*3/MM3 (ref 140–450)
PMV BLD AUTO: 8.6 FL (ref 6–12)
POTASSIUM SERPL-SCNC: 4.4 MMOL/L (ref 3.5–5.2)
PROTHROMBIN TIME: 10.7 SECONDS (ref 9.6–11.7)
RBC # BLD AUTO: 3.89 10*6/MM3 (ref 4.14–5.8)
RH BLD: POSITIVE
SODIUM SERPL-SCNC: 144 MMOL/L (ref 136–145)
T&S EXPIRATION DATE: NORMAL
WBC NRBC COR # BLD AUTO: 5.5 10*3/MM3 (ref 3.4–10.8)

## 2024-03-07 PROCEDURE — 86901 BLOOD TYPING SEROLOGIC RH(D): CPT

## 2024-03-07 PROCEDURE — 86900 BLOOD TYPING SEROLOGIC ABO: CPT

## 2024-03-07 PROCEDURE — 36415 COLL VENOUS BLD VENIPUNCTURE: CPT

## 2024-03-07 PROCEDURE — 93005 ELECTROCARDIOGRAM TRACING: CPT | Performed by: ORTHOPAEDIC SURGERY

## 2024-03-07 PROCEDURE — 80048 BASIC METABOLIC PNL TOTAL CA: CPT

## 2024-03-07 PROCEDURE — 85730 THROMBOPLASTIN TIME PARTIAL: CPT

## 2024-03-07 PROCEDURE — 85025 COMPLETE CBC W/AUTO DIFF WBC: CPT

## 2024-03-07 PROCEDURE — 85610 PROTHROMBIN TIME: CPT

## 2024-03-07 PROCEDURE — 86850 RBC ANTIBODY SCREEN: CPT

## 2024-03-09 LAB
QT INTERVAL: 430 MS
QTC INTERVAL: 514 MS

## 2024-03-24 NOTE — PROGRESS NOTES
Male Referring Provider: Specialist    Reason for follow-up: Syncope     Patient Care Team:  Deysi Mason APRN as PCP - General (Nurse Practitioner)  Eliseo Casarez MD as Consulting Physician (Nephrology)    Subjective .  Patient doing well without any symptoms    Objective  Lying in bed comfortably     Review of Systems   Constitutional: Negative for fever and malaise/fatigue.   Cardiovascular: Negative for chest pain, dyspnea on exertion and palpitations.   Respiratory: Negative for cough and shortness of breath.    Skin: Negative for rash.   Gastrointestinal: Negative for abdominal pain, nausea and vomiting.   Neurological: Negative for focal weakness and headaches.   All other systems reviewed and are negative.      Patient has no known allergies.    Scheduled Meds:[MAR Hold] Acetylcysteine, 600 mg, Oral, Q8H  amLODIPine, 5 mg, Oral, Daily  atorvastatin, 10 mg, Oral, Daily  budesonide-formoterol, 2 puff, Inhalation, BID - RT   And  ipratropium, 0.5 mg, Nebulization, Q6H - RT  enoxaparin, 1 mg/kg, Subcutaneous, Q12H  gabapentin, 600 mg, Oral, BID  guaiFENesin, 1,200 mg, Oral, Q12H  ipratropium-albuterol, 3 mL, Nebulization, Q4H - RT  lisinopril, 40 mg, Oral, Daily  predniSONE, 40 mg, Oral, Daily With Breakfast  sodium chloride, 10 mL, Intravenous, Q12H  [START ON 6/3/2022] tamsulosin, 0.4 mg, Oral, Daily      Continuous Infusions:Pharmacy to Dose enoxaparin (LOVENOX),   Pharmacy to dose Trelegy,   sodium chloride, 75 mL/hr, Last Rate: 75 mL/hr (06/02/22 1231)  sodium chloride, 75 mL/hr      PRN Meds:.•  acetaminophen **OR** acetaminophen **OR** acetaminophen  •  aluminum-magnesium hydroxide-simethicone  •  atropine  •  benzonatate  •  hydrALAZINE  •  ipratropium-albuterol  •  LORazepam **OR** LORazepam **OR** LORazepam **OR** LORazepam **OR** LORazepam **OR** LORazepam  •  LORazepam **OR** LORazepam **OR** LORazepam **OR** LORazepam **OR** LORazepam **OR** LORazepam  •  magnesium sulfate **OR** magnesium  "sulfate **OR** magnesium sulfate  •  melatonin  •  nitroglycerin  •  ondansetron **OR** ondansetron  •  Pharmacy to Dose enoxaparin (LOVENOX)  •  Pharmacy to dose Trelegy  •  potassium chloride **OR** potassium chloride **OR** potassium chloride  •  sodium chloride  •  sodium chloride  •  sodium chloride        VITAL SIGNS  Vitals:    06/02/22 1945 06/02/22 2000 06/02/22 2015 06/02/22 2030   BP: 148/80 156/85 177/98 164/91   BP Location:       Patient Position:       Pulse: 77 82 88 77   Resp:       Temp:       TempSrc:       SpO2:  96% 94%    Weight:       Height:           Flowsheet Rows    Flowsheet Row First Filed Value   Admission Height 177.8 cm (70\") Documented at 05/31/2022 2052   Admission Weight 104 kg (230 lb) Documented at 05/31/2022 2033           TELEMETRY: Sinus rhythm    Physical Exam:  Constitutional:       Appearance: Well-developed.   Eyes:      General: No scleral icterus.     Conjunctiva/sclera: Conjunctivae normal.   HENT:      Head: Normocephalic and atraumatic.   Neck:      Vascular: No carotid bruit or JVD.   Pulmonary:      Effort: Pulmonary effort is normal.      Breath sounds: Normal breath sounds. No wheezing. No rales.   Cardiovascular:      Normal rate. Regular rhythm.   Pulses:     Intact distal pulses.   Abdominal:      General: Bowel sounds are normal.      Palpations: Abdomen is soft.   Musculoskeletal:      Cervical back: Normal range of motion and neck supple. Skin:     General: Skin is warm and dry.      Findings: No rash.   Neurological:      Mental Status: Alert.          Results Review:   I reviewed the patient's new clinical results.  Lab Results (last 24 hours)     Procedure Component Value Units Date/Time    Comprehensive Metabolic Panel [[id number]]  (Abnormal) Collected: 06/02/22 0500    Specimen: Blood Updated: 06/02/22 0609     Glucose 159 mg/dL      BUN 13 mg/dL      Creatinine 0.86 mg/dL      Sodium 138 mmol/L      Potassium 4.5 mmol/L      Chloride 103 mmol/L      " CO2 23.0 mmol/L      Calcium 8.3 mg/dL      Total Protein 6.5 g/dL      Albumin 3.50 g/dL      ALT (SGPT) 26 U/L      AST (SGOT) 22 U/L      Alkaline Phosphatase 55 U/L      Total Bilirubin 0.2 mg/dL      Globulin 3.0 gm/dL      A/G Ratio 1.2 g/dL      BUN/Creatinine Ratio 15.1     Anion Gap 12.0 mmol/L      eGFR 94.9 mL/min/1.73      Comment: National Kidney Foundation and American Society of Nephrology (ASN) Task Force recommended calculation based on the Chronic Kidney Disease Epidemiology Collaboration (CKD-EPI) equation refit without adjustment for race.       Narrative:      GFR Normal >60  Chronic Kidney Disease <60  Kidney Failure <15      Magnesium [[id number]]  (Normal) Collected: 06/02/22 0500    Specimen: Blood Updated: 06/02/22 0609     Magnesium 2.1 mg/dL     Protime-INR [[id number]]  (Normal) Collected: 06/02/22 0500    Specimen: Blood Updated: 06/02/22 0542     Protime 10.3 Seconds      INR 1.00    CBC (No Diff) [[id number]]  (Abnormal) Collected: 06/02/22 0500    Specimen: Blood Updated: 06/02/22 0531     WBC 6.80 10*3/mm3      RBC 3.97 10*6/mm3      Hemoglobin 12.9 g/dL      Hematocrit 39.3 %      MCV 99.0 fL      MCH 32.5 pg      MCHC 32.8 g/dL      RDW 13.9 %      RDW-SD 48.6 fl      MPV 8.0 fL      Platelets 161 10*3/mm3           Imaging Results (Last 24 Hours)     ** No results found for the last 24 hours. **          EKG      I personally viewed and interpreted the patient's EKG/Telemetry data:    ECHOCARDIOGRAM:    STRESS MYOVIEW:    CARDIAC CATHETERIZATION:    OTHER:         Assessment & Plan     Active Problems:    Abnormal nuclear stress test    COPD exacerbation (HCC)    Obesity (BMI 30-39.9)    Acute renal insufficiency    Alcohol dependence (HCC)    Essential hypertension    Other emphysema (HCC)    Coronary artery disease       Patient presented after a syncopal episode and is ruled out for MI by get enzymes  Patient had a stress Myoview study which is abnormal  Patient underwent a  cardiac catheterization today which showed severe three-vessel coronary disease  Will review films with surgeons for possible coronary bypass surgery  Blood pressure heart rate stable  Patient's renal function has been abnormal at admission but is now better and is followed by nephrologist      I discussed the patients findings and my recommendations with patient and nurse    Frank Giraldo MD  06/02/22  21:09 EDT

## 2024-04-02 DIAGNOSIS — I10 ESSENTIAL HYPERTENSION: ICD-10-CM

## 2024-04-02 DIAGNOSIS — I50.32 CHRONIC HEART FAILURE WITH PRESERVED EJECTION FRACTION (HFPEF): ICD-10-CM

## 2024-04-02 DIAGNOSIS — I50.32 CHRONIC DIASTOLIC (CONGESTIVE) HEART FAILURE: ICD-10-CM

## 2024-04-04 RX ORDER — FUROSEMIDE 40 MG/1
40 TABLET ORAL DAILY
Qty: 90 TABLET | Refills: 3 | Status: SHIPPED | OUTPATIENT
Start: 2024-04-04

## 2024-04-04 RX ORDER — POTASSIUM CHLORIDE 1500 MG/1
20 TABLET, EXTENDED RELEASE ORAL DAILY
Qty: 90 TABLET | Refills: 3 | Status: SHIPPED | OUTPATIENT
Start: 2024-04-04

## 2024-04-04 RX ORDER — LISINOPRIL 5 MG/1
5 TABLET ORAL DAILY
Qty: 90 TABLET | Refills: 3 | Status: SHIPPED | OUTPATIENT
Start: 2024-04-04

## 2024-04-04 RX ORDER — CARVEDILOL 3.12 MG/1
3.12 TABLET ORAL 2 TIMES DAILY
Qty: 180 TABLET | Refills: 3 | Status: SHIPPED | OUTPATIENT
Start: 2024-04-04

## 2024-04-04 NOTE — TELEPHONE ENCOUNTER
Rx Refill Note  Requested Prescriptions     Pending Prescriptions Disp Refills    potassium chloride ER (K-TAB) 20 MEQ tablet controlled-release ER tablet [Pharmacy Med Name: POTASSIUM CL ER 20 MEQ TABLET] 90 tablet 3     Sig: TAKE 1 TABLET BY MOUTH EVERY DAY    furosemide (LASIX) 40 MG tablet 90 tablet 3     Sig: Take 1 tablet by mouth Daily.    lisinopril (PRINIVIL,ZESTRIL) 5 MG tablet 90 tablet 3     Sig: Take 1 tablet by mouth Daily.    carvedilol (COREG) 3.125 MG tablet 180 tablet 3     Sig: Take 1 tablet by mouth 2 (Two) Times a Day.      Last office visit with prescribing clinician: 1/10/2024   Last telemedicine visit with prescribing clinician: Visit date not found   Next office visit with prescribing clinician: Visit date not found                         Would you like a call back once the refill request has been completed: [] Yes [] No    If the office needs to give you a call back, can they leave a voicemail: [] Yes [] No    Bela Watts MA  04/04/24, 11:58 EDTRx Refill Note  Requested Prescriptions     Pending Prescriptions Disp Refills    potassium chloride ER (K-TAB) 20 MEQ tablet controlled-release ER tablet [Pharmacy Med Name: POTASSIUM CL ER 20 MEQ TABLET] 90 tablet 3     Sig: TAKE 1 TABLET BY MOUTH EVERY DAY      Last office visit with prescribing clinician: 1/10/2024   Last telemedicine visit with prescribing clinician: Visit date not found   Next office visit with prescribing clinician: Visit date not found                         Would you like a call back once the refill request has been completed: [] Yes [] No    If the office needs to give you a call back, can they leave a voicemail: [] Yes [] No    Bela Watts MA  04/04/24, 11:52 EDT

## 2024-06-23 NOTE — PROGRESS NOTES
Encounter Date:06/26/2024        Patient ID: Chi Quinteros Sr. is a 69 y.o. male.      Chief Complaint:      History of Present Illness  69-year-old  male with history of coronary disease status post coronary bypass surgeryX4 June 2022, history of hypertension, hyperlipidemia, paroxysmal fibrillation and ascending aortic aneurysm presents to the cardiology office for follow-up.   CABG in June 2022 with LIMA to LAD, vein graft to diagonal, vein graft to obtuse marginal, vein graft to distal RCA and ascending aorta replacement with 30 mm graft.     Complains of shortness of breath in warm weather.  ECG shows normal sinus rhythm with occasional PVC.  ST-T wave abnormalities with anterolateral ischemia.  Heart rate 88, AK interval 150, , QTc 503 ms.  Unchanged when compared to previous ECG     Current cardiac medications include aspirin, Coreg, furosemide and lisinopril.    He had spine surgery however continues to have significant back pain and is now able to ambulate much    The following portions of the patient's history were reviewed and updated as appropriate: allergies, current medications, past family history, past medical history, past social history, past surgical history, and problem list.    Review of Systems   Constitutional: Negative for malaise/fatigue.   Cardiovascular:  Positive for dyspnea on exertion. Negative for chest pain, leg swelling and palpitations.   Respiratory:  Positive for shortness of breath. Negative for cough.    Gastrointestinal:  Negative for abdominal pain, nausea and vomiting.   Neurological:  Negative for dizziness, focal weakness, headaches, light-headedness and numbness.   All other systems reviewed and are negative.        Current Outpatient Medications:     acetaminophen (TYLENOL) 325 MG tablet, Take 2 tablets by mouth Every 4 (Four) Hours As Needed for Mild Pain ., Disp: , Rfl:     albuterol sulfate  (90 Base) MCG/ACT inhaler, Inhale 2 puffs Every 4 (Four) Hours  As Needed for Wheezing., Disp: , Rfl:     aspirin 81 MG EC tablet, Take 1 tablet by mouth Daily., Disp: , Rfl:     Breztri Aerosphere 160-9-4.8 MCG/ACT aerosol inhaler, Inhale 2 puffs., Disp: , Rfl:     carvedilol (COREG) 3.125 MG tablet, Take 1 tablet by mouth 2 (Two) Times a Day., Disp: 180 tablet, Rfl: 3    furosemide (LASIX) 40 MG tablet, Take 1 tablet by mouth Daily., Disp: 90 tablet, Rfl: 3    gabapentin (NEURONTIN) 400 MG capsule, Take 1 capsule by mouth 2 (Two) Times a Day As Needed., Disp: , Rfl:     lisinopril (PRINIVIL,ZESTRIL) 5 MG tablet, Take 1 tablet by mouth Daily., Disp: 90 tablet, Rfl: 3    potassium chloride ER (K-TAB) 20 MEQ tablet controlled-release ER tablet, TAKE 1 TABLET BY MOUTH EVERY DAY, Disp: 90 tablet, Rfl: 3    Current outpatient and discharge medications have been reconciled for the patient.  Reviewed by: Jeff Kang MD       No Known Allergies    Family History   Problem Relation Age of Onset    Alzheimer's disease Mother     Diabetes Mother     Bone cancer Father     No Known Problems Sister     No Known Problems Brother     No Known Problems Brother        Past Surgical History:   Procedure Laterality Date    ASCENDING AORTIC ANEURYSM REPAIR W/ MECHANICAL AORTIC VALVE REPLACEMENT N/A 6/10/2022    Procedure: AORTIC VALVE ASCENDING ANEURYSM REPAIR;  Surgeon: Benson uHghes MD;  Location: [facility];  Service: Cardiothoracic;  Laterality: N/A;    CARDIAC CATHETERIZATION Right 6/2/2022    Procedure: Coronary angiography;  Surgeon: Frank Giraldo MD;  Location: [facility] CATH INVASIVE LOCATION;  Service: Cardiovascular;  Laterality: Right;    CARDIAC CATHETERIZATION N/A 6/2/2022    Procedure: Left Heart Cath;  Surgeon: Frank Giraldo MD;  Location: [facility] CATH INVASIVE LOCATION;  Service: Cardiovascular;  Laterality: N/A;    CARDIAC CATHETERIZATION N/A 6/2/2022    Procedure: Left ventriculography;  Surgeon: Frank Giraldo MD;  Location: [facility] CATH INVASIVE LOCATION;  Service:  "Cardiovascular;  Laterality: N/A;    CORONARY ARTERY BYPASS GRAFT N/A 6/10/2022    Procedure: CORONARY ARTERY BYPASS GRAFTING;  Surgeon: Benson Hughes MD;  Location: [facility];  Service: Cardiothoracic;  Laterality: N/A;       Past Medical History:   Diagnosis Date    Aneurysm     Back pain     Emphysema lung     Hypertension        Family History   Problem Relation Age of Onset    Alzheimer's disease Mother     Diabetes Mother     Bone cancer Father     No Known Problems Sister     No Known Problems Brother     No Known Problems Brother        Social History     Socioeconomic History    Marital status:    Tobacco Use    Smoking status: Former     Current packs/day: 0.00     Types: Cigarettes     Quit date: 6/1/2021     Years since quitting: 3.0     Passive exposure: Past    Smokeless tobacco: Never   Vaping Use    Vaping status: Never Used   Substance and Sexual Activity    Alcohol use: Yes     Comment: 3-4 beers daily    Drug use: Never    Sexual activity: Defer               Objective:       Physical Exam    /72 (BP Location: Left arm, Patient Position: Sitting, Cuff Size: Adult)   Pulse 82   Ht 177.8 cm (70\")   Wt 109 kg (240 lb)   SpO2 94%   BMI 34.44 kg/m²   The patient is alert, oriented and in no distress.    Vital signs as noted above.    Head and neck revealed no carotid bruits or jugular venous distension.  No thyromegaly or lymphadenopathy is present.    Lungs clear.  No wheezing.  Breath sounds are normal bilaterally.    Heart normal first and second heart sounds.  No murmur..  No pericardial rub is present.  No gallop is present.    Abdomen soft and nontender.  No organomegaly is present.    Extremities revealed good peripheral pulses without any pedal edema.    Skin warm and dry.    Musculoskeletal system is grossly normal.    CNS grossly normal.           Diagnosis Plan   1. Chronic heart failure with preserved ejection fraction (HFpEF)        2. Essential hypertension   "      3. Chronic diastolic (congestive) heart failure        4. Other specified pre-operative examination        5. Coronary artery disease involving native coronary artery of native heart without angina pectoris        6. Primary hypertension        7. Paroxysmal atrial fibrillation        8. Aneurysm of ascending aorta without rupture        9. S/P CABG (coronary artery bypass graft)        10. Abnormal nuclear stress test        11. Mixed hyperlipidemia        12. Pre-operative cardiovascular examination        LAB RESULTS (LAST 7 DAYS)    CBC        BMP        CMP         BNP        TROPONIN        CoAg        Creatinine Clearance  CrCl cannot be calculated (Patient's most recent lab result is older than the maximum 30 days allowed.).    ABG        Radiology  No radiology results for the last day    EKG  Procedures    Stress test  Results for orders placed during the hospital encounter of 22    Stress Test With Myocardial Perfusion One Day    Interpretation Summary  · Left ventricular ejection fraction is mildly reduced. (Calculated EF = 45%).  · Myocardial perfusion imaging indicates a large-sized, severe area of ischemia located in the inferior wall and lateral wall.  · Impressions are consistent with a high risk study.      Echocardiogram  Results for orders placed during the hospital encounter of 22    Adult Transthoracic Echo Complete With Contrast if Necessary Per Protocol (With Agitated Saline)    Interpretation Summary  · Left ventricular ejection fraction appears to be 56 - 60%.  · The right ventricular cavity is mildly dilated.  · Mild dilation of the aortic root is present.  · No pericardial effusion noted      Cardiac catheterization  Results for orders placed during the hospital encounter of 22    Cardiac Catheterization/Vascular Study    Narrative  Heart Cath Report    NAME:              Chi Quinteros Sr.  :                1955  AGE/SEX:        67 y.o. male  MRN:                 [id number]  [facility] DATE:      [unfilled]  DOS:      Pre-Procedure Notes  H&P Performed  [x]  Yes []  No       []  N/A    Indications:  []  ACS <= 24 HRS  []  ACS >24 HRS  []  New Onset Angina <= 2 mos  []  Worsening Angina  []  Resuscitated Cardiac Arrest  []  Angina on Exertion:  []  Suspected CAD  []  Valvular Disease  []  Pericardial Disease  []  Cardiac Arrythmia  []  Cardiomyopathy  []  LV Dysfunction  [x]  Syncope  []  Post Cardiac Transplant  []  Eval. For Exercise Clearance  [x]  Abnormal Stress Test  []  Other  []  Pre-Operative Evaluation  If Pre-Op Eval:  Evaluation for Surgery Type:  []  Cardiac Surgery   []  Non-Cardiac Surgery  Functional Capacity:  []  <4 METS  []  >=4 METS w/o symptoms  []  >= 4 METS with symptoms  []  Unknown  Surgical Risk:  []  Low  []  Intermediate  []  High Risk: Vascular  []  High Risk Non-Vascular    Risks, Benefits, & Complications Discussed:  [x]  Yes  []  No  []  N/A    Questions Answered:  [x]  Yes  []  No  []  N/A    Consent Obtained:  [x]  Yes  []  No  []  N/A    CHF: [x]  Yes  []  No  If Yes:  Newly Diagnosed?  [x]  Yes  []  No  If Yes:  HF Type:  []  Diastolic  [x]  Systolic  []  Unknown      Procedure Description  The patient underwent successful [x]  Left  []  Right  []  Left & Right  Heart catheterization and coronary angiography via the  [x]  Femoral approach  []  Radial approach  []  Brachial approach    Procedure Narrative:  Informed consent was obtained from the patient after explaining risks and benefits.  Patient was brought to the cardiac catheterization laboratory and placed on the table in the usual fashion.  Right groin was shaved and prepped in sterile fashion. Moderate conscious sedation was given utilizing IV Versed and fentanyl administered by RN with continuous EKG oximetry and hemodynamic monitoring supervised by me throughout the entire case, conscious sedation time was 30 minutes.  I was present with the patient for the duration of moderate  sedation and supervised staff who had no other duties and monitored the patient for the entire procedure patient had Regalado 2-3 sedation scale. 2% lidocaine was used for local anesthesia to the right groin.  A total of 20 cc was used.  A 6 Dominican sheath was placed in the right femoral artery.  A 6 Dominican pigtail catheter, 6 Dominican JR4 catheter and a 6 Dominican JL4 catheter was used for the procedure.  After the cardiac catheterization is complete, all the catheters and sheath were removed.  Patient tolerated the procedure very well.  No complications noted.      Hemodynamic    LVEDP:8-12   Estimated EF %: 40%    Initial Aortic Pressure: 140/80    AV Gradient: No gradient    Rt. Heart Pressure:    Wall Motion:  Dominance:  []  Left  []  Right  [x]  Co-Dominant    Coronary Arteriography: (Please Code highest degree of stenosis)    Left Main %:   0  Proximal LAD %: 70%  Mid/Distal LAD %:  0.  Diagonal branch has an ostial 70%  LCX %: Subtotal occlusion with collaterals from the LAD  Ramus:  RCA %: 100% with collaterals from the left to the right  Lima %:  SVG(s) %:      Complications: No complications    Estimated Blood Loss:  None      Impression and Recommendation: Severe three-vessel coronary disease  Mild LV dysfunction  We will review films with surgeons for possible coronary bypass    Electronically signed by Frank Giraldo MD, 06/02/22, 7:03 PM EDT          Assessment and Plan       Diagnoses and all orders for this visit:    1. Chronic heart failure with preserved ejection fraction (HFpEF) (Primary)    2. Essential hypertension    3. Chronic diastolic (congestive) heart failure    4. Other specified pre-operative examination    5. Coronary artery disease involving native coronary artery of native heart without angina pectoris    6. Primary hypertension    7. Paroxysmal atrial fibrillation    8. Aneurysm of ascending aorta without rupture    9. S/P CABG (coronary artery bypass graft)    10. Abnormal nuclear stress  test  Overview:  Added automatically from request for surgery [id number]      11. Mixed hyperlipidemia    12. Pre-operative cardiovascular examination         Coronary artery disease  Multivessel coronary disease status post CABG x4   LIMA to LAD, vein graft to diagonal, vein graft to obtuse marginal, vein graft to distal RCA  Continue aspirin and beta-blocker  Unable to tolerate statin  Echocardiogram shows EF of 45% with global hypokinesis.  Grade 1 diastolic dysfunction and RVSP of 36 mmHg with mild mitral regurgitation.      Hypertension  Well-controlled on lisinopril and Coreg     Hyperlipidemia  Unable to tolerate statin     Atrial fibrillation  Postop paroxysmal A. fib with no recurrence.  [id number] score is 4  He has maintained sinus rhythm since then  Amiodarone was previously discontinued     Aortic aneurysm  Patient also had ascending aortic aneurysm which was repaired with a 30 mm graft and is stable.  Will restart ACE inhibitor and beta-blocker.  Recent echocardiogram shows EF of 45%, no evidence of aortic root enlargement.     Heart failure with preserved ejection fraction  Added ACE inhibitor, beta-blocker, Lasix.  Echocardiogram with EF of 45%  Discussed salt intake, water intake and daily weights     Prostate cancer  Radiation therapy.     Neuropathy  Continue gabapentin    Obese  BMI 34.4.  He weighs 240 pounds.  Lifestyle modifications weight loss, dietary changes discussed with the patient.     Preop cardiovascular risk assessment     Lawson Perioperative Risk for Myocardial Infarction or Cardiac Arrest (ISADORA):  0.4% Risk of myocardial infarction or cardiac arrest, intraoperatively or up to 30 days post-op     Revised Cardiac Risk Index for Pre-Operative Risk: 2 point  10.1% 30-day risk of death, MI, or cardiac arrest      He completed spine surgery however needs a repeat CT scan due to continued pain

## 2024-06-26 ENCOUNTER — OFFICE VISIT (OUTPATIENT)
Dept: CARDIOLOGY | Facility: CLINIC | Age: 69
End: 2024-06-26
Payer: MEDICARE

## 2024-06-26 VITALS
OXYGEN SATURATION: 94 % | HEIGHT: 70 IN | HEART RATE: 82 BPM | BODY MASS INDEX: 34.36 KG/M2 | DIASTOLIC BLOOD PRESSURE: 72 MMHG | WEIGHT: 240 LBS | SYSTOLIC BLOOD PRESSURE: 132 MMHG

## 2024-06-26 DIAGNOSIS — I10 ESSENTIAL HYPERTENSION: ICD-10-CM

## 2024-06-26 DIAGNOSIS — Z95.1 S/P CABG (CORONARY ARTERY BYPASS GRAFT): ICD-10-CM

## 2024-06-26 DIAGNOSIS — I48.0 PAROXYSMAL ATRIAL FIBRILLATION: ICD-10-CM

## 2024-06-26 DIAGNOSIS — Z01.810 PRE-OPERATIVE CARDIOVASCULAR EXAMINATION: ICD-10-CM

## 2024-06-26 DIAGNOSIS — I71.21 ANEURYSM OF ASCENDING AORTA WITHOUT RUPTURE: ICD-10-CM

## 2024-06-26 DIAGNOSIS — I25.10 CORONARY ARTERY DISEASE INVOLVING NATIVE CORONARY ARTERY OF NATIVE HEART WITHOUT ANGINA PECTORIS: ICD-10-CM

## 2024-06-26 DIAGNOSIS — R94.39 ABNORMAL NUCLEAR STRESS TEST: ICD-10-CM

## 2024-06-26 DIAGNOSIS — I10 PRIMARY HYPERTENSION: ICD-10-CM

## 2024-06-26 DIAGNOSIS — E78.2 MIXED HYPERLIPIDEMIA: ICD-10-CM

## 2024-06-26 DIAGNOSIS — I50.32 CHRONIC HEART FAILURE WITH PRESERVED EJECTION FRACTION (HFPEF): Primary | ICD-10-CM

## 2024-06-26 DIAGNOSIS — Z01.818 OTHER SPECIFIED PRE-OPERATIVE EXAMINATION: ICD-10-CM

## 2024-06-26 DIAGNOSIS — I50.32 CHRONIC DIASTOLIC (CONGESTIVE) HEART FAILURE: ICD-10-CM

## 2024-06-26 PROCEDURE — 99214 OFFICE O/P EST MOD 30 MIN: CPT | Performed by: INTERNAL MEDICINE

## 2024-06-26 PROCEDURE — 3075F SYST BP GE 130 - 139MM HG: CPT | Performed by: INTERNAL MEDICINE

## 2024-06-26 PROCEDURE — 3078F DIAST BP <80 MM HG: CPT | Performed by: INTERNAL MEDICINE

## 2024-06-26 PROCEDURE — 1159F MED LIST DOCD IN RCRD: CPT | Performed by: INTERNAL MEDICINE

## 2024-06-26 PROCEDURE — 1160F RVW MEDS BY RX/DR IN RCRD: CPT | Performed by: INTERNAL MEDICINE

## 2024-07-12 DIAGNOSIS — I71.02 DISSECTION OF ABDOMINAL AORTA: Primary | ICD-10-CM

## 2024-07-12 DIAGNOSIS — I71.43 INFRARENAL ABDOMINAL AORTIC ANEURYSM (AAA) WITHOUT RUPTURE: ICD-10-CM

## 2024-08-22 ENCOUNTER — TRANSCRIBE ORDERS (OUTPATIENT)
Dept: ADMINISTRATIVE | Facility: HOSPITAL | Age: 69
End: 2024-08-22
Payer: MEDICARE

## 2024-08-22 DIAGNOSIS — M46.1 INFLAMMATION OF BOTH SACROILIAC JOINTS: Primary | ICD-10-CM

## 2024-09-10 ENCOUNTER — HOSPITAL ENCOUNTER (OUTPATIENT)
Dept: CT IMAGING | Facility: HOSPITAL | Age: 69
Discharge: HOME | End: 2024-09-10
Payer: MEDICARE

## 2024-09-10 DIAGNOSIS — I71.02 DISSECTION OF ABDOMINAL AORTA: ICD-10-CM

## 2024-09-10 DIAGNOSIS — M46.1 INFLAMMATION OF BOTH SACROILIAC JOINTS: ICD-10-CM

## 2024-09-10 DIAGNOSIS — I71.43 INFRARENAL ABDOMINAL AORTIC ANEURYSM (AAA) WITHOUT RUPTURE: ICD-10-CM

## 2024-09-10 PROCEDURE — 25510000001 IOPAMIDOL PER 1 ML: Performed by: NURSE PRACTITIONER

## 2024-09-10 PROCEDURE — 74174 CTA ABD&PLVS W/CONTRAST: CPT

## 2024-09-10 RX ORDER — METHYLPREDNISOLONE ACETATE 80 MG/ML
80 INJECTION, SUSPENSION INTRA-ARTICULAR; INTRALESIONAL; INTRAMUSCULAR; SOFT TISSUE ONCE
Status: DISCONTINUED | OUTPATIENT
Start: 2024-09-10 | End: 2024-09-10 | Stop reason: SDUPTHER

## 2024-09-10 RX ORDER — BUPIVACAINE HYDROCHLORIDE 2.5 MG/ML
10 INJECTION, SOLUTION EPIDURAL; INFILTRATION; INTRACAUDAL ONCE
Status: DISCONTINUED | OUTPATIENT
Start: 2024-09-10 | End: 2024-09-11 | Stop reason: HOSPADM

## 2024-09-10 RX ORDER — LIDOCAINE HYDROCHLORIDE 10 MG/ML
30 INJECTION, SOLUTION INFILTRATION; PERINEURAL ONCE
Status: DISCONTINUED | OUTPATIENT
Start: 2024-09-10 | End: 2024-09-11 | Stop reason: HOSPADM

## 2024-09-10 RX ORDER — LIDOCAINE HYDROCHLORIDE 10 MG/ML
INJECTION, SOLUTION EPIDURAL; INFILTRATION; INTRACAUDAL; PERINEURAL
Status: DISCONTINUED
Start: 2024-09-10 | End: 2024-09-10 | Stop reason: WASHOUT

## 2024-09-10 RX ORDER — IOPAMIDOL 755 MG/ML
100 INJECTION, SOLUTION INTRAVASCULAR
Status: COMPLETED | OUTPATIENT
Start: 2024-09-10 | End: 2024-09-10

## 2024-09-10 RX ORDER — METHYLPREDNISOLONE ACETATE 80 MG/ML
80 INJECTION, SUSPENSION INTRA-ARTICULAR; INTRALESIONAL; INTRAMUSCULAR; SOFT TISSUE ONCE
Status: DISCONTINUED | OUTPATIENT
Start: 2024-09-10 | End: 2024-09-11 | Stop reason: HOSPADM

## 2024-09-10 RX ADMIN — IOPAMIDOL 100 ML: 755 INJECTION, SOLUTION INTRAVENOUS at 11:39

## 2024-10-07 ENCOUNTER — TELEPHONE (OUTPATIENT)
Dept: CARDIOLOGY | Facility: CLINIC | Age: 69
End: 2024-10-07
Payer: MEDICARE

## 2024-10-07 NOTE — TELEPHONE ENCOUNTER
FACILITY: [facility]   [address]  PHONE: [phone]  FAX: [phone]  PROCEDURE: BILATERAL SI FUSION  SCHEDULED: 10/30/24  MEDS TO HOLD: ASA

## 2024-10-07 NOTE — TELEPHONE ENCOUNTER
The [facility] received a fax that requires your attention. The document has been indexed to the patient’s chart for your review.      Reason for sending: EXTERNAL MEDICAL RECORD NOTIFICATION     Documents Description: CARDIAC CLEARANCE REQ-[facility]10.7.24    Name of Sender: [facility]     Date Indexed: 10.7.24

## 2024-11-14 ENCOUNTER — TELEPHONE (OUTPATIENT)
Dept: NEUROSURGERY | Facility: CLINIC | Age: 69
End: 2024-11-14
Payer: MEDICARE

## 2024-11-14 NOTE — TELEPHONE ENCOUNTER
"LVM WITH ISABEL AT [facility] TO CALL THE OFFICE REGARDING THIS PATIENT.     \"WE NEED IS SURGERY NOTES AND IMAGES PUSHED OVER \"   "

## 2024-11-14 NOTE — TELEPHONE ENCOUNTER
Anitha with Dr. Barr's office called back, I gave her the message from Mela. She is faxing over the OP report from March 2024. She said if we need anything else we need to call back and ask for the medical assistant and not surgery scheduler.

## 2024-11-15 NOTE — TELEPHONE ENCOUNTER
LVM FOR PATIENT TO PLEASE STOP AND  THE DISC AT [facility] WITH HIS X-RAYS ON IT. I DID CALL [facility] AND HAVE THEM MAKE THE DISC FOR HIM TO .

## 2024-11-18 NOTE — TELEPHONE ENCOUNTER
CALLED PATIENT AND HE STATED THAT HE WILL BE PICKING UP THE DISC AT [facility] TODAY AND WILL BRING TO HIS APPOINTMENT ON 11/22/24.

## 2024-11-21 NOTE — PROGRESS NOTES
"Subjective   History of Present Illness: Chi Quinteros Sr. is a 69 y.o. male is being seen for consultation today at the request of SIGIFREDO Mcqueen for spondylolisthesis neurogenic claudication.  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 Marcelo in March of this year.  Patient reports approximately 1 month betterment of his symptoms which included basically lower back pain.  His main symptoms today are midline lumbosacral pain.  He describes no radiation of his pain into his buttocks hips or legs.  he describes no paresthesias.  He is unable to walk or stand for any length of time due to the pain and gets resolution of his symptoms if he sits or lays.  He was followed up for his routine postop follow-ups with some complaints of \"something sticking out of the right side of his back \".  He was worked up for sacroiliac joint dysfunction and underwent 2 injections with 1 day benefit.  Postoperative scans were reported to be okay.  He was placed on Celebrex for his pain.  He was sent over by Luke Adame for second opinion..  He does have prior history of prostate cancer s/p radiation years ago.  He is monitored with PSA checks and describes periodic injections and lab draws to monitor his levels which have been significantly bettered.  History of Present Illness        Review of Systems   Musculoskeletal:  Positive for back pain.   Neurological:  Positive for weakness.   All other systems reviewed and are negative.      Objective     ./72   Pulse 80   Resp 18   Ht 177.8 cm (70\")   Wt 108 kg (239 lb)   SpO2 98%   BMI 34.29 kg/m²    Body mass index is 34.29 kg/m².    Vitals:    11/22/24 0932   PainSc:   6   PainLoc: Back          Physical Exam  Neurological Exam  Lower extremity motor strength 5/5  Tenderness midline lumbosacral region  Negative Barbie finger  Negative Montana's maneuver  Small focal protrusions tender to touch right of midline lumbosacral " region    Assessment & Plan   Independent Review of Radiographic Studies:      I personally reviewed the images from the following studies.    CT lumbar spine 6/28/2024    Postoperative changes at L5-S1 noted with posterior paired rods and pedicle screws and disc spacer.  Possibly some subsidence of the cage along the inferior endplate of L5 report indicates possible synovial facet cyst versus disc material along the left lateral recess.      Medical Decision Making:      Chi Quinteros Sr.is a 69 y.o. male status post L5-S1 posterior fusion in March of this year with Dr. Marcelo with axial back pain.  His symptoms suggest more of neurogenic claudication scenario versus SI joint dysfunction.  He did have a focal protuberance palpated along the right of midline of his lumbosacral region on examination.  This was quite tender.  I was able to review CT lumbar spine imaging from June of this year showing possible subsidence of his cage at L5-S1 along the inferior endplate of L5.  Otherwise hardware appears okay.  There was a mention of some type soft tissue prominence along the left lateral recess uncertain etiology.  I have ordered additional imaging including MRI and repeat CT to look for any evidence of further subsidence of the cage.  I do have some concerns regarding his bone quality given his past history.  We will follow-up afterward for further recommendations.  I did explain to patient that if patient was noted to have any complications or hardware failure from prior surgery was up to Dr. Salamanca's discretion on whether patient would be to return back to operating surgeon for intervention.  He did understand.  I will place him back on some gabapentin therapy.    Diagnoses and all orders for this visit:    1. Chronic midline low back pain without sciatica (Primary)  -     MRI Lumbar Spine With & Without Contrast; Future  -     CT Lumbar Spine Without Contrast; Future    2. S/P lumbar fusion  -     MRI Lumbar Spine  With & Without Contrast; Future    3. Neurogenic claudication  -     MRI Lumbar Spine With & Without Contrast; Future    Other orders  -     gabapentin (NEURONTIN) 300 MG capsule; Take 1 capsule by mouth 3 (Three) Times a Day.  Dispense: 90 capsule; Refill: 0      Return for Next scheduled follow up.    This patient was examined wearing appropriate personal protective equipment.     Chi Quinteros Sr.  reports that he quit smoking about 3 years ago. His smoking use included cigarettes. He has been exposed to tobacco smoke. He has never used smokeless tobacco.        Body mass index is 34.29 kg/m².      Patient's blood pressure was reviewed.  Recommendations for  a low-salt diet and exercise to maintain/improve BP in addition to taking any presribed medications.    Advance Care Planning   ACP discussion was held with the patient during this visit. Patient has an advance directive (not in EMR), copy requested.         Kori Dejesus DNP, APRN  11/22/24  13:11 EST

## 2024-11-22 ENCOUNTER — PATIENT ROUNDING (BHMG ONLY) (OUTPATIENT)
Dept: NEUROSURGERY | Facility: CLINIC | Age: 69
End: 2024-11-22
Payer: MEDICARE

## 2024-11-22 ENCOUNTER — ANCILLARY ORDERS (OUTPATIENT)
Dept: NEUROSURGERY | Facility: CLINIC | Age: 69
End: 2024-11-22

## 2024-11-22 ENCOUNTER — OFFICE VISIT (OUTPATIENT)
Dept: NEUROSURGERY | Facility: CLINIC | Age: 69
End: 2024-11-22
Payer: MEDICARE

## 2024-11-22 VITALS
WEIGHT: 239 LBS | HEIGHT: 70 IN | SYSTOLIC BLOOD PRESSURE: 132 MMHG | BODY MASS INDEX: 34.22 KG/M2 | RESPIRATION RATE: 18 BRPM | DIASTOLIC BLOOD PRESSURE: 72 MMHG | OXYGEN SATURATION: 98 % | HEART RATE: 80 BPM

## 2024-11-22 DIAGNOSIS — R29.818 NEUROGENIC CLAUDICATION: ICD-10-CM

## 2024-11-22 DIAGNOSIS — Z98.1 S/P LUMBAR FUSION: ICD-10-CM

## 2024-11-22 DIAGNOSIS — G89.29 CHRONIC MIDLINE LOW BACK PAIN WITHOUT SCIATICA: Primary | ICD-10-CM

## 2024-11-22 DIAGNOSIS — M54.50 CHRONIC MIDLINE LOW BACK PAIN WITHOUT SCIATICA: Primary | ICD-10-CM

## 2024-11-22 RX ORDER — GABAPENTIN 300 MG/1
300 CAPSULE ORAL 3 TIMES DAILY
Qty: 90 CAPSULE | Refills: 0 | Status: SHIPPED | OUTPATIENT
Start: 2024-11-22

## 2024-11-22 RX ORDER — CELECOXIB 100 MG/1
100 CAPSULE ORAL 2 TIMES DAILY
COMMUNITY

## 2024-12-05 NOTE — PROGRESS NOTES
Encounter Date:12/11/2024        Patient ID: Chi Quinteros Sr. is a 69 y.o. male.      Chief Complaint:      History of Present Illness  69-year-old  male with history of coronary disease status post coronary bypass surgeryX4 June 2022, history of hypertension, hyperlipidemia, paroxysmal fibrillation and ascending aortic aneurysm presents to the cardiology office for follow-up.   CABG in June 2022 with LIMA to LAD, vein graft to diagonal, vein graft to obtuse marginal, vein graft to distal RCA and ascending aorta replacement with 30 mm graft.     Complains of shortness of breath in warm weather.  Previous ECG shows normal sinus rhythm with occasional PVC.  ST-T wave abnormalities with anterolateral ischemia.  Heart rate 84, TN interval 162,  and QTc 519 ms Unchanged when compared to previous ECG     Current cardiac medications include aspirin, Coreg, Lasix, lisinopril and potassium     He had spine surgery however continues to have significant back pain and is now able to ambulate much.     The following portions of the patient's history were reviewed and updated as appropriate: allergies, current medications, past family history, past medical history, past social history, past surgical history, and problem list.    Review of Systems   Constitutional: Negative for malaise/fatigue.   Cardiovascular:  Positive for leg swelling. Negative for chest pain, dyspnea on exertion and palpitations.   Respiratory:  Positive for shortness of breath. Negative for cough.    Gastrointestinal:  Negative for abdominal pain, nausea and vomiting.   Neurological:  Negative for dizziness, focal weakness, headaches, light-headedness and numbness.   All other systems reviewed and are negative.        Current Outpatient Medications:     albuterol sulfate  (90 Base) MCG/ACT inhaler, Inhale 2 puffs Every 4 (Four) Hours As Needed for Wheezing., Disp: , Rfl:     aspirin 81 MG EC tablet, Take 1 tablet by mouth Daily., Disp: , Rfl:      Breztri Aerosphere 160-9-4.8 MCG/ACT aerosol inhaler, Inhale 2 puffs., Disp: , Rfl:     carvedilol (COREG) 3.125 MG tablet, Take 1 tablet by mouth 2 (Two) Times a Day., Disp: 180 tablet, Rfl: 3    furosemide (LASIX) 40 MG tablet, Take 1 tablet by mouth Daily., Disp: 90 tablet, Rfl: 3    lisinopril (PRINIVIL,ZESTRIL) 5 MG tablet, Take 1 tablet by mouth Daily., Disp: 90 tablet, Rfl: 3    potassium chloride ER (K-TAB) 20 MEQ tablet controlled-release ER tablet, TAKE 1 TABLET BY MOUTH EVERY DAY, Disp: 90 tablet, Rfl: 3    Current outpatient and discharge medications have been reconciled for the patient.  Reviewed by: Jeff Kang MD       No Known Allergies    Family History   Problem Relation Age of Onset    Alzheimer's disease Mother     Diabetes Mother     Bone cancer Father     No Known Problems Sister     No Known Problems Brother     No Known Problems Brother        Past Surgical History:   Procedure Laterality Date    ASCENDING AORTIC ANEURYSM REPAIR W/ MECHANICAL AORTIC VALVE REPLACEMENT N/A 6/10/2022    Procedure: AORTIC VALVE ASCENDING ANEURYSM REPAIR;  Surgeon: Benson Hughes MD;  Location: [facility];  Service: Cardiothoracic;  Laterality: N/A;    CARDIAC CATHETERIZATION Right 6/2/2022    Procedure: Coronary angiography;  Surgeon: Frank Giralod MD;  Location: [facility] CATH INVASIVE LOCATION;  Service: Cardiovascular;  Laterality: Right;    CARDIAC CATHETERIZATION N/A 6/2/2022    Procedure: Left Heart Cath;  Surgeon: Frank Giraldo MD;  Location: [facility] CATH INVASIVE LOCATION;  Service: Cardiovascular;  Laterality: N/A;    CARDIAC CATHETERIZATION N/A 6/2/2022    Procedure: Left ventriculography;  Surgeon: Frank Giraldo MD;  Location: [facility] CATH INVASIVE LOCATION;  Service: Cardiovascular;  Laterality: N/A;    CORONARY ARTERY BYPASS GRAFT N/A 6/10/2022    Procedure: CORONARY ARTERY BYPASS GRAFTING;  Surgeon: Benson Hughes MD;  Location: [facility];  Service: Cardiothoracic;   "Laterality: N/A;       Past Medical History:   Diagnosis Date    Aneurysm     Back pain     Emphysema lung     Hypertension        Family History   Problem Relation Age of Onset    Alzheimer's disease Mother     Diabetes Mother     Bone cancer Father     No Known Problems Sister     No Known Problems Brother     No Known Problems Brother        Social History     Socioeconomic History    Marital status:    Tobacco Use    Smoking status: Former     Current packs/day: 0.00     Types: Cigarettes     Quit date: 6/1/2021     Years since quitting: 3.5     Passive exposure: Past    Smokeless tobacco: Never   Vaping Use    Vaping status: Never Used   Substance and Sexual Activity    Alcohol use: Yes     Comment: 3-4 beers daily    Drug use: Never    Sexual activity: Defer               Objective:       Physical Exam    /74 (BP Location: Left arm, Patient Position: Sitting, Cuff Size: Adult)   Pulse 88   Ht 177.8 cm (70\")   Wt 110 kg (243 lb)   SpO2 97%   BMI 34.87 kg/m²   The patient is alert, oriented and in no distress.    Vital signs as noted above.    Head and neck revealed no carotid bruits or jugular venous distension.  No thyromegaly or lymphadenopathy is present.    Lungs clear.  No wheezing.  Breath sounds are normal bilaterally.    Heart normal first and second heart sounds.  No murmur..  No pericardial rub is present.  No gallop is present.    Abdomen soft and nontender.  No organomegaly is present.    Extremities revealed good peripheral pulses without any pedal edema.    Skin warm and dry.    Musculoskeletal system is grossly normal.    CNS grossly normal.           Diagnosis Plan   1. Chronic heart failure with preserved ejection fraction (HFpEF)        2. Essential hypertension        3. Other specified pre-operative examination        4. Coronary artery disease involving native coronary artery of native heart without angina pectoris        5. Primary hypertension        6. Paroxysmal atrial " fibrillation        7. Aneurysm of ascending aorta without rupture        8. S/P CABG (coronary artery bypass graft)        9. Chronic diastolic (congestive) heart failure        10. Pre-operative cardiovascular examination        11. Mixed hyperlipidemia        12. Obesity (BMI 30-39.9)        13. COPD exacerbation        14. Abnormal nuclear stress test        LAB RESULTS (LAST 7 DAYS)    CBC        BMP        CMP         BNP        TROPONIN        CoAg        Creatinine Clearance  CrCl cannot be calculated (Patient's most recent lab result is older than the maximum 30 days allowed.).    ABG        Radiology  No radiology results for the last day    EKG  Procedures    Stress test  Results for orders placed during the hospital encounter of 22    Stress Test With Myocardial Perfusion One Day    Interpretation Summary  · Left ventricular ejection fraction is mildly reduced. (Calculated EF = 45%).  · Myocardial perfusion imaging indicates a large-sized, severe area of ischemia located in the inferior wall and lateral wall.  · Impressions are consistent with a high risk study.      Echocardiogram  Results for orders placed during the hospital encounter of 22    Adult Transthoracic Echo Complete With Contrast if Necessary Per Protocol (With Agitated Saline)    Interpretation Summary  · Left ventricular ejection fraction appears to be 56 - 60%.  · The right ventricular cavity is mildly dilated.  · Mild dilation of the aortic root is present.  · No pericardial effusion noted      Cardiac catheterization  Results for orders placed during the hospital encounter of 22    Cardiac Catheterization/Vascular Study    Narrative  Heart Cath Report    NAME:              Chi NICHLOSON Neli Martin.  :                1955  AGE/SEX:        67 y.o. male  MRN:                [id number]  ADM DATE:      [unfilled]  DOS:      Pre-Procedure Notes  H&P Performed  [x]  Yes []  No       []  N/A    Indications:  []  ACS <= 24  HRS  []  ACS >24 HRS  []  New Onset Angina <= 2 mos  []  Worsening Angina  []  Resuscitated Cardiac Arrest  []  Angina on Exertion:  []  Suspected CAD  []  Valvular Disease  []  Pericardial Disease  []  Cardiac Arrythmia  []  Cardiomyopathy  []  LV Dysfunction  [x]  Syncope  []  Post Cardiac Transplant  []  Eval. For Exercise Clearance  [x]  Abnormal Stress Test  []  Other  []  Pre-Operative Evaluation  If Pre-Op Eval:  Evaluation for Surgery Type:  []  Cardiac Surgery   []  Non-Cardiac Surgery  Functional Capacity:  []  <4 METS  []  >=4 METS w/o symptoms  []  >= 4 METS with symptoms  []  Unknown  Surgical Risk:  []  Low  []  Intermediate  []  High Risk: Vascular  []  High Risk Non-Vascular    Risks, Benefits, & Complications Discussed:  [x]  Yes  []  No  []  N/A    Questions Answered:  [x]  Yes  []  No  []  N/A    Consent Obtained:  [x]  Yes  []  No  []  N/A    CHF: [x]  Yes  []  No  If Yes:  Newly Diagnosed?  [x]  Yes  []  No  If Yes:  HF Type:  []  Diastolic  [x]  Systolic  []  Unknown      Procedure Description  The patient underwent successful [x]  Left  []  Right  []  Left & Right  Heart catheterization and coronary angiography via the  [x]  Femoral approach  []  Radial approach  []  Brachial approach    Procedure Narrative:  Informed consent was obtained from the patient after explaining risks and benefits.  Patient was brought to the cardiac catheterization laboratory and placed on the table in the usual fashion.  Right groin was shaved and prepped in sterile fashion. Moderate conscious sedation was given utilizing IV Versed and fentanyl administered by RN with continuous EKG oximetry and hemodynamic monitoring supervised by me throughout the entire case, conscious sedation time was 30 minutes.  I was present with the patient for the duration of moderate sedation and supervised staff who had no other duties and monitored the patient for the entire procedure patient had Regalado 2-3 sedation scale. 2% lidocaine  was used for local anesthesia to the right groin.  A total of 20 cc was used.  A 6 [place] sheath was placed in the right femoral artery.  A 6 [place] pigtail catheter, 6 [place] JR4 catheter and a 6 [place] JL4 catheter was used for the procedure.  After the cardiac catheterization is complete, all the catheters and sheath were removed.  Patient tolerated the procedure very well.  No complications noted.      Hemodynamic    LVEDP:8-12   Estimated EF %: 40%    Initial Aortic Pressure: 140/80    AV Gradient: No gradient    Rt. Heart Pressure:    Wall Motion:  Dominance:  []  Left  []  Right  [x]  Co-Dominant    Coronary Arteriography: (Please Code highest degree of stenosis)    Left Main %:   0  Proximal LAD %: 70%  Mid/Distal LAD %:  0.  Diagonal branch has an ostial 70%  LCX %: Subtotal occlusion with collaterals from the LAD  Ramus:  RCA %: 100% with collaterals from the left to the right  Lima %:  SVG(s) %:      Complications: No complications    Estimated Blood Loss:  None      Impression and Recommendation: Severe three-vessel coronary disease  Mild LV dysfunction  We will review films with surgeons for possible coronary bypass    Electronically signed by Frank Giraldo MD, 06/02/22, 7:03 PM EDT          Assessment and Plan       Diagnoses and all orders for this visit:    1. Chronic heart failure with preserved ejection fraction (HFpEF) (Primary)    2. Essential hypertension    3. Other specified pre-operative examination    4. Coronary artery disease involving native coronary artery of native heart without angina pectoris    5. Primary hypertension    6. Paroxysmal atrial fibrillation    7. Aneurysm of ascending aorta without rupture    8. S/P CABG (coronary artery bypass graft)    9. Chronic diastolic (congestive) heart failure    10. Pre-operative cardiovascular examination    11. Mixed hyperlipidemia    12. Obesity (BMI 30-39.9)    13. COPD exacerbation    14. Abnormal nuclear stress test  Overview:  Added  automatically from request for surgery [id number]           Coronary artery disease  Multivessel coronary disease status post CABG x4   LIMA to LAD, vein graft to diagonal, vein graft to obtuse marginal, vein graft to distal RCA  Continue aspirin and beta-blocker  Unable to tolerate statin  Echocardiogram shows EF of 45% with global hypokinesis.  Grade 1 diastolic dysfunction and RVSP of 36 mmHg with mild mitral regurgitation.      Hypertension  Well-controlled on lisinopril and Coreg     Hyperlipidemia  Unable to tolerate statin  Currently not interested on Repatha or Praluent     Atrial fibrillation  Postop paroxysmal A. fib with no recurrence.  [id number] score is 4  He has maintained sinus rhythm since then  Amiodarone was previously discontinued  Continue Coreg  If A-fib recurs he will be started on anticoagulation     Aortic aneurysm  Patient also had ascending aortic aneurysm which was repaired with a 30 mm graft and is stable.  Continue ACE inhibitor and beta-blocker  Recent echocardiogram shows EF of 45%, no evidence of aortic root enlargement.     Heart failure with preserved ejection fraction  Continue ACE inhibitor, beta-blocker, Lasix.  Echocardiogram with EF of 45%  Discussed salt intake, water intake and daily weights  Mild shortness of breath secondary to COPD from smoking.  He no longer smokes     Prostate cancer  Radiation therapy.     Neuropathy  Unable to tolerate gabapentin.  Will follow-up with spine surgery     Obese  BMI 34.9.  He weighs 243 pounds.  Lifestyle modifications weight loss, dietary changes discussed with the patient.     Preop cardiovascular risk assessment     Lulu Perioperative Risk for Myocardial Infarction or Cardiac Arrest (ISADORA):  0.4% Risk of myocardial infarction or cardiac arrest, intraoperatively or up to 30 days post-op     Revised Cardiac Risk Index for Pre-Operative Risk: 2 point  10.1% 30-day risk of death, MI, or cardiac arrest      He completed spine surgery with  no ISABEL procedure cardiac issues.  He may need another back surgery as he continues to have pain.

## 2024-12-11 ENCOUNTER — OFFICE VISIT (OUTPATIENT)
Dept: CARDIOLOGY | Facility: CLINIC | Age: 69
End: 2024-12-11
Payer: MEDICARE

## 2024-12-11 VITALS
BODY MASS INDEX: 34.79 KG/M2 | DIASTOLIC BLOOD PRESSURE: 74 MMHG | SYSTOLIC BLOOD PRESSURE: 136 MMHG | WEIGHT: 243 LBS | HEART RATE: 88 BPM | OXYGEN SATURATION: 97 % | HEIGHT: 70 IN

## 2024-12-11 DIAGNOSIS — E78.2 MIXED HYPERLIPIDEMIA: ICD-10-CM

## 2024-12-11 DIAGNOSIS — Z01.810 PRE-OPERATIVE CARDIOVASCULAR EXAMINATION: ICD-10-CM

## 2024-12-11 DIAGNOSIS — I48.0 PAROXYSMAL ATRIAL FIBRILLATION: ICD-10-CM

## 2024-12-11 DIAGNOSIS — I10 ESSENTIAL HYPERTENSION: ICD-10-CM

## 2024-12-11 DIAGNOSIS — I25.10 CORONARY ARTERY DISEASE INVOLVING NATIVE CORONARY ARTERY OF NATIVE HEART WITHOUT ANGINA PECTORIS: ICD-10-CM

## 2024-12-11 DIAGNOSIS — I71.21 ANEURYSM OF ASCENDING AORTA WITHOUT RUPTURE: ICD-10-CM

## 2024-12-11 DIAGNOSIS — I10 PRIMARY HYPERTENSION: ICD-10-CM

## 2024-12-11 DIAGNOSIS — Z95.1 S/P CABG (CORONARY ARTERY BYPASS GRAFT): ICD-10-CM

## 2024-12-11 DIAGNOSIS — J44.1 COPD EXACERBATION: ICD-10-CM

## 2024-12-11 DIAGNOSIS — R94.39 ABNORMAL NUCLEAR STRESS TEST: ICD-10-CM

## 2024-12-11 DIAGNOSIS — I50.32 CHRONIC DIASTOLIC (CONGESTIVE) HEART FAILURE: ICD-10-CM

## 2024-12-11 DIAGNOSIS — I50.32 CHRONIC HEART FAILURE WITH PRESERVED EJECTION FRACTION (HFPEF): Primary | ICD-10-CM

## 2024-12-11 DIAGNOSIS — E66.9 OBESITY (BMI 30-39.9): ICD-10-CM

## 2024-12-11 DIAGNOSIS — Z01.818 OTHER SPECIFIED PRE-OPERATIVE EXAMINATION: ICD-10-CM

## 2024-12-17 ENCOUNTER — HOSPITAL ENCOUNTER (OUTPATIENT)
Dept: CT IMAGING | Facility: HOSPITAL | Age: 69
Discharge: HOME | End: 2024-12-17
Payer: MEDICARE

## 2024-12-17 ENCOUNTER — HOSPITAL ENCOUNTER (OUTPATIENT)
Dept: MRI IMAGING | Facility: HOSPITAL | Age: 69
Discharge: HOME | End: 2024-12-17
Payer: MEDICARE

## 2024-12-17 DIAGNOSIS — M54.50 CHRONIC MIDLINE LOW BACK PAIN WITHOUT SCIATICA: ICD-10-CM

## 2024-12-17 DIAGNOSIS — Z98.1 S/P LUMBAR FUSION: ICD-10-CM

## 2024-12-17 DIAGNOSIS — R29.818 NEUROGENIC CLAUDICATION: ICD-10-CM

## 2024-12-17 DIAGNOSIS — G89.29 CHRONIC MIDLINE LOW BACK PAIN WITHOUT SCIATICA: ICD-10-CM

## 2024-12-17 PROCEDURE — 25010000002 GADOTERIDOL PER 1 ML: Performed by: NURSE PRACTITIONER

## 2024-12-17 PROCEDURE — 72158 MRI LUMBAR SPINE W/O & W/DYE: CPT

## 2024-12-17 PROCEDURE — 72131 CT LUMBAR SPINE W/O DYE: CPT

## 2024-12-17 PROCEDURE — A9579 GAD-BASE MR CONTRAST NOS,1ML: HCPCS | Performed by: NURSE PRACTITIONER

## 2024-12-17 RX ADMIN — GADOTERIDOL 20 ML: 279.3 INJECTION, SOLUTION INTRAVENOUS at 16:30

## 2025-01-14 NOTE — PROGRESS NOTES
"Subjective   History of Present Illness: Chi Quinteros Sr. is a 69 y.o. male is here today for follow-up for low back pain that has continued despite undergoing an L5-S1 TLIF with Dr. Marcelo about a year ago.  He also describes a small hard spot in the back of his back that is painful to push.  His back pain is located along his prior surgical site and downward with pressure down into his buttocks.  His pain is significantly worse when he is up walking or standing and significantly decreased his ambulation ability.  He cannot even get through washing the dishes before he has to sit down.  He does feel that his legs are weak but he describes no leg pain or paresthesias.  He had undergone prior sacroiliac joint injections with mixed results.  He was sent over to our office as a second opinion from Dr. Marcelo's office.  Patient is accompanied by his daughter today.  He underwent recommended imaging and is here for review.    History of Present Illness    The following portions of the patient's history were reviewed and updated as appropriate: allergies, current medications, past family history, past medical history, past social history, past surgical history, and problem list.    Review of Systems   Constitutional:  Positive for activity change.   HENT: Negative.     Eyes: Negative.    Respiratory: Negative.     Cardiovascular: Negative.    Gastrointestinal: Negative.    Endocrine: Negative.    Genitourinary: Negative.    Musculoskeletal:  Positive for arthralgias, back pain (bilateral lower/left hip) and myalgias.   Skin: Negative.    Allergic/Immunologic: Negative.    Neurological:  Positive for weakness (bilateral legs).   Hematological: Negative.    Psychiatric/Behavioral:  Positive for sleep disturbance.    All other systems reviewed and are negative.      Objective     ./66 (BP Location: Left arm, Patient Position: Sitting)   Pulse 78   Ht 177.8 cm (70\")   Wt 108 kg (238 lb)   SpO2 96%   BMI 34.15 kg/m²  "   Body mass index is 34.15 kg/m².    Vitals:    01/17/25 1335   PainSc:   8          Physical Exam  Neurological Exam  No change to prior examination  Lower extremity motor strength 5/5  Tenderness midline lumbosacral region  Negative Barbie finger  Negative Montana's maneuver  Small focal protrusions tender to touch right of midline lumbosacral region    Assessment & Plan   Independent Review of Radiographic Studies:      I personally reviewed the images from the following studies.    CT lumbar spine    1. No acute lumbar spine findings.  2. Previously described, soft tissue thickening is seen in the region of the L5-S1 left lateral recess/proximal left neural foramen, such that moderate left neural foraminal narrowing is suspected.  3. No high-grade lumbar canal stenosis is identified.  4.. L5-S1 spinal fusion hardware appears intact.  Cage appears to be similarly positioned.  No definitive osseous incorporation along the L5-S1 space.    5.Minor retrolisthesis L4 upon L5 and minor anterolisthesis L5 upon S1.    MRI lumbar spine    Postoperative changes noted from posterior fusion at L5-S1.  There is slight retrolisthesis of L4 on L5.  There is multilevel degenerative disc changes throughout.  Small persistent disc bulge at L5-S1.  There may be some lateral recess narrowing at this level but not overtly convinced that it is affecting the S1 nerve.     Medical Decision Making:      Chi Quinteros is a 69 y.o. male being seen today for follow-up of his back pain and review of imaging.  Patient was referred over to us by Dr. Marcelo for second opinion for his symptoms.  I have reviewed his imaging and ruled out any compressive pathology.  He does have a small area of what I feel like is a calcification in his soft tissues responsible for painful palpation.  His CT scan showed stable findings when compared to prior CT scanning approximately 6 months ago.  The hardware appears similarly positioned with no overt hardware  loosening noted.  He does not appear that he is osseously fused at this level though which could indicate a developing pseudoarthrosis.  I have several recommendations including utilizing a back brace, ordering osteogenesis stimulator, referring him to pain management for some injections and to undergo flexion-extension imaging to look for any instability that may definitively narrow down reasons why he is having his pain.  It is hopeful that the bone growth stimulator would be beneficial in helping develop an osseous incorporation along his fusion site.  I do feel that physical therapy is also a good idea to help strengthen his legs.  Patient is recommended to follow-up in about 6 months with Dr. Salamanca if he does continue to show evidence of pseudoarthrosis despite recommendations.  Patient agrees to plan of care and wishes to proceed.  I encouraged him to call the office with any questions or concerns.     Diagnoses and all orders for this visit:    1. S/P lumbar fusion (Primary)  -     Ambulatory Referral to Pain Management  -     traMADol (ULTRAM) 50 MG tablet; Take 1 tablet by mouth Every 8 (Eight) Hours As Needed for Moderate Pain.  Dispense: 24 tablet; Refill: 0  -     XR Spine Lumbar Complete With Flex & Ext; Future  -     Ambulatory Referral to Physical Therapy for Evaluation & Treatment  -     Osteogenesis Stimulator  -     Back Brace    2. Chronic midline low back pain without sciatica  -     Ambulatory Referral to Pain Management  -     traMADol (ULTRAM) 50 MG tablet; Take 1 tablet by mouth Every 8 (Eight) Hours As Needed for Moderate Pain.  Dispense: 24 tablet; Refill: 0  -     XR Spine Lumbar Complete With Flex & Ext; Future  -     Ambulatory Referral to Physical Therapy for Evaluation & Treatment  -     Osteogenesis Stimulator  -     Back Brace      Return if symptoms worsen or fail to improve.    This patient was examined wearing appropriate personal protective equipment.     Chi Quinteros Sr.   reports that he quit smoking about 3 years ago. His smoking use included cigarettes. He has been exposed to tobacco smoke. He has never used smokeless tobacco.       Body mass index is 34.15 kg/m².      Patient's blood pressure was reviewed.  Recommendations for  a low-salt diet and exercise to maintain/improve BP in addition to taking any presribed medications.    Advance Care Planning   ACP discussion was held with the patient during this visit. Patient has an advance directive (not in EMR), copy requested.         Kori Dejesus DNP, APRN  01/17/25  16:06 EST

## 2025-01-17 ENCOUNTER — OFFICE VISIT (OUTPATIENT)
Dept: NEUROSURGERY | Facility: CLINIC | Age: 70
End: 2025-01-17
Payer: MEDICARE

## 2025-01-17 ENCOUNTER — HOSPITAL ENCOUNTER (OUTPATIENT)
Dept: GENERAL RADIOLOGY | Facility: HOSPITAL | Age: 70
Discharge: HOME | End: 2025-01-17
Payer: MEDICARE

## 2025-01-17 VITALS
DIASTOLIC BLOOD PRESSURE: 66 MMHG | WEIGHT: 238 LBS | SYSTOLIC BLOOD PRESSURE: 105 MMHG | BODY MASS INDEX: 34.07 KG/M2 | HEIGHT: 70 IN | OXYGEN SATURATION: 96 % | HEART RATE: 78 BPM

## 2025-01-17 DIAGNOSIS — G89.29 CHRONIC MIDLINE LOW BACK PAIN WITHOUT SCIATICA: ICD-10-CM

## 2025-01-17 DIAGNOSIS — M54.50 CHRONIC MIDLINE LOW BACK PAIN WITHOUT SCIATICA: ICD-10-CM

## 2025-01-17 DIAGNOSIS — Z98.1 S/P LUMBAR FUSION: Primary | ICD-10-CM

## 2025-01-17 DIAGNOSIS — Z98.1 S/P LUMBAR FUSION: ICD-10-CM

## 2025-01-17 PROCEDURE — 72114 X-RAY EXAM L-S SPINE BENDING: CPT

## 2025-01-17 RX ORDER — PREDNISONE 10 MG/1
TABLET ORAL
COMMUNITY
Start: 2024-12-19

## 2025-01-17 RX ORDER — TRAMADOL HYDROCHLORIDE 50 MG/1
50 TABLET ORAL EVERY 8 HOURS PRN
Qty: 24 TABLET | Refills: 0 | Status: SHIPPED | OUTPATIENT
Start: 2025-01-17

## 2025-01-17 RX ORDER — CELECOXIB 100 MG/1
CAPSULE ORAL
COMMUNITY
Start: 2025-01-16

## 2025-01-20 ENCOUNTER — TELEPHONE (OUTPATIENT)
Dept: NEUROSURGERY | Facility: CLINIC | Age: 70
End: 2025-01-20
Payer: MEDICARE

## 2025-01-20 NOTE — TELEPHONE ENCOUNTER
Caller: [facility]      Best call back number: [phone]    What orders are you requesting (i.e. lab or imaging): PHYS THERAPY    In what timeframe would the patient need to come in: ASAP    Where will you receive your lab/imaging services: [facility]    Additional notes: PLEASE FAX PT ORDER [phone] FOR [facility].

## 2025-01-24 DIAGNOSIS — Z98.1 S/P LUMBAR FUSION: Primary | ICD-10-CM

## 2025-03-29 DIAGNOSIS — I50.32 CHRONIC DIASTOLIC (CONGESTIVE) HEART FAILURE: ICD-10-CM

## 2025-03-30 DIAGNOSIS — I10 ESSENTIAL HYPERTENSION: ICD-10-CM

## 2025-03-30 DIAGNOSIS — I50.32 CHRONIC HEART FAILURE WITH PRESERVED EJECTION FRACTION (HFPEF): ICD-10-CM

## 2025-03-31 RX ORDER — LISINOPRIL 5 MG/1
5 TABLET ORAL DAILY
Qty: 90 TABLET | Refills: 0 | Status: SHIPPED | OUTPATIENT
Start: 2025-03-31

## 2025-03-31 RX ORDER — CARVEDILOL 3.12 MG/1
3.12 TABLET ORAL 2 TIMES DAILY
Qty: 180 TABLET | Refills: 3 | Status: SHIPPED | OUTPATIENT
Start: 2025-03-31

## 2025-03-31 RX ORDER — FUROSEMIDE 40 MG/1
40 TABLET ORAL DAILY
Qty: 90 TABLET | Refills: 0 | Status: SHIPPED | OUTPATIENT
Start: 2025-03-31

## 2025-03-31 NOTE — TELEPHONE ENCOUNTER
Rx Refill Note  Requested Prescriptions     Pending Prescriptions Disp Refills    furosemide (LASIX) 40 MG tablet [Pharmacy Med Name: FUROSEMIDE 40 MG TABLET] 90 tablet 0     Sig: TAKE 1 TABLET BY MOUTH EVERY DAY    lisinopril (PRINIVIL,ZESTRIL) 5 MG tablet [Pharmacy Med Name: LISINOPRIL 5 MG TABLET] 90 tablet 0     Sig: TAKE 1 TABLET BY MOUTH EVERY DAY      Last office visit with prescribing clinician: 12/11/2024   Last telemedicine visit with prescribing clinician: Visit date not found   Next office visit with prescribing clinician: Visit date not found                         Would you like a call back once the refill request has been completed: [] Yes [] No    If the office needs to give you a call back, can they leave a voicemail: [] Yes [] No    Deysi Barksdale MA  03/31/25, 10:41 EDT

## 2025-04-21 ENCOUNTER — TELEPHONE (OUTPATIENT)
Dept: CARDIOLOGY | Facility: CLINIC | Age: 70
End: 2025-04-21
Payer: MEDICARE

## 2025-04-24 ENCOUNTER — TELEPHONE (OUTPATIENT)
Dept: CARDIOLOGY | Facility: CLINIC | Age: 70
End: 2025-04-24
Payer: MEDICARE

## 2025-04-24 NOTE — TELEPHONE ENCOUNTER
Spoke to patient. Advised he still needs to complete BMP for Dr. Kang. Patient wants done at [facility]. Called Priti at [facility] office and got fax number for lab. Faxed lab order to [phone].     Patient does not need call back.

## 2025-04-24 NOTE — TELEPHONE ENCOUNTER
Pt returning Nicole's call stating that he had labs done at his prostate doctor's appt yesterday. He stated that you could call Dr. Jayme Pena's office at [phone] for a copy.

## 2025-04-24 NOTE — TELEPHONE ENCOUNTER
Called 's office to check on those labs and they stated that the patient only had a PSA lab drawn, so I called the patient back to advise him that we still need the BMP done. No answer, LMOM.

## 2025-06-12 ENCOUNTER — TELEPHONE (OUTPATIENT)
Dept: NEUROSURGERY | Facility: CLINIC | Age: 70
End: 2025-06-12
Payer: MEDICARE

## 2025-06-12 NOTE — TELEPHONE ENCOUNTER
ATTEMPTED WT AND NOT SUCCESSFUL     PATIENT CALLED TO INQUIRE ON BRACE QUESTION AND THEN ALSO SAID HE IS IN A LOT OF PAIN     PER ROSA ISELA SENDING T/E     STATES HE CANNOT EVEN WALK TO THE GROCERY STORE     PAIN MANAGEMENT WON'T PRESCRIBE HIM PAIN MEDICINE     PLEASE CALL PATIENT WITH ANY ADVICE     [phone]    THANK YOU!

## 2025-06-12 NOTE — TELEPHONE ENCOUNTER
Patient scheduled to see Dr Salamanca 6/18/25. The question about his brace can be addressed at that time.

## 2025-06-12 NOTE — TELEPHONE ENCOUNTER
ATTEMPTED WT AND NOT SUCCESSFUL     PATIENT CALLED TO GET THE OK TO DONATE HIS BACK BRACE     STATES THE FACILITY WON'T TAKE IT WITHOUT A NOTE FROM DR. VIRAMONTES SAYING HE NO LONGER NEEDS THIS BRACE     PLEASE CALL PATIENT WITH ANY ADVICE     [phone]    OR PER PATIENT YOU CAN CALL THE FACILITY AND GIVE THEM THE OK OVER THE PHONE     [phone]    THANK YOU!

## 2025-06-12 NOTE — TELEPHONE ENCOUNTER
Called patient and asked him if he had completed Physical Therapy and he stated that he had done 5 visits and his insurance would not cover any additional.  I did also ask him about Pain Management and he stated that he had received two sets of injections,3 on each side. I did tell him per the Providers last office note that he could follow up with Dr Salamanca if his pain continues. I did schedule a follow up appointment with Dr Salamanca for him.

## 2025-06-16 NOTE — PROGRESS NOTES
Subjective:     Encounter Date:06/17/2025        Patient ID: Chi Quinteros Sr. 1955     Chief Complaint:  6 month follow-up    History of Present Illness:  Chi Quinteros Sr. is a 70 y.o. male with a history of coronary disease status post CABG x4 (June 2022), hypertension, hyperlipidemia, paroxysmal fibrillation and ascending aortic aneurysm who presents to the cardiology office for 6 month follow-up. The patient denies any chest pain. He reports shortness of breath, but states it is chronic. He states it is worse with the warmer weather. He states he was diagnosed with COPD. He quit smoking 4 years ago. He reports some intermittent bilateral lower extremity edema, but states it improves when he elevates his feet.     The patient is complaining of back pain. He states he had lumbar spine surgery in March 2024, but continues to have significant back pain that affects his mobility. He states he is switching to a different pain management physician because he does not feel like he's getting any relief from his current pain management.        EKG shows normal sinus rhythm with nonspecific ST and T wave abnormality and prolonged QT interval. HR 91 bpm,  ms,  ms, QTc 506 ms. Unchanged when compared to previous ECG.       Current cardiac medications include:  aspirin, Coreg, Lasix, lisinopril and potassium.       Review of systems:  Review of Systems   Constitutional: Negative for malaise/fatigue.   Cardiovascular:  Positive for leg swelling. Negative for chest pain, dyspnea on exertion and palpitations.   Respiratory:  Positive for shortness of breath. Negative for cough.    Gastrointestinal:  Negative for abdominal pain, nausea and vomiting.   Neurological:  Negative for dizziness, headaches, light-headedness, numbness and weakness.   All other systems reviewed and are negative.        The following portions of the patient's history were reviewed and updated as appropriate: allergies, current  medications, past family history, past medical history, past social history, past surgical history and problem list.    Past Medical History:  Past Medical History:   Diagnosis Date    Aneurysm     Back pain     Emphysema lung     Hypertension     Smoker 06/17/2025       Past Surgical History:  Past Surgical History:   Procedure Laterality Date    ASCENDING AORTIC ANEURYSM REPAIR W/ MECHANICAL AORTIC VALVE REPLACEMENT N/A 6/10/2022    Procedure: AORTIC VALVE ASCENDING ANEURYSM REPAIR;  Surgeon: Benson Hughes MD;  Location: [facility] CVOR;  Service: Cardiothoracic;  Laterality: N/A;    CARDIAC CATHETERIZATION Right 6/2/2022    Procedure: Coronary angiography;  Surgeon: Frank Giraldo MD;  Location: [facility] CATH INVASIVE LOCATION;  Service: Cardiovascular;  Laterality: Right;    CARDIAC CATHETERIZATION N/A 6/2/2022    Procedure: Left Heart Cath;  Surgeon: Frank Giraldo MD;  Location:  [facility] CATH INVASIVE LOCATION;  Service: Cardiovascular;  Laterality: N/A;    CARDIAC CATHETERIZATION N/A 6/2/2022    Procedure: Left ventriculography;  Surgeon: Frank Giraldo MD;  Location: [facility] CATH INVASIVE LOCATION;  Service: Cardiovascular;  Laterality: N/A;    CORONARY ARTERY BYPASS GRAFT N/A 6/10/2022    Procedure: CORONARY ARTERY BYPASS GRAFTING;  Surgeon: Benson Hughes MD;  Location: [facility] CVOR;  Service: Cardiothoracic;  Laterality: N/A;        Allergies:  Allergies   Allergen Reactions    Statins Myalgia       Current Meds:     Current Outpatient Medications:     albuterol sulfate  (90 Base) MCG/ACT inhaler, Inhale 2 puffs Every 4 (Four) Hours As Needed for Wheezing., Disp: , Rfl:     aspirin 81 MG EC tablet, Take 1 tablet by mouth Daily., Disp: , Rfl:     carvedilol (COREG) 3.125 MG tablet, TAKE 1 TABLET BY MOUTH TWICE A DAY, Disp: 180 tablet, Rfl: 3    celecoxib (CeleBREX) 100 MG capsule, Take 1 capsule by mouth 2 (Two) Times a Day., Disp: , Rfl:     furosemide (LASIX) 40 MG tablet, Take 0.5  tablets by mouth Daily., Disp: , Rfl:     lisinopril (PRINIVIL,ZESTRIL) 5 MG tablet, TAKE 1 TABLET BY MOUTH EVERY DAY, Disp: 90 tablet, Rfl: 0    potassium chloride ER (K-TAB) 20 MEQ tablet controlled-release ER tablet, Take 1 tablet by mouth Daily., Disp: 90 tablet, Rfl: 3    Social History:   Social History     Tobacco Use    Smoking status: Former     Current packs/day: 0.00     Types: Cigarettes     Quit date: 2021     Years since quittin.0     Passive exposure: Past    Smokeless tobacco: Never   Substance Use Topics    Alcohol use: Yes     Comment: 3-4 beers daily        Family History:  Family History   Problem Relation Age of Onset    Alzheimer's disease Mother     Diabetes Mother     Bone cancer Father     No Known Problems Sister     No Known Problems Brother     No Known Problems Brother                  Objective:       Physical Exam:  Vitals reviewed.   Constitutional:       Appearance: Well-developed and well-groomed. Obese.   Eyes:      Conjunctiva/sclera: Conjunctivae normal.      Pupils: Pupils are equal, round, and reactive to light.   HENT:      Head: Normocephalic and atraumatic.    Mouth/Throat:      Mouth: Mucous membranes are moist.      Pharynx: Oropharynx is clear.   Pulmonary:      Effort: Pulmonary effort is normal.      Breath sounds: Normal breath sounds.   Cardiovascular:      PMI at left midclavicular line. Normal rate. Regular rhythm.      Murmurs: There is no murmur.   Pulses:     Intact distal pulses.   Edema:     Pretibial: bilateral trace edema of the pretibial area.     Ankle: bilateral trace edema of the ankle.  Abdominal:      General: Bowel sounds are normal.      Palpations: Abdomen is soft.   Musculoskeletal: Normal range of motion.      Cervical back: Normal range of motion and neck supple. Skin:     General: Skin is warm and dry.   Neurological:      Mental Status: Alert and oriented to person, place, and time.   Psychiatric:         Mood and Affect: Mood normal.     "     Behavior: Behavior normal.         Vital signs:  /68 (BP Location: Left arm, Patient Position: Sitting, Cuff Size: Adult)   Pulse 91   Ht 177.8 cm (70\")   Wt 103 kg (226 lb)   SpO2 94%   BMI 32.43 kg/m²       Recent labs reviewed:    CBC        BMP        CMP       Creatinine Clearance  CrCl cannot be calculated (Patient's most recent lab result is older than the maximum 30 days allowed.).    BNP        TROPONIN        CoAg          Cardiology results reviewed:    Stress test  Results for orders placed during the hospital encounter of 05/31/22    Stress Test With Myocardial Perfusion One Day    Interpretation Summary  · Left ventricular ejection fraction is mildly reduced. (Calculated EF = 45%).  · Myocardial perfusion imaging indicates a large-sized, severe area of ischemia located in the inferior wall and lateral wall.  · Impressions are consistent with a high risk study.      Echocardiogram  Results for orders placed during the hospital encounter of 05/31/22    Adult Transthoracic Echo Complete With Contrast if Necessary Per Protocol (With Agitated Saline)    Interpretation Summary  · Left ventricular ejection fraction appears to be 56 - 60%.  · The right ventricular cavity is mildly dilated.  · Mild dilation of the aortic root is present.  · No pericardial effusion noted      Cardiac catheterization    Results for orders placed during the hospital encounter of 05/31/22    Cardiac Catheterization/Vascular Study    Procedure Description  The patient underwent successful [x]  Left  []  Right  []  Left & Right  Heart catheterization and coronary angiography via the  [x]  Femoral approach  []  Radial approach  []  Brachial approach    Procedure Narrative:  Informed consent was obtained from the patient after explaining risks and benefits.  Patient was brought to the cardiac catheterization laboratory and placed on the table in the usual fashion.  Right groin was shaved and prepped in sterile fashion. " Moderate conscious sedation was given utilizing IV Versed and fentanyl administered by RN with continuous EKG oximetry and hemodynamic monitoring supervised by me throughout the entire case, conscious sedation time was 30 minutes.  I was present with the patient for the duration of moderate sedation and supervised staff who had no other duties and monitored the patient for the entire procedure patient had Regalado 2-3 sedation scale. 2% lidocaine was used for local anesthesia to the right groin.  A total of 20 cc was used.  A 6 [place] citizen sheath was placed in the right femoral artery.  A 6 [place] citizen pigtail catheter, 6 [place] citizen JR4 catheter and a 6 [place] citizen JL4 catheter was used for the procedure.  After the cardiac catheterization is complete, all the catheters and sheath were removed.  Patient tolerated the procedure very well.  No complications noted.      Hemodynamic    LVEDP:8-12   Estimated EF %: 40%    Initial Aortic Pressure: 140/80    AV Gradient: No gradient    Rt. Heart Pressure:    Wall Motion:  Dominance:  []  Left  []  Right  [x]  Co-Dominant    Coronary Arteriography: (Please Code highest degree of stenosis)    Left Main %:   0  Proximal LAD %: 70%  Mid/Distal LAD %:  0.  Diagonal branch has an ostial 70%  LCX %: Subtotal occlusion with collaterals from the LAD  Ramus:  RCA %: 100% with collaterals from the left to the right  Lima %:  SVG(s) %:      Complications: No complications    Estimated Blood Loss:  None      Impression and Recommendation: Severe three-vessel coronary disease  Mild LV dysfunction  We will review films with surgeons for possible coronary bypass    Electronically signed by Frank Giraldo MD, 06/02/22, 7:03 PM EDT               Assessment:         Diagnoses and all orders for this visit:    1. Coronary artery disease involving native coronary artery of native heart without angina pectoris (Primary)    2. S/P CABG (coronary artery bypass graft)    3. S/P ascending aortic aneurysm repair per   Ellen 6/10/2022    4. Primary hypertension  -     BNP; Future  -     Comprehensive Metabolic Panel; Future    5. Mixed hyperlipidemia  -     Comprehensive Metabolic Panel; Future  -     Lipid Panel; Future    6. Paroxysmal atrial fibrillation [I48.0]    7. 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  Dispense: 90 tablet; Refill: 3  -     BNP; Future  -     Comprehensive Metabolic Panel; Future  -     furosemide (LASIX) 40 MG tablet; Take 0.5 tablets by mouth Daily.    8. Obesity (BMI 30-39.9)    9. Chronic obstructive pulmonary disease, unspecified COPD type    10. Chronic midline low back pain without sciatica    11. Encounter for screening and preventative care  -     Comprehensive Metabolic Panel; Future  -     Lipid Panel; Future  -     Hemoglobin A1c; Future    Other orders  -     Discontinue: furosemide (LASIX) 40 MG tablet; Take 0.5 tablets by mouth Daily.                Plan:       Coronary artery disease involving native coronary artery of native heart without angina pectoris   S/P CABG (coronary artery bypass graft)  Multivessel coronary disease status post CABG x4 with LIMA to LAD, vein graft to diagonal, vein graft to obtuse marginal, vein graft to distal RCA (6/10/2022)  Continue aspirin and beta-blocker  He is intolerant to statins   Echocardiogram in Dec. 2022 shows EF of 45% with global hypokinesis, grade 1 diastolic dysfunction and RVSP of 36 mmHg with mild mitral regurgitation.     Ascending aortic aneurysm   S/P ascending aortic aneurysm repair per Dr. Hughes 6/10/2022  Echocardiogram done in Dec. 2022 shows EF of 45%, no evidence of aortic root enlargement.  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 2022 shows EF of 45% with global hypokinesis, grade 1 diastolic dysfunction and RVSP of 36 mmHg with mild mitral regurgitation.   The patient has trace edema in bilateral lower extremities.   Continue Lasix, Coreg and lisinopril  Recommend daily weights and low sodium diet.   I will check a BNP and CMP.  Recommend repeat echocardiogram, but the patient is reluctant.     Obesity (BMI 30-39.9)  BMI is 32.43. He weighs 226 lbs.  Lifestyle modifications recommended    Chronic obstructive pulmonary disease, unspecified COPD type  Former smoker, he quit 4 years ago  He uses albuterol inhaler as needed    Chronic midline low back pain without sciatica  History of lumbar spinal fusion (3/2024)  On Celebrex  The patient states he is switching pain management physicians.     Encounter for screening and preventative care  I will check a CMP, lipid panel and A1c     History of prostate cancer  Status post radiation       Electronically signed by ERICA Ruby, 06/17/25, 11:04 AM EDT.

## 2025-06-17 ENCOUNTER — OFFICE VISIT (OUTPATIENT)
Dept: CARDIOLOGY | Facility: CLINIC | Age: 70
End: 2025-06-17
Payer: MEDICARE

## 2025-06-17 VITALS
DIASTOLIC BLOOD PRESSURE: 68 MMHG | WEIGHT: 226 LBS | SYSTOLIC BLOOD PRESSURE: 100 MMHG | OXYGEN SATURATION: 94 % | BODY MASS INDEX: 32.35 KG/M2 | HEIGHT: 70 IN | HEART RATE: 91 BPM

## 2025-06-17 DIAGNOSIS — I48.0 PAROXYSMAL ATRIAL FIBRILLATION: Chronic | ICD-10-CM

## 2025-06-17 DIAGNOSIS — G89.29 CHRONIC MIDLINE LOW BACK PAIN WITHOUT SCIATICA: Chronic | ICD-10-CM

## 2025-06-17 DIAGNOSIS — E66.9 OBESITY (BMI 30-39.9): Chronic | ICD-10-CM

## 2025-06-17 DIAGNOSIS — E78.2 MIXED HYPERLIPIDEMIA: Chronic | ICD-10-CM

## 2025-06-17 DIAGNOSIS — M54.50 CHRONIC MIDLINE LOW BACK PAIN WITHOUT SCIATICA: Chronic | ICD-10-CM

## 2025-06-17 DIAGNOSIS — Z95.1 S/P CABG (CORONARY ARTERY BYPASS GRAFT): Chronic | ICD-10-CM

## 2025-06-17 DIAGNOSIS — I50.22 CHRONIC HEART FAILURE WITH MILDLY REDUCED EJECTION FRACTION (HFMREF, 41-49%): Chronic | ICD-10-CM

## 2025-06-17 DIAGNOSIS — Z86.79 S/P ASCENDING AORTIC ANEURYSM REPAIR: Chronic | ICD-10-CM

## 2025-06-17 DIAGNOSIS — Z00.00 ENCOUNTER FOR SCREENING AND PREVENTATIVE CARE: ICD-10-CM

## 2025-06-17 DIAGNOSIS — J44.9 CHRONIC OBSTRUCTIVE PULMONARY DISEASE, UNSPECIFIED COPD TYPE: Chronic | ICD-10-CM

## 2025-06-17 DIAGNOSIS — Z98.890 S/P ASCENDING AORTIC ANEURYSM REPAIR: Chronic | ICD-10-CM

## 2025-06-17 DIAGNOSIS — I25.10 CORONARY ARTERY DISEASE INVOLVING NATIVE CORONARY ARTERY OF NATIVE HEART WITHOUT ANGINA PECTORIS: Primary | Chronic | ICD-10-CM

## 2025-06-17 DIAGNOSIS — I10 PRIMARY HYPERTENSION: Chronic | ICD-10-CM

## 2025-06-17 PROBLEM — F05 POSTOPERATIVE DELIRIUM: Status: RESOLVED | Noted: 2022-06-14 | Resolved: 2025-06-17

## 2025-06-17 PROBLEM — F17.200 SMOKER: Status: ACTIVE | Noted: 2025-06-17

## 2025-06-17 PROBLEM — H52.223 REGULAR ASTIGMATISM OF BOTH EYES: Status: ACTIVE | Noted: 2024-08-07

## 2025-06-17 PROBLEM — J44.1 COPD EXACERBATION: Status: RESOLVED | Noted: 2022-06-01 | Resolved: 2025-06-17

## 2025-06-17 PROBLEM — R55 SYNCOPE, UNSPECIFIED SYNCOPE TYPE: Status: RESOLVED | Noted: 2022-06-05 | Resolved: 2025-06-17

## 2025-06-17 PROBLEM — H25.813 COMBINED FORMS OF AGE-RELATED CATARACT OF BOTH EYES: Status: ACTIVE | Noted: 2023-05-10

## 2025-06-17 PROBLEM — N28.9 ACUTE RENAL INSUFFICIENCY: Status: RESOLVED | Noted: 2022-06-02 | Resolved: 2025-06-17

## 2025-06-17 PROBLEM — H52.4 PRESBYOPIA: Status: ACTIVE | Noted: 2023-05-10

## 2025-06-17 PROBLEM — F17.200 SMOKER: Status: RESOLVED | Noted: 2025-06-17 | Resolved: 2025-06-17

## 2025-06-17 PROBLEM — R94.39 ABNORMAL NUCLEAR STRESS TEST: Status: RESOLVED | Noted: 2022-05-31 | Resolved: 2025-06-17

## 2025-06-17 RX ORDER — FUROSEMIDE 40 MG/1
20 TABLET ORAL DAILY
Start: 2025-06-17 | End: 2025-06-17

## 2025-06-17 RX ORDER — CELECOXIB 100 MG/1
1 CAPSULE ORAL 2 TIMES DAILY
COMMUNITY

## 2025-06-17 RX ORDER — POTASSIUM CHLORIDE 1500 MG/1
20 TABLET, EXTENDED RELEASE ORAL DAILY
Qty: 90 TABLET | Refills: 3 | Status: SHIPPED | OUTPATIENT
Start: 2025-06-17

## 2025-06-17 RX ORDER — FUROSEMIDE 40 MG/1
20 TABLET ORAL DAILY
Start: 2025-06-17

## 2025-06-17 NOTE — PATIENT INSTRUCTIONS
Go to the Express Lab at [facility] for your labs. You will need to be fasting, but sips of water with medication is okay to take that morning.

## 2025-06-18 ENCOUNTER — OFFICE VISIT (OUTPATIENT)
Dept: NEUROSURGERY | Facility: CLINIC | Age: 70
End: 2025-06-18
Payer: MEDICARE

## 2025-06-18 ENCOUNTER — PREP FOR SURGERY (OUTPATIENT)
Dept: OTHER | Facility: HOSPITAL | Age: 70
End: 2025-06-18
Payer: MEDICARE

## 2025-06-18 VITALS
HEIGHT: 70 IN | BODY MASS INDEX: 31.92 KG/M2 | DIASTOLIC BLOOD PRESSURE: 73 MMHG | WEIGHT: 223 LBS | OXYGEN SATURATION: 94 % | SYSTOLIC BLOOD PRESSURE: 110 MMHG | HEART RATE: 86 BPM

## 2025-06-18 DIAGNOSIS — Z98.1 S/P LUMBAR FUSION: ICD-10-CM

## 2025-06-18 DIAGNOSIS — R79.1 ABNORMAL COAGULATION PROFILE: ICD-10-CM

## 2025-06-18 DIAGNOSIS — S32.009K LUMBAR PSEUDOARTHROSIS: Primary | ICD-10-CM

## 2025-06-18 DIAGNOSIS — Z98.1 S/P LUMBAR FUSION: Primary | ICD-10-CM

## 2025-06-18 DIAGNOSIS — R79.9 ABNORMAL FINDING OF BLOOD CHEMISTRY, UNSPECIFIED: ICD-10-CM

## 2025-06-18 NOTE — PROGRESS NOTES
"Subjective   History of Present Illness: Chi Quinteros Sr. is a 70 y.o. male is here today for follow-up. Today patient reports low back pain.  Briefly the patient underwent TLIF at L5-S1 apparently for pars fractures performed a year and a half ago.  He never got significant improvement following surgery and has had progressively worsening low back pain since.  He describes the pain as banding across his low back.  Patient denies any significant pain into his legs.  He is done physical therapy without any movement as well as injections.      Chief Complaint   Patient presents with    Back Pain          Previous treatment:   Tylenol and Greater than 6 weeks of physical therapy    Previous neurosurgery:  L5-S1 TLIF with Dr. Marcelo    Previous injections: Injections at [facility].    The following portions of the patient's history were reviewed and updated as appropriate: allergies, current medications, past family history, past medical history, past social history, past surgical history, and problem list.    Review of Systems   Constitutional:  Positive for activity change.   HENT: Negative.     Eyes: Negative.    Respiratory: Negative.     Cardiovascular: Negative.    Gastrointestinal: Negative.    Endocrine: Negative.    Genitourinary: Negative.    Musculoskeletal:  Positive for arthralgias, back pain (bilateral lower) and myalgias.   Skin: Negative.    Allergic/Immunologic: Negative.    Neurological:  Positive for weakness (bilateral legs) and numbness (tingling/feet/hands).   Psychiatric/Behavioral:  Positive for sleep disturbance.        Objective      /73 (BP Location: Right arm, Patient Position: Sitting)   Pulse 86   Ht 177.8 cm (70\")   Wt 101 kg (223 lb)   SpO2 94%   BMI 32.00 kg/m²    Body mass index is 32 kg/m².  Vitals:    06/18/25 1143   PainSc: 10-Worst pain ever         Neurological Exam  Mental Status  Awake, alert and oriented to person, place and time.    Motor   Strength is 5/5 " throughout all four extremities.    Sensory  Sensation is intact to light touch, pinprick, vibration and proprioception in all four extremities.    Reflexes    Right pathological reflexes: Bridger's absent.  Left pathological reflexes: Bridger's absent.          Assessment & Plan   Independent Review of Radiographic Studies:      I personally reviewed and interpreted the images from the following studies.    MRI lumbar spine: Previous fusion at L5-S1.  Some mild degenerative changes at L4-5 without significant central or foraminal stenosis    Lumbar flexion-extension x-rays: No evidence of spondylolisthesis or dynamic instability    CT lumbar December 2024: Hardware intact with some evidence of mild loosening posteriorly.  There is some subsidence of the TLIF graft and no evidence of fusion across the disc space    Medical Decision Making:      Chi Torresbenson Welch is a 70 y.o. male status post L5-S1 TLIF for pars fractures who appears to have a pseudoarthrosis at L5-S1.  He has now over a year and a half postop with worsening low back pain and no evidence of fusion on most recent CT scan.  Will plan to proceed with L5-S1 ALIF with removal of previous interbody hardware.  I will also get a CT scan prior to surgery for planning purposes.      Diagnoses and all orders for this visit:    1. Lumbar pseudoarthrosis (Primary)    2. S/P lumbar fusion      No follow-ups on file.    This patient was examined wearing appropriate personal protective equipment.                      Dr. Dong Salamanca IV    06/18/25  12:28 EDT

## 2025-07-08 DIAGNOSIS — I10 ESSENTIAL HYPERTENSION: ICD-10-CM

## 2025-07-08 DIAGNOSIS — I50.22 CHRONIC HEART FAILURE WITH MILDLY REDUCED EJECTION FRACTION (HFMREF, 41-49%): Chronic | ICD-10-CM

## 2025-07-09 RX ORDER — FUROSEMIDE 40 MG/1
40 TABLET ORAL DAILY
Qty: 90 TABLET | Refills: 3 | Status: SHIPPED | OUTPATIENT
Start: 2025-07-09

## 2025-07-09 RX ORDER — LISINOPRIL 5 MG/1
5 TABLET ORAL DAILY
Qty: 90 TABLET | Refills: 0 | Status: SHIPPED | OUTPATIENT
Start: 2025-07-09

## 2025-07-09 NOTE — TELEPHONE ENCOUNTER
Rx Refill Note  Requested Prescriptions     Signed Prescriptions Disp Refills    furosemide (LASIX) 40 MG tablet 90 tablet 3     Sig: TAKE 1 TABLET BY MOUTH EVERY DAY     Authorizing Provider: REBEL DICKENS     Ordering User: PRUDENCE LERNER    lisinopril (PRINIVIL,ZESTRIL) 5 MG tablet 90 tablet 0     Sig: TAKE 1 TABLET BY MOUTH EVERY DAY     Authorizing Provider: REBEL DICKENS     Ordering User: PRUDENCE LERNER      Last office visit with prescribing clinician: 6/17/2025   Next office visit with prescribing clinician: Visit date not found                         LABORATORY - SCAN - [facility], [facility], 06/05/2025 (06/05/2025)     Prudence Lerner MA  07/09/25, 09:26 EDT

## 2025-07-22 ENCOUNTER — TELEPHONE (OUTPATIENT)
Dept: NEUROSURGERY | Facility: CLINIC | Age: 70
End: 2025-07-22
Payer: MEDICARE

## 2025-07-22 NOTE — TELEPHONE ENCOUNTER
Caller: DAYRON BOWERS    Relationship: Emergency Contact    Best call back number: [phone]    Who are you requesting to speak with (clinical staff, provider,  specific staff member): SURGERY SCHEDULING    What was the call regarding: PLEASE CALL PATIENT'S DAUGHTER FOR AN UPDATE ON SURGERY SCHEDULING, AS SURGERY WAS ORDERED IN JUNE.

## 2025-07-23 DIAGNOSIS — R29.818 NEUROGENIC CLAUDICATION: Primary | ICD-10-CM

## 2025-07-28 NOTE — TELEPHONE ENCOUNTER
PT'S DAUGHTER CALLED BACK IN ASKING FOR AN UPDATE AS SHE'S NOT BEEN CALLED BACK.     ROBINSONDAYRON (Daughter)  [phone] (Mobile)

## 2025-07-29 ENCOUNTER — TRANSCRIBE ORDERS (OUTPATIENT)
Dept: NEUROSURGERY | Facility: CLINIC | Age: 70
End: 2025-07-29
Payer: MEDICARE

## 2025-07-29 NOTE — TELEPHONE ENCOUNTER
Called the patient and went over surgery scheduling, including his appointment with Dr. Burton. He requested we call his daughter Clary. Called her and went over everything instructions. Told her we will mail him the surgery information. She verbalized understanding.

## 2025-08-27 ENCOUNTER — TELEPHONE (OUTPATIENT)
Age: 70
End: 2025-08-27
Payer: MEDICARE

## (undated) DEVICE — BLOWER MISTER CLEARVIEW W/TBG

## (undated) DEVICE — SUT SILK 0 CT1 CR8 18IN C021D

## (undated) DEVICE — Device

## (undated) DEVICE — SPNG GZ AVANT 6PLY 4X4IN STRL PK/2

## (undated) DEVICE — CANN ART EOPA 3D NV W/CONN 20F

## (undated) DEVICE — TUBING, SUCTION, 1/4" X 12', STRAIGHT: Brand: MEDLINE

## (undated) DEVICE — ELECTRD DEFIB M/FUNC PROPADZ STRL 2PK

## (undated) DEVICE — ELECTRD BLD EZ CLN STD 6.5IN

## (undated) DEVICE — SYR LL TP 10ML STRL

## (undated) DEVICE — PK OPN HEART WHT WRP 50

## (undated) DEVICE — SUT SILK 2 SUTUPAK TIE 60IN SA8H 2STRAND

## (undated) DEVICE — SOL NACL 0.9PCT 1000ML

## (undated) DEVICE — GLV SURG BIOGEL LTX PF 7 1/2

## (undated) DEVICE — CABL BIPOL W/ALLGTR CLIP/SM 12FT

## (undated) DEVICE — CATH DIAG IMPULSE FL4 6F 100CM

## (undated) DEVICE — ANTIBACTERIAL UNDYED BRAIDED (POLYGLACTIN 910), SYNTHETIC ABSORBABLE SUTURE: Brand: COATED VICRYL

## (undated) DEVICE — DRAPE SHEET ULTRAGARD: Brand: MEDLINE

## (undated) DEVICE — CONNECT Y INTERSEPT W/LL 3/8 X 3/8 X 3/8IN

## (undated) DEVICE — SUT PROLN 5/0 V5 36IN 8934H

## (undated) DEVICE — BNDG ELAS ELITE V/CLOSE 4IN 5YD LF STRL

## (undated) DEVICE — TEMP PACING WIRE: Brand: MYO/WIRE

## (undated) DEVICE — PRESSURE TUBING: Brand: TRUWAVE

## (undated) DEVICE — BLD SCLPL BEAVR MINI STR 2BVL 180D LF

## (undated) DEVICE — ROTATING SURGICAL PUNCHES, 1 PER POUCH: Brand: A&E MEDICAL / ROTATING SURGICAL PUNCHES

## (undated) DEVICE — BG BLD SYS

## (undated) DEVICE — GW DIAG EMERALD HEPCOAT MOVE JTIP STD .035 3MM 150CM

## (undated) DEVICE — SCANLAN® VASCU-STATT® II SINGLE-USE BULLDOG CLAMP W/FIRMER CLAMPING PRESS - MIDI ANGLED 45° (YELLOW), CLAMPING PRESSURE 75-80 G (2/STERILE PKG): Brand: SCANLAN® VASCU-STATT® II SINGLE-USE BULLDOG CLAMP W/FIRMER CLAMPING PRESS

## (undated) DEVICE — CANN AORT ROOT DLP VNT/8IN 14G 7F

## (undated) DEVICE — SUT PROLN 4/0 V5 36IN 8935H

## (undated) DEVICE — CATH DIAG IMPULSE FR4 6F 100CM

## (undated) DEVICE — SUT SILK 2/0 SH CR8 18IN CR8 C012D

## (undated) DEVICE — SOL IRR H2O BTL 1000ML STRL

## (undated) DEVICE — PINNACLE INTRODUCER SHEATH: Brand: PINNACLE

## (undated) DEVICE — SUT PROLN 7/0 BV1 D/A 30IN 8703H

## (undated) DEVICE — HEMOCONCENTRATOR PERFUS LPS06

## (undated) DEVICE — SUT PROLN 6/0 C1 D/A 30IN 8706H

## (undated) DEVICE — BALN IAB SENSATION PLS F/O 50CC 8F 15CM SYS

## (undated) DEVICE — CORONARY ARTERY BYPASS GRAFT MARKERS, STAINLESS STEEL, DISTAL, WITHOUT HOLDER: Brand: ANASTOMARK CORONARY ARTERY BYPASS GRAFT MARKERS, STAINLESS STEEL, DISTAL

## (undated) DEVICE — 28 FR STRAIGHT – SOFT PVC CATHETER: Brand: PVC THORACIC CATHETERS

## (undated) DEVICE — PAD HEMOST NEPTUNE 2X2IN

## (undated) DEVICE — 28 FR RIGHT ANGLE – SOFT PVC CATHETER: Brand: PVC THORACIC CATHETERS

## (undated) DEVICE — PK TRY HEART CATH 50

## (undated) DEVICE — SYS VASOVIEW HEMOPRO ENDOSCOPIC HARVST VESL

## (undated) DEVICE — SYS PERFUS SEP PLATLT W TIPS CUST

## (undated) DEVICE — CANN RETRGR STYLET RSCP 15F

## (undated) DEVICE — SUT PDS 0 CT-1 Z340H

## (undated) DEVICE — SUT PROLN 4/0 V7 36IN 8975H

## (undated) DEVICE — ST PERFUS M/

## (undated) DEVICE — BLOOD TRANSFUSION FILTER: Brand: HAEMONETICS

## (undated) DEVICE — PK PERFUS CUST W/CARDIOPLEGIA

## (undated) DEVICE — BNDG ELAS ELITE V/CLOSE 6IN 5YD LF STRL

## (undated) DEVICE — SENSR CERBRL O2 PK/2

## (undated) DEVICE — GAUZE,SPONGE,4"X4",16PLY,XRAY,STRL,LF: Brand: MEDLINE

## (undated) DEVICE — SUT SILK 4/0 TIES 18IN A183H

## (undated) DEVICE — TBG INSUFF MALE L/L W 12MM CON: Brand: MEDLINE INDUSTRIES, INC.

## (undated) DEVICE — CATH DIAG IMPULSE FL5 6F 100CM

## (undated) DEVICE — SOL IRR NACL 0.9PCT BT 1000ML

## (undated) DEVICE — PK ATS CUST W CARDIOTOMY RESEVOIR

## (undated) DEVICE — CAUTRY ACU/DISPO FINETP HI/TEMP 2IN DISP STRL

## (undated) DEVICE — RADIFOCUS OBTURATOR: Brand: RADIFOCUS

## (undated) DEVICE — SUT SILK 2/0 TIES 18IN A185H

## (undated) DEVICE — CATH DIAG IMPULSE PIG .056 6F 110CM

## (undated) DEVICE — ANGIO-SEAL VIP VASCULAR CLOSURE DEVICE: Brand: ANGIO-SEAL

## (undated) DEVICE — OCEAN DRAIN, SINGLE W/STOPCOCK: Brand: OCEAN

## (undated) DEVICE — SUT PROLN 3/0 V7 D/A 36IN 8976H

## (undated) DEVICE — DRN WND CH RND FUL/FLUT NO/TROC 3/8IN 28F